# Patient Record
Sex: MALE | Race: WHITE | NOT HISPANIC OR LATINO
[De-identification: names, ages, dates, MRNs, and addresses within clinical notes are randomized per-mention and may not be internally consistent; named-entity substitution may affect disease eponyms.]

---

## 2017-01-18 PROBLEM — Z00.00 ENCOUNTER FOR PREVENTIVE HEALTH EXAMINATION: Status: ACTIVE | Noted: 2017-01-18

## 2017-01-19 ENCOUNTER — APPOINTMENT (OUTPATIENT)
Dept: CARDIOLOGY | Facility: CLINIC | Age: 52
End: 2017-01-19

## 2017-01-19 ENCOUNTER — NON-APPOINTMENT (OUTPATIENT)
Age: 52
End: 2017-01-19

## 2017-01-19 VITALS
TEMPERATURE: 98.3 F | BODY MASS INDEX: 35.29 KG/M2 | DIASTOLIC BLOOD PRESSURE: 93 MMHG | WEIGHT: 275 LBS | RESPIRATION RATE: 18 BRPM | OXYGEN SATURATION: 95 % | HEIGHT: 74 IN | SYSTOLIC BLOOD PRESSURE: 138 MMHG | HEART RATE: 82 BPM

## 2017-03-09 ENCOUNTER — APPOINTMENT (OUTPATIENT)
Dept: CARDIOLOGY | Facility: CLINIC | Age: 52
End: 2017-03-09

## 2017-03-09 ENCOUNTER — NON-APPOINTMENT (OUTPATIENT)
Age: 52
End: 2017-03-09

## 2017-03-09 VITALS
RESPIRATION RATE: 18 BRPM | HEIGHT: 74 IN | TEMPERATURE: 97.9 F | DIASTOLIC BLOOD PRESSURE: 90 MMHG | HEART RATE: 83 BPM | SYSTOLIC BLOOD PRESSURE: 133 MMHG | OXYGEN SATURATION: 95 % | WEIGHT: 272 LBS | BODY MASS INDEX: 34.91 KG/M2

## 2017-03-10 LAB
ALBUMIN SERPL ELPH-MCNC: 4.5 G/DL
ALP BLD-CCNC: 67 U/L
ALT SERPL-CCNC: 24 U/L
ANION GAP SERPL CALC-SCNC: 18 MMOL/L
AST SERPL-CCNC: 16 U/L
BILIRUB SERPL-MCNC: 0.7 MG/DL
BUN SERPL-MCNC: 14 MG/DL
CALCIUM SERPL-MCNC: 9.6 MG/DL
CHLORIDE SERPL-SCNC: 101 MMOL/L
CHOLEST SERPL-MCNC: 167 MG/DL
CHOLEST/HDLC SERPL: 3.2 RATIO
CO2 SERPL-SCNC: 22 MMOL/L
CREAT SERPL-MCNC: 1.07 MG/DL
GLUCOSE SERPL-MCNC: 96 MG/DL
HBA1C MFR BLD HPLC: 5.5 %
HDLC SERPL-MCNC: 53 MG/DL
LDLC SERPL CALC-MCNC: 78 MG/DL
POTASSIUM SERPL-SCNC: 4.4 MMOL/L
PROT SERPL-MCNC: 7.1 G/DL
SODIUM SERPL-SCNC: 141 MMOL/L
TRIGL SERPL-MCNC: 178 MG/DL

## 2017-03-22 ENCOUNTER — APPOINTMENT (OUTPATIENT)
Dept: CARDIOLOGY | Facility: CLINIC | Age: 52
End: 2017-03-22

## 2017-04-05 ENCOUNTER — OTHER (OUTPATIENT)
Age: 52
End: 2017-04-05

## 2017-04-06 ENCOUNTER — APPOINTMENT (OUTPATIENT)
Dept: CARDIOLOGY | Facility: CLINIC | Age: 52
End: 2017-04-06

## 2017-09-07 ENCOUNTER — OUTPATIENT (OUTPATIENT)
Dept: OUTPATIENT SERVICES | Facility: HOSPITAL | Age: 52
LOS: 1 days | End: 2017-09-07

## 2017-09-07 ENCOUNTER — APPOINTMENT (OUTPATIENT)
Dept: RADIOLOGY | Facility: HOSPITAL | Age: 52
End: 2017-09-07
Payer: COMMERCIAL

## 2017-09-07 DIAGNOSIS — R13.10 DYSPHAGIA, UNSPECIFIED: ICD-10-CM

## 2017-09-07 PROCEDURE — 74220 X-RAY XM ESOPHAGUS 1CNTRST: CPT | Mod: 26

## 2017-09-26 ENCOUNTER — APPOINTMENT (OUTPATIENT)
Dept: GASTROENTEROLOGY | Facility: CLINIC | Age: 52
End: 2017-09-26
Payer: COMMERCIAL

## 2017-09-26 VITALS
SYSTOLIC BLOOD PRESSURE: 130 MMHG | HEART RATE: 108 BPM | DIASTOLIC BLOOD PRESSURE: 80 MMHG | BODY MASS INDEX: 32.73 KG/M2 | WEIGHT: 255 LBS | TEMPERATURE: 98.7 F | OXYGEN SATURATION: 98 % | HEIGHT: 74 IN

## 2017-09-26 PROCEDURE — 99244 OFF/OP CNSLTJ NEW/EST MOD 40: CPT

## 2017-09-28 ENCOUNTER — NON-APPOINTMENT (OUTPATIENT)
Age: 52
End: 2017-09-28

## 2017-09-28 ENCOUNTER — APPOINTMENT (OUTPATIENT)
Dept: CARDIOLOGY | Facility: CLINIC | Age: 52
End: 2017-09-28
Payer: COMMERCIAL

## 2017-09-28 VITALS
DIASTOLIC BLOOD PRESSURE: 86 MMHG | HEART RATE: 100 BPM | HEIGHT: 74 IN | RESPIRATION RATE: 18 BRPM | OXYGEN SATURATION: 98 % | BODY MASS INDEX: 32.73 KG/M2 | WEIGHT: 255 LBS | SYSTOLIC BLOOD PRESSURE: 134 MMHG

## 2017-09-28 DIAGNOSIS — E78.5 HYPERLIPIDEMIA, UNSPECIFIED: ICD-10-CM

## 2017-09-28 PROCEDURE — 93000 ELECTROCARDIOGRAM COMPLETE: CPT

## 2017-09-28 PROCEDURE — 99215 OFFICE O/P EST HI 40 MIN: CPT

## 2017-09-29 PROBLEM — E78.5 HYPERLIPIDEMIA: Status: ACTIVE | Noted: 2017-01-19

## 2017-10-02 ENCOUNTER — APPOINTMENT (OUTPATIENT)
Dept: GASTROENTEROLOGY | Facility: AMBULATORY MEDICAL SERVICES | Age: 52
End: 2017-10-02

## 2017-10-05 ENCOUNTER — OUTPATIENT (OUTPATIENT)
Dept: OUTPATIENT SERVICES | Facility: HOSPITAL | Age: 52
LOS: 1 days | End: 2017-10-05
Payer: COMMERCIAL

## 2017-10-05 DIAGNOSIS — D62 ACUTE POSTHEMORRHAGIC ANEMIA: ICD-10-CM

## 2017-10-05 DIAGNOSIS — R13.11 DYSPHAGIA, ORAL PHASE: ICD-10-CM

## 2017-10-05 LAB
ALBUMIN SERPL ELPH-MCNC: 4 G/DL — SIGNIFICANT CHANGE UP (ref 3.3–5)
ALP SERPL-CCNC: 66 U/L — SIGNIFICANT CHANGE UP (ref 30–120)
ALT FLD-CCNC: 27 U/L DA — SIGNIFICANT CHANGE UP (ref 10–60)
ANION GAP SERPL CALC-SCNC: 7 MMOL/L — SIGNIFICANT CHANGE UP (ref 5–17)
AST SERPL-CCNC: 16 U/L — SIGNIFICANT CHANGE UP (ref 10–40)
BASOPHILS # BLD AUTO: 0.1 K/UL — SIGNIFICANT CHANGE UP (ref 0–0.2)
BASOPHILS NFR BLD AUTO: 1 % — SIGNIFICANT CHANGE UP (ref 0–2)
BILIRUB SERPL-MCNC: 0.6 MG/DL — SIGNIFICANT CHANGE UP (ref 0.2–1.2)
BUN SERPL-MCNC: 10 MG/DL — SIGNIFICANT CHANGE UP (ref 7–23)
CALCIUM SERPL-MCNC: 9.4 MG/DL — SIGNIFICANT CHANGE UP (ref 8.4–10.5)
CHLORIDE SERPL-SCNC: 104 MMOL/L — SIGNIFICANT CHANGE UP (ref 96–108)
CO2 SERPL-SCNC: 30 MMOL/L — SIGNIFICANT CHANGE UP (ref 22–31)
CREAT SERPL-MCNC: 1.32 MG/DL — HIGH (ref 0.5–1.3)
EOSINOPHIL # BLD AUTO: 0.1 K/UL — SIGNIFICANT CHANGE UP (ref 0–0.5)
EOSINOPHIL NFR BLD AUTO: 1 % — SIGNIFICANT CHANGE UP (ref 0–6)
GLUCOSE SERPL-MCNC: 90 MG/DL — SIGNIFICANT CHANGE UP (ref 70–99)
HCT VFR BLD CALC: 48.6 % — SIGNIFICANT CHANGE UP (ref 39–50)
HGB BLD-MCNC: 15.2 G/DL — SIGNIFICANT CHANGE UP (ref 13–17)
LYMPHOCYTES # BLD AUTO: 2.3 K/UL — SIGNIFICANT CHANGE UP (ref 1–3.3)
LYMPHOCYTES # BLD AUTO: 31.4 % — SIGNIFICANT CHANGE UP (ref 13–44)
MCHC RBC-ENTMCNC: 28.3 PG — SIGNIFICANT CHANGE UP (ref 27–34)
MCHC RBC-ENTMCNC: 31.2 GM/DL — LOW (ref 32–36)
MCV RBC AUTO: 90.6 FL — SIGNIFICANT CHANGE UP (ref 80–100)
MONOCYTES # BLD AUTO: 0.8 K/UL — SIGNIFICANT CHANGE UP (ref 0–0.9)
MONOCYTES NFR BLD AUTO: 11.4 % — SIGNIFICANT CHANGE UP (ref 2–14)
NEUTROPHILS # BLD AUTO: 4 K/UL — SIGNIFICANT CHANGE UP (ref 1.8–7.4)
NEUTROPHILS NFR BLD AUTO: 55.3 % — SIGNIFICANT CHANGE UP (ref 43–77)
PLATELET # BLD AUTO: 247 K/UL — SIGNIFICANT CHANGE UP (ref 150–400)
POTASSIUM SERPL-MCNC: 4.3 MMOL/L — SIGNIFICANT CHANGE UP (ref 3.5–5.3)
POTASSIUM SERPL-SCNC: 4.3 MMOL/L — SIGNIFICANT CHANGE UP (ref 3.5–5.3)
PROT SERPL-MCNC: 7.7 G/DL — SIGNIFICANT CHANGE UP (ref 6–8.3)
RBC # BLD: 5.37 M/UL — SIGNIFICANT CHANGE UP (ref 4.2–5.8)
RBC # FLD: 12.6 % — SIGNIFICANT CHANGE UP (ref 10.3–14.5)
SODIUM SERPL-SCNC: 141 MMOL/L — SIGNIFICANT CHANGE UP (ref 135–145)
WBC # BLD: 7.3 K/UL — SIGNIFICANT CHANGE UP (ref 3.8–10.5)
WBC # FLD AUTO: 7.3 K/UL — SIGNIFICANT CHANGE UP (ref 3.8–10.5)

## 2017-10-05 PROCEDURE — 36415 COLL VENOUS BLD VENIPUNCTURE: CPT

## 2017-10-05 PROCEDURE — 86301 IMMUNOASSAY TUMOR CA 19-9: CPT

## 2017-10-05 PROCEDURE — 80053 COMPREHEN METABOLIC PANEL: CPT

## 2017-10-05 PROCEDURE — 85027 COMPLETE CBC AUTOMATED: CPT

## 2017-10-05 PROCEDURE — 82378 CARCINOEMBRYONIC ANTIGEN: CPT

## 2017-10-06 LAB
CANCER AG19-9 SERPL-ACNC: 20.2 U/ML — SIGNIFICANT CHANGE UP
CEA SERPL-MCNC: 2.4 NG/ML — SIGNIFICANT CHANGE UP (ref 0–3.8)

## 2017-10-10 ENCOUNTER — TRANSCRIPTION ENCOUNTER (OUTPATIENT)
Age: 52
End: 2017-10-10

## 2017-10-16 ENCOUNTER — RESULT REVIEW (OUTPATIENT)
Age: 52
End: 2017-10-16

## 2017-10-16 ENCOUNTER — TRANSCRIPTION ENCOUNTER (OUTPATIENT)
Age: 52
End: 2017-10-16

## 2017-10-16 ENCOUNTER — OUTPATIENT (OUTPATIENT)
Dept: OUTPATIENT SERVICES | Facility: HOSPITAL | Age: 52
LOS: 1 days | Discharge: HOME | End: 2017-10-16
Payer: COMMERCIAL

## 2017-10-16 ENCOUNTER — RX RENEWAL (OUTPATIENT)
Age: 52
End: 2017-10-16

## 2017-10-16 DIAGNOSIS — R13.10 DYSPHAGIA, UNSPECIFIED: ICD-10-CM

## 2017-10-16 PROCEDURE — 88342 IMHCHEM/IMCYTCHM 1ST ANTB: CPT | Mod: 26

## 2017-10-16 PROCEDURE — 88341 IMHCHEM/IMCYTCHM EA ADD ANTB: CPT | Mod: 26

## 2017-10-16 PROCEDURE — 88312 SPECIAL STAINS GROUP 1: CPT | Mod: 26

## 2017-10-16 PROCEDURE — 88313 SPECIAL STAINS GROUP 2: CPT

## 2017-10-16 PROCEDURE — 88313 SPECIAL STAINS GROUP 2: CPT | Mod: 26

## 2017-10-16 PROCEDURE — 88305 TISSUE EXAM BY PATHOLOGIST: CPT | Mod: 26

## 2017-10-16 PROCEDURE — 88312 SPECIAL STAINS GROUP 1: CPT

## 2017-10-16 PROCEDURE — 88305 TISSUE EXAM BY PATHOLOGIST: CPT

## 2017-10-16 PROCEDURE — 88342 IMHCHEM/IMCYTCHM 1ST ANTB: CPT

## 2017-10-16 PROCEDURE — 88341 IMHCHEM/IMCYTCHM EA ADD ANTB: CPT

## 2017-10-16 PROCEDURE — 43239 EGD BIOPSY SINGLE/MULTIPLE: CPT

## 2017-10-17 ENCOUNTER — APPOINTMENT (OUTPATIENT)
Dept: THORACIC SURGERY | Facility: CLINIC | Age: 52
End: 2017-10-17
Payer: COMMERCIAL

## 2017-10-17 ENCOUNTER — OUTPATIENT (OUTPATIENT)
Dept: OUTPATIENT SERVICES | Facility: HOSPITAL | Age: 52
LOS: 1 days | End: 2017-10-17
Payer: COMMERCIAL

## 2017-10-17 ENCOUNTER — CHART COPY (OUTPATIENT)
Age: 52
End: 2017-10-17

## 2017-10-17 VITALS
OXYGEN SATURATION: 98 % | HEART RATE: 91 BPM | BODY MASS INDEX: 31.95 KG/M2 | SYSTOLIC BLOOD PRESSURE: 104 MMHG | WEIGHT: 249 LBS | DIASTOLIC BLOOD PRESSURE: 88 MMHG | HEIGHT: 74 IN

## 2017-10-17 DIAGNOSIS — R13.11 DYSPHAGIA, ORAL PHASE: ICD-10-CM

## 2017-10-17 DIAGNOSIS — Z87.891 PERSONAL HISTORY OF NICOTINE DEPENDENCE: ICD-10-CM

## 2017-10-17 LAB
ANION GAP SERPL CALC-SCNC: 9 MMOL/L — SIGNIFICANT CHANGE UP (ref 5–17)
BUN SERPL-MCNC: 11 MG/DL — SIGNIFICANT CHANGE UP (ref 7–23)
CALCIUM SERPL-MCNC: 9.4 MG/DL — SIGNIFICANT CHANGE UP (ref 8.4–10.5)
CHLORIDE SERPL-SCNC: 102 MMOL/L — SIGNIFICANT CHANGE UP (ref 96–108)
CO2 SERPL-SCNC: 28 MMOL/L — SIGNIFICANT CHANGE UP (ref 22–31)
CREAT SERPL-MCNC: 1.11 MG/DL — SIGNIFICANT CHANGE UP (ref 0.5–1.3)
GLUCOSE SERPL-MCNC: 98 MG/DL — SIGNIFICANT CHANGE UP (ref 70–99)
POTASSIUM SERPL-MCNC: 4.3 MMOL/L — SIGNIFICANT CHANGE UP (ref 3.5–5.3)
POTASSIUM SERPL-SCNC: 4.3 MMOL/L — SIGNIFICANT CHANGE UP (ref 3.5–5.3)
SODIUM SERPL-SCNC: 139 MMOL/L — SIGNIFICANT CHANGE UP (ref 135–145)

## 2017-10-17 PROCEDURE — 36415 COLL VENOUS BLD VENIPUNCTURE: CPT

## 2017-10-17 PROCEDURE — 99205 OFFICE O/P NEW HI 60 MIN: CPT

## 2017-10-17 PROCEDURE — 80048 BASIC METABOLIC PNL TOTAL CA: CPT

## 2017-10-17 RX ORDER — ATORVASTATIN CALCIUM 20 MG/1
20 TABLET, FILM COATED ORAL
Qty: 90 | Refills: 3 | Status: DISCONTINUED | COMMUNITY
Start: 2017-01-19 | End: 2017-10-17

## 2017-10-17 RX ORDER — TICAGRELOR 90 MG/1
90 TABLET ORAL TWICE DAILY
Qty: 180 | Refills: 3 | Status: DISCONTINUED | COMMUNITY
Start: 2017-01-19 | End: 2017-10-17

## 2017-10-19 DIAGNOSIS — Z79.82 LONG TERM (CURRENT) USE OF ASPIRIN: ICD-10-CM

## 2017-10-19 DIAGNOSIS — I25.2 OLD MYOCARDIAL INFARCTION: ICD-10-CM

## 2017-10-19 DIAGNOSIS — I25.10 ATHEROSCLEROTIC HEART DISEASE OF NATIVE CORONARY ARTERY WITHOUT ANGINA PECTORIS: ICD-10-CM

## 2017-10-19 DIAGNOSIS — K44.9 DIAPHRAGMATIC HERNIA WITHOUT OBSTRUCTION OR GANGRENE: ICD-10-CM

## 2017-10-19 DIAGNOSIS — K29.50 UNSPECIFIED CHRONIC GASTRITIS WITHOUT BLEEDING: ICD-10-CM

## 2017-10-20 ENCOUNTER — APPOINTMENT (OUTPATIENT)
Dept: GASTROENTEROLOGY | Facility: HOSPITAL | Age: 52
End: 2017-10-20

## 2017-10-20 ENCOUNTER — OUTPATIENT (OUTPATIENT)
Dept: OUTPATIENT SERVICES | Facility: HOSPITAL | Age: 52
LOS: 1 days | End: 2017-10-20
Payer: COMMERCIAL

## 2017-10-20 ENCOUNTER — APPOINTMENT (OUTPATIENT)
Dept: CT IMAGING | Facility: IMAGING CENTER | Age: 52
End: 2017-10-20
Payer: COMMERCIAL

## 2017-10-20 DIAGNOSIS — Z00.8 ENCOUNTER FOR OTHER GENERAL EXAMINATION: ICD-10-CM

## 2017-10-20 PROCEDURE — 74177 CT ABD & PELVIS W/CONTRAST: CPT | Mod: 26

## 2017-10-20 PROCEDURE — 71260 CT THORAX DX C+: CPT

## 2017-10-20 PROCEDURE — 74177 CT ABD & PELVIS W/CONTRAST: CPT

## 2017-10-20 PROCEDURE — 71260 CT THORAX DX C+: CPT | Mod: 26

## 2017-10-23 ENCOUNTER — APPOINTMENT (OUTPATIENT)
Dept: NUCLEAR MEDICINE | Facility: IMAGING CENTER | Age: 52
End: 2017-10-23
Payer: COMMERCIAL

## 2017-10-23 ENCOUNTER — OUTPATIENT (OUTPATIENT)
Dept: OUTPATIENT SERVICES | Facility: HOSPITAL | Age: 52
LOS: 1 days | End: 2017-10-23
Payer: COMMERCIAL

## 2017-10-23 DIAGNOSIS — K22.9 DISEASE OF ESOPHAGUS, UNSPECIFIED: ICD-10-CM

## 2017-10-23 PROCEDURE — 78815 PET IMAGE W/CT SKULL-THIGH: CPT | Mod: 26,PI

## 2017-10-23 PROCEDURE — A9552: CPT

## 2017-10-23 PROCEDURE — 78815 PET IMAGE W/CT SKULL-THIGH: CPT

## 2017-10-24 ENCOUNTER — APPOINTMENT (OUTPATIENT)
Dept: THORACIC SURGERY | Facility: CLINIC | Age: 52
End: 2017-10-24
Payer: COMMERCIAL

## 2017-10-24 DIAGNOSIS — C15.9 MALIGNANT NEOPLASM OF ESOPHAGUS, UNSPECIFIED: ICD-10-CM

## 2017-10-24 PROCEDURE — 99214 OFFICE O/P EST MOD 30 MIN: CPT

## 2017-10-24 RX ORDER — ERYTHROMYCIN 20 MG/ML
2 SWAB TOPICAL
Qty: 60 | Refills: 0 | Status: COMPLETED | COMMUNITY
Start: 2017-07-14

## 2017-10-25 ENCOUNTER — OUTPATIENT (OUTPATIENT)
Dept: OUTPATIENT SERVICES | Facility: HOSPITAL | Age: 52
LOS: 1 days | Discharge: HOME | End: 2017-10-25

## 2017-10-25 ENCOUNTER — APPOINTMENT (OUTPATIENT)
Dept: THORACIC SURGERY | Facility: CLINIC | Age: 52
End: 2017-10-25

## 2017-10-25 DIAGNOSIS — C15.3 MALIGNANT NEOPLASM OF UPPER THIRD OF ESOPHAGUS: ICD-10-CM

## 2017-10-26 ENCOUNTER — APPOINTMENT (OUTPATIENT)
Dept: GASTROENTEROLOGY | Facility: HOSPITAL | Age: 52
End: 2017-10-26

## 2017-10-26 ENCOUNTER — RESULT REVIEW (OUTPATIENT)
Age: 52
End: 2017-10-26

## 2017-10-26 ENCOUNTER — OUTPATIENT (OUTPATIENT)
Dept: OUTPATIENT SERVICES | Facility: HOSPITAL | Age: 52
LOS: 1 days | Discharge: HOME | End: 2017-10-26
Payer: COMMERCIAL

## 2017-10-26 DIAGNOSIS — K22.9 DISEASE OF ESOPHAGUS, UNSPECIFIED: ICD-10-CM

## 2017-10-26 PROCEDURE — 43259 EGD US EXAM DUODENUM/JEJUNUM: CPT | Mod: GC

## 2017-10-26 PROCEDURE — 43259 EGD US EXAM DUODENUM/JEJUNUM: CPT

## 2017-10-27 ENCOUNTER — OUTPATIENT (OUTPATIENT)
Dept: OUTPATIENT SERVICES | Facility: HOSPITAL | Age: 52
LOS: 1 days | Discharge: HOME | End: 2017-10-27

## 2017-10-30 ENCOUNTER — APPOINTMENT (OUTPATIENT)
Dept: HEMATOLOGY ONCOLOGY | Facility: CLINIC | Age: 52
End: 2017-10-30
Payer: COMMERCIAL

## 2017-10-30 VITALS
SYSTOLIC BLOOD PRESSURE: 131 MMHG | TEMPERATURE: 97.9 F | HEART RATE: 107 BPM | RESPIRATION RATE: 16 BRPM | OXYGEN SATURATION: 98 % | BODY MASS INDEX: 32 KG/M2 | HEIGHT: 73.31 IN | WEIGHT: 244.05 LBS | DIASTOLIC BLOOD PRESSURE: 80 MMHG

## 2017-10-30 PROCEDURE — 99245 OFF/OP CONSLTJ NEW/EST HI 55: CPT

## 2017-10-31 ENCOUNTER — APPOINTMENT (OUTPATIENT)
Dept: RADIATION ONCOLOGY | Facility: CLINIC | Age: 52
End: 2017-10-31
Payer: COMMERCIAL

## 2017-10-31 ENCOUNTER — APPOINTMENT (OUTPATIENT)
Dept: GASTROENTEROLOGY | Facility: CLINIC | Age: 52
End: 2017-10-31

## 2017-10-31 VITALS
DIASTOLIC BLOOD PRESSURE: 85 MMHG | BODY MASS INDEX: 31.13 KG/M2 | WEIGHT: 240 LBS | SYSTOLIC BLOOD PRESSURE: 127 MMHG | HEART RATE: 99 BPM | HEIGHT: 73.5 IN | OXYGEN SATURATION: 96 %

## 2017-10-31 PROCEDURE — 99244 OFF/OP CNSLTJ NEW/EST MOD 40: CPT | Mod: 25

## 2017-11-02 ENCOUNTER — CHART COPY (OUTPATIENT)
Age: 52
End: 2017-11-02

## 2017-11-06 PROCEDURE — 77470 SPECIAL RADIATION TREATMENT: CPT | Mod: 26

## 2017-11-13 ENCOUNTER — APPOINTMENT (OUTPATIENT)
Dept: HEMATOLOGY ONCOLOGY | Facility: CLINIC | Age: 52
End: 2017-11-13

## 2017-11-15 ENCOUNTER — APPOINTMENT (OUTPATIENT)
Dept: GASTROENTEROLOGY | Facility: HOSPITAL | Age: 52
End: 2017-11-15

## 2017-11-17 ENCOUNTER — APPOINTMENT (OUTPATIENT)
Dept: HEMATOLOGY ONCOLOGY | Facility: CLINIC | Age: 52
End: 2017-11-17

## 2017-11-17 ENCOUNTER — OUTPATIENT (OUTPATIENT)
Dept: OUTPATIENT SERVICES | Facility: HOSPITAL | Age: 52
LOS: 1 days | Discharge: HOME | End: 2017-11-17
Payer: COMMERCIAL

## 2017-11-17 DIAGNOSIS — C15.9 MALIGNANT NEOPLASM OF ESOPHAGUS, UNSPECIFIED: ICD-10-CM

## 2017-11-20 ENCOUNTER — APPOINTMENT (OUTPATIENT)
Dept: GASTROENTEROLOGY | Facility: HOSPITAL | Age: 52
End: 2017-11-20

## 2017-11-20 ENCOUNTER — OUTPATIENT (OUTPATIENT)
Dept: OUTPATIENT SERVICES | Facility: HOSPITAL | Age: 52
LOS: 1 days | Discharge: HOME | End: 2017-11-20
Payer: COMMERCIAL

## 2017-11-20 DIAGNOSIS — C15.9 MALIGNANT NEOPLASM OF ESOPHAGUS, UNSPECIFIED: ICD-10-CM

## 2017-11-20 PROCEDURE — 43242 EGD US FINE NEEDLE BX/ASPIR: CPT | Mod: GC

## 2017-11-21 ENCOUNTER — RESULT REVIEW (OUTPATIENT)
Age: 52
End: 2017-11-21

## 2017-11-21 PROCEDURE — 77263 THER RADIOLOGY TX PLNG CPLX: CPT

## 2017-11-21 PROCEDURE — 88305 TISSUE EXAM BY PATHOLOGIST: CPT | Mod: 26

## 2017-11-21 PROCEDURE — 88342 IMHCHEM/IMCYTCHM 1ST ANTB: CPT | Mod: 26,59

## 2017-11-21 PROCEDURE — 88360 TUMOR IMMUNOHISTOCHEM/MANUAL: CPT | Mod: 26

## 2017-11-21 PROCEDURE — 88341 IMHCHEM/IMCYTCHM EA ADD ANTB: CPT | Mod: 26,59

## 2017-11-27 ENCOUNTER — APPOINTMENT (OUTPATIENT)
Dept: HEMATOLOGY ONCOLOGY | Facility: CLINIC | Age: 52
End: 2017-11-27

## 2017-11-29 ENCOUNTER — CHART COPY (OUTPATIENT)
Age: 52
End: 2017-11-29

## 2017-12-11 PROCEDURE — 77338 DESIGN MLC DEVICE FOR IMRT: CPT | Mod: 26

## 2017-12-11 PROCEDURE — 77300 RADIATION THERAPY DOSE PLAN: CPT | Mod: 26

## 2017-12-11 PROCEDURE — 77301 RADIOTHERAPY DOSE PLAN IMRT: CPT | Mod: 26

## 2017-12-18 PROCEDURE — 77387B: CUSTOM | Mod: 26

## 2017-12-19 PROCEDURE — 77387B: CUSTOM | Mod: 26

## 2017-12-20 VITALS — BODY MASS INDEX: 30.79 KG/M2 | WEIGHT: 236.56 LBS

## 2017-12-20 PROCEDURE — 77387B: CUSTOM | Mod: 26

## 2017-12-21 PROCEDURE — 77387B: CUSTOM | Mod: 26

## 2017-12-22 PROCEDURE — 77427 RADIATION TX MANAGEMENT X5: CPT

## 2017-12-22 PROCEDURE — 77387B: CUSTOM | Mod: 26

## 2017-12-26 PROCEDURE — 77387B: CUSTOM | Mod: 26

## 2017-12-27 PROCEDURE — 77387B: CUSTOM | Mod: 26

## 2017-12-28 PROCEDURE — 77387B: CUSTOM | Mod: 26

## 2017-12-29 PROCEDURE — 77387B: CUSTOM | Mod: 26

## 2018-01-02 PROCEDURE — 77387B: CUSTOM | Mod: 26

## 2018-01-03 PROCEDURE — 77387B: CUSTOM | Mod: 26

## 2018-01-05 ENCOUNTER — MOBILE ON CALL (OUTPATIENT)
Age: 53
End: 2018-01-05

## 2018-01-05 VITALS
HEART RATE: 82 BPM | DIASTOLIC BLOOD PRESSURE: 85 MMHG | WEIGHT: 226.3 LBS | RESPIRATION RATE: 18 BRPM | OXYGEN SATURATION: 100 % | BODY MASS INDEX: 29.45 KG/M2 | SYSTOLIC BLOOD PRESSURE: 133 MMHG

## 2018-01-05 PROCEDURE — 77427 RADIATION TX MANAGEMENT X5: CPT

## 2018-01-05 PROCEDURE — 77387B: CUSTOM | Mod: 26

## 2018-01-08 PROCEDURE — 77387B: CUSTOM | Mod: 26

## 2018-01-09 PROCEDURE — 77387B: CUSTOM | Mod: 26

## 2018-01-10 PROCEDURE — 77387B: CUSTOM | Mod: 26

## 2018-01-11 ENCOUNTER — OTHER (OUTPATIENT)
Age: 53
End: 2018-01-11

## 2018-01-11 VITALS
RESPIRATION RATE: 18 BRPM | DIASTOLIC BLOOD PRESSURE: 77 MMHG | OXYGEN SATURATION: 96 % | SYSTOLIC BLOOD PRESSURE: 111 MMHG | HEART RATE: 100 BPM | WEIGHT: 220.99 LBS | BODY MASS INDEX: 28.76 KG/M2

## 2018-01-11 PROCEDURE — 77387B: CUSTOM | Mod: 26

## 2018-01-12 PROCEDURE — 77427 RADIATION TX MANAGEMENT X5: CPT

## 2018-01-12 PROCEDURE — 77387B: CUSTOM | Mod: 26

## 2018-01-16 PROCEDURE — 77387B: CUSTOM | Mod: 26

## 2018-01-17 ENCOUNTER — OTHER (OUTPATIENT)
Age: 53
End: 2018-01-17

## 2018-01-17 VITALS
DIASTOLIC BLOOD PRESSURE: 83 MMHG | BODY MASS INDEX: 28.34 KG/M2 | HEIGHT: 73 IN | OXYGEN SATURATION: 98 % | WEIGHT: 213.82 LBS | SYSTOLIC BLOOD PRESSURE: 120 MMHG | TEMPERATURE: 98.1 F | RESPIRATION RATE: 16 BRPM | HEART RATE: 90 BPM

## 2018-01-17 PROCEDURE — 77387B: CUSTOM | Mod: 26

## 2018-01-18 PROCEDURE — 77387B: CUSTOM | Mod: 26

## 2018-01-19 PROCEDURE — 77387B: CUSTOM | Mod: 26

## 2018-01-19 PROCEDURE — 77427 RADIATION TX MANAGEMENT X5: CPT

## 2018-01-22 PROCEDURE — 77387B: CUSTOM | Mod: 26

## 2018-01-23 VITALS
HEART RATE: 84 BPM | OXYGEN SATURATION: 97 % | TEMPERATURE: 98.1 F | HEIGHT: 73 IN | WEIGHT: 218 LBS | BODY MASS INDEX: 28.89 KG/M2 | SYSTOLIC BLOOD PRESSURE: 112 MMHG | RESPIRATION RATE: 16 BRPM | DIASTOLIC BLOOD PRESSURE: 79 MMHG

## 2018-01-23 PROCEDURE — 77387B: CUSTOM | Mod: 26

## 2018-01-24 PROCEDURE — G6002: CPT | Mod: 26

## 2018-01-25 PROCEDURE — G6002: CPT | Mod: 26

## 2018-01-26 PROCEDURE — 77427 RADIATION TX MANAGEMENT X5: CPT

## 2018-01-26 PROCEDURE — 77387B: CUSTOM | Mod: 26

## 2018-01-29 PROCEDURE — 77387B: CUSTOM | Mod: 26

## 2018-01-30 VITALS
BODY MASS INDEX: 28.4 KG/M2 | DIASTOLIC BLOOD PRESSURE: 83 MMHG | TEMPERATURE: 97.8 F | RESPIRATION RATE: 16 BRPM | WEIGHT: 214.31 LBS | HEIGHT: 73 IN | OXYGEN SATURATION: 98 % | HEART RATE: 88 BPM | SYSTOLIC BLOOD PRESSURE: 124 MMHG

## 2018-01-30 PROCEDURE — 77387B: CUSTOM | Mod: 26

## 2018-01-31 ENCOUNTER — APPOINTMENT (OUTPATIENT)
Dept: THORACIC SURGERY | Facility: CLINIC | Age: 53
End: 2018-01-31
Payer: COMMERCIAL

## 2018-01-31 ENCOUNTER — OTHER (OUTPATIENT)
Age: 53
End: 2018-01-31

## 2018-02-06 ENCOUNTER — OTHER (OUTPATIENT)
Age: 53
End: 2018-02-06

## 2018-02-07 ENCOUNTER — APPOINTMENT (OUTPATIENT)
Dept: THORACIC SURGERY | Facility: CLINIC | Age: 53
End: 2018-02-07
Payer: COMMERCIAL

## 2018-02-07 VITALS
DIASTOLIC BLOOD PRESSURE: 89 MMHG | HEIGHT: 73 IN | OXYGEN SATURATION: 100 % | WEIGHT: 211 LBS | RESPIRATION RATE: 16 BRPM | HEART RATE: 108 BPM | SYSTOLIC BLOOD PRESSURE: 111 MMHG | BODY MASS INDEX: 27.96 KG/M2

## 2018-02-07 PROCEDURE — 99215 OFFICE O/P EST HI 40 MIN: CPT

## 2018-02-07 RX ORDER — SUCRALFATE 1 G/10ML
1 SUSPENSION ORAL 4 TIMES DAILY
Qty: 600 | Refills: 5 | Status: COMPLETED | COMMUNITY
Start: 2018-01-25 | End: 2018-02-07

## 2018-02-07 RX ORDER — DRONABINOL 2.5 MG/1
2.5 CAPSULE ORAL 3 TIMES DAILY
Qty: 90 | Refills: 0 | Status: COMPLETED | COMMUNITY
Start: 2018-01-23 | End: 2018-02-07

## 2018-02-07 RX ORDER — DRONABINOL 5 MG/ML
5 SOLUTION ORAL 3 TIMES DAILY
Qty: 30 | Refills: 0 | Status: COMPLETED | COMMUNITY
Start: 2018-01-25 | End: 2018-02-07

## 2018-02-07 RX ORDER — ASPIRIN 81 MG/1
81 TABLET ORAL
Qty: 90 | Refills: 3 | Status: COMPLETED | COMMUNITY
Start: 2017-01-19 | End: 2018-02-07

## 2018-02-07 RX ORDER — METOCLOPRAMIDE 10 MG/1
10 TABLET ORAL
Qty: 60 | Refills: 2 | Status: COMPLETED | COMMUNITY
Start: 2017-12-22 | End: 2018-02-07

## 2018-02-07 RX ORDER — DIPHENHYDRAMINE HYDROCHLORIDE AND LIDOCAINE HYDROCHLORIDE AND ALUMINUM HYDROXIDE AND MAGNESIUM HYDRO
KIT
Qty: 600 | Refills: 5 | Status: COMPLETED | COMMUNITY
Start: 2018-01-25 | End: 2018-02-07

## 2018-02-07 RX ORDER — ONDANSETRON 4 MG/1
4 TABLET, ORALLY DISINTEGRATING ORAL
Qty: 48 | Refills: 0 | Status: COMPLETED | COMMUNITY
Start: 2018-01-08

## 2018-02-07 RX ORDER — HYDROCORTISONE 25 MG/G
2.5 CREAM TOPICAL
Qty: 28 | Refills: 0 | Status: COMPLETED | COMMUNITY
Start: 2018-02-01

## 2018-02-08 ENCOUNTER — CHART COPY (OUTPATIENT)
Age: 53
End: 2018-02-08

## 2018-02-13 ENCOUNTER — OTHER (OUTPATIENT)
Age: 53
End: 2018-02-13

## 2018-03-14 ENCOUNTER — APPOINTMENT (OUTPATIENT)
Dept: THORACIC SURGERY | Facility: CLINIC | Age: 53
End: 2018-03-14

## 2018-03-15 ENCOUNTER — APPOINTMENT (OUTPATIENT)
Dept: RADIATION ONCOLOGY | Facility: CLINIC | Age: 53
End: 2018-03-15
Payer: MEDICARE

## 2018-03-15 VITALS
DIASTOLIC BLOOD PRESSURE: 76 MMHG | SYSTOLIC BLOOD PRESSURE: 108 MMHG | RESPIRATION RATE: 18 BRPM | HEART RATE: 93 BPM | OXYGEN SATURATION: 98 % | WEIGHT: 216 LBS | BODY MASS INDEX: 28.5 KG/M2

## 2018-03-15 PROCEDURE — 99024 POSTOP FOLLOW-UP VISIT: CPT

## 2018-03-15 RX ORDER — ONDANSETRON 4 MG/1
4 TABLET ORAL
Qty: 30 | Refills: 0 | Status: DISCONTINUED | COMMUNITY
Start: 2017-11-24

## 2018-03-15 RX ORDER — PROCHLORPERAZINE MALEATE 10 MG/1
10 TABLET ORAL
Qty: 60 | Refills: 0 | Status: DISCONTINUED | COMMUNITY
Start: 2017-11-30

## 2018-03-19 ENCOUNTER — CHART COPY (OUTPATIENT)
Age: 53
End: 2018-03-19

## 2018-03-29 ENCOUNTER — FORM ENCOUNTER (OUTPATIENT)
Age: 53
End: 2018-03-29

## 2018-03-30 ENCOUNTER — OUTPATIENT (OUTPATIENT)
Dept: OUTPATIENT SERVICES | Facility: HOSPITAL | Age: 53
LOS: 1 days | End: 2018-03-30
Payer: COMMERCIAL

## 2018-03-30 ENCOUNTER — APPOINTMENT (OUTPATIENT)
Dept: CT IMAGING | Facility: IMAGING CENTER | Age: 53
End: 2018-03-30
Payer: COMMERCIAL

## 2018-03-30 ENCOUNTER — APPOINTMENT (OUTPATIENT)
Dept: NUCLEAR MEDICINE | Facility: IMAGING CENTER | Age: 53
End: 2018-03-30

## 2018-03-30 DIAGNOSIS — Z00.8 ENCOUNTER FOR OTHER GENERAL EXAMINATION: ICD-10-CM

## 2018-03-30 PROCEDURE — 71250 CT THORAX DX C-: CPT | Mod: 26

## 2018-03-30 PROCEDURE — 74176 CT ABD & PELVIS W/O CONTRAST: CPT | Mod: 26

## 2018-03-30 PROCEDURE — 71250 CT THORAX DX C-: CPT

## 2018-03-30 PROCEDURE — 74176 CT ABD & PELVIS W/O CONTRAST: CPT

## 2018-04-05 VITALS
HEART RATE: 98 BPM | DIASTOLIC BLOOD PRESSURE: 80 MMHG | RESPIRATION RATE: 16 BRPM | BODY MASS INDEX: 28.63 KG/M2 | WEIGHT: 216 LBS | SYSTOLIC BLOOD PRESSURE: 104 MMHG | OXYGEN SATURATION: 97 % | HEIGHT: 73 IN

## 2018-04-11 ENCOUNTER — APPOINTMENT (OUTPATIENT)
Dept: THORACIC SURGERY | Facility: CLINIC | Age: 53
End: 2018-04-11
Payer: COMMERCIAL

## 2018-04-11 VITALS
OXYGEN SATURATION: 95 % | BODY MASS INDEX: 27.96 KG/M2 | HEIGHT: 73 IN | DIASTOLIC BLOOD PRESSURE: 76 MMHG | SYSTOLIC BLOOD PRESSURE: 109 MMHG | HEART RATE: 100 BPM | RESPIRATION RATE: 16 BRPM | WEIGHT: 211 LBS

## 2018-04-11 PROCEDURE — 99215 OFFICE O/P EST HI 40 MIN: CPT

## 2018-04-12 ENCOUNTER — CHART COPY (OUTPATIENT)
Age: 53
End: 2018-04-12

## 2018-04-12 ENCOUNTER — FORM ENCOUNTER (OUTPATIENT)
Age: 53
End: 2018-04-12

## 2018-04-13 ENCOUNTER — APPOINTMENT (OUTPATIENT)
Dept: NUCLEAR MEDICINE | Facility: IMAGING CENTER | Age: 53
End: 2018-04-13
Payer: COMMERCIAL

## 2018-04-13 ENCOUNTER — OUTPATIENT (OUTPATIENT)
Dept: OUTPATIENT SERVICES | Facility: HOSPITAL | Age: 53
LOS: 1 days | End: 2018-04-13
Payer: COMMERCIAL

## 2018-04-13 DIAGNOSIS — C15.9 MALIGNANT NEOPLASM OF ESOPHAGUS, UNSPECIFIED: ICD-10-CM

## 2018-04-13 PROCEDURE — 78815 PET IMAGE W/CT SKULL-THIGH: CPT

## 2018-04-13 PROCEDURE — 78815 PET IMAGE W/CT SKULL-THIGH: CPT | Mod: 26,PS

## 2018-04-13 PROCEDURE — A9552: CPT

## 2018-04-18 ENCOUNTER — APPOINTMENT (OUTPATIENT)
Dept: THORACIC SURGERY | Facility: CLINIC | Age: 53
End: 2018-04-18
Payer: COMMERCIAL

## 2018-04-18 VITALS
WEIGHT: 211 LBS | HEART RATE: 98 BPM | SYSTOLIC BLOOD PRESSURE: 104 MMHG | BODY MASS INDEX: 27.96 KG/M2 | RESPIRATION RATE: 16 BRPM | DIASTOLIC BLOOD PRESSURE: 80 MMHG | HEIGHT: 73 IN | OXYGEN SATURATION: 97 %

## 2018-04-18 PROCEDURE — 99215 OFFICE O/P EST HI 40 MIN: CPT

## 2018-04-25 ENCOUNTER — APPOINTMENT (OUTPATIENT)
Dept: SURGICAL ONCOLOGY | Facility: CLINIC | Age: 53
End: 2018-04-25
Payer: COMMERCIAL

## 2018-04-25 VITALS
SYSTOLIC BLOOD PRESSURE: 121 MMHG | HEART RATE: 80 BPM | DIASTOLIC BLOOD PRESSURE: 75 MMHG | TEMPERATURE: 97.3 F | HEIGHT: 73 IN | BODY MASS INDEX: 27.96 KG/M2 | OXYGEN SATURATION: 95 % | WEIGHT: 211 LBS

## 2018-04-25 PROCEDURE — 99245 OFF/OP CONSLTJ NEW/EST HI 55: CPT

## 2018-04-25 RX ORDER — CLOTRIMAZOLE AND BETAMETHASONE DIPROPIONATE 10; .5 MG/G; MG/G
1-0.05 CREAM TOPICAL
Qty: 45 | Refills: 0 | Status: DISCONTINUED | COMMUNITY
Start: 2018-03-19 | End: 2018-04-25

## 2018-04-25 RX ORDER — DOCUSATE SODIUM 100 MG/1
100 CAPSULE, LIQUID FILLED ORAL
Qty: 10 | Refills: 0 | Status: DISCONTINUED | COMMUNITY
Start: 2018-02-01 | End: 2018-04-25

## 2018-04-25 RX ORDER — SUCRALFATE 1 G/10ML
1 SUSPENSION ORAL 3 TIMES DAILY
Qty: 1 | Refills: 0 | Status: DISCONTINUED | COMMUNITY
Start: 2018-01-11 | End: 2018-04-25

## 2018-05-16 ENCOUNTER — APPOINTMENT (OUTPATIENT)
Dept: THORACIC SURGERY | Facility: CLINIC | Age: 53
End: 2018-05-16
Payer: COMMERCIAL

## 2018-05-16 VITALS
HEART RATE: 95 BPM | OXYGEN SATURATION: 97 % | SYSTOLIC BLOOD PRESSURE: 132 MMHG | HEIGHT: 73 IN | TEMPERATURE: 98 F | WEIGHT: 211 LBS | BODY MASS INDEX: 27.96 KG/M2 | RESPIRATION RATE: 15 BRPM | DIASTOLIC BLOOD PRESSURE: 88 MMHG

## 2018-05-16 PROCEDURE — 99213 OFFICE O/P EST LOW 20 MIN: CPT

## 2018-05-17 ENCOUNTER — APPOINTMENT (OUTPATIENT)
Dept: CARDIOLOGY | Facility: CLINIC | Age: 53
End: 2018-05-17
Payer: COMMERCIAL

## 2018-05-17 ENCOUNTER — NON-APPOINTMENT (OUTPATIENT)
Age: 53
End: 2018-05-17

## 2018-05-17 VITALS
RESPIRATION RATE: 17 BRPM | SYSTOLIC BLOOD PRESSURE: 118 MMHG | HEIGHT: 73 IN | HEART RATE: 78 BPM | DIASTOLIC BLOOD PRESSURE: 76 MMHG | OXYGEN SATURATION: 96 % | WEIGHT: 213 LBS | TEMPERATURE: 97.9 F | BODY MASS INDEX: 28.23 KG/M2

## 2018-05-17 DIAGNOSIS — Z95.5 PRESENCE OF CORONARY ANGIOPLASTY IMPLANT AND GRAFT: ICD-10-CM

## 2018-05-17 LAB
ALBUMIN SERPL ELPH-MCNC: 4.4 G/DL
ALP BLD-CCNC: 79 U/L
ALT SERPL-CCNC: 46 U/L
ANION GAP SERPL CALC-SCNC: 13 MMOL/L
AST SERPL-CCNC: 30 U/L
BASOPHILS # BLD AUTO: 0 K/UL
BASOPHILS NFR BLD AUTO: 0 %
BILIRUB SERPL-MCNC: 0.5 MG/DL
BUN SERPL-MCNC: 17 MG/DL
CALCIUM SERPL-MCNC: 9.8 MG/DL
CHLORIDE SERPL-SCNC: 103 MMOL/L
CHOLEST SERPL-MCNC: 242 MG/DL
CHOLEST/HDLC SERPL: 3.2 RATIO
CO2 SERPL-SCNC: 24 MMOL/L
CREAT SERPL-MCNC: 1.04 MG/DL
EOSINOPHIL # BLD AUTO: 0.03 K/UL
EOSINOPHIL NFR BLD AUTO: 0.7 %
GLUCOSE SERPL-MCNC: 91 MG/DL
HBA1C MFR BLD HPLC: 5.3 %
HCT VFR BLD CALC: 43.5 %
HDLC SERPL-MCNC: 75 MG/DL
HGB BLD-MCNC: 13.9 G/DL
IMM GRANULOCYTES NFR BLD AUTO: 0 %
LDLC SERPL CALC-MCNC: 152 MG/DL
LYMPHOCYTES # BLD AUTO: 0.7 K/UL
LYMPHOCYTES NFR BLD AUTO: 16.4 %
MAN DIFF?: NORMAL
MCHC RBC-ENTMCNC: 27.9 PG
MCHC RBC-ENTMCNC: 32 GM/DL
MCV RBC AUTO: 87.3 FL
MONOCYTES # BLD AUTO: 0.39 K/UL
MONOCYTES NFR BLD AUTO: 9.2 %
NEUTROPHILS # BLD AUTO: 3.14 K/UL
NEUTROPHILS NFR BLD AUTO: 73.7 %
PLATELET # BLD AUTO: 199 K/UL
POTASSIUM SERPL-SCNC: 4.7 MMOL/L
PROT SERPL-MCNC: 7.5 G/DL
RBC # BLD: 4.98 M/UL
RBC # FLD: 14.9 %
SODIUM SERPL-SCNC: 140 MMOL/L
TRIGL SERPL-MCNC: 75 MG/DL
TSH SERPL-ACNC: 1.25 UIU/ML
WBC # FLD AUTO: 4.26 K/UL

## 2018-05-17 PROCEDURE — 99214 OFFICE O/P EST MOD 30 MIN: CPT

## 2018-05-17 PROCEDURE — 93000 ELECTROCARDIOGRAM COMPLETE: CPT

## 2018-05-29 ENCOUNTER — OUTPATIENT (OUTPATIENT)
Dept: OUTPATIENT SERVICES | Facility: HOSPITAL | Age: 53
LOS: 1 days | End: 2018-05-29
Payer: COMMERCIAL

## 2018-05-29 VITALS
HEIGHT: 73.5 IN | RESPIRATION RATE: 16 BRPM | DIASTOLIC BLOOD PRESSURE: 80 MMHG | WEIGHT: 212.08 LBS | TEMPERATURE: 97 F | OXYGEN SATURATION: 98 % | SYSTOLIC BLOOD PRESSURE: 120 MMHG | HEART RATE: 96 BPM

## 2018-05-29 DIAGNOSIS — Z95.5 PRESENCE OF CORONARY ANGIOPLASTY IMPLANT AND GRAFT: Chronic | ICD-10-CM

## 2018-05-29 DIAGNOSIS — C15.9 MALIGNANT NEOPLASM OF ESOPHAGUS, UNSPECIFIED: ICD-10-CM

## 2018-05-29 DIAGNOSIS — Z90.89 ACQUIRED ABSENCE OF OTHER ORGANS: Chronic | ICD-10-CM

## 2018-05-29 DIAGNOSIS — Z98.890 OTHER SPECIFIED POSTPROCEDURAL STATES: Chronic | ICD-10-CM

## 2018-05-29 DIAGNOSIS — I21.9 ACUTE MYOCARDIAL INFARCTION, UNSPECIFIED: ICD-10-CM

## 2018-05-29 LAB
BLD GP AB SCN SERPL QL: NEGATIVE — SIGNIFICANT CHANGE UP
RH IG SCN BLD-IMP: POSITIVE — SIGNIFICANT CHANGE UP

## 2018-05-29 RX ORDER — SODIUM CHLORIDE 9 MG/ML
1000 INJECTION, SOLUTION INTRAVENOUS
Qty: 0 | Refills: 0 | Status: DISCONTINUED | OUTPATIENT
Start: 2018-06-08 | End: 2018-06-10

## 2018-05-29 NOTE — H&P PST ADULT - FAMILY HISTORY
Father  Still living? Yes, Estimated age: Age Unknown  Family history of throat cancer, Age at diagnosis: Age Unknown     Mother  Still living? Yes, Estimated age: Age Unknown  Family history of cervical cancer, Age at diagnosis: Age Unknown

## 2018-05-29 NOTE — H&P PST ADULT - LYMPHATIC
anterior cervical R/posterior cervical R/supraclavicular L/posterior cervical L/anterior cervical L/supraclavicular R

## 2018-05-29 NOTE — H&P PST ADULT - NEGATIVE ENMT SYMPTOMS
no tinnitus/no sinus symptoms/no ear pain/no post-nasal discharge/no dysphagia/no hearing difficulty/no vertigo/no nasal congestion

## 2018-05-29 NOTE — H&P PST ADULT - PROBLEM SELECTOR PLAN 1
Pt is scheduled for upper endoscopy robotic jodie ji esophagogastrectomy feeding jejunostomy possible open, esophagogastroduodenoscopy robotic assisted laparoscopic esophagogastrectomy insertion of feeding tube on 6/8/18.   Pre op instructions including chlorhexidine wash given and pt able to verbalize understanding.

## 2018-05-29 NOTE — H&P PST ADULT - NEGATIVE MUSCULOSKELETAL SYMPTOMS
no stiffness/no arm pain L/no muscle weakness/no neck pain/no leg pain R/no leg pain L/no muscle cramps/no back pain/no arthralgia/no arthritis/no arm pain R/no joint swelling/no myalgia

## 2018-05-29 NOTE — H&P PST ADULT - NEGATIVE CARDIOVASCULAR SYMPTOMS
no claudication/no orthopnea/no paroxysmal nocturnal dyspnea/no peripheral edema/no chest pain/no dyspnea on exertion/no palpitations

## 2018-05-29 NOTE — H&P PST ADULT - PMH
Esophageal cancer    MI (myocardial infarction)  2015 with 1 cononary stent Esophageal cancer    MI (myocardial infarction)  2015 with 1 coronary stent

## 2018-05-29 NOTE — H&P PST ADULT - PROBLEM SELECTOR PLAN 2
Pt obtained cardiac eval, will obtain document.  Pt instructed to continue aspirin therapy preop due to h/o coronary stent.

## 2018-05-29 NOTE — H&P PST ADULT - NSANTHOSAYNRD_GEN_A_CORE
No. RERE screening performed.  STOP BANG Legend: 0-2 = LOW Risk; 3-4 = INTERMEDIATE Risk; 5-8 = HIGH Risk

## 2018-05-29 NOTE — H&P PST ADULT - HISTORY OF PRESENT ILLNESS
53 yo male with PMH MI and stent x1 placed 2015 presents to pre surgical testing.  Pt reports he was diagnosed with esophageal CA 10/2017.  Pt completed chemotherapy 11/25/17 -1/30/2018 and RT 1/2018.  Pt reports post treatment CT and PET done, revealed good response.  Pt is scheduled for upper endoscopy robotic jodie ji esophagogastrectomy feeding jejunostomy possible open, esophagogastroduodenoscopy robotic assisted laparoscopic esophagogastrectomy insertion of feeding tube on 6/8/18.

## 2018-05-29 NOTE — H&P PST ADULT - PSH
H/O hernia repair  1966  Status post tonsillectomy and adenoidectomy    Stented coronary artery  x1 2015

## 2018-06-08 ENCOUNTER — APPOINTMENT (OUTPATIENT)
Dept: SURGICAL ONCOLOGY | Facility: HOSPITAL | Age: 53
End: 2018-06-08

## 2018-06-08 ENCOUNTER — RESULT REVIEW (OUTPATIENT)
Age: 53
End: 2018-06-08

## 2018-06-08 ENCOUNTER — INPATIENT (INPATIENT)
Facility: HOSPITAL | Age: 53
LOS: 10 days | Discharge: HOME | End: 2018-06-19
Attending: THORACIC SURGERY (CARDIOTHORACIC VASCULAR SURGERY) | Admitting: THORACIC SURGERY (CARDIOTHORACIC VASCULAR SURGERY)
Payer: COMMERCIAL

## 2018-06-08 ENCOUNTER — APPOINTMENT (OUTPATIENT)
Dept: THORACIC SURGERY | Facility: HOSPITAL | Age: 53
End: 2018-06-08

## 2018-06-08 VITALS
HEART RATE: 80 BPM | DIASTOLIC BLOOD PRESSURE: 70 MMHG | WEIGHT: 212.08 LBS | OXYGEN SATURATION: 96 % | RESPIRATION RATE: 18 BRPM | TEMPERATURE: 98 F | HEIGHT: 73.5 IN | SYSTOLIC BLOOD PRESSURE: 108 MMHG

## 2018-06-08 DIAGNOSIS — Z98.890 OTHER SPECIFIED POSTPROCEDURAL STATES: Chronic | ICD-10-CM

## 2018-06-08 DIAGNOSIS — Z95.5 PRESENCE OF CORONARY ANGIOPLASTY IMPLANT AND GRAFT: Chronic | ICD-10-CM

## 2018-06-08 DIAGNOSIS — Z90.89 ACQUIRED ABSENCE OF OTHER ORGANS: Chronic | ICD-10-CM

## 2018-06-08 DIAGNOSIS — C15.9 MALIGNANT NEOPLASM OF ESOPHAGUS, UNSPECIFIED: ICD-10-CM

## 2018-06-08 LAB
BASOPHILS # BLD AUTO: 0.01 K/UL — SIGNIFICANT CHANGE UP (ref 0–0.2)
BASOPHILS NFR BLD AUTO: 0.1 % — SIGNIFICANT CHANGE UP (ref 0–2)
BUN SERPL-MCNC: 15 MG/DL — SIGNIFICANT CHANGE UP (ref 7–23)
CALCIUM SERPL-MCNC: 8.6 MG/DL — SIGNIFICANT CHANGE UP (ref 8.4–10.5)
CHLORIDE SERPL-SCNC: 99 MMOL/L — SIGNIFICANT CHANGE UP (ref 98–107)
CO2 SERPL-SCNC: 22 MMOL/L — SIGNIFICANT CHANGE UP (ref 22–31)
CREAT SERPL-MCNC: 0.98 MG/DL — SIGNIFICANT CHANGE UP (ref 0.5–1.3)
EOSINOPHIL # BLD AUTO: 0 K/UL — SIGNIFICANT CHANGE UP (ref 0–0.5)
EOSINOPHIL NFR BLD AUTO: 0 % — SIGNIFICANT CHANGE UP (ref 0–6)
GLUCOSE BLDC GLUCOMTR-MCNC: 137 MG/DL — HIGH (ref 70–99)
GLUCOSE BLDC GLUCOMTR-MCNC: 170 MG/DL — HIGH (ref 70–99)
GLUCOSE BLDC GLUCOMTR-MCNC: 96 MG/DL — SIGNIFICANT CHANGE UP (ref 70–99)
GLUCOSE SERPL-MCNC: 166 MG/DL — HIGH (ref 70–99)
HCT VFR BLD CALC: 40 % — SIGNIFICANT CHANGE UP (ref 39–50)
HGB BLD-MCNC: 13.1 G/DL — SIGNIFICANT CHANGE UP (ref 13–17)
IMM GRANULOCYTES # BLD AUTO: 0.04 # — SIGNIFICANT CHANGE UP
IMM GRANULOCYTES NFR BLD AUTO: 0.3 % — SIGNIFICANT CHANGE UP (ref 0–1.5)
LYMPHOCYTES # BLD AUTO: 0.32 K/UL — LOW (ref 1–3.3)
LYMPHOCYTES # BLD AUTO: 2.6 % — LOW (ref 13–44)
MCHC RBC-ENTMCNC: 26.8 PG — LOW (ref 27–34)
MCHC RBC-ENTMCNC: 32.8 % — SIGNIFICANT CHANGE UP (ref 32–36)
MCV RBC AUTO: 82 FL — SIGNIFICANT CHANGE UP (ref 80–100)
MONOCYTES # BLD AUTO: 1.02 K/UL — HIGH (ref 0–0.9)
MONOCYTES NFR BLD AUTO: 8.2 % — SIGNIFICANT CHANGE UP (ref 2–14)
NEUTROPHILS # BLD AUTO: 11.08 K/UL — HIGH (ref 1.8–7.4)
NEUTROPHILS NFR BLD AUTO: 88.8 % — HIGH (ref 43–77)
NRBC # FLD: 0 — SIGNIFICANT CHANGE UP
PLATELET # BLD AUTO: 144 K/UL — LOW (ref 150–400)
PMV BLD: 10 FL — SIGNIFICANT CHANGE UP (ref 7–13)
POTASSIUM SERPL-MCNC: 4.4 MMOL/L — SIGNIFICANT CHANGE UP (ref 3.5–5.3)
POTASSIUM SERPL-SCNC: 4.4 MMOL/L — SIGNIFICANT CHANGE UP (ref 3.5–5.3)
RBC # BLD: 4.88 M/UL — SIGNIFICANT CHANGE UP (ref 4.2–5.8)
RBC # FLD: 15 % — HIGH (ref 10.3–14.5)
RH IG SCN BLD-IMP: POSITIVE — SIGNIFICANT CHANGE UP
SODIUM SERPL-SCNC: 136 MMOL/L — SIGNIFICANT CHANGE UP (ref 135–145)
WBC # BLD: 12.47 K/UL — HIGH (ref 3.8–10.5)
WBC # FLD AUTO: 12.47 K/UL — HIGH (ref 3.8–10.5)

## 2018-06-08 PROCEDURE — 88331 PATH CONSLTJ SURG 1 BLK 1SPC: CPT | Mod: 26

## 2018-06-08 PROCEDURE — 43117 PARTIAL REMOVAL OF ESOPHAGUS: CPT

## 2018-06-08 PROCEDURE — 49203: CPT

## 2018-06-08 PROCEDURE — 88309 TISSUE EXAM BY PATHOLOGIST: CPT | Mod: 26

## 2018-06-08 PROCEDURE — 44120 REMOVAL OF SMALL INTESTINE: CPT

## 2018-06-08 PROCEDURE — 71045 X-RAY EXAM CHEST 1 VIEW: CPT | Mod: 26

## 2018-06-08 PROCEDURE — 88307 TISSUE EXAM BY PATHOLOGIST: CPT | Mod: 26

## 2018-06-08 PROCEDURE — 43236 UPPR GI SCOPE W/SUBMUC INJ: CPT

## 2018-06-08 PROCEDURE — 44015 INSERT NEEDLE CATH BOWEL: CPT

## 2018-06-08 PROCEDURE — S2900 ROBOTIC SURGICAL SYSTEM: CPT | Mod: NC

## 2018-06-08 PROCEDURE — 88305 TISSUE EXAM BY PATHOLOGIST: CPT | Mod: 26

## 2018-06-08 PROCEDURE — 99233 SBSQ HOSP IP/OBS HIGH 50: CPT

## 2018-06-08 PROCEDURE — 88304 TISSUE EXAM BY PATHOLOGIST: CPT | Mod: 26

## 2018-06-08 PROCEDURE — 88300 SURGICAL PATH GROSS: CPT | Mod: 26,59

## 2018-06-08 PROCEDURE — 43287 ESPHG DSTL 2/3 W/LAPS MOBLJ: CPT

## 2018-06-08 PROCEDURE — 43200 ESOPHAGOSCOPY FLEXIBLE BRUSH: CPT

## 2018-06-08 PROCEDURE — 51702 INSERT TEMP BLADDER CATH: CPT | Mod: 59

## 2018-06-08 RX ORDER — BUTORPHANOL TARTRATE 2 MG/ML
0.12 INJECTION, SOLUTION INTRAMUSCULAR; INTRAVENOUS ONCE
Qty: 0 | Refills: 0 | Status: DISCONTINUED | OUTPATIENT
Start: 2018-06-08 | End: 2018-06-13

## 2018-06-08 RX ORDER — ONDANSETRON 8 MG/1
4 TABLET, FILM COATED ORAL EVERY 6 HOURS
Qty: 0 | Refills: 0 | Status: DISCONTINUED | OUTPATIENT
Start: 2018-06-08 | End: 2018-06-13

## 2018-06-08 RX ORDER — NALOXONE HYDROCHLORIDE 4 MG/.1ML
0.1 SPRAY NASAL
Qty: 0 | Refills: 0 | Status: DISCONTINUED | OUTPATIENT
Start: 2018-06-08 | End: 2018-06-13

## 2018-06-08 RX ORDER — ACETAMINOPHEN 500 MG
1000 TABLET ORAL ONCE
Qty: 0 | Refills: 0 | Status: COMPLETED | OUTPATIENT
Start: 2018-06-08 | End: 2018-06-09

## 2018-06-08 RX ORDER — KETOROLAC TROMETHAMINE 30 MG/ML
30 SYRINGE (ML) INJECTION ONCE
Qty: 0 | Refills: 0 | Status: DISCONTINUED | OUTPATIENT
Start: 2018-06-08 | End: 2018-06-08

## 2018-06-08 RX ORDER — SODIUM CHLORIDE 9 MG/ML
250 INJECTION, SOLUTION INTRAVENOUS ONCE
Qty: 0 | Refills: 0 | Status: COMPLETED | OUTPATIENT
Start: 2018-06-08 | End: 2018-06-08

## 2018-06-08 RX ORDER — HEPARIN SODIUM 5000 [USP'U]/ML
5000 INJECTION INTRAVENOUS; SUBCUTANEOUS ONCE
Qty: 0 | Refills: 0 | Status: COMPLETED | OUTPATIENT
Start: 2018-06-08 | End: 2018-06-08

## 2018-06-08 RX ORDER — HYDROMORPHONE HYDROCHLORIDE 2 MG/ML
0.5 INJECTION INTRAMUSCULAR; INTRAVENOUS; SUBCUTANEOUS
Qty: 0 | Refills: 0 | Status: DISCONTINUED | OUTPATIENT
Start: 2018-06-08 | End: 2018-06-13

## 2018-06-08 RX ORDER — HEPARIN SODIUM 5000 [USP'U]/ML
5000 INJECTION INTRAVENOUS; SUBCUTANEOUS EVERY 12 HOURS
Qty: 0 | Refills: 0 | Status: DISCONTINUED | OUTPATIENT
Start: 2018-06-08 | End: 2018-06-19

## 2018-06-08 RX ORDER — CEFOTETAN DISODIUM 1 G
1 VIAL (EA) INJECTION EVERY 12 HOURS
Qty: 0 | Refills: 0 | Status: COMPLETED | OUTPATIENT
Start: 2018-06-08 | End: 2018-06-09

## 2018-06-08 RX ORDER — ASPIRIN/CALCIUM CARB/MAGNESIUM 324 MG
300 TABLET ORAL DAILY
Qty: 0 | Refills: 0 | Status: DISCONTINUED | OUTPATIENT
Start: 2018-06-09 | End: 2018-06-18

## 2018-06-08 RX ORDER — SODIUM CHLORIDE 9 MG/ML
1000 INJECTION, SOLUTION INTRAVENOUS
Qty: 0 | Refills: 0 | Status: DISCONTINUED | OUTPATIENT
Start: 2018-06-08 | End: 2018-06-10

## 2018-06-08 RX ADMIN — HYDROMORPHONE HYDROCHLORIDE 0.5 MILLIGRAM(S): 2 INJECTION INTRAMUSCULAR; INTRAVENOUS; SUBCUTANEOUS at 17:55

## 2018-06-08 RX ADMIN — HYDROMORPHONE HYDROCHLORIDE 0.5 MILLIGRAM(S): 2 INJECTION INTRAMUSCULAR; INTRAVENOUS; SUBCUTANEOUS at 17:40

## 2018-06-08 RX ADMIN — SODIUM CHLORIDE 1000 MILLILITER(S): 9 INJECTION, SOLUTION INTRAVENOUS at 18:30

## 2018-06-08 RX ADMIN — SODIUM CHLORIDE 30 MILLILITER(S): 9 INJECTION, SOLUTION INTRAVENOUS at 06:49

## 2018-06-08 RX ADMIN — HEPARIN SODIUM 5000 UNIT(S): 5000 INJECTION INTRAVENOUS; SUBCUTANEOUS at 18:12

## 2018-06-08 RX ADMIN — SODIUM CHLORIDE 100 MILLILITER(S): 9 INJECTION, SOLUTION INTRAVENOUS at 17:25

## 2018-06-08 RX ADMIN — SODIUM CHLORIDE 100 MILLILITER(S): 9 INJECTION, SOLUTION INTRAVENOUS at 19:40

## 2018-06-08 RX ADMIN — HEPARIN SODIUM 5000 UNIT(S): 5000 INJECTION INTRAVENOUS; SUBCUTANEOUS at 06:48

## 2018-06-08 RX ADMIN — Medication 30 MILLIGRAM(S): at 18:10

## 2018-06-08 RX ADMIN — ONDANSETRON 4 MILLIGRAM(S): 8 TABLET, FILM COATED ORAL at 17:49

## 2018-06-08 RX ADMIN — Medication 30 MILLIGRAM(S): at 18:25

## 2018-06-08 RX ADMIN — Medication 100 GRAM(S): at 18:10

## 2018-06-08 NOTE — BRIEF OPERATIVE NOTE - PROCEDURE
<<-----Click on this checkbox to enter Procedure Robotic assistance in thoracoscopic procedure  06/08/2018  [place] Esophagectomy  Active  TAMERA

## 2018-06-08 NOTE — CHART NOTE - NSCHARTNOTEFT_GEN_A_CORE
General Surgery Post-operative Note    S: Patient underwent robotic assisted [place] Andrew esophagectomy, tolerated procedure without issue and sent to CT ICU. Patient denies shortness of breath, nausea, vomiting, lightheadedness, or dizziness.  Pain was well controlled.      O:T(C): 36.1 (06-08-18 @ 20:00), Max: 37.2 (06-08-18 @ 17:10)  HR: 75 (06-08-18 @ 21:00) (75 - 93)  BP: 123/69 (06-08-18 @ 20:00) (106/61 - 130/78)  RR: 21 (06-08-18 @ 21:00) (12 - 23)  SpO2: 95% (06-08-18 @ 21:00) (93% - 97%)  Wt(kg): --                        13.1   12.47 )-----------( 144      ( 08 Jun 2018 18:20 )             40.0        06-08    136  |  99  |  15  ----------------------------<  166<H>  4.4   |  22  |  0.98    Ca    8.6      08 Jun 2018 18:20      Gen: Sitting upright in bed, in NAD  Resp: CTAB  Chest: Incisions c/d/i, no visible bleeding or drainage. CT in place  Abd: Soft, nontender, nondistended.  No palpable masses.         Assessment/Plan:  52y Male s/p Robotic assistance in thoracoscopic procedure, currently hemodynamically stable    - Care per CT ICU  - Continue NPO/IVF  - Monitor CT output  - Pain control  - F/U AM labs

## 2018-06-08 NOTE — BRIEF OPERATIVE NOTE - SPECIMENS
Continue Regimen: Ketoconazole shampoo PRN Detail Level: Zone gastro esophagectomy and anastamotic margins

## 2018-06-09 DIAGNOSIS — C15.9 MALIGNANT NEOPLASM OF ESOPHAGUS, UNSPECIFIED: ICD-10-CM

## 2018-06-09 LAB
APTT BLD: 26.6 SEC — LOW (ref 27.5–37.4)
BASOPHILS # BLD AUTO: 0.01 K/UL — SIGNIFICANT CHANGE UP (ref 0–0.2)
BASOPHILS NFR BLD AUTO: 0.1 % — SIGNIFICANT CHANGE UP (ref 0–2)
BUN SERPL-MCNC: 14 MG/DL — SIGNIFICANT CHANGE UP (ref 7–23)
CALCIUM SERPL-MCNC: 8.3 MG/DL — LOW (ref 8.4–10.5)
CHLORIDE SERPL-SCNC: 101 MMOL/L — SIGNIFICANT CHANGE UP (ref 98–107)
CO2 SERPL-SCNC: 24 MMOL/L — SIGNIFICANT CHANGE UP (ref 22–31)
CREAT SERPL-MCNC: 0.93 MG/DL — SIGNIFICANT CHANGE UP (ref 0.5–1.3)
EOSINOPHIL # BLD AUTO: 0 K/UL — SIGNIFICANT CHANGE UP (ref 0–0.5)
EOSINOPHIL NFR BLD AUTO: 0 % — SIGNIFICANT CHANGE UP (ref 0–6)
GLUCOSE BLDC GLUCOMTR-MCNC: 104 MG/DL — HIGH (ref 70–99)
GLUCOSE BLDC GLUCOMTR-MCNC: 107 MG/DL — HIGH (ref 70–99)
GLUCOSE BLDC GLUCOMTR-MCNC: 88 MG/DL — SIGNIFICANT CHANGE UP (ref 70–99)
GLUCOSE SERPL-MCNC: 124 MG/DL — HIGH (ref 70–99)
HCT VFR BLD CALC: 37.9 % — LOW (ref 39–50)
HGB BLD-MCNC: 12.1 G/DL — LOW (ref 13–17)
IMM GRANULOCYTES # BLD AUTO: 0.04 # — SIGNIFICANT CHANGE UP
IMM GRANULOCYTES NFR BLD AUTO: 0.3 % — SIGNIFICANT CHANGE UP (ref 0–1.5)
INR BLD: 1.23 — HIGH (ref 0.88–1.17)
LYMPHOCYTES # BLD AUTO: 0.42 K/UL — LOW (ref 1–3.3)
LYMPHOCYTES # BLD AUTO: 3.5 % — LOW (ref 13–44)
MCHC RBC-ENTMCNC: 26.9 PG — LOW (ref 27–34)
MCHC RBC-ENTMCNC: 31.9 % — LOW (ref 32–36)
MCV RBC AUTO: 84.4 FL — SIGNIFICANT CHANGE UP (ref 80–100)
MONOCYTES # BLD AUTO: 1.06 K/UL — HIGH (ref 0–0.9)
MONOCYTES NFR BLD AUTO: 8.9 % — SIGNIFICANT CHANGE UP (ref 2–14)
NEUTROPHILS # BLD AUTO: 10.38 K/UL — HIGH (ref 1.8–7.4)
NEUTROPHILS NFR BLD AUTO: 87.2 % — HIGH (ref 43–77)
NRBC # FLD: 0 — SIGNIFICANT CHANGE UP
PLATELET # BLD AUTO: 130 K/UL — LOW (ref 150–400)
PMV BLD: 11 FL — SIGNIFICANT CHANGE UP (ref 7–13)
POTASSIUM SERPL-MCNC: 4.2 MMOL/L — SIGNIFICANT CHANGE UP (ref 3.5–5.3)
POTASSIUM SERPL-SCNC: 4.2 MMOL/L — SIGNIFICANT CHANGE UP (ref 3.5–5.3)
PROTHROM AB SERPL-ACNC: 13.7 SEC — HIGH (ref 9.8–13.1)
RBC # BLD: 4.49 M/UL — SIGNIFICANT CHANGE UP (ref 4.2–5.8)
RBC # FLD: 15 % — HIGH (ref 10.3–14.5)
SODIUM SERPL-SCNC: 138 MMOL/L — SIGNIFICANT CHANGE UP (ref 135–145)
WBC # BLD: 11.91 K/UL — HIGH (ref 3.8–10.5)
WBC # FLD AUTO: 11.91 K/UL — HIGH (ref 3.8–10.5)

## 2018-06-09 PROCEDURE — 71045 X-RAY EXAM CHEST 1 VIEW: CPT | Mod: 26

## 2018-06-09 PROCEDURE — 99233 SBSQ HOSP IP/OBS HIGH 50: CPT

## 2018-06-09 RX ORDER — DIPHENHYDRAMINE HCL 50 MG
25 CAPSULE ORAL AT BEDTIME
Qty: 0 | Refills: 0 | Status: DISCONTINUED | OUTPATIENT
Start: 2018-06-09 | End: 2018-06-19

## 2018-06-09 RX ORDER — ALBUMIN HUMAN 25 %
250 VIAL (ML) INTRAVENOUS
Qty: 0 | Refills: 0 | Status: DISCONTINUED | OUTPATIENT
Start: 2018-06-09 | End: 2018-06-10

## 2018-06-09 RX ORDER — SODIUM CHLORIDE 9 MG/ML
250 INJECTION, SOLUTION INTRAVENOUS
Qty: 0 | Refills: 0 | Status: DISCONTINUED | OUTPATIENT
Start: 2018-06-09 | End: 2018-06-10

## 2018-06-09 RX ORDER — IPRATROPIUM/ALBUTEROL SULFATE 18-103MCG
3 AEROSOL WITH ADAPTER (GRAM) INHALATION EVERY 6 HOURS
Qty: 0 | Refills: 0 | Status: DISCONTINUED | OUTPATIENT
Start: 2018-06-09 | End: 2018-06-19

## 2018-06-09 RX ORDER — ACETAMINOPHEN 500 MG
1000 TABLET ORAL ONCE
Qty: 0 | Refills: 0 | Status: COMPLETED | OUTPATIENT
Start: 2018-06-09 | End: 2018-06-10

## 2018-06-09 RX ORDER — SODIUM CHLORIDE 0.65 %
1 AEROSOL, SPRAY (ML) NASAL EVERY 4 HOURS
Qty: 0 | Refills: 0 | Status: DISCONTINUED | OUTPATIENT
Start: 2018-06-09 | End: 2018-06-19

## 2018-06-09 RX ORDER — LIDOCAINE 4 G/100G
1 CREAM TOPICAL EVERY 4 HOURS
Qty: 0 | Refills: 0 | Status: DISCONTINUED | OUTPATIENT
Start: 2018-06-09 | End: 2018-06-17

## 2018-06-09 RX ORDER — MAGNESIUM SULFATE 500 MG/ML
1 VIAL (ML) INJECTION ONCE
Qty: 0 | Refills: 0 | Status: COMPLETED | OUTPATIENT
Start: 2018-06-09 | End: 2018-06-09

## 2018-06-09 RX ORDER — DORNASE ALFA 1 MG/ML
2.5 SOLUTION RESPIRATORY (INHALATION) DAILY
Qty: 0 | Refills: 0 | Status: DISCONTINUED | OUTPATIENT
Start: 2018-06-09 | End: 2018-06-12

## 2018-06-09 RX ADMIN — Medication 1000 MILLIGRAM(S): at 17:52

## 2018-06-09 RX ADMIN — Medication 400 MILLIGRAM(S): at 17:22

## 2018-06-09 RX ADMIN — Medication 3 MILLILITER(S): at 22:24

## 2018-06-09 RX ADMIN — Medication 100 GRAM(S): at 17:48

## 2018-06-09 RX ADMIN — SODIUM CHLORIDE 100 MILLILITER(S): 9 INJECTION, SOLUTION INTRAVENOUS at 07:28

## 2018-06-09 RX ADMIN — HEPARIN SODIUM 5000 UNIT(S): 5000 INJECTION INTRAVENOUS; SUBCUTANEOUS at 17:47

## 2018-06-09 RX ADMIN — HEPARIN SODIUM 5000 UNIT(S): 5000 INJECTION INTRAVENOUS; SUBCUTANEOUS at 07:13

## 2018-06-09 RX ADMIN — SODIUM CHLORIDE 100 MILLILITER(S): 9 INJECTION, SOLUTION INTRAVENOUS at 19:34

## 2018-06-09 RX ADMIN — Medication 100 GRAM(S): at 07:07

## 2018-06-09 RX ADMIN — Medication 1 SPRAY(S): at 23:46

## 2018-06-09 RX ADMIN — Medication 25 MILLIGRAM(S): at 23:47

## 2018-06-09 NOTE — CONSULT NOTE ADULT - SUBJECTIVE AND OBJECTIVE BOX
53 yo male with PMH MI and stent x1 placed 2015  Pt reported  he was diagnosed with esophageal CA 10/2017.    Pt completed chemotherapy 11/25/17 -1/30/2018 and RT 1/2018.    Pt reports post treatment CT and PET done, revealed good response.    Pt is now s/p  upper endoscopy / jodie ji esophagogastrectomy feeding jejunostomy  esophagogastroduodenoscopy robotic assisted laparoscopic esophagogastrectomy       INTERVAL HPI/OVERNIGHT EVENTS:    Medications:MEDICATIONS  (STANDING):  acetaminophen  IVPB. 1000 milliGRAM(s) IV Intermittent once  aspirin Suppository 300 milliGRAM(s) Rectal daily  heparin  Injectable 5000 Unit(s) SubCutaneous every 12 hours  HYDROmorphone (10 MICROgram(s)/mL) + BUpivacaine 0.0625% in 0.9% Sodium Chloride PCEA 250 milliLiter(s) Epidural PCA Continuous  lactated ringers. 1000 milliLiter(s) (30 mL/Hr) IV Continuous <Continuous>  lactated ringers. 1000 milliLiter(s) (100 mL/Hr) IV Continuous <Continuous>    MEDICATIONS  (PRN):  butorphanol Injectable 0.125 milliGRAM(s) IV Push once PRN Pruritus  HYDROmorphone  Injectable 0.5 milliGRAM(s) IV Push every 3 hours PRN Severe Pain  HYDROmorphone (10 MICROgram(s)/mL) + BUpivacaine 0.0625% in 0.9% Sodium Chloride PCEA Rescue Clinician  Bolus 5 milliLiter(s) Epidural every 15 minutes PRN for Pain Score greater than 6  naloxone Injectable 0.1 milliGRAM(s) IV Push every 3 minutes PRN For ANY of the following changes in patient status:  A. RR LESS THAN 10 breaths per minute, B. Oxygen saturation LESS THAN 90%, C. Sedation score of 6  ondansetron Injectable 4 milliGRAM(s) IV Push every 6 hours PRN Nausea      Allergies: Allergies    No Known Allergies    Intolerances          FAMILY HISTORY:  Family history of cervical cancer (Mother)  Family history of throat cancer (Father)        PAST MEDICAL & SURGICAL HISTORY:  Esophageal cancer  MI (myocardial infarction): 2015 with 1 coronary stent  H/O hernia repair: 1966  Status post tonsillectomy and adenoidectomy  Stented coronary artery: x1 2015      REVIEW OF SYSTEMS:  CONSTITUTIONAL: pain controlled  EYES: No eye pain, visual disturbances, or discharge  ENMT:  No difficulty hearing, tinnitus, vertigo; throat discomfort from ng tube  NECK: No pain or stiffness  RESPIRATORY: No cough, wheezing, chills or hemoptysis; No shortness of breath  CARDIOVASCULAR: No chest pain, palpitations, dizziness, or leg swelling  GASTROINTESTINAL: No abdominal or epigastric pain. No nausea, vomiting, or hematemesis; No diarrhea or constipation. No melena or hematochezia.  GENITOURINARY: No dysuria, frequency, hematuria, or incontinence  NEUROLOGICAL: No headaches, memory loss, loss of strength, numbness, or tremors  SKIN: No itching, burning, rashes, or lesions   LYMPH NODES: No enlarged glands  ENDOCRINE: No heat or cold intolerance; No hair loss  MUSCULOSKELETAL: No joint pain or swelling; No muscle, back, or extremity pain      T(C): 36.7 (06-09-18 @ 08:00), Max: 37.2 (06-08-18 @ 17:10)  HR: 89 (06-09-18 @ 10:00) (72 - 94)  BP: 109/64 (06-09-18 @ 10:00) (99/59 - 130/78)  RR: 17 (06-09-18 @ 10:00) (11 - 24)  SpO2: 94% (06-09-18 @ 10:00) (93% - 98%)  Wt(kg): --Vital Signs Last 24 Hrs  T(C): 36.7 (09 Jun 2018 08:00), Max: 37.2 (08 Jun 2018 17:10)  T(F): 98 (09 Jun 2018 08:00), Max: 99 (08 Jun 2018 17:10)  HR: 89 (09 Jun 2018 10:00) (72 - 94)  BP: 109/64 (09 Jun 2018 10:00) (99/59 - 130/78)  BP(mean): 74 (09 Jun 2018 10:00) (63 - 96)  RR: 17 (09 Jun 2018 10:00) (11 - 24)  SpO2: 94% (09 Jun 2018 10:00) (93% - 98%)  I&O's Summary    08 Jun 2018 07:01  -  09 Jun 2018 07:00  --------------------------------------------------------  IN: 1920 mL / OUT: 1747.5 mL / NET: 172.5 mL    09 Jun 2018 07:01  -  09 Jun 2018 10:28  --------------------------------------------------------  IN: 300 mL / OUT: 130 mL / NET: 170 mL        PHYSICAL EXAM:  GENERAL: NAD,   HEAD:  Atraumatic, Normocephalic  EYES: EOMI, PERRLA, conjunctiva and sclera clear/ ng in place  ENMT: No tonsillar erythema, exudates, or enlargement; Moist mucous membranes, Good dentition, No lesions  NECK: Supple, No JVD, Normal thyroid  NERVOUS SYSTEM:  Alert & Oriented X3, Good concentration; Motor Strength 5/5 B/L upper and lower extremities; DTRs 2+ intact and symmetric  CHEST/LUNG: dec bs b/l  HEART: Regular rate and rhythm; No murmurs, rubs, or gallops  ABDOMEN: Soft, Nontender, Nondistended; Bowel sounds present  EXTREMITIES:  2+ Peripheral Pulses, No clubbing, cyanosis, or edema  LYMPH: No lymphadenopathy noted      Consultant(s) Notes Reviewed:  [x ] YES  [ ] NO  Care Discussed with Consultants/Other Providerscpk [ x] YES  [ ] NO    LABS:                    CBC Full  -  ( 09 Jun 2018 03:00 )  WBC Count : 11.91 K/uL  Hemoglobin : 12.1 g/dL  Hematocrit : 37.9 %  Platelet Count - Automated : 130 K/uL  Mean Cell Volume : 84.4 fL  Mean Cell Hemoglobin : 26.9 pg  Mean Cell Hemoglobin Concentration : 31.9 %  Auto Neutrophil # : 10.38 K/uL  Auto Lymphocyte # : 0.42 K/uL  Auto Monocyte # : 1.06 K/uL  Auto Eosinophil # : 0.00 K/uL  Auto Basophil # : 0.01 K/uL  Auto Neutrophil % : 87.2 %  Auto Lymphocyte % : 3.5 %  Auto Monocyte % : 8.9 %  Auto Eosinophil % : 0.0 %  Auto Basophil % : 0.1 %      06-09    138  |  101  |  14  ----------------------------<  124<H>  4.2   |  24  |  0.93    Ca    8.3<L>      09 Jun 2018 03:00            PT/INR - ( 09 Jun 2018 03:00 )   PT: 13.7 SEC;   INR: 1.23          PTT - ( 09 Jun 2018 03:00 )  PTT:26.6 SEC  RADIOLOGY & ADDITIONAL TESTS:    Imaging Personally Reviewed:  [ ] YES  [ ] NO

## 2018-06-09 NOTE — PROGRESS NOTE ADULT - ASSESSMENT
52y Male s/p Robotic assistance in thoracoscopic procedure, currently hemodynamically stable    - Care per CT ICU  - Continue NPO/IVF  - Monitor CT output  - Pain control  - F/U AM labs

## 2018-06-09 NOTE — CHART NOTE - NSCHARTNOTEFT_GEN_A_CORE
spoke with department of anesthesia pain service, Dr Swain, regarding aspirin 300mg rectally daily in a pt w/ epidural catheter. They said that as long as platelet count was normal and there were no signs of bleeding that the pt could take asa 300mg, but to space out the aspirin and SQH dosing. SQH given 6am and 6PM and asa at After 12 hours if feeling well. Do NOT Drive if taking prescription pain medications noon. labs monitored daily. spoke with department of anesthesia pain service, Dr Swain, regarding aspirin 300mg rectally daily in a pt w/ epidural catheter. They said that as long as platelet count was normal and there were no signs of bleeding that the pt could take asa 300mg, but to space out the aspirin and SQH dosing. SQH given 6am and 6PM and asa at After 12 hours. labs monitored daily.

## 2018-06-09 NOTE — CONSULT NOTE ADULT - ASSESSMENT
s/p esophagectomy  pain control  dvt proph  hx cad  asa when able  pt  ppi  sx f/u  ng as per sx   will follow

## 2018-06-09 NOTE — CONSULT NOTE ADULT - SUBJECTIVE AND OBJECTIVE BOX
53 yo male with PMH MI and stent x1 placed 2015  Pt reported  he was diagnosed with esophageal CA 10/2017.    Pt completed chemotherapy 11/25/17 -1/30/2018 and RT 1/2018.    Pt reports post treatment CT and PET done, revealed good response.    Pt is now s/p  upper endoscopy / jodie ji esophagogastrectomy feeding jejunostomy  esophagogastroduodenoscopy robotic assisted laparoscopic esophagogastrectomy           Medications:MEDICATIONS  (STANDING):  acetaminophen  IVPB. 1000 milliGRAM(s) IV Intermittent once  aspirin Suppository 300 milliGRAM(s) Rectal daily  heparin  Injectable 5000 Unit(s) SubCutaneous every 12 hours  HYDROmorphone (10 MICROgram(s)/mL) + BUpivacaine 0.0625% in 0.9% Sodium Chloride PCEA 250 milliLiter(s) Epidural PCA Continuous  lactated ringers. 1000 milliLiter(s) (30 mL/Hr) IV Continuous <Continuous>  lactated ringers. 1000 milliLiter(s) (100 mL/Hr) IV Continuous <Continuous>    MEDICATIONS  (PRN):  butorphanol Injectable 0.125 milliGRAM(s) IV Push once PRN Pruritus  HYDROmorphone  Injectable 0.5 milliGRAM(s) IV Push every 3 hours PRN Severe Pain  HYDROmorphone (10 MICROgram(s)/mL) + BUpivacaine 0.0625% in 0.9% Sodium Chloride PCEA Rescue Clinician  Bolus 5 milliLiter(s) Epidural every 15 minutes PRN for Pain Score greater than 6  naloxone Injectable 0.1 milliGRAM(s) IV Push every 3 minutes PRN For ANY of the following changes in patient status:  A. RR LESS THAN 10 breaths per minute, B. Oxygen saturation LESS THAN 90%, C. Sedation score of 6  ondansetron Injectable 4 milliGRAM(s) IV Push every 6 hours PRN Nausea      Allergies: Allergies    No Known Allergies    Intolerances          FAMILY HISTORY:  Family history of cervical cancer (Mother)  Family history of throat cancer (Father)        PAST MEDICAL & SURGICAL HISTORY:  Esophageal cancer  MI (myocardial infarction): 2015 with 1 coronary stent  H/O hernia repair: 1966  Status post tonsillectomy and adenoidectomy  Stented coronary artery: x1 2015      REVIEW OF SYSTEMS:  CONSTITUTIONAL: pain controlled  EYES: No eye pain, visual disturbances, or discharge  ENMT:  No difficulty hearing, tinnitus, vertigo; throat discomfort from ng tube  NECK: No pain or stiffness  RESPIRATORY: No cough, wheezing, chills or hemoptysis; No shortness of breath  CARDIOVASCULAR: No chest pain, palpitations, dizziness, or leg swelling  GASTROINTESTINAL: No abdominal or epigastric pain. No nausea, vomiting, or hematemesis; No diarrhea or constipation. No melena or hematochezia.  GENITOURINARY: No dysuria, frequency, hematuria, or incontinence  NEUROLOGICAL: No headaches, memory loss, loss of strength, numbness, or tremors  SKIN: No itching, burning, rashes, or lesions   LYMPH NODES: No enlarged glands  ENDOCRINE: No heat or cold intolerance; No hair loss  MUSCULOSKELETAL: No joint pain or swelling; No muscle, back, or extremity pain      T(C): 36.7 (06-09-18 @ 08:00), Max: 37.2 (06-08-18 @ 17:10)  HR: 89 (06-09-18 @ 10:00) (72 - 94)  BP: 109/64 (06-09-18 @ 10:00) (99/59 - 130/78)  RR: 17 (06-09-18 @ 10:00) (11 - 24)  SpO2: 94% (06-09-18 @ 10:00) (93% - 98%)  Wt(kg): --Vital Signs Last 24 Hrs  T(C): 36.7 (09 Jun 2018 08:00), Max: 37.2 (08 Jun 2018 17:10)  T(F): 98 (09 Jun 2018 08:00), Max: 99 (08 Jun 2018 17:10)  HR: 89 (09 Jun 2018 10:00) (72 - 94)  BP: 109/64 (09 Jun 2018 10:00) (99/59 - 130/78)  BP(mean): 74 (09 Jun 2018 10:00) (63 - 96)  RR: 17 (09 Jun 2018 10:00) (11 - 24)  SpO2: 94% (09 Jun 2018 10:00) (93% - 98%)  I&O's Summary    08 Jun 2018 07:01  -  09 Jun 2018 07:00  --------------------------------------------------------  IN: 1920 mL / OUT: 1747.5 mL / NET: 172.5 mL    09 Jun 2018 07:01  -  09 Jun 2018 10:28  --------------------------------------------------------  IN: 300 mL / OUT: 130 mL / NET: 170 mL        PHYSICAL EXAM:  GENERAL: NAD,   HEAD:  Atraumatic, Normocephalic  EYES: EOMI, PERRLA, conjunctiva and sclera clear/ ng in place  ENMT: No tonsillar erythema, exudates, or enlargement; Moist mucous membranes, Good dentition, No lesions  NECK: Supple, No JVD, Normal thyroid  NERVOUS SYSTEM:  Alert & Oriented X3, Good concentration; Motor Strength 5/5 B/L upper and lower extremities; DTRs 2+ intact and symmetric  CHEST/LUNG: dec bs b/l  HEART: Regular rate and rhythm; No murmurs, rubs, or gallops  ABDOMEN: Soft, Nontender, Nondistended; Bowel sounds present  EXTREMITIES:  2+ Peripheral Pulses, No clubbing, cyanosis, or edema  LYMPH: No lymphadenopathy noted      Consultant(s) Notes Reviewed:  [x ] YES  [ ] NO  Care Discussed with Consultants/Other Providerscpk [ x] YES  [ ] NO    LABS:                    CBC Full  -  ( 09 Jun 2018 03:00 )  WBC Count : 11.91 K/uL  Hemoglobin : 12.1 g/dL  Hematocrit : 37.9 %  Platelet Count - Automated : 130 K/uL  Mean Cell Volume : 84.4 fL  Mean Cell Hemoglobin : 26.9 pg  Mean Cell Hemoglobin Concentration : 31.9 %  Auto Neutrophil # : 10.38 K/uL  Auto Lymphocyte # : 0.42 K/uL  Auto Monocyte # : 1.06 K/uL  Auto Eosinophil # : 0.00 K/uL  Auto Basophil # : 0.01 K/uL  Auto Neutrophil % : 87.2 %  Auto Lymphocyte % : 3.5 %  Auto Monocyte % : 8.9 %  Auto Eosinophil % : 0.0 %  Auto Basophil % : 0.1 %      06-09    138  |  101  |  14  ----------------------------<  124<H>  4.2   |  24  |  0.93    Ca    8.3<L>      09 Jun 2018 03:00            PT/INR - ( 09 Jun 2018 03:00 )   PT: 13.7 SEC;   INR: 1.23          PTT - ( 09 Jun 2018 03:00 )  PTT:26.6 SEC  RADIOLOGY & ADDITIONAL TESTS:    Imaging Personally Reviewed:  [ ] YES  [ ] NO

## 2018-06-10 LAB
APTT BLD: 30.5 SEC — SIGNIFICANT CHANGE UP (ref 27.5–37.4)
BASOPHILS # BLD AUTO: 0.01 K/UL — SIGNIFICANT CHANGE UP (ref 0–0.2)
BASOPHILS NFR BLD AUTO: 0.1 % — SIGNIFICANT CHANGE UP (ref 0–2)
BASOPHILS NFR SPEC: 0 % — SIGNIFICANT CHANGE UP (ref 0–2)
BUN SERPL-MCNC: 12 MG/DL — SIGNIFICANT CHANGE UP (ref 7–23)
CALCIUM SERPL-MCNC: 8.6 MG/DL — SIGNIFICANT CHANGE UP (ref 8.4–10.5)
CHLORIDE SERPL-SCNC: 98 MMOL/L — SIGNIFICANT CHANGE UP (ref 98–107)
CO2 SERPL-SCNC: 26 MMOL/L — SIGNIFICANT CHANGE UP (ref 22–31)
CREAT SERPL-MCNC: 0.84 MG/DL — SIGNIFICANT CHANGE UP (ref 0.5–1.3)
EOSINOPHIL # BLD AUTO: 0.02 K/UL — SIGNIFICANT CHANGE UP (ref 0–0.5)
EOSINOPHIL NFR BLD AUTO: 0.2 % — SIGNIFICANT CHANGE UP (ref 0–6)
EOSINOPHIL NFR FLD: 0 % — SIGNIFICANT CHANGE UP (ref 0–6)
GIANT PLATELETS BLD QL SMEAR: PRESENT — SIGNIFICANT CHANGE UP
GLUCOSE BLDC GLUCOMTR-MCNC: 87 MG/DL — SIGNIFICANT CHANGE UP (ref 70–99)
GLUCOSE BLDC GLUCOMTR-MCNC: 99 MG/DL — SIGNIFICANT CHANGE UP (ref 70–99)
GLUCOSE SERPL-MCNC: 83 MG/DL — SIGNIFICANT CHANGE UP (ref 70–99)
HCT VFR BLD CALC: 37.7 % — LOW (ref 39–50)
HGB BLD-MCNC: 12.2 G/DL — LOW (ref 13–17)
IMM GRANULOCYTES # BLD AUTO: 0.05 # — SIGNIFICANT CHANGE UP
IMM GRANULOCYTES NFR BLD AUTO: 0.5 % — SIGNIFICANT CHANGE UP (ref 0–1.5)
INR BLD: 1.11 — SIGNIFICANT CHANGE UP (ref 0.88–1.17)
LYMPHOCYTES # BLD AUTO: 0.54 K/UL — LOW (ref 1–3.3)
LYMPHOCYTES # BLD AUTO: 5.8 % — LOW (ref 13–44)
LYMPHOCYTES NFR SPEC AUTO: 3.6 % — LOW (ref 13–44)
MAGNESIUM SERPL-MCNC: 2.2 MG/DL — SIGNIFICANT CHANGE UP (ref 1.6–2.6)
MCHC RBC-ENTMCNC: 27.8 PG — SIGNIFICANT CHANGE UP (ref 27–34)
MCHC RBC-ENTMCNC: 32.4 % — SIGNIFICANT CHANGE UP (ref 32–36)
MCV RBC AUTO: 85.9 FL — SIGNIFICANT CHANGE UP (ref 80–100)
METAMYELOCYTES # FLD: 0.9 % — SIGNIFICANT CHANGE UP (ref 0–1)
MICROCYTES BLD QL: SLIGHT — SIGNIFICANT CHANGE UP
MONOCYTES # BLD AUTO: 0.98 K/UL — HIGH (ref 0–0.9)
MONOCYTES NFR BLD AUTO: 10.6 % — SIGNIFICANT CHANGE UP (ref 2–14)
MONOCYTES NFR BLD: 4.4 % — SIGNIFICANT CHANGE UP (ref 2–9)
NEUTROPHIL AB SER-ACNC: 86.7 % — HIGH (ref 43–77)
NEUTROPHILS # BLD AUTO: 7.66 K/UL — HIGH (ref 1.8–7.4)
NEUTROPHILS NFR BLD AUTO: 82.8 % — HIGH (ref 43–77)
NEUTS BAND # BLD: 4.4 % — SIGNIFICANT CHANGE UP (ref 0–6)
NRBC # FLD: 0 — SIGNIFICANT CHANGE UP
PHOSPHATE SERPL-MCNC: 2.2 MG/DL — LOW (ref 2.5–4.5)
PLATELET # BLD AUTO: 112 K/UL — LOW (ref 150–400)
PLATELET COUNT - ESTIMATE: SIGNIFICANT CHANGE UP
PMV BLD: 10.2 FL — SIGNIFICANT CHANGE UP (ref 7–13)
POIKILOCYTOSIS BLD QL AUTO: SLIGHT — SIGNIFICANT CHANGE UP
POTASSIUM SERPL-MCNC: 4 MMOL/L — SIGNIFICANT CHANGE UP (ref 3.5–5.3)
POTASSIUM SERPL-SCNC: 4 MMOL/L — SIGNIFICANT CHANGE UP (ref 3.5–5.3)
PROTHROM AB SERPL-ACNC: 12.8 SEC — SIGNIFICANT CHANGE UP (ref 9.8–13.1)
RBC # BLD: 4.39 M/UL — SIGNIFICANT CHANGE UP (ref 4.2–5.8)
RBC # FLD: 15.3 % — HIGH (ref 10.3–14.5)
SODIUM SERPL-SCNC: 136 MMOL/L — SIGNIFICANT CHANGE UP (ref 135–145)
WBC # BLD: 9.26 K/UL — SIGNIFICANT CHANGE UP (ref 3.8–10.5)
WBC # FLD AUTO: 9.26 K/UL — SIGNIFICANT CHANGE UP (ref 3.8–10.5)

## 2018-06-10 PROCEDURE — 71045 X-RAY EXAM CHEST 1 VIEW: CPT | Mod: 26

## 2018-06-10 PROCEDURE — 99233 SBSQ HOSP IP/OBS HIGH 50: CPT

## 2018-06-10 RX ORDER — ACETAMINOPHEN 500 MG
1000 TABLET ORAL ONCE
Qty: 0 | Refills: 0 | Status: COMPLETED | OUTPATIENT
Start: 2018-06-10 | End: 2018-06-10

## 2018-06-10 RX ORDER — SODIUM CHLORIDE 9 MG/ML
1000 INJECTION, SOLUTION INTRAVENOUS
Qty: 0 | Refills: 0 | Status: DISCONTINUED | OUTPATIENT
Start: 2018-06-10 | End: 2018-06-11

## 2018-06-10 RX ORDER — ALBUMIN HUMAN 25 %
250 VIAL (ML) INTRAVENOUS ONCE
Qty: 0 | Refills: 0 | Status: COMPLETED | OUTPATIENT
Start: 2018-06-10 | End: 2018-06-10

## 2018-06-10 RX ADMIN — Medication 400 MILLIGRAM(S): at 22:50

## 2018-06-10 RX ADMIN — Medication 300 MILLIGRAM(S): at 17:34

## 2018-06-10 RX ADMIN — Medication 400 MILLIGRAM(S): at 05:10

## 2018-06-10 RX ADMIN — Medication 63.75 MILLIMOLE(S): at 11:25

## 2018-06-10 RX ADMIN — HEPARIN SODIUM 5000 UNIT(S): 5000 INJECTION INTRAVENOUS; SUBCUTANEOUS at 07:22

## 2018-06-10 RX ADMIN — Medication 1000 MILLIGRAM(S): at 05:25

## 2018-06-10 RX ADMIN — DORNASE ALFA 2.5 MILLIGRAM(S): 1 SOLUTION RESPIRATORY (INHALATION) at 09:30

## 2018-06-10 RX ADMIN — SODIUM CHLORIDE 1000 MILLILITER(S): 9 INJECTION, SOLUTION INTRAVENOUS at 22:00

## 2018-06-10 RX ADMIN — Medication 25 MILLIGRAM(S): at 22:53

## 2018-06-10 RX ADMIN — SODIUM CHLORIDE 1500 MILLILITER(S): 9 INJECTION, SOLUTION INTRAVENOUS at 02:22

## 2018-06-10 RX ADMIN — HEPARIN SODIUM 5000 UNIT(S): 5000 INJECTION INTRAVENOUS; SUBCUTANEOUS at 18:21

## 2018-06-10 RX ADMIN — Medication 1000 MILLIGRAM(S): at 23:20

## 2018-06-10 RX ADMIN — Medication 500 MILLILITER(S): at 13:13

## 2018-06-10 RX ADMIN — SODIUM CHLORIDE 75 MILLILITER(S): 9 INJECTION, SOLUTION INTRAVENOUS at 09:00

## 2018-06-10 NOTE — PROGRESS NOTE ADULT - ASSESSMENT
52y Male s/p Robotic assistance in thoracoscopic procedure, currently hemodynamically stable POD2    - Care per CT ICU  - Continue NPO/IVF  - Monitor CT output  - Pain control, keep PCA  - keep pagan catheter

## 2018-06-10 NOTE — PROGRESS NOTE ADULT - ASSESSMENT
s/p esophagectomy  pain control  dvt proph  hx cad  asa when able  pt  ppi  sx f/u  ng as per sx   oob  o2   will follow

## 2018-06-11 LAB
APTT BLD: 30.3 SEC — SIGNIFICANT CHANGE UP (ref 27.5–37.4)
BUN SERPL-MCNC: 11 MG/DL — SIGNIFICANT CHANGE UP (ref 7–23)
CA-I BLD-SCNC: 1.14 MMOL/L — SIGNIFICANT CHANGE UP (ref 1.03–1.23)
CALCIUM SERPL-MCNC: 8.3 MG/DL — LOW (ref 8.4–10.5)
CHLORIDE SERPL-SCNC: 100 MMOL/L — SIGNIFICANT CHANGE UP (ref 98–107)
CO2 SERPL-SCNC: 25 MMOL/L — SIGNIFICANT CHANGE UP (ref 22–31)
CREAT SERPL-MCNC: 0.81 MG/DL — SIGNIFICANT CHANGE UP (ref 0.5–1.3)
GLUCOSE BLDC GLUCOMTR-MCNC: 86 MG/DL — SIGNIFICANT CHANGE UP (ref 70–99)
GLUCOSE BLDC GLUCOMTR-MCNC: 87 MG/DL — SIGNIFICANT CHANGE UP (ref 70–99)
GLUCOSE BLDC GLUCOMTR-MCNC: 94 MG/DL — SIGNIFICANT CHANGE UP (ref 70–99)
GLUCOSE SERPL-MCNC: 82 MG/DL — SIGNIFICANT CHANGE UP (ref 70–99)
HCT VFR BLD CALC: 33.5 % — LOW (ref 39–50)
HGB BLD-MCNC: 10.8 G/DL — LOW (ref 13–17)
INR BLD: 1.15 — SIGNIFICANT CHANGE UP (ref 0.88–1.17)
MAGNESIUM SERPL-MCNC: 2 MG/DL — SIGNIFICANT CHANGE UP (ref 1.6–2.6)
MCHC RBC-ENTMCNC: 26.8 PG — LOW (ref 27–34)
MCHC RBC-ENTMCNC: 32.2 % — SIGNIFICANT CHANGE UP (ref 32–36)
MCV RBC AUTO: 83.1 FL — SIGNIFICANT CHANGE UP (ref 80–100)
NRBC # FLD: 0 — SIGNIFICANT CHANGE UP
PHOSPHATE SERPL-MCNC: 2.6 MG/DL — SIGNIFICANT CHANGE UP (ref 2.5–4.5)
PLATELET # BLD AUTO: 117 K/UL — LOW (ref 150–400)
PMV BLD: 10.3 FL — SIGNIFICANT CHANGE UP (ref 7–13)
POTASSIUM SERPL-MCNC: 3.7 MMOL/L — SIGNIFICANT CHANGE UP (ref 3.5–5.3)
POTASSIUM SERPL-SCNC: 3.7 MMOL/L — SIGNIFICANT CHANGE UP (ref 3.5–5.3)
PROTHROM AB SERPL-ACNC: 12.8 SEC — SIGNIFICANT CHANGE UP (ref 9.8–13.1)
RBC # BLD: 4.03 M/UL — LOW (ref 4.2–5.8)
RBC # FLD: 15.1 % — HIGH (ref 10.3–14.5)
SODIUM SERPL-SCNC: 137 MMOL/L — SIGNIFICANT CHANGE UP (ref 135–145)
WBC # BLD: 6.64 K/UL — SIGNIFICANT CHANGE UP (ref 3.8–10.5)
WBC # FLD AUTO: 6.64 K/UL — SIGNIFICANT CHANGE UP (ref 3.8–10.5)

## 2018-06-11 PROCEDURE — 99233 SBSQ HOSP IP/OBS HIGH 50: CPT

## 2018-06-11 PROCEDURE — 71045 X-RAY EXAM CHEST 1 VIEW: CPT | Mod: 26

## 2018-06-11 RX ORDER — DEXTROSE MONOHYDRATE, SODIUM CHLORIDE, AND POTASSIUM CHLORIDE 50; .745; 4.5 G/1000ML; G/1000ML; G/1000ML
1000 INJECTION, SOLUTION INTRAVENOUS
Qty: 0 | Refills: 0 | Status: DISCONTINUED | OUTPATIENT
Start: 2018-06-11 | End: 2018-06-11

## 2018-06-11 RX ORDER — ACETAMINOPHEN 500 MG
1000 TABLET ORAL ONCE
Qty: 0 | Refills: 0 | Status: COMPLETED | OUTPATIENT
Start: 2018-06-11 | End: 2018-06-11

## 2018-06-11 RX ORDER — SODIUM CHLORIDE 9 MG/ML
250 INJECTION, SOLUTION INTRAVENOUS ONCE
Qty: 0 | Refills: 0 | Status: COMPLETED | OUTPATIENT
Start: 2018-06-11 | End: 2018-06-10

## 2018-06-11 RX ORDER — SODIUM CHLORIDE 9 MG/ML
1000 INJECTION, SOLUTION INTRAVENOUS
Qty: 0 | Refills: 0 | Status: DISCONTINUED | OUTPATIENT
Start: 2018-06-11 | End: 2018-06-11

## 2018-06-11 RX ORDER — DEXTROSE MONOHYDRATE, SODIUM CHLORIDE, AND POTASSIUM CHLORIDE 50; .745; 4.5 G/1000ML; G/1000ML; G/1000ML
1000 INJECTION, SOLUTION INTRAVENOUS
Qty: 0 | Refills: 0 | Status: DISCONTINUED | OUTPATIENT
Start: 2018-06-11 | End: 2018-06-12

## 2018-06-11 RX ADMIN — HEPARIN SODIUM 5000 UNIT(S): 5000 INJECTION INTRAVENOUS; SUBCUTANEOUS at 06:50

## 2018-06-11 RX ADMIN — Medication 25 MILLIGRAM(S): at 22:47

## 2018-06-11 RX ADMIN — Medication 400 MILLIGRAM(S): at 14:00

## 2018-06-11 RX ADMIN — DORNASE ALFA 2.5 MILLIGRAM(S): 1 SOLUTION RESPIRATORY (INHALATION) at 09:48

## 2018-06-11 RX ADMIN — Medication 300 MILLIGRAM(S): at 13:32

## 2018-06-11 RX ADMIN — Medication 400 MILLIGRAM(S): at 22:45

## 2018-06-11 RX ADMIN — DEXTROSE MONOHYDRATE, SODIUM CHLORIDE, AND POTASSIUM CHLORIDE 75 MILLILITER(S): 50; .745; 4.5 INJECTION, SOLUTION INTRAVENOUS at 16:53

## 2018-06-11 RX ADMIN — HEPARIN SODIUM 5000 UNIT(S): 5000 INJECTION INTRAVENOUS; SUBCUTANEOUS at 17:21

## 2018-06-11 RX ADMIN — Medication 1000 MILLIGRAM(S): at 14:15

## 2018-06-11 RX ADMIN — Medication 1000 MILLIGRAM(S): at 23:15

## 2018-06-11 NOTE — CHART NOTE - NSCHARTNOTEFT_GEN_A_CORE
NUTRITION SERVICES                                                                                  MALNUTRITION ALERT     Attention Health Care Provider: Upon nutritional assessment by the Registered Dietitian your patient was determined to meet criteria / has evidence of the following diagnosis/diagnoses:    [ ] Mild Protein Calorie Malnutrition   [ ] Moderate Protein Calorie Malnutrition   [ ] Severe Protein Calorie Malnutrition   [ ] Unspecified Protein Calorie Malnutrition   [ ] Underweight / BMI <19  [ ] Morbid Obesity / BMI >40          By signing this assessment you are acknowledging the diagnosis/diagnoses.       PLAN OF CARE: Refer to Initial Dietitian Evaluation or Nutrition Follow-Up Documentation for Nutritional Recommendations. NUTRITION SERVICES                                                                                  MALNUTRITION ALERT     Attention Health Care Provider: Upon nutritional assessment by the Registered Dietitian your patient was determined to meet criteria / has evidence of the following diagnosis/diagnoses:    [ ] Mild Protein Calorie Malnutrition   [x ] Moderate Protein Calorie Malnutrition   [ ] Severe Protein Calorie Malnutrition   [ ] Unspecified Protein Calorie Malnutrition   [ ] Underweight / BMI <19  [ ] Morbid Obesity / BMI >40          By signing this assessment you are acknowledging the diagnosis/diagnoses.       PLAN OF CARE: Refer to Initial Dietitian Evaluation or Nutrition Follow-Up Documentation for Nutritional Recommendations.

## 2018-06-11 NOTE — PROGRESS NOTE ADULT - ASSESSMENT
s/p esophagectomy  pain control  dvt proph  hx cad  asa when able  pt  ppi  sx f/u  ng as per sx   oob  o2  incentive spirometry   will follow

## 2018-06-11 NOTE — PROGRESS NOTE ADULT - ASSESSMENT
52y Male s/p Robotic assistance in thoracoscopic procedure, currently hemodynamically stable    - Care per CT ICU  - Continue NPO/IVF  - Monitor CT output  - Pain control, keep PCA  - Monitor UOP

## 2018-06-11 NOTE — DIETITIAN INITIAL EVALUATION ADULT. - OTHER INFO
Nutrition assessment initiated for critical care LOS. Pt. alert, oriented, maintained NPO. Pt. geraldineorts, no recent wt. changes, was not taking PO since last Wednesday - 6/6/18 - in preparation for the surgery as directed . Pt. continues NPO now , plan for swallow study on Wednesday 6/13/18.

## 2018-06-12 LAB
APTT BLD: 32.2 SEC — SIGNIFICANT CHANGE UP (ref 27.5–37.4)
BUN SERPL-MCNC: 10 MG/DL — SIGNIFICANT CHANGE UP (ref 7–23)
CA-I BLD-SCNC: 1.06 MMOL/L — SIGNIFICANT CHANGE UP (ref 1.03–1.23)
CALCIUM SERPL-MCNC: 8.7 MG/DL — SIGNIFICANT CHANGE UP (ref 8.4–10.5)
CHLORIDE SERPL-SCNC: 101 MMOL/L — SIGNIFICANT CHANGE UP (ref 98–107)
CO2 SERPL-SCNC: 25 MMOL/L — SIGNIFICANT CHANGE UP (ref 22–31)
CREAT SERPL-MCNC: 0.79 MG/DL — SIGNIFICANT CHANGE UP (ref 0.5–1.3)
GLUCOSE BLDC GLUCOMTR-MCNC: 101 MG/DL — HIGH (ref 70–99)
GLUCOSE BLDC GLUCOMTR-MCNC: 98 MG/DL — SIGNIFICANT CHANGE UP (ref 70–99)
GLUCOSE SERPL-MCNC: 89 MG/DL — SIGNIFICANT CHANGE UP (ref 70–99)
HCT VFR BLD CALC: 36.4 % — LOW (ref 39–50)
HGB BLD-MCNC: 12 G/DL — LOW (ref 13–17)
INR BLD: 1.15 — SIGNIFICANT CHANGE UP (ref 0.88–1.17)
MAGNESIUM SERPL-MCNC: 2 MG/DL — SIGNIFICANT CHANGE UP (ref 1.6–2.6)
MCHC RBC-ENTMCNC: 27 PG — SIGNIFICANT CHANGE UP (ref 27–34)
MCHC RBC-ENTMCNC: 33 % — SIGNIFICANT CHANGE UP (ref 32–36)
MCV RBC AUTO: 82 FL — SIGNIFICANT CHANGE UP (ref 80–100)
NRBC # FLD: 0 — SIGNIFICANT CHANGE UP
PHOSPHATE SERPL-MCNC: 2.6 MG/DL — SIGNIFICANT CHANGE UP (ref 2.5–4.5)
PLATELET # BLD AUTO: 131 K/UL — LOW (ref 150–400)
PMV BLD: 10.4 FL — SIGNIFICANT CHANGE UP (ref 7–13)
POTASSIUM SERPL-MCNC: 3.5 MMOL/L — SIGNIFICANT CHANGE UP (ref 3.5–5.3)
POTASSIUM SERPL-SCNC: 3.5 MMOL/L — SIGNIFICANT CHANGE UP (ref 3.5–5.3)
PROTHROM AB SERPL-ACNC: 13.3 SEC — HIGH (ref 9.8–13.1)
RBC # BLD: 4.44 M/UL — SIGNIFICANT CHANGE UP (ref 4.2–5.8)
RBC # FLD: 14.7 % — HIGH (ref 10.3–14.5)
SODIUM SERPL-SCNC: 138 MMOL/L — SIGNIFICANT CHANGE UP (ref 135–145)
WBC # BLD: 5.84 K/UL — SIGNIFICANT CHANGE UP (ref 3.8–10.5)
WBC # FLD AUTO: 5.84 K/UL — SIGNIFICANT CHANGE UP (ref 3.8–10.5)

## 2018-06-12 PROCEDURE — 99233 SBSQ HOSP IP/OBS HIGH 50: CPT

## 2018-06-12 PROCEDURE — 71045 X-RAY EXAM CHEST 1 VIEW: CPT | Mod: 26

## 2018-06-12 RX ORDER — DEXTROSE MONOHYDRATE, SODIUM CHLORIDE, AND POTASSIUM CHLORIDE 50; .745; 4.5 G/1000ML; G/1000ML; G/1000ML
1000 INJECTION, SOLUTION INTRAVENOUS
Qty: 0 | Refills: 0 | Status: DISCONTINUED | OUTPATIENT
Start: 2018-06-12 | End: 2018-06-13

## 2018-06-12 RX ORDER — ACETAMINOPHEN 500 MG
1000 TABLET ORAL ONCE
Qty: 0 | Refills: 0 | Status: COMPLETED | OUTPATIENT
Start: 2018-06-13 | End: 2018-06-13

## 2018-06-12 RX ORDER — CHLORHEXIDINE GLUCONATE 213 G/1000ML
1 SOLUTION TOPICAL
Qty: 0 | Refills: 0 | Status: DISCONTINUED | OUTPATIENT
Start: 2018-06-12 | End: 2018-06-15

## 2018-06-12 RX ORDER — CHLORHEXIDINE GLUCONATE 213 G/1000ML
1 SOLUTION TOPICAL DAILY
Qty: 0 | Refills: 0 | Status: DISCONTINUED | OUTPATIENT
Start: 2018-06-12 | End: 2018-06-12

## 2018-06-12 RX ORDER — ACETAMINOPHEN 500 MG
1000 TABLET ORAL ONCE
Qty: 0 | Refills: 0 | Status: COMPLETED | OUTPATIENT
Start: 2018-06-12 | End: 2018-06-12

## 2018-06-12 RX ADMIN — Medication 25 MILLIGRAM(S): at 22:31

## 2018-06-12 RX ADMIN — CHLORHEXIDINE GLUCONATE 1 APPLICATION(S): 213 SOLUTION TOPICAL at 07:23

## 2018-06-12 RX ADMIN — DEXTROSE MONOHYDRATE, SODIUM CHLORIDE, AND POTASSIUM CHLORIDE 80 MILLILITER(S): 50; .745; 4.5 INJECTION, SOLUTION INTRAVENOUS at 11:31

## 2018-06-12 RX ADMIN — DEXTROSE MONOHYDRATE, SODIUM CHLORIDE, AND POTASSIUM CHLORIDE 100 MILLILITER(S): 50; .745; 4.5 INJECTION, SOLUTION INTRAVENOUS at 07:41

## 2018-06-12 RX ADMIN — Medication 1000 MILLIGRAM(S): at 17:00

## 2018-06-12 RX ADMIN — DEXTROSE MONOHYDRATE, SODIUM CHLORIDE, AND POTASSIUM CHLORIDE 80 MILLILITER(S): 50; .745; 4.5 INJECTION, SOLUTION INTRAVENOUS at 19:18

## 2018-06-12 RX ADMIN — HEPARIN SODIUM 5000 UNIT(S): 5000 INJECTION INTRAVENOUS; SUBCUTANEOUS at 05:55

## 2018-06-12 RX ADMIN — Medication 300 MILLIGRAM(S): at 13:23

## 2018-06-12 RX ADMIN — HEPARIN SODIUM 5000 UNIT(S): 5000 INJECTION INTRAVENOUS; SUBCUTANEOUS at 17:00

## 2018-06-12 RX ADMIN — Medication 400 MILLIGRAM(S): at 16:45

## 2018-06-12 NOTE — PROGRESS NOTE ADULT - ASSESSMENT
s/p esophagectomy  pain control  dvt proph  hx cad  asa when able  pt  ppi  sx f/u  ng as per sx   oob  o2  incentive spirometry   care per CTU

## 2018-06-12 NOTE — PROGRESS NOTE ADULT - ASSESSMENT
52y Male s/p Robotic assistance in thoracoscopic procedure, currently hemodynamically stable    - Care per CT ICU  - Continue NPO/IVF  - Monitor Chest tube output  - Pain control  - Monitor UOP

## 2018-06-13 ENCOUNTER — TRANSCRIPTION ENCOUNTER (OUTPATIENT)
Age: 53
End: 2018-06-13

## 2018-06-13 LAB
APTT BLD: 33.8 SEC — SIGNIFICANT CHANGE UP (ref 27.5–37.4)
BUN SERPL-MCNC: 7 MG/DL — SIGNIFICANT CHANGE UP (ref 7–23)
CA-I BLD-SCNC: 1.14 MMOL/L — SIGNIFICANT CHANGE UP (ref 1.03–1.23)
CALCIUM SERPL-MCNC: 8.9 MG/DL — SIGNIFICANT CHANGE UP (ref 8.4–10.5)
CHLORIDE SERPL-SCNC: 98 MMOL/L — SIGNIFICANT CHANGE UP (ref 98–107)
CO2 SERPL-SCNC: 26 MMOL/L — SIGNIFICANT CHANGE UP (ref 22–31)
CREAT SERPL-MCNC: 0.86 MG/DL — SIGNIFICANT CHANGE UP (ref 0.5–1.3)
GLUCOSE SERPL-MCNC: 98 MG/DL — SIGNIFICANT CHANGE UP (ref 70–99)
HCT VFR BLD CALC: 35.4 % — LOW (ref 39–50)
HGB BLD-MCNC: 11.7 G/DL — LOW (ref 13–17)
INR BLD: 1.19 — HIGH (ref 0.88–1.17)
MAGNESIUM SERPL-MCNC: 1.9 MG/DL — SIGNIFICANT CHANGE UP (ref 1.6–2.6)
MCHC RBC-ENTMCNC: 26.8 PG — LOW (ref 27–34)
MCHC RBC-ENTMCNC: 33.1 % — SIGNIFICANT CHANGE UP (ref 32–36)
MCV RBC AUTO: 81 FL — SIGNIFICANT CHANGE UP (ref 80–100)
NRBC # FLD: 0 — SIGNIFICANT CHANGE UP
PHOSPHATE SERPL-MCNC: 3.7 MG/DL — SIGNIFICANT CHANGE UP (ref 2.5–4.5)
PLATELET # BLD AUTO: 149 K/UL — LOW (ref 150–400)
PMV BLD: 10.2 FL — SIGNIFICANT CHANGE UP (ref 7–13)
POTASSIUM SERPL-MCNC: 4.5 MMOL/L — SIGNIFICANT CHANGE UP (ref 3.5–5.3)
POTASSIUM SERPL-SCNC: 4.5 MMOL/L — SIGNIFICANT CHANGE UP (ref 3.5–5.3)
PROTHROM AB SERPL-ACNC: 13.2 SEC — HIGH (ref 9.8–13.1)
RBC # BLD: 4.37 M/UL — SIGNIFICANT CHANGE UP (ref 4.2–5.8)
RBC # FLD: 14.6 % — HIGH (ref 10.3–14.5)
SODIUM SERPL-SCNC: 135 MMOL/L — SIGNIFICANT CHANGE UP (ref 135–145)
WBC # BLD: 6.54 K/UL — SIGNIFICANT CHANGE UP (ref 3.8–10.5)
WBC # FLD AUTO: 6.54 K/UL — SIGNIFICANT CHANGE UP (ref 3.8–10.5)

## 2018-06-13 PROCEDURE — 71045 X-RAY EXAM CHEST 1 VIEW: CPT | Mod: 26

## 2018-06-13 PROCEDURE — 99233 SBSQ HOSP IP/OBS HIGH 50: CPT

## 2018-06-13 PROCEDURE — 74220 X-RAY XM ESOPHAGUS 1CNTRST: CPT | Mod: 26

## 2018-06-13 RX ORDER — HYDROMORPHONE HYDROCHLORIDE 2 MG/ML
1 INJECTION INTRAMUSCULAR; INTRAVENOUS; SUBCUTANEOUS
Qty: 0 | Refills: 0 | Status: DISCONTINUED | OUTPATIENT
Start: 2018-06-13 | End: 2018-06-18

## 2018-06-13 RX ORDER — HYDROMORPHONE HYDROCHLORIDE 2 MG/ML
0.5 INJECTION INTRAMUSCULAR; INTRAVENOUS; SUBCUTANEOUS
Qty: 0 | Refills: 0 | Status: DISCONTINUED | OUTPATIENT
Start: 2018-06-13 | End: 2018-06-18

## 2018-06-13 RX ORDER — HYDROMORPHONE HYDROCHLORIDE 2 MG/ML
0.5 INJECTION INTRAMUSCULAR; INTRAVENOUS; SUBCUTANEOUS ONCE
Qty: 0 | Refills: 0 | Status: DISCONTINUED | OUTPATIENT
Start: 2018-06-13 | End: 2018-06-13

## 2018-06-13 RX ORDER — DEXTROSE MONOHYDRATE, SODIUM CHLORIDE, AND POTASSIUM CHLORIDE 50; .745; 4.5 G/1000ML; G/1000ML; G/1000ML
1000 INJECTION, SOLUTION INTRAVENOUS
Qty: 0 | Refills: 0 | Status: DISCONTINUED | OUTPATIENT
Start: 2018-06-13 | End: 2018-06-17

## 2018-06-13 RX ADMIN — HYDROMORPHONE HYDROCHLORIDE 0.5 MILLIGRAM(S): 2 INJECTION INTRAMUSCULAR; INTRAVENOUS; SUBCUTANEOUS at 11:40

## 2018-06-13 RX ADMIN — HYDROMORPHONE HYDROCHLORIDE 0.5 MILLIGRAM(S): 2 INJECTION INTRAMUSCULAR; INTRAVENOUS; SUBCUTANEOUS at 18:09

## 2018-06-13 RX ADMIN — Medication 25 MILLIGRAM(S): at 23:38

## 2018-06-13 RX ADMIN — HYDROMORPHONE HYDROCHLORIDE 0.5 MILLIGRAM(S): 2 INJECTION INTRAMUSCULAR; INTRAVENOUS; SUBCUTANEOUS at 11:25

## 2018-06-13 RX ADMIN — HYDROMORPHONE HYDROCHLORIDE 1 MILLIGRAM(S): 2 INJECTION INTRAMUSCULAR; INTRAVENOUS; SUBCUTANEOUS at 23:38

## 2018-06-13 RX ADMIN — Medication 1000 MILLIGRAM(S): at 01:24

## 2018-06-13 RX ADMIN — HYDROMORPHONE HYDROCHLORIDE 0.5 MILLIGRAM(S): 2 INJECTION INTRAMUSCULAR; INTRAVENOUS; SUBCUTANEOUS at 20:42

## 2018-06-13 RX ADMIN — HEPARIN SODIUM 5000 UNIT(S): 5000 INJECTION INTRAVENOUS; SUBCUTANEOUS at 06:46

## 2018-06-13 RX ADMIN — HEPARIN SODIUM 5000 UNIT(S): 5000 INJECTION INTRAVENOUS; SUBCUTANEOUS at 18:09

## 2018-06-13 RX ADMIN — CHLORHEXIDINE GLUCONATE 1 APPLICATION(S): 213 SOLUTION TOPICAL at 06:00

## 2018-06-13 RX ADMIN — Medication 400 MILLIGRAM(S): at 01:09

## 2018-06-13 RX ADMIN — DEXTROSE MONOHYDRATE, SODIUM CHLORIDE, AND POTASSIUM CHLORIDE 75 MILLILITER(S): 50; .745; 4.5 INJECTION, SOLUTION INTRAVENOUS at 19:59

## 2018-06-13 RX ADMIN — Medication 300 MILLIGRAM(S): at 16:38

## 2018-06-13 RX ADMIN — DEXTROSE MONOHYDRATE, SODIUM CHLORIDE, AND POTASSIUM CHLORIDE 75 MILLILITER(S): 50; .745; 4.5 INJECTION, SOLUTION INTRAVENOUS at 07:21

## 2018-06-13 RX ADMIN — DEXTROSE MONOHYDRATE, SODIUM CHLORIDE, AND POTASSIUM CHLORIDE 75 MILLILITER(S): 50; .745; 4.5 INJECTION, SOLUTION INTRAVENOUS at 18:12

## 2018-06-13 RX ADMIN — HYDROMORPHONE HYDROCHLORIDE 0.5 MILLIGRAM(S): 2 INJECTION INTRAMUSCULAR; INTRAVENOUS; SUBCUTANEOUS at 19:58

## 2018-06-13 RX ADMIN — HYDROMORPHONE HYDROCHLORIDE 0.5 MILLIGRAM(S): 2 INJECTION INTRAMUSCULAR; INTRAVENOUS; SUBCUTANEOUS at 18:30

## 2018-06-13 RX ADMIN — DEXTROSE MONOHYDRATE, SODIUM CHLORIDE, AND POTASSIUM CHLORIDE 75 MILLILITER(S): 50; .745; 4.5 INJECTION, SOLUTION INTRAVENOUS at 06:46

## 2018-06-13 NOTE — PROGRESS NOTE ADULT - ASSESSMENT
s/p esophagectomy  pain control  dvt proph  hx cad  asa when able  pt  ppi  ng as per sx   swallow study today  oob  o2  incentive spirometry  tachycardia and uptrending Cr - possibly slightly dehydrated, would increase IVF  care per CTU s/p esophagectomy  pain control  dvt proph  hx cad  asa when able  pt  ppi  ng as per sx   swallow study today  oob  o2  incentive spirometry  tachycardia likely 2/2 pain  care per CTU

## 2018-06-13 NOTE — PROGRESS NOTE ADULT - ASSESSMENT
52y Male s/p Robotic assistance in thoracoscopic procedure, currently hemodynamically stable POD5    - omnipaque swallow study today  - discontinue PCEA today   - Care per CT ICU  - Continue NPO/IVF  - Monitor Chest tube output  - Pain control  - Monitor UOP

## 2018-06-14 ENCOUNTER — APPOINTMENT (OUTPATIENT)
Dept: THORACIC SURGERY | Facility: HOSPITAL | Age: 53
End: 2018-06-14

## 2018-06-14 LAB
BUN SERPL-MCNC: 9 MG/DL — SIGNIFICANT CHANGE UP (ref 7–23)
CALCIUM SERPL-MCNC: 9 MG/DL — SIGNIFICANT CHANGE UP (ref 8.4–10.5)
CHLORIDE SERPL-SCNC: 99 MMOL/L — SIGNIFICANT CHANGE UP (ref 98–107)
CO2 SERPL-SCNC: 29 MMOL/L — SIGNIFICANT CHANGE UP (ref 22–31)
CREAT SERPL-MCNC: 0.91 MG/DL — SIGNIFICANT CHANGE UP (ref 0.5–1.3)
GLUCOSE BLDC GLUCOMTR-MCNC: 76 MG/DL — SIGNIFICANT CHANGE UP (ref 70–99)
GLUCOSE SERPL-MCNC: 107 MG/DL — HIGH (ref 70–99)
HCT VFR BLD CALC: 37.7 % — LOW (ref 39–50)
HGB BLD-MCNC: 12.3 G/DL — LOW (ref 13–17)
MCHC RBC-ENTMCNC: 27.3 PG — SIGNIFICANT CHANGE UP (ref 27–34)
MCHC RBC-ENTMCNC: 32.6 % — SIGNIFICANT CHANGE UP (ref 32–36)
MCV RBC AUTO: 83.6 FL — SIGNIFICANT CHANGE UP (ref 80–100)
NRBC # FLD: 0 — SIGNIFICANT CHANGE UP
PLATELET # BLD AUTO: 174 K/UL — SIGNIFICANT CHANGE UP (ref 150–400)
PMV BLD: 10.2 FL — SIGNIFICANT CHANGE UP (ref 7–13)
POTASSIUM SERPL-MCNC: 4 MMOL/L — SIGNIFICANT CHANGE UP (ref 3.5–5.3)
POTASSIUM SERPL-SCNC: 4 MMOL/L — SIGNIFICANT CHANGE UP (ref 3.5–5.3)
RBC # BLD: 4.51 M/UL — SIGNIFICANT CHANGE UP (ref 4.2–5.8)
RBC # FLD: 14.6 % — HIGH (ref 10.3–14.5)
SODIUM SERPL-SCNC: 138 MMOL/L — SIGNIFICANT CHANGE UP (ref 135–145)
WBC # BLD: 6.88 K/UL — SIGNIFICANT CHANGE UP (ref 3.8–10.5)
WBC # FLD AUTO: 6.88 K/UL — SIGNIFICANT CHANGE UP (ref 3.8–10.5)

## 2018-06-14 PROCEDURE — 71045 X-RAY EXAM CHEST 1 VIEW: CPT | Mod: 26

## 2018-06-14 PROCEDURE — 43236 UPPR GI SCOPE W/SUBMUC INJ: CPT | Mod: 78

## 2018-06-14 PROCEDURE — 43245 EGD DILATE STRICTURE: CPT | Mod: 78

## 2018-06-14 RX ORDER — ACETAMINOPHEN 500 MG
1000 TABLET ORAL ONCE
Qty: 0 | Refills: 0 | Status: COMPLETED | OUTPATIENT
Start: 2018-06-14 | End: 2018-06-14

## 2018-06-14 RX ORDER — SODIUM CHLORIDE 9 MG/ML
1000 INJECTION, SOLUTION INTRAVENOUS ONCE
Qty: 0 | Refills: 0 | Status: COMPLETED | OUTPATIENT
Start: 2018-06-14 | End: 2018-06-14

## 2018-06-14 RX ORDER — SODIUM CHLORIDE 9 MG/ML
1000 INJECTION, SOLUTION INTRAVENOUS
Qty: 0 | Refills: 0 | Status: DISCONTINUED | OUTPATIENT
Start: 2018-06-14 | End: 2018-06-14

## 2018-06-14 RX ADMIN — HYDROMORPHONE HYDROCHLORIDE 1 MILLIGRAM(S): 2 INJECTION INTRAMUSCULAR; INTRAVENOUS; SUBCUTANEOUS at 09:12

## 2018-06-14 RX ADMIN — Medication 400 MILLIGRAM(S): at 18:25

## 2018-06-14 RX ADMIN — Medication 400 MILLIGRAM(S): at 11:03

## 2018-06-14 RX ADMIN — CHLORHEXIDINE GLUCONATE 1 APPLICATION(S): 213 SOLUTION TOPICAL at 05:10

## 2018-06-14 RX ADMIN — HYDROMORPHONE HYDROCHLORIDE 0.5 MILLIGRAM(S): 2 INJECTION INTRAMUSCULAR; INTRAVENOUS; SUBCUTANEOUS at 17:00

## 2018-06-14 RX ADMIN — HYDROMORPHONE HYDROCHLORIDE 1 MILLIGRAM(S): 2 INJECTION INTRAMUSCULAR; INTRAVENOUS; SUBCUTANEOUS at 05:21

## 2018-06-14 RX ADMIN — HEPARIN SODIUM 5000 UNIT(S): 5000 INJECTION INTRAVENOUS; SUBCUTANEOUS at 05:14

## 2018-06-14 RX ADMIN — HYDROMORPHONE HYDROCHLORIDE 1 MILLIGRAM(S): 2 INJECTION INTRAMUSCULAR; INTRAVENOUS; SUBCUTANEOUS at 23:00

## 2018-06-14 RX ADMIN — HYDROMORPHONE HYDROCHLORIDE 1 MILLIGRAM(S): 2 INJECTION INTRAMUSCULAR; INTRAVENOUS; SUBCUTANEOUS at 10:24

## 2018-06-14 RX ADMIN — SODIUM CHLORIDE 2000 MILLILITER(S): 9 INJECTION, SOLUTION INTRAVENOUS at 16:15

## 2018-06-14 RX ADMIN — Medication 1000 MILLIGRAM(S): at 12:04

## 2018-06-14 RX ADMIN — HEPARIN SODIUM 5000 UNIT(S): 5000 INJECTION INTRAVENOUS; SUBCUTANEOUS at 18:25

## 2018-06-14 RX ADMIN — HYDROMORPHONE HYDROCHLORIDE 0.5 MILLIGRAM(S): 2 INJECTION INTRAMUSCULAR; INTRAVENOUS; SUBCUTANEOUS at 16:45

## 2018-06-14 RX ADMIN — HYDROMORPHONE HYDROCHLORIDE 1 MILLIGRAM(S): 2 INJECTION INTRAMUSCULAR; INTRAVENOUS; SUBCUTANEOUS at 03:57

## 2018-06-14 RX ADMIN — HYDROMORPHONE HYDROCHLORIDE 1 MILLIGRAM(S): 2 INJECTION INTRAMUSCULAR; INTRAVENOUS; SUBCUTANEOUS at 22:00

## 2018-06-14 RX ADMIN — HYDROMORPHONE HYDROCHLORIDE 1 MILLIGRAM(S): 2 INJECTION INTRAMUSCULAR; INTRAVENOUS; SUBCUTANEOUS at 00:10

## 2018-06-14 RX ADMIN — Medication 1000 MILLIGRAM(S): at 19:25

## 2018-06-14 RX ADMIN — Medication 3 MILLILITER(S): at 20:50

## 2018-06-14 NOTE — BRIEF OPERATIVE NOTE - PROCEDURE
<<-----Click on this checkbox to enter Procedure Botox injection  06/14/2018  Esophageal stricture Botox injection.  Active  CSUMMERS  EGD, with balloon dilation  06/14/2018  Dialated up to 15  Active  CSUMMERS Botox injection  06/14/2018  Esophageal stricture Botox injection.  Francisco Javier Gaspar  EGD, with balloon dilation  06/14/2018  Dialated up to 50mm  Francisco Javier Gaspar

## 2018-06-14 NOTE — PROGRESS NOTE ADULT - ASSESSMENT
s/p esophagectomy  pain control  dvt proph  hx cad  asa when able  pt  ppi  ng as per sx   swallow study w/o anastamotic leak  oob  o2  incentive spirometry  tachycardia likely 2/2 pain, monitor

## 2018-06-14 NOTE — BRIEF OPERATIVE NOTE - PRE-OP DX
Esophageal cancer  06/08/2018  s/p Esophagogastrectomy, esophagal stricture  Active  Mcneill, Francisco Javier ALCARAZ

## 2018-06-14 NOTE — PROGRESS NOTE ADULT - ASSESSMENT
52y Male s/p Robotic assistance in thoracoscopic procedure, currently hemodynamically stable POD6. ARBF    - f/u CT surgery  - Pain control  - Monitor UOP  - continue CLD

## 2018-06-14 NOTE — CHART NOTE - NSCHARTNOTEFT_GEN_A_CORE
STATUS POST:  EGD with dilation of pyloric sphincter and botox injection    POST OPERATIVE DAY #: 0    SUBJECTIVE: Pt seen  SOB:  [ ] YES [ X] NO  Chest Discomfort: [ ] YES [ X] NO    Nausea: [ ] YES [ X] NO           Vomiting: [ ] YES [X ] NO     Pain Control Adequate: [X ] YES [ ] NO      Vital Signs Last 24 Hrs  T(C): 37.1 (14 Jun 2018 18:21), Max: 37.1 (14 Jun 2018 00:16)  T(F): 98.7 (14 Jun 2018 18:21), Max: 98.7 (14 Jun 2018 00:16)  HR: 64 (14 Jun 2018 18:21) (64 - 103)  BP: 122/83 (14 Jun 2018 18:21) (109/71 - 137/89)  BP(mean): --  RR: 18 (14 Jun 2018 18:21) (14 - 20)  SpO2: 97% (14 Jun 2018 18:21) (90% - 100%)  I&O's Summary    13 Jun 2018 07:01  -  14 Jun 2018 07:00  --------------------------------------------------------  IN: 1650 mL / OUT: 3720 mL / NET: -2070 mL    14 Jun 2018 07:01  -  14 Jun 2018 18:46  --------------------------------------------------------  IN: 690 mL / OUT: 830 mL / NET: -140 mL      I&O's Detail    13 Jun 2018 07:01  -  14 Jun 2018 07:00  --------------------------------------------------------  IN:    dextrose 5% + lactated ringers with potassium chloride 20 mEq/L: 1650 mL  Total IN: 1650 mL    OUT:    Bulb: 35 mL    Chest Tube: 255 mL    Nasoenteral Tube: 150 mL    Voided: 3280 mL  Total OUT: 3720 mL    Total NET: -2070 mL      14 Jun 2018 07:01  -  14 Jun 2018 18:46  --------------------------------------------------------  IN:    dextrose 5% + lactated ringers with potassium chloride 20 mEq/L: 450 mL    IV PiggyBack: 100 mL    Oral Fluid: 140 mL  Total IN: 690 mL    OUT:    Bulb: 50 mL    Chest Tube: 30 mL    Voided: 750 mL  Total OUT: 830 mL    Total NET: -140 mL          MEDICATIONS  (STANDING):  aspirin Suppository 300 milliGRAM(s) Rectal daily  botulinum toxin Type A Injectable 100 Unit(s) IntraMuscular once  chlorhexidine 4% Liquid 1 Application(s) Topical <User Schedule>  dextrose 5% + lactated ringers with potassium chloride 20 mEq/L 1000 milliLiter(s) (75 mL/Hr) IV Continuous <Continuous>  heparin  Injectable 5000 Unit(s) SubCutaneous every 12 hours    MEDICATIONS  (PRN):  ALBUTerol/ipratropium for Nebulization 3 milliLiter(s) Nebulizer every 6 hours PRN SOB  diphenhydrAMINE   Injectable 25 milliGRAM(s) IV Push at bedtime PRN sleep  HYDROmorphone  Injectable 0.5 milliGRAM(s) IV Push every 3 hours PRN Moderate Pain (4 - 6)  HYDROmorphone  Injectable 1 milliGRAM(s) IV Push every 3 hours PRN Severe Pain (7 - 10)  lidocaine 2% Gel 1 Application(s) Topical every 4 hours PRN for pain  sodium chloride 0.65% Nasal 1 Spray(s) Left Nostril every 4 hours PRN Congestion      LABS:                        12.3   6.88  )-----------( 174      ( 14 Jun 2018 06:59 )             37.7     06-14    138  |  99  |  9   ----------------------------<  107<H>  4.0   |  29  |  0.91    Ca    9.0      14 Jun 2018 06:59  Phos  3.7     06-13  Mg     1.9     06-13      PT/INR - ( 13 Jun 2018 04:30 )   PT: 13.2 SEC;   INR: 1.19          PTT - ( 13 Jun 2018 04:30 )  PTT:33.8 SEC      RADIOLOGY & ADDITIONAL STUDIES:    PHYSICAL EXAM:    Gen: NAD  Abdomen: soft nondistended chris ttp, incisions c/d/i        A/P: 52y Male Malignant neoplasm of esophagus  Family history of cervical cancer (Mother)  Family history of throat cancer (Father)  Handoff  MEWS Score  Esophageal cancer  MI (myocardial infarction)  Esophageal cancer  Esophageal cancer  Malignant neoplasm of esophagus, unspecified location  MI (myocardial infarction)  Esophageal cancer  EGD, with balloon dilation  Botox injection  Robotic assistance in thoracoscopic procedure  H/O hernia repair  Status post tonsillectomy and adenoidectomy  Stented coronary artery   s/p EGD with dilation of pyloric sphincter and botox injection  - Diet: cld  - Activity: ambulate with assistance  - Labs: AM labs  - Pain medication  - DVT ppx

## 2018-06-15 LAB
BUN SERPL-MCNC: 9 MG/DL — SIGNIFICANT CHANGE UP (ref 7–23)
CALCIUM SERPL-MCNC: 9.1 MG/DL — SIGNIFICANT CHANGE UP (ref 8.4–10.5)
CHLORIDE SERPL-SCNC: 99 MMOL/L — SIGNIFICANT CHANGE UP (ref 98–107)
CO2 SERPL-SCNC: 27 MMOL/L — SIGNIFICANT CHANGE UP (ref 22–31)
CREAT SERPL-MCNC: 0.81 MG/DL — SIGNIFICANT CHANGE UP (ref 0.5–1.3)
GLUCOSE SERPL-MCNC: 91 MG/DL — SIGNIFICANT CHANGE UP (ref 70–99)
HCT VFR BLD CALC: 36.9 % — LOW (ref 39–50)
HGB BLD-MCNC: 12.4 G/DL — LOW (ref 13–17)
MCHC RBC-ENTMCNC: 27.1 PG — SIGNIFICANT CHANGE UP (ref 27–34)
MCHC RBC-ENTMCNC: 33.6 % — SIGNIFICANT CHANGE UP (ref 32–36)
MCV RBC AUTO: 80.7 FL — SIGNIFICANT CHANGE UP (ref 80–100)
NRBC # FLD: 0 — SIGNIFICANT CHANGE UP
PLATELET # BLD AUTO: 186 K/UL — SIGNIFICANT CHANGE UP (ref 150–400)
PMV BLD: 10.5 FL — SIGNIFICANT CHANGE UP (ref 7–13)
POTASSIUM SERPL-MCNC: 3.8 MMOL/L — SIGNIFICANT CHANGE UP (ref 3.5–5.3)
POTASSIUM SERPL-SCNC: 3.8 MMOL/L — SIGNIFICANT CHANGE UP (ref 3.5–5.3)
RBC # BLD: 4.57 M/UL — SIGNIFICANT CHANGE UP (ref 4.2–5.8)
RBC # FLD: 14.9 % — HIGH (ref 10.3–14.5)
SODIUM SERPL-SCNC: 137 MMOL/L — SIGNIFICANT CHANGE UP (ref 135–145)
WBC # BLD: 6.1 K/UL — SIGNIFICANT CHANGE UP (ref 3.8–10.5)
WBC # FLD AUTO: 6.1 K/UL — SIGNIFICANT CHANGE UP (ref 3.8–10.5)

## 2018-06-15 PROCEDURE — 71045 X-RAY EXAM CHEST 1 VIEW: CPT | Mod: 26,77

## 2018-06-15 PROCEDURE — 71045 X-RAY EXAM CHEST 1 VIEW: CPT | Mod: 26

## 2018-06-15 PROCEDURE — 74220 X-RAY XM ESOPHAGUS 1CNTRST: CPT | Mod: 26

## 2018-06-15 PROCEDURE — 99232 SBSQ HOSP IP/OBS MODERATE 35: CPT

## 2018-06-15 RX ORDER — ACETAMINOPHEN 500 MG
1000 TABLET ORAL ONCE
Qty: 0 | Refills: 0 | Status: COMPLETED | OUTPATIENT
Start: 2018-06-15 | End: 2018-06-15

## 2018-06-15 RX ORDER — PANTOPRAZOLE SODIUM 20 MG/1
40 TABLET, DELAYED RELEASE ORAL DAILY
Qty: 0 | Refills: 0 | Status: DISCONTINUED | OUTPATIENT
Start: 2018-06-15 | End: 2018-06-18

## 2018-06-15 RX ADMIN — Medication 1000 MILLIGRAM(S): at 23:50

## 2018-06-15 RX ADMIN — Medication 400 MILLIGRAM(S): at 22:47

## 2018-06-15 RX ADMIN — PANTOPRAZOLE SODIUM 40 MILLIGRAM(S): 20 TABLET, DELAYED RELEASE ORAL at 12:36

## 2018-06-15 RX ADMIN — HYDROMORPHONE HYDROCHLORIDE 1 MILLIGRAM(S): 2 INJECTION INTRAMUSCULAR; INTRAVENOUS; SUBCUTANEOUS at 05:51

## 2018-06-15 RX ADMIN — HYDROMORPHONE HYDROCHLORIDE 1 MILLIGRAM(S): 2 INJECTION INTRAMUSCULAR; INTRAVENOUS; SUBCUTANEOUS at 02:10

## 2018-06-15 RX ADMIN — Medication 25 MILLIGRAM(S): at 01:15

## 2018-06-15 RX ADMIN — HYDROMORPHONE HYDROCHLORIDE 1 MILLIGRAM(S): 2 INJECTION INTRAMUSCULAR; INTRAVENOUS; SUBCUTANEOUS at 10:30

## 2018-06-15 RX ADMIN — Medication 400 MILLIGRAM(S): at 17:02

## 2018-06-15 RX ADMIN — Medication 25 MILLIGRAM(S): at 22:47

## 2018-06-15 RX ADMIN — HEPARIN SODIUM 5000 UNIT(S): 5000 INJECTION INTRAVENOUS; SUBCUTANEOUS at 05:51

## 2018-06-15 RX ADMIN — HYDROMORPHONE HYDROCHLORIDE 1 MILLIGRAM(S): 2 INJECTION INTRAMUSCULAR; INTRAVENOUS; SUBCUTANEOUS at 06:51

## 2018-06-15 RX ADMIN — HYDROMORPHONE HYDROCHLORIDE 1 MILLIGRAM(S): 2 INJECTION INTRAMUSCULAR; INTRAVENOUS; SUBCUTANEOUS at 01:11

## 2018-06-15 RX ADMIN — HYDROMORPHONE HYDROCHLORIDE 1 MILLIGRAM(S): 2 INJECTION INTRAMUSCULAR; INTRAVENOUS; SUBCUTANEOUS at 14:05

## 2018-06-15 RX ADMIN — HEPARIN SODIUM 5000 UNIT(S): 5000 INJECTION INTRAVENOUS; SUBCUTANEOUS at 17:03

## 2018-06-15 RX ADMIN — Medication 1000 MILLIGRAM(S): at 17:35

## 2018-06-15 RX ADMIN — HYDROMORPHONE HYDROCHLORIDE 1 MILLIGRAM(S): 2 INJECTION INTRAMUSCULAR; INTRAVENOUS; SUBCUTANEOUS at 13:44

## 2018-06-15 RX ADMIN — HYDROMORPHONE HYDROCHLORIDE 1 MILLIGRAM(S): 2 INJECTION INTRAMUSCULAR; INTRAVENOUS; SUBCUTANEOUS at 10:15

## 2018-06-15 NOTE — PROGRESS NOTE ADULT - ASSESSMENT
s/p esophagectomy  pain control  dvt proph  hx cad  asa when able  pt  ppi  ng as per sx   swallow study w/o anastamotic leak  oob  o2  incentive spirometry

## 2018-06-15 NOTE — CHART NOTE - NSCHARTNOTEFT_GEN_A_CORE
Pt seen and examined. Pt is POD6 from Rell Morales, POD0 from ESVIN, do duvalitation, tolerated procedure well. Patient agitated, wants to eat. Pt in no acute disress.    Vital Signs Last 24 Hrs  T(C): 36.4 (15 Boogie 2018 00:10), Max: 37.1 (14 Jun 2018 18:21)  T(F): 97.6 (15 Boogie 2018 00:10), Max: 98.7 (14 Jun 2018 18:21)  HR: 76 (15 Boogie 2018 00:10) (64 - 103)  BP: 126/75 (15 Boogie 2018 00:10) (109/71 - 137/89)  BP(mean): --  RR: 18 (15 Boogie 2018 00:10) (14 - 20)  SpO2: 94% (15 Boogie 2018 00:10) (90% - 100%)                          12.3   6.88  )-----------( 174      ( 14 Jun 2018 06:59 )             37.7     06-14    138  |  99  |  9   ----------------------------<  107<H>  4.0   |  29  |  0.91    Ca    9.0      14 Jun 2018 06:59  Phos  3.7     06-13  Mg     1.9     06-13    Plan  - Keep NPO, BS in AM  - IVF until BS  - Plan management

## 2018-06-16 PROCEDURE — 71045 X-RAY EXAM CHEST 1 VIEW: CPT | Mod: 26

## 2018-06-16 PROCEDURE — 99232 SBSQ HOSP IP/OBS MODERATE 35: CPT

## 2018-06-16 RX ORDER — ACETAMINOPHEN 500 MG
1000 TABLET ORAL ONCE
Qty: 0 | Refills: 0 | Status: COMPLETED | OUTPATIENT
Start: 2018-06-16 | End: 2018-06-16

## 2018-06-16 RX ADMIN — HEPARIN SODIUM 5000 UNIT(S): 5000 INJECTION INTRAVENOUS; SUBCUTANEOUS at 05:27

## 2018-06-16 RX ADMIN — HEPARIN SODIUM 5000 UNIT(S): 5000 INJECTION INTRAVENOUS; SUBCUTANEOUS at 18:17

## 2018-06-16 RX ADMIN — Medication 1000 MILLIGRAM(S): at 08:02

## 2018-06-16 RX ADMIN — Medication 300 MILLIGRAM(S): at 13:41

## 2018-06-16 RX ADMIN — Medication 1000 MILLIGRAM(S): at 17:30

## 2018-06-16 RX ADMIN — Medication 400 MILLIGRAM(S): at 07:00

## 2018-06-16 RX ADMIN — Medication 400 MILLIGRAM(S): at 16:08

## 2018-06-16 RX ADMIN — HYDROMORPHONE HYDROCHLORIDE 0.5 MILLIGRAM(S): 2 INJECTION INTRAMUSCULAR; INTRAVENOUS; SUBCUTANEOUS at 02:00

## 2018-06-16 RX ADMIN — HYDROMORPHONE HYDROCHLORIDE 1 MILLIGRAM(S): 2 INJECTION INTRAMUSCULAR; INTRAVENOUS; SUBCUTANEOUS at 21:34

## 2018-06-16 RX ADMIN — HYDROMORPHONE HYDROCHLORIDE 0.5 MILLIGRAM(S): 2 INJECTION INTRAMUSCULAR; INTRAVENOUS; SUBCUTANEOUS at 03:00

## 2018-06-16 NOTE — PROGRESS NOTE ADULT - ASSESSMENT
s/p esophagectomy  pain control  dvt proph  hx cad  asa when able  pt  ppi  now s/p EGD w/baloon dilation  diet per Surg  oob  o2  incentive spirometry

## 2018-06-16 NOTE — PROGRESS NOTE ADULT - ASSESSMENT
52y Male s/p Robotic assistance in thoracoscopic procedure, currently hemodynamically stable POD8. Repeated barium swallow yesterday, ARBF    - care per CT surg  - Pain control  - Monitor UOP  - Monitor GI function  - NPO

## 2018-06-17 ENCOUNTER — TRANSCRIPTION ENCOUNTER (OUTPATIENT)
Age: 53
End: 2018-06-17

## 2018-06-17 LAB
HCT VFR BLD CALC: 37.7 % — LOW (ref 39–50)
HGB BLD-MCNC: 12.2 G/DL — LOW (ref 13–17)
MCHC RBC-ENTMCNC: 27.5 PG — SIGNIFICANT CHANGE UP (ref 27–34)
MCHC RBC-ENTMCNC: 32.4 % — SIGNIFICANT CHANGE UP (ref 32–36)
MCV RBC AUTO: 84.9 FL — SIGNIFICANT CHANGE UP (ref 80–100)
NRBC # FLD: 0 — SIGNIFICANT CHANGE UP
PLATELET # BLD AUTO: 228 K/UL — SIGNIFICANT CHANGE UP (ref 150–400)
PMV BLD: 10.1 FL — SIGNIFICANT CHANGE UP (ref 7–13)
RBC # BLD: 4.44 M/UL — SIGNIFICANT CHANGE UP (ref 4.2–5.8)
RBC # FLD: 14.6 % — HIGH (ref 10.3–14.5)
WBC # BLD: 5.71 K/UL — SIGNIFICANT CHANGE UP (ref 3.8–10.5)
WBC # FLD AUTO: 5.71 K/UL — SIGNIFICANT CHANGE UP (ref 3.8–10.5)

## 2018-06-17 PROCEDURE — 71045 X-RAY EXAM CHEST 1 VIEW: CPT | Mod: 26

## 2018-06-17 PROCEDURE — 99232 SBSQ HOSP IP/OBS MODERATE 35: CPT

## 2018-06-17 RX ORDER — BENZOCAINE AND MENTHOL 5; 1 G/100ML; G/100ML
1 LIQUID ORAL ONCE
Qty: 0 | Refills: 0 | Status: COMPLETED | OUTPATIENT
Start: 2018-06-17 | End: 2018-06-17

## 2018-06-17 RX ORDER — BENZOCAINE AND MENTHOL 5; 1 G/100ML; G/100ML
1 LIQUID ORAL
Qty: 0 | Refills: 0 | Status: DISCONTINUED | OUTPATIENT
Start: 2018-06-17 | End: 2018-06-19

## 2018-06-17 RX ADMIN — BENZOCAINE AND MENTHOL 1 LOZENGE: 5; 1 LIQUID ORAL at 23:14

## 2018-06-17 RX ADMIN — HYDROMORPHONE HYDROCHLORIDE 1 MILLIGRAM(S): 2 INJECTION INTRAMUSCULAR; INTRAVENOUS; SUBCUTANEOUS at 00:00

## 2018-06-17 RX ADMIN — HYDROMORPHONE HYDROCHLORIDE 1 MILLIGRAM(S): 2 INJECTION INTRAMUSCULAR; INTRAVENOUS; SUBCUTANEOUS at 11:17

## 2018-06-17 RX ADMIN — HEPARIN SODIUM 5000 UNIT(S): 5000 INJECTION INTRAVENOUS; SUBCUTANEOUS at 05:33

## 2018-06-17 RX ADMIN — HYDROMORPHONE HYDROCHLORIDE 1 MILLIGRAM(S): 2 INJECTION INTRAMUSCULAR; INTRAVENOUS; SUBCUTANEOUS at 23:00

## 2018-06-17 RX ADMIN — HEPARIN SODIUM 5000 UNIT(S): 5000 INJECTION INTRAVENOUS; SUBCUTANEOUS at 18:12

## 2018-06-17 RX ADMIN — HYDROMORPHONE HYDROCHLORIDE 1 MILLIGRAM(S): 2 INJECTION INTRAMUSCULAR; INTRAVENOUS; SUBCUTANEOUS at 22:36

## 2018-06-17 RX ADMIN — HYDROMORPHONE HYDROCHLORIDE 1 MILLIGRAM(S): 2 INJECTION INTRAMUSCULAR; INTRAVENOUS; SUBCUTANEOUS at 16:57

## 2018-06-17 RX ADMIN — BENZOCAINE AND MENTHOL 1 LOZENGE: 5; 1 LIQUID ORAL at 05:32

## 2018-06-17 RX ADMIN — Medication 25 MILLIGRAM(S): at 22:37

## 2018-06-17 RX ADMIN — HYDROMORPHONE HYDROCHLORIDE 1 MILLIGRAM(S): 2 INJECTION INTRAMUSCULAR; INTRAVENOUS; SUBCUTANEOUS at 01:26

## 2018-06-17 RX ADMIN — HYDROMORPHONE HYDROCHLORIDE 1 MILLIGRAM(S): 2 INJECTION INTRAMUSCULAR; INTRAVENOUS; SUBCUTANEOUS at 12:20

## 2018-06-17 RX ADMIN — HYDROMORPHONE HYDROCHLORIDE 1 MILLIGRAM(S): 2 INJECTION INTRAMUSCULAR; INTRAVENOUS; SUBCUTANEOUS at 17:01

## 2018-06-17 RX ADMIN — HYDROMORPHONE HYDROCHLORIDE 1 MILLIGRAM(S): 2 INJECTION INTRAMUSCULAR; INTRAVENOUS; SUBCUTANEOUS at 05:32

## 2018-06-17 RX ADMIN — DEXTROSE MONOHYDRATE, SODIUM CHLORIDE, AND POTASSIUM CHLORIDE 75 MILLILITER(S): 50; .745; 4.5 INJECTION, SOLUTION INTRAVENOUS at 19:44

## 2018-06-17 RX ADMIN — Medication 25 MILLIGRAM(S): at 02:29

## 2018-06-17 NOTE — DISCHARGE NOTE ADULT - PLAN OF CARE
Wound healing; Development of a treatment plan based on final pathology results Stable for outpatient follow up Walk 4-5 x per day. Increase as tolerated. You may climb stairs. No heavy lifting. Continue to use incentive spirometer. Follow above diet instructions.   See Dr. Brown in 7-10 days. Call for an apt. [phone]  See Dr. Yousif in 2 weeks as well. Call for an apt.   You may keep all wounds uncovered and shower daily.

## 2018-06-17 NOTE — DISCHARGE NOTE ADULT - MEDICATION SUMMARY - MEDICATIONS TO TAKE
I will START or STAY ON the medications listed below when I get home from the hospital:    acetaminophen 325 mg oral tablet  -- 2 tab(s) by mouth every 6 hours, As needed, Mild Pain (1 - 3)  -- Indication: For pain    HYDROmorphone 2 mg oral tablet  -- 2 tab(s) by mouth every 4 hours, As Needed for severe pain. Can take 1 tab for moderate. MDD:12  -- Indication: For pain    Advil 200 mg oral tablet  -- 2 tab(s) by mouth every 6 hours, As Needed  -- Indication: For pain, can alternate with Tylenol and Dilaudid    aspirin 81 mg oral tablet  -- 1 tab(s) by mouth once a day  -- Indication: For Anti platelet    gabapentin 100 mg oral capsule  -- 2 cap(s) by mouth 2 times a day  -- Indication: For pain    Colace 100 mg oral capsule  -- 1 cap(s) by mouth 2 times a day  -- Indication: For constipation    senna oral tablet  -- 2 tab(s) by mouth once a day  -- Indication: For constipation    benzocaine-menthol 15 mg-3.6 mg mucous membrane lozenge  -- 1  mucous membrane every 6 hours, As Needed  -- Indication: For Sore throat over the counter

## 2018-06-17 NOTE — DISCHARGE NOTE ADULT - HOSPITAL COURSE
51 yo male was diagnosed with esophageal CA in 10/2017.  Pt completed chemotherapy 11/25/17 -1/30/2018 and RT 1/2018.  Pt reports post treatment CT and PET scans revealed good responses.  Pt underwent an upper endoscopy, robotic [place]-Andrew Esophagogastrectomy on 6/8/18.  As his barium swallow showed delayed emptying, he went for an EGD, balloon dilation, Botox injection on 6/14/18.  The Vitor drain was removed on 6/15.  He tolerated sips of clears and was advanced to clears on 6/17. 51 yo male was diagnosed with esophageal CA in 10/2017.  Pt completed chemotherapy 11/25/17 -1/30/2018 and RT 1/2018.  Pt reports post treatment CT and PET scans revealed good responses.  Pt underwent an upper endoscopy, robotic [place]-Andrew Esophagogastrectomy on 6/8/18.  As his barium swallow showed delayed emptying, he went for an EGD, balloon dilation, Botox injection on 6/14/18.  The Vitor drain was removed on 6/15.  He tolerated sips of clears and was advanced to clears on 6/17. On 6/18 pt had a pureed diet but felt fullness with some abd discomfort. Put back onto Fulls liquids po and tolerated that well. Extensive teaching done. All pain meds changed to oral. Staples and sutures removed today. Pt. ambulating frequently. No c.o SOB and CP. Pt. c.o incisional pain. All wounds c/d/i. All staples and sutures removed. Vital signs, CXR and labs WNL. Cleared for d/c to home from Dr. Brown

## 2018-06-17 NOTE — DISCHARGE NOTE ADULT - ADDITIONAL INSTRUCTIONS
See Dr Brown in 2 weeks; call for an appointment.  Keep the wounds clean with soap and water and then pat dry.  If you develop fevers or increasing redness or pus at the wounds, call Dr Brown.

## 2018-06-17 NOTE — PROGRESS NOTE ADULT - ASSESSMENT
Subjective: "Im feeling better today. I am able to drink liquids well"  Overnight pt was refusing to were the telemetry monitor. Had discussion with him this morning and he agrees to wear the monitor.     Vital Signs:  Vital Signs Last 24 Hrs  T(C): 36.9 (06-17-18 @ 05:39), Max: 36.9 (06-16-18 @ 16:22)  T(F): 98.4 (06-17-18 @ 05:39), Max: 98.4 (06-16-18 @ 16:22)  HR: 78 (06-17-18 @ 05:39) (76 - 94)  BP: 128/78 (06-17-18 @ 05:39) (124/77 - 138/84)  RR: 18 (06-17-18 @ 05:39) (16 - 18)  SpO2: 94% (06-17-18 @ 05:39) (94% - 95%) on (O2)    Telemetry/Alarms: No tele overnight pt was refusing.   General: WN/WD NAD  Neurology: Awake, nonfocal, CASH x 4  Eyes: Scleras clear, PERRLA/ EOMI, Gross vision intact  ENT: Gross hearing intact, grossly patent pharynx, no stridor  Neck: Neck supple, trachea midline, No JVD,   Respiratory: CTA B/L, No wheezing, rales, rhonchi  CV: RRR, S1S2, no murmurs, rubs or gallops  Abdominal: Soft, NT, ND +BS, +BM last night.  Extremities: No edema, + peripheral pulses  Skin: No Rashes, Hematoma, Ecchymosis  Lymphatic: No Neck, axilla, groin LAD  Psych: Oriented x 3, normal affect  Incisions: no signs of infection, Staples in place at surgical sights.     Relevant labs, radiology and Medications reviewed                        12.2   5.71  )-----------( 228      ( 17 Jun 2018 05:51 )             37.7             MEDICATIONS  (STANDING):  aspirin Suppository 300 milliGRAM(s) Rectal daily  dextrose 5% + lactated ringers with potassium chloride 20 mEq/L 1000 milliLiter(s) (75 mL/Hr) IV Continuous <Continuous>  heparin  Injectable 5000 Unit(s) SubCutaneous every 12 hours  pantoprazole  Injectable 40 milliGRAM(s) IV Push daily    MEDICATIONS  (PRN):  ALBUTerol/ipratropium for Nebulization 3 milliLiter(s) Nebulizer every 6 hours PRN SOB  diphenhydrAMINE   Injectable 25 milliGRAM(s) IV Push at bedtime PRN sleep  HYDROmorphone  Injectable 0.5 milliGRAM(s) IV Push every 3 hours PRN Moderate Pain (4 - 6)  HYDROmorphone  Injectable 1 milliGRAM(s) IV Push every 3 hours PRN Severe Pain (7 - 10)  lidocaine 2% Gel 1 Application(s) Topical every 4 hours PRN for pain  sodium chloride 0.65% Nasal 1 Spray(s) Left Nostril every 4 hours PRN Congestion    Pertinent Physical Exam  I&O's Summary    16 Jun 2018 07:01  -  17 Jun 2018 07:00  --------------------------------------------------------  IN: 1375 mL / OUT: 850 mL / NET: 525 mL    17 Jun 2018 07:01  -  17 Jun 2018 08:44  --------------------------------------------------------  IN: 75 mL / OUT: 300 mL / NET: -225 mL        Assessment  52y Male  w/ PAST MEDICAL & SURGICAL HISTORY:  Esophageal cancer  MI (myocardial infarction): 2015 with 1 coronary stent  H/O hernia repair: 1966  Status post tonsillectomy and adenoidectomy  Stented coronary artery: x1 2015  admitted with complaints of Patient is a 52y old  Male who presents with a chief complaint of "stomach surgery and feeding tube" (29 May 2018 16:22)    on 6/8/18, he underwent a Robotic Arley Andrew Esophagogastrectomy.   . Postoperative course/issues: Pt had delayed emptying on swallow study which required him to under go EGD, Dilation and Botox injection on 6/14/18    PLAN  Head of bed 45 degrees.  Neuro: Pain management  Pulm: Encourage coughing, deep breathing and use of incentive spirometry. Wean off supplemental oxygen as able. Daily CXR.   Cardio: Monitor telemetry/alarms. Pt agrees to wear the monitors.  GI: Tolerating diet. Continue stool softeners. No carbonated of acidic beverages.   Renal: monitor urine output, supplement electrolytes as needed  Vasc: Heparin SC/SCDs for DVT prophylaxis  Heme: Stable H/H. .   ID: Off antibiotics. Stable.  Therapy: OOB/ambulate  Disposition: Aim to D/C to home when he has better gastric emptying.   Discussed with Cardiothoracic Team at AM rounds.

## 2018-06-17 NOTE — DISCHARGE NOTE ADULT - INSTRUCTIONS
***** Continue Full Liquid and Pureed diet as tolerated. No Carbonated beverages. Eat only small amounts at one time, 2-4oz. Eating frequently throughout the day. 6-8 small meals. Avoid spicy or gassy foods. You must be completely upright for all meals. Sleep or lay down with your head of bed at least 45degrees. You can never lay flat.

## 2018-06-17 NOTE — DISCHARGE NOTE ADULT - CONDITIONS AT DISCHARGE
Patient remained hemodynamically stable. Patient denies chest pain, shortness of breath and palpitations. Patient shows no signs of distress. Patient instructed on diet. Discharged to home.

## 2018-06-17 NOTE — DISCHARGE NOTE ADULT - CARE PROVIDERS DIRECT ADDRESSES
[email or web address],DirectAddress_Unknown,DirectAddress_Unknown

## 2018-06-17 NOTE — DISCHARGE NOTE ADULT - CARE PLAN
Principal Discharge DX:	Esophageal cancer  Goal:	Wound healing; Development of a treatment plan based on final pathology results  Assessment and plan of treatment:	Stable for outpatient follow up Principal Discharge DX:	Esophageal cancer  Goal:	Wound healing; Development of a treatment plan based on final pathology results  Assessment and plan of treatment:	Walk 4-5 x per day. Increase as tolerated. You may climb stairs. No heavy lifting. Continue to use incentive spirometer. Follow above diet instructions.   See Dr. Brown in 7-10 days. Call for an apt. [phone]  See Dr. Yousif in 2 weeks as well. Call for an apt.   You may keep all wounds uncovered and shower daily.

## 2018-06-17 NOTE — DISCHARGE NOTE ADULT - NS AS ACTIVITY OBS
Walking-Outdoors allowed/Stairs allowed/Do not drive or operate machinery/Sex allowed/No Heavy lifting/straining/Showering allowed/Walking-Indoors allowed Showering allowed/Stairs allowed/Walking-Outdoors allowed/Do not make important decisions/Sex allowed/No Heavy lifting/straining/Walking-Indoors allowed/Do not drive or operate machinery

## 2018-06-17 NOTE — DISCHARGE NOTE ADULT - PATIENT PORTAL LINK FT
You can access the [facility] Patient Portal, offered by [facility], by registering with the following website: [email or web address]

## 2018-06-17 NOTE — DISCHARGE NOTE ADULT - PROVIDER TOKENS
TOKEN:'[id number]',FREE:[LAST:[Alfredo],FIRST:[Laurie],PHONE:[phone]:[phone]    -],ADDRESS:[[address] [place] [address]],FREE:[LAST:[Kevin],FIRST:[Abdifatah],PHONE:[phone]:[phone]],ADDRESS:[[facility]  Oncology [facility]  Level C]]

## 2018-06-18 LAB
BUN SERPL-MCNC: 8 MG/DL — SIGNIFICANT CHANGE UP (ref 7–23)
CALCIUM SERPL-MCNC: 8.8 MG/DL — SIGNIFICANT CHANGE UP (ref 8.4–10.5)
CHLORIDE SERPL-SCNC: 100 MMOL/L — SIGNIFICANT CHANGE UP (ref 98–107)
CO2 SERPL-SCNC: 26 MMOL/L — SIGNIFICANT CHANGE UP (ref 22–31)
CREAT SERPL-MCNC: 0.94 MG/DL — SIGNIFICANT CHANGE UP (ref 0.5–1.3)
GLUCOSE SERPL-MCNC: 94 MG/DL — SIGNIFICANT CHANGE UP (ref 70–99)
POTASSIUM SERPL-MCNC: 3.7 MMOL/L — SIGNIFICANT CHANGE UP (ref 3.5–5.3)
POTASSIUM SERPL-SCNC: 3.7 MMOL/L — SIGNIFICANT CHANGE UP (ref 3.5–5.3)
SODIUM SERPL-SCNC: 138 MMOL/L — SIGNIFICANT CHANGE UP (ref 135–145)
SURGICAL PATHOLOGY STUDY: SIGNIFICANT CHANGE UP

## 2018-06-18 RX ORDER — HYDROMORPHONE HYDROCHLORIDE 2 MG/ML
2 INJECTION INTRAMUSCULAR; INTRAVENOUS; SUBCUTANEOUS EVERY 4 HOURS
Qty: 0 | Refills: 0 | Status: DISCONTINUED | OUTPATIENT
Start: 2018-06-18 | End: 2018-06-19

## 2018-06-18 RX ORDER — ACETAMINOPHEN 500 MG
1000 TABLET ORAL ONCE
Qty: 0 | Refills: 0 | Status: COMPLETED | OUTPATIENT
Start: 2018-06-18 | End: 2018-06-18

## 2018-06-18 RX ORDER — HYDROMORPHONE HYDROCHLORIDE 2 MG/ML
4 INJECTION INTRAMUSCULAR; INTRAVENOUS; SUBCUTANEOUS EVERY 4 HOURS
Qty: 0 | Refills: 0 | Status: DISCONTINUED | OUTPATIENT
Start: 2018-06-18 | End: 2018-06-19

## 2018-06-18 RX ORDER — ACETAMINOPHEN 500 MG
650 TABLET ORAL EVERY 6 HOURS
Qty: 0 | Refills: 0 | Status: DISCONTINUED | OUTPATIENT
Start: 2018-06-18 | End: 2018-06-19

## 2018-06-18 RX ORDER — OXYCODONE HYDROCHLORIDE 5 MG/1
10 TABLET ORAL
Qty: 0 | Refills: 0 | Status: DISCONTINUED | OUTPATIENT
Start: 2018-06-18 | End: 2018-06-18

## 2018-06-18 RX ORDER — PANTOPRAZOLE SODIUM 20 MG/1
40 TABLET, DELAYED RELEASE ORAL
Qty: 0 | Refills: 0 | Status: DISCONTINUED | OUTPATIENT
Start: 2018-06-18 | End: 2018-06-19

## 2018-06-18 RX ORDER — OXYCODONE HYDROCHLORIDE 5 MG/1
5 TABLET ORAL
Qty: 0 | Refills: 0 | Status: DISCONTINUED | OUTPATIENT
Start: 2018-06-18 | End: 2018-06-18

## 2018-06-18 RX ORDER — ASPIRIN/CALCIUM CARB/MAGNESIUM 324 MG
81 TABLET ORAL DAILY
Qty: 0 | Refills: 0 | Status: DISCONTINUED | OUTPATIENT
Start: 2018-06-18 | End: 2018-06-19

## 2018-06-18 RX ADMIN — HEPARIN SODIUM 5000 UNIT(S): 5000 INJECTION INTRAVENOUS; SUBCUTANEOUS at 05:03

## 2018-06-18 RX ADMIN — Medication 400 MILLIGRAM(S): at 12:11

## 2018-06-18 RX ADMIN — OXYCODONE HYDROCHLORIDE 10 MILLIGRAM(S): 5 TABLET ORAL at 11:05

## 2018-06-18 RX ADMIN — BENZOCAINE AND MENTHOL 1 LOZENGE: 5; 1 LIQUID ORAL at 08:08

## 2018-06-18 RX ADMIN — Medication 81 MILLIGRAM(S): at 11:05

## 2018-06-18 RX ADMIN — HYDROMORPHONE HYDROCHLORIDE 1 MILLIGRAM(S): 2 INJECTION INTRAMUSCULAR; INTRAVENOUS; SUBCUTANEOUS at 05:18

## 2018-06-18 RX ADMIN — Medication 25 MILLIGRAM(S): at 22:34

## 2018-06-18 RX ADMIN — HYDROMORPHONE HYDROCHLORIDE 4 MILLIGRAM(S): 2 INJECTION INTRAMUSCULAR; INTRAVENOUS; SUBCUTANEOUS at 22:50

## 2018-06-18 RX ADMIN — HYDROMORPHONE HYDROCHLORIDE 4 MILLIGRAM(S): 2 INJECTION INTRAMUSCULAR; INTRAVENOUS; SUBCUTANEOUS at 22:35

## 2018-06-18 RX ADMIN — HYDROMORPHONE HYDROCHLORIDE 1 MILLIGRAM(S): 2 INJECTION INTRAMUSCULAR; INTRAVENOUS; SUBCUTANEOUS at 04:48

## 2018-06-18 RX ADMIN — Medication 650 MILLIGRAM(S): at 18:59

## 2018-06-18 RX ADMIN — HYDROMORPHONE HYDROCHLORIDE 1 MILLIGRAM(S): 2 INJECTION INTRAMUSCULAR; INTRAVENOUS; SUBCUTANEOUS at 02:20

## 2018-06-18 RX ADMIN — HYDROMORPHONE HYDROCHLORIDE 1 MILLIGRAM(S): 2 INJECTION INTRAMUSCULAR; INTRAVENOUS; SUBCUTANEOUS at 01:45

## 2018-06-18 RX ADMIN — PANTOPRAZOLE SODIUM 40 MILLIGRAM(S): 20 TABLET, DELAYED RELEASE ORAL at 18:13

## 2018-06-18 RX ADMIN — OXYCODONE HYDROCHLORIDE 10 MILLIGRAM(S): 5 TABLET ORAL at 12:30

## 2018-06-18 RX ADMIN — Medication 1000 MILLIGRAM(S): at 12:35

## 2018-06-18 RX ADMIN — HEPARIN SODIUM 5000 UNIT(S): 5000 INJECTION INTRAVENOUS; SUBCUTANEOUS at 18:13

## 2018-06-18 RX ADMIN — Medication 650 MILLIGRAM(S): at 18:12

## 2018-06-18 RX ADMIN — HYDROMORPHONE HYDROCHLORIDE 1 MILLIGRAM(S): 2 INJECTION INTRAMUSCULAR; INTRAVENOUS; SUBCUTANEOUS at 09:00

## 2018-06-18 RX ADMIN — HYDROMORPHONE HYDROCHLORIDE 1 MILLIGRAM(S): 2 INJECTION INTRAMUSCULAR; INTRAVENOUS; SUBCUTANEOUS at 08:09

## 2018-06-18 NOTE — PROGRESS NOTE ADULT - ASSESSMENT
s/p esophagectomy  pain control  dvt proph  hx cad  asa when able  pt  ppi  now s/p EGD w/baloon dilation  diet per Surg  oob  o2  tachycardia episodes likely 2/2 pain  incentive spirometry  d/c planning per Surg

## 2018-06-18 NOTE — PROGRESS NOTE ADULT - ASSESSMENT
A/P: 52M s/p Arley Andrew esophagectomy, s/p EDG and dilation of pyloric sphincter. Tolerating full liquids, no anastamotic leaks.    - care per CT surgery  - encouraged patient to minimize narcotics  - seen and examined with Dr. Yousif

## 2018-06-19 VITALS
OXYGEN SATURATION: 95 % | RESPIRATION RATE: 16 BRPM | DIASTOLIC BLOOD PRESSURE: 72 MMHG | SYSTOLIC BLOOD PRESSURE: 112 MMHG | HEART RATE: 80 BPM | TEMPERATURE: 98 F

## 2018-06-19 PROCEDURE — 71045 X-RAY EXAM CHEST 1 VIEW: CPT | Mod: 26

## 2018-06-19 PROCEDURE — 99238 HOSP IP/OBS DSCHRG MGMT 30/<: CPT

## 2018-06-19 RX ORDER — HYDROMORPHONE HYDROCHLORIDE 2 MG/ML
2 INJECTION INTRAMUSCULAR; INTRAVENOUS; SUBCUTANEOUS
Qty: 72 | Refills: 0 | OUTPATIENT
Start: 2018-06-19 | End: 2018-06-24

## 2018-06-19 RX ORDER — BENZOCAINE AND MENTHOL 5; 1 G/100ML; G/100ML
1 LIQUID ORAL
Qty: 0 | Refills: 0 | COMMUNITY
Start: 2018-06-19

## 2018-06-19 RX ORDER — GABAPENTIN 400 MG/1
2 CAPSULE ORAL
Qty: 120 | Refills: 0 | OUTPATIENT
Start: 2018-06-19 | End: 2018-07-18

## 2018-06-19 RX ORDER — ACETAMINOPHEN 500 MG
975 TABLET ORAL ONCE
Qty: 0 | Refills: 0 | Status: COMPLETED | OUTPATIENT
Start: 2018-06-19 | End: 2018-06-19

## 2018-06-19 RX ORDER — GABAPENTIN 400 MG/1
200 CAPSULE ORAL
Qty: 0 | Refills: 0 | Status: DISCONTINUED | OUTPATIENT
Start: 2018-06-19 | End: 2018-06-19

## 2018-06-19 RX ORDER — ACETAMINOPHEN 500 MG
2 TABLET ORAL
Qty: 0 | Refills: 0 | COMMUNITY
Start: 2018-06-19

## 2018-06-19 RX ADMIN — HYDROMORPHONE HYDROCHLORIDE 4 MILLIGRAM(S): 2 INJECTION INTRAMUSCULAR; INTRAVENOUS; SUBCUTANEOUS at 11:52

## 2018-06-19 RX ADMIN — Medication 81 MILLIGRAM(S): at 11:52

## 2018-06-19 RX ADMIN — Medication 975 MILLIGRAM(S): at 15:04

## 2018-06-19 RX ADMIN — GABAPENTIN 200 MILLIGRAM(S): 400 CAPSULE ORAL at 08:47

## 2018-06-19 RX ADMIN — PANTOPRAZOLE SODIUM 40 MILLIGRAM(S): 20 TABLET, DELAYED RELEASE ORAL at 06:05

## 2018-06-19 RX ADMIN — HYDROMORPHONE HYDROCHLORIDE 4 MILLIGRAM(S): 2 INJECTION INTRAMUSCULAR; INTRAVENOUS; SUBCUTANEOUS at 06:47

## 2018-06-19 RX ADMIN — Medication 25 MILLIGRAM(S): at 02:59

## 2018-06-19 RX ADMIN — HYDROMORPHONE HYDROCHLORIDE 4 MILLIGRAM(S): 2 INJECTION INTRAMUSCULAR; INTRAVENOUS; SUBCUTANEOUS at 08:44

## 2018-06-19 RX ADMIN — HYDROMORPHONE HYDROCHLORIDE 4 MILLIGRAM(S): 2 INJECTION INTRAMUSCULAR; INTRAVENOUS; SUBCUTANEOUS at 03:08

## 2018-06-19 RX ADMIN — HYDROMORPHONE HYDROCHLORIDE 4 MILLIGRAM(S): 2 INJECTION INTRAMUSCULAR; INTRAVENOUS; SUBCUTANEOUS at 02:47

## 2018-06-19 RX ADMIN — HEPARIN SODIUM 5000 UNIT(S): 5000 INJECTION INTRAVENOUS; SUBCUTANEOUS at 06:04

## 2018-06-19 NOTE — PROGRESS NOTE ADULT - PROBLEM SELECTOR PROBLEM 1
Malignant neoplasm of esophagus, unspecified location

## 2018-06-19 NOTE — PROGRESS NOTE ADULT - SUBJECTIVE AND OBJECTIVE BOX
CHIEF COMPLAINT:Patient is a 52y old  Male who presents with a chief complaint of "stomach surgery and feeding tube" (29 May 2018 16:22)  no acute events    PAST MEDICAL & SURGICAL HISTORY:  Esophageal cancer  MI (myocardial infarction): 2015 with 1 coronary stent  H/O hernia repair: 1966  Status post tonsillectomy and adenoidectomy  Stented coronary artery: x1 2015          REVIEW OF SYSTEMS:  CONSTITUTIONAL: weak  EYES: No eye pain, visual disturbances, or discharge  NECK: No pain or stiffness  RESPIRATORY: No cough, wheezing, chills or hemoptysis; No Shortness of Breath  CARDIOVASCULAR: No chest pain, palpitations, passing out, dizziness, or leg swelling  GASTROINTESTINAL: pain  controlled  GENITOURINARY: No dysuria, frequency, hematuria, or incontinence  NEUROLOGICAL: No headaches, memory loss, loss of strength, numbness, or tremorsain      Medications:  MEDICATIONS  (STANDING):  aspirin Suppository 300 milliGRAM(s) Rectal daily  dornase shelby Solution 2.5 milliGRAM(s) Inhalation daily  heparin  Injectable 5000 Unit(s) SubCutaneous every 12 hours  HYDROmorphone (10 MICROgram(s)/mL) + BUpivacaine 0.0625% in 0.9% Sodium Chloride PCEA 250 milliLiter(s) Epidural PCA Continuous  lactated ringers 1000 milliLiter(s) (75 mL/Hr) IV Continuous <Continuous>    MEDICATIONS  (PRN):  ALBUTerol/ipratropium for Nebulization 3 milliLiter(s) Nebulizer every 6 hours PRN SOB  butorphanol Injectable 0.125 milliGRAM(s) IV Push once PRN Pruritus  diphenhydrAMINE   Injectable 25 milliGRAM(s) IV Push at bedtime PRN sleep  HYDROmorphone  Injectable 0.5 milliGRAM(s) IV Push every 3 hours PRN Severe Pain  HYDROmorphone (10 MICROgram(s)/mL) + BUpivacaine 0.0625% in 0.9% Sodium Chloride PCEA Rescue Clinician  Bolus 5 milliLiter(s) Epidural every 15 minutes PRN for Pain Score greater than 6  lidocaine 2% Gel 1 Application(s) Topical every 4 hours PRN for pain  naloxone Injectable 0.1 milliGRAM(s) IV Push every 3 minutes PRN For ANY of the following changes in patient status:  A. RR LESS THAN 10 breaths per minute, B. Oxygen saturation LESS THAN 90%, C. Sedation score of 6  ondansetron Injectable 4 milliGRAM(s) IV Push every 6 hours PRN Nausea  sodium chloride 0.65% Nasal 1 Spray(s) Left Nostril every 4 hours PRN Congestion    	    PHYSICAL EXAM:  T(C): 36.9 (06-11-18 @ 08:00), Max: 37.2 (06-10-18 @ 20:00)  HR: 88 (06-11-18 @ 11:00) (72 - 104)  BP: 115/77 (06-11-18 @ 11:00) (95/54 - 138/86)  RR: 18 (06-11-18 @ 11:00) (11 - 27)  SpO2: 94% (06-11-18 @ 11:00) (91% - 97%)  Wt(kg): --  I&O's Summary    10 Boogie 2018 07:01  -  11 Jun 2018 07:00  --------------------------------------------------------  IN: 2655 mL / OUT: 2192 mL / NET: 463 mL    11 Jun 2018 07:01  -  11 Jun 2018 11:58  --------------------------------------------------------  IN: 435 mL / OUT: 70 mL / NET: 365 mL      Appearance: Normal	  HEENT:   Normal oral mucosa, PERRL, EOMI	  Lymphatic: No lymphadenopathy  Cardiovascular: Normal S1 S2, No JVD, No murmurs, No edema  Respiratory: dec bs   Psychiatry: A & O x 3, Mood & affect appropriate  Gastrointestinal:  Soft, Non-tender, + BS	  Skin: No rashes, No ecchymoses, No cyanosis	  Neurologic: Non-focal  Extremities: Normal range of motion, No clubbing, cyanosis or edema  Vascular: Peripheral pulses palpable 2+ bilaterally    LABS:	 	    CARDIAC MARKERS:                                10.8   6.64  )-----------( 117      ( 11 Jun 2018 05:00 )             33.5     06-11    137  |  100  |  11  ----------------------------<  82  3.7   |  25  |  0.81    Ca    8.3<L>      11 Jun 2018 05:00  Phos  2.6     06-11  Mg     2.0     06-11      proBNP:   Lipid Profile:   HgA1c:   TSH:
GENERAL SURGERY DAILY PROGRESS NOTE      Subjective:  Patient underwent robotic [place] Andrew Esophagectomy yesterday, tolerated procedure well. This AM pt feels well overall. Pain well controlled.         Objective:    PE:  Gen: Sitting upright in bed, in NAD  Resp: CTAB  Chest: Incisions c/d/i, no visible bleeding or drainage. CT in place  Abd: Soft, nontender, nondistended.  No palpable masses.     Vital Signs Last 24 Hrs  T(C): 36.4 (09 Jun 2018 00:00), Max: 37.2 (08 Jun 2018 17:10)  T(F): 97.5 (09 Jun 2018 00:00), Max: 99 (08 Jun 2018 17:10)  HR: 72 (09 Jun 2018 00:00) (72 - 93)  BP: 123/69 (08 Jun 2018 20:00) (106/61 - 130/78)  BP(mean): 80 (08 Jun 2018 20:00) (71 - 96)  RR: 11 (09 Jun 2018 00:00) (11 - 23)  SpO2: 94% (09 Jun 2018 00:00) (93% - 97%)    I&O's Detail    08 Jun 2018 07:01  -  09 Jun 2018 00:25  --------------------------------------------------------  IN:    Enteral Tube Flush: 60 mL    IV PiggyBack: 300 mL    lactated ringers.: 900 mL  Total IN: 1260 mL    OUT:    Bulb: 22.5 mL    Chest Tube: 110 mL    Indwelling Catheter - Urethral: 465 mL    Nasoenteral Tube: 25 mL  Total OUT: 622.5 mL    Total NET: 637.5 mL          Daily Height in cm: 186.69 (08 Jun 2018 06:22)    Daily     MEDICATIONS  (STANDING):  acetaminophen  IVPB. 1000 milliGRAM(s) IV Intermittent once  aspirin Suppository 300 milliGRAM(s) Rectal daily  cefoTEtan  IVPB 1 Gram(s) IV Intermittent every 12 hours  heparin  Injectable 5000 Unit(s) SubCutaneous every 12 hours  HYDROmorphone (10 MICROgram(s)/mL) + BUpivacaine 0.0625% in 0.9% Sodium Chloride PCEA 250 milliLiter(s) Epidural PCA Continuous  lactated ringers. 1000 milliLiter(s) (30 mL/Hr) IV Continuous <Continuous>  lactated ringers. 1000 milliLiter(s) (100 mL/Hr) IV Continuous <Continuous>    MEDICATIONS  (PRN):  butorphanol Injectable 0.125 milliGRAM(s) IV Push once PRN Pruritus  HYDROmorphone  Injectable 0.5 milliGRAM(s) IV Push every 3 hours PRN Severe Pain  HYDROmorphone (10 MICROgram(s)/mL) + BUpivacaine 0.0625% in 0.9% Sodium Chloride PCEA Rescue Clinician  Bolus 5 milliLiter(s) Epidural every 15 minutes PRN for Pain Score greater than 6  naloxone Injectable 0.1 milliGRAM(s) IV Push every 3 minutes PRN For ANY of the following changes in patient status:  A. RR LESS THAN 10 breaths per minute, B. Oxygen saturation LESS THAN 90%, C. Sedation score of 6  ondansetron Injectable 4 milliGRAM(s) IV Push every 6 hours PRN Nausea      LABS:                        13.1   12.47 )-----------( 144      ( 08 Jun 2018 18:20 )             40.0     06-08    136  |  99  |  15  ----------------------------<  166<H>  4.4   |  22  |  0.98    Ca    8.6      08 Jun 2018 18:20            RADIOLOGY & ADDITIONAL STUDIES:
GENERAL SURGERY DAILY PROGRESS NOTE      Subjective:  Patient underwent robotic [place] Andrew Esophagectomy POD2, pain controlled. Working on breathing with incentive spirometry         Objective:    PE:  Gen: sitting in chair NAD  Resp: breathing comfortably  Chest: Incisions c/d/i, no visible bleeding or drainage. CT in place  Abd: Soft, nontender, nondistended.  No palpable masses.     T(C): 35.7 (06-10-18 @ 08:00), Max: 36.8 (06-09-18 @ 12:00)  HR: 70 (06-10-18 @ 09:31) (68 - 93)  BP: 104/65 (06-10-18 @ 09:00) (94/58 - 126/67)  RR: 16 (06-10-18 @ 09:00) (11 - 24)  SpO2: 95% (06-10-18 @ 09:31) (91% - 99%)  Wt(kg): --  06-09 @ 07:01  -  06-10 @ 07:00  --------------------------------------------------------  IN:    Enteral Tube Flush: 180 mL    IV PiggyBack: 200 mL    Lactated Ringers IV Bolus: 250 mL    lactated ringers.: 2400 mL  Total IN: 3030 mL    OUT:    Bulb: 10 mL    Chest Tube: 305 mL    Indwelling Catheter - Urethral: 1640 mL    Nasoenteral Tube: 250 mL  Total OUT: 2205 mL    Total NET: 825 mL      06-10 @ 07:01  -  06-10 @ 09:56  --------------------------------------------------------  IN:    Enteral Tube Flush: 30 mL    lactated ringers.: 75 mL  Total IN: 105 mL    OUT:    Bulb: 20 mL    Chest Tube: 20 mL    Indwelling Catheter - Urethral: 80 mL    Nasoenteral Tube: 30 mL  Total OUT: 150 mL    Total NET: -45 mL            MEDICATIONS  (STANDING):  aspirin Suppository 300 milliGRAM(s) Rectal daily  dornase shelby Solution 2.5 milliGRAM(s) Inhalation daily  heparin  Injectable 5000 Unit(s) SubCutaneous every 12 hours  HYDROmorphone (10 MICROgram(s)/mL) + BUpivacaine 0.0625% in 0.9% Sodium Chloride PCEA 250 milliLiter(s) Epidural PCA Continuous  lactated ringers. 1000 milliLiter(s) (75 mL/Hr) IV Continuous <Continuous>  sodium phosphate IVPB 15 milliMole(s) IV Intermittent once    MEDICATIONS  (PRN):  ALBUTerol/ipratropium for Nebulization 3 milliLiter(s) Nebulizer every 6 hours PRN SOB  butorphanol Injectable 0.125 milliGRAM(s) IV Push once PRN Pruritus  diphenhydrAMINE   Injectable 25 milliGRAM(s) IV Push at bedtime PRN sleep  HYDROmorphone  Injectable 0.5 milliGRAM(s) IV Push every 3 hours PRN Severe Pain  HYDROmorphone (10 MICROgram(s)/mL) + BUpivacaine 0.0625% in 0.9% Sodium Chloride PCEA Rescue Clinician  Bolus 5 milliLiter(s) Epidural every 15 minutes PRN for Pain Score greater than 6  lidocaine 2% Gel 1 Application(s) Topical every 4 hours PRN for pain  naloxone Injectable 0.1 milliGRAM(s) IV Push every 3 minutes PRN For ANY of the following changes in patient status:  A. RR LESS THAN 10 breaths per minute, B. Oxygen saturation LESS THAN 90%, C. Sedation score of 6  ondansetron Injectable 4 milliGRAM(s) IV Push every 6 hours PRN Nausea  sodium chloride 0.65% Nasal 1 Spray(s) Left Nostril every 4 hours PRN Congestion        LABS:                         12.2   9.26  )-----------( 112      ( 10 Boogie 2018 06:00 )             37.7     06-10    136  |  98  |  12  ----------------------------<  83  4.0   |  26  |  0.84    Ca    8.6      10 Boogie 2018 06:00  Phos  2.2     06-10  Mg     2.2     06-10      PT/INR - ( 10 Boogie 2018 06:00 )   PT: 12.8 SEC;   INR: 1.11          PTT - ( 10 Boogie 2018 06:00 )  PTT:30.5 SEC          RADIOLOGY, EKG & ADDITIONAL TESTS: Reviewed.
SYLVIA VICTOR                     [id number]    HPI:  51 yo male with PMH MI and stent x1 placed 2015 presents to pre surgical testing.  Pt reports he was diagnosed with esophageal CA 10/2017.  Pt completed chemotherapy 11/25/17 -1/30/2018 and RT 1/2018.  Pt reports post treatment CT and PET done, revealed good response.  Pt is scheduled for upper endoscopy robotic jodie andrew esophagogastrectomy feeding jejunostomy possible open, esophagogastroduodenoscopy robotic assisted laparoscopic esophagogastrectomy insertion of feeding tube on 6/8/18. (29 May 2018 16:22)      Procedure: Robotic assistance in thoracoscopic procedure  06/08/2018  [place] Andrew Esophagectomy       Issues:  Esophageal cancer  Post op pain  Anxiety  CAD         PAST MEDICAL & SURGICAL HISTORY:  Esophageal cancer  MI (myocardial infarction): 2015 with 1 coronary stent  H/O hernia repair: 1966  Status post tonsillectomy and adenoidectomy  Stented coronary artery: x1 2015            VITAL SIGNS:  Vital Signs Last 24 Hrs  T(C): 37.2 (08 Jun 2018 17:10), Max: 37.2 (08 Jun 2018 17:10)  T(F): 99 (08 Jun 2018 17:10), Max: 99 (08 Jun 2018 17:10)  HR: 89 (08 Jun 2018 17:45) (80 - 93)  BP: 106/61 (08 Jun 2018 17:45) (106/61 - 130/78)  BP(mean): 71 (08 Jun 2018 17:45) (71 - 96)  RR: 20 (08 Jun 2018 17:45) (12 - 20)  SpO2: 94% (08 Jun 2018 17:45) (93% - 97%)    I/Os:   I&O's Detail    08 Jun 2018 07:01  -  08 Jun 2018 17:48  --------------------------------------------------------  IN:    lactated ringers.: 200 mL  Total IN: 200 mL    OUT:    Chest Tube: 40 mL    Indwelling Catheter - Urethral: 110 mL  Total OUT: 150 mL    Total NET: 50 mL          CAPILLARY BLOOD GLUCOSE      POCT Blood Glucose.: 96 mg/dL (08 Jun 2018 06:37)      =======================MEDICATIONS===================  MEDICATIONS  (STANDING):  cefoTEtan  IVPB 1 Gram(s) IV Intermittent every 12 hours  heparin  Injectable 5000 Unit(s) SubCutaneous every 12 hours  HYDROmorphone (10 MICROgram(s)/mL) + BUpivacaine 0.0625% in 0.9% Sodium Chloride PCEA 250 milliLiter(s) Epidural PCA Continuous  ketorolac   Injectable 30 milliGRAM(s) IV Push once  lactated ringers. 1000 milliLiter(s) (30 mL/Hr) IV Continuous <Continuous>  lactated ringers. 1000 milliLiter(s) (100 mL/Hr) IV Continuous <Continuous>    MEDICATIONS  (PRN):  butorphanol Injectable 0.125 milliGRAM(s) IV Push once PRN Pruritus  HYDROmorphone  Injectable 0.5 milliGRAM(s) IV Push every 3 hours PRN Severe Pain  HYDROmorphone (10 MICROgram(s)/mL) + BUpivacaine 0.0625% in 0.9% Sodium Chloride PCEA Rescue Clinician  Bolus 5 milliLiter(s) Epidural every 15 minutes PRN for Pain Score greater than 6  naloxone Injectable 0.1 milliGRAM(s) IV Push every 3 minutes PRN For ANY of the following changes in patient status:  A. RR LESS THAN 10 breaths per minute, B. Oxygen saturation LESS THAN 90%, C. Sedation score of 6  ondansetron Injectable 4 milliGRAM(s) IV Push every 6 hours PRN Nausea      PHYSICAL EXAM============================  General:                         Awake, alert, not in any distress  Neuro:                            Moving all extremities to commands.   Respiratory:	Air entry fair and  bilateral conducted sounds                                     Chest tube to suction  CV:		Regular rate and rhythm. Normal S1/S2                                          Distal pulses present.  Abdomen:	                     Soft, non-distended. Bowel sounds present   Skin:		No rash.  Extremities:	Warm, no cyanosis or edema.  Palpable pulses    =============================NEUROLOGY============================  Pain control with PCA /  Tylenol IV / Toradol     ==============================RESPIRATORY========================  Pt is on  2 L nasal canula   Comfortable, not in any distress.  Using incentive spirometry   Monitor chest tube output  Chest tube to suction 	  Continue bronchodilators, pulmonary toilet    ============================CARDIOVASCULAR======================  Continue hemodynamic monitoring.  Not on any pressors    =====================RENAL===================  Continue LR 100CC/hr    Monitor I/Os and electrolytes    ====================GASTROINTESTINAL===================  NPO. N/G to LWS  Continue GI prophylaxis with  Protonix  Continue Zofran / Reglan for nausea - PRN	    ========================HEMATOLOGIC/ONCOLOGIC====================  Monitor chest tube output. No signs of active bleeding.   Follow CBC in AM    ============================INFECTIOUS DISEASE========================  Monitor for fever / leukocytosis.  All surgical incision / chest tube  sites look clean      Pt is on GI & DVT prophylaxis  OOB & ambulate       Pertinent clinical, laboratory, radiographic, hemodynamic, echocardiographic, respiratory data, microbiologic data and chart were reviewed and analyzed frequently throughout the course of the day and night  Patient seen, examined and plan discussed with CT Surgery / CTICU team during rounds.    Pt's status discussed with family at bedside, updated status        Jonathan Quiles DO FACEP
52 Mh PMH MI and stent x1 w esophageal CA.  The pt was diagnosed with esophageal CA 10/2017.  Pt completed chemotherapy 11/25/17 -1/30/2018 and RT 1/2018.  Pt reports post treatment CT and PET done, revealed good response.   [id number]: Robotic [place] Andrew Esophagectomy     Issues:  Esophageal cancer  Post op pain  Anxiety  CAD     Past Medical History:  Esophageal cancer    MI (myocardial infarction)  2015 with 1 coronary stent.     Past Surgical History:  H/O hernia repair  1966  Status post tonsillectomy and adenoidectomy    Stented coronary artery  x1 2015.  MEDICATIONS  (STANDING):  albumin human  5% IVPB 250 milliLiter(s) IV Intermittent every 30 minutes  aspirin Suppository 300 milliGRAM(s) Rectal daily  dornase shelby Solution 2.5 milliGRAM(s) Inhalation daily  heparin  Injectable 5000 Unit(s) SubCutaneous every 12 hours  HYDROmorphone (10 MICROgram(s)/mL) + BUpivacaine 0.0625% in 0.9% Sodium Chloride PCEA 250 milliLiter(s) Epidural PCA Continuous  lactated ringers Bolus 250 milliLiter(s) IV Bolus every 15 minutes  lactated ringers. 1000 milliLiter(s) (30 mL/Hr) IV Continuous <Continuous>  lactated ringers. 1000 milliLiter(s) (100 mL/Hr) IV Continuous <Continuous>    MEDICATIONS  (PRN):  ALBUTerol/ipratropium for Nebulization 3 milliLiter(s) Nebulizer every 6 hours PRN SOB  butorphanol Injectable 0.125 milliGRAM(s) IV Push once PRN Pruritus  diphenhydrAMINE   Injectable 25 milliGRAM(s) IV Push at bedtime PRN sleep  HYDROmorphone  Injectable 0.5 milliGRAM(s) IV Push every 3 hours PRN Severe Pain  HYDROmorphone (10 MICROgram(s)/mL) + BUpivacaine 0.0625% in 0.9% Sodium Chloride PCEA Rescue Clinician  Bolus 5 milliLiter(s) Epidural every 15 minutes PRN for Pain Score greater than 6  lidocaine 2% Gel 1 Application(s) Topical every 4 hours PRN for pain  naloxone Injectable 0.1 milliGRAM(s) IV Push every 3 minutes PRN For ANY of the following changes in patient status:  A. RR LESS THAN 10 breaths per minute, B. Oxygen saturation LESS THAN 90%, C. Sedation score of 6  ondansetron Injectable 4 milliGRAM(s) IV Push every 6 hours PRN Nausea  sodium chloride 0.65% Nasal 1 Spray(s) Left Nostril every 4 hours PRN Congestion      ICU Vital Signs Last 24 Hrs  T(C): 36.2 (10 Boogie 2018 04:00), Max: 36.8 (09 Jun 2018 12:00)  T(F): 97.2 (10 Boogie 2018 04:00), Max: 98.3 (09 Jun 2018 16:00)  HR: 80 (10 Boogie 2018 06:00) (71 - 94)  BP: 96/62 (10 Boogie 2018 06:00) (94/58 - 126/67)  BP(mean): 69 (10 Boogie 2018 06:00) (65 - 85)  RR: 14 (10 Boogie 2018 06:00) (11 - 24)  SpO2: 96% (10 Boogie 2018 06:00) (91% - 99%)      Physical exam:                           General:            WD WN in NAD  Neuro:              No focal deficits	  Respiratory:	Lungs clear to auscultation bilaterally. Good aeration.   CV:		Regular rate and rhythm. Normal S1/S2. No murmurs Distal pulses present.  Abdomen:	 + bowel sounds, sofft, non-distended.  Skin:		No rash.  Extremities:	Warm, no cyanosis or edema.  Palpable pulses      I&O's Summary    09 Jun 2018 07:01  -  10 Boogie 2018 07:00  --------------------------------------------------------  IN: 3030 mL / OUT: 2205 mL / NET: 825 mL    Labs:                                                                           12.2   9.26  )-----------( 112      ( 10 Boogie 2018 06:00 )             37.7                            06-10    136  |  98  |  12  ----------------------------<  83  4.0   |  26  |  0.84    Ca    8.6      10 Boogie 2018 06:00  Phos  2.2     06-10  Mg     2.2     06-10    POCT Blood Glucose.: 99 mg/dL (10 Boogie 2018 00:13)                      PT/INR - ( 10 Boogie 2018 06:00 )   PT: 12.8 SEC;   INR: 1.11     PTT - ( 10 Boogie 2018 06:00 )  PTT:30.5 SEC           CXR  [id number]:  FINDINGS:   Cardiac silhouette normal in size. Endotracheal tube tip below diaphragm.   2 right-sided chest tubes and right-sided postsurgical changes.   Pneumoperitoneum and subcutaneous emphysema. Clear lungs. No pneumothorax.  IMPRESSION:   No change from prior study.    Plan:  52 Mh PMH MI and stent x1 w esophageal CA.  The pt was diagnosed with esophageal CA 10/2017.  Pt completed chemotherapy 11/25/17 -1/30/2018 and RT 1/2018.  Pt reports post treatment CT and PET done, revealed good response.   [id number]: Robotic [place] Andrew Esophagectomy   Issues:  Esophageal cancer  Post op pain  Anxiety  CAD                                Neuro:                                         Pain control with PCEA /Tylenol IV                             Cardiovascular:                                          Continue hemodynamic monitoring.                                         Not on any pressors                                         Continue cardiovascular / antihypertensive medications                            Respiratory:                                         Pt is on  nasal canula                                          Comfortable, not in any distress.                                         Use incentive spirometry ASAP                                         Monitor chest tube output                                         Chest tube to suction	                                         Continue bronchodilators, pulmonary toilet                            GI                                         Continue GI prophylaxis with Protonix                                         Continue Zofran / Reglan for nausea - PRN	                                         NPO                                  Renal:                                         Continue IVF                                         Monitor I/Os and electrolytes                                                 Hematologic / Oncology:                                         SQH & SCDs for VTE prophylaxis                                         Monitor chest tube output. No signs of active bleeding.                                          Follow CBC in AM                           Infectious disease:                                          No signs of infection. Monitor for fever / leukocytosis.                                          All surgical incision / chest tube  sites look clean                            Endocrine:                                           Continue Finger stick glucose checks with coverage    All available pertinent clinical, laboratory, radiographic, hemodynamic, echocardiographic, respiratory data, microbiologic data and chart were reviewed and analyzed frequently. GI and DVT prophylaxis, glycemic control, head of bed elevation and skin care issues were addressed.  Patient seen, examined and plan discussed with CT Surgery / CTICU team during rounds.    Fredis Cates MD
52 Mh PMH MI and stent x1 w esophageal CA.  The pt was diagnosed with esophageal CA 10/2017.  Pt completed chemotherapy 11/25/17 -1/30/2018 and RT 1/2018.  Pt reports post treatment CT and PET done, revealed good response.   [id number]: Robotic [place] Andrew Esophagectomy     Issues:  Esophageal cancer  Post op pain  Anxiety  CAD       MEDICATIONS  (STANDING):  aspirin Suppository 300 milliGRAM(s) Rectal daily  dextrose 5% + sodium chloride 0.45% with potassium chloride 40 mEq/L 1000 milliLiter(s) (100 mL/Hr) IV Continuous <Continuous>  dornase shelby Solution 2.5 milliGRAM(s) Inhalation daily  heparin  Injectable 5000 Unit(s) SubCutaneous every 12 hours  HYDROmorphone (10 MICROgram(s)/mL) + BUpivacaine 0.0625% in 0.9% Sodium Chloride PCEA 250 milliLiter(s) Epidural PCA Continuous    MEDICATIONS  (PRN):  ALBUTerol/ipratropium for Nebulization 3 milliLiter(s) Nebulizer every 6 hours PRN SOB  butorphanol Injectable 0.125 milliGRAM(s) IV Push once PRN Pruritus  diphenhydrAMINE   Injectable 25 milliGRAM(s) IV Push at bedtime PRN sleep  HYDROmorphone  Injectable 0.5 milliGRAM(s) IV Push every 3 hours PRN Severe Pain  HYDROmorphone (10 MICROgram(s)/mL) + BUpivacaine 0.0625% in 0.9% Sodium Chloride PCEA Rescue Clinician  Bolus 5 milliLiter(s) Epidural every 15 minutes PRN for Pain Score greater than 6  lidocaine 2% Gel 1 Application(s) Topical every 4 hours PRN for pain  naloxone Injectable 0.1 milliGRAM(s) IV Push every 3 minutes PRN For ANY of the following changes in patient status:  A. RR LESS THAN 10 breaths per minute, B. Oxygen saturation LESS THAN 90%, C. Sedation score of 6  ondansetron Injectable 4 milliGRAM(s) IV Push every 6 hours PRN Nausea  sodium chloride 0.65% Nasal 1 Spray(s) Left Nostril every 4 hours PRN Congestion      ICU Vital Signs Last 24 Hrs  T(C): 36.6 (12 Jun 2018 04:00), Max: 36.9 (11 Jun 2018 08:00)  T(F): 97.9 (12 Jun 2018 04:00), Max: 98.5 (11 Jun 2018 08:00)  HR: 67 (12 Jun 2018 04:00) (67 - 97)  BP: 118/72 (12 Jun 2018 04:00) (103/60 - 133/75)  BP(mean): 81 (12 Jun 2018 04:00) (70 - 97)  RR: 12 (12 Jun 2018 04:00) (11 - 23)  SpO2: 95% (12 Jun 2018 04:00) (92% - 99%)      Physical exam:                           General:            WD WN in NAD  Neuro:              No focal deficits	  Respiratory:	Lungs clear to auscultation bilaterally. Good aeration.   CV:		Regular rate and rhythm. Normal S1/S2. No murmurs Distal pulses present.  Abdomen:	 + bowel sounds, sofft, non-distended.  Skin:		No rash.  Extremities:	Warm, no cyanosis or edema.  Palpable pulses    I&O's Summary    10 Boogie 2018 07:01  -  11 Jun 2018 07:00  --------------------------------------------------------  IN: 2655 mL / OUT: 2192 mL / NET: 463 mL    11 Jun 2018 07:01  -  12 Jun 2018 05:30  --------------------------------------------------------  IN: 1930 mL / OUT: 1895 mL / NET: 35 mL        Labs:                                                                           12.0   5.84  )-----------( 131      ( 12 Jun 2018 03:30 )             36.4                            06-12    138  |  101  |  10  ----------------------------<  89  3.5   |  25  |  0.79    Ca    8.7      12 Jun 2018 03:30  Phos  2.6     06-12  Mg     2.0     06-12      CAPILLARY BLOOD GLUCOSE      POCT Blood Glucose.: 86 mg/dL (11 Jun 2018 22:53)                       PT/INR - ( 12 Jun 2018 03:30 )   PT: 13.3 SEC;   INR: 1.15     PTT - ( 12 Jun 2018 03:30 )  PTT:32.2 SEC               Plan:  52 Mh PMH MI and stent x1 w esophageal CA.  The pt was diagnosed with esophageal CA 10/2017.  Pt completed chemotherapy 11/25/17 -1/30/2018 and RT 1/2018.  Pt reports post treatment CT and PET done, revealed good response.   [id number]: Robotic Arley Andrew Esophagectomy   Issues:  Esophageal cancer  Post op pain  Anxiety  CAD                                Neuro:                                         Pain control with PCEA /Tylenol IV                             Cardiovascular:                                          Continue hemodynamic monitoring.                                         Not on any pressors                                         Continue cardiovascular / antihypertensive medications                            Respiratory:                                         Pt is on  nasal canula                                          Comfortable, not in any distress.                                         Use incentive spirometry ASAP                                         Monitor chest tube output                                         Chest tube to suction	                                         Continue bronchodilators, pulmonary toilet                            GI                                         Continue GI prophylaxis with Protonix                                         Continue Zofran / Reglan for nausea - PRN	                                         NPO     BS tmw am                                  Renal:                                         Continue IVF - Add K                                         Monitor I/Os and electrolytes                                                 Hematologic / Oncology:                                         SQH & SCDs for VTE prophylaxis                                         Monitor chest tube output. No signs of active bleeding.                                          Follow CBC in AM                           Infectious disease:                                          No signs of infection. Monitor for fever / leukocytosis.                                          All surgical incision / chest tube sites look clean                            Endocrine:                                           Continue Finger stick glucose checks with coverage    All available pertinent clinical, laboratory, radiographic, hemodynamic, echocardiographic, respiratory data, microbiologic data and chart were reviewed and analyzed frequently. GI and DVT prophylaxis, glycemic control, head of bed elevation and skin care issues were addressed.  Patient seen, examined and plan discussed with CT Surgery / CTICU team during rounds.    MD Fredis Perdomo MD
Anesthesia Pain Management Service    SUBJECTIVE: Pt doing well with PCEA without problems reported.    Therapy:	  [ ] IV PCA	   [ X] Epidural           [ ] s/p Spinal Opoid              [ ] Postpartum infusion	  [ ] Patient controlled regional anesthesia (PCRA)    [ ] prn Analgesics    Allergies    No Known Allergies    Intolerances      MEDICATIONS  (STANDING):  aspirin Suppository 300 milliGRAM(s) Rectal daily  dextrose 5% + sodium chloride 0.9% with potassium chloride 20 mEq/L 1000 milliLiter(s) (75 mL/Hr) IV Continuous <Continuous>  dornase shelby Solution 2.5 milliGRAM(s) Inhalation daily  heparin  Injectable 5000 Unit(s) SubCutaneous every 12 hours  HYDROmorphone (10 MICROgram(s)/mL) + BUpivacaine 0.0625% in 0.9% Sodium Chloride PCEA 250 milliLiter(s) Epidural PCA Continuous    MEDICATIONS  (PRN):  ALBUTerol/ipratropium for Nebulization 3 milliLiter(s) Nebulizer every 6 hours PRN SOB  butorphanol Injectable 0.125 milliGRAM(s) IV Push once PRN Pruritus  diphenhydrAMINE   Injectable 25 milliGRAM(s) IV Push at bedtime PRN sleep  HYDROmorphone  Injectable 0.5 milliGRAM(s) IV Push every 3 hours PRN Severe Pain  HYDROmorphone (10 MICROgram(s)/mL) + BUpivacaine 0.0625% in 0.9% Sodium Chloride PCEA Rescue Clinician  Bolus 5 milliLiter(s) Epidural every 15 minutes PRN for Pain Score greater than 6  lidocaine 2% Gel 1 Application(s) Topical every 4 hours PRN for pain  naloxone Injectable 0.1 milliGRAM(s) IV Push every 3 minutes PRN For ANY of the following changes in patient status:  A. RR LESS THAN 10 breaths per minute, B. Oxygen saturation LESS THAN 90%, C. Sedation score of 6  ondansetron Injectable 4 milliGRAM(s) IV Push every 6 hours PRN Nausea  sodium chloride 0.65% Nasal 1 Spray(s) Left Nostril every 4 hours PRN Congestion      OBJECTIVE:   [X] No new signs     [ ] Other:    Side Effects:  [X ] None			[ ] Other:    Assessment of Catheter Site:		[ X] Intact		[ ] Other:    ASSESSMENT/PLAN  [ X] Continue current therapy    [ ] Therapy changed to:    [ ] IV PCA       [ ] Epidural     [ ] prn Analgesics     Comments: Continue current PCEA settings.
Anesthesia Pain Management Service- Attending Addendum    SUBJECTIVE: Pt doing well with PCEA without problems reported.    Therapy:	  [ ] IV PCA	   [ X] Epidural           [ ] s/p Spinal Opoid              [ ] Postpartum infusion	  [ ] Patient controlled regional anesthesia (PCRA)    [ ] prn Analgesics    Allergies    No Known Allergies    Intolerances      MEDICATIONS  (STANDING):  aspirin Suppository 300 milliGRAM(s) Rectal daily  chlorhexidine 4% Liquid 1 Application(s) Topical <User Schedule>  dextrose 5% + lactated ringers with potassium chloride 20 mEq/L 1000 milliLiter(s) (75 mL/Hr) IV Continuous <Continuous>  heparin  Injectable 5000 Unit(s) SubCutaneous every 12 hours    MEDICATIONS  (PRN):  ALBUTerol/ipratropium for Nebulization 3 milliLiter(s) Nebulizer every 6 hours PRN SOB  diphenhydrAMINE   Injectable 25 milliGRAM(s) IV Push at bedtime PRN sleep  HYDROmorphone  Injectable 0.5 milliGRAM(s) IV Push every 3 hours PRN Moderate Pain (4 - 6)  HYDROmorphone  Injectable 1 milliGRAM(s) IV Push every 3 hours PRN Severe Pain (7 - 10)  lidocaine 2% Gel 1 Application(s) Topical every 4 hours PRN for pain  sodium chloride 0.65% Nasal 1 Spray(s) Left Nostril every 4 hours PRN Congestion      OBJECTIVE:   [X] No new signs     [ ] Other:    Side Effects:  [X ] None			[ ] Other:    Assessment of Catheter Site:		[ ] Intact		[ X] Other: Discontinued    ASSESSMENT/PLAN  [ ] Continue current therapy    [ x] Therapy changed to:    [ ] IV PCA       [ ] Epidural     [ x] prn Analgesics     Comments: Epidural discontinued, PRN analgesics
Anesthesia Pain Management Service- Attending Addendum    SUBJECTIVE: Pt doing well with PCEA without problems reported.    Therapy:	  [ ] IV PCA	   [ X] Epidural           [ ] s/p Spinal Opoid              [ ] Postpartum infusion	  [ ] Patient controlled regional anesthesia (PCRA)    [ ] prn Analgesics    Allergies    No Known Allergies    Intolerances      MEDICATIONS  (STANDING):  aspirin Suppository 300 milliGRAM(s) Rectal daily  chlorhexidine 4% Liquid 1 Application(s) Topical <User Schedule>  dextrose 5% + sodium chloride 0.45% with potassium chloride 40 mEq/L 1000 milliLiter(s) (80 mL/Hr) IV Continuous <Continuous>  heparin  Injectable 5000 Unit(s) SubCutaneous every 12 hours  HYDROmorphone (10 MICROgram(s)/mL) + BUpivacaine 0.0625% in 0.9% Sodium Chloride PCEA 250 milliLiter(s) Epidural PCA Continuous    MEDICATIONS  (PRN):  ALBUTerol/ipratropium for Nebulization 3 milliLiter(s) Nebulizer every 6 hours PRN SOB  butorphanol Injectable 0.125 milliGRAM(s) IV Push once PRN Pruritus  diphenhydrAMINE   Injectable 25 milliGRAM(s) IV Push at bedtime PRN sleep  HYDROmorphone  Injectable 0.5 milliGRAM(s) IV Push every 3 hours PRN Severe Pain  HYDROmorphone (10 MICROgram(s)/mL) + BUpivacaine 0.0625% in 0.9% Sodium Chloride PCEA Rescue Clinician  Bolus 5 milliLiter(s) Epidural every 15 minutes PRN for Pain Score greater than 6  lidocaine 2% Gel 1 Application(s) Topical every 4 hours PRN for pain  naloxone Injectable 0.1 milliGRAM(s) IV Push every 3 minutes PRN For ANY of the following changes in patient status:  A. RR LESS THAN 10 breaths per minute, B. Oxygen saturation LESS THAN 90%, C. Sedation score of 6  ondansetron Injectable 4 milliGRAM(s) IV Push every 6 hours PRN Nausea  sodium chloride 0.65% Nasal 1 Spray(s) Left Nostril every 4 hours PRN Congestion      OBJECTIVE:   [X] No new signs     [ ] Other:    Side Effects:  [X ] None			[ ] Other:    Assessment of Catheter Site:		[ X] Intact		[ ] Other:    ASSESSMENT/PLAN  [ X] Continue current therapy    [ ] Therapy changed to:    [ ] IV PCA       [ ] Epidural     [ ] prn Analgesics     Comments: Continue current PCEA settings.
Anesthesia Pain Management Service: Day _2_ of Epidural    SUBJECTIVE: Patient doing well with PCEA and no problems.  Pain Scale Score:   Refer to charted pain scores    THERAPY:  [x ] Epidural Bupivacaine 0.0625% and Hydromorphone  		[ ] 10 micrograms/mL	[ ] 5 micrograms/mL  [ ] Epidural Bupivacaine 0.0625% and Fentanyl - 2 micrograms/mL  [ ] Epidural Ropivacaine 0.1% plain – 1 mg/mL  [ ] Patient Controlled Regional Anesthesia (PCRA) Ropivacaine  		[ ] 0.2%			[ ] 0.1%    Demand dose __3_ lockout __15_ (minutes) Continuous Rate 8___ Total: __276_ Daily      MEDICATIONS  (STANDING):  aspirin Suppository 300 milliGRAM(s) Rectal daily  dornase shelby Solution 2.5 milliGRAM(s) Inhalation daily  heparin  Injectable 5000 Unit(s) SubCutaneous every 12 hours  HYDROmorphone (10 MICROgram(s)/mL) + BUpivacaine 0.0625% in 0.9% Sodium Chloride PCEA 250 milliLiter(s) Epidural PCA Continuous  lactated ringers. 1000 milliLiter(s) (75 mL/Hr) IV Continuous <Continuous>  sodium phosphate IVPB 15 milliMole(s) IV Intermittent once    MEDICATIONS  (PRN):  ALBUTerol/ipratropium for Nebulization 3 milliLiter(s) Nebulizer every 6 hours PRN SOB  butorphanol Injectable 0.125 milliGRAM(s) IV Push once PRN Pruritus  diphenhydrAMINE   Injectable 25 milliGRAM(s) IV Push at bedtime PRN sleep  HYDROmorphone  Injectable 0.5 milliGRAM(s) IV Push every 3 hours PRN Severe Pain  HYDROmorphone (10 MICROgram(s)/mL) + BUpivacaine 0.0625% in 0.9% Sodium Chloride PCEA Rescue Clinician  Bolus 5 milliLiter(s) Epidural every 15 minutes PRN for Pain Score greater than 6  lidocaine 2% Gel 1 Application(s) Topical every 4 hours PRN for pain  naloxone Injectable 0.1 milliGRAM(s) IV Push every 3 minutes PRN For ANY of the following changes in patient status:  A. RR LESS THAN 10 breaths per minute, B. Oxygen saturation LESS THAN 90%, C. Sedation score of 6  ondansetron Injectable 4 milliGRAM(s) IV Push every 6 hours PRN Nausea  sodium chloride 0.65% Nasal 1 Spray(s) Left Nostril every 4 hours PRN Congestion      OBJECTIVE:    Assessment of Catheter Site:	[ ] Left	[ ] Right  [x ] Epidural 	[ ] Femoral	      [ ] Saphenous   [ ] Supraclavicular   [ ] Other:    [x ] Dressing intact	[x ] Site non-tender	[ x] Site without erythema, discharge, edema  [x ] Epidural tubing and connection checked	[x] Gross neurological exam within normal limits  [ ] Catheter removed – tip intact		[x ] Afebrile	[ ] Febrile: ___    PT/INR - ( 10 Boogie 2018 06:00 )   PT: 12.8 SEC;   INR: 1.11          PTT - ( 10 Boogie 2018 06:00 )  PTT:30.5 SEC                      12.2   9.26  )-----------( 112      ( 10 Boogie 2018 06:00 )             37.7     Vital Signs Last 24 Hrs  T(C): 35.7 (06-10-18 @ 08:00), Max: 36.8 (06-09-18 @ 12:00)  T(F): 96.3 (06-10-18 @ 08:00), Max: 98.3 (06-09-18 @ 16:00)  HR: 69 (06-10-18 @ 09:00) (68 - 93)  BP: 104/65 (06-10-18 @ 09:00) (94/58 - 126/67)  BP(mean): 74 (06-10-18 @ 09:00) (65 - 85)  RR: 16 (06-10-18 @ 09:00) (11 - 24)  SpO2: 95% (06-10-18 @ 09:00) (91% - 99%)      Sedation Score:	[x ] Alert	[ ] Drowsy	[ ] Arousable	[ ] Asleep	[ ] Unresponsive    Side Effects:	[x ] None	[ ] Nausea	[ ] Vomiting	[ ] Pruritus  		[ ] Weakness		[ ] Numbness	[ ] Other:    ASSESSMENT/ PLAN:    Therapy to  be:	[x ] Continue   [ ] Discontinued   [ ] Change to prn Analgesics    Documentation and Verification of current medications:  [ X ] Done	[ ] Not done, not eligible, reason:    Comments: Doing OK with epidural and may continue.
Anesthesia Pain Management Service: Day _6_ of Epidural    SUBJECTIVE: Patient doing well with PCEA and no problems.  Pain Scale Score:   Refer to charted pain scores    THERAPY:  [x ] Epidural Bupivacaine 0.0625% and Hydromorphone  		[X ] 10 micrograms/mL	[ ] 5 micrograms/mL  [ ] Epidural Bupivacaine 0.0625% and Fentanyl - 2 micrograms/mL  [ ] Epidural Ropivacaine 0.1% plain – 1 mg/mL  [ ] Patient Controlled Regional Anesthesia (PCRA) Ropivacaine  		[ ] 0.2%			[ ] 0.1%    Demand dose __3_ lockout __15_ (minutes) Continuous Rate __8_ Total: _260ml__ Daily      MEDICATIONS  (STANDING):  aspirin Suppository 300 milliGRAM(s) Rectal daily  chlorhexidine 4% Liquid 1 Application(s) Topical <User Schedule>  dextrose 5% + lactated ringers with potassium chloride 20 mEq/L 1000 milliLiter(s) (75 mL/Hr) IV Continuous <Continuous>  heparin  Injectable 5000 Unit(s) SubCutaneous every 12 hours    MEDICATIONS  (PRN):  ALBUTerol/ipratropium for Nebulization 3 milliLiter(s) Nebulizer every 6 hours PRN SOB  diphenhydrAMINE   Injectable 25 milliGRAM(s) IV Push at bedtime PRN sleep  HYDROmorphone  Injectable 0.5 milliGRAM(s) IV Push every 3 hours PRN Moderate Pain (4 - 6)  HYDROmorphone  Injectable 1 milliGRAM(s) IV Push every 3 hours PRN Severe Pain (7 - 10)  lidocaine 2% Gel 1 Application(s) Topical every 4 hours PRN for pain  sodium chloride 0.65% Nasal 1 Spray(s) Left Nostril every 4 hours PRN Congestion      OBJECTIVE: sitting in chair     Assessment of Catheter Site:	[ ] Left	[ ] Right  [x ] Epidural 	[ ] Femoral	      [ ] Saphenous   [ ] Supraclavicular   [ ] Other:    [x ] Dressing intact	[x ] Site non-tender	[ x] Site without erythema, discharge, edema  [x ] Epidural tubing and connection checked	[x] Gross neurological exam within normal limits  [X ] Catheter removed – tip intact		[ ] Afebrile	  [ ] Febrile: ___       [ X] see Temp under VS below)    PT/INR - ( 13 Jun 2018 04:30 )   PT: 13.2 SEC;   INR: 1.19          PTT - ( 13 Jun 2018 04:30 )  PTT:33.8 SEC                      11.7   6.54  )-----------( 149      ( 13 Jun 2018 04:30 )             35.4     Vital Signs Last 24 Hrs  T(C): 37.2 (06-13-18 @ 08:00), Max: 37.2 (06-12-18 @ 16:00)  T(F): 98.9 (06-13-18 @ 08:00), Max: 99 (06-12-18 @ 16:00)  HR: 98 (06-13-18 @ 09:00) (70 - 98)  BP: 139/64 (06-13-18 @ 09:00) (97/64 - 140/76)  BP(mean): 84 (06-13-18 @ 09:00) (71 - 103)  RR: 20 (06-13-18 @ 09:00) (10 - 23)  SpO2: 94% (06-13-18 @ 09:00) (92% - 96%)      Sedation Score:	[x ] Alert	[ ] Drowsy	[ ] Arousable	[ ] Asleep	[ ] Unresponsive    Side Effects:	[x ] None	[ ] Nausea	[ ] Vomiting	[ ] Pruritus  		[ ] Weakness		[ ] Numbness	[ ] Other:    ASSESSMENT/ PLAN:    Therapy to  be:	[ ] Continue   [ X] Discontinued   [ X] Change to prn Analgesics    Documentation and Verification of current medications:  [ X ] Done	[ ] Not done, not eligible, reason:    Comments: Changed to IV or oral opioid medication at this point.
Anesthesia Pain Management Service: Day __ of Epidural    SUBJECTIVE: Patient doing well with PCEA and no problems.  Pain Scale Score:   Refer to charted pain scores    THERAPY:  [x ] Epidural Bupivacaine 0.0625% and Hydromorphone  		[ ] 10 micrograms/mL	[ ] 5 micrograms/mL  [ ] Epidural Bupivacaine 0.0625% and Fentanyl - 2 micrograms/mL  [ ] Epidural Ropivacaine 0.1% plain – 1 mg/mL  [ ] Patient Controlled Regional Anesthesia (PCRA) Ropivacaine  		[ ] 0.2%			[ ] 0.1%    Demand dose __3_ lockout __15_ (minutes) Continuous Rate ___ Total: 202.90___ Daily      MEDICATIONS  (STANDING):  acetaminophen  IVPB. 1000 milliGRAM(s) IV Intermittent once  aspirin Suppository 300 milliGRAM(s) Rectal daily  heparin  Injectable 5000 Unit(s) SubCutaneous every 12 hours  HYDROmorphone (10 MICROgram(s)/mL) + BUpivacaine 0.0625% in 0.9% Sodium Chloride PCEA 250 milliLiter(s) Epidural PCA Continuous  lactated ringers. 1000 milliLiter(s) (30 mL/Hr) IV Continuous <Continuous>  lactated ringers. 1000 milliLiter(s) (100 mL/Hr) IV Continuous <Continuous>    MEDICATIONS  (PRN):  butorphanol Injectable 0.125 milliGRAM(s) IV Push once PRN Pruritus  HYDROmorphone  Injectable 0.5 milliGRAM(s) IV Push every 3 hours PRN Severe Pain  HYDROmorphone (10 MICROgram(s)/mL) + BUpivacaine 0.0625% in 0.9% Sodium Chloride PCEA Rescue Clinician  Bolus 5 milliLiter(s) Epidural every 15 minutes PRN for Pain Score greater than 6  naloxone Injectable 0.1 milliGRAM(s) IV Push every 3 minutes PRN For ANY of the following changes in patient status:  A. RR LESS THAN 10 breaths per minute, B. Oxygen saturation LESS THAN 90%, C. Sedation score of 6  ondansetron Injectable 4 milliGRAM(s) IV Push every 6 hours PRN Nausea      OBJECTIVE:    Assessment of Catheter Site:	[ ] Left	[ ] Right  [x ] Epidural 	[ ] Femoral	      [ ] Saphenous   [ ] Supraclavicular   [ ] Other:    [x ] Dressing intact	[x ] Site non-tender	[ x] Site without erythema, discharge, edema  [x ] Epidural tubing and connection checked	[x] Gross neurological exam within normal limits  [ ] Catheter removed – tip intact		[x ] Afebrile	[ ] Febrile: ___    PT/INR - ( 09 Jun 2018 03:00 )   PT: 13.7 SEC;   INR: 1.23          PTT - ( 09 Jun 2018 03:00 )  PTT:26.6 SEC                      12.1   11.91 )-----------( 130      ( 09 Jun 2018 03:00 )             37.9     Vital Signs Last 24 Hrs  T(C): 36.7 (06-09-18 @ 08:00), Max: 37.2 (06-08-18 @ 17:10)  T(F): 98 (06-09-18 @ 08:00), Max: 99 (06-08-18 @ 17:10)  HR: 89 (06-09-18 @ 10:00) (72 - 94)  BP: 109/64 (06-09-18 @ 10:00) (99/59 - 130/78)  BP(mean): 74 (06-09-18 @ 10:00) (63 - 96)  RR: 17 (06-09-18 @ 10:00) (11 - 24)  SpO2: 94% (06-09-18 @ 10:00) (93% - 98%)      Sedation Score:	[x ] Alert	[ ] Drowsy	[ ] Arousable	[ ] Asleep	[ ] Unresponsive    Side Effects:	[x ] None	[ ] Nausea	[ ] Vomiting	[ ] Pruritus  		[ ] Weakness		[ ] Numbness	[ ] Other:    ASSESSMENT/ PLAN:    Therapy to  be:	[x ] Continue   [ ] Discontinued   [ ] Change to prn Analgesics    Documentation and Verification of current medications:  [ X ] Done	[ ] Not done, not eligible, reason:    Comments: Doing OK with epidural and may continue.
CHIEF COMPLAINT:Patient is a 52y old  Male who presents with a chief complaint of "stomach surgery and feeding tube" (29 May 2018 16:22)    	        PAST MEDICAL & SURGICAL HISTORY:  Esophageal cancer  MI (myocardial infarction): 2015 with 1 coronary stent  H/O hernia repair: 1966  Status post tonsillectomy and adenoidectomy  Stented coronary artery: x1 2015          REVIEW OF SYSTEMS:  CONSTITUTIONAL: weak  EYES: No eye pain, visual disturbances, or discharge  NECK: No pain or stiffness  RESPIRATORY: No cough, wheezing, chills or hemoptysis; No Shortness of Breath  CARDIOVASCULAR: No chest pain, palpitations, passing out, dizziness, or leg swelling  GASTROINTESTINAL: pain  controlled  GENITOURINARY: No dysuria, frequency, hematuria, or incontinence  NEUROLOGICAL: No headaches, memory loss, loss of strength, numbness, or tremorsain    Medications:  MEDICATIONS  (STANDING):  aspirin Suppository 300 milliGRAM(s) Rectal daily  dornase shelby Solution 2.5 milliGRAM(s) Inhalation daily  heparin  Injectable 5000 Unit(s) SubCutaneous every 12 hours  HYDROmorphone (10 MICROgram(s)/mL) + BUpivacaine 0.0625% in 0.9% Sodium Chloride PCEA 250 milliLiter(s) Epidural PCA Continuous  lactated ringers. 1000 milliLiter(s) (75 mL/Hr) IV Continuous <Continuous>    MEDICATIONS  (PRN):  ALBUTerol/ipratropium for Nebulization 3 milliLiter(s) Nebulizer every 6 hours PRN SOB  butorphanol Injectable 0.125 milliGRAM(s) IV Push once PRN Pruritus  diphenhydrAMINE   Injectable 25 milliGRAM(s) IV Push at bedtime PRN sleep  HYDROmorphone  Injectable 0.5 milliGRAM(s) IV Push every 3 hours PRN Severe Pain  HYDROmorphone (10 MICROgram(s)/mL) + BUpivacaine 0.0625% in 0.9% Sodium Chloride PCEA Rescue Clinician  Bolus 5 milliLiter(s) Epidural every 15 minutes PRN for Pain Score greater than 6  lidocaine 2% Gel 1 Application(s) Topical every 4 hours PRN for pain  naloxone Injectable 0.1 milliGRAM(s) IV Push every 3 minutes PRN For ANY of the following changes in patient status:  A. RR LESS THAN 10 breaths per minute, B. Oxygen saturation LESS THAN 90%, C. Sedation score of 6  ondansetron Injectable 4 milliGRAM(s) IV Push every 6 hours PRN Nausea  sodium chloride 0.65% Nasal 1 Spray(s) Left Nostril every 4 hours PRN Congestion    	    PHYSICAL EXAM:  T(C): 35.7 (06-10-18 @ 08:00), Max: 36.8 (06-09-18 @ 12:00)  HR: 87 (06-10-18 @ 11:00) (68 - 93)  BP: 100/56 (06-10-18 @ 11:00) (94/58 - 126/67)  RR: 23 (06-10-18 @ 11:00) (11 - 24)  SpO2: 94% (06-10-18 @ 11:00) (91% - 99%)  Wt(kg): --  I&O's Summary    09 Jun 2018 07:01  -  10 Boogie 2018 07:00  --------------------------------------------------------  IN: 3030 mL / OUT: 2205 mL / NET: 825 mL    10 Boogie 2018 07:01  -  10 Boogie 2018 11:58  --------------------------------------------------------  IN: 255 mL / OUT: 320 mL / NET: -65 mL        Appearance: Normal	  HEENT:   Normal oral mucosa, PERRL, EOMI	  Lymphatic: No lymphadenopathy  Cardiovascular: Normal S1 S2, No JVD, No murmurs, No edema  Respiratory: dec bs   Psychiatry: A & O x 3, Mood & affect appropriate  Gastrointestinal:  Soft, Non-tender, + BS	  Skin: No rashes, No ecchymoses, No cyanosis	  Neurologic: Non-focal  Extremities: Normal range of motion, No clubbing, cyanosis or edema  Vascular: Peripheral pulses palpable 2+ bilaterally    TELEMETRY: 	    ECG:  	  RADIOLOGY:  OTHER: 	  	  LABS:	 	    CARDIAC MARKERS:                                12.2   9.26  )-----------( 112      ( 10 Boogie 2018 06:00 )             37.7     06-10    136  |  98  |  12  ----------------------------<  83  4.0   |  26  |  0.84    Ca    8.6      10 Boogie 2018 06:00  Phos  2.2     06-10  Mg     2.2     06-10      proBNP:   Lipid Profile:   HgA1c:   TSH:
CHIEF COMPLAINT:Patient is a 52y old  Male who presents with a chief complaint of "stomach surgery and feeding tube" (29 May 2018 16:22)  no acute events    PAST MEDICAL & SURGICAL HISTORY:  Esophageal cancer  MI (myocardial infarction): 2015 with 1 coronary stent  H/O hernia repair: 1966  Status post tonsillectomy and adenoidectomy  Stented coronary artery: x1 2015          REVIEW OF SYSTEMS:  CONSTITUTIONAL: no fever  EYES: No eye pain, visual disturbances, or discharge  NECK: No pain or stiffness  RESPIRATORY: No cough, wheezing, chills or hemoptysis; No Shortness of Breath  CARDIOVASCULAR: No chest pain, palpitations, passing out, dizziness, or leg swelling  GASTROINTESTINAL: pain  controlled  GENITOURINARY: No dysuria, frequency, hematuria, or incontinence  NEUROLOGICAL: No headaches, memory loss, loss of strength, numbness, or tremorsain      Medications:  MEDICATIONS  (STANDING):  aspirin  chewable 81 milliGRAM(s) Oral daily  gabapentin 200 milliGRAM(s) Oral two times a day  heparin  Injectable 5000 Unit(s) SubCutaneous every 12 hours  pantoprazole    Tablet 40 milliGRAM(s) Oral before breakfast    MEDICATIONS  (PRN):  acetaminophen   Tablet. 650 milliGRAM(s) Oral every 6 hours PRN Mild Pain (1 - 3)  ALBUTerol/ipratropium for Nebulization 3 milliLiter(s) Nebulizer every 6 hours PRN SOB  benzocaine 15 mG/menthol 3.6 mG Lozenge 1 Lozenge Oral every 2 hours PRN Sore Throat  diphenhydrAMINE   Injectable 25 milliGRAM(s) IV Push at bedtime PRN sleep  HYDROmorphone   Tablet 4 milliGRAM(s) Oral every 4 hours PRN Severe Pain (7 - 10)  HYDROmorphone   Tablet 2 milliGRAM(s) Oral every 4 hours PRN Moderate Pain (4 - 6)  sodium chloride 0.65% Nasal 1 Spray(s) Left Nostril every 4 hours PRN Congestion    	    PHYSICAL EXAM:  T(C): 36.8 (06-19-18 @ 11:25), Max: 37.5 (06-18-18 @ 21:28)  HR: 80 (06-19-18 @ 11:25) (79 - 102)  BP: 112/72 (06-19-18 @ 11:25) (109/66 - 121/81)  RR: 16 (06-19-18 @ 11:25) (16 - 19)  SpO2: 95% (06-19-18 @ 11:25) (94% - 99%)  Wt(kg): --  I&O's Summary    18 Jun 2018 07:01  -  19 Jun 2018 07:00  --------------------------------------------------------  IN: 0 mL / OUT: 500 mL / NET: -500 mL      Appearance: Normal	  HEENT:   Normal oral mucosa, PERRL, EOMI	  Lymphatic: No lymphadenopathy  Cardiovascular: Normal S1 S2, No JVD, No murmurs, No edema  Respiratory: dec bs   Psychiatry: A & O x 3, Mood & affect appropriate  Gastrointestinal:  Soft, Non-tender, + BS	  Skin: No rashes, No ecchymoses, No cyanosis	  Neurologic: Non-focal  Extremities: Normal range of motion, No clubbing, cyanosis or edema  Vascular: Peripheral pulses palpable 2+ bilaterally    LABS:	 	    CARDIAC MARKERS:            06-18    138  |  100  |  8   ----------------------------<  94  3.7   |  26  |  0.94    Ca    8.8      18 Jun 2018 06:10      proBNP:   Lipid Profile:   HgA1c:   TSH:
CHIEF COMPLAINT:Patient is a 52y old  Male who presents with a chief complaint of "stomach surgery and feeding tube" (29 May 2018 16:22)  no acute events    PAST MEDICAL & SURGICAL HISTORY:  Esophageal cancer  MI (myocardial infarction): 2015 with 1 coronary stent  H/O hernia repair: 1966  Status post tonsillectomy and adenoidectomy  Stented coronary artery: x1 2015          REVIEW OF SYSTEMS:  CONSTITUTIONAL: weak  EYES: No eye pain, visual disturbances, or discharge  NECK: No pain or stiffness  RESPIRATORY: No cough, wheezing, chills or hemoptysis; No Shortness of Breath  CARDIOVASCULAR: No chest pain, palpitations, passing out, dizziness, or leg swelling  GASTROINTESTINAL: pain  controlled  GENITOURINARY: No dysuria, frequency, hematuria, or incontinence  NEUROLOGICAL: No headaches, memory loss, loss of strength, numbness, or tremorsain      Medications:  MEDICATIONS  (STANDING):  aspirin  chewable 81 milliGRAM(s) Oral daily  heparin  Injectable 5000 Unit(s) SubCutaneous every 12 hours  pantoprazole    Tablet 40 milliGRAM(s) Oral before breakfast    MEDICATIONS  (PRN):  ALBUTerol/ipratropium for Nebulization 3 milliLiter(s) Nebulizer every 6 hours PRN SOB  benzocaine 15 mG/menthol 3.6 mG Lozenge 1 Lozenge Oral every 2 hours PRN Sore Throat  diphenhydrAMINE   Injectable 25 milliGRAM(s) IV Push at bedtime PRN sleep  HYDROmorphone   Tablet 4 milliGRAM(s) Oral every 4 hours PRN Severe Pain (7 - 10)  HYDROmorphone   Tablet 2 milliGRAM(s) Oral every 4 hours PRN Moderate Pain (4 - 6)  sodium chloride 0.65% Nasal 1 Spray(s) Left Nostril every 4 hours PRN Congestion    	    PHYSICAL EXAM:  T(C): 36.5 (06-18-18 @ 12:00), Max: 37.3 (06-17-18 @ 16:26)  HR: 104 (06-18-18 @ 12:00) (73 - 104)  BP: 100/73 (06-18-18 @ 12:00) (100/73 - 125/72)  RR: 18 (06-18-18 @ 12:00) (16 - 18)  SpO2: 95% (06-18-18 @ 12:00) (94% - 96%)  Wt(kg): --  I&O's Summary    17 Jun 2018 07:01  -  18 Jun 2018 07:00  --------------------------------------------------------  IN: 1400 mL / OUT: 2150 mL / NET: -750 mL      Appearance: Normal	  HEENT:   Normal oral mucosa, PERRL, EOMI	  Lymphatic: No lymphadenopathy  Cardiovascular: Normal S1 S2, No JVD, No murmurs, No edema  Respiratory: dec bs   Psychiatry: A & O x 3, Mood & affect appropriate  Gastrointestinal:  Soft, Non-tender, + BS	  Skin: No rashes, No ecchymoses, No cyanosis	  Neurologic: Non-focal  Extremities: Normal range of motion, No clubbing, cyanosis or edema  Vascular: Peripheral pulses palpable 2+ bilaterally    LABS:	 	    CARDIAC MARKERS:                                12.2   5.71  )-----------( 228      ( 17 Jun 2018 05:51 )             37.7     06-18    138  |  100  |  8   ----------------------------<  94  3.7   |  26  |  0.94    Ca    8.8      18 Jun 2018 06:10      proBNP:   Lipid Profile:   HgA1c:   TSH:
CHIEF COMPLAINT:Patient is a 52y old  Male who presents with a chief complaint of "stomach surgery and feeding tube" (29 May 2018 16:22)  no acute events    PAST MEDICAL & SURGICAL HISTORY:  Esophageal cancer  MI (myocardial infarction): 2015 with 1 coronary stent  H/O hernia repair: 1966  Status post tonsillectomy and adenoidectomy  Stented coronary artery: x1 2015          REVIEW OF SYSTEMS:  CONSTITUTIONAL: weak  EYES: No eye pain, visual disturbances, or discharge  NECK: No pain or stiffness  RESPIRATORY: No cough, wheezing, chills or hemoptysis; No Shortness of Breath  CARDIOVASCULAR: No chest pain, palpitations, passing out, dizziness, or leg swelling  GASTROINTESTINAL: pain  controlled  GENITOURINARY: No dysuria, frequency, hematuria, or incontinence  NEUROLOGICAL: No headaches, memory loss, loss of strength, numbness, or tremorsain      Medications:  MEDICATIONS  (STANDING):  aspirin Suppository 300 milliGRAM(s) Rectal daily  botulinum toxin Type A Injectable 100 Unit(s) IntraMuscular once  dextrose 5% + lactated ringers with potassium chloride 20 mEq/L 1000 milliLiter(s) (75 mL/Hr) IV Continuous <Continuous>  heparin  Injectable 5000 Unit(s) SubCutaneous every 12 hours  pantoprazole  Injectable 40 milliGRAM(s) IV Push daily    MEDICATIONS  (PRN):  ALBUTerol/ipratropium for Nebulization 3 milliLiter(s) Nebulizer every 6 hours PRN SOB  diphenhydrAMINE   Injectable 25 milliGRAM(s) IV Push at bedtime PRN sleep  HYDROmorphone  Injectable 0.5 milliGRAM(s) IV Push every 3 hours PRN Moderate Pain (4 - 6)  HYDROmorphone  Injectable 1 milliGRAM(s) IV Push every 3 hours PRN Severe Pain (7 - 10)  lidocaine 2% Gel 1 Application(s) Topical every 4 hours PRN for pain  sodium chloride 0.65% Nasal 1 Spray(s) Left Nostril every 4 hours PRN Congestion    	    PHYSICAL EXAM:  T(C): 36.8 (06-15-18 @ 19:49), Max: 37.1 (06-15-18 @ 05:50)  HR: 76 (06-15-18 @ 19:49) (67 - 103)  BP: 116/70 (06-15-18 @ 19:49) (113/78 - 128/91)  RR: 18 (06-15-18 @ 19:49) (16 - 20)  SpO2: 95% (06-15-18 @ 19:49) (93% - 98%)  Wt(kg): --  I&O's Summary    14 Jun 2018 07:01  -  15 Boogie 2018 07:00  --------------------------------------------------------  IN: 690 mL / OUT: 1485 mL / NET: -795 mL    15 Boogie 2018 07:01  -  15 Boogie 2018 20:19  --------------------------------------------------------  IN: 150 mL / OUT: 90 mL / NET: 60 mL      Appearance: Normal	  HEENT:   Normal oral mucosa, PERRL, EOMI	  Lymphatic: No lymphadenopathy  Cardiovascular: Normal S1 S2, No JVD, No murmurs, No edema  Respiratory: dec bs   Psychiatry: A & O x 3, Mood & affect appropriate  Gastrointestinal:  Soft, Non-tender, + BS	  Skin: No rashes, No ecchymoses, No cyanosis	  Neurologic: Non-focal  Extremities: Normal range of motion, No clubbing, cyanosis or edema  Vascular: Peripheral pulses palpable 2+ bilaterally    LABS:	 	    CARDIAC MARKERS:                                12.4   6.10  )-----------( 186      ( 15 Boogie 2018 06:20 )             36.9     06-15    137  |  99  |  9   ----------------------------<  91  3.8   |  27  |  0.81    Ca    9.1      15 Boogie 2018 06:20      proBNP:   Lipid Profile:   HgA1c:   TSH:
CHIEF COMPLAINT:Patient is a 52y old  Male who presents with a chief complaint of "stomach surgery and feeding tube" (29 May 2018 16:22)  no acute events    PAST MEDICAL & SURGICAL HISTORY:  Esophageal cancer  MI (myocardial infarction): 2015 with 1 coronary stent  H/O hernia repair: 1966  Status post tonsillectomy and adenoidectomy  Stented coronary artery: x1 2015          REVIEW OF SYSTEMS:  CONSTITUTIONAL: weak  EYES: No eye pain, visual disturbances, or discharge  NECK: No pain or stiffness  RESPIRATORY: No cough, wheezing, chills or hemoptysis; No Shortness of Breath  CARDIOVASCULAR: No chest pain, palpitations, passing out, dizziness, or leg swelling  GASTROINTESTINAL: pain  controlled  GENITOURINARY: No dysuria, frequency, hematuria, or incontinence  NEUROLOGICAL: No headaches, memory loss, loss of strength, numbness, or tremorsain      Medications:  MEDICATIONS  (STANDING):  aspirin Suppository 300 milliGRAM(s) Rectal daily  botulinum toxin Type A Injectable 100 Unit(s) IntraMuscular once  dextrose 5% + lactated ringers with potassium chloride 20 mEq/L 1000 milliLiter(s) (75 mL/Hr) IV Continuous <Continuous>  heparin  Injectable 5000 Unit(s) SubCutaneous every 12 hours  pantoprazole  Injectable 40 milliGRAM(s) IV Push daily    MEDICATIONS  (PRN):  ALBUTerol/ipratropium for Nebulization 3 milliLiter(s) Nebulizer every 6 hours PRN SOB  diphenhydrAMINE   Injectable 25 milliGRAM(s) IV Push at bedtime PRN sleep  HYDROmorphone  Injectable 0.5 milliGRAM(s) IV Push every 3 hours PRN Moderate Pain (4 - 6)  HYDROmorphone  Injectable 1 milliGRAM(s) IV Push every 3 hours PRN Severe Pain (7 - 10)  lidocaine 2% Gel 1 Application(s) Topical every 4 hours PRN for pain  sodium chloride 0.65% Nasal 1 Spray(s) Left Nostril every 4 hours PRN Congestion    	    PHYSICAL EXAM:  T(C): 36.8 (06-15-18 @ 19:49), Max: 37.1 (06-15-18 @ 05:50)  HR: 76 (06-15-18 @ 19:49) (67 - 103)  BP: 116/70 (06-15-18 @ 19:49) (113/78 - 128/91)  RR: 18 (06-15-18 @ 19:49) (16 - 20)  SpO2: 95% (06-15-18 @ 19:49) (93% - 98%)  Wt(kg): --  I&O's Summary    14 Jun 2018 07:01  -  15 Boogie 2018 07:00  --------------------------------------------------------  IN: 690 mL / OUT: 1485 mL / NET: -795 mL    15 Boogie 2018 07:01  -  15 Boogie 2018 20:19  --------------------------------------------------------  IN: 150 mL / OUT: 90 mL / NET: 60 mL      Appearance: Normal	  HEENT:   Normal oral mucosa, PERRL, EOMI	  Lymphatic: No lymphadenopathy  Cardiovascular: Normal S1 S2, No JVD, No murmurs, No edema  Respiratory: dec bs   Psychiatry: A & O x 3, Mood & affect appropriate  Gastrointestinal:  Soft, Non-tender, + BS	  Skin: No rashes, No ecchymoses, No cyanosis	  Neurologic: Non-focal  Extremities: Normal range of motion, No clubbing, cyanosis or edema  Vascular: Peripheral pulses palpable 2+ bilaterally    LABS:	 	    CARDIAC MARKERS:                                12.4   6.10  )-----------( 186      ( 15 Boogie 2018 06:20 )             36.9     06-15    137  |  99  |  9   ----------------------------<  91  3.8   |  27  |  0.81    Ca    9.1      15 Boogie 2018 06:20      proBNP:   Lipid Profile:   HgA1c:   TSH:
CHIEF COMPLAINT:Patient is a 52y old  Male who presents with a chief complaint of "stomach surgery and feeding tube" (29 May 2018 16:22)  no acute events    PAST MEDICAL & SURGICAL HISTORY:  Esophageal cancer  MI (myocardial infarction): 2015 with 1 coronary stent  H/O hernia repair: 1966  Status post tonsillectomy and adenoidectomy  Stented coronary artery: x1 2015          REVIEW OF SYSTEMS:  CONSTITUTIONAL: weak  EYES: No eye pain, visual disturbances, or discharge  NECK: No pain or stiffness  RESPIRATORY: No cough, wheezing, chills or hemoptysis; No Shortness of Breath  CARDIOVASCULAR: No chest pain, palpitations, passing out, dizziness, or leg swelling  GASTROINTESTINAL: pain  controlled  GENITOURINARY: No dysuria, frequency, hematuria, or incontinence  NEUROLOGICAL: No headaches, memory loss, loss of strength, numbness, or tremorsain      Medications:  MEDICATIONS  (STANDING):  aspirin Suppository 300 milliGRAM(s) Rectal daily  chlorhexidine 4% Liquid 1 Application(s) Topical <User Schedule>  dextrose 5% + lactated ringers with potassium chloride 20 mEq/L 1000 milliLiter(s) (75 mL/Hr) IV Continuous <Continuous>  heparin  Injectable 5000 Unit(s) SubCutaneous every 12 hours    MEDICATIONS  (PRN):  ALBUTerol/ipratropium for Nebulization 3 milliLiter(s) Nebulizer every 6 hours PRN SOB  diphenhydrAMINE   Injectable 25 milliGRAM(s) IV Push at bedtime PRN sleep  HYDROmorphone  Injectable 0.5 milliGRAM(s) IV Push every 3 hours PRN Moderate Pain (4 - 6)  HYDROmorphone  Injectable 1 milliGRAM(s) IV Push every 3 hours PRN Severe Pain (7 - 10)  lidocaine 2% Gel 1 Application(s) Topical every 4 hours PRN for pain  sodium chloride 0.65% Nasal 1 Spray(s) Left Nostril every 4 hours PRN Congestion    	    PHYSICAL EXAM:  T(C): 37.2 (06-13-18 @ 08:00), Max: 37.2 (06-12-18 @ 16:00)  HR: 98 (06-13-18 @ 09:00) (70 - 98)  BP: 139/64 (06-13-18 @ 09:00) (97/64 - 140/76)  RR: 20 (06-13-18 @ 09:00) (10 - 23)  SpO2: 94% (06-13-18 @ 09:00) (92% - 96%)  Wt(kg): --  I&O's Summary    12 Jun 2018 07:01  -  13 Jun 2018 07:00  --------------------------------------------------------  IN: 2370 mL / OUT: 3830 mL / NET: -1460 mL    13 Jun 2018 07:01  -  13 Jun 2018 11:13  --------------------------------------------------------  IN: 225 mL / OUT: 1025 mL / NET: -800 mL      Appearance: Normal	  HEENT:   Normal oral mucosa, PERRL, EOMI	  Lymphatic: No lymphadenopathy  Cardiovascular: Normal S1 S2, No JVD, No murmurs, No edema  Respiratory: dec bs   Psychiatry: A & O x 3, Mood & affect appropriate  Gastrointestinal:  Soft, Non-tender, + BS	  Skin: No rashes, No ecchymoses, No cyanosis	  Neurologic: Non-focal  Extremities: Normal range of motion, No clubbing, cyanosis or edema  Vascular: Peripheral pulses palpable 2+ bilaterally    LABS:	 	    CARDIAC MARKERS:                                11.7   6.54  )-----------( 149      ( 13 Jun 2018 04:30 )             35.4     06-13    135  |  98  |  7   ----------------------------<  98  4.5   |  26  |  0.86    Ca    8.9      13 Jun 2018 04:30  Phos  3.7     06-13  Mg     1.9     06-13      proBNP:   Lipid Profile:   HgA1c:   TSH:
CHIEF COMPLAINT:Patient is a 52y old  Male who presents with a chief complaint of "stomach surgery and feeding tube" (29 May 2018 16:22)  no acute events    PAST MEDICAL & SURGICAL HISTORY:  Esophageal cancer  MI (myocardial infarction): 2015 with 1 coronary stent  H/O hernia repair: 1966  Status post tonsillectomy and adenoidectomy  Stented coronary artery: x1 2015          REVIEW OF SYSTEMS:  CONSTITUTIONAL: weak  EYES: No eye pain, visual disturbances, or discharge  NECK: No pain or stiffness  RESPIRATORY: No cough, wheezing, chills or hemoptysis; No Shortness of Breath  CARDIOVASCULAR: No chest pain, palpitations, passing out, dizziness, or leg swelling  GASTROINTESTINAL: pain  controlled  GENITOURINARY: No dysuria, frequency, hematuria, or incontinence  NEUROLOGICAL: No headaches, memory loss, loss of strength, numbness, or tremorsain      Medications:  MEDICATIONS  (STANDING):  aspirin Suppository 300 milliGRAM(s) Rectal daily  chlorhexidine 4% Liquid 1 Application(s) Topical <User Schedule>  dextrose 5% + sodium chloride 0.45% with potassium chloride 40 mEq/L 1000 milliLiter(s) (100 mL/Hr) IV Continuous <Continuous>  heparin  Injectable 5000 Unit(s) SubCutaneous every 12 hours  HYDROmorphone (10 MICROgram(s)/mL) + BUpivacaine 0.0625% in 0.9% Sodium Chloride PCEA 250 milliLiter(s) Epidural PCA Continuous    MEDICATIONS  (PRN):  ALBUTerol/ipratropium for Nebulization 3 milliLiter(s) Nebulizer every 6 hours PRN SOB  butorphanol Injectable 0.125 milliGRAM(s) IV Push once PRN Pruritus  diphenhydrAMINE   Injectable 25 milliGRAM(s) IV Push at bedtime PRN sleep  HYDROmorphone  Injectable 0.5 milliGRAM(s) IV Push every 3 hours PRN Severe Pain  HYDROmorphone (10 MICROgram(s)/mL) + BUpivacaine 0.0625% in 0.9% Sodium Chloride PCEA Rescue Clinician  Bolus 5 milliLiter(s) Epidural every 15 minutes PRN for Pain Score greater than 6  lidocaine 2% Gel 1 Application(s) Topical every 4 hours PRN for pain  naloxone Injectable 0.1 milliGRAM(s) IV Push every 3 minutes PRN For ANY of the following changes in patient status:  A. RR LESS THAN 10 breaths per minute, B. Oxygen saturation LESS THAN 90%, C. Sedation score of 6  ondansetron Injectable 4 milliGRAM(s) IV Push every 6 hours PRN Nausea  sodium chloride 0.65% Nasal 1 Spray(s) Left Nostril every 4 hours PRN Congestion    	    PHYSICAL EXAM:  T(C): 37.3 (06-12-18 @ 08:00), Max: 37.3 (06-12-18 @ 08:00)  HR: 87 (06-12-18 @ 09:00) (67 - 88)  BP: 126/75 (06-12-18 @ 09:00) (103/60 - 134/79)  RR: 23 (06-12-18 @ 09:00) (11 - 24)  SpO2: 93% (06-12-18 @ 09:00) (93% - 99%)  Wt(kg): --  I&O's Summary    11 Jun 2018 07:01  -  12 Jun 2018 07:00  --------------------------------------------------------  IN: 2005 mL / OUT: 2605 mL / NET: -600 mL    12 Jun 2018 07:01  -  12 Jun 2018 10:05  --------------------------------------------------------  IN: 105 mL / OUT: 350 mL / NET: -245 mL      Appearance: Normal	  HEENT:   Normal oral mucosa, PERRL, EOMI	  Lymphatic: No lymphadenopathy  Cardiovascular: Normal S1 S2, No JVD, No murmurs, No edema  Respiratory: dec bs   Psychiatry: A & O x 3, Mood & affect appropriate  Gastrointestinal:  Soft, Non-tender, + BS	  Skin: No rashes, No ecchymoses, No cyanosis	  Neurologic: Non-focal  Extremities: Normal range of motion, No clubbing, cyanosis or edema  Vascular: Peripheral pulses palpable 2+ bilaterally    LABS:	 	    CARDIAC MARKERS:                                12.0   5.84  )-----------( 131      ( 12 Jun 2018 03:30 )             36.4     06-12    138  |  101  |  10  ----------------------------<  89  3.5   |  25  |  0.79    Ca    8.7      12 Jun 2018 03:30  Phos  2.6     06-12  Mg     2.0     06-12      proBNP:   Lipid Profile:   HgA1c:   TSH:
CHIEF COMPLAINT:Patient is a 52y old  Male who presents with a chief complaint of "stomach surgery and feeding tube" (29 May 2018 16:22)  no acute events    PAST MEDICAL & SURGICAL HISTORY:  Esophageal cancer  MI (myocardial infarction): 2015 with 1 coronary stent  H/O hernia repair: 1966  Status post tonsillectomy and adenoidectomy  Stented coronary artery: x1 2015          REVIEW OF SYSTEMS:  CONSTITUTIONAL: weak  EYES: No eye pain, visual disturbances, or discharge  NECK: No pain or stiffness  RESPIRATORY: No cough, wheezing, chills or hemoptysis; No Shortness of Breath  CARDIOVASCULAR: No chest pain, palpitations, passing out, dizziness, or leg swelling  GASTROINTESTINAL: pain  controlled  GENITOURINARY: No dysuria, frequency, hematuria, or incontinence  NEUROLOGICAL: No headaches, memory loss, loss of strength, numbness, or tremorsain      Medications:  MEDICATIONS  (STANDING):  aspirin Suppository 300 milliGRAM(s) Rectal daily  dextrose 5% + lactated ringers with potassium chloride 20 mEq/L 1000 milliLiter(s) (75 mL/Hr) IV Continuous <Continuous>  heparin  Injectable 5000 Unit(s) SubCutaneous every 12 hours  pantoprazole  Injectable 40 milliGRAM(s) IV Push daily    MEDICATIONS  (PRN):  ALBUTerol/ipratropium for Nebulization 3 milliLiter(s) Nebulizer every 6 hours PRN SOB  diphenhydrAMINE   Injectable 25 milliGRAM(s) IV Push at bedtime PRN sleep  HYDROmorphone  Injectable 0.5 milliGRAM(s) IV Push every 3 hours PRN Moderate Pain (4 - 6)  HYDROmorphone  Injectable 1 milliGRAM(s) IV Push every 3 hours PRN Severe Pain (7 - 10)  lidocaine 2% Gel 1 Application(s) Topical every 4 hours PRN for pain  sodium chloride 0.65% Nasal 1 Spray(s) Left Nostril every 4 hours PRN Congestion    	    PHYSICAL EXAM:  T(C): 36.7 (06-17-18 @ 12:43), Max: 36.9 (06-16-18 @ 16:22)  HR: 71 (06-17-18 @ 12:43) (71 - 84)  BP: 121/80 (06-17-18 @ 12:43) (121/80 - 138/84)  RR: 17 (06-17-18 @ 12:43) (17 - 18)  SpO2: 95% (06-17-18 @ 12:43) (94% - 95%)  Wt(kg): --  I&O's Summary    16 Jun 2018 07:01  -  17 Jun 2018 07:00  --------------------------------------------------------  IN: 1375 mL / OUT: 850 mL / NET: 525 mL    17 Jun 2018 07:01  -  17 Jun 2018 13:49  --------------------------------------------------------  IN: 750 mL / OUT: 700 mL / NET: 50 mL      Appearance: Normal	  HEENT:   Normal oral mucosa, PERRL, EOMI	  Lymphatic: No lymphadenopathy  Cardiovascular: Normal S1 S2, No JVD, No murmurs, No edema  Respiratory: dec bs   Psychiatry: A & O x 3, Mood & affect appropriate  Gastrointestinal:  Soft, Non-tender, + BS	  Skin: No rashes, No ecchymoses, No cyanosis	  Neurologic: Non-focal  Extremities: Normal range of motion, No clubbing, cyanosis or edema  Vascular: Peripheral pulses palpable 2+ bilaterally    LABS:	 	    CARDIAC MARKERS:                                12.2   5.71  )-----------( 228      ( 17 Jun 2018 05:51 )             37.7           proBNP:   Lipid Profile:   HgA1c:   TSH:
CHIEF COMPLAINT:Patient is a 52y old  Male who presents with a chief complaint of "stomach surgery and feeding tube" (29 May 2018 16:22)  transferred to floor    PAST MEDICAL & SURGICAL HISTORY:  Esophageal cancer  MI (myocardial infarction): 2015 with 1 coronary stent  H/O hernia repair: 1966  Status post tonsillectomy and adenoidectomy  Stented coronary artery: x1 2015          REVIEW OF SYSTEMS:  CONSTITUTIONAL: weak  EYES: No eye pain, visual disturbances, or discharge  NECK: No pain or stiffness  RESPIRATORY: No cough, wheezing, chills or hemoptysis; No Shortness of Breath  CARDIOVASCULAR: No chest pain, palpitations, passing out, dizziness, or leg swelling  GASTROINTESTINAL: pain  controlled  GENITOURINARY: No dysuria, frequency, hematuria, or incontinence  NEUROLOGICAL: No headaches, memory loss, loss of strength, numbness, or tremorsain      Medications:  MEDICATIONS  (STANDING):  aspirin Suppository 300 milliGRAM(s) Rectal daily  chlorhexidine 4% Liquid 1 Application(s) Topical <User Schedule>  dextrose 5% + lactated ringers with potassium chloride 20 mEq/L 1000 milliLiter(s) (75 mL/Hr) IV Continuous <Continuous>  heparin  Injectable 5000 Unit(s) SubCutaneous every 12 hours    MEDICATIONS  (PRN):  ALBUTerol/ipratropium for Nebulization 3 milliLiter(s) Nebulizer every 6 hours PRN SOB  diphenhydrAMINE   Injectable 25 milliGRAM(s) IV Push at bedtime PRN sleep  HYDROmorphone  Injectable 0.5 milliGRAM(s) IV Push every 3 hours PRN Moderate Pain (4 - 6)  HYDROmorphone  Injectable 1 milliGRAM(s) IV Push every 3 hours PRN Severe Pain (7 - 10)  lidocaine 2% Gel 1 Application(s) Topical every 4 hours PRN for pain  sodium chloride 0.65% Nasal 1 Spray(s) Left Nostril every 4 hours PRN Congestion    	    PHYSICAL EXAM:  T(C): 37 (06-14-18 @ 08:35), Max: 37.1 (06-14-18 @ 00:16)  HR: 85 (06-14-18 @ 08:35) (72 - 100)  BP: 132/84 (06-14-18 @ 08:35) (111/68 - 134/78)  RR: 16 (06-14-18 @ 08:35) (13 - 22)  SpO2: 95% (06-14-18 @ 08:35) (91% - 100%)  Wt(kg): --  I&O's Summary    13 Jun 2018 07:01  -  14 Jun 2018 07:00  --------------------------------------------------------  IN: 1650 mL / OUT: 3720 mL / NET: -2070 mL    14 Jun 2018 07:01  -  14 Jun 2018 11:28  --------------------------------------------------------  IN: 440 mL / OUT: 370 mL / NET: 70 mL      Appearance: Normal	  HEENT:   Normal oral mucosa, PERRL, EOMI	  Lymphatic: No lymphadenopathy  Cardiovascular: Normal S1 S2, No JVD, No murmurs, No edema  Respiratory: dec bs   Psychiatry: A & O x 3, Mood & affect appropriate  Gastrointestinal:  Soft, Non-tender, + BS	  Skin: No rashes, No ecchymoses, No cyanosis	  Neurologic: Non-focal  Extremities: Normal range of motion, No clubbing, cyanosis or edema  Vascular: Peripheral pulses palpable 2+ bilaterally    LABS:	 	    CARDIAC MARKERS:                                12.3   6.88  )-----------( 174      ( 14 Jun 2018 06:59 )             37.7     06-14    138  |  99  |  9   ----------------------------<  107<H>  4.0   |  29  |  0.91    Ca    9.0      14 Jun 2018 06:59  Phos  3.7     06-13  Mg     1.9     06-13      proBNP:   Lipid Profile:   HgA1c:   TSH:
Day _4__ of Anesthesia Pain Management Service    Allergies    No Known Allergies    Intolerances        SUBJECTIVE: "I'm doing ok"  Pain Scale Score	At rest: _3__ 	With Activity: ___ 	[  ] Refer to charted pain scores    THERAPY:  [X] Epidural Bupivacaine 0.0625% and Hydromorphone  		[X] 10 micrograms/mL	[ ] 5 micrograms/mL  [ ] Epidural Bupivacaine 0.0625% and Fentanyl - 2 micrograms/mL  [ ] Epidural Ropivacaine 0.1% plain – 1 mg/mL  [ ] Patient Controlled Regional Anesthesia (PCRA) Ropivacaine  		[ ] 0.2%			[ ] 0.1%    Demand dose _3mL_ lockout _15min_ (minutes) Continuous Rate _8mL_ Total: __292ml_ Daily      MEDICATIONS  (STANDING):  aspirin Suppository 300 milliGRAM(s) Rectal daily  dornase shelby Solution 2.5 milliGRAM(s) Inhalation daily  heparin  Injectable 5000 Unit(s) SubCutaneous every 12 hours  HYDROmorphone (10 MICROgram(s)/mL) + BUpivacaine 0.0625% in 0.9% Sodium Chloride PCEA 250 milliLiter(s) Epidural PCA Continuous  lactated ringers 1000 milliLiter(s) (75 mL/Hr) IV Continuous <Continuous>    MEDICATIONS  (PRN):  ALBUTerol/ipratropium for Nebulization 3 milliLiter(s) Nebulizer every 6 hours PRN SOB  butorphanol Injectable 0.125 milliGRAM(s) IV Push once PRN Pruritus  diphenhydrAMINE   Injectable 25 milliGRAM(s) IV Push at bedtime PRN sleep  HYDROmorphone  Injectable 0.5 milliGRAM(s) IV Push every 3 hours PRN Severe Pain  HYDROmorphone (10 MICROgram(s)/mL) + BUpivacaine 0.0625% in 0.9% Sodium Chloride PCEA Rescue Clinician  Bolus 5 milliLiter(s) Epidural every 15 minutes PRN for Pain Score greater than 6  lidocaine 2% Gel 1 Application(s) Topical every 4 hours PRN for pain  naloxone Injectable 0.1 milliGRAM(s) IV Push every 3 minutes PRN For ANY of the following changes in patient status:  A. RR LESS THAN 10 breaths per minute, B. Oxygen saturation LESS THAN 90%, C. Sedation score of 6  ondansetron Injectable 4 milliGRAM(s) IV Push every 6 hours PRN Nausea  sodium chloride 0.65% Nasal 1 Spray(s) Left Nostril every 4 hours PRN Congestion      OBJECTIVE: Patient A&Ox3, NAD, sitting up in chair    Assessment of Catheter Site:	[ ] Left	[ ] Right  [X] Epidural 	[ ] Femoral	      [ ] Saphenous   [ ] Supraclavicular   [ ] Other:    [X] Dressing intact	[X] Site non-tender	[X] Site without erythema, discharge, edema  [ ] Epidural tubing and connection checked	[X] Gross neurological exam within normal limits  [ ] Catheter removed – tip intact		    [X ] Temperatue:  ___ [TMax: ]    Sedation Score:	[X] Alert	[ ] Drowsy	[ ] Arousable	[ ] Asleep	[ ] Unresponsive    Side Effects:	[X] None	[ ] Nausea	[ ] Vomiting	[ ] Pruritus  		[ ] Weakness		[ ] Numbness	[ ] Other:    PT/INR - ( 11 Jun 2018 05:00 )   PT: 12.8 SEC;   INR: 1.15          PTT - ( 11 Jun 2018 05:00 )  PTT:30.3 SEC                          10.8   6.64  )-----------( 117      ( 11 Jun 2018 05:00 )             33.5       06-11    137  |  100  |  11  ----------------------------<  82  3.7   |  25  |  0.81    Ca    8.3<L>      11 Jun 2018 05:00  Phos  2.6     06-11  Mg     2.0     06-11        ASSESSMENT/ PLAN:    Therapy to  be:	[X] Continue   [ ] Discontinued   [ ] Change to prn Analgesics    Documentation and Verification of current medications:  [X] Done	[ ] Not done, not eligible  [ ] Not done, reason not given    [ ]  [place]  Reviewed and Copied to Chart    COMMENTS: NPO with NGT, continue current therapy   Dressing reinforced.  systemic anticoagulant sign placed above bed.
Day _5__ of Anesthesia Pain Management Service    Allergies    No Known Allergies    Intolerances        SUBJECTIVE: "I'm doing ok   Pain Scale Score	At rest: _3__ 	With Activity: ___ 	[  ] Refer to charted pain scores    THERAPY:  [X] Epidural Bupivacaine 0.0625% and Hydromorphone  		[X] 10 micrograms/mL	[ ] 5 micrograms/mL  [ ] Epidural Bupivacaine 0.0625% and Fentanyl - 2 micrograms/mL  [ ] Epidural Ropivacaine 0.1% plain – 1 mg/mL  [ ] Patient Controlled Regional Anesthesia (PCRA) Ropivacaine  		[ ] 0.2%			[ ] 0.1%    Demand dose _3mL_ lockout _15min_ (minutes) Continuous Rate _8mL_ Total: _321.7__ Daily      MEDICATIONS  (STANDING):  aspirin Suppository 300 milliGRAM(s) Rectal daily  chlorhexidine 4% Liquid 1 Application(s) Topical <User Schedule>  dextrose 5% + sodium chloride 0.45% with potassium chloride 40 mEq/L 1000 milliLiter(s) (100 mL/Hr) IV Continuous <Continuous>  heparin  Injectable 5000 Unit(s) SubCutaneous every 12 hours  HYDROmorphone (10 MICROgram(s)/mL) + BUpivacaine 0.0625% in 0.9% Sodium Chloride PCEA 250 milliLiter(s) Epidural PCA Continuous    MEDICATIONS  (PRN):  ALBUTerol/ipratropium for Nebulization 3 milliLiter(s) Nebulizer every 6 hours PRN SOB  butorphanol Injectable 0.125 milliGRAM(s) IV Push once PRN Pruritus  diphenhydrAMINE   Injectable 25 milliGRAM(s) IV Push at bedtime PRN sleep  HYDROmorphone  Injectable 0.5 milliGRAM(s) IV Push every 3 hours PRN Severe Pain  HYDROmorphone (10 MICROgram(s)/mL) + BUpivacaine 0.0625% in 0.9% Sodium Chloride PCEA Rescue Clinician  Bolus 5 milliLiter(s) Epidural every 15 minutes PRN for Pain Score greater than 6  lidocaine 2% Gel 1 Application(s) Topical every 4 hours PRN for pain  naloxone Injectable 0.1 milliGRAM(s) IV Push every 3 minutes PRN For ANY of the following changes in patient status:  A. RR LESS THAN 10 breaths per minute, B. Oxygen saturation LESS THAN 90%, C. Sedation score of 6  ondansetron Injectable 4 milliGRAM(s) IV Push every 6 hours PRN Nausea  sodium chloride 0.65% Nasal 1 Spray(s) Left Nostril every 4 hours PRN Congestion      OBJECTIVE: Patient A&Ox3, NAD, sitting up in chair    Assessment of Catheter Site:	[ ] Left	[ ] Right  [X] Epidural 	[ ] Femoral	      [ ] Saphenous   [ ] Supraclavicular   [ ] Other:    [X] Dressing intact	[X] Site non-tender	[X] Site without erythema, discharge, edema  [ ] Epidural tubing and connection checked	[X] Gross neurological exam within normal limits  [ ] Catheter removed – tip intact		    [X ] Temperatue:  ___ [TMax: ]    Sedation Score:	[X] Alert	[ ] Drowsy	[ ] Arousable	[ ] Asleep	[ ] Unresponsive    Side Effects:	[X] None	[ ] Nausea	[ ] Vomiting	[ ] Pruritus  		[ ] Weakness		[ ] Numbness	[ ] Other:    PT/INR - ( 12 Jun 2018 03:30 )   PT: 13.3 SEC;   INR: 1.15          PTT - ( 12 Jun 2018 03:30 )  PTT:32.2 SEC                          12.0   5.84  )-----------( 131      ( 12 Jun 2018 03:30 )             36.4       06-12    138  |  101  |  10  ----------------------------<  89  3.5   |  25  |  0.79    Ca    8.7      12 Jun 2018 03:30  Phos  2.6     06-12  Mg     2.0     06-12        ASSESSMENT/ PLAN:    Therapy to  be:	[X] Continue   [ ] Discontinued   [ ] Change to prn Analgesics    Documentation and Verification of current medications:  [X] Done	[ ] Not done, not eligible  [ ] Not done, reason not given    [ ]  [place]ed and Copied to Chart    COMMENTS: NPO with NGT, continue current therapy   Dressing reinforced.   systemic anticoagulant sign placed above bed.
GASTROENTEROLOGY    Patient seen and examined at bedside   No complaints offered.   No abdominal pain reported  Denies nausea and vomiting. Tolerating clear liquid diet well  + flatus, no BM today       MEDICATIONS  (STANDING):  aspirin Suppository 300 milliGRAM(s) Rectal daily  dextrose 5% + lactated ringers with potassium chloride 20 mEq/L 1000 milliLiter(s) (75 mL/Hr) IV Continuous <Continuous>  heparin  Injectable 5000 Unit(s) SubCutaneous every 12 hours  pantoprazole  Injectable 40 milliGRAM(s) IV Push daily        T(F): 98.5 (06-17-18 @ 08:57), Max: 98.5 (06-17-18 @ 08:57)  HR: 74 (06-17-18 @ 08:57) (74 - 84)  BP: 122/73 (06-17-18 @ 08:57) (122/73 - 138/84)  RR: 18 (06-17-18 @ 08:57) (17 - 18)  SpO2: 95% (06-17-18 @ 08:57) (94% - 95%)  Wt(kg): --    PHYSICAL EXAM  GENERAL:   NAD  HEENT:  NC/AT,  no JVD, sclera-anicteric  CHEST:  clear to ascultation bilaterally, respirations nonlabored  HEART:  +S1+S2 regular rate and rhythm   ABDOMEN:  Soft, non-tender, non-distended  EXTREMITIES:  no cyanosis,clubbing or edema  NEURO:  Alert, oriented, no asterixis, no tremor, no encephalopathy  SKIN:  No rash/warm/dry      LABS:                        12.2<L>  5.71  )-----------( 228      ( 17 Jun 2018 05:51 )             37.7<L>  17 Jun 2018 05:51          I&O's Detail    16 Jun 2018 07:01  -  17 Jun 2018 07:00  --------------------------------------------------------  IN:    dextrose 5% + lactated ringers with potassium chloride 20 mEq/L: 975 mL    Oral Fluid: 400 mL  Total IN: 1375 mL    OUT:    Voided: 850 mL  Total OUT: 850 mL    Total NET: 525 mL      17 Jun 2018 07:01  -  17 Jun 2018 10:58  --------------------------------------------------------  IN:    dextrose 5% + lactated ringers with potassium chloride 20 mEq/L: 300 mL    Oral Fluid: 100 mL  Total IN: 400 mL    OUT:    Voided: 450 mL  Total OUT: 450 mL    Total NET: -50 mL
GASTROENTEROLOGY    Patient seen and examined at bedside  No complaints offered. Denies abdominal pain  Denies nausea and vomiting. Tolerating  clear liquid diet.  + BM this morning      MEDICATIONS  (STANDING):  aspirin Suppository 300 milliGRAM(s) Rectal daily  botulinum toxin Type A Injectable 100 Unit(s) IntraMuscular once  dextrose 5% + lactated ringers with potassium chloride 20 mEq/L 1000 milliLiter(s) (75 mL/Hr) IV Continuous <Continuous>  heparin  Injectable 5000 Unit(s) SubCutaneous every 12 hours  pantoprazole  Injectable 40 milliGRAM(s) IV Push daily        T(F): 98.2 (06-16-18 @ 09:15), Max: 98.2 (06-15-18 @ 19:49)  HR: 94 (06-16-18 @ 09:15) (65 - 94)  BP: 132/82 (06-16-18 @ 09:15) (116/70 - 138/89)  RR: 16 (06-16-18 @ 09:15) (16 - 20)  SpO2: 95% (06-16-18 @ 09:15) (95% - 98%)  Wt(kg): --    PHYSICAL EXAM  GENERAL:   NAD  HEENT:  NC/AT,  no JVD, sclera-anicteric  CHEST:  clear to ascultation bilaterally, respirations nonlabored  HEART:  +S1+S2 regular rate and rhythm   ABDOMEN:  Soft, non-tender, non-distended, normoactive bowel sounds  EXTREMITIES:  no cyanosis,clubbing or edema  NEURO:  Alert, oriented, no asterixis, no tremor, no encephalopathy  SKIN:  No rash/warm/dry      LABS:    06-15    137  |  99  |  9   ----------------------------<  91  3.8   |  27  |  0.81    Ca    9.1      15 Boogie 2018 06:20          I&O's Detail    15 Boogie 2018 07:01  -  16 Jun 2018 07:00  --------------------------------------------------------  IN:    dextrose 5% + lactated ringers with potassium chloride 20 mEq/L: 225 mL  Total IN: 225 mL    OUT:    Bulb: 90 mL    Voided: 500 mL  Total OUT: 590 mL    Total NET: -365 mL      16 Jun 2018 07:01  -  16 Jun 2018 11:55  --------------------------------------------------------  IN:    dextrose 5% + lactated ringers with potassium chloride 20 mEq/L: 375 mL    Oral Fluid: 100 mL  Total IN: 475 mL    OUT:    Voided: 300 mL  Total OUT: 300 mL    Total NET: 175 mL      < from: Xray Chest 1 View- PORTABLE-Routine (06.16.18 @ 08:46) >    EXAM:  XR CHEST PORTABLE ROUTINE 1V        PROCEDURE DATE:  Jun 16 2018         INTERPRETATION:  TIME OF EXAM: June 16, 2018 at 8:43 AM    CLINICAL INFORMATION: Post esophagectomy with gastric pull-through and   following barium passage.    TECHNIQUE:   Portable chest    INTERPRETATION:     Most of the contrast has now passed out of the gastric pull-through with   small residual remaining. Lungs are unchanged with small postop   effusions. Upper lung fields are clear.    Contrast seen within the large bowel.      COMPARISON:  [facility] 15      IMPRESSION:  Follow-up study showing progression of contrast out of   gastric pull-through and now in the large bowel.      < end of copied text >
GENERAL SURGERY DAILY PROGRESS NOTE:       Subjective:  No acute events overnight. This AM pt feeling well overall. Has been voiding spontaneously. Pain well controlled.         Objective:    PE:  Gen: sitting in chair NAD  Resp: breathing comfortably  Chest: Incisions c/d/i, no visible bleeding or drainage. CT in place, on suction  Abd: Soft, nontender, nondistended.  No palpable masses.      Vital Signs Last 24 Hrs  T(C): 36.6 (2018 04:00), Max: 36.9 (2018 08:00)  T(F): 97.9 (2018 04:00), Max: 98.5 (2018 08:00)  HR: 67 (2018 05:00) (67 - 88)  BP: 118/65 (2018 05:00) (103/60 - 133/75)  BP(mean): 76 (2018 05:00) (70 - 97)  RR: 11 (2018 05:00) (11 - 23)  SpO2: 95% (2018 05:00) (92% - 99%)    I&O's Detail    2018 07:01  -  2018 07:00  --------------------------------------------------------  IN:    dextrose 5% + sodium chloride 0.45% with potassium chloride 40 mEq/L: 150 mL    dextrose 5% + sodium chloride 0.9% with potassium chloride 20 mEq/L: 1050 mL    dextrose 5% + sodium chloride 0.9% with potassium chloride 20 mEq/L: 75 mL    Enteral Tube Flush: 180 mL    IV PiggyBack: 100 mL    lactated ringers: 450 mL  Total IN: 2005 mL    OUT:    Bulb: 40 mL    Chest Tube: 270 mL    Nasoenteral Tube: 350 mL    Voided: 1275 mL  Total OUT: 1935 mL    Total NET: 70 mL          Daily     Daily Weight in k.9 (2018 22:00)    MEDICATIONS  (STANDING):  aspirin Suppository 300 milliGRAM(s) Rectal daily  chlorhexidine 4% Liquid 1 Application(s) Topical <User Schedule>  dextrose 5% + sodium chloride 0.45% with potassium chloride 40 mEq/L 1000 milliLiter(s) (100 mL/Hr) IV Continuous <Continuous>  heparin  Injectable 5000 Unit(s) SubCutaneous every 12 hours  HYDROmorphone (10 MICROgram(s)/mL) + BUpivacaine 0.0625% in 0.9% Sodium Chloride PCEA 250 milliLiter(s) Epidural PCA Continuous    MEDICATIONS  (PRN):  ALBUTerol/ipratropium for Nebulization 3 milliLiter(s) Nebulizer every 6 hours PRN SOB  butorphanol Injectable 0.125 milliGRAM(s) IV Push once PRN Pruritus  diphenhydrAMINE   Injectable 25 milliGRAM(s) IV Push at bedtime PRN sleep  HYDROmorphone  Injectable 0.5 milliGRAM(s) IV Push every 3 hours PRN Severe Pain  HYDROmorphone (10 MICROgram(s)/mL) + BUpivacaine 0.0625% in 0.9% Sodium Chloride PCEA Rescue Clinician  Bolus 5 milliLiter(s) Epidural every 15 minutes PRN for Pain Score greater than 6  lidocaine 2% Gel 1 Application(s) Topical every 4 hours PRN for pain  naloxone Injectable 0.1 milliGRAM(s) IV Push every 3 minutes PRN For ANY of the following changes in patient status:  A. RR LESS THAN 10 breaths per minute, B. Oxygen saturation LESS THAN 90%, C. Sedation score of 6  ondansetron Injectable 4 milliGRAM(s) IV Push every 6 hours PRN Nausea  sodium chloride 0.65% Nasal 1 Spray(s) Left Nostril every 4 hours PRN Congestion      LABS:                        12.0   5.84  )-----------( 131      ( 2018 03:30 )             36.4     06-12    138  |  101  |  10  ----------------------------<  89  3.5   |  25  |  0.79    Ca    8.7      2018 03:30  Phos  2.6     06-12  Mg     2.0     06-12      PT/INR - ( 2018 03:30 )   PT: 13.3 SEC;   INR: 1.15          PTT - ( 2018 03:30 )  PTT:32.2 SEC      RADIOLOGY & ADDITIONAL STUDIES:
GENERAL SURGERY DAILY PROGRESS NOTE:       Subjective:  No acute events overnight. This AM pt feeling well overall. Pain well controlled.         Objective:    PE:  Gen: sitting in chair NAD  Resp: breathing comfortably  Chest: Incisions c/d/i, no visible bleeding or drainage. CT in place  Abd: Soft, nontender, nondistended.  No palpable masses.       Vital Signs Last 24 Hrs  T(C): 36.7 (2018 16:00), Max: 37.2 (10 Boogie 2018 20:00)  T(F): 98 (2018 16:00), Max: 98.9 (10 Boogie 2018 20:00)  HR: 71 (2018 16:00) (71 - 104)  BP: 118/66 (:00) (95/54 - 138/86)  BP(mean): 78 (2018 16:00) (64 - 95)  RR: 15 (2018 16:00) (11 - 27)  SpO2: 95% (2018 16:00) (91% - 97%)    I&O's Detail    10 Boogie 2018 07:  -  2018 07:00  --------------------------------------------------------  IN:    Albumin 5%  - 250 mL: 250 mL    Enteral Tube Flush: 180 mL    IV PiggyBack: 250 mL    Lactated Ringers IV Bolus: 250 mL    lactated ringers.: 1725 mL  Total IN: 2655 mL    OUT:    Bulb: 60 mL    Chest Tube: 210 mL    Indwelling Catheter - Urethral: 1222 mL    Nasoenteral Tube: 700 mL  Total OUT: 2192 mL    Total NET: 463 mL      2018 07:01  -  2018 16:51  --------------------------------------------------------  IN:    dextrose 5% + sodium chloride 0.9% with potassium chloride 20 mEq/L: 75 mL    dextrose 5% + sodium chloride 0.9% with potassium chloride 20 mEq/L: 225 mL    Enteral Tube Flush: 90 mL    IV PiggyBack: 100 mL    lactated ringers: 450 mL  Total IN: 940 mL    OUT:    Bulb: 10 mL    Chest Tube: 100 mL    Voided: 100 mL  Total OUT: 210 mL    Total NET: 730 mL          Daily     Daily Weight in k.8 (2018 11:41)    MEDICATIONS  (STANDING):  aspirin Suppository 300 milliGRAM(s) Rectal daily  dextrose 5% + sodium chloride 0.9% with potassium chloride 20 mEq/L 1000 milliLiter(s) (75 mL/Hr) IV Continuous <Continuous>  dornase shel[place] Solution 2.5 milliGRAM(s) Inhalation daily  heparin  Injectable 5000 Unit(s) SubCutaneous every 12 hours  HYDROmorphone (10 MICROgram(s)/mL) + BUpivacaine 0.0625% in 0.9% Sodium Chloride PCEA 250 milliLiter(s) Epidural PCA Continuous    MEDICATIONS  (PRN):  acetaminophen  IVPB. 1000 milliGRAM(s) IV Intermittent once PRN Moderate Pain (4 - 6)  ALBUTerol/ipratropium for Nebulization 3 milliLiter(s) Nebulizer every 6 hours PRN SOB  butorphanol Injectable 0.125 milliGRAM(s) IV Push once PRN Pruritus  diphenhydrAMINE   Injectable 25 milliGRAM(s) IV Push at bedtime PRN sleep  HYDROmorphone  Injectable 0.5 milliGRAM(s) IV Push every 3 hours PRN Severe Pain  HYDROmorphone (10 MICROgram(s)/mL) + BUpivacaine 0.0625% in 0.9% Sodium Chloride PCEA Rescue Clinician  Bolus 5 milliLiter(s) Epidural every 15 minutes PRN for Pain Score greater than 6  lidocaine 2% Gel 1 Application(s) Topical every 4 hours PRN for pain  naloxone Injectable 0.1 milliGRAM(s) IV Push every 3 minutes PRN For ANY of the following changes in patient status:  A. RR LESS THAN 10 breaths per minute, B. Oxygen saturation LESS THAN 90%, C. Sedation score of 6  ondansetron Injectable 4 milliGRAM(s) IV Push every 6 hours PRN Nausea  sodium chloride 0.65% Nasal 1 Spray(s) Left Nostril every 4 hours PRN Congestion      LABS:                        10.8   6.64  )-----------( 117      ( 2018 05:00 )             33.5     06-11    137  |  100  |  11  ----------------------------<  82  3.7   |  25  |  0.81    Ca    8.3<L>      2018 05:00  Phos  2.6       Mg     2.0           PT/INR - ( 2018 05:00 )   PT: 12.8 SEC;   INR: 1.15          PTT - ( 2018 05:00 )  PTT:30.3 SEC      RADIOLOGY & ADDITIONAL STUDIES:
GENERAL SURGERY DAILY PROGRESS NOTE:   Subjective:  Pt refusing to be evaluated by surgical residents this morning.    Objective:    PE: pt declining examination    VITALS  T(C): 36.4 (06-13-18 @ 04:00), Max: 37.2 (06-12-18 @ 16:00)  HR: 86 (06-13-18 @ 07:00) (70 - 92)  BP: 129/95 (06-13-18 @ 07:00) (97/64 - 140/76)  RR: 20 (06-13-18 @ 07:00) (10 - 23)  SpO2: 94% (06-13-18 @ 07:00) (92% - 96%)  Wt(kg): --  06-12 @ 07:01  -  06-13 @ 07:00  --------------------------------------------------------  IN:    dextrose 5% + lactated ringers with potassium chloride 20 mEq/L: 150 mL    dextrose 5% + sodium chloride 0.45% with potassium chloride 40 mEq/L: 1840 mL    Enteral Tube Flush: 180 mL    IV PiggyBack: 200 mL  Total IN: 2370 mL    OUT:    Bulb: 35 mL    Chest Tube: 320 mL    Nasoenteral Tube: 450 mL    Voided: 3025 mL  Total OUT: 3830 mL    Total NET: -1460 mL      MEDICATIONS  (STANDING):  aspirin Suppository 300 milliGRAM(s) Rectal daily  chlorhexidine 4% Liquid 1 Application(s) Topical <User Schedule>  dextrose 5% + lactated ringers with potassium chloride 20 mEq/L 1000 milliLiter(s) (75 mL/Hr) IV Continuous <Continuous>  heparin  Injectable 5000 Unit(s) SubCutaneous every 12 hours  HYDROmorphone (10 MICROgram(s)/mL) + BUpivacaine 0.0625% in 0.9% Sodium Chloride PCEA 250 milliLiter(s) Epidural PCA Continuous    MEDICATIONS  (PRN):  ALBUTerol/ipratropium for Nebulization 3 milliLiter(s) Nebulizer every 6 hours PRN SOB  butorphanol Injectable 0.125 milliGRAM(s) IV Push once PRN Pruritus  diphenhydrAMINE   Injectable 25 milliGRAM(s) IV Push at bedtime PRN sleep  HYDROmorphone  Injectable 0.5 milliGRAM(s) IV Push every 3 hours PRN Severe Pain  HYDROmorphone (10 MICROgram(s)/mL) + BUpivacaine 0.0625% in 0.9% Sodium Chloride PCEA Rescue Clinician  Bolus 5 milliLiter(s) Epidural every 15 minutes PRN for Pain Score greater than 6  lidocaine 2% Gel 1 Application(s) Topical every 4 hours PRN for pain  naloxone Injectable 0.1 milliGRAM(s) IV Push every 3 minutes PRN For ANY of the following changes in patient status:  A. RR LESS THAN 10 breaths per minute, B. Oxygen saturation LESS THAN 90%, C. Sedation score of 6  ondansetron Injectable 4 milliGRAM(s) IV Push every 6 hours PRN Nausea  sodium chloride 0.65% Nasal 1 Spray(s) Left Nostril every 4 hours PRN Congestion    .  LABS:                         11.7   6.54  )-----------( 149      ( 13 Jun 2018 04:30 )             35.4     06-13    135  |  98  |  7   ----------------------------<  98  4.5   |  26  |  0.86    Ca    8.9      13 Jun 2018 04:30  Phos  3.7     06-13  Mg     1.9     06-13      PT/INR - ( 13 Jun 2018 04:30 )   PT: 13.2 SEC;   INR: 1.19          PTT - ( 13 Jun 2018 04:30 )  PTT:33.8 SEC          RADIOLOGY, EKG & ADDITIONAL TESTS: Reviewed.
GENERAL SURGERY POST-OP NOTE:   Patient seen and examined. Patient stable.     MEDICATIONS  (STANDING):  aspirin Suppository 300 milliGRAM(s) Rectal daily  botulinum toxin Type A Injectable 100 Unit(s) IntraMuscular once  dextrose 5% + lactated ringers with potassium chloride 20 mEq/L 1000 milliLiter(s) (75 mL/Hr) IV Continuous <Continuous>  heparin  Injectable 5000 Unit(s) SubCutaneous every 12 hours  pantoprazole  Injectable 40 milliGRAM(s) IV Push daily    MEDICATIONS  (PRN):  ALBUTerol/ipratropium for Nebulization 3 milliLiter(s) Nebulizer every 6 hours PRN SOB  diphenhydrAMINE   Injectable 25 milliGRAM(s) IV Push at bedtime PRN sleep  HYDROmorphone  Injectable 0.5 milliGRAM(s) IV Push every 3 hours PRN Moderate Pain (4 - 6)  HYDROmorphone  Injectable 1 milliGRAM(s) IV Push every 3 hours PRN Severe Pain (7 - 10)  lidocaine 2% Gel 1 Application(s) Topical every 4 hours PRN for pain  sodium chloride 0.65% Nasal 1 Spray(s) Left Nostril every 4 hours PRN Congestion      Vital Signs Last 24 Hrs  T(C): 36.9 (16 Jun 2018 16:22), Max: 36.9 (16 Jun 2018 16:22)  T(F): 98.4 (16 Jun 2018 16:22), Max: 98.4 (16 Jun 2018 16:22)  HR: 84 (16 Jun 2018 16:22) (65 - 94)  BP: 138/84 (16 Jun 2018 16:22) (132/75 - 138/89)  BP(mean): --  RR: 17 (16 Jun 2018 16:22) (16 - 18)  SpO2: 95% (16 Jun 2018 16:22) (95% - 95%)    I&O's Detail    15 Boogie 2018 07:01  -  16 Jun 2018 07:00  --------------------------------------------------------  IN:    dextrose 5% + lactated ringers with potassium chloride 20 mEq/L: 225 mL  Total IN: 225 mL    OUT:    Bulb: 90 mL    Voided: 500 mL  Total OUT: 590 mL    Total NET: -365 mL      16 Jun 2018 07:01  -  16 Jun 2018 20:12  --------------------------------------------------------  IN:    dextrose 5% + lactated ringers with potassium chloride 20 mEq/L: 900 mL    Oral Fluid: 400 mL  Total IN: 1300 mL    OUT:    Voided: 850 mL  Total OUT: 850 mL    Total NET: 450 mL          Daily     Daily     LABS:                        12.4   6.10  )-----------( 186      ( 15 Boogie 2018 06:20 )             36.9     06-15    137  |  99  |  9   ----------------------------<  91  3.8   |  27  |  0.81    Ca    9.1      15 Boogie 2018 06:20            PHYSICAL EXAM:  Pt comfortable, reports flatus  Abd- soft, NT ND  port sites CDI          52yMale s/p robot assist Arley Morales, doing well  Plan:   -Diet per thoracic surgery team
INTERVAL HPI/OVERNIGHT EVENTS:    'i want to eat normal food'  had small bm     MEDICATIONS  (STANDING):  aspirin  chewable 81 milliGRAM(s) Oral daily  gabapentin 200 milliGRAM(s) Oral two times a day  heparin  Injectable 5000 Unit(s) SubCutaneous every 12 hours  pantoprazole    Tablet 40 milliGRAM(s) Oral before breakfast    MEDICATIONS  (PRN):  acetaminophen   Tablet. 650 milliGRAM(s) Oral every 6 hours PRN Mild Pain (1 - 3)  ALBUTerol/ipratropium for Nebulization 3 milliLiter(s) Nebulizer every 6 hours PRN SOB  benzocaine 15 mG/menthol 3.6 mG Lozenge 1 Lozenge Oral every 2 hours PRN Sore Throat  diphenhydrAMINE   Injectable 25 milliGRAM(s) IV Push at bedtime PRN sleep  HYDROmorphone   Tablet 4 milliGRAM(s) Oral every 4 hours PRN Severe Pain (7 - 10)  HYDROmorphone   Tablet 2 milliGRAM(s) Oral every 4 hours PRN Moderate Pain (4 - 6)  sodium chloride 0.65% Nasal 1 Spray(s) Left Nostril every 4 hours PRN Congestion      Allergies    No Known Allergies    Intolerances        Review of Systems:    General:  No wt loss, fevers, chills, night sweats, fatigue   Eyes:  Good vision, no reported pain  ENT:  No sore throat, pain, runny nose, dysphagia  CV:  No pain, palpitations, hypo/hypertension  Resp:  No dyspnea, cough, tachypnea, wheezing  GI:  No pain, No nausea, No vomiting, No diarrhea, No constipation, No weight loss, No fever, No pruritis, No rectal bleeding, No melena, No dysphagia  :  No pain, bleeding, incontinence, nocturia  Muscle:  No pain, weakness  Neuro:  No weakness, tingling, memory problems  Psych:  No fatigue, insomnia, mood problems, depression  Endocrine:  No polyuria, polydypsia, cold/heat intolerance  Heme:  No petechiae, ecchymosis, easy bruisability  Skin:  No rash, tattoos, scars, edema      Vital Signs Last 24 Hrs  T(C): 36.8 (19 Jun 2018 11:25), Max: 37.5 (18 Jun 2018 21:28)  T(F): 98.2 (19 Jun 2018 11:25), Max: 99.5 (18 Jun 2018 21:28)  HR: 80 (19 Jun 2018 11:25) (79 - 104)  BP: 112/72 (19 Jun 2018 11:25) (100/73 - 121/81)  BP(mean): --  RR: 16 (19 Jun 2018 11:25) (16 - 19)  SpO2: 95% (19 Jun 2018 11:25) (94% - 99%)    PHYSICAL EXAM:    Constitutional: NAD  HEENT: EOMI, throat clear  Neck: No LAD, supple  Respiratory: CTA and P  Cardiovascular: S1 and S2, RRR, no M  Gastrointestinal: BS+, soft, NT/ND, neg HSM,  Extremities: No peripheral edema, neg clubbing, cyanosis  Vascular: 2+ peripheral pulses  Neurological: A/O x 3, no focal deficits  Psychiatric: Normal mood, normal affect  Skin: No rashes      LABS:    06-18    138  |  100  |  8   ----------------------------<  94  3.7   |  26  |  0.94    Ca    8.8      18 Jun 2018 06:10            RADIOLOGY & ADDITIONAL TESTS:
INTERVAL HPI/OVERNIGHT EVENTS:    no new gi complaints  passing flatus  no n/v    MEDICATIONS  (STANDING):  aspirin Suppository 300 milliGRAM(s) Rectal daily  chlorhexidine 4% Liquid 1 Application(s) Topical <User Schedule>  dextrose 5% + sodium chloride 0.45% with potassium chloride 40 mEq/L 1000 milliLiter(s) (80 mL/Hr) IV Continuous <Continuous>  heparin  Injectable 5000 Unit(s) SubCutaneous every 12 hours  HYDROmorphone (10 MICROgram(s)/mL) + BUpivacaine 0.0625% in 0.9% Sodium Chloride PCEA 250 milliLiter(s) Epidural PCA Continuous    MEDICATIONS  (PRN):  ALBUTerol/ipratropium for Nebulization 3 milliLiter(s) Nebulizer every 6 hours PRN SOB  butorphanol Injectable 0.125 milliGRAM(s) IV Push once PRN Pruritus  diphenhydrAMINE   Injectable 25 milliGRAM(s) IV Push at bedtime PRN sleep  HYDROmorphone  Injectable 0.5 milliGRAM(s) IV Push every 3 hours PRN Severe Pain  HYDROmorphone (10 MICROgram(s)/mL) + BUpivacaine 0.0625% in 0.9% Sodium Chloride PCEA Rescue Clinician  Bolus 5 milliLiter(s) Epidural every 15 minutes PRN for Pain Score greater than 6  lidocaine 2% Gel 1 Application(s) Topical every 4 hours PRN for pain  naloxone Injectable 0.1 milliGRAM(s) IV Push every 3 minutes PRN For ANY of the following changes in patient status:  A. RR LESS THAN 10 breaths per minute, B. Oxygen saturation LESS THAN 90%, C. Sedation score of 6  ondansetron Injectable 4 milliGRAM(s) IV Push every 6 hours PRN Nausea  sodium chloride 0.65% Nasal 1 Spray(s) Left Nostril every 4 hours PRN Congestion      Allergies    No Known Allergies    Intolerances        Review of Systems:    General:  No wt loss, fevers, chills, night sweats, fatigue   Eyes:  Good vision, no reported pain  ENT:  No sore throat, pain, runny nose, dysphagia  CV:  No pain, palpitations, hypo/hypertension  Resp:  No dyspnea, cough, tachypnea, wheezing  GI:  No pain, No nausea, No vomiting, No diarrhea, No constipation, No weight loss, No fever, No pruritis, No rectal bleeding, No melena, No dysphagia  :  No pain, bleeding, incontinence, nocturia  Muscle:  No pain, weakness  Neuro:  No weakness, tingling, memory problems  Psych:  No fatigue, insomnia, mood problems, depression  Endocrine:  No polyuria, polydypsia, cold/heat intolerance  Heme:  No petechiae, ecchymosis, easy bruisability  Skin:  No rash, tattoos, scars, edema      Vital Signs Last 24 Hrs  T(C): 37.3 (12 Jun 2018 08:00), Max: 37.3 (12 Jun 2018 08:00)  T(F): 99.1 (12 Jun 2018 08:00), Max: 99.1 (12 Jun 2018 08:00)  HR: 73 (12 Jun 2018 12:00) (67 - 88)  BP: 121/71 (12 Jun 2018 12:00) (103/60 - 134/79)  BP(mean): 81 (12 Jun 2018 12:00) (70 - 97)  RR: 12 (12 Jun 2018 12:00) (11 - 24)  SpO2: 94% (12 Jun 2018 12:00) (93% - 99%)    PHYSICAL EXAM:    Constitutional: NAD  HEENT: EOMI, throat clear  Neck: No LAD, supple  Respiratory: CTA and P  Cardiovascular: S1 and S2, RRR, no M  Gastrointestinal: BS+, soft, NT/ND, neg HSM,  Extremities: No peripheral edema, neg clubbing, cyanosis  Vascular: 2+ peripheral pulses  Neurological: A/O x 3, no focal deficits  Psychiatric: Normal mood, normal affect  Skin: No rashes      LABS:                        12.0   5.84  )-----------( 131      ( 12 Jun 2018 03:30 )             36.4     06-12    138  |  101  |  10  ----------------------------<  89  3.5   |  25  |  0.79    Ca    8.7      12 Jun 2018 03:30  Phos  2.6     06-12  Mg     2.0     06-12      PT/INR - ( 12 Jun 2018 03:30 )   PT: 13.3 SEC;   INR: 1.15          PTT - ( 12 Jun 2018 03:30 )  PTT:32.2 SEC      RADIOLOGY & ADDITIONAL TESTS:
INTERVAL HPI/OVERNIGHT EVENTS:    pt reports no abdominal pain   feeling "very hungry, i just want to eat"    MEDICATIONS  (STANDING):  aspirin Suppository 300 milliGRAM(s) Rectal daily  botulinum toxin Type A Injectable 100 Unit(s) IntraMuscular once  dextrose 5% + lactated ringers with potassium chloride 20 mEq/L 1000 milliLiter(s) (75 mL/Hr) IV Continuous <Continuous>  heparin  Injectable 5000 Unit(s) SubCutaneous every 12 hours  pantoprazole  Injectable 40 milliGRAM(s) IV Push daily    MEDICATIONS  (PRN):  ALBUTerol/ipratropium for Nebulization 3 milliLiter(s) Nebulizer every 6 hours PRN SOB  diphenhydrAMINE   Injectable 25 milliGRAM(s) IV Push at bedtime PRN sleep  HYDROmorphone  Injectable 0.5 milliGRAM(s) IV Push every 3 hours PRN Moderate Pain (4 - 6)  HYDROmorphone  Injectable 1 milliGRAM(s) IV Push every 3 hours PRN Severe Pain (7 - 10)  lidocaine 2% Gel 1 Application(s) Topical every 4 hours PRN for pain  sodium chloride 0.65% Nasal 1 Spray(s) Left Nostril every 4 hours PRN Congestion      Allergies    No Known Allergies    Intolerances        Review of Systems:    General:  No wt loss, fevers, chills, night sweats, fatigue   Eyes:  Good vision, no reported pain  ENT:  No sore throat, pain, runny nose, dysphagia  CV:  No pain, palpitations, hypo/hypertension  Resp:  No dyspnea, cough, tachypnea, wheezing  GI:  No pain, No nausea, No vomiting, No diarrhea, No constipation, No weight loss, No fever, No pruritis, No rectal bleeding, No melena, No dysphagia  :  No pain, bleeding, incontinence, nocturia  Muscle:  No pain, weakness  Neuro:  No weakness, tingling, memory problems  Psych:  No fatigue, insomnia, mood problems, depression  Endocrine:  No polyuria, polydypsia, cold/heat intolerance  Heme:  No petechiae, ecchymosis, easy bruisability  Skin:  No rash, tattoos, scars, edema      Vital Signs Last 24 Hrs  T(C): 37.1 (15 Boogie 2018 10:00), Max: 37.1 (14 Jun 2018 18:21)  T(F): 98.8 (15 Boogie 2018 10:00), Max: 98.8 (15 Boogie 2018 05:50)  HR: 103 (15 Boogie 2018 10:00) (64 - 103)  BP: 127/84 (15 Boogie 2018 10:00) (109/71 - 137/89)  BP(mean): --  RR: 18 (15 Boogie 2018 10:00) (14 - 20)  SpO2: 95% (15 Boogie 2018 10:00) (90% - 98%)    PHYSICAL EXAM:    Constitutional: NAD  HEENT: EOMI, throat clear  Neck: No LAD, supple  Respiratory: CTA and P  Cardiovascular: S1 and S2, RRR, no M  Gastrointestinal: BS+, soft, NT/ND, neg HSM,  Extremities: No peripheral edema, neg clubbing, cyanosis  Vascular: 2+ peripheral pulses  Neurological: A/O x 3, no focal deficits  Psychiatric: Normal mood, normal affect  Skin: No rashes      LABS:                        12.4   6.10  )-----------( 186      ( 15 Boogie 2018 06:20 )             36.9     06-15    137  |  99  |  9   ----------------------------<  91  3.8   |  27  |  0.81    Ca    9.1      15 Boogie 2018 06:20            RADIOLOGY & ADDITIONAL TESTS:
INTERVAL HPI/OVERNIGHT EVENTS:    reports pain being controlled  passing flatus    MEDICATIONS  (STANDING):  aspirin Suppository 300 milliGRAM(s) Rectal daily  botulinum toxin Type A Injectable 100 Unit(s) IntraMuscular once  chlorhexidine 4% Liquid 1 Application(s) Topical <User Schedule>  dextrose 5% + lactated ringers with potassium chloride 20 mEq/L 1000 milliLiter(s) (75 mL/Hr) IV Continuous <Continuous>  heparin  Injectable 5000 Unit(s) SubCutaneous every 12 hours    MEDICATIONS  (PRN):  ALBUTerol/ipratropium for Nebulization 3 milliLiter(s) Nebulizer every 6 hours PRN SOB  diphenhydrAMINE   Injectable 25 milliGRAM(s) IV Push at bedtime PRN sleep  HYDROmorphone  Injectable 0.5 milliGRAM(s) IV Push every 3 hours PRN Moderate Pain (4 - 6)  HYDROmorphone  Injectable 1 milliGRAM(s) IV Push every 3 hours PRN Severe Pain (7 - 10)  lidocaine 2% Gel 1 Application(s) Topical every 4 hours PRN for pain  sodium chloride 0.65% Nasal 1 Spray(s) Left Nostril every 4 hours PRN Congestion      Allergies    No Known Allergies    Intolerances        Review of Systems:    General:  No wt loss, fevers, chills, night sweats, fatigue   Eyes:  Good vision, no reported pain  ENT:  No sore throat, pain, runny nose, dysphagia  CV:  No pain, palpitations, hypo/hypertension  Resp:  No dyspnea, cough, tachypnea, wheezing  GI:  No pain, No nausea, No vomiting, No diarrhea, No constipation, No weight loss, No fever, No pruritis, No rectal bleeding, No melena, No dysphagia  :  No pain, bleeding, incontinence, nocturia  Muscle:  No pain, weakness  Neuro:  No weakness, tingling, memory problems  Psych:  No fatigue, insomnia, mood problems, depression  Endocrine:  No polyuria, polydypsia, cold/heat intolerance  Heme:  No petechiae, ecchymosis, easy bruisability  Skin:  No rash, tattoos, scars, edema      Vital Signs Last 24 Hrs  T(C): 36.8 (14 Jun 2018 12:50), Max: 37.1 (14 Jun 2018 00:16)  T(F): 98.3 (14 Jun 2018 12:50), Max: 98.7 (14 Jun 2018 00:16)  HR: 103 (14 Jun 2018 12:50) (71 - 103)  BP: 120/77 (14 Jun 2018 12:50) (109/71 - 134/78)  BP(mean): 92 (13 Jun 2018 17:00) (71 - 92)  RR: 20 (14 Jun 2018 12:50) (13 - 20)  SpO2: 90% (14 Jun 2018 12:50) (90% - 100%)    PHYSICAL EXAM:    Constitutional: NAD  HEENT: EOMI, throat clear  Neck: No LAD, supple  Respiratory: CTA and P  Cardiovascular: S1 and S2, RRR, no M  Gastrointestinal: BS+, soft, NT/ND, neg HSM,  Extremities: No peripheral edema, neg clubbing, cyanosis  Vascular: 2+ peripheral pulses  Neurological: A/O x 3, no focal deficits  Psychiatric: Normal mood, normal affect  Skin: No rashes      LABS:                        12.3   6.88  )-----------( 174      ( 14 Jun 2018 06:59 )             37.7     06-14    138  |  99  |  9   ----------------------------<  107<H>  4.0   |  29  |  0.91    Ca    9.0      14 Jun 2018 06:59  Phos  3.7     06-13  Mg     1.9     06-13      PT/INR - ( 13 Jun 2018 04:30 )   PT: 13.2 SEC;   INR: 1.19          PTT - ( 13 Jun 2018 04:30 )  PTT:33.8 SEC      RADIOLOGY & ADDITIONAL TESTS:
S: no acute events overnight, patient seen and examined. no nausea no vomiting. no GI function    O:  T(C): 36.8 (06-16-18 @ 05:21), Max: 37.1 (06-15-18 @ 10:00)  HR: 75 (06-16-18 @ 05:21) (65 - 103)  BP: 138/89 (06-16-18 @ 05:21) (116/70 - 138/89)  RR: 17 (06-16-18 @ 05:21) (17 - 20)  SpO2: 95% (06-16-18 @ 05:21) (95% - 98%)  Wt(kg): --  06-15 @ 07:01  -  06-16 @ 07:00  --------------------------------------------------------  IN:    dextrose 5% + lactated ringers with potassium chloride 20 mEq/L: 150 mL  Total IN: 150 mL    OUT:    Bulb: 90 mL    Voided: 500 mL  Total OUT: 590 mL    Total NET: -440 mL    Gen: NAD  Chest: breathing comfortably on room air  Abd: soft nt nd, incisions intact with staples    .  LABS:                         12.4   6.10  )-----------( 186      ( 15 Boogie 2018 06:20 )             36.9     06-15    137  |  99  |  9   ----------------------------<  91  3.8   |  27  |  0.81    Ca    9.1      15 Boogie 2018 06:20                RADIOLOGY, EKG & ADDITIONAL TESTS: Reviewed.
S: no acute events overnight. tolerating clear liquid diet without complaints. seen and examined this morning on rounds.    O:  T(C): 36.8 (06-14-18 @ 12:50), Max: 37.1 (06-14-18 @ 00:16)  HR: 93 (06-14-18 @ 13:00) (71 - 103)  BP: 120/77 (06-14-18 @ 12:50) (109/71 - 134/78)  RR: 20 (06-14-18 @ 13:00) (13 - 20)  SpO2: 94% (06-14-18 @ 13:00) (90% - 100%)  Wt(kg): --  06-13 @ 07:01  -  06-14 @ 07:00  --------------------------------------------------------  IN:    dextrose 5% + lactated ringers with potassium chloride 20 mEq/L: 1650 mL  Total IN: 1650 mL    OUT:    Bulb: 35 mL    Chest Tube: 255 mL    Nasoenteral Tube: 150 mL    Voided: 3280 mL  Total OUT: 3720 mL    Total NET: -2070 mL      06-14 @ 07:01  -  06-14 @ 14:01  --------------------------------------------------------  IN:    dextrose 5% + lactated ringers with potassium chloride 20 mEq/L: 300 mL    Oral Fluid: 140 mL  Total IN: 440 mL    OUT:    Bulb: 40 mL    Chest Tube: 30 mL    Voided: 300 mL  Total OUT: 370 mL    Total NET: 70 mL      Gen: NAD  Chest: chest tube in place  Abdomen: incisions c/d/i, soft, nt, nd    .  LABS:                         12.3   6.88  )-----------( 174      ( 14 Jun 2018 06:59 )             37.7     06-14    138  |  99  |  9   ----------------------------<  107<H>  4.0   |  29  |  0.91    Ca    9.0      14 Jun 2018 06:59  Phos  3.7     06-13  Mg     1.9     06-13      PT/INR - ( 13 Jun 2018 04:30 )   PT: 13.2 SEC;   INR: 1.19          PTT - ( 13 Jun 2018 04:30 )  PTT:33.8 SEC          RADIOLOGY, EKG & ADDITIONAL TESTS: Reviewed.
S: patient reports tolerating full liquid diet, tried pureed diet but had nausea. encouraged to try non-narcotic pain medications.    O:  T(C): 36.4 (06-18-18 @ 16:11), Max: 37.3 (06-17-18 @ 16:26)  HR: 102 (06-18-18 @ 16:11) (73 - 104)  BP: 111/66 (06-18-18 @ 16:11) (100/73 - 125/72)  RR: 16 (06-18-18 @ 16:11) (16 - 18)  SpO2: 99% (06-18-18 @ 16:11) (94% - 99%)  Wt(kg): --  06-17 @ 07:01  -  06-18 @ 07:00  --------------------------------------------------------  IN:    dextrose 5% + lactated ringers with potassium chloride 20 mEq/L: 900 mL    Oral Fluid: 500 mL  Total IN: 1400 mL    OUT:    Voided: 2150 mL  Total OUT: 2150 mL    Total NET: -750 mL      Gen: NAD resting in bed  Chest: breathing comfortably on room air  Abd: soft nt nd, incisions c/d/i    .  LABS:                         12.2   5.71  )-----------( 228      ( 17 Jun 2018 05:51 )             37.7     06-18    138  |  100  |  8   ----------------------------<  94  3.7   |  26  |  0.94    Ca    8.8      18 Jun 2018 06:10                RADIOLOGY, EKG & ADDITIONAL TESTS: Reviewed.
SURGICAL ONCOLGY  Patient seen and examined.     MEDICATIONS  (STANDING):  aspirin Suppository 300 milliGRAM(s) Rectal daily  dextrose 5% + lactated ringers with potassium chloride 20 mEq/L 1000 milliLiter(s) (75 mL/Hr) IV Continuous <Continuous>  heparin  Injectable 5000 Unit(s) SubCutaneous every 12 hours  pantoprazole  Injectable 40 milliGRAM(s) IV Push daily    MEDICATIONS  (PRN):  ALBUTerol/ipratropium for Nebulization 3 milliLiter(s) Nebulizer every 6 hours PRN SOB  diphenhydrAMINE   Injectable 25 milliGRAM(s) IV Push at bedtime PRN sleep  HYDROmorphone  Injectable 0.5 milliGRAM(s) IV Push every 3 hours PRN Moderate Pain (4 - 6)  HYDROmorphone  Injectable 1 milliGRAM(s) IV Push every 3 hours PRN Severe Pain (7 - 10)  lidocaine 2% Gel 1 Application(s) Topical every 4 hours PRN for pain  sodium chloride 0.65% Nasal 1 Spray(s) Left Nostril every 4 hours PRN Congestion      Vital Signs Last 24 Hrs  T(C): 36.7 (17 Jun 2018 12:43), Max: 36.9 (16 Jun 2018 16:22)  T(F): 98 (17 Jun 2018 12:43), Max: 98.5 (17 Jun 2018 08:57)  HR: 71 (17 Jun 2018 12:43) (71 - 84)  BP: 121/80 (17 Jun 2018 12:43) (121/80 - 138/84)  BP(mean): --  RR: 17 (17 Jun 2018 12:43) (17 - 18)  SpO2: 95% (17 Jun 2018 12:43) (94% - 95%)    I&O's Detail    16 Jun 2018 07:01  -  17 Jun 2018 07:00  --------------------------------------------------------  IN:    dextrose 5% + lactated ringers with potassium chloride 20 mEq/L: 975 mL    Oral Fluid: 400 mL  Total IN: 1375 mL    OUT:    Voided: 850 mL  Total OUT: 850 mL    Total NET: 525 mL      17 Jun 2018 07:01  -  17 Jun 2018 15:08  --------------------------------------------------------  IN:    dextrose 5% + lactated ringers with potassium chloride 20 mEq/L: 450 mL    Oral Fluid: 300 mL  Total IN: 750 mL    OUT:    Voided: 700 mL  Total OUT: 700 mL    Total NET: 50 mL          Daily     Daily     LABS:                        12.2   5.71  )-----------( 228      ( 17 Jun 2018 05:51 )             37.7                 PHYSICAL EXAM:  Gen: feeling well,   Abd: soft, NT ND port sites CDI            52yMale s/p   Plan:   - Diet plan per CT surgery  Follow GI- s/p EGD and dilatation
SYLVIA VICTOR            [id number]         No Known Allergies                 HPI:  53 yo male with PMH MI and stent x1 placed 2015 presents to pre surgical testing.  Pt reports he was diagnosed with esophageal CA 10/2017.  Pt completed chemotherapy 11/25/17 -1/30/2018 and RT 1/2018.  Pt reports post treatment CT and PET done, revealed good response.  Pt is scheduled for upper endoscopy robotic jodie andrew esophagogastrectomy feeding jejunostomy possible open, esophagogastroduodenoscopy robotic assisted laparoscopic esophagogastrectomy insertion of feeding tube on 6/8/18. (29 May 2018 16:22)      Procedure: [place] Andrew Esophagogastrectomy  06/08/2018      Issues:  Esophageal cancer  Postop pain  CAD / Hx of MI / Coronary stents                 PAST MEDICAL & SURGICAL HISTORY:  Esophageal cancer  MI (myocardial infarction): 2015 with 1 coronary stent  H/O hernia repair: 1966  Status post tonsillectomy and adenoidectomy  Stented coronary artery: x1 2015        ICU Vital Signs Last 24 Hrs  T(C): 36.4 (13 Jun 2018 04:00), Max: 37.3 (12 Jun 2018 08:00)  T(F): 97.6 (13 Jun 2018 04:00), Max: 99.1 (12 Jun 2018 08:00)  HR: 76 (13 Jun 2018 05:00) (71 - 92)  BP: 117/67 (13 Jun 2018 05:00) (97/64 - 140/76)  BP(mean): 79 (13 Jun 2018 05:00) (71 - 93)  ABP: --  ABP(mean): --  RR: 16 (13 Jun 2018 05:00) (10 - 24)  SpO2: 93% (13 Jun 2018 05:00) (92% - 96%)    I&O's Detail    11 Jun 2018 07:01  -  12 Jun 2018 07:00  --------------------------------------------------------  IN:    dextrose 5% + sodium chloride 0.45% with potassium chloride 40 mEq/L: 150 mL    dextrose 5% + sodium chloride 0.9% with potassium chloride 20 mEq/L: 1050 mL    dextrose 5% + sodium chloride 0.9% with potassium chloride 20 mEq/L: 75 mL    Enteral Tube Flush: 180 mL    IV PiggyBack: 100 mL    lactated ringers: 450 mL  Total IN: 2005 mL    OUT:    Bulb: 40 mL    Chest Tube: 290 mL    Nasoenteral Tube: 500 mL    Voided: 1775 mL  Total OUT: 2605 mL    Total NET: -600 mL      12 Jun 2018 07:01  -  13 Jun 2018 05:38  --------------------------------------------------------  IN:    dextrose 5% + sodium chloride 0.45% with potassium chloride 40 mEq/L: 1840 mL    Enteral Tube Flush: 180 mL    IV PiggyBack: 200 mL  Total IN: 2220 mL    OUT:    Bulb: 15 mL    Chest Tube: 270 mL    Nasoenteral Tube: 300 mL    Voided: 3025 mL  Total OUT: 3610 mL    Total NET: -1390 mL        CAPILLARY BLOOD GLUCOSE      POCT Blood Glucose.: 98 mg/dL (12 Jun 2018 17:38)      Home Medications:  aspirin 81 mg oral tablet: 1 tab(s) orally once a day (08 Jun 2018 06:35)      MEDICATIONS  (STANDING):  aspirin Suppository 300 milliGRAM(s) Rectal daily  chlorhexidine 4% Liquid 1 Application(s) Topical <User Schedule>  dextrose 5% + sodium chloride 0.45% with potassium chloride 40 mEq/L 1000 milliLiter(s) (80 mL/Hr) IV Continuous <Continuous>  heparin  Injectable 5000 Unit(s) SubCutaneous every 12 hours  HYDROmorphone (10 MICROgram(s)/mL) + BUpivacaine 0.0625% in 0.9% Sodium Chloride PCEA 250 milliLiter(s) Epidural PCA Continuous    MEDICATIONS  (PRN):  ALBUTerol/ipratropium for Nebulization 3 milliLiter(s) Nebulizer every 6 hours PRN SOB  butorphanol Injectable 0.125 milliGRAM(s) IV Push once PRN Pruritus  diphenhydrAMINE   Injectable 25 milliGRAM(s) IV Push at bedtime PRN sleep  HYDROmorphone  Injectable 0.5 milliGRAM(s) IV Push every 3 hours PRN Severe Pain  HYDROmorphone (10 MICROgram(s)/mL) + BUpivacaine 0.0625% in 0.9% Sodium Chloride PCEA Rescue Clinician  Bolus 5 milliLiter(s) Epidural every 15 minutes PRN for Pain Score greater than 6  lidocaine 2% Gel 1 Application(s) Topical every 4 hours PRN for pain  naloxone Injectable 0.1 milliGRAM(s) IV Push every 3 minutes PRN For ANY of the following changes in patient status:  A. RR LESS THAN 10 breaths per minute, B. Oxygen saturation LESS THAN 90%, C. Sedation score of 6  ondansetron Injectable 4 milliGRAM(s) IV Push every 6 hours PRN Nausea  sodium chloride 0.65% Nasal 1 Spray(s) Left Nostril every 4 hours PRN Congestion          Physical exam:                             General:               Pt is awake, alert, not in any distress                                                  Neuro:                  Nonfocal                             Cardiovascular:   S1 & S2, regular                           Respiratory:         Air entry is fair and equal on both sides, has bilateral conducted sounds                           GI:                          Soft, nondistended and nontender, Bowel sounds hypoactive                            Ext:                        No cyanosis or edema     Labs:                                                                           11.7   6.54  )-----------( 149      ( 13 Jun 2018 04:30 )             35.4             06-13    135  |  98  |  7   ----------------------------<  98  4.5   |  26  |  0.86    Ca    8.9      13 Jun 2018 04:30  Phos  3.7     06-13  Mg     1.9     06-13                    PT/INR - ( 13 Jun 2018 04:30 )   PT: 13.2 SEC;   INR: 1.19          PTT - ( 13 Jun 2018 04:30 )  PTT:33.8 SEC        CXR:    < from: Xray Chest 1 View- PORTABLE-Routine (06.12.18 @ 07:48) >  An enteric tube is seen coursing down the thorax with its tip overlying   the right upper quadrant.    2 right-sided chest tubes are present unchanged. Hazy left lung base   likely small postop effusion with atelectasis. Subcutaneous emphysema in  the right side of the neck. Right apical pneumothorax seen post   thoracotomy.        Plan:    General: 52yMale s/p Jodie Andrew Esophagogastrectomy 06/08/2018,   progressing well, OOB in chair, experiencing  pain with deep breathing.                             Neuro:                                         Pain control with PCA / PCEA / IV Tylenol    D/C PCEA today                            Cardiovascular:                                          Continue hemodynamic monitoring.                                        Continue IVF LR 75cc/hr    CAD: Restart ASA when PO is allowed                                        Avoid vasopressor agents                            Respiratory:                                         Pt is on RA,                                           Appears to be in pain but not in distress.                                         Using incentive spirometry  2000cc                                         Monitor chest tube output                                         Chest tube to WS                                                              Continue bronchodilators, pulmonary toilet                            GI                                         NPO                                         Continue GI prophylaxis with Protonix                                         Continue Zofran / Reglan for nausea - PRN                                         NGT to low continuous wall suction                                          Flush both NGT with 20cc of sterile water Q 4hrs.  	                                                                 Renal:                                         Continue LR 75cc/hr                                         Monitor I/Os and electrolytes                                         Rodriguez                                                  Hem/ Onc:                                                                                  Monitor chest tube output &  signs of bleeding.                                          Follow CBC in AM                           Infectious disease:                                            No signs of infection. Monitor for fever / leukocytosis.                                          All surgical incision / chest tube  sites look clean                            Endocrine                                             Continue Accu-Checks with coverage    Pt is on SQ Heparin and Venodyne boots for DVT prophylaxis.     Pertinent clinical, laboratory, radiographic, hemodynamic, echocardiographic, respiratory data, microbiologic data and chart were reviewed and analyzed frequently throughout the course of the day and night  Patient seen, examined and plan discussed with CT Surgeon Dr. Brown / CTICU team during rounds.    Pt's status discussed with family at bedside, updated status            Les Jiménez MD
SYLVIA VICTOR  MRN-[id number]    HPI:  53 yo male with PMH MI and stent x1 placed 2015 presents to pre surgical testing.  Pt reports he was diagnosed with esophageal CA 10/2017.  Pt completed chemotherapy 11/25/17 -1/30/2018 and RT 1/2018.  Pt reports post treatment CT and PET done, revealed good response.  Pt is scheduled for upper endoscopy robotic jodie andrew esophagogastrectomy feeding jejunostomy possible open, esophagogastroduodenoscopy robotic assisted laparoscopic esophagogastrectomy insertion of feeding tube on 6/8/18. (29 May 2018 16:22)      [id number]: Robotic Jodie Andrew Esophagectomy     Issues:  Esophageal cancer  Post op pain  Anxiety  CAD     Past Medical History:  Esophageal cancer    MI (myocardial infarction)  2015 with 1 coronary stent.     Past Surgical History:  H/O hernia repair  1966  Status post tonsillectomy and adenoidectomy    Stented coronary artery  x1 2015.  MEDICATIONS  (STANDING):  albumin human  5% IVPB 250 milliLiter(s) IV Intermittent every 30 minutes  aspirin Suppository 300 milliGRAM(s) Rectal daily  dornase shelby Solution 2.5 milliGRAM(s) Inhalation daily  heparin  Injectable 5000 Unit(s) SubCutaneous every 12 hours  HYDROmorphone (10 MICROgram(s)/mL) + BUpivacaine 0.0625% in 0.9% Sodium Chloride PCEA 250 milliLiter(s) Epidural PCA Continuous  lactated ringers Bolus 250 milliLiter(s) IV Bolus every 15 minutes  lactated ringers. 1000 milliLiter(s) (30 mL/Hr) IV Continuous <Continuous>  lactated ringers. 1000 milliLiter(s) (100 mL/Hr) IV Continuous <Continuous>    PAST MEDICAL & SURGICAL HISTORY:  Esophageal cancer  MI (myocardial infarction): 2015 with 1 coronary stent  H/O hernia repair: 1966  Status post tonsillectomy and adenoidectomy  Stented coronary artery: x1 2015      ***VITAL SIGNS:  Vital Signs Last 24 Hrs  T(C): 36.4 (11 Jun 2018 04:00), Max: 37.2 (10 Boogie 2018 20:00)  T(F): 97.6 (11 Jun 2018 04:00), Max: 98.9 (10 Boogie 2018 20:00)  HR: 72 (11 Jun 2018 05:00) (68 - 104)  BP: 115/67 (11 Jun 2018 05:00) (95/54 - 138/86)  BP(mean): 78 (11 Jun 2018 05:00) (64 - 95)  RR: 13 (11 Jun 2018 05:00) (11 - 27)  SpO2: 93% (11 Jun 2018 05:00) (91% - 97%)  I/Os:   I&O's Detail    09 Jun 2018 07:01  -  10 Boogie 2018 07:00  --------------------------------------------------------  IN:    Enteral Tube Flush: 180 mL    IV PiggyBack: 200 mL    Lactated Ringers IV Bolus: 250 mL    lactated ringers.: 2400 mL  Total IN: 3030 mL    OUT:    Bulb: 10 mL    Chest Tube: 305 mL    Indwelling Catheter - Urethral: 1640 mL    Nasoenteral Tube: 250 mL  Total OUT: 2205 mL    Total NET: 825 mL      10 Boogie 2018 07:01  -  11 Jun 2018 06:08  --------------------------------------------------------  IN:    Albumin 5%  - 250 mL: 250 mL    Enteral Tube Flush: 180 mL    IV PiggyBack: 250 mL    Lactated Ringers IV Bolus: 250 mL    lactated ringers.: 1575 mL  Total IN: 2505 mL    OUT:    Bulb: 45 mL    Chest Tube: 190 mL    Indwelling Catheter - Urethral: 1047 mL    Nasoenteral Tube: 500 mL  Total OUT: 1782 mL    Total NET: 723 mL        CAPILLARY BLOOD GLUCOSE      POCT Blood Glucose.: 87 mg/dL (10 Boogie 2018 19:34)        ======================= PHYSICAL EXAM============================  General:                         Awake, alert, not in any distress  Neuro:                            Moving all extremities to commands. No acute change from baseline exam.	  Respiratory:	Lungs clear to auscultation bilaterally. Good aeration.                                       CV:		Regular rate and rhythm. Normal S1/S2. No murmurs                                          Distal pulses present.  Abdomen:	                     Soft, non-distended. Bowel sounds present. N/G to LWS   Skin:		No rash.  Extremities:	Warm, no cyanosis or edema.  Palpable pulses    ============================ LABS =========================                        10.8   6.64  )-----------( 117      ( 11 Jun 2018 05:00 )             33.5     06-11    137  |  100  |  11  ----------------------------<  82  3.7   |  25  |  0.81    Ca    8.3<L>      11 Jun 2018 05:00  Phos  2.6     06-11  Mg     2.0     06-11        PT/INR - ( 11 Jun 2018 05:00 )   PT: 12.8 SEC;   INR: 1.15          PTT - ( 11 Jun 2018 05:00 )  PTT:30.3 SEC    ===================== IMAGING STUDIES =========================  < from: Xray Chest 1 View- PORTABLE-Urgent (06.10.18 @ 06:35) >  IMPRESSION: The patient appears to be status post gastric pull-through.   Enteric tube has its tip overlying the right hemidiaphragm as on the   prior study. Right-sided chest tubes are again noted. There is no   evidence of a pneumothorax. There are likely bibasilar areas of   subsegmental atelectasis associated with a trace left pleural effusion.   There is pneumoperitoneum with interval decrease in size. There is left   chest wall and right lower neck subcutaneous emphysema.      =======================  MEDICATIONS  =================  MEDICATIONS  (STANDING):  aspirin Suppository 300 milliGRAM(s) Rectal daily  dornase shelby Solution 2.5 milliGRAM(s) Inhalation daily  heparin  Injectable 5000 Unit(s) SubCutaneous every 12 hours  HYDROmorphone (10 MICROgram(s)/mL) + BUpivacaine 0.0625% in 0.9% Sodium Chloride PCEA 250 milliLiter(s) Epidural PCA Continuous  lactated ringers. 1000 milliLiter(s) (75 mL/Hr) IV Continuous <Continuous>    MEDICATIONS  (PRN):  ALBUTerol/ipratropium for Nebulization 3 milliLiter(s) Nebulizer every 6 hours PRN SOB  butorphanol Injectable 0.125 milliGRAM(s) IV Push once PRN Pruritus  diphenhydrAMINE   Injectable 25 milliGRAM(s) IV Push at bedtime PRN sleep  HYDROmorphone  Injectable 0.5 milliGRAM(s) IV Push every 3 hours PRN Severe Pain  HYDROmorphone (10 MICROgram(s)/mL) + BUpivacaine 0.0625% in 0.9% Sodium Chloride PCEA Rescue Clinician  Bolus 5 milliLiter(s) Epidural every 15 minutes PRN for Pain Score greater than 6  lidocaine 2% Gel 1 Application(s) Topical every 4 hours PRN for pain  naloxone Injectable 0.1 milliGRAM(s) IV Push every 3 minutes PRN For ANY of the following changes in patient status:  A. RR LESS THAN 10 breaths per minute, B. Oxygen saturation LESS THAN 90%, C. Sedation score of 6  ondansetron Injectable 4 milliGRAM(s) IV Push every 6 hours PRN Nausea  sodium chloride 0.65% Nasal 1 Spray(s) Left Nostril every 4 hours PRN Congestion      A/P:    52 Mh PMH MI and stent x1 w esophageal CA.  The pt was diagnosed with esophageal CA 10/2017.  Pt completed chemotherapy 11/25/17 -1/30/2018 and RT 1/2018.  Pt reports post treatment CT and PET done, revealed good response.     [id number]: Robotic Jodie Andrew Esophagectomy   Issues:  Esophageal cancer  Post op pain  Anxiety  CAD                                Neuro:                                         Pain control with PCEA /Tylenol IV                             Cardiovascular:                                          Continue hemodynamic monitoring.                                         Not on any pressors                              Respiratory:                                         Pt is on  nasal canula , HOB 45 degree                                         Comfortable, not in any distress.                                         Use incentive spirometry ASAP                                         Monitor chest tube output                                         Chest tube to suction	                                         Continue bronchodilators, pulmonary toilet, Ambulate                            GI                                         Continue GI prophylaxis with Protonix                                         Continue Zofran / Reglan for nausea - PRN	                                         NPO, N/G to LWS                                  Renal:                                         Continue IVF                                         Monitor I/Os and electrolytes                                                 Hematologic / Oncology:                                         SQH & SCDs for VTE prophylaxis                                         Monitor chest tube output. No signs of active bleeding.                                          Follow CBC in AM                           Infectious disease:                                          No signs of infection. Monitor for fever / leukocytosis.                                          All surgical incision / chest tube  sites look clean                            Endocrine:                                           Continue Finger stick glucose checks with coverage    All available pertinent clinical, laboratory, radiographic, hemodynamic, echocardiographic, respiratory data, microbiologic data and chart were reviewed and analyzed frequently. GI and DVT prophylaxis, glycemic control, head of bed elevation and skin care issues were addressed.  Patient seen, examined and plan discussed with CT Surgery / CTICU team during rounds.      Jonathan Quiles DO, ERROL
SYLVIA VICTOR  MRN-[id number]    HPI:  51 yo male with PMH MI and stent x1 placed 2015 presents to pre surgical testing.  Pt reports he was diagnosed with esophageal CA 10/2017.  Pt completed chemotherapy 11/25/17 -1/30/2018 and RT 1/2018.  Pt reports post treatment CT and PET done, revealed good response.  Pt is scheduled for upper endoscopy robotic jodie andrew esophagogastrectomy feeding jejunostomy possible open, esophagogastroduodenoscopy robotic assisted laparoscopic esophagogastrectomy insertion of feeding tube on 6/8/18. (29 May 2018 16:22)    Procedure: Robotic assistance in thoracoscopic procedure  06/08/2018  [place] Andrew Esophagectomy       Issues:  Esophageal cancer  Post op pain  Anxiety  CAD             PAST MEDICAL & SURGICAL HISTORY:  Esophageal cancer  MI (myocardial infarction): 2015 with 1 coronary stent  H/O hernia repair: 1966  Status post tonsillectomy and adenoidectomy  Stented coronary artery: x1 2015      ***VITAL SIGNS:  Vital Signs Last 24 Hrs  T(C): 36.9 (09 Jun 2018 04:00), Max: 37.2 (08 Jun 2018 17:10)  T(F): 98.4 (09 Jun 2018 04:00), Max: 99 (08 Jun 2018 17:10)  HR: 80 (09 Jun 2018 05:00) (72 - 93)  BP: 108/57 (09 Jun 2018 04:00) (101/54 - 130/78)  BP(mean): 67 (09 Jun 2018 04:00) (63 - 96)  RR: 24 (09 Jun 2018 05:00) (11 - 24)  SpO2: 98% (09 Jun 2018 05:00) (93% - 98%)  I/Os:   I&O's Detail    08 Jun 2018 07:01  -  09 Jun 2018 07:00  --------------------------------------------------------  IN:    Enteral Tube Flush: 90 mL    IV PiggyBack: 300 mL    lactated ringers.: 1300 mL  Total IN: 1690 mL    OUT:    Bulb: 22.5 mL    Chest Tube: 150 mL    Indwelling Catheter - Urethral: 975 mL    Nasoenteral Tube: 200 mL  Total OUT: 1347.5 mL    Total NET: 342.5 mL        CAPILLARY BLOOD GLUCOSE      POCT Blood Glucose.: 107 mg/dL (09 Jun 2018 06:47)  POCT Blood Glucose.: 137 mg/dL (08 Jun 2018 23:47)  POCT Blood Glucose.: 170 mg/dL (08 Jun 2018 18:21)        ======================= PHYSICAL EXAM============================  General:                         Awake, alert, not in any distress  Neuro:                            Moving all extremities to commands. Nonfocal	  Respiratory:	Lungs clear to auscultation bilaterally. Good aeration.                                        chest tube to suction  CV:		Regular rate and rhythm. Normal S1/S2. No murmurs                                          Distal pulses present.  Abdomen:	                     Soft, non-distended. Bowel sounds present   Skin:		No rash.  Extremities:	Warm, no cyanosis or edema.  Palpable pulses    ============================ LABS =========================                        12.1   11.91 )-----------( 130      ( 09 Jun 2018 03:00 )             37.9     06-09    138  |  101  |  14  ----------------------------<  124<H>  4.2   |  24  |  0.93    Ca    8.3<L>      09 Jun 2018 03:00        PT/INR - ( 09 Jun 2018 03:00 )   PT: 13.7 SEC;   INR: 1.23          PTT - ( 09 Jun 2018 03:00 )  PTT:26.6 SEC        =======================  MEDICATIONS  =================  MEDICATIONS  (STANDING):  acetaminophen  IVPB. 1000 milliGRAM(s) IV Intermittent once  aspirin Suppository 300 milliGRAM(s) Rectal daily  heparin  Injectable 5000 Unit(s) SubCutaneous every 12 hours  HYDROmorphone (10 MICROgram(s)/mL) + BUpivacaine 0.0625% in 0.9% Sodium Chloride PCEA 250 milliLiter(s) Epidural PCA Continuous  lactated ringers. 1000 milliLiter(s) (30 mL/Hr) IV Continuous <Continuous>  lactated ringers. 1000 milliLiter(s) (100 mL/Hr) IV Continuous <Continuous>    MEDICATIONS  (PRN):  butorphanol Injectable 0.125 milliGRAM(s) IV Push once PRN Pruritus  HYDROmorphone  Injectable 0.5 milliGRAM(s) IV Push every 3 hours PRN Severe Pain  HYDROmorphone (10 MICROgram(s)/mL) + BUpivacaine 0.0625% in 0.9% Sodium Chloride PCEA Rescue Clinician  Bolus 5 milliLiter(s) Epidural every 15 minutes PRN for Pain Score greater than 6  naloxone Injectable 0.1 milliGRAM(s) IV Push every 3 minutes PRN For ANY of the following changes in patient status:  A. RR LESS THAN 10 breaths per minute, B. Oxygen saturation LESS THAN 90%, C. Sedation score of 6  ondansetron Injectable 4 milliGRAM(s) IV Push every 6 hours PRN Nausea      ====================== NEUROLOGY=====================  Pain control with  PCEA / Tylenol IV / Toradol     ==================== RESPIRATORY======================  Pt is on 2 L nasal canula   Comfortable, not in any distress.  Using incentive spirometry   Monitor chest tube output  Chest tube to suction   Continue bronchodilators, pulmonary toilet    ====================CARDIOVASCULAR==================  Continue hemodynamic monitoring.  Not on any pressors    ===================== RENAL =========================  Continue  IVF  Monitor I/Os and electrolytes  Keep Rodriguez    ==================== GASTROINTESTINAL===================  Continue GI prophylaxis with  Protonix  Continue Zofran / Reglan for nausea - PRN	  NPO, N/G to LWS    =======================    ENDOCRIN  =====================  Glycemic monitoring  F/S with coverage  ===================HEMATOLOGIC/ONC ===================  Monitor chest tube output. No signs of active bleeding.   Follow CBC in AM  DVT prophylaxis with SCD, sc Heparin    ========================INFECTIOUS DISEASE================  No signs of infection. Monitor for fever / leukocytosis.  All surgical incision / chest tube  sites look clean        Pertinent clinical, laboratory, radiographic, hemodynamic, echocardiographic, respiratory data, microbiologic data and chart were reviewed and analyzed frequently throughout the course of the day and night. GI and DVT prophylaxis, glycemic control, head of bed elevation and skin care issues were addressed.  Patient seen, examined and plan discussed with CT Surgery / CTICU team during rounds.    I have spent               minutes of critical care time with this pt between            am/pm    and               am/ pm      Jonathan Quiles DO, FACEP
Subjective: "I really want to drink something. I hope my test goes well" Pt. denies CP or SOB. Denies abd pain, n/v/d. + flatus. NO BM. Still NPO. Using IS to 1500    Vital Signs:  Vital Signs Last 24 Hrs  T(C): 36.7 (06-15-18 @ 12:53), Max: 37.1 (06-14-18 @ 18:21)  T(F): 98.1 (06-15-18 @ 12:53), Max: 98.8 (06-15-18 @ 05:50)  HR: 79 (06-15-18 @ 12:53) (64 - 103)  BP: 120/74 (06-15-18 @ 12:53) (110/90 - 137/89)  RR: 18 (06-15-18 @ 12:53) (14 - 18)  SpO2: 98% (06-15-18 @ 12:53) (93% - 98%) on (O2)    Telemetry/Alarms:  General: WN/WD NAD  Neurology: Awake, nonfocal, CASH x 4  Eyes: Scleras clear, PERRLA/ EOMI, Gross vision intact  ENT:Gross hearing intact, grossly patent pharynx, no stridor  Neck: Neck supple, trachea midline, No JVD,   Respiratory: CTA B/L, decreased at Rt. Base.  No wheezing, rales, rhonchi  CV: RRR, S1S2, no murmurs, rubs or gallops  Abdominal: Soft, NT, ND +BS, +flatus. No BM. Still NPO w IVF.   Extremities: No edema, + peripheral pulses  Skin: No Rashes, Hematoma, Ecchymosis  Lymphatic: No Neck, axilla, groin LAD  Psych: Oriented x 3, normal affect  Incisions: Abd and chest incisions c/d/i.   Tubes: Rt. Vitor removed at bedside this morning. Tolerated procedure well. CXR stable.   Relevant labs, radiology and Medications reviewed                        12.4   6.10  )-----------( 186      ( 15 Boogie 2018 06:20 )             36.9     06-15    137  |  99  |  9   ----------------------------<  91  3.8   |  27  |  0.81    Ca    9.1      15 Boogie 2018 06:20        MEDICATIONS  (STANDING):  aspirin Suppository 300 milliGRAM(s) Rectal daily  botulinum toxin Type A Injectable 100 Unit(s) IntraMuscular once  dextrose 5% + lactated ringers with potassium chloride 20 mEq/L 1000 milliLiter(s) (75 mL/Hr) IV Continuous <Continuous>  heparin  Injectable 5000 Unit(s) SubCutaneous every 12 hours  pantoprazole  Injectable 40 milliGRAM(s) IV Push daily    MEDICATIONS  (PRN):  ALBUTerol/ipratropium for Nebulization 3 milliLiter(s) Nebulizer every 6 hours PRN SOB  diphenhydrAMINE   Injectable 25 milliGRAM(s) IV Push at bedtime PRN sleep  HYDROmorphone  Injectable 0.5 milliGRAM(s) IV Push every 3 hours PRN Moderate Pain (4 - 6)  HYDROmorphone  Injectable 1 milliGRAM(s) IV Push every 3 hours PRN Severe Pain (7 - 10)  lidocaine 2% Gel 1 Application(s) Topical every 4 hours PRN for pain  sodium chloride 0.65% Nasal 1 Spray(s) Left Nostril every 4 hours PRN Congestion    Pertinent Physical Exam  I&O's Summary    14 Jun 2018 07:01  -  15 Boogie 2018 07:00  --------------------------------------------------------  IN: 690 mL / OUT: 1485 mL / NET: -795 mL    15 Boogie 2018 07:01  -  15 Jun 2018 15:58  --------------------------------------------------------  IN: 150 mL / OUT: 90 mL / NET: 60 mL        Assessment  52y Male  w/ PAST MEDICAL & SURGICAL HISTORY:  Esophageal cancer  MI (myocardial infarction): 2015 with 1 coronary stent  H/O hernia repair: 1966  Status post tonsillectomy and adenoidectomy  Stented coronary artery: x1 2015  admitted with complaints of Patient is a 52y old  Male who presents with a chief complaint of "stomach surgery and feeding tube" (29 May 2018 16:22)  On (6/8/18), patient underwent Robotic assisted Arley Andrew Esophagectomy  . Postoperative course/issues: barium swallow negative for leak but delayed emptying. On 6/14-pt. went to OR with Dr. Brown for EGD/botox and dilation. Pt now with out complaints. Rpt Barium swallow today read as normal however images and CXR 2 hrs after esophagram show stasis and delay in stomach. Images reviewed by Dr. Brown.   PLAN  Neuro: Pain management  Pulm: Encourage coughing, deep breathing and use of incentive spirometry. Wean off supplemental oxygen as able. Daily CXR.   Cardio: Monitor telemetry/alarms  GI: Cont. IVF hydration and meds IV. As per Dr. Brown request Surg team consider J tube for feedings. Cont NPO for next 24-48hrs at least. Will rpt CXR tonight and tomorrow.   Renal: monitor urine output, supplement electrolytes as needed  Vasc: Heparin SC/SCDs for DVT prophylaxis  Heme: Stable H/H. .   ID: Off antibiotics. Stable.  Therapy: OOB/ambulate    Disposition: Aim to D/C to home once able to tolerate po diet or receive enteral nutrition.   Discussed with Cardiothoracic Team at AM rounds.
Subjective: "Im drinking a lot of liquids and they're going down fine. I'd like to have more to eat" Pt given extensive teaching abt keep intake to very small amounts frequently. Discussed risk of aspiration. Pt. otherwise feels well. No CP or SOB. No n/v. Had BM 3 days ago. C/o pain at incision sites. Ambulating frequently in hallway.     Vital Signs:  Vital Signs Last 24 Hrs  T(C): 36.5 (06-18-18 @ 12:00), Max: 37.3 (06-17-18 @ 16:26)  T(F): 97.7 (06-18-18 @ 12:00), Max: 99.2 (06-17-18 @ 20:33)  HR: 104 (06-18-18 @ 12:00) (73 - 104)  BP: 100/73 (06-18-18 @ 12:00) (100/73 - 125/72)  RR: 18 (06-18-18 @ 12:00) (16 - 18)  SpO2: 95% (06-18-18 @ 12:00) (94% - 96%) on (O2)    Telemetry/Alarms:  General: WN/WD NAD  Neurology: Awake, nonfocal, CASH x 4  Eyes: Scleras clear, PERRLA/ EOMI, Gross vision intact  ENT:Gross hearing intact, grossly patent pharynx, no stridor  Neck: Neck supple, trachea midline, No JVD,   Respiratory: CTA B/L, No wheezing, rales, rhonchi. Decreased Rt. Mid to base  CV: RRR, S1S2, no murmurs, rubs or gallops  Abdominal: Soft, NT, ND +BS, + flatus, no BM. ON Clears po this am. Given pureed but c/o "fullness", will change to Full liquids for now.   Extremities: No edema, + peripheral pulses  Skin: No Rashes, Hematoma, Ecchymosis  Lymphatic: No Neck, axilla, groin LAD  Psych: Oriented x 3, normal affect  Incisions: Rt. Chest and abd incisions w staples c/d/i.   Tubes: none  Relevant labs, radiology and Medications reviewed                        12.2   5.71  )-----------( 228      ( 17 Jun 2018 05:51 )             37.7     06-18    138  |  100  |  8   ----------------------------<  94  3.7   |  26  |  0.94    Ca    8.8      18 Jun 2018 06:10        MEDICATIONS  (STANDING):  aspirin  chewable 81 milliGRAM(s) Oral daily  heparin  Injectable 5000 Unit(s) SubCutaneous every 12 hours  pantoprazole    Tablet 40 milliGRAM(s) Oral before breakfast    MEDICATIONS  (PRN):  ALBUTerol/ipratropium for Nebulization 3 milliLiter(s) Nebulizer every 6 hours PRN SOB  benzocaine 15 mG/menthol 3.6 mG Lozenge 1 Lozenge Oral every 2 hours PRN Sore Throat  diphenhydrAMINE   Injectable 25 milliGRAM(s) IV Push at bedtime PRN sleep  HYDROmorphone   Tablet 4 milliGRAM(s) Oral every 4 hours PRN Severe Pain (7 - 10)  HYDROmorphone   Tablet 2 milliGRAM(s) Oral every 4 hours PRN Moderate Pain (4 - 6)  sodium chloride 0.65% Nasal 1 Spray(s) Left Nostril every 4 hours PRN Congestion    Pertinent Physical Exam  I&O's Summary    17 Jun 2018 07:01  -  18 Jun 2018 07:00  --------------------------------------------------------  IN: 1400 mL / OUT: 2150 mL / NET: -750 mL        Assessment  52y Male  w/ PAST MEDICAL & SURGICAL HISTORY:  Esophageal cancer  MI (myocardial infarction): 2015 with 1 coronary stent  H/O hernia repair: 1966  Status post tonsillectomy and adenoidectomy  Stented coronary artery: x1 2015  admitted with complaints of Patient is a 52y old  Male who presents with a chief complaint of Esophageal cancer (17 Jun 2018 22:01)  On (6/8/18), patient underwent Robotic assisted Arley Andrew Esophagectomy  . Postoperative course/issues: barium swallow negative for leak but delayed emptying. On 6/14-pt. went to OR with Dr. Brown for EGD/botox and dilation. Pt now with out complaints. Rpt Barium swallow today read as normal however images and CXR 2 hrs after esophagram show stasis and delay in stomach. Images reviewed by Dr. Brown. Pt then kept NPO for another day. On Saturday began sips of clears then advanced to full tray yesterday.       PLAN  Neuro: Pain management. Will change regiment to oral pain meds to prepare for home.   Pulm: Encourage coughing, deep breathing and use of incentive spirometry. . Daily CXR.   Cardio: Monitor telemetry/alarms  GI: Pt c/o fullness, GI distress after Pureed diet this am. Will change to Full liquids. MOre teaching done abt sml intake. Keep HOB up. Aspiration precaut.   Renal: monitor urine output, supplement electrolytes as needed  Vasc: Heparin SC/SCDs for DVT prophylaxis  Heme: Stable H/H. .   ID: Off antibiotics. Stable.  Therapy: OOB/ambulate  Disposition: Aim to D/C to home once tolerating diet.   Discussed with Cardiothoracic Team at AM rounds.
Subjective: pt angry this morning, frustrated that he is still in the hospital; denies N,V; +flatus, no BM, ambulating in the room, no pain complaints    Vital Signs:  Vital Signs Last 24 Hrs  T(C): 36.8 (06-16-18 @ 05:21), Max: 37.1 (06-15-18 @ 10:00)  T(F): 98.2 (06-16-18 @ 05:21), Max: 98.8 (06-15-18 @ 10:00)  HR: 75 (06-16-18 @ 05:21) (65 - 103)  BP: 138/89 (06-16-18 @ 05:21) (116/70 - 138/89)  RR: 17 (06-16-18 @ 05:21) (17 - 20)  SpO2: 95% (06-16-18 @ 05:21) (95% - 98%) on (O2)    Telemetry/Alarms:  General: WN/WD NAD  Neurology: Awake, nonfocal, CASH x 4  Eyes: Scleras clear, PERRLA/ EOMI, Gross vision intact  ENT:Gross hearing intact, grossly patent pharynx, no stridor  Neck: Neck supple, trachea midline, No JVD,   Respiratory: CTA B/L, No wheezing, rales, rhonchi  CV: RRR, S1S2, no murmurs, rubs or gallops  Abdominal: Soft, NT, ND +BS,   Extremities: No edema, + peripheral pulses  Skin: No Rashes, Hematoma, Ecchymosis  Lymphatic: No Neck, axilla, groin LAD  Psych: Oriented x 3, normal affect  Incisions: c,d,i    Relevant labs, radiology and Medications reviewed                        12.4   6.10  )-----------( 186      ( 15 Boogie 2018 06:20 )             36.9     06-15    137  |  99  |  9   ----------------------------<  91  3.8   |  27  |  0.81    Ca    9.1      15 Boogie 2018 06:20        MEDICATIONS  (STANDING):  aspirin Suppository 300 milliGRAM(s) Rectal daily  botulinum toxin Type A Injectable 100 Unit(s) IntraMuscular once  dextrose 5% + lactated ringers with potassium chloride 20 mEq/L 1000 milliLiter(s) (75 mL/Hr) IV Continuous <Continuous>  heparin  Injectable 5000 Unit(s) SubCutaneous every 12 hours  pantoprazole  Injectable 40 milliGRAM(s) IV Push daily    MEDICATIONS  (PRN):  ALBUTerol/ipratropium for Nebulization 3 milliLiter(s) Nebulizer every 6 hours PRN SOB  diphenhydrAMINE   Injectable 25 milliGRAM(s) IV Push at bedtime PRN sleep  HYDROmorphone  Injectable 0.5 milliGRAM(s) IV Push every 3 hours PRN Moderate Pain (4 - 6)  HYDROmorphone  Injectable 1 milliGRAM(s) IV Push every 3 hours PRN Severe Pain (7 - 10)  lidocaine 2% Gel 1 Application(s) Topical every 4 hours PRN for pain  sodium chloride 0.65% Nasal 1 Spray(s) Left Nostril every 4 hours PRN Congestion    Pertinent Physical Exam  I&O's Summary    15 Boogie 2018 07:01  -  16 Jun 2018 07:00  --------------------------------------------------------  IN: 225 mL / OUT: 590 mL / NET: -365 mL    16 Jun 2018 07:01  -  16 Jun 2018 08:37  --------------------------------------------------------  IN: 150 mL / OUT: 0 mL / NET: 150 mL        Assessment  52y Male  w/ PAST MEDICAL & SURGICAL HISTORY:  Esophageal cancer  MI (myocardial infarction): 2015 with 1 coronary stent  H/O hernia repair: 1966  Status post tonsillectomy and adenoidectomy  Stented coronary artery: x1 2015  admitted with complaints of Patient is a 52y old  Male who presents with a chief complaint of "stomach surgery and feeding tube" (29 May 2018 16:22)  .  On (6/8/18), patient underwent Robotic assisted Arley Andrew Esophagogastrectomy, Botox injection  On (6/14/18), patient underwent EGD, with balloon dilation,   . Postoperative course/issues: Barium swallow postop revealed delayed emptying of stomach    PLAN  Neuro: Pain management  Pulm: Encourage coughing, deep breathing and use of incentive spirometry. Wean off supplemental oxygen as able. Daily CXR.   Cardio: Monitor telemetry/alarms  GI: advanced to sips of clears this AM, monitor GI function  Renal: monitor urine output, supplement electrolytes as needed  Vasc: Heparin SC/SCDs for DVT prophylaxis  Heme: Stable H/H. .   ID: Off antibiotics. Stable.  Therapy: OOB/ambulate    Disposition: advance diet as tolerated monitoring GI function  Discussed with Cardiothoracic Team at AM rounds.
Subjective: pt has pain complaints - IV tylenol and dilaudid ordered    Vital Signs:  Vital Signs Last 24 Hrs  T(C): 37 (06-14-18 @ 08:35), Max: 37.1 (06-14-18 @ 00:16)  T(F): 98.6 (06-14-18 @ 08:35), Max: 98.7 (06-14-18 @ 00:16)  HR: 85 (06-14-18 @ 08:35) (72 - 100)  BP: 132/84 (06-14-18 @ 08:35) (115/64 - 134/78)  RR: 16 (06-14-18 @ 08:35) (13 - 19)  SpO2: 95% (06-14-18 @ 08:35) (91% - 100%) on (O2)    Telemetry/Alarms:  General: WN/WD NAD  Neurology: Awake, nonfocal, CASH x 4  Eyes: Scleras clear, PERRLA/ EOMI, Gross vision intact  ENT:Gross hearing intact, grossly patent pharynx, no stridor  Neck: Neck supple, trachea midline, No JVD,   Respiratory: CTA B/L, No wheezing, rales, rhonchi  CV: RRR, S1S2, no murmurs, rubs or gallops  Abdominal: Soft, NT, ND +BS,   Extremities: No edema, + peripheral pulses  Skin: No Rashes, Hematoma, Ecchymosis  Lymphatic: No Neck, axilla, groin LAD  Psych: Oriented x 3, normal affect  Incisions: c,d,i  Tubes: chest tube drained 255 and CLARITZA drained 35  Relevant labs, radiology and Medications reviewed                        12.3   6.88  )-----------( 174      ( 14 Jun 2018 06:59 )             37.7     06-14    138  |  99  |  9   ----------------------------<  107<H>  4.0   |  29  |  0.91    Ca    9.0      14 Jun 2018 06:59  Phos  3.7     06-13  Mg     1.9     06-13      PT/INR - ( 13 Jun 2018 04:30 )   PT: 13.2 SEC;   INR: 1.19          PTT - ( 13 Jun 2018 04:30 )  PTT:33.8 SEC  MEDICATIONS  (STANDING):  aspirin Suppository 300 milliGRAM(s) Rectal daily  chlorhexidine 4% Liquid 1 Application(s) Topical <User Schedule>  dextrose 5% + lactated ringers with potassium chloride 20 mEq/L 1000 milliLiter(s) (75 mL/Hr) IV Continuous <Continuous>  heparin  Injectable 5000 Unit(s) SubCutaneous every 12 hours    MEDICATIONS  (PRN):  ALBUTerol/ipratropium for Nebulization 3 milliLiter(s) Nebulizer every 6 hours PRN SOB  diphenhydrAMINE   Injectable 25 milliGRAM(s) IV Push at bedtime PRN sleep  HYDROmorphone  Injectable 0.5 milliGRAM(s) IV Push every 3 hours PRN Moderate Pain (4 - 6)  HYDROmorphone  Injectable 1 milliGRAM(s) IV Push every 3 hours PRN Severe Pain (7 - 10)  lidocaine 2% Gel 1 Application(s) Topical every 4 hours PRN for pain  sodium chloride 0.65% Nasal 1 Spray(s) Left Nostril every 4 hours PRN Congestion    Pertinent Physical Exam  I&O's Summary    13 Jun 2018 07:01  -  14 Jun 2018 07:00  --------------------------------------------------------  IN: 1650 mL / OUT: 3720 mL / NET: -2070 mL    14 Jun 2018 07:01  -  14 Jun 2018 12:03  --------------------------------------------------------  IN: 440 mL / OUT: 370 mL / NET: 70 mL        Assessment  52y Male  w/ PAST MEDICAL & SURGICAL HISTORY:  Esophageal cancer  MI (myocardial infarction): 2015 with 1 coronary stent  H/O hernia repair: 1966  Status post tonsillectomy and adenoidectomy  Stented coronary artery: x1 2015  admitted with complaints of Patient is a 52y old  Male who presents with a chief complaint of "stomach surgery and feeding tube" (29 May 2018 16:22)  .  On (6/8/18), patient underwent Robotic assisted Arley Andrew Esophagectomy  . Postoperative course/issues: barium swallow negative for leak but delayed emptying    PLAN  Neuro: Pain management  Pulm: Encourage coughing, deep breathing and use of incentive spirometry. Wean off supplemental oxygen as able. Daily CXR.   Cardio: Monitor telemetry/alarms  GI: NPO for OR today  Renal: monitor urine output, supplement electrolytes as needed  Vasc: Heparin SC/SCDs for DVT prophylaxis  Heme: Stable H/H. .   ID: Off antibiotics. Stable.  Therapy: OOB/ambulate  Tubes: chest tube removed, f/u urgent CXR; continue CLARITZA  Disposition: Plan for EGD, dilation later today  Discussed with Cardiothoracic Team at AM rounds.
INTERVAL HPI/OVERNIGHT EVENTS:    "i dont want to be bothered anymore, i just want to go home"    MEDICATIONS  (STANDING):  aspirin  chewable 81 milliGRAM(s) Oral daily  heparin  Injectable 5000 Unit(s) SubCutaneous every 12 hours    MEDICATIONS  (PRN):  ALBUTerol/ipratropium for Nebulization 3 milliLiter(s) Nebulizer every 6 hours PRN SOB  benzocaine 15 mG/menthol 3.6 mG Lozenge 1 Lozenge Oral every 2 hours PRN Sore Throat  diphenhydrAMINE   Injectable 25 milliGRAM(s) IV Push at bedtime PRN sleep  oxyCODONE    IR 5 milliGRAM(s) Oral every 3 hours PRN Moderate Pain (4 - 6)  oxyCODONE    IR 10 milliGRAM(s) Oral every 3 hours PRN Severe Pain (7 - 10)  sodium chloride 0.65% Nasal 1 Spray(s) Left Nostril every 4 hours PRN Congestion      Allergies    No Known Allergies    Intolerances        Review of Systems:    General:  No wt loss, fevers, chills, night sweats, fatigue   Eyes:  Good vision, no reported pain  ENT:  No sore throat, pain, runny nose, dysphagia  CV:  No pain, palpitations, hypo/hypertension  Resp:  No dyspnea, cough, tachypnea, wheezing  GI:  No pain, No nausea, No vomiting, No diarrhea, No constipation, No weight loss, No fever, No pruritis, No rectal bleeding, No melena, No dysphagia  :  No pain, bleeding, incontinence, nocturia  Muscle:  No pain, weakness  Neuro:  No weakness, tingling, memory problems  Psych:  No fatigue, insomnia, mood problems, depression  Endocrine:  No polyuria, polydypsia, cold/heat intolerance  Heme:  No petechiae, ecchymosis, easy bruisability  Skin:  No rash, tattoos, scars, edema      Vital Signs Last 24 Hrs  T(C): 36.8 (18 Jun 2018 07:56), Max: 37.3 (17 Jun 2018 16:26)  T(F): 98.2 (18 Jun 2018 07:56), Max: 99.2 (17 Jun 2018 20:33)  HR: 77 (18 Jun 2018 07:56) (71 - 83)  BP: 118/77 (18 Jun 2018 07:56) (111/72 - 125/72)  BP(mean): --  RR: 18 (18 Jun 2018 07:56) (16 - 18)  SpO2: 94% (18 Jun 2018 07:56) (94% - 96%)    PHYSICAL EXAM:    Constitutional: NAD  HEENT: EOMI, throat clear  Neck: No LAD, supple  Respiratory: CTA and P  Cardiovascular: S1 and S2, RRR, no M  Gastrointestinal: BS+, soft, NT/ND, neg HSM,  Extremities: No peripheral edema, neg clubbing, cyanosis  Vascular: 2+ peripheral pulses  Neurological: A/O x 3, no focal deficits  Psychiatric: Normal mood, normal affect  Skin: No rashes      LABS:                        12.2   5.71  )-----------( 228      ( 17 Jun 2018 05:51 )             37.7     06-18    138  |  100  |  8   ----------------------------<  94  3.7   |  26  |  0.94    Ca    8.8      18 Jun 2018 06:10            RADIOLOGY & ADDITIONAL TESTS:

## 2018-06-19 NOTE — PROGRESS NOTE ADULT - PROBLEM SELECTOR PLAN 1
- s/p Esophagectomy  - gi ppx with protonix qd  - pain control prn/zofran prn  - CTSx care appreciated  - barium swallow showed delayed emptying and he is now s/p egd with balloon dilation by CTSx on 6/14  - monitor GI function  - diet per thoracic team  - dc planning per primary team
- s/p Esophagectomy  - gi ppx with protonix qd  - pain control prn/zofran prn  - CTSx care appreciated  - barium swallow showed delayed emptying and he is now s/p egd with balloon dilation by CTSx on 6/14  - monitor GI function  - diet per thoracic team
- s/p Esophagectomy  - gi ppx with protonix qd  - pain control prn/zofran prn  - CTSx care appreciated  - barium swallow showed delayed emptying and he is now s/p egd with balloon dilation by CTSx on 6/14  - monitor GI function  - diet per thoracic team
- s/p Esophagectomy  - gi ppx with protonix qd  - pain control prn/zofran prn  - CTSx care appreciated  - barium swallow showed delayed emptying and he is now s/p egd with balloon dilation by CTSx on 6/14  - monitor GI function  - diet per thoracic team  - dc planning per primary team
- s/p esophagectomy  - IVF  - gi ppx with protonix qd  - pain control prn/zofran prn  - CTI care appreciated  - barium swallow showing delayed emptying; plan for egd/dilation today per CTSx
- s/p esophagectomy  - IVF  - gi ppx with protonix qd  - pain control prn/zofran prn  - CTI care appreciated  - feeds as per CTI
- s/p esophagectomy  - gi ppx with protonix qd  - pain control prn/zofran prn  - CTSx care appreciated  - barium swallow showing delayed emptying and he is now s/p egd with balloon dilation by CTSx on 6/14  - repeat barium swallow tomorrow, 6/16  - NPO/IVF; diet per thoracic team post repeat swallow study

## 2018-06-27 ENCOUNTER — APPOINTMENT (OUTPATIENT)
Dept: SURGICAL ONCOLOGY | Facility: CLINIC | Age: 53
End: 2018-06-27
Payer: COMMERCIAL

## 2018-06-27 VITALS
HEIGHT: 73 IN | HEART RATE: 70 BPM | RESPIRATION RATE: 16 BRPM | OXYGEN SATURATION: 98 % | WEIGHT: 213 LBS | SYSTOLIC BLOOD PRESSURE: 82 MMHG | DIASTOLIC BLOOD PRESSURE: 68 MMHG | BODY MASS INDEX: 28.23 KG/M2

## 2018-06-27 PROCEDURE — 99024 POSTOP FOLLOW-UP VISIT: CPT

## 2018-06-29 ENCOUNTER — INPATIENT (INPATIENT)
Facility: HOSPITAL | Age: 53
LOS: 5 days | Discharge: HOME | End: 2018-07-05
Attending: THORACIC SURGERY (CARDIOTHORACIC VASCULAR SURGERY) | Admitting: THORACIC SURGERY (CARDIOTHORACIC VASCULAR SURGERY)
Payer: COMMERCIAL

## 2018-06-29 VITALS
OXYGEN SATURATION: 95 % | RESPIRATION RATE: 16 BRPM | HEART RATE: 96 BPM | DIASTOLIC BLOOD PRESSURE: 56 MMHG | TEMPERATURE: 98 F | SYSTOLIC BLOOD PRESSURE: 97 MMHG

## 2018-06-29 DIAGNOSIS — Z98.890 OTHER SPECIFIED POSTPROCEDURAL STATES: Chronic | ICD-10-CM

## 2018-06-29 DIAGNOSIS — Z90.89 ACQUIRED ABSENCE OF OTHER ORGANS: Chronic | ICD-10-CM

## 2018-06-29 DIAGNOSIS — R14.0 ABDOMINAL DISTENSION (GASEOUS): ICD-10-CM

## 2018-06-29 DIAGNOSIS — R07.9 CHEST PAIN, UNSPECIFIED: ICD-10-CM

## 2018-06-29 DIAGNOSIS — Z95.5 PRESENCE OF CORONARY ANGIOPLASTY IMPLANT AND GRAFT: Chronic | ICD-10-CM

## 2018-06-29 LAB
ALBUMIN SERPL ELPH-MCNC: 3.6 G/DL — SIGNIFICANT CHANGE UP (ref 3.3–5)
ALP SERPL-CCNC: 82 U/L — SIGNIFICANT CHANGE UP (ref 40–120)
ALT FLD-CCNC: 11 U/L — SIGNIFICANT CHANGE UP (ref 4–41)
APTT BLD: 34 SEC — SIGNIFICANT CHANGE UP (ref 27.5–37.4)
AST SERPL-CCNC: 15 U/L — SIGNIFICANT CHANGE UP (ref 4–40)
BASE EXCESS BLDV CALC-SCNC: 3.3 MMOL/L — SIGNIFICANT CHANGE UP
BASOPHILS # BLD AUTO: 0.01 K/UL — SIGNIFICANT CHANGE UP (ref 0–0.2)
BASOPHILS NFR BLD AUTO: 0.1 % — SIGNIFICANT CHANGE UP (ref 0–2)
BILIRUB SERPL-MCNC: 0.3 MG/DL — SIGNIFICANT CHANGE UP (ref 0.2–1.2)
BLOOD GAS VENOUS - CREATININE: 0.82 MG/DL — SIGNIFICANT CHANGE UP (ref 0.5–1.3)
BUN SERPL-MCNC: 17 MG/DL — SIGNIFICANT CHANGE UP (ref 7–23)
CALCIUM SERPL-MCNC: 9 MG/DL — SIGNIFICANT CHANGE UP (ref 8.4–10.5)
CHLORIDE BLDV-SCNC: 101 MMOL/L — SIGNIFICANT CHANGE UP (ref 96–108)
CHLORIDE SERPL-SCNC: 96 MMOL/L — LOW (ref 98–107)
CO2 SERPL-SCNC: 27 MMOL/L — SIGNIFICANT CHANGE UP (ref 22–31)
CREAT SERPL-MCNC: 0.97 MG/DL — SIGNIFICANT CHANGE UP (ref 0.5–1.3)
EOSINOPHIL # BLD AUTO: 0.01 K/UL — SIGNIFICANT CHANGE UP (ref 0–0.5)
EOSINOPHIL NFR BLD AUTO: 0.1 % — SIGNIFICANT CHANGE UP (ref 0–6)
GAS PNL BLDV: 136 MMOL/L — SIGNIFICANT CHANGE UP (ref 136–146)
GLUCOSE BLDV-MCNC: 133 — HIGH (ref 70–99)
GLUCOSE SERPL-MCNC: 138 MG/DL — HIGH (ref 70–99)
HCO3 BLDV-SCNC: 25 MMOL/L — SIGNIFICANT CHANGE UP (ref 20–27)
HCT VFR BLD CALC: 39.4 % — SIGNIFICANT CHANGE UP (ref 39–50)
HCT VFR BLDV CALC: 40.3 % — SIGNIFICANT CHANGE UP (ref 39–51)
HGB BLD-MCNC: 12.7 G/DL — LOW (ref 13–17)
HGB BLDV-MCNC: 13.1 G/DL — SIGNIFICANT CHANGE UP (ref 13–17)
IMM GRANULOCYTES # BLD AUTO: 0.06 # — SIGNIFICANT CHANGE UP
IMM GRANULOCYTES NFR BLD AUTO: 0.4 % — SIGNIFICANT CHANGE UP (ref 0–1.5)
INR BLD: 1.4 — HIGH (ref 0.88–1.17)
LACTATE BLDV-MCNC: 1.9 MMOL/L — SIGNIFICANT CHANGE UP (ref 0.5–2)
LYMPHOCYTES # BLD AUTO: 0.39 K/UL — LOW (ref 1–3.3)
LYMPHOCYTES # BLD AUTO: 2.8 % — LOW (ref 13–44)
MCHC RBC-ENTMCNC: 27.3 PG — SIGNIFICANT CHANGE UP (ref 27–34)
MCHC RBC-ENTMCNC: 32.2 % — SIGNIFICANT CHANGE UP (ref 32–36)
MCV RBC AUTO: 84.5 FL — SIGNIFICANT CHANGE UP (ref 80–100)
MONOCYTES # BLD AUTO: 0.97 K/UL — HIGH (ref 0–0.9)
MONOCYTES NFR BLD AUTO: 7 % — SIGNIFICANT CHANGE UP (ref 2–14)
NEUTROPHILS # BLD AUTO: 12.5 K/UL — HIGH (ref 1.8–7.4)
NEUTROPHILS NFR BLD AUTO: 89.6 % — HIGH (ref 43–77)
NRBC # FLD: 0 — SIGNIFICANT CHANGE UP
PCO2 BLDV: 49 MMHG — SIGNIFICANT CHANGE UP (ref 41–51)
PH BLDV: 7.37 PH — SIGNIFICANT CHANGE UP (ref 7.32–7.43)
PLATELET # BLD AUTO: 328 K/UL — SIGNIFICANT CHANGE UP (ref 150–400)
PMV BLD: 9.3 FL — SIGNIFICANT CHANGE UP (ref 7–13)
PO2 BLDV: < 24 MMHG — LOW (ref 35–40)
POTASSIUM BLDV-SCNC: 4 MMOL/L — SIGNIFICANT CHANGE UP (ref 3.4–4.5)
POTASSIUM SERPL-MCNC: 5.1 MMOL/L — SIGNIFICANT CHANGE UP (ref 3.5–5.3)
POTASSIUM SERPL-SCNC: 5.1 MMOL/L — SIGNIFICANT CHANGE UP (ref 3.5–5.3)
PROT SERPL-MCNC: 7.5 G/DL — SIGNIFICANT CHANGE UP (ref 6–8.3)
PROTHROM AB SERPL-ACNC: 15.6 SEC — HIGH (ref 9.8–13.1)
RBC # BLD: 4.66 M/UL — SIGNIFICANT CHANGE UP (ref 4.2–5.8)
RBC # FLD: 14.2 % — SIGNIFICANT CHANGE UP (ref 10.3–14.5)
SAO2 % BLDV: 22.2 % — LOW (ref 60–85)
SODIUM SERPL-SCNC: 135 MMOL/L — SIGNIFICANT CHANGE UP (ref 135–145)
TROPONIN T, HIGH SENSITIVITY: 10 NG/L — SIGNIFICANT CHANGE UP (ref ?–14)
TROPONIN T, HIGH SENSITIVITY: 9 NG/L — SIGNIFICANT CHANGE UP (ref ?–14)
WBC # BLD: 13.94 K/UL — HIGH (ref 3.8–10.5)
WBC # FLD AUTO: 13.94 K/UL — HIGH (ref 3.8–10.5)

## 2018-06-29 PROCEDURE — 71045 X-RAY EXAM CHEST 1 VIEW: CPT | Mod: 26

## 2018-06-29 PROCEDURE — 99221 1ST HOSP IP/OBS SF/LOW 40: CPT

## 2018-06-29 PROCEDURE — 71275 CT ANGIOGRAPHY CHEST: CPT | Mod: 26

## 2018-06-29 PROCEDURE — 74174 CTA ABD&PLVS W/CONTRAST: CPT | Mod: 26

## 2018-06-29 RX ORDER — VANCOMYCIN HCL 1 G
1000 VIAL (EA) INTRAVENOUS ONCE
Qty: 0 | Refills: 0 | Status: COMPLETED | OUTPATIENT
Start: 2018-06-29 | End: 2018-06-29

## 2018-06-29 RX ORDER — SODIUM CHLORIDE 9 MG/ML
1000 INJECTION INTRAMUSCULAR; INTRAVENOUS; SUBCUTANEOUS ONCE
Qty: 0 | Refills: 0 | Status: COMPLETED | OUTPATIENT
Start: 2018-06-29 | End: 2018-06-29

## 2018-06-29 RX ORDER — FENTANYL CITRATE 50 UG/ML
25 INJECTION INTRAVENOUS ONCE
Qty: 0 | Refills: 0 | Status: DISCONTINUED | OUTPATIENT
Start: 2018-06-29 | End: 2018-06-29

## 2018-06-29 RX ORDER — HYDROMORPHONE HYDROCHLORIDE 2 MG/ML
1 INJECTION INTRAMUSCULAR; INTRAVENOUS; SUBCUTANEOUS EVERY 4 HOURS
Qty: 0 | Refills: 0 | Status: DISCONTINUED | OUTPATIENT
Start: 2018-06-29 | End: 2018-07-04

## 2018-06-29 RX ORDER — PIPERACILLIN AND TAZOBACTAM 4; .5 G/20ML; G/20ML
3.38 INJECTION, POWDER, LYOPHILIZED, FOR SOLUTION INTRAVENOUS EVERY 8 HOURS
Qty: 0 | Refills: 0 | Status: DISCONTINUED | OUTPATIENT
Start: 2018-06-29 | End: 2018-07-03

## 2018-06-29 RX ORDER — VANCOMYCIN HCL 1 G
1000 VIAL (EA) INTRAVENOUS EVERY 24 HOURS
Qty: 0 | Refills: 0 | Status: DISCONTINUED | OUTPATIENT
Start: 2018-06-29 | End: 2018-07-03

## 2018-06-29 RX ORDER — PIPERACILLIN AND TAZOBACTAM 4; .5 G/20ML; G/20ML
3.38 INJECTION, POWDER, LYOPHILIZED, FOR SOLUTION INTRAVENOUS ONCE
Qty: 0 | Refills: 0 | Status: COMPLETED | OUTPATIENT
Start: 2018-06-29 | End: 2018-06-29

## 2018-06-29 RX ORDER — HEPARIN SODIUM 5000 [USP'U]/ML
5000 INJECTION INTRAVENOUS; SUBCUTANEOUS EVERY 8 HOURS
Qty: 0 | Refills: 0 | Status: DISCONTINUED | OUTPATIENT
Start: 2018-06-29 | End: 2018-07-05

## 2018-06-29 RX ORDER — PANTOPRAZOLE SODIUM 20 MG/1
40 TABLET, DELAYED RELEASE ORAL DAILY
Qty: 0 | Refills: 0 | Status: DISCONTINUED | OUTPATIENT
Start: 2018-06-29 | End: 2018-07-04

## 2018-06-29 RX ORDER — DIPHENHYDRAMINE HCL 50 MG
25 CAPSULE ORAL ONCE
Qty: 0 | Refills: 0 | Status: COMPLETED | OUTPATIENT
Start: 2018-06-29 | End: 2018-06-29

## 2018-06-29 RX ORDER — HYDROMORPHONE HYDROCHLORIDE 2 MG/ML
0.5 INJECTION INTRAMUSCULAR; INTRAVENOUS; SUBCUTANEOUS EVERY 4 HOURS
Qty: 0 | Refills: 0 | Status: DISCONTINUED | OUTPATIENT
Start: 2018-06-29 | End: 2018-07-04

## 2018-06-29 RX ORDER — ACETAMINOPHEN 500 MG
650 TABLET ORAL ONCE
Qty: 0 | Refills: 0 | Status: COMPLETED | OUTPATIENT
Start: 2018-06-29 | End: 2018-06-29

## 2018-06-29 RX ORDER — SODIUM CHLORIDE 9 MG/ML
1000 INJECTION, SOLUTION INTRAVENOUS
Qty: 0 | Refills: 0 | Status: DISCONTINUED | OUTPATIENT
Start: 2018-06-29 | End: 2018-06-30

## 2018-06-29 RX ADMIN — FENTANYL CITRATE 25 MICROGRAM(S): 50 INJECTION INTRAVENOUS at 15:05

## 2018-06-29 RX ADMIN — PIPERACILLIN AND TAZOBACTAM 200 GRAM(S): 4; .5 INJECTION, POWDER, LYOPHILIZED, FOR SOLUTION INTRAVENOUS at 13:36

## 2018-06-29 RX ADMIN — FENTANYL CITRATE 25 MICROGRAM(S): 50 INJECTION INTRAVENOUS at 15:50

## 2018-06-29 RX ADMIN — SODIUM CHLORIDE 75 MILLILITER(S): 9 INJECTION, SOLUTION INTRAVENOUS at 21:08

## 2018-06-29 RX ADMIN — HYDROMORPHONE HYDROCHLORIDE 1 MILLIGRAM(S): 2 INJECTION INTRAMUSCULAR; INTRAVENOUS; SUBCUTANEOUS at 20:45

## 2018-06-29 RX ADMIN — FENTANYL CITRATE 25 MICROGRAM(S): 50 INJECTION INTRAVENOUS at 11:30

## 2018-06-29 RX ADMIN — SODIUM CHLORIDE 75 MILLILITER(S): 9 INJECTION, SOLUTION INTRAVENOUS at 20:18

## 2018-06-29 RX ADMIN — Medication 250 MILLIGRAM(S): at 14:06

## 2018-06-29 RX ADMIN — HEPARIN SODIUM 5000 UNIT(S): 5000 INJECTION INTRAVENOUS; SUBCUTANEOUS at 21:07

## 2018-06-29 RX ADMIN — Medication 250 MILLIGRAM(S): at 21:07

## 2018-06-29 RX ADMIN — Medication 650 MILLIGRAM(S): at 15:05

## 2018-06-29 RX ADMIN — HYDROMORPHONE HYDROCHLORIDE 1 MILLIGRAM(S): 2 INJECTION INTRAMUSCULAR; INTRAVENOUS; SUBCUTANEOUS at 20:18

## 2018-06-29 RX ADMIN — Medication 650 MILLIGRAM(S): at 15:50

## 2018-06-29 RX ADMIN — SODIUM CHLORIDE 1000 MILLILITER(S): 9 INJECTION INTRAMUSCULAR; INTRAVENOUS; SUBCUTANEOUS at 10:29

## 2018-06-29 RX ADMIN — Medication 25 MILLIGRAM(S): at 22:52

## 2018-06-29 RX ADMIN — PIPERACILLIN AND TAZOBACTAM 25 GRAM(S): 4; .5 INJECTION, POWDER, LYOPHILIZED, FOR SOLUTION INTRAVENOUS at 22:51

## 2018-06-29 RX ADMIN — FENTANYL CITRATE 25 MICROGRAM(S): 50 INJECTION INTRAVENOUS at 11:12

## 2018-06-29 NOTE — ED ADULT NURSE NOTE - OBJECTIVE STATEMENT
Patient reports increasing chest pain since esophageal ectomy, pain has been persistent since surgery, but has increased to an unbearable limit this morning that "it woke me from sleeping." Patent reports "it feels like I got hit by a truck. Patient denies any SOB, syncope or dizziness. Patient describes pain as aching, sharp. Patient reports that pain increases with movement. Patient was prescribed hydromorphone for pain, patient reports that pain is not relieved with rx medications. Patient's labs drawn, placed on continuous cardiac monitor showing NSR.

## 2018-06-29 NOTE — H&P ADULT - NSHPPHYSICALEXAM_GEN_ALL_CORE
Vital Signs Last 24 Hrs  T(C): 37.1 (29 Jun 2018 20:48), Max: 38.1 (29 Jun 2018 12:05)  T(F): 98.7 (29 Jun 2018 20:48), Max: 100.5 (29 Jun 2018 12:05)  HR: 102 (29 Jun 2018 20:48) (96 - 106)  BP: 106/71 (29 Jun 2018 20:48) (97/56 - 122/75)  BP(mean): --  RR: 18 (29 Jun 2018 20:48) (16 - 22)  SpO2: 93% (29 Jun 2018 20:48) (93% - 98%)    General: WN/WD NAD  Neurology: A&Ox3, nonfocal, CASH x 4  Eyes: PERRLA/ EOMI, Gross vision intact  ENT/Neck: Neck supple, trachea midline, No JVD, Gross hearing intact  Respiratory: CTA B/L, No wheezing, rales, rhonchi  CV: RRR, S1S2, no murmurs, rubs or gallops  Abdominal: Soft, NT, ND +BS,   Extremities: No edema, + peripheral pulses  Skin: No Rashes, Hematoma, Ecchymosis Vital Signs Last 24 Hrs  T(C): 37.1 (29 Jun 2018 20:48), Max: 38.1 (29 Jun 2018 12:05)  T(F): 98.7 (29 Jun 2018 20:48), Max: 100.5 (29 Jun 2018 12:05)  HR: 102 (29 Jun 2018 20:48) (96 - 106)  BP: 106/71 (29 Jun 2018 20:48) (97/56 - 122/75)  BP(mean): --  RR: 18 (29 Jun 2018 20:48) (16 - 22)  SpO2: 93% (29 Jun 2018 20:48) (93% - 98%)    General: WN/WD NAD  Neurology: A&Ox3, nonfocal, CASH x 4  Eyes: PERRLA/ EOMI, Gross vision intact  ENT/Neck: Neck supple, trachea midline, No JVD, Gross hearing intact  Respiratory: CTA B/L, No wheezing, rales, rhonchi  CV: RRR, S1S2, no murmurs, rubs or gallops  Abdominal: Soft, NT, ND +BS,   Extremities: No edema, + peripheral pulses  Skin: No Rashes, Hematoma, Ecchymosis  Incision: clean and dry, no drainage or redness noted

## 2018-06-29 NOTE — PROGRESS NOTE ADULT - SUBJECTIVE AND OBJECTIVE BOX
HPI: Pt is a 52 year old male with a PMHX of MI with 1 stent, esophageal carcinoma (last chemo/rad 1/18), s/p robotic [place]-Andrew Esophagogastrectomy on 6/8/18 with Dr. Brown/Dr. Yousif pw 10/10 midsternal CP radiating to back and shoulders started when he was asleep at 6AM, worsening on movement. Pt had chest pain since surgery but never this severe. Patient stated he had no difficulty tolerating his diet and had salmon and rice yesterday night, which he ate with no difficulty. Pt denies any dysphagia or recent trouble eating food. Pt febrile in ED to 100.8, WBC elected to 15, started on abx. EKG done in ED showed new 1mm EDUARDO in AvL, I, not present in present in pt's pre op EKGs. CTA done in ER   < from: CT Angio Abdomen and Pelvis w/ IV Cont (06.29.18 @ 11:43) >  IMPRESSION:     Chest:  1.  No aneurysm or dissection.    Abdomen and pelvis:  1.  No aneurysm or dissection.  2.  The thickening of the descending colon and sigmoid colon could be   secondary to incomplete distention alternatively a colitis.  3.  Small amount of free fluid in the pelvis.  4.  No bowel obstruction.  5.  No CT findings to suggest appendicitis.    < end of copied text >   CTS consulted for possible esophageal perforation.         PAST MEDICAL & SURGICAL HISTORY:  Esophageal cancer  MI (myocardial infarction): 2015 with 1 coronary stent  History of esophageal surgery  H/O hernia repair: 1966  Status post tonsillectomy and adenoidectomy  Stented coronary artery: x1 2015      REVIEW OF SYSTEMS      General:No Weight change/ Fatigue/ HA/Dizzy	  Skin/Breast: No Rashes/ Lesions/ Masses	  Ophthalmologic: No Blurry vision/ Glaucoma/ Blindness	  ENMT: No Hearing loss/ Drainage/ Lesions	  Respiratory and Thorax: No Cough/ Wheezing/ SOB/ Hemoptysis/ Sputum production	  Cardiovascular: Chest pain/ No Palpitations/ Diaphoresis	  Gastrointestinal: No dysphagia Nausea/ Vomiting/ Constipation/ Appetite Change	  Genitourinary: No Heamturia/ Dysuria/ Frequency change/ Impotence	  Musculoskeletal: No Pain/ Weakness/ Claudication	  Neurological: No Seizures/ TIA/CVA/ Parastesias	  Psychiatric: No Dementia/ Depression/ SI/HI	  Hematology/Lymphatics: No hx of bleeding/ Edema	  Endocrine:	No Hyperglycemia/ Hypoglycemia  Allergic/Immunologic:	 No Anaphylaxis/ Intolerance/ Recent illnesses    MEDICATIONS  (STANDING):    MEDICATIONS  (PRN):      Allergies    No Known Allergies    Intolerances        SOCIAL HISTORY:  Occupation:  Smoking Hx: denies  Etoh Hx: Ex drinker 2-4 drinks 3-5 days a week for 15 years  IVDA Hx: denies    FAMILY HISTORY:  Family history of cervical cancer (Mother)  Family history of throat cancer (Father)    unless noted, no significant family hx with Mother, Father, Siblings    Vital Signs Last 24 Hrs  T(C): 36.9 (29 Jun 2018 16:04), Max: 38.1 (29 Jun 2018 12:05)  T(F): 98.5 (29 Jun 2018 16:04), Max: 100.5 (29 Jun 2018 12:05)  HR: 98 (29 Jun 2018 16:04) (96 - 106)  BP: 105/60 (29 Jun 2018 16:04) (97/56 - 122/75)  BP(mean): --  RR: 22 (29 Jun 2018 16:04) (16 - 22)  SpO2: 98% (29 Jun 2018 16:04) (95% - 98%)    General: WN/WD NAD  Neurology: Awake, nonfocal, CASH x 4  Eyes: Scleras clear, PERRLA/ EOMI, Gross vision intact  ENT:Gross hearing intact, grossly patent pharynx, no stridor  Neck: Neck supple, trachea midline, No JVD,   Respiratory: CTA B/L, No wheezing, rales, rhonchi  CV: RRR, S1S2, no murmurs, rubs or gallops  Abdominal: Soft, NT, ND +BS,   Extremities: No edema, + peripheral pulses  Skin: No Rashes, Hematoma, Ecchymosis  Lymphatic: No Neck, axilla, groin LAD  Psych: Oriented x 3, normal affect  Incisions:   Tubes:    LABS:                        12.7   13.94 )-----------( 328      ( 29 Jun 2018 10:15 )             39.4     06-29    135  |  96<L>  |  17  ----------------------------<  138<H>  5.1   |  27  |  0.97    Ca    9.0      29 Jun 2018 10:15    TPro  7.5  /  Alb  3.6  /  TBili  0.3  /  DBili  x   /  AST  15  /  ALT  11  /  AlkPhos  82  06-29    PT/INR - ( 29 Jun 2018 10:16 )   PT: 15.6 SEC;   INR: 1.40          PTT - ( 29 Jun 2018 10:16 )  PTT:34.0 SEC      RADIOLOGY & ADDITIONAL STUDIES:  < from: CT Angio Abdomen and Pelvis w/ IV Cont (06.29.18 @ 11:43) >  FINDINGS:     Aorta: Left-sided aortic arch and left-sided descending thoracic aorta.   No acute intramural hematoma. No aneurysm. No penetrating aortic ulcer.   No dissection. The great vessels are patent along their imaged segments.   Atheromatous disease of the aorta.    The celiac artery, SMA, renal arteries, and STACI are patent.    Tubes/Lines: None.    Lungs And Airways: The 2 mm nodules in the right upper lobeare   unchanged. The remainder of the lungs are clear. The airways are   unremarkable.      Pleura: No pneumothorax.  No pleural effusion.    Mediastinum: There are no enlarged chest lymph nodes. The visualized   portion of the thyroid gland is unremarkable. The patient is undergone   esophagectomy and gastric pull-up.     Heart and Vasculature: The heart is normal in size.  Small amount of   pericardial fluid. Coronary artery calcifications of the left anterior   descending, left circumflex, andright coronary arteries. The main   pulmonary artery is normal in caliber.     Bones And Soft Tissues: The bones are unremarkable.  The soft tissues are   unremarkable.      Abdomen and pelvis:    Liver: Unremarkable.    Spleen: Unremarkable.    Pancreas: Unremarkable.    Gallbladder: Unremarkable.    Adrenal glands: There are bilateral adrenal nodules. A 1.1 x 0.7 cm left   adrenal nodule and a 1.4 x 1.7 cm right adrenal nodule are unchanged.    Kidneys: A subcentimeter hypodense right renal lesion likely represents a   cyst. Remainder of the right kidney is unremarkable. The left kidney is   unremarkable.    Lymph nodes: No enlarged lymph nodes.    Bowel: The patient has undergone esophagectomy and gastric pull-up. The   small bowel is normal in caliber. No small bowel obstruction. The   appendix is normal. There is thickening of the sigmoid colon and   descending colon. No free air. No pneumatosis. Small amount of free fluid   in the pelvis.    Bladder: The bladder is unremarkable.    Prostate gland: The prostate gland measures 4.7 x 3.7 cm.    Skeletal: Unremarkable.      IMPRESSION:     Chest:  1.  No aneurysm or dissection.    Abdomen and pelvis:  1.  No aneurysm or dissection.  2.  The thickening of the descending colon and sigmoid colon could be   secondary to incomplete distention alternatively a colitis.  3.  Small amount of free fluid in the pelvis.  4.  No bowel obstruction.  5.  No CT findings to suggest appendicitis.    < end of copied text >      ASSESSMENT:   52yMalePAST MEDICAL & SURGICAL HISTORY:  Esophageal cancer  MI (myocardial infarction): 2015 with 1 coronary stent  History of esophageal surgery  H/O hernia repair: 1966  Status post tonsillectomy and adenoidectomy  Stented coronary artery: x1 2015  HEALTH ISSUES - PROBLEM Dx:      HEALTH ISSUES - R/O PROBLEM Dx:      PLAN:

## 2018-06-29 NOTE — H&P ADULT - NSHPLABSRESULTS_GEN_ALL_CORE
Lab Results:  CBC  CBC Full  -  ( 29 Jun 2018 10:15 )  WBC Count : 13.94 K/uL  Hemoglobin : 12.7 g/dL  Hematocrit : 39.4 %  Platelet Count - Automated : 328 K/uL  Mean Cell Volume : 84.5 fL  Mean Cell Hemoglobin : 27.3 pg  Mean Cell Hemoglobin Concentration : 32.2 %  Auto Neutrophil # : 12.50 K/uL  Auto Lymphocyte # : 0.39 K/uL  Auto Monocyte # : 0.97 K/uL  Auto Eosinophil # : 0.01 K/uL  Auto Basophil # : 0.01 K/uL  Auto Neutrophil % : 89.6 %  Auto Lymphocyte % : 2.8 %  Auto Monocyte % : 7.0 %  Auto Eosinophil % : 0.1 %  Auto Basophil % : 0.1 %    .		Differential:	[] Automated		[] Manual  Chemistry                        12.7   13.94 )-----------( 328      ( 29 Jun 2018 10:15 )             39.4     06-29    135  |  96<L>  |  17  ----------------------------<  138<H>  5.1   |  27  |  0.97    Ca    9.0      29 Jun 2018 10:15    TPro  7.5  /  Alb  3.6  /  TBili  0.3  /  DBili  x   /  AST  15  /  ALT  11  /  AlkPhos  82  06-29    LIVER FUNCTIONS - ( 29 Jun 2018 10:15 )  Alb: 3.6 g/dL / Pro: 7.5 g/dL / ALK PHOS: 82 u/L / ALT: 11 u/L / AST: 15 u/L / GGT: x           PT/INR - ( 29 Jun 2018 10:16 )   PT: 15.6 SEC;   INR: 1.40          PTT - ( 29 Jun 2018 10:16 )  PTT:34.0 SEC          MICROBIOLOGY/CULTURES:      RADIOLOGY RESULTS:  Chest:  1.  No aneurysm or dissection.    Abdomen and pelvis:  1.  No aneurysm or dissection.  2.  The thickening of the descending colon and sigmoid colon could be   secondary to incomplete distention alternatively a colitis.  3.  Small amount of free fluid in the pelvis.  4.  No bowel obstruction.  5.  No CT findings to suggest appendicitis.

## 2018-06-29 NOTE — H&P ADULT - FAMILY HISTORY
Father  Still living? Unknown  Family history of throat cancer, Age at diagnosis: Age Unknown     Mother  Still living? Unknown  Family history of cervical cancer, Age at diagnosis: Age Unknown

## 2018-06-29 NOTE — ED PROVIDER NOTE - ATTENDING CONTRIBUTION TO CARE
ED Attending Dr. Hillman: 53 yo male with PMH CAD s/p MI with stent x 1, esophageal carcinoma s/p esophagectomy 3 weeks ago, in ED with sternal chest pain radiating into back, constant and awoke him from sleep at 04:00 today.  Pt notes that he had chest pain post-surgical but today it was much worse.  Pain is worse when pt moves, not relieved by 2 doses of Dilaudid that pt took at home.  No abdominal pain, N/V/D, urinary complaints, SOB or fever, although pt found to have fever in ED.  On exam pt stable but uncomfortable appearing, heart tachycardic, lungs CTAB, abd NTND, extremities without swelling, strength 5/5 in all extremities and skin without rash.  Initial EKG tachycardic with anterior ST abnormality but repeat EKG no long tachycardic and without acute ischemic changes.

## 2018-06-29 NOTE — H&P ADULT - HISTORY OF PRESENT ILLNESS
Pt is a 52 year old male with a PMHX of MI with 1 stent, esophageal carcinoma (last chemo/rad 1/18), s/p robotic [place]-Andrew Esophagogastrectomy on 6/8/18 with Dr. Brown/Dr. Yousif pw 10/10 midsternal CP radiating to back and shoulders started when he was asleep at 6AM, worsening on movement. Pt had chest pain since surgery but never this severe. Patient stated he had no difficulty tolerating his diet and had salmon and rice yesterday night, which he ate with no difficulty. Pt denies any dysphagia or recent trouble eating food. Pt febrile in ED to 100.8, WBC elected to 15, started on abx. EKG done in ED showed new 1mm EDUARDO in AvL, I, not present in present in pt's pre op EKGs. CTA done in ER   < from: CT Angio Abdomen and Pelvis w/ IV Cont (06.29.18 @ 11:43) >  IMPRESSION:     Chest:  1.  No aneurysm or dissection.    Abdomen and pelvis:  1.  No aneurysm or dissection.  2.  The thickening of the descending colon and sigmoid colon could be   secondary to incomplete distention alternatively a colitis.  3.  Small amount of free fluid in the pelvis.  4.  No bowel obstruction.  5.  No CT findings to suggest appendicitis. Pt is a 52 year old male with a PMHX of MI with 1 stent, esophageal carcinoma (last chemo/rad 1/18), s/p robotic [place]-Andrew Esophagogastrectomy on 6/8/18 with Dr. Brown/Dr. Yousif pw 10/10 midsternal CP radiating to back and shoulders started when he was asleep at 4AM, worsening on movement. Pt had chest pain since surgery but never this severe. Patient stated he had no difficulty tolerating his diet and had salmon and rice yesterday night, which he ate with no difficulty. Pt denies any dysphagia or recent trouble eating food. Pt febrile in ED to 100.8, WBC elected to 15, started on abx. EKG done in ED showed new 1mm EDUARDO in AvL, I, not present in present in pt's pre op EKGs. CTA done in ER   < from: CT Angio Abdomen and Pelvis w/ IV Cont (06.29.18 @ 11:43) >  IMPRESSION:     Chest:  1.  No aneurysm or dissection.    Abdomen and pelvis:  1.  No aneurysm or dissection.  2.  The thickening of the descending colon and sigmoid colon could be   secondary to incomplete distention alternatively a colitis.  3.  Small amount of free fluid in the pelvis.  4.  No bowel obstruction.  5.  No CT findings to suggest appendicitis.    Patient seen with Dr. Corbin, will admit, NPO, NGT placement- patient refusing.  Plan on OR monday for EGD, dilation.

## 2018-06-29 NOTE — ED ADULT TRIAGE NOTE - CHIEF COMPLAINT QUOTE
s/p esophageal ectomy on 06/08/17 for CA, c/o chest pain x 3 weeks, with pain so severe this A.M., it woke him up from sleep, denies any SOB or fever.  Skin color is pale.  PMH: esophageal CA, MI, cardiac stent,

## 2018-06-29 NOTE — H&P ADULT - ASSESSMENT
52 male s/p Robotic Arley Andrew esophagogastrectomy,EGD, balloon dilation, botox injection with chest pain with CT findings of distention- thickening of the descending colon and sigmoid colon.

## 2018-06-29 NOTE — ED PROVIDER NOTE - MEDICAL DECISION MAKING DETAILS
52 year old male with a PMHX of MI with 1 stent, esophageal carcinoma (last chemo/rad 1/18), s/p robotic [place]-Andrew Esophagogastrectomy on 6/8/18 pw 3 weeks of chest pain (after procedure) - today worse - woke the patient up from sleeping at 04:00.  Plan: r/o dissection

## 2018-06-29 NOTE — ED ADULT NURSE REASSESSMENT NOTE - NS ED NURSE REASSESS COMMENT FT1
received from [facility], c/o severe chest pain. After taking medication pt feels better by now. VSS on his baseline. Cardiac monitor in place/tachycardia. will continue to monitor.

## 2018-06-29 NOTE — CONSULT NOTE ADULT - SUBJECTIVE AND OBJECTIVE BOX
CC: 52y old Male admitted with a chief complaint of chest pain (29 Jun 2018 19:39), now with plan for possible endoscopy 7/2/18    HPI: 52 year old male POD#21 s/p robotic esophagogastrectomy (Arley Andrew), presenting to the ED with severe sudden chest pain, located in the middle of the chest/midsternum, and radiating to the back and R shoulder. Patient states he has had some discomfort since surgery, but this was different and woke him from sleep. Deep breaths and moving worsened his discomfort, IV analgesia in the hospital improved his pain. Previously he was doing well, tolerating a regular diet, with no change in his bowel habits. No dysphagia. Fever in the ED, denies fevers at home, denies difficulty breathing, nausea, emesis, abdominal pain.     PMHx/PSHx: Esophageal cancer s/p chemo, [place] Andrew (6/8/18), MI s/p stent x 1 (2015), hernia repair, tonsillectomy, adenoidectomy    Medications (inpatient): dextrose 5% + sodium chloride 0.45%. 1000 milliLiter(s) IV Continuous <Continuous>  heparin  Injectable 5000 Unit(s) SubCutaneous every 8 hours  pantoprazole  Injectable 40 milliGRAM(s) IV Push daily  piperacillin/tazobactam IVPB. 3.375 Gram(s) IV Intermittent every 8 hours  vancomycin  IVPB 1000 milliGRAM(s) IV Intermittent every 24 hours    Medications (PRN):HYDROmorphone  Injectable 1 milliGRAM(s) IV Push every 4 hours PRN  HYDROmorphone  Injectable 0.5 milliGRAM(s) IV Push every 4 hours PRN    Allergies: No Known Allergies  (Intolerances: None)  Social Hx:   Family Hx: Family history of cervical cancer (Mother)  Family history of throat cancer (Father)      Physical Exam  T(C): 37.1  HR: 102 (96 - 106)  BP: 106/71 (97/56 - 122/75)  RR: 18 (16 - 22)  SpO2: 93% (93% - 98%)  Tmax: T(C): , Max: 38.1 (06-29-18 @ 12:05)    General: well developed, well nourished, NAD  Neuro: alert and oriented, no focal deficits, moves all extremities spontaneously  HEENT: NCAT, EOMI, anicteric, mucosa moist  Respiratory: airway patent, respirations unlabored  CVS: regular rate and rhythm  Abdomen: soft, nontender, nondistended  Extremities: no edema, sensation and movement grossly intact  Skin: warm, dry, appropriate color    Labs:                        12.7   13.94 )-----------( 328      ( 29 Jun 2018 10:15 )             39.4     PT/INR - ( 29 Jun 2018 10:16 )   PT: 15.6 SEC;   INR: 1.40          PTT - ( 29 Jun 2018 10:16 )  PTT:34.0 SEC  06-29    135  |  96<L>  |  17  ----------------------------<  138<H>  5.1   |  27  |  0.97    Ca    9.0      29 Jun 2018 10:15    TPro  7.5  /  Alb  3.6  /  TBili  0.3  /  DBili  x   /  AST  15  /  ALT  11  /  AlkPhos  82  06-29            Imaging and other studies: CC: 52y old Male admitted with a chief complaint of chest pain (29 Jun 2018 19:39), now with plan for possible endoscopy 7/2/18    HPI: 52 year old male POD#21 s/p robotic esophagogastrectomy (Arley Andrew), presenting to the ED with severe sudden chest pain, located in the middle of the chest/midsternum, and radiating to the back and R shoulder. Patient states he has had some discomfort since surgery, but this was different and woke him from sleep. Deep breaths and moving worsened his discomfort, IV analgesia in the hospital improved his pain. Previously he was doing well, tolerating a regular diet, with no change in his bowel habits. No dysphagia. Fever in the ED, denies fevers at home, denies difficulty breathing, nausea, emesis, abdominal pain.     PMHx/PSHx: Esophageal cancer s/p chemo, [place] Andrew (6/8/18), MI s/p stent x 1 (2015), hernia repair, tonsillectomy, adenoidectomy    Medications (inpatient): dextrose 5% + sodium chloride 0.45%. 1000 milliLiter(s) IV Continuous <Continuous>  heparin  Injectable 5000 Unit(s) SubCutaneous every 8 hours  pantoprazole  Injectable 40 milliGRAM(s) IV Push daily  piperacillin/tazobactam IVPB. 3.375 Gram(s) IV Intermittent every 8 hours  vancomycin  IVPB 1000 milliGRAM(s) IV Intermittent every 24 hours    Medications (PRN):HYDROmorphone  Injectable 1 milliGRAM(s) IV Push every 4 hours PRN  HYDROmorphone  Injectable 0.5 milliGRAM(s) IV Push every 4 hours PRN    Allergies: No Known Allergies  (Intolerances: None)  Social Hx: denies EtOH, tobacco use  Family Hx: Family history of cervical cancer (Mother)  Family history of throat cancer (Father)    Physical Exam  T(C): 37.1  HR: 102 (96 - 106)  BP: 106/71 (97/56 - 122/75)  RR: 18 (16 - 22)  SpO2: 93% (93% - 98%)  Tmax: T(C): , Max: 38.1 (06-29-18 @ 12:05)    General: well developed, thin, NAD  Neuro: alert and oriented, no focal deficits, moves all extremities spontaneously  HEENT: NCAT, anicteric, mucosa moist  Respiratory: airway patent, respirations unlabored  CVS: regular, mildly tachycardic  Abdomen: soft, nontender, nondistended  Incisions: C/D/I, healing well  Extremities: no edema, sensation and movement grossly intact  Skin: warm, dry, appropriate color    Labs:                        12.7   13.94 )-----------( 328      ( 29 Jun 2018 10:15 )             39.4     PT/INR - ( 29 Jun 2018 10:16 )   PT: 15.6 SEC;   INR: 1.40          PTT - ( 29 Jun 2018 10:16 )  PTT:34.0 SEC  06-29    135  |  96<L>  |  17  ----------------------------<  138<H>  5.1   |  27  |  0.97    Ca    9.0      29 Jun 2018 10:15    TPro  7.5  /  Alb  3.6  /  TBili  0.3  /  DBili  x   /  AST  15  /  ALT  11  /  AlkPhos  82  06-29      Imaging and other studies:  < from: CT Angio Abdomen and Pelvis w/ IV Cont (06.29.18 @ 11:43) >  Chest:  1.  No aneurysm or dissection.    Abdomen and pelvis:  1.  No aneurysm or dissection.  2.  The thickening of the descending colon and sigmoid colon could be secondary to incomplete distention alternatively a colitis.  3.  Small amount of free fluid in the pelvis.  4.  No bowel obstruction.  5.  No CT findings to suggest appendicitis.    < end of copied text >

## 2018-06-29 NOTE — CONSULT NOTE ADULT - ASSESSMENT
52 year old male POD#21 s/p Arley Morales with Lourdes Brown and Benja, returning with chest pain, now afebrile and normotensive.    - admitted to thoracic surgery service, with plan for possible endoscopy on Monday  - on broad spectrum antibiotics as empiric coverage for fever and leukocytosis  - colonic thickening seen on CT scan less likely colitis without clinical changes in bowel habits or abdominal pain, recommend serial abdominal exams    Discussed with Dr. Chen, PGY-5  --KAVITHA Mckinnon, [id number]

## 2018-06-29 NOTE — H&P ADULT - NSHPSOCIALHISTORY_GEN_ALL_CORE
Social History:  · Marital Status	  1 child, unaware of his health problems  · Occupation	musician  · Lives With	spouse     Substance Use History:  · Substance Use	never used     Alcohol Use History:  · Have you ever consumed alcohol	never  · Alcohol Use Comment	quit 1 year ago, previously 2-4 drinks 3-5 days a week for 15 years     Tobacco Usage:  · Tobacco Usage: Former smoker  · Tobacco Type: quit at age 15, smoked 1 PPD for 3 years

## 2018-06-29 NOTE — ED ADULT NURSE NOTE - CHPI ED SYMPTOMS NEG
no nausea/no dizziness/no shortness of breath/no vomiting/no cough/no diaphoresis/no syncope/no fever

## 2018-06-29 NOTE — H&P ADULT - PROBLEM SELECTOR PLAN 1
NPO  NGT placement- patient refusing   IV hydration  Antibiotics   Pre-op for Monday for EGD, dilation

## 2018-06-29 NOTE — ED PROVIDER NOTE - PSH
H/O hernia repair  1966  History of esophageal surgery    Status post tonsillectomy and adenoidectomy    Stented coronary artery  x1 2015

## 2018-06-29 NOTE — ED PROVIDER NOTE - OBJECTIVE STATEMENT
52 year old male with a PMHX of 52 year old male with a PMHX of MI with 1 stent, esophageal carcinoma (last chemo/rad 1/18) pw 3 weeks of chest pain - today worse. Pain is midsternal radiates to the engle, constant, 10/10 in severity, worse with movement, no alleviating factors 52 year old male with a PMHX of MI with 1 stent, esophageal carcinoma (last chemo/rad 1/18), s/p robotic [place]-Andrew Esophagogastrectomy on 6/8/18 pw 3 weeks of chest pain (after procedure) - today worse - woke the patient up from sleeping at 04:00. Pain is midsternal radiates to the back, constant, 10/10 in severity, worse with movement, no alleviating factors - took 2 doses of hydromorphone without relief. Denies lightheadedness, n/v/f/c, abdominal pain, dysuria, hematuria, melena, hematochezia, diarrhea, constipation.

## 2018-06-29 NOTE — H&P ADULT - NSHPREVIEWOFSYSTEMS_GEN_ALL_CORE
REVIEW OF SYSTEMS      General: +fever, chills 	    Skin/Breast: No Rashes/ Lesions/ Masses  	  Ophthalmologic: No Blurry vision/ Glaucoma/ Blindness  	  ENMT: No Hearing loss/ Drainage/ Lesions	    Respiratory and Thorax: No Cough/ Wheezing/ SOB/ Hemoptysis/ Sputum production  	  Cardiovascular: + Chest pain radiating to back     Gastrointestinal: No Nausea/ Vomiting/ Constipation/ Appetite Change	    Genitourinary: No Heamturia/ Dysuria/ Frequency change/ Impotence	    Musculoskeletal: No Pain/ Weakness/ Claudication	    Neurological: No Seizures/ TIA/CVA/ Parastesias	    Psychiatric: No Dementia/ Depression/ SI/HI	    Hematology/Lymphatics: No hx of bleeding/ Edema	    Endocrine:	No Hyperglycemia/ Hypoglycemia    Allergic/Immunologic:	 No Anaphylaxis/ Intolerance/ Recent illnesses

## 2018-06-30 LAB
APTT BLD: 34.6 SEC — SIGNIFICANT CHANGE UP (ref 27.5–37.4)
BLD GP AB SCN SERPL QL: NEGATIVE — SIGNIFICANT CHANGE UP
BUN SERPL-MCNC: 12 MG/DL — SIGNIFICANT CHANGE UP (ref 7–23)
CALCIUM SERPL-MCNC: 9.1 MG/DL — SIGNIFICANT CHANGE UP (ref 8.4–10.5)
CHLORIDE SERPL-SCNC: 97 MMOL/L — LOW (ref 98–107)
CO2 SERPL-SCNC: 29 MMOL/L — SIGNIFICANT CHANGE UP (ref 22–31)
CREAT SERPL-MCNC: 1.03 MG/DL — SIGNIFICANT CHANGE UP (ref 0.5–1.3)
GLUCOSE SERPL-MCNC: 96 MG/DL — SIGNIFICANT CHANGE UP (ref 70–99)
HCT VFR BLD CALC: 35.3 % — LOW (ref 39–50)
HGB BLD-MCNC: 11.3 G/DL — LOW (ref 13–17)
INR BLD: 1.71 — HIGH (ref 0.88–1.17)
MCHC RBC-ENTMCNC: 26.8 PG — LOW (ref 27–34)
MCHC RBC-ENTMCNC: 32 % — SIGNIFICANT CHANGE UP (ref 32–36)
MCV RBC AUTO: 83.6 FL — SIGNIFICANT CHANGE UP (ref 80–100)
NRBC # FLD: 0 — SIGNIFICANT CHANGE UP
PLATELET # BLD AUTO: 267 K/UL — SIGNIFICANT CHANGE UP (ref 150–400)
PMV BLD: 9.9 FL — SIGNIFICANT CHANGE UP (ref 7–13)
POTASSIUM SERPL-MCNC: 3.9 MMOL/L — SIGNIFICANT CHANGE UP (ref 3.5–5.3)
POTASSIUM SERPL-SCNC: 3.9 MMOL/L — SIGNIFICANT CHANGE UP (ref 3.5–5.3)
PROTHROM AB SERPL-ACNC: 19.2 SEC — HIGH (ref 9.8–13.1)
RBC # BLD: 4.22 M/UL — SIGNIFICANT CHANGE UP (ref 4.2–5.8)
RBC # FLD: 14.3 % — SIGNIFICANT CHANGE UP (ref 10.3–14.5)
RH IG SCN BLD-IMP: POSITIVE — SIGNIFICANT CHANGE UP
SODIUM SERPL-SCNC: 137 MMOL/L — SIGNIFICANT CHANGE UP (ref 135–145)
SPECIMEN SOURCE: SIGNIFICANT CHANGE UP
SPECIMEN SOURCE: SIGNIFICANT CHANGE UP
TROPONIN T, HIGH SENSITIVITY: 15 NG/L — SIGNIFICANT CHANGE UP (ref ?–14)
WBC # BLD: 9.66 K/UL — SIGNIFICANT CHANGE UP (ref 3.8–10.5)
WBC # FLD AUTO: 9.66 K/UL — SIGNIFICANT CHANGE UP (ref 3.8–10.5)

## 2018-06-30 PROCEDURE — 71045 X-RAY EXAM CHEST 1 VIEW: CPT | Mod: 26

## 2018-06-30 RX ORDER — SODIUM CHLORIDE 9 MG/ML
1000 INJECTION, SOLUTION INTRAVENOUS
Qty: 0 | Refills: 0 | Status: DISCONTINUED | OUTPATIENT
Start: 2018-06-30 | End: 2018-07-04

## 2018-06-30 RX ADMIN — HYDROMORPHONE HYDROCHLORIDE 1 MILLIGRAM(S): 2 INJECTION INTRAMUSCULAR; INTRAVENOUS; SUBCUTANEOUS at 22:30

## 2018-06-30 RX ADMIN — HEPARIN SODIUM 5000 UNIT(S): 5000 INJECTION INTRAVENOUS; SUBCUTANEOUS at 21:25

## 2018-06-30 RX ADMIN — HEPARIN SODIUM 5000 UNIT(S): 5000 INJECTION INTRAVENOUS; SUBCUTANEOUS at 13:06

## 2018-06-30 RX ADMIN — PIPERACILLIN AND TAZOBACTAM 25 GRAM(S): 4; .5 INJECTION, POWDER, LYOPHILIZED, FOR SOLUTION INTRAVENOUS at 22:30

## 2018-06-30 RX ADMIN — HYDROMORPHONE HYDROCHLORIDE 1 MILLIGRAM(S): 2 INJECTION INTRAMUSCULAR; INTRAVENOUS; SUBCUTANEOUS at 13:28

## 2018-06-30 RX ADMIN — PIPERACILLIN AND TAZOBACTAM 25 GRAM(S): 4; .5 INJECTION, POWDER, LYOPHILIZED, FOR SOLUTION INTRAVENOUS at 05:16

## 2018-06-30 RX ADMIN — HYDROMORPHONE HYDROCHLORIDE 1 MILLIGRAM(S): 2 INJECTION INTRAMUSCULAR; INTRAVENOUS; SUBCUTANEOUS at 21:50

## 2018-06-30 RX ADMIN — SODIUM CHLORIDE 75 MILLILITER(S): 9 INJECTION, SOLUTION INTRAVENOUS at 13:04

## 2018-06-30 RX ADMIN — HYDROMORPHONE HYDROCHLORIDE 1 MILLIGRAM(S): 2 INJECTION INTRAMUSCULAR; INTRAVENOUS; SUBCUTANEOUS at 17:19

## 2018-06-30 RX ADMIN — HYDROMORPHONE HYDROCHLORIDE 1 MILLIGRAM(S): 2 INJECTION INTRAMUSCULAR; INTRAVENOUS; SUBCUTANEOUS at 00:19

## 2018-06-30 RX ADMIN — HYDROMORPHONE HYDROCHLORIDE 1 MILLIGRAM(S): 2 INJECTION INTRAMUSCULAR; INTRAVENOUS; SUBCUTANEOUS at 00:35

## 2018-06-30 RX ADMIN — HYDROMORPHONE HYDROCHLORIDE 1 MILLIGRAM(S): 2 INJECTION INTRAMUSCULAR; INTRAVENOUS; SUBCUTANEOUS at 08:28

## 2018-06-30 RX ADMIN — PIPERACILLIN AND TAZOBACTAM 25 GRAM(S): 4; .5 INJECTION, POWDER, LYOPHILIZED, FOR SOLUTION INTRAVENOUS at 13:28

## 2018-06-30 RX ADMIN — HYDROMORPHONE HYDROCHLORIDE 1 MILLIGRAM(S): 2 INJECTION INTRAMUSCULAR; INTRAVENOUS; SUBCUTANEOUS at 04:40

## 2018-06-30 RX ADMIN — SODIUM CHLORIDE 100 MILLILITER(S): 9 INJECTION, SOLUTION INTRAVENOUS at 21:26

## 2018-06-30 RX ADMIN — PANTOPRAZOLE SODIUM 40 MILLIGRAM(S): 20 TABLET, DELAYED RELEASE ORAL at 13:05

## 2018-06-30 RX ADMIN — HYDROMORPHONE HYDROCHLORIDE 1 MILLIGRAM(S): 2 INJECTION INTRAMUSCULAR; INTRAVENOUS; SUBCUTANEOUS at 04:24

## 2018-06-30 RX ADMIN — Medication 250 MILLIGRAM(S): at 21:25

## 2018-06-30 RX ADMIN — SODIUM CHLORIDE 75 MILLILITER(S): 9 INJECTION, SOLUTION INTRAVENOUS at 11:40

## 2018-06-30 RX ADMIN — HYDROMORPHONE HYDROCHLORIDE 1 MILLIGRAM(S): 2 INJECTION INTRAMUSCULAR; INTRAVENOUS; SUBCUTANEOUS at 13:04

## 2018-06-30 RX ADMIN — HYDROMORPHONE HYDROCHLORIDE 1 MILLIGRAM(S): 2 INJECTION INTRAMUSCULAR; INTRAVENOUS; SUBCUTANEOUS at 18:00

## 2018-06-30 RX ADMIN — HEPARIN SODIUM 5000 UNIT(S): 5000 INJECTION INTRAVENOUS; SUBCUTANEOUS at 05:16

## 2018-06-30 NOTE — PROGRESS NOTE ADULT - SUBJECTIVE AND OBJECTIVE BOX
Subjective: no acute complaints. Pt refusing NGT.    Vital Signs:  Vital Signs Last 24 Hrs  T(C): 36.9 (06-30-18 @ 08:32), Max: 37.8 (06-29-18 @ 14:40)  T(F): 98.5 (06-30-18 @ 08:32), Max: 100.1 (06-29-18 @ 14:40)  HR: 90 (06-30-18 @ 08:32) (89 - 106)  BP: 113/74 (06-30-18 @ 08:32) (105/60 - 115/74)  RR: 18 (06-30-18 @ 08:32) (16 - 22)  SpO2: 95% (06-30-18 @ 08:32) (93% - 98%) on (O2)    Telemetry/Alarms:  General: WN/WD NAD  Neurology: Awake, nonfocal, CASH x 4  Eyes: Scleras clear, PERRLA/ EOMI, Gross vision intact  ENT:Gross hearing intact, grossly patent pharynx, no stridor  Neck: Neck supple, trachea midline, No JVD,   Respiratory: CTA B/L, No wheezing, rales, rhonchi  CV: RRR, S1S2, no murmurs, rubs or gallops  Abdominal: Soft, NT, ND +BS,   Extremities: No edema, + peripheral pulses  Skin: No Rashes, Hematoma, Ecchymosis  Lymphatic: No Neck, axilla, groin LAD  Psych: Oriented x 3, normal affect  Incisions: c,d,i    Relevant labs, radiology and Medications reviewed                        11.3   9.66  )-----------( 267      ( 30 Jun 2018 06:34 )             35.3     06-30    137  |  97<L>  |  12  ----------------------------<  96  3.9   |  29  |  1.03    Ca    9.1      30 Jun 2018 06:34    TPro  7.5  /  Alb  3.6  /  TBili  0.3  /  DBili  x   /  AST  15  /  ALT  11  /  AlkPhos  82  06-29    PT/INR - ( 30 Jun 2018 06:34 )   PT: 19.2 SEC;   INR: 1.71          PTT - ( 30 Jun 2018 06:34 )  PTT:34.6 SEC  MEDICATIONS  (STANDING):  dextrose 5% + sodium chloride 0.45% 1000 milliLiter(s) (75 mL/Hr) IV Continuous <Continuous>  heparin  Injectable 5000 Unit(s) SubCutaneous every 8 hours  pantoprazole  Injectable 40 milliGRAM(s) IV Push daily  piperacillin/tazobactam IVPB. 3.375 Gram(s) IV Intermittent every 8 hours  vancomycin  IVPB 1000 milliGRAM(s) IV Intermittent every 24 hours    MEDICATIONS  (PRN):  HYDROmorphone  Injectable 0.5 milliGRAM(s) IV Push every 4 hours PRN Moderate Pain (4 - 6)  HYDROmorphone  Injectable 1 milliGRAM(s) IV Push every 4 hours PRN Severe Pain (7 - 10)    Pertinent Physical Exam  I&O's Summary    29 Jun 2018 07:01  -  30 Jun 2018 07:00  --------------------------------------------------------  IN: 450 mL / OUT: 950 mL / NET: -500 mL    30 Jun 2018 07:01  -  30 Jun 2018 12:28  --------------------------------------------------------  IN: 0 mL / OUT: 180 mL / NET: -180 mL        Assessment  52y Male  w/ PAST MEDICAL & SURGICAL HISTORY:  Esophageal cancer  MI (myocardial infarction): 2015 with 1 coronary stent  History of esophageal surgery  H/O hernia repair: 1966  Status post tonsillectomy and adenoidectomy  Stented coronary artery: x1 2015  admitted with complaints of Patient is a 52y old  Male who presents with a chief complaint of chest pain (29 Jun 2018 19:39)  .  On (6/8/18), patient underwent Arley Andrew Esophagogastrectomy followed by 6/14/18 EGD balloon dilation, botox injection . Pt was discharge home and readmitted with chest pain, found to have distension on CT scan.    PLAN  Neuro: Pain management  Pulm: Encourage coughing, deep breathing and use of incentive spirometry. Wean off supplemental oxygen as able. Daily CXR.   Cardio: Monitor telemetry/alarms  GI: NPO with IV fluids   Renal: monitor urine output, supplement electrolytes as needed  Vasc: Heparin SC/SCDs for DVT prophylaxis  Heme: Stable H/H. .   ID: vanco [email or web address]  Therapy: OOB/ambulate    Disposition: Plan for EGD on Monday  Discussed with Cardiothoracic Team at AM rounds.

## 2018-07-01 PROCEDURE — 71045 X-RAY EXAM CHEST 1 VIEW: CPT | Mod: 26

## 2018-07-01 RX ORDER — DIPHENHYDRAMINE HCL 50 MG
25 CAPSULE ORAL ONCE
Qty: 0 | Refills: 0 | Status: COMPLETED | OUTPATIENT
Start: 2018-07-01 | End: 2018-07-01

## 2018-07-01 RX ORDER — DIPHENHYDRAMINE HCL 50 MG
25 CAPSULE ORAL ONCE
Qty: 0 | Refills: 0 | Status: DISCONTINUED | OUTPATIENT
Start: 2018-07-01 | End: 2018-07-01

## 2018-07-01 RX ADMIN — HYDROMORPHONE HYDROCHLORIDE 1 MILLIGRAM(S): 2 INJECTION INTRAMUSCULAR; INTRAVENOUS; SUBCUTANEOUS at 23:30

## 2018-07-01 RX ADMIN — PIPERACILLIN AND TAZOBACTAM 25 GRAM(S): 4; .5 INJECTION, POWDER, LYOPHILIZED, FOR SOLUTION INTRAVENOUS at 22:30

## 2018-07-01 RX ADMIN — PIPERACILLIN AND TAZOBACTAM 25 GRAM(S): 4; .5 INJECTION, POWDER, LYOPHILIZED, FOR SOLUTION INTRAVENOUS at 13:48

## 2018-07-01 RX ADMIN — HYDROMORPHONE HYDROCHLORIDE 1 MILLIGRAM(S): 2 INJECTION INTRAMUSCULAR; INTRAVENOUS; SUBCUTANEOUS at 13:50

## 2018-07-01 RX ADMIN — HYDROMORPHONE HYDROCHLORIDE 1 MILLIGRAM(S): 2 INJECTION INTRAMUSCULAR; INTRAVENOUS; SUBCUTANEOUS at 03:45

## 2018-07-01 RX ADMIN — HYDROMORPHONE HYDROCHLORIDE 0.5 MILLIGRAM(S): 2 INJECTION INTRAMUSCULAR; INTRAVENOUS; SUBCUTANEOUS at 10:30

## 2018-07-01 RX ADMIN — SODIUM CHLORIDE 100 MILLILITER(S): 9 INJECTION, SOLUTION INTRAVENOUS at 13:49

## 2018-07-01 RX ADMIN — PANTOPRAZOLE SODIUM 40 MILLIGRAM(S): 20 TABLET, DELAYED RELEASE ORAL at 13:49

## 2018-07-01 RX ADMIN — Medication 25 MILLIGRAM(S): at 00:24

## 2018-07-01 RX ADMIN — HEPARIN SODIUM 5000 UNIT(S): 5000 INJECTION INTRAVENOUS; SUBCUTANEOUS at 22:29

## 2018-07-01 RX ADMIN — HYDROMORPHONE HYDROCHLORIDE 1 MILLIGRAM(S): 2 INJECTION INTRAMUSCULAR; INTRAVENOUS; SUBCUTANEOUS at 14:30

## 2018-07-01 RX ADMIN — HYDROMORPHONE HYDROCHLORIDE 1 MILLIGRAM(S): 2 INJECTION INTRAMUSCULAR; INTRAVENOUS; SUBCUTANEOUS at 23:11

## 2018-07-01 RX ADMIN — HEPARIN SODIUM 5000 UNIT(S): 5000 INJECTION INTRAVENOUS; SUBCUTANEOUS at 05:05

## 2018-07-01 RX ADMIN — HYDROMORPHONE HYDROCHLORIDE 0.5 MILLIGRAM(S): 2 INJECTION INTRAMUSCULAR; INTRAVENOUS; SUBCUTANEOUS at 05:53

## 2018-07-01 RX ADMIN — HYDROMORPHONE HYDROCHLORIDE 0.5 MILLIGRAM(S): 2 INJECTION INTRAMUSCULAR; INTRAVENOUS; SUBCUTANEOUS at 06:36

## 2018-07-01 RX ADMIN — HYDROMORPHONE HYDROCHLORIDE 1 MILLIGRAM(S): 2 INJECTION INTRAMUSCULAR; INTRAVENOUS; SUBCUTANEOUS at 19:05

## 2018-07-01 RX ADMIN — HYDROMORPHONE HYDROCHLORIDE 0.5 MILLIGRAM(S): 2 INJECTION INTRAMUSCULAR; INTRAVENOUS; SUBCUTANEOUS at 09:48

## 2018-07-01 RX ADMIN — PIPERACILLIN AND TAZOBACTAM 25 GRAM(S): 4; .5 INJECTION, POWDER, LYOPHILIZED, FOR SOLUTION INTRAVENOUS at 05:05

## 2018-07-01 RX ADMIN — HYDROMORPHONE HYDROCHLORIDE 1 MILLIGRAM(S): 2 INJECTION INTRAMUSCULAR; INTRAVENOUS; SUBCUTANEOUS at 19:20

## 2018-07-01 RX ADMIN — SODIUM CHLORIDE 100 MILLILITER(S): 9 INJECTION, SOLUTION INTRAVENOUS at 23:12

## 2018-07-01 RX ADMIN — Medication 250 MILLIGRAM(S): at 22:30

## 2018-07-01 RX ADMIN — HYDROMORPHONE HYDROCHLORIDE 1 MILLIGRAM(S): 2 INJECTION INTRAMUSCULAR; INTRAVENOUS; SUBCUTANEOUS at 03:06

## 2018-07-01 RX ADMIN — HEPARIN SODIUM 5000 UNIT(S): 5000 INJECTION INTRAVENOUS; SUBCUTANEOUS at 13:49

## 2018-07-01 NOTE — PROGRESS NOTE ADULT - SUBJECTIVE AND OBJECTIVE BOX
Subjective:    Vital Signs:  Vital Signs Last 24 Hrs  T(C): 36.9 (07-01-18 @ 07:54), Max: 37.4 (06-30-18 @ 21:46)  T(F): 98.5 (07-01-18 @ 07:54), Max: 99.4 (06-30-18 @ 21:46)  HR: 92 (07-01-18 @ 07:54) (56 - 113)  BP: 112/82 (07-01-18 @ 07:54) (100/57 - 115/60)  RR: 18 (07-01-18 @ 07:54) (16 - 20)  SpO2: 95% (07-01-18 @ 07:54) (94% - 97%) on (O2)    Telemetry/Alarms:  General: WN/WD NAD  Neurology: Awake, nonfocal, CASH x 4  Eyes: Scleras clear, PERRLA/ EOMI, Gross vision intact  ENT:Gross hearing intact, grossly patent pharynx, no stridor  Neck: Neck supple, trachea midline, No JVD,   Respiratory: CTA B/L, No wheezing, rales, rhonchi  CV: RRR, S1S2, no murmurs, rubs or gallops  Abdominal: Soft, NT, ND +BS,   Extremities: No edema, + peripheral pulses  Skin: No Rashes, Hematoma, Ecchymosis  Lymphatic: No Neck, axilla, groin LAD  Psych: Oriented x 3, normal affect  Incisions:   Tubes:  Relevant labs, radiology and Medications reviewed                        11.3   9.66  )-----------( 267      ( 30 Jun 2018 06:34 )             35.3     06-30    137  |  97<L>  |  12  ----------------------------<  96  3.9   |  29  |  1.03    Ca    9.1      30 Jun 2018 06:34      PT/INR - ( 30 Jun 2018 06:34 )   PT: 19.2 SEC;   INR: 1.71          PTT - ( 30 Jun 2018 06:34 )  PTT:34.6 SEC  MEDICATIONS  (STANDING):  dextrose 5% + sodium chloride 0.45% 1000 milliLiter(s) (100 mL/Hr) IV Continuous <Continuous>  heparin  Injectable 5000 Unit(s) SubCutaneous every 8 hours  pantoprazole  Injectable 40 milliGRAM(s) IV Push daily  piperacillin/tazobactam IVPB. 3.375 Gram(s) IV Intermittent every 8 hours  vancomycin  IVPB 1000 milliGRAM(s) IV Intermittent every 24 hours    MEDICATIONS  (PRN):  HYDROmorphone  Injectable 0.5 milliGRAM(s) IV Push every 4 hours PRN Moderate Pain (4 - 6)  HYDROmorphone  Injectable 1 milliGRAM(s) IV Push every 4 hours PRN Severe Pain (7 - 10)    Pertinent Physical Exam  I&O's Summary    30 Jun 2018 07:01  -  01 Jul 2018 07:00  --------------------------------------------------------  IN: 0 mL / OUT: 1780 mL / NET: -1780 mL        Assessment  52y Male  w/ PAST MEDICAL & SURGICAL HISTORY:  Esophageal cancer  MI (myocardial infarction): 2015 with 1 coronary stent  History of esophageal surgery  H/O hernia repair: 1966  Status post tonsillectomy and adenoidectomy  Stented coronary artery: x1 2015  admitted with complaints of Patient is a 52y old  Male who presents with a chief complaint of chest pain (29 Jun 2018 19:39)  .  pt s/p esophagectomy 6/8/18 with egd, dilation 6/14/18.  Pt kept NPO over weekend with IV hydration with plans for EGD, dilation in AM.     PLAN  Neuro: Pain management  Pulm: Encourage coughing, deep breathing and use of incentive spirometry. Wean off supplemental oxygen as able. Daily CXR.   Cardio: Monitor telemetry/alarms  GI: Tolerating diet. Continue stool softeners.  Renal: monitor urine output, supplement electrolytes as needed  Vasc: Heparin SC/SCDs for DVT prophylaxis  Heme: Stable H/H. .   ID: Off antibiotics. Stable.  Therapy: OOB/ambulate    Disposition: EGD, dilation in AM  Discussed with Cardiothoracic Team at AM rounds.

## 2018-07-02 LAB
APTT BLD: 30.2 SEC — SIGNIFICANT CHANGE UP (ref 27.5–37.4)
BUN SERPL-MCNC: 9 MG/DL — SIGNIFICANT CHANGE UP (ref 7–23)
CALCIUM SERPL-MCNC: 9.1 MG/DL — SIGNIFICANT CHANGE UP (ref 8.4–10.5)
CHLORIDE SERPL-SCNC: 99 MMOL/L — SIGNIFICANT CHANGE UP (ref 98–107)
CO2 SERPL-SCNC: 24 MMOL/L — SIGNIFICANT CHANGE UP (ref 22–31)
CREAT SERPL-MCNC: 0.94 MG/DL — SIGNIFICANT CHANGE UP (ref 0.5–1.3)
GLUCOSE SERPL-MCNC: 79 MG/DL — SIGNIFICANT CHANGE UP (ref 70–99)
HCT VFR BLD CALC: 38.1 % — LOW (ref 39–50)
HGB BLD-MCNC: 12 G/DL — LOW (ref 13–17)
INR BLD: 1.42 — HIGH (ref 0.88–1.17)
MCHC RBC-ENTMCNC: 26.4 PG — LOW (ref 27–34)
MCHC RBC-ENTMCNC: 31.5 % — LOW (ref 32–36)
MCV RBC AUTO: 83.7 FL — SIGNIFICANT CHANGE UP (ref 80–100)
NRBC # FLD: 0 — SIGNIFICANT CHANGE UP
PLATELET # BLD AUTO: 304 K/UL — SIGNIFICANT CHANGE UP (ref 150–400)
PMV BLD: 10.2 FL — SIGNIFICANT CHANGE UP (ref 7–13)
POTASSIUM SERPL-MCNC: 4 MMOL/L — SIGNIFICANT CHANGE UP (ref 3.5–5.3)
POTASSIUM SERPL-SCNC: 4 MMOL/L — SIGNIFICANT CHANGE UP (ref 3.5–5.3)
PROTHROM AB SERPL-ACNC: 15.9 SEC — HIGH (ref 9.8–13.1)
RBC # BLD: 4.55 M/UL — SIGNIFICANT CHANGE UP (ref 4.2–5.8)
RBC # FLD: 14.3 % — SIGNIFICANT CHANGE UP (ref 10.3–14.5)
SODIUM SERPL-SCNC: 137 MMOL/L — SIGNIFICANT CHANGE UP (ref 135–145)
VANCOMYCIN TROUGH SERPL-MCNC: 1.7 UG/ML — LOW (ref 10–20)
WBC # BLD: 5.38 K/UL — SIGNIFICANT CHANGE UP (ref 3.8–10.5)
WBC # FLD AUTO: 5.38 K/UL — SIGNIFICANT CHANGE UP (ref 3.8–10.5)

## 2018-07-02 RX ORDER — DIPHENHYDRAMINE HCL 50 MG
25 CAPSULE ORAL ONCE
Qty: 0 | Refills: 0 | Status: COMPLETED | OUTPATIENT
Start: 2018-07-02 | End: 2018-07-02

## 2018-07-02 RX ORDER — ONDANSETRON 8 MG/1
4 TABLET, FILM COATED ORAL ONCE
Qty: 0 | Refills: 0 | Status: DISCONTINUED | OUTPATIENT
Start: 2018-07-02 | End: 2018-07-04

## 2018-07-02 RX ORDER — HYDROMORPHONE HYDROCHLORIDE 2 MG/ML
0.5 INJECTION INTRAMUSCULAR; INTRAVENOUS; SUBCUTANEOUS ONCE
Qty: 0 | Refills: 0 | Status: DISCONTINUED | OUTPATIENT
Start: 2018-07-02 | End: 2018-07-02

## 2018-07-02 RX ADMIN — PIPERACILLIN AND TAZOBACTAM 25 GRAM(S): 4; .5 INJECTION, POWDER, LYOPHILIZED, FOR SOLUTION INTRAVENOUS at 05:44

## 2018-07-02 RX ADMIN — HYDROMORPHONE HYDROCHLORIDE 1 MILLIGRAM(S): 2 INJECTION INTRAMUSCULAR; INTRAVENOUS; SUBCUTANEOUS at 20:05

## 2018-07-02 RX ADMIN — HYDROMORPHONE HYDROCHLORIDE 1 MILLIGRAM(S): 2 INJECTION INTRAMUSCULAR; INTRAVENOUS; SUBCUTANEOUS at 19:30

## 2018-07-02 RX ADMIN — HYDROMORPHONE HYDROCHLORIDE 1 MILLIGRAM(S): 2 INJECTION INTRAMUSCULAR; INTRAVENOUS; SUBCUTANEOUS at 11:13

## 2018-07-02 RX ADMIN — HYDROMORPHONE HYDROCHLORIDE 1 MILLIGRAM(S): 2 INJECTION INTRAMUSCULAR; INTRAVENOUS; SUBCUTANEOUS at 06:01

## 2018-07-02 RX ADMIN — HYDROMORPHONE HYDROCHLORIDE 0.5 MILLIGRAM(S): 2 INJECTION INTRAMUSCULAR; INTRAVENOUS; SUBCUTANEOUS at 22:00

## 2018-07-02 RX ADMIN — HYDROMORPHONE HYDROCHLORIDE 0.5 MILLIGRAM(S): 2 INJECTION INTRAMUSCULAR; INTRAVENOUS; SUBCUTANEOUS at 21:39

## 2018-07-02 RX ADMIN — PIPERACILLIN AND TAZOBACTAM 25 GRAM(S): 4; .5 INJECTION, POWDER, LYOPHILIZED, FOR SOLUTION INTRAVENOUS at 13:47

## 2018-07-02 RX ADMIN — HYDROMORPHONE HYDROCHLORIDE 1 MILLIGRAM(S): 2 INJECTION INTRAMUSCULAR; INTRAVENOUS; SUBCUTANEOUS at 06:05

## 2018-07-02 RX ADMIN — PANTOPRAZOLE SODIUM 40 MILLIGRAM(S): 20 TABLET, DELAYED RELEASE ORAL at 12:59

## 2018-07-02 RX ADMIN — PIPERACILLIN AND TAZOBACTAM 25 GRAM(S): 4; .5 INJECTION, POWDER, LYOPHILIZED, FOR SOLUTION INTRAVENOUS at 21:49

## 2018-07-02 RX ADMIN — Medication 25 MILLIGRAM(S): at 02:32

## 2018-07-02 RX ADMIN — HEPARIN SODIUM 5000 UNIT(S): 5000 INJECTION INTRAVENOUS; SUBCUTANEOUS at 05:44

## 2018-07-02 RX ADMIN — HEPARIN SODIUM 5000 UNIT(S): 5000 INJECTION INTRAVENOUS; SUBCUTANEOUS at 21:39

## 2018-07-02 RX ADMIN — HYDROMORPHONE HYDROCHLORIDE 1 MILLIGRAM(S): 2 INJECTION INTRAMUSCULAR; INTRAVENOUS; SUBCUTANEOUS at 16:55

## 2018-07-02 RX ADMIN — Medication 25 MILLIGRAM(S): at 23:57

## 2018-07-02 RX ADMIN — SODIUM CHLORIDE 100 MILLILITER(S): 9 INJECTION, SOLUTION INTRAVENOUS at 21:49

## 2018-07-02 RX ADMIN — HEPARIN SODIUM 5000 UNIT(S): 5000 INJECTION INTRAVENOUS; SUBCUTANEOUS at 13:47

## 2018-07-02 RX ADMIN — HYDROMORPHONE HYDROCHLORIDE 1 MILLIGRAM(S): 2 INJECTION INTRAMUSCULAR; INTRAVENOUS; SUBCUTANEOUS at 15:41

## 2018-07-02 NOTE — PROGRESS NOTE ADULT - SUBJECTIVE AND OBJECTIVE BOX
Subjective: "I really don't want any procedures or any more tests done, I just want to try eating again" Pt. denies CP, fever chills, abd pain, n/v/d. Pt. ambulating frequently in hallway. Planned EGD/Dilation explained at length to pt. this am w risks/benefits. Pt. still refusing surgical intervention. Dr. Corbin at bedside this am, wants to get Barium Swallow but pt. refusing that as well.     Vital Signs:  Vital Signs Last 24 Hrs  T(C): 36.9 (07-02-18 @ 08:19), Max: 37.2 (07-01-18 @ 14:01)  T(F): 98.5 (07-02-18 @ 08:19), Max: 98.9 (07-01-18 @ 14:01)  HR: 75 (07-02-18 @ 08:19) (65 - 103)  BP: 113/76 (07-02-18 @ 08:19) (92/62 - 113/76)  RR: 16 (07-02-18 @ 08:19) (16 - 18)  SpO2: 95% (07-02-18 @ 08:19) (95% - 100%) on (O2)    Telemetry/Alarms:  General: WN/WD NAD  Neurology: Awake, nonfocal, CASH x 4  Eyes: Scleras clear, PERRLA/ EOMI, Gross vision intact  ENT:Gross hearing intact, grossly patent pharynx, no stridor  Neck: Neck supple, trachea midline, No JVD,   Respiratory: CTA B/L, No wheezing, rales, rhonchi  CV: RRR, S1S2, no murmurs, rubs or gallops  Abdominal: Soft, NT, ND +BS, NO BM. No N/v/. Still NPO on IVF  Extremities: No edema, + peripheral pulses  Skin: No Rashes, Hematoma, Ecchymosis  Lymphatic: No Neck, axilla, groin LAD  Psych: Oriented x 3, normal affect  Incisions: Healing well, C/d/i.   Tubes: none  Relevant labs, radiology and Medications reviewed                        12.0   5.38  )-----------( 304      ( 02 Jul 2018 07:19 )             38.1     07-02    137  |  99  |  9   ----------------------------<  79  4.0   |  24  |  0.94    Ca    9.1      02 Jul 2018 07:19      PT/INR - ( 02 Jul 2018 07:19 )   PT: 15.9 SEC;   INR: 1.42          PTT - ( 02 Jul 2018 07:19 )  PTT:30.2 SEC  MEDICATIONS  (STANDING):  dextrose 5% + sodium chloride 0.45% 1000 milliLiter(s) (100 mL/Hr) IV Continuous <Continuous>  heparin  Injectable 5000 Unit(s) SubCutaneous every 8 hours  pantoprazole  Injectable 40 milliGRAM(s) IV Push daily  piperacillin/tazobactam IVPB. 3.375 Gram(s) IV Intermittent every 8 hours  vancomycin  IVPB 1000 milliGRAM(s) IV Intermittent every 24 hours    MEDICATIONS  (PRN):  HYDROmorphone  Injectable 0.5 milliGRAM(s) IV Push every 4 hours PRN Moderate Pain (4 - 6)  HYDROmorphone  Injectable 1 milliGRAM(s) IV Push every 4 hours PRN Severe Pain (7 - 10)    Pertinent Physical Exam  I&O's Summary    01 Jul 2018 07:01  -  02 Jul 2018 07:00  --------------------------------------------------------  IN: 2450 mL / OUT: 2565 mL / NET: -115 mL    02 Jul 2018 07:01  -  02 Jul 2018 12:05  --------------------------------------------------------  IN: 400 mL / OUT: 250 mL / NET: 150 mL        Assessment  52y Male  w/ PAST MEDICAL & SURGICAL HISTORY:  Esophageal cancer  MI (myocardial infarction): 2015 with 1 coronary stent  History of esophageal surgery  H/O hernia repair: 1966  Status post tonsillectomy and adenoidectomy  Stented coronary artery: x1 2015  admitted with complaints of Patient is a 52y old  Male who presents with a chief complaint of chest pain (29 Jun 2018 19:39)  This is a 52 year old male with a PMHX of MI with 1 stent, esophageal carcinoma (last chemo/rad 1/18), s/p robotic [place]-Andrew Esophagogastrectomy on 6/8/18 with Dr. Brown/Dr. Yousif pw 10/10 midsternal CP radiating to back and shoulders started when he was asleep at 4AM, worsening on movement. Pt had chest pain since surgery but never this severe. Patient stated he had no difficulty tolerating his diet and had salmon and rice yesterday night, which he ate with no difficulty. Pt denies any dysphagia or recent trouble eating food. Pt febrile in ED to 100.8, WBC elected to 15, started on abx. EKG done in ED showed new 1mm EDUARDO in AvL, I, not present in present in pt's pre op EKGs. CTA done in ER. On CT scan pt. w distended stomach, signs of poor emptying. Placed NPO on IVF with Vanco and Zosyn,.  Refusing NGT. Kept NPO over weekend with plans for EGD on 7/2/18. Pt. has been afebrile >24hrs. No GI complaints. Today 7/2 pt. refusing OR or Barium Swallow.       PLAN  Neuro: Pain management  Pulm: Encourage coughing, deep breathing and use of incentive spirometry.   Cardio: Monitor telemetry/alarms  GI: cont NPO. Cont IVF hydration. Pt. refusing intervention.   Renal: monitor urine output, supplement electrolytes as needed  Vasc: Heparin SC/SCDs for DVT prophylaxis  Heme: Stable H/H. .   ID: Cont Vanco/zosyn per Dr. Corbin. Vanco trough tonight.   Therapy: OOB/ambulate    Disposition: Awaiting further plan/instructions by Dr. Corbin.   Discussed with Cardiothoracic Team at AM rounds.

## 2018-07-03 RX ADMIN — HEPARIN SODIUM 5000 UNIT(S): 5000 INJECTION INTRAVENOUS; SUBCUTANEOUS at 06:02

## 2018-07-03 RX ADMIN — HYDROMORPHONE HYDROCHLORIDE 1 MILLIGRAM(S): 2 INJECTION INTRAMUSCULAR; INTRAVENOUS; SUBCUTANEOUS at 04:13

## 2018-07-03 RX ADMIN — HYDROMORPHONE HYDROCHLORIDE 1 MILLIGRAM(S): 2 INJECTION INTRAMUSCULAR; INTRAVENOUS; SUBCUTANEOUS at 11:05

## 2018-07-03 RX ADMIN — HYDROMORPHONE HYDROCHLORIDE 1 MILLIGRAM(S): 2 INJECTION INTRAMUSCULAR; INTRAVENOUS; SUBCUTANEOUS at 20:53

## 2018-07-03 RX ADMIN — HEPARIN SODIUM 5000 UNIT(S): 5000 INJECTION INTRAVENOUS; SUBCUTANEOUS at 21:25

## 2018-07-03 RX ADMIN — Medication 250 MILLIGRAM(S): at 00:18

## 2018-07-03 RX ADMIN — HEPARIN SODIUM 5000 UNIT(S): 5000 INJECTION INTRAVENOUS; SUBCUTANEOUS at 14:55

## 2018-07-03 RX ADMIN — HYDROMORPHONE HYDROCHLORIDE 1 MILLIGRAM(S): 2 INJECTION INTRAMUSCULAR; INTRAVENOUS; SUBCUTANEOUS at 03:45

## 2018-07-03 RX ADMIN — HYDROMORPHONE HYDROCHLORIDE 1 MILLIGRAM(S): 2 INJECTION INTRAMUSCULAR; INTRAVENOUS; SUBCUTANEOUS at 15:15

## 2018-07-03 RX ADMIN — SODIUM CHLORIDE 100 MILLILITER(S): 9 INJECTION, SOLUTION INTRAVENOUS at 21:25

## 2018-07-03 RX ADMIN — HYDROMORPHONE HYDROCHLORIDE 1 MILLIGRAM(S): 2 INJECTION INTRAMUSCULAR; INTRAVENOUS; SUBCUTANEOUS at 14:55

## 2018-07-03 RX ADMIN — PIPERACILLIN AND TAZOBACTAM 25 GRAM(S): 4; .5 INJECTION, POWDER, LYOPHILIZED, FOR SOLUTION INTRAVENOUS at 06:03

## 2018-07-03 RX ADMIN — HYDROMORPHONE HYDROCHLORIDE 1 MILLIGRAM(S): 2 INJECTION INTRAMUSCULAR; INTRAVENOUS; SUBCUTANEOUS at 21:05

## 2018-07-03 RX ADMIN — HYDROMORPHONE HYDROCHLORIDE 1 MILLIGRAM(S): 2 INJECTION INTRAMUSCULAR; INTRAVENOUS; SUBCUTANEOUS at 10:44

## 2018-07-03 RX ADMIN — PANTOPRAZOLE SODIUM 40 MILLIGRAM(S): 20 TABLET, DELAYED RELEASE ORAL at 12:09

## 2018-07-03 RX ADMIN — PIPERACILLIN AND TAZOBACTAM 25 GRAM(S): 4; .5 INJECTION, POWDER, LYOPHILIZED, FOR SOLUTION INTRAVENOUS at 14:18

## 2018-07-04 LAB
BACTERIA BLD CULT: SIGNIFICANT CHANGE UP
BACTERIA BLD CULT: SIGNIFICANT CHANGE UP

## 2018-07-04 RX ORDER — GABAPENTIN 400 MG/1
200 CAPSULE ORAL EVERY 8 HOURS
Qty: 0 | Refills: 0 | Status: DISCONTINUED | OUTPATIENT
Start: 2018-07-04 | End: 2018-07-05

## 2018-07-04 RX ORDER — FAMOTIDINE 10 MG/ML
20 INJECTION INTRAVENOUS DAILY
Qty: 0 | Refills: 0 | Status: DISCONTINUED | OUTPATIENT
Start: 2018-07-04 | End: 2018-07-05

## 2018-07-04 RX ORDER — HYDROMORPHONE HYDROCHLORIDE 2 MG/ML
1 INJECTION INTRAMUSCULAR; INTRAVENOUS; SUBCUTANEOUS ONCE
Qty: 0 | Refills: 0 | Status: DISCONTINUED | OUTPATIENT
Start: 2018-07-04 | End: 2018-07-04

## 2018-07-04 RX ORDER — POLYETHYLENE GLYCOL 3350 17 G/17G
17 POWDER, FOR SOLUTION ORAL
Qty: 0 | Refills: 0 | Status: DISCONTINUED | OUTPATIENT
Start: 2018-07-04 | End: 2018-07-05

## 2018-07-04 RX ORDER — DIPHENHYDRAMINE HCL 50 MG
25 CAPSULE ORAL ONCE
Qty: 0 | Refills: 0 | Status: COMPLETED | OUTPATIENT
Start: 2018-07-04 | End: 2018-07-04

## 2018-07-04 RX ORDER — HYDROMORPHONE HYDROCHLORIDE 2 MG/ML
4 INJECTION INTRAMUSCULAR; INTRAVENOUS; SUBCUTANEOUS EVERY 6 HOURS
Qty: 0 | Refills: 0 | Status: DISCONTINUED | OUTPATIENT
Start: 2018-07-04 | End: 2018-07-05

## 2018-07-04 RX ORDER — HYDROMORPHONE HYDROCHLORIDE 2 MG/ML
2 INJECTION INTRAMUSCULAR; INTRAVENOUS; SUBCUTANEOUS EVERY 4 HOURS
Qty: 0 | Refills: 0 | Status: DISCONTINUED | OUTPATIENT
Start: 2018-07-04 | End: 2018-07-05

## 2018-07-04 RX ORDER — ACETAMINOPHEN 500 MG
650 TABLET ORAL EVERY 6 HOURS
Qty: 0 | Refills: 0 | Status: DISCONTINUED | OUTPATIENT
Start: 2018-07-04 | End: 2018-07-05

## 2018-07-04 RX ADMIN — FAMOTIDINE 20 MILLIGRAM(S): 10 INJECTION INTRAVENOUS at 13:23

## 2018-07-04 RX ADMIN — HYDROMORPHONE HYDROCHLORIDE 0.5 MILLIGRAM(S): 2 INJECTION INTRAMUSCULAR; INTRAVENOUS; SUBCUTANEOUS at 05:35

## 2018-07-04 RX ADMIN — Medication 650 MILLIGRAM(S): at 12:38

## 2018-07-04 RX ADMIN — GABAPENTIN 200 MILLIGRAM(S): 400 CAPSULE ORAL at 23:03

## 2018-07-04 RX ADMIN — GABAPENTIN 200 MILLIGRAM(S): 400 CAPSULE ORAL at 13:24

## 2018-07-04 RX ADMIN — HEPARIN SODIUM 5000 UNIT(S): 5000 INJECTION INTRAVENOUS; SUBCUTANEOUS at 23:03

## 2018-07-04 RX ADMIN — Medication 650 MILLIGRAM(S): at 18:08

## 2018-07-04 RX ADMIN — POLYETHYLENE GLYCOL 3350 17 GRAM(S): 17 POWDER, FOR SOLUTION ORAL at 09:44

## 2018-07-04 RX ADMIN — HYDROMORPHONE HYDROCHLORIDE 0.5 MILLIGRAM(S): 2 INJECTION INTRAMUSCULAR; INTRAVENOUS; SUBCUTANEOUS at 05:17

## 2018-07-04 RX ADMIN — HEPARIN SODIUM 5000 UNIT(S): 5000 INJECTION INTRAVENOUS; SUBCUTANEOUS at 13:24

## 2018-07-04 RX ADMIN — POLYETHYLENE GLYCOL 3350 17 GRAM(S): 17 POWDER, FOR SOLUTION ORAL at 18:08

## 2018-07-04 RX ADMIN — HYDROMORPHONE HYDROCHLORIDE 1 MILLIGRAM(S): 2 INJECTION INTRAMUSCULAR; INTRAVENOUS; SUBCUTANEOUS at 09:43

## 2018-07-04 RX ADMIN — Medication 25 MILLIGRAM(S): at 00:17

## 2018-07-04 RX ADMIN — Medication 650 MILLIGRAM(S): at 23:03

## 2018-07-04 RX ADMIN — Medication 650 MILLIGRAM(S): at 18:29

## 2018-07-04 RX ADMIN — HYDROMORPHONE HYDROCHLORIDE 1 MILLIGRAM(S): 2 INJECTION INTRAMUSCULAR; INTRAVENOUS; SUBCUTANEOUS at 10:21

## 2018-07-04 RX ADMIN — Medication 650 MILLIGRAM(S): at 12:00

## 2018-07-04 RX ADMIN — HEPARIN SODIUM 5000 UNIT(S): 5000 INJECTION INTRAVENOUS; SUBCUTANEOUS at 05:11

## 2018-07-04 NOTE — PROGRESS NOTE ADULT - SUBJECTIVE AND OBJECTIVE BOX
Subjective: "Im eating a little but I get full easily and feel nauseated" No n/v or abd pain. Pt. still complains of diffuse "severe" pain throughout chest and abd. Feels that he isn't "healed", needs to continue IV pain medication only to control his pain.  Extensive conversation had between pt. and Dr. Moody this am explaining reason for his feelings of fullness/nausea risks by not having additional tests and EGD done. Pt. also given teaching about diet, ambulation, converting medications to oral and need for caloric intake to sustain life. Pt. still resistant to any further interventions including possible feeding tube placement for adequate nutrition. Pt. also explained at length risk for aspiration if he overeats and sequelae of malnutrition       Vital Signs:  Vital Signs Last 24 Hrs  T(C): 36.8 (07-04-18 @ 09:36), Max: 36.9 (07-04-18 @ 00:01)  T(F): 98.2 (07-04-18 @ 09:36), Max: 98.4 (07-04-18 @ 00:01)  HR: 82 (07-04-18 @ 09:36) (79 - 85)  BP: 121/83 (07-04-18 @ 09:36) (105/68 - 124/80)  RR: 18 (07-04-18 @ 09:36) (18 - 18)  SpO2: 96% (07-04-18 @ 09:36) (96% - 98%) on (O2)    Telemetry/Alarms:  General: WN/WD NAD. No distress. No s/s of pain/discomfort  Neurology: Awake, nonfocal, CASH x 4  Eyes: Scleras clear, PERRLA/ EOMI, Gross vision intact  ENT:Gross hearing intact, grossly patent pharynx, no stridor  Neck: Neck supple, trachea midline, No JVD,   Respiratory: CTA B/L, No wheezing, rales, rhonchi  CV: RRR, S1S2, no murmurs, rubs or gallops  Abdominal: Soft, NT, ND +BS, No BM x 7 days. + Flatus.   Extremities: No edema, + peripheral pulses  Skin: No Rashes, Hematoma, Ecchymosis  Lymphatic: No Neck, axilla, groin LAD  Psych: Oriented x 3, normal affect  Incisions: Healing well, no swelling, redness, drainage.     Relevant labs, radiology and Medications reviewed            MEDICATIONS  (STANDING):  acetaminophen    Suspension. 650 milliGRAM(s) Oral every 6 hours  famotidine    Tablet 20 milliGRAM(s) Oral daily  gabapentin 200 milliGRAM(s) Oral every 8 hours  heparin  Injectable 5000 Unit(s) SubCutaneous every 8 hours  polyethylene glycol 3350 17 Gram(s) Oral two times a day    MEDICATIONS  (PRN):  bisacodyl Suppository 10 milliGRAM(s) Rectal daily PRN Constipation  HYDROmorphone   Tablet 2 milliGRAM(s) Oral every 4 hours PRN Moderate Pain (4 - 6)  HYDROmorphone   Tablet 4 milliGRAM(s) Oral every 6 hours PRN Severe Pain (7 - 10)    Pertinent Physical Exam  I&O's Summary    03 Jul 2018 07:01  -  04 Jul 2018 07:00  --------------------------------------------------------  IN: 1200 mL / OUT: 1160 mL / NET: 40 mL    04 Jul 2018 07:01  -  04 Jul 2018 13:14  --------------------------------------------------------  IN: 300 mL / OUT: 800 mL / NET: -500 mL        Assessment  52y Male  w/ PAST MEDICAL & SURGICAL HISTORY:  Esophageal cancer  MI (myocardial infarction): 2015 with 1 coronary stent  History of esophageal surgery  H/O hernia repair: 1966  Status post tonsillectomy and adenoidectomy  Stented coronary artery: x1 2015  admitted with complaints of Patient is a 52y old  Male who presents with a chief complaint of chest pain (29 Jun 2018 19:39)  This is a 52 year old male with a PMHX of MI with 1 stent, esophageal carcinoma (last chemo/rad 1/18), s/p robotic [place]-Andrew Esophagogastrectomy on 6/8/18 with Dr. Brown/Dr. Yousif pw 10/10 midsternal CP radiating to back and shoulders started when he was asleep at 4AM, worsening on movement. Pt had chest pain since surgery but never this severe. Patient stated he had no difficulty tolerating his diet and had salmon and rice yesterday night, which he ate with no difficulty. Pt denies any dysphagia or recent trouble eating food. Pt febrile in ED to 100.8, WBC elected to 15, started on abx. EKG done in ED showed new 1mm EDUARDO in AvL, I, not present in present in pt's pre op EKGs. CTA done in ER. On CT scan pt. w distended stomach, signs of poor emptying. Placed NPO on IVF with Vanco and Zosyn,.  Refusing NGT. Kept NPO over weekend with plans for EGD on 7/2/18. Pt. has been afebrile >24hrs. No GI complaints. Today 7/2 pt. refusing OR or Barium Swallow.   7/3-Antibiotics stopped. Pt. started on Clear liquid diet per Dr. Corbin. Continuing to refuse EGD or Ba Swa.   7/4-Today advanced to Fulls po, only tolerating sml amounts before becoming "full".   PLAN  Neuro: Pain management. Will change medication to oral. Unusual for pt. to have "severe" pain at POD #26.   Pulm: Encourage coughing, deep breathing and use of incentive spirometry.   Cardio: Monitor telemetry/alarms  GI: Will cont Full liquids as tolerated. Will add Ensure. Continued to re-teach pt. abt eating sml meals, keep HOB up. Will order Calorie Count and Bowel Regiment for constipation. Will stop IVF  Renal: monitor urine output, supplement electrolytes as needed  Vasc: Heparin SC/SCDs for DVT prophylaxis  Heme: Stable H/H. .   ID: Off antibiotics. Stable.  Therapy: OOB/ambulate    Disposition: Aim to D/C to home once confirmed that pt. can sustain oral intake  Discussed with Cardiothoracic Team at AM rounds.

## 2018-07-05 ENCOUNTER — TRANSCRIPTION ENCOUNTER (OUTPATIENT)
Age: 53
End: 2018-07-05

## 2018-07-05 VITALS
TEMPERATURE: 98 F | RESPIRATION RATE: 18 BRPM | SYSTOLIC BLOOD PRESSURE: 116 MMHG | HEART RATE: 90 BPM | DIASTOLIC BLOOD PRESSURE: 77 MMHG | OXYGEN SATURATION: 96 %

## 2018-07-05 LAB
BUN SERPL-MCNC: 8 MG/DL — SIGNIFICANT CHANGE UP (ref 7–23)
CALCIUM SERPL-MCNC: 9.7 MG/DL — SIGNIFICANT CHANGE UP (ref 8.4–10.5)
CHLORIDE SERPL-SCNC: 103 MMOL/L — SIGNIFICANT CHANGE UP (ref 98–107)
CO2 SERPL-SCNC: 24 MMOL/L — SIGNIFICANT CHANGE UP (ref 22–31)
CREAT SERPL-MCNC: 0.95 MG/DL — SIGNIFICANT CHANGE UP (ref 0.5–1.3)
GLUCOSE SERPL-MCNC: 85 MG/DL — SIGNIFICANT CHANGE UP (ref 70–99)
HCT VFR BLD CALC: 41 % — SIGNIFICANT CHANGE UP (ref 39–50)
HGB BLD-MCNC: 13 G/DL — SIGNIFICANT CHANGE UP (ref 13–17)
MCHC RBC-ENTMCNC: 26.2 PG — LOW (ref 27–34)
MCHC RBC-ENTMCNC: 31.7 % — LOW (ref 32–36)
MCV RBC AUTO: 82.7 FL — SIGNIFICANT CHANGE UP (ref 80–100)
NRBC # FLD: 0 — SIGNIFICANT CHANGE UP
PLATELET # BLD AUTO: 305 K/UL — SIGNIFICANT CHANGE UP (ref 150–400)
PMV BLD: 9.7 FL — SIGNIFICANT CHANGE UP (ref 7–13)
POTASSIUM SERPL-MCNC: 4.3 MMOL/L — SIGNIFICANT CHANGE UP (ref 3.5–5.3)
POTASSIUM SERPL-SCNC: 4.3 MMOL/L — SIGNIFICANT CHANGE UP (ref 3.5–5.3)
RBC # BLD: 4.96 M/UL — SIGNIFICANT CHANGE UP (ref 4.2–5.8)
RBC # FLD: 14 % — SIGNIFICANT CHANGE UP (ref 10.3–14.5)
SODIUM SERPL-SCNC: 138 MMOL/L — SIGNIFICANT CHANGE UP (ref 135–145)
WBC # BLD: 6.15 K/UL — SIGNIFICANT CHANGE UP (ref 3.8–10.5)
WBC # FLD AUTO: 6.15 K/UL — SIGNIFICANT CHANGE UP (ref 3.8–10.5)

## 2018-07-05 PROCEDURE — 71045 X-RAY EXAM CHEST 1 VIEW: CPT | Mod: 26

## 2018-07-05 PROCEDURE — 99238 HOSP IP/OBS DSCHRG MGMT 30/<: CPT

## 2018-07-05 RX ORDER — HYDROMORPHONE HYDROCHLORIDE 2 MG/ML
1 INJECTION INTRAMUSCULAR; INTRAVENOUS; SUBCUTANEOUS
Qty: 24 | Refills: 0 | OUTPATIENT
Start: 2018-07-05 | End: 2018-07-08

## 2018-07-05 RX ORDER — GABAPENTIN 400 MG/1
2 CAPSULE ORAL
Qty: 84 | Refills: 0 | OUTPATIENT
Start: 2018-07-05 | End: 2018-07-18

## 2018-07-05 RX ORDER — DIPHENHYDRAMINE HCL 50 MG
25 CAPSULE ORAL ONCE
Qty: 0 | Refills: 0 | Status: COMPLETED | OUTPATIENT
Start: 2018-07-05 | End: 2018-07-05

## 2018-07-05 RX ORDER — HYDROMORPHONE HYDROCHLORIDE 2 MG/ML
1 INJECTION INTRAMUSCULAR; INTRAVENOUS; SUBCUTANEOUS
Qty: 0 | Refills: 0 | COMMUNITY
Start: 2018-07-05

## 2018-07-05 RX ORDER — POLYETHYLENE GLYCOL 3350 17 G/17G
17 POWDER, FOR SOLUTION ORAL
Qty: 0 | Refills: 0 | COMMUNITY
Start: 2018-07-05

## 2018-07-05 RX ORDER — GABAPENTIN 400 MG/1
2 CAPSULE ORAL
Qty: 0 | Refills: 0 | COMMUNITY
Start: 2018-07-05

## 2018-07-05 RX ORDER — ACETAMINOPHEN 500 MG
20 TABLET ORAL
Qty: 0 | Refills: 0 | COMMUNITY
Start: 2018-07-05

## 2018-07-05 RX ADMIN — GABAPENTIN 200 MILLIGRAM(S): 400 CAPSULE ORAL at 05:31

## 2018-07-05 RX ADMIN — Medication 25 MILLIGRAM(S): at 00:44

## 2018-07-05 RX ADMIN — GABAPENTIN 200 MILLIGRAM(S): 400 CAPSULE ORAL at 13:21

## 2018-07-05 RX ADMIN — FAMOTIDINE 20 MILLIGRAM(S): 10 INJECTION INTRAVENOUS at 11:45

## 2018-07-05 RX ADMIN — HEPARIN SODIUM 5000 UNIT(S): 5000 INJECTION INTRAVENOUS; SUBCUTANEOUS at 05:31

## 2018-07-05 RX ADMIN — Medication 650 MILLIGRAM(S): at 00:01

## 2018-07-05 RX ADMIN — POLYETHYLENE GLYCOL 3350 17 GRAM(S): 17 POWDER, FOR SOLUTION ORAL at 05:31

## 2018-07-05 RX ADMIN — Medication 650 MILLIGRAM(S): at 05:31

## 2018-07-05 RX ADMIN — Medication 650 MILLIGRAM(S): at 11:44

## 2018-07-05 NOTE — DISCHARGE NOTE ADULT - CARE PROVIDERS DIRECT ADDRESSES
[email or web address]

## 2018-07-05 NOTE — DISCHARGE NOTE ADULT - PATIENT PORTAL LINK FT
You can access the [facility] Patient Portal, offered by [facility], by registering with the following website: [email or web address]

## 2018-07-05 NOTE — DISCHARGE NOTE ADULT - CARE PROVIDER_API CALL
Abdifatah Brown), Thoracic Surgery  [address] [place] [id number]  Phone: [phone]  Fax: [phone]    Serafin Yousif), ColonRectal Surgery; Surgery  [address] [place] [id number]  Phone: [phone]  Fax: [phone]

## 2018-07-05 NOTE — DISCHARGE NOTE ADULT - MEDICATION SUMMARY - MEDICATIONS TO STOP TAKING
I will STOP taking the medications listed below when I get home from the hospital:    benzocaine-menthol 15 mg-3.6 mg mucous membrane lozenge  -- 1  mucous membrane every 6 hours, As Needed

## 2018-07-05 NOTE — DISCHARGE NOTE ADULT - PLAN OF CARE
Goal is improved emptying of stomach and you being able to sustain your Nutrition orally. Walk frequently. Climb stairs. Keep moving your bowels regularly. Follow diet instructions carefully. You are at risk of Aspiration if your stomach becomes too full with food or liquids. Shower daily. Leave wounds uncovered.   Follow up with Dr. Brown and Dr. Yousif next week. Call for your apts.  Return to office or ER sooner if symptoms worsen or you are unable to eat.

## 2018-07-05 NOTE — DISCHARGE NOTE ADULT - CARE PLAN
Principal Discharge DX:	Dysphagia, unspecified type  Goal:	Goal is improved emptying of stomach and you being able to sustain your Nutrition orally.  Assessment and plan of treatment:	Walk frequently. Climb stairs. Keep moving your bowels regularly. Follow diet instructions carefully. You are at risk of Aspiration if your stomach becomes too full with food or liquids. Shower daily. Leave wounds uncovered.   Follow up with Dr. Brown and Dr. Yousif next week. Call for your apts.  Return to office or ER sooner if symptoms worsen or you are unable to eat.

## 2018-07-05 NOTE — DISCHARGE NOTE ADULT - HOSPITAL COURSE
This is a 52 year old male with a PMHX of MI with 1 stent, esophageal carcinoma (last chemo/rad 1/18), s/p robotic Arley-Andrew Esophagogastrectomy on 6/8/18 with Dr. Brown/Dr. Yousif pw 10/10 midsternal CP radiating to back and shoulders started when he was asleep at 4AM, worsening on movement. Pt had chest pain since surgery but never this severe. Patient stated he had no difficulty tolerating his diet and had salmon and rice yesterday night, which he ate with no difficulty. Pt denies any dysphagia or recent trouble eating food. Pt febrile in ED to 100.8, WBC elected to 15, started on abx. EKG done in ED showed new 1mm EDUARDO in AvL, I, not present in present in pt's pre op EKGs. CTA done in ER. On CT scan pt. w distended stomach, signs of poor emptying. Placed NPO on IVF with Vanco and Zosyn,.  Refusing NGT. Kept NPO over weekend with plans for EGD on 7/2/18. Pt. has been afebrile >24hrs. No GI complaints. Today 7/2 pt. refusing OR or Barium Swallow.   7/3-Antibiotics stopped. Pt. started on Clear liquid diet per Dr. Corbin. Continuing to refuse EGD or Ba Swa.   7/4-Today advanced to Fulls po, only tolerating sml amounts before becoming "full". On 7/4/18-Extensive conversation had between pt. and Dr. Moody this am explaining reason for his feelings of fullness/nausea risks by not having additional tests and EGD done. Pt. also given teaching about diet, ambulation, converting medications to oral and need for caloric intake to sustain life. Pt. still resistant to any further interventions including possible feeding tube placement for adequate nutrition. Pt. also explained at length risk for aspiration if he overeats and sequelae of malnutrition   Today 7/5/18 pt tolerating 75% to 100% of his liquid diet. Denies SOB, CP, abd pain, n/v/d or feelings of fullness. S/p BM today. All wounds continue to heal, c/d/i. PT. ambulating frequently. Again, pt. spoken to by Dr. Corbin today to reiterate recommendations for EGD/dilation or consideration of feeding tube. Risks of aspiration and malnutrition discussed. Pt. still refusing any procedure or intervention. Tolerating liquid diet and requesting to be discharged to home. FU instructions and diet instructions given at length with understanding. Cleared for d/c to home by Dr. Corbin.

## 2018-07-05 NOTE — DISCHARGE NOTE ADULT - MEDICATION SUMMARY - MEDICATIONS TO TAKE
I will START or STAY ON the medications listed below when I get home from the hospital:    aspirin 81 mg oral tablet  -- 1 tab(s) by mouth once a day  -- Indication: For anti platelet    acetaminophen 160 mg/5 mL oral suspension  -- 20 milliliter(s) by mouth every 6 hours, As Needed  -- Indication: For pain    Advil 200 mg oral tablet  -- 2 tab(s) by mouth every 6 hours, As Needed  -- Indication: For pain    HYDROmorphone 2 mg oral tablet  -- 1 tab(s) by mouth every 4 hours, As needed, Moderate Pain (4 - 6) MDD:6  -- Indication: For pain    gabapentin 100 mg oral capsule  -- 2 cap(s) by mouth every 8 hours  -- Indication: For pain    Colace 100 mg oral capsule  -- 1 cap(s) by mouth 2 times a day  -- Indication: For Constipation    senna oral tablet  -- 2 tab(s) by mouth once a day  -- Indication: For Constipation    bisacodyl 10 mg rectal suppository  -- 1 suppository(ies) rectally once a day, As needed, Constipation  -- Indication: For Constipation    polyethylene glycol 3350 oral powder for reconstitution  -- 17 gram(s) by mouth once a day  -- Indication: For Constipation

## 2018-07-05 NOTE — DISCHARGE NOTE ADULT - NS AS ACTIVITY OBS
No Heavy lifting/straining/Showering allowed/Walking-Indoors allowed/Do not make important decisions/Do not drive or operate machinery/Walking-Outdoors allowed/Stairs allowed/Sex allowed

## 2018-07-05 NOTE — DISCHARGE NOTE ADULT - INSTRUCTIONS
Full liquid diet only. No carbonated beverages. No large amounts at one time. Eat small amounts frequently throughout the day. Stay hydrated. Can add supplement shakes like Ensure or Glucerna 3 x per day. Be completely upright at all times to lessen risk of aspiration. Please follow full liquid diet as instructed. If you develop any difficulties swallowing please contact Dr. Corbin office. IF you develop fever above 100.4 please go to nearest Emergency room.

## 2018-07-10 ENCOUNTER — INPATIENT (INPATIENT)
Facility: HOSPITAL | Age: 53
LOS: 5 days | Discharge: HOME | End: 2018-07-16
Attending: THORACIC SURGERY (CARDIOTHORACIC VASCULAR SURGERY) | Admitting: THORACIC SURGERY (CARDIOTHORACIC VASCULAR SURGERY)
Payer: COMMERCIAL

## 2018-07-10 ENCOUNTER — TRANSCRIPTION ENCOUNTER (OUTPATIENT)
Age: 53
End: 2018-07-10

## 2018-07-10 VITALS
OXYGEN SATURATION: 97 % | RESPIRATION RATE: 20 BRPM | HEART RATE: 129 BPM | DIASTOLIC BLOOD PRESSURE: 71 MMHG | TEMPERATURE: 98 F | SYSTOLIC BLOOD PRESSURE: 101 MMHG

## 2018-07-10 DIAGNOSIS — Z98.890 OTHER SPECIFIED POSTPROCEDURAL STATES: Chronic | ICD-10-CM

## 2018-07-10 DIAGNOSIS — Z90.89 ACQUIRED ABSENCE OF OTHER ORGANS: Chronic | ICD-10-CM

## 2018-07-10 DIAGNOSIS — Z95.5 PRESENCE OF CORONARY ANGIOPLASTY IMPLANT AND GRAFT: Chronic | ICD-10-CM

## 2018-07-10 LAB
ALBUMIN SERPL ELPH-MCNC: 3.5 G/DL — SIGNIFICANT CHANGE UP (ref 3.3–5)
ALP SERPL-CCNC: 76 U/L — SIGNIFICANT CHANGE UP (ref 40–120)
ALT FLD-CCNC: 12 U/L — SIGNIFICANT CHANGE UP (ref 4–41)
AST SERPL-CCNC: 17 U/L — SIGNIFICANT CHANGE UP (ref 4–40)
BASE EXCESS BLDV CALC-SCNC: 2.1 MMOL/L — SIGNIFICANT CHANGE UP
BASOPHILS # BLD AUTO: 0.02 K/UL — SIGNIFICANT CHANGE UP (ref 0–0.2)
BASOPHILS NFR BLD AUTO: 0.1 % — SIGNIFICANT CHANGE UP (ref 0–2)
BILIRUB SERPL-MCNC: 0.5 MG/DL — SIGNIFICANT CHANGE UP (ref 0.2–1.2)
BLOOD GAS VENOUS - CREATININE: 0.99 MG/DL — SIGNIFICANT CHANGE UP (ref 0.5–1.3)
BUN SERPL-MCNC: 15 MG/DL — SIGNIFICANT CHANGE UP (ref 7–23)
CALCIUM SERPL-MCNC: 9.2 MG/DL — SIGNIFICANT CHANGE UP (ref 8.4–10.5)
CHLORIDE BLDV-SCNC: 102 MMOL/L — SIGNIFICANT CHANGE UP (ref 96–108)
CHLORIDE SERPL-SCNC: 98 MMOL/L — SIGNIFICANT CHANGE UP (ref 98–107)
CO2 SERPL-SCNC: 24 MMOL/L — SIGNIFICANT CHANGE UP (ref 22–31)
CREAT SERPL-MCNC: 1.03 MG/DL — SIGNIFICANT CHANGE UP (ref 0.5–1.3)
D DIMER BLD IA.RAPID-MCNC: 711 NG/ML — SIGNIFICANT CHANGE UP
EOSINOPHIL # BLD AUTO: 0.03 K/UL — SIGNIFICANT CHANGE UP (ref 0–0.5)
EOSINOPHIL NFR BLD AUTO: 0.2 % — SIGNIFICANT CHANGE UP (ref 0–6)
GAS PNL BLDV: 131 MMOL/L — LOW (ref 136–146)
GLUCOSE BLDV-MCNC: 107 — HIGH (ref 70–99)
GLUCOSE SERPL-MCNC: 107 MG/DL — HIGH (ref 70–99)
HCO3 BLDV-SCNC: 26 MMOL/L — SIGNIFICANT CHANGE UP (ref 20–27)
HCT VFR BLD CALC: 39.8 % — SIGNIFICANT CHANGE UP (ref 39–50)
HCT VFR BLDV CALC: 40.1 % — SIGNIFICANT CHANGE UP (ref 39–51)
HGB BLD-MCNC: 12.9 G/DL — LOW (ref 13–17)
HGB BLDV-MCNC: 13.1 G/DL — SIGNIFICANT CHANGE UP (ref 13–17)
IMM GRANULOCYTES # BLD AUTO: 0.06 # — SIGNIFICANT CHANGE UP
IMM GRANULOCYTES NFR BLD AUTO: 0.4 % — SIGNIFICANT CHANGE UP (ref 0–1.5)
LACTATE BLDV-MCNC: 1.7 MMOL/L — SIGNIFICANT CHANGE UP (ref 0.5–2)
LYMPHOCYTES # BLD AUTO: 0.76 K/UL — LOW (ref 1–3.3)
LYMPHOCYTES # BLD AUTO: 5.7 % — LOW (ref 13–44)
MCHC RBC-ENTMCNC: 27.1 PG — SIGNIFICANT CHANGE UP (ref 27–34)
MCHC RBC-ENTMCNC: 32.4 % — SIGNIFICANT CHANGE UP (ref 32–36)
MCV RBC AUTO: 83.6 FL — SIGNIFICANT CHANGE UP (ref 80–100)
MONOCYTES # BLD AUTO: 1.31 K/UL — HIGH (ref 0–0.9)
MONOCYTES NFR BLD AUTO: 9.8 % — SIGNIFICANT CHANGE UP (ref 2–14)
NEUTROPHILS # BLD AUTO: 11.16 K/UL — HIGH (ref 1.8–7.4)
NEUTROPHILS NFR BLD AUTO: 83.8 % — HIGH (ref 43–77)
NRBC # FLD: 0 — SIGNIFICANT CHANGE UP
PCO2 BLDV: 41 MMHG — SIGNIFICANT CHANGE UP (ref 41–51)
PH BLDV: 7.42 PH — SIGNIFICANT CHANGE UP (ref 7.32–7.43)
PLATELET # BLD AUTO: 378 K/UL — SIGNIFICANT CHANGE UP (ref 150–400)
PMV BLD: 10 FL — SIGNIFICANT CHANGE UP (ref 7–13)
PO2 BLDV: 41 MMHG — HIGH (ref 35–40)
POTASSIUM BLDV-SCNC: 5.8 MMOL/L — HIGH (ref 3.4–4.5)
POTASSIUM SERPL-MCNC: 5.1 MMOL/L — SIGNIFICANT CHANGE UP (ref 3.5–5.3)
POTASSIUM SERPL-SCNC: 5.1 MMOL/L — SIGNIFICANT CHANGE UP (ref 3.5–5.3)
PROT SERPL-MCNC: 7.5 G/DL — SIGNIFICANT CHANGE UP (ref 6–8.3)
RBC # BLD: 4.76 M/UL — SIGNIFICANT CHANGE UP (ref 4.2–5.8)
RBC # FLD: 15.1 % — HIGH (ref 10.3–14.5)
SAO2 % BLDV: 73 % — SIGNIFICANT CHANGE UP (ref 60–85)
SODIUM SERPL-SCNC: 134 MMOL/L — LOW (ref 135–145)
TROPONIN T, HIGH SENSITIVITY: 16 NG/L — SIGNIFICANT CHANGE UP (ref ?–14)
WBC # BLD: 13.34 K/UL — HIGH (ref 3.8–10.5)
WBC # FLD AUTO: 13.34 K/UL — HIGH (ref 3.8–10.5)

## 2018-07-10 PROCEDURE — 71275 CT ANGIOGRAPHY CHEST: CPT | Mod: 26

## 2018-07-10 PROCEDURE — 71046 X-RAY EXAM CHEST 2 VIEWS: CPT | Mod: 26

## 2018-07-10 RX ORDER — SODIUM CHLORIDE 9 MG/ML
1000 INJECTION INTRAMUSCULAR; INTRAVENOUS; SUBCUTANEOUS ONCE
Qty: 0 | Refills: 0 | Status: COMPLETED | OUTPATIENT
Start: 2018-07-10 | End: 2018-07-10

## 2018-07-10 RX ORDER — HYDROMORPHONE HYDROCHLORIDE 2 MG/ML
1 INJECTION INTRAMUSCULAR; INTRAVENOUS; SUBCUTANEOUS ONCE
Qty: 0 | Refills: 0 | Status: DISCONTINUED | OUTPATIENT
Start: 2018-07-10 | End: 2018-07-10

## 2018-07-10 RX ADMIN — SODIUM CHLORIDE 500 MILLILITER(S): 9 INJECTION INTRAMUSCULAR; INTRAVENOUS; SUBCUTANEOUS at 23:35

## 2018-07-10 RX ADMIN — HYDROMORPHONE HYDROCHLORIDE 1 MILLIGRAM(S): 2 INJECTION INTRAMUSCULAR; INTRAVENOUS; SUBCUTANEOUS at 23:50

## 2018-07-10 RX ADMIN — HYDROMORPHONE HYDROCHLORIDE 1 MILLIGRAM(S): 2 INJECTION INTRAMUSCULAR; INTRAVENOUS; SUBCUTANEOUS at 23:35

## 2018-07-10 RX ADMIN — SODIUM CHLORIDE 1000 MILLILITER(S): 9 INJECTION INTRAMUSCULAR; INTRAVENOUS; SUBCUTANEOUS at 21:34

## 2018-07-10 NOTE — ED ADULT NURSE REASSESSMENT NOTE - NS ED NURSE REASSESS COMMENT FT1
facilitator RN- pt VS as noted, in NAD, medicated per orders for pain and temp. pt status reported to primary RN in area.

## 2018-07-10 NOTE — ED ADULT NURSE NOTE - ED STAT RN HANDOFF DETAILS
endorsed to imelda sullivan. pt a&o x3, remains in sinus tach, md aware. no c/o pain. nad noted. safety maintained

## 2018-07-10 NOTE — ED PROVIDER NOTE - OBJECTIVE STATEMENT
This is a 52 year old male with a PMHX of MI with 1 stent, esophageal carcinoma (last chemo/rad 1/18), s/p robotic Arley-Andrew Esophagogastrectomy on 6/8/18 with Dr. Brown/Dr. Yousif pw 10/10 midsternal CP radiating to back and shoulders started when he was asleep at 4AM, worsening on movement. Pt had chest pain since surgery but never this severe. Patient stated he had no difficulty tolerating his diet and had salmon and rice yesterday night, which he ate with no difficulty. Pt denies any dysphagia or recent trouble eating food. Pt febrile in ED to 100.8, WBC elected to 15, started on abx. EKG done in ED showed new 1mm EDUARDO in AvL, I, not present in present in pt's pre op EKGs. CTA done in ER. On CT scan pt. w distended stomach, signs of poor emptying. Placed NPO on IVF with Vanco and Zosyn,.  Refusing NGT. Kept NPO over weekend with plans for EGD on 7/2/18. Pt. has been afebrile >24hrs. No GI complaints. Today 7/2 pt. refusing OR or Barium Swallow.   7/3-Antibiotics stopped. Pt. started on Clear liquid diet per Dr. Corbin. Continuing to refuse EGD or Ba Swa.   7/4-Today advanced to Fulls po, only tolerating sml amounts before becoming "full". On 7/4/18-Extensive conversation had between pt. and Dr. Moody this am explaining reason for his feelings of fullness/nausea risks by not having additional tests and EGD done. Pt. also given teaching about diet, ambulation, converting medications to oral and need for caloric intake to sustain life. Pt. still resistant to any further interventions including possible feeding tube placement for adequate nutrition. Pt. also explained at length risk for aspiration if he overeats and sequelae of malnutrition   Today 7/5/18 pt tolerating 75% to 100% of his liquid diet. Denies SOB, CP, abd pain, n/v/d or feelings of fullness. S/p BM today. All wounds continue to heal, c/d/i. PT. ambulating frequently. Again, pt. spoken to by Dr. Corbin today to reiterate recommendations for EGD/dilation or consideration of feeding tube. Risks of aspiration and malnutrition discussed. Pt. still refusing any procedure or intervention. Tolerating liquid diet and requesting to be discharged to home. FU instructions and diet instructions given at length with understanding. Cleared for d/c to home by Dr. Corbin. 52 year old male with PMH of MI with 1 stent placed about 3 years ago, esophageal carcinoma (last chemo/rad 1/18), s/p robotic Arley-Andrew Esophagogastrectomy on 6/8/18 with Dr. Brown/Dr. Benja lee left sided chest pain. Pt states he has had chest, back, and abdominal pain with movement since his surgery but today's pain feels different. Denies SOB, METCALF but noted to be tachycardic. Patient notes decreased PO intake since his surgery, instructed to be on a liquid diet and notes he has lost about 20-30 pounds since his surgery and states  he feels he is dehydrated because he is not drinking enough water. Denies any dysphagia or recent trouble eating food. Denies paresthesias, weakness, paralysis. 52 year old male with PMH of MI with 1 stent placed about 3 years ago, esophageal carcinoma (last chemo/rad 1/18), s/p robotic Arley-Andrew Esophagogastrectomy on 6/8/18 with Dr. Brown/Dr. Benja lee left sided chest pain. Pt states he has had chest, back, and abdominal pain with movement since his surgery but today's pain feels different. Denies SOB, METCALF but noted to be tachycardic. Patient notes decreased PO intake since his surgery, instructed to be on a liquid diet and notes he has lost about 20-30 pounds since his surgery and states  he feels he is dehydrated because he is not drinking enough water. Denies any dysphagia or recent trouble eating food. Denies paresthesias, weakness, .paralysis.

## 2018-07-10 NOTE — ED PROVIDER NOTE - PROGRESS NOTE DETAILS
[place]: CT showing new pericardial effusion. Tachy ~115, other vitals wnl. Currently (at rest) pt has no CP or SOB. Feels very minimally lightheaded but much improved. Well appearing. CT surg consulted. Updated pt. Antonette: Accepted to [facility].

## 2018-07-10 NOTE — ED CLERICAL - NS ED CLERK NOTE PRE-ARRIVAL INFORMATION; ADDITIONAL PRE-ARRIVAL INFORMATION
sent from urgent care had esophogeal tumour removed one week ago  pt c/o cp sob tac acardia  r/o cardiac or pe

## 2018-07-10 NOTE — ED PROVIDER NOTE - ATTENDING CONTRIBUTION TO CARE
Attending note:   After face to face evaluation of this patient, I concur with above noted hx, pe, and care plan for this patient. 51 y/o M with h/o gastric tumor with resection and pull-through, +CAD with stent c/o weakness from poor po intake and chest discomfort today, now resolved.  No dyspnea today.   No pleuritic pain today.   +Tachycardia.    evaluation in progress

## 2018-07-10 NOTE — ED ADULT NURSE NOTE - OBJECTIVE STATEMENT
Alert and oriented x 4. Pt received to spot 2 due to chest pain. Pt states : " I had chest pain on and off today and I feel weak. "  Pt states he also has poor intake recently due to GI hx and has been losing weight. Pt denies pain at this. Denies shortness of breath, nausea, vomiting , dizziness, painful urination or diarrhea.  Pt has cardiac hx of stents and MI. IV 20g to left hand. Labs drawn.  Sinus tachycardiac on monitor. VSS. MD Sow at bedside. Pt walks. Wife at bedside.  Will continue to monitor.

## 2018-07-10 NOTE — ED ADULT TRIAGE NOTE - CHIEF COMPLAINT QUOTE
pt. c/o "aching" L sided CP since this AM, denies SOB, respirations even and unlabored in triage.  Pt. s/p esophageal sx on 06/08/18 d/t esophageal CA.  Pt. tachycardic in triage.  PMHx MI w/ 1 cardiac stent.

## 2018-07-11 ENCOUNTER — APPOINTMENT (OUTPATIENT)
Dept: THORACIC SURGERY | Facility: CLINIC | Age: 53
End: 2018-07-11

## 2018-07-11 DIAGNOSIS — I31.3 PERICARDIAL EFFUSION (NONINFLAMMATORY): ICD-10-CM

## 2018-07-11 LAB — TROPONIN T, HIGH SENSITIVITY: 15 NG/L — SIGNIFICANT CHANGE UP (ref ?–14)

## 2018-07-11 PROCEDURE — 99291 CRITICAL CARE FIRST HOUR: CPT

## 2018-07-11 PROCEDURE — 88305 TISSUE EXAM BY PATHOLOGIST: CPT | Mod: 26

## 2018-07-11 PROCEDURE — 75989 ABSCESS DRAINAGE UNDER X-RAY: CPT | Mod: 26,GC

## 2018-07-11 PROCEDURE — 99223 1ST HOSP IP/OBS HIGH 75: CPT

## 2018-07-11 PROCEDURE — 99254 IP/OBS CNSLTJ NEW/EST MOD 60: CPT | Mod: GC

## 2018-07-11 PROCEDURE — 33015: CPT

## 2018-07-11 PROCEDURE — 93306 TTE W/DOPPLER COMPLETE: CPT | Mod: 26

## 2018-07-11 PROCEDURE — 88112 CYTOPATH CELL ENHANCE TECH: CPT | Mod: 26

## 2018-07-11 RX ORDER — SENNA PLUS 8.6 MG/1
2 TABLET ORAL AT BEDTIME
Qty: 0 | Refills: 0 | Status: DISCONTINUED | OUTPATIENT
Start: 2018-07-11 | End: 2018-07-16

## 2018-07-11 RX ORDER — CHLORHEXIDINE GLUCONATE 213 G/1000ML
5 SOLUTION TOPICAL
Qty: 0 | Refills: 0 | Status: DISCONTINUED | OUTPATIENT
Start: 2018-07-11 | End: 2018-07-11

## 2018-07-11 RX ORDER — ASPIRIN/CALCIUM CARB/MAGNESIUM 324 MG
81 TABLET ORAL DAILY
Qty: 0 | Refills: 0 | Status: DISCONTINUED | OUTPATIENT
Start: 2018-07-11 | End: 2018-07-11

## 2018-07-11 RX ORDER — HYDROMORPHONE HYDROCHLORIDE 2 MG/ML
2 INJECTION INTRAMUSCULAR; INTRAVENOUS; SUBCUTANEOUS EVERY 6 HOURS
Qty: 0 | Refills: 0 | Status: DISCONTINUED | OUTPATIENT
Start: 2018-07-11 | End: 2018-07-13

## 2018-07-11 RX ORDER — ACETAMINOPHEN 500 MG
650 TABLET ORAL ONCE
Qty: 0 | Refills: 0 | Status: COMPLETED | OUTPATIENT
Start: 2018-07-11 | End: 2018-07-11

## 2018-07-11 RX ORDER — MORPHINE SULFATE 50 MG/1
2 CAPSULE, EXTENDED RELEASE ORAL EVERY 4 HOURS
Qty: 0 | Refills: 0 | Status: DISCONTINUED | OUTPATIENT
Start: 2018-07-11 | End: 2018-07-13

## 2018-07-11 RX ORDER — HEPARIN SODIUM 5000 [USP'U]/ML
5000 INJECTION INTRAVENOUS; SUBCUTANEOUS EVERY 8 HOURS
Qty: 0 | Refills: 0 | Status: DISCONTINUED | OUTPATIENT
Start: 2018-07-11 | End: 2018-07-16

## 2018-07-11 RX ORDER — GABAPENTIN 400 MG/1
200 CAPSULE ORAL EVERY 8 HOURS
Qty: 0 | Refills: 0 | Status: DISCONTINUED | OUTPATIENT
Start: 2018-07-11 | End: 2018-07-16

## 2018-07-11 RX ORDER — ASPIRIN/CALCIUM CARB/MAGNESIUM 324 MG
81 TABLET ORAL DAILY
Qty: 0 | Refills: 0 | Status: DISCONTINUED | OUTPATIENT
Start: 2018-07-11 | End: 2018-07-16

## 2018-07-11 RX ORDER — POLYETHYLENE GLYCOL 3350 17 G/17G
17 POWDER, FOR SOLUTION ORAL DAILY
Qty: 0 | Refills: 0 | Status: DISCONTINUED | OUTPATIENT
Start: 2018-07-11 | End: 2018-07-16

## 2018-07-11 RX ORDER — CHLORHEXIDINE GLUCONATE 213 G/1000ML
1 SOLUTION TOPICAL
Qty: 0 | Refills: 0 | Status: DISCONTINUED | OUTPATIENT
Start: 2018-07-11 | End: 2018-07-12

## 2018-07-11 RX ORDER — ACETAMINOPHEN 500 MG
650 TABLET ORAL EVERY 6 HOURS
Qty: 0 | Refills: 0 | Status: DISCONTINUED | OUTPATIENT
Start: 2018-07-11 | End: 2018-07-16

## 2018-07-11 RX ORDER — DOCUSATE SODIUM 100 MG
100 CAPSULE ORAL
Qty: 0 | Refills: 0 | Status: DISCONTINUED | OUTPATIENT
Start: 2018-07-11 | End: 2018-07-16

## 2018-07-11 RX ORDER — MAGNESIUM SULFATE 500 MG/ML
1 VIAL (ML) INJECTION ONCE
Qty: 0 | Refills: 0 | Status: COMPLETED | OUTPATIENT
Start: 2018-07-11 | End: 2018-07-11

## 2018-07-11 RX ORDER — HYDROMORPHONE HYDROCHLORIDE 2 MG/ML
1 INJECTION INTRAMUSCULAR; INTRAVENOUS; SUBCUTANEOUS EVERY 4 HOURS
Qty: 0 | Refills: 0 | Status: DISCONTINUED | OUTPATIENT
Start: 2018-07-11 | End: 2018-07-11

## 2018-07-11 RX ORDER — CHLORHEXIDINE GLUCONATE 213 G/1000ML
5 SOLUTION TOPICAL EVERY 12 HOURS
Qty: 0 | Refills: 0 | Status: DISCONTINUED | OUTPATIENT
Start: 2018-07-11 | End: 2018-07-16

## 2018-07-11 RX ORDER — SODIUM CHLORIDE 9 MG/ML
1000 INJECTION, SOLUTION INTRAVENOUS
Qty: 0 | Refills: 0 | Status: DISCONTINUED | OUTPATIENT
Start: 2018-07-11 | End: 2018-07-13

## 2018-07-11 RX ADMIN — MORPHINE SULFATE 2 MILLIGRAM(S): 50 CAPSULE, EXTENDED RELEASE ORAL at 22:18

## 2018-07-11 RX ADMIN — SODIUM CHLORIDE 125 MILLILITER(S): 9 INJECTION, SOLUTION INTRAVENOUS at 23:10

## 2018-07-11 RX ADMIN — HYDROMORPHONE HYDROCHLORIDE 1 MILLIGRAM(S): 2 INJECTION INTRAMUSCULAR; INTRAVENOUS; SUBCUTANEOUS at 04:45

## 2018-07-11 RX ADMIN — HYDROMORPHONE HYDROCHLORIDE 1 MILLIGRAM(S): 2 INJECTION INTRAMUSCULAR; INTRAVENOUS; SUBCUTANEOUS at 12:15

## 2018-07-11 RX ADMIN — MORPHINE SULFATE 2 MILLIGRAM(S): 50 CAPSULE, EXTENDED RELEASE ORAL at 17:18

## 2018-07-11 RX ADMIN — HYDROMORPHONE HYDROCHLORIDE 1 MILLIGRAM(S): 2 INJECTION INTRAMUSCULAR; INTRAVENOUS; SUBCUTANEOUS at 12:00

## 2018-07-11 RX ADMIN — Medication 81 MILLIGRAM(S): at 17:16

## 2018-07-11 RX ADMIN — HEPARIN SODIUM 5000 UNIT(S): 5000 INJECTION INTRAVENOUS; SUBCUTANEOUS at 16:09

## 2018-07-11 RX ADMIN — HYDROMORPHONE HYDROCHLORIDE 2 MILLIGRAM(S): 2 INJECTION INTRAMUSCULAR; INTRAVENOUS; SUBCUTANEOUS at 16:09

## 2018-07-11 RX ADMIN — MORPHINE SULFATE 2 MILLIGRAM(S): 50 CAPSULE, EXTENDED RELEASE ORAL at 22:03

## 2018-07-11 RX ADMIN — HYDROMORPHONE HYDROCHLORIDE 1 MILLIGRAM(S): 2 INJECTION INTRAMUSCULAR; INTRAVENOUS; SUBCUTANEOUS at 07:00

## 2018-07-11 RX ADMIN — HYDROMORPHONE HYDROCHLORIDE 1 MILLIGRAM(S): 2 INJECTION INTRAMUSCULAR; INTRAVENOUS; SUBCUTANEOUS at 07:15

## 2018-07-11 RX ADMIN — Medication 650 MILLIGRAM(S): at 00:05

## 2018-07-11 RX ADMIN — HYDROMORPHONE HYDROCHLORIDE 2 MILLIGRAM(S): 2 INJECTION INTRAMUSCULAR; INTRAVENOUS; SUBCUTANEOUS at 16:25

## 2018-07-11 RX ADMIN — HYDROMORPHONE HYDROCHLORIDE 1 MILLIGRAM(S): 2 INJECTION INTRAMUSCULAR; INTRAVENOUS; SUBCUTANEOUS at 04:30

## 2018-07-11 RX ADMIN — Medication 100 GRAM(S): at 23:10

## 2018-07-11 RX ADMIN — SENNA PLUS 2 TABLET(S): 8.6 TABLET ORAL at 22:02

## 2018-07-11 RX ADMIN — GABAPENTIN 200 MILLIGRAM(S): 400 CAPSULE ORAL at 22:03

## 2018-07-11 RX ADMIN — HEPARIN SODIUM 5000 UNIT(S): 5000 INJECTION INTRAVENOUS; SUBCUTANEOUS at 22:02

## 2018-07-11 RX ADMIN — CHLORHEXIDINE GLUCONATE 5 MILLILITER(S): 213 SOLUTION TOPICAL at 05:16

## 2018-07-11 RX ADMIN — GABAPENTIN 200 MILLIGRAM(S): 400 CAPSULE ORAL at 16:09

## 2018-07-11 RX ADMIN — Medication 100 MILLIGRAM(S): at 05:16

## 2018-07-11 RX ADMIN — MORPHINE SULFATE 2 MILLIGRAM(S): 50 CAPSULE, EXTENDED RELEASE ORAL at 17:38

## 2018-07-11 RX ADMIN — CHLORHEXIDINE GLUCONATE 5 MILLILITER(S): 213 SOLUTION TOPICAL at 17:16

## 2018-07-11 RX ADMIN — HEPARIN SODIUM 5000 UNIT(S): 5000 INJECTION INTRAVENOUS; SUBCUTANEOUS at 05:15

## 2018-07-11 NOTE — H&P ADULT - ASSESSMENT
52 male s/p Robotic Arley Andrew esophagogastrectomy 6/8/18 and recent admission in which he refused EGD, dilation.  This admission pt presents with different chest pain and was found to have moderate pericardial effusion on CT chest.

## 2018-07-11 NOTE — H&P ADULT - NSHPPHYSICALEXAM_GEN_ALL_CORE
General: WN/WD NAD  Neurology: A&Ox3, nonfocal, CASH x 4  Eyes: PERRLA/ EOMI, Gross vision intact  ENT/Neck: Neck supple, trachea midline, No JVD, Gross hearing intact  Respiratory: CTA B/L, No wheezing, rales, rhonchi  CV: RRR, S1S2, no murmurs, rubs or gallops  Abdominal: Soft, NT, ND +BS,   Extremities: No edema, + peripheral pulses  Skin: No Rashes, Hematoma, Ecchymosis  Incision: clean and dry, no drainage or redness noted

## 2018-07-11 NOTE — H&P ADULT - HISTORY OF PRESENT ILLNESS
This is a 52 year old male with a PMHX of MI with 1 stent, esophageal carcinoma (last chemo/rad 1/18), s/p robotic Arley-Andrew Esophagogastrectomy on 6/8/18 with Dr. Brown/Dr. Yousif and a recent admission 6/29/18-7/5/18 in which pt refused EGD, dilation of pylorus.  This admission, pt presents to ER with complaints of chest pain that felt different from previous complaints and tachycardia, -130s. He had a CTA which revealed moderate pericardial effusion.  Of note, pt has only been tolerating a clear liquid diet at home, mostly broth and watermelon juice, he says he can barely consume one ensure can daily and has lost 20-30 lbs since initial surgery.

## 2018-07-11 NOTE — H&P ADULT - NSHPREVIEWOFSYSTEMS_GEN_ALL_CORE
REVIEW OF SYSTEMS      General: weak, "feel dehydrated", weight loss	    Skin/Breast: No Rashes/ Lesions/ Masses  	  Ophthalmologic: No Blurry vision/ Glaucoma/ Blindness  	  ENMT: No Hearing loss/ Drainage/ Lesions	    Respiratory and Thorax: No Cough/ Wheezing/ SOB/ Hemoptysis/ Sputum production  	  Cardiovascular: + substernal Chest pain and back pain    Gastrointestinal: No Nausea/ Vomiting/ Constipation/ Appetite Change, +abdominal pain	    Genitourinary: No Heamturia/ Dysuria/ Frequency change/ Impotence	    Musculoskeletal: No Pain/ Weakness/ Claudication	    Neurological: No Seizures/ TIA/CVA/ Parastesias	    Psychiatric: No Dementia/ Depression/ SI/HI	    Hematology/Lymphatics: No hx of bleeding/ Edema	    Endocrine:	No Hyperglycemia/ Hypoglycemia    Allergic/Immunologic:	 No Anaphylaxis/ Intolerance/ Recent illnesses

## 2018-07-11 NOTE — CONSULT NOTE ADULT - ATTENDING COMMENTS
Pt with gastroparesis. Discussed dilation and botox of the pylorus.    Pt refuses treatment at this time.

## 2018-07-11 NOTE — CONSULT NOTE ADULT - SUBJECTIVE AND OBJECTIVE BOX
[facility]      Chief Complaint:  Patient is a 52y old  Male who presents with a chief complaint of chest pain (11 Jul 2018 02:16)      Interval Events: 52 year old male with a PMHX of MI with 1 stent, esophageal carcinoma (last chemo/rad 1/18), s/p robotic [place]-Andrew Esophagogastrectomy on 6/8/18 with Dr. Brown/Dr. Yousif and a recent admission 6/29/18-7/5/18 in which pt refused EGD, dilation of pylorus.  This admission, pt presents to ER with complaints of chest pain that felt different from previous complaints and tachycardia, -130s. He had a CTA which revealed moderate pericardial effusion.  Of note, pt has only been tolerating a clear liquid diet at home, mostly broth and watermelon juice, he says he can barely consume one ensure can daily and has lost 20-30 lbs since initial surgery.     He had pericardial drain this afternoon.    Allergies:  No Known Allergies      Hospital Medications:  acetaminophen    Suspension. 650 milliGRAM(s) Oral every 6 hours PRN  aspirin enteric coated 81 milliGRAM(s) Oral daily  bisacodyl Suppository 10 milliGRAM(s) Rectal daily PRN  chlorhexidine 0.12% Liquid 5 milliLiter(s) Swish and Spit every 12 hours  chlorhexidine 4% Liquid 1 Application(s) Topical <User Schedule>  dextrose 5% + sodium chloride 0.9%. 1000 milliLiter(s) IV Continuous <Continuous>  docusate sodium 100 milliGRAM(s) Oral two times a day  gabapentin 200 milliGRAM(s) Oral every 8 hours  heparin  Injectable 5000 Unit(s) SubCutaneous every 8 hours  HYDROmorphone   Tablet 2 milliGRAM(s) Oral every 6 hours PRN  HYDROmorphone  Injectable 1 milliGRAM(s) IV Push every 4 hours PRN  polyethylene glycol 3350 17 Gram(s) Oral daily PRN  senna 2 Tablet(s) Oral at bedtime      PMHX/PSHX:  Esophageal cancer  MI (myocardial infarction)  History of esophageal surgery  H/O hernia repair  Status post tonsillectomy and adenoidectomy  Stented coronary artery      Family history:  Family history of cervical cancer  Family history of throat cancer      ROS:     General:  No fevers, chills  Eyes:  Good vision  ENT:  No odynophagia, dysphagia  CV:  No pain, palpitations  Resp:  No dyspnea, cough, tachypnea, wheezing  GI:  See HPI  Muscle:  No pain, weakness  Skin:  No rash, edema      PHYSICAL EXAM:     GENERAL:  No distress  HEENT: conjunctivae clear  CHEST:  Full & symmetric excursion, no increased effort  HEART:  Regular rhythm  ABDOMEN:  Soft, non-tender, non-distended  EXTREMITIES:  no edema  SKIN:  Dry/warm  NEURO:  Alert    Vital Signs:  Vital Signs Last 24 Hrs  T(C): 36.9 (11 Jul 2018 11:00), Max: 37.2 (10 Jul 2018 23:32)  T(F): 98.5 (11 Jul 2018 11:00), Max: 99 (10 Jul 2018 23:32)  HR: 109 (11 Jul 2018 15:00) (98 - 129)  BP: 108/77 (11 Jul 2018 15:00) (97/73 - 123/76)  BP(mean): 80 (11 Jul 2018 15:00) (72 - 95)  RR: 17 (11 Jul 2018 15:00) (12 - 25)  SpO2: 96% (11 Jul 2018 15:00) (92% - 100%)  Daily Height in cm: 187.96 (11 Jul 2018 03:05)    Daily     LABS:                        12.9   13.34 )-----------( 378      ( 10 Jul 2018 21:30 )             39.8     07-10    134<L>  |  98  |  15  ----------------------------<  107<H>  5.1   |  24  |  1.03    Ca    9.2      10 Jul 2018 21:30    TPro  7.5  /  Alb  3.5  /  TBili  0.5  /  DBili  x   /  AST  17  /  ALT  12  /  AlkPhos  76  07-10    LIVER FUNCTIONS - ( 10 Jul 2018 21:30 )  Alb: 3.5 g/dL / Pro: 7.5 g/dL / ALK PHOS: 76 u/L / ALT: 12 u/L / AST: 17 u/L / GGT: x                   Imaging:

## 2018-07-11 NOTE — CONSULT NOTE ADULT - ASSESSMENT
Israel from [facility] called with acceptance of pt by Dr Foster to unit A.  Report to be called to [phone].  Asked to wait until 4557 to call report.  Notified pt primary nurse Liat HOWARD RN and Dr Earl.    Impression:  1. Decreased appetite due to pylorus stenosis s/p dilation 6/2018  2. esophageal cancer s/p jodie ji Esophagogastrectomy    Plan:  1. Pt amenable to egd with dilation after risks explained but he is hesitant to get stent across pyloris.   2. Will plan for EGD with dilation Friday if no further tachycardia and optimized from cardiac standpoint  3. Diet as tolerating. Impression:  1. Decreased appetite due to pylorus stenosis s/p dilation 6/2018  2. esophageal cancer s/p jodie ji Esophagogastrectomy    Plan:  1. Pt amenable to egd with dilation after risks explained but he is hesitant to get stent across pyloris.   2. Will plan for EGD with dilation Friday if no further tachycardia and optimized from cardiac standpoint  3. Diet as tolerating.  4. NM gastric emptying prior to EGD Friday.

## 2018-07-11 NOTE — PROGRESS NOTE ADULT - SUBJECTIVE AND OBJECTIVE BOX
52 M PMH MI (stent X 1), esophageal carcinoma (chemo/RT 1/18), s/p [place]-Andrew on 6/8/18 w cc of CP.  The patient underwent a robotic [place]-Andrew Esophagogastrectomy on 6/8/18 with Dr. Brown/Dr. Yousif. He was recently admitted from 6/29/18-7/5/18 with cc of CP and was advised and refused  EGD/ dilation of pylorus.  The pt presented to ER with complaints of chest pain that felt different from previous admission and also c/o tachycardia, -130s. A CTA revealed moderate pericardial effusion.  He has been tolerating ONLY a clear liquid diet at home, mostly broth and watermelon juice, can barely consume one ensure can daily and has lost 20-30 lbs since initial surgery.     Past Medical History:  Esophageal cancer    MI (myocardial infarction)  2015 with 1 coronary stent.     Past Surgical History:  H/O hernia repair  1966  History of esophageal surgery    Status post tonsillectomy and adenoidectomy    Stented coronary artery  x1 2015.     Family History:  Father  Still living? Unknown  Family history of throat cancer, Age at diagnosis: Age Unknown     Mother  Still living? Unknown  Family history of cervical cancer, Age at diagnosis: Age Unknown.     Social History:  Social History (marital status, living situation, occupation, tobacco use, alcohol and drug use, and sexual history): Social History:  · Marital Status   1 child, unaware of his health problems  · Occupation musician  · Lives With spouse    Substance Use History:  · Substance Use never used    Alcohol Use History:  · Have you ever consumed alcohol never  · Alcohol Use Comment quit 1 year ago, previously 2-4 drinks 3-5 days a week for 15 years    Tobacco Usage:  · Tobacco Usage: Former smoker · Tobacco Type: quit at age 15, smoked 1 PPD for 3 years	     Tobacco Screening:  · Core Measure Site	No	      Allergies:  	No Known Allergies:     Home Medications:   * Patient Currently Takes Medications as of 05-Jul-2018 14:49 documented in Structured Notes  · 	gabapentin 100 mg oral capsule: 2 cap(s) orally every 8 hours  · 	HYDROmorphone 2 mg oral tablet: 1 tab(s) orally every 4 hours, As needed, Moderate Pain (4 - 6) MDD:6  · 	polyethylene glycol 3350 oral powder for reconstitution: 17 gram(s) orally once a day  · 	bisacodyl 10 mg rectal suppository: 1 suppository(ies) rectal once a day, As needed, Constipation  · 	acetaminophen 160 mg/5 mL oral suspension: 20 milliliter(s) orally every 6 hours, As Needed  · 	Advil 200 mg oral tablet: 2 tab(s) orally every 6 hours, As Needed  · 	Colace 100 mg oral capsule: 1 cap(s) orally 2 times a day  · 	senna oral tablet: 2 tab(s) orally once a day  · 	aspirin 81 mg oral tablet: 1 tab(s) orally once a day    MEDICATIONS  (STANDING):  aspirin enteric coated 81 milliGRAM(s) Oral daily  chlorhexidine 0.12% Liquid 5 milliLiter(s) Swish and Spit <User Schedule>  chlorhexidine 4% Liquid 1 Application(s) Topical <User Schedule>  dextrose 5% + sodium chloride 0.9%. 1000 milliLiter(s) (125 mL/Hr) IV Continuous <Continuous>  docusate sodium 100 milliGRAM(s) Oral two times a day  gabapentin 200 milliGRAM(s) Oral every 8 hours  heparin  Injectable 5000 Unit(s) SubCutaneous every 8 hours  senna 2 Tablet(s) Oral at bedtime    MEDICATIONS  (PRN):  acetaminophen    Suspension. 650 milliGRAM(s) Oral every 6 hours PRN Mild Pain (1 - 3)  bisacodyl Suppository 10 milliGRAM(s) Rectal daily PRN Constipation  HYDROmorphone   Tablet 2 milliGRAM(s) Oral every 6 hours PRN Moderate Pain (4 - 6)  polyethylene glycol 3350 17 Gram(s) Oral daily PRN Constipation      ICU Vital Signs Last 24 Hrs  T(C): 37.2 (10 Jul 2018 23:32), Max: 37.2 (10 Jul 2018 23:32)  T(F): 99 (10 Jul 2018 23:32), Max: 99 (10 Jul 2018 23:32)  HR: 116 (11 Jul 2018 01:38) (114 - 129)  BP: 105/79 (11 Jul 2018 01:38) (101/71 - 111/80)  RR: 12 (11 Jul 2018 01:38) (12 - 20)  SpO2: 96% (11 Jul 2018 01:38) (96% - 100%)      Physical exam:                             General:             WN/WD NAD  	Neurology:          A&Ox3, nonfocal, CASH x 4  	Eyes:                 PERRLA/ EOMI, Gross vision intact  	Neck                  supple, trachea midline, No JVD,     ENT/Neck:          Gross hearing intact  	Respiratory:        CTA B/L, No wheezing, rales, rhonchi  	CV:                    RRR, S1S2, no murmurs, rubs or gallops  	Abdominal:         +BS, Soft, NT, ND  	Extremities:       No edema, + peripheral pulses  	Skin:                 No Rashes, Hematoma, Ecchymosis  Incision:            clean and dry, no drainage or redness noted    Labs:                                                                           12.9   13.34 )-----------( 378      ( 10 Jul 2018 21:30 )             39.8                            07-10    134<L>  |  98  |  15  ----------------------------<  107<H>  5.1         |  24  |  1.03    Ca    9.2      10 Jul 2018 21:30    TPro  7.5  /  Alb  3.5  /  TBili  0.5  /  DBili  x   /  AST  17  /  ALT  12  /  AlkPhos  76  07-10    LIVER FUNCTIONS - ( 10 Jul 2018 21:30 )  Alb: 3.5 g/dL / Pro: 7.5 g/dL / ALK PHOS: 76 u/L / ALT: 12 u/L / AST: 17 u/L / GGT: x                                CTA chest  [id number]  IMPRESSION:   Suboptimal contrast opacification of the subsegmental pulmonary arteries.   No obvious embolus to the level of the segmental pulmonary arteries,   given the extent of artifact.   New moderate pericardial effusion. Suggestion of mild   flattening/indentation of the anterior border of the right ventricle.   Cardiac tamponade cannot be excluded. Recommend clinical correlation and   follow-up echocardiogram.  Age-indeterminate splenic infarct.  Stable determinate nodular thickening of bilateral adrenal glands, which   can be further characterized on a nonemergent MRI.               Plan:  52 M PMH MI (stent X 1), esophageal carcinoma (chemo/RT 1/18), s/p [place]-Andrew on 6/8/18 w cc of CP.  The patient underwent a robotic [place]-Andrew Esophagogastrectomy on 6/8/18 with Dr. Brown/Dr. Yousif. He was recently admitted from 6/29/18-7/5/18 with cc of CP and was advised and refused  EGD/ dilation of pylorus.  The pt presented to ER with complaints of chest pain that felt different from previous admission and also c/o tachycardia, -130s. A CTA revealed moderate pericardial effusion.  He has been tolerating ONLY a clear liquid diet at home, mostly broth and watermelon juice, can barely consume one ensure can daily and has lost 20-30 lbs since initial surgery.                             Neuro:                                         Pain control                               Cardiovascular:                                          Continue hemodynamic monitoring.                                         Not on any pressors                                         Continue cardiovascular / antihypertensive medications     STAT Echo                            Respiratory:                                         Pt is on nasal canula                                          Comfortable, not in any distress.                                         Use incentive spirometry                                          Continue bronchodilators, pulmonary toilet                            GI                                         GI prophylaxis                                          Zofran / Reglan for nausea - PRN	                                         NPO                                  Renal:                                         Continue IVF                                         Monitor I/Os and electrolytes                                                   Hematologic / Oncology:                                         SQH & SCDs for VTE prophylaxis                                         Monitor chest tube output. No signs of active bleeding.                                          Follow CBC in AM                           Infectious disease:                                          No signs of infection. Monitor for fever / leukocytosis.                                          All surgical incision  sites look clean                              Endocrine:                                           Continue Finger stick glucose checks with coverage      All available pertinent clinical, laboratory, radiographic, hemodynamic, echocardiographic, respiratory data, microbiologic data and chart were reviewed and analyzed frequently. GI and DVT prophylaxis, glycemic control, head of bed elevation and skin care issues were addressed.  Patient seen, examined and plan discussed with CT Surgery / CTICU team during rounds.    I have spent 80minutes of critical care time with this patient between 2 am and  7 am.    Fredis Cates MD

## 2018-07-12 LAB
ALBUMIN SERPL ELPH-MCNC: 3.2 G/DL — LOW (ref 3.3–5)
ALP SERPL-CCNC: 72 U/L — SIGNIFICANT CHANGE UP (ref 40–120)
ALT FLD-CCNC: 8 U/L — SIGNIFICANT CHANGE UP (ref 4–41)
APTT BLD: 24 SEC — LOW (ref 27.5–37.4)
AST SERPL-CCNC: 11 U/L — SIGNIFICANT CHANGE UP (ref 4–40)
BASOPHILS # BLD AUTO: 0.01 K/UL — SIGNIFICANT CHANGE UP (ref 0–0.2)
BASOPHILS NFR BLD AUTO: 0.1 % — SIGNIFICANT CHANGE UP (ref 0–2)
BILIRUB SERPL-MCNC: 0.4 MG/DL — SIGNIFICANT CHANGE UP (ref 0.2–1.2)
BUN SERPL-MCNC: 7 MG/DL — SIGNIFICANT CHANGE UP (ref 7–23)
CALCIUM SERPL-MCNC: 8.8 MG/DL — SIGNIFICANT CHANGE UP (ref 8.4–10.5)
CHLORIDE SERPL-SCNC: 100 MMOL/L — SIGNIFICANT CHANGE UP (ref 98–107)
CO2 SERPL-SCNC: 27 MMOL/L — SIGNIFICANT CHANGE UP (ref 22–31)
CREAT SERPL-MCNC: 0.86 MG/DL — SIGNIFICANT CHANGE UP (ref 0.5–1.3)
EOSINOPHIL # BLD AUTO: 0.01 K/UL — SIGNIFICANT CHANGE UP (ref 0–0.5)
EOSINOPHIL NFR BLD AUTO: 0.1 % — SIGNIFICANT CHANGE UP (ref 0–6)
GLUCOSE SERPL-MCNC: 105 MG/DL — HIGH (ref 70–99)
HCT VFR BLD CALC: 37.3 % — LOW (ref 39–50)
HGB BLD-MCNC: 11.7 G/DL — LOW (ref 13–17)
IMM GRANULOCYTES # BLD AUTO: 0.03 # — SIGNIFICANT CHANGE UP
IMM GRANULOCYTES NFR BLD AUTO: 0.4 % — SIGNIFICANT CHANGE UP (ref 0–1.5)
INR BLD: 1.41 — HIGH (ref 0.88–1.17)
LYMPHOCYTES # BLD AUTO: 0.69 K/UL — LOW (ref 1–3.3)
LYMPHOCYTES # BLD AUTO: 9.1 % — LOW (ref 13–44)
MAGNESIUM SERPL-MCNC: 2.3 MG/DL — SIGNIFICANT CHANGE UP (ref 1.6–2.6)
MCHC RBC-ENTMCNC: 26.6 PG — LOW (ref 27–34)
MCHC RBC-ENTMCNC: 31.4 % — LOW (ref 32–36)
MCV RBC AUTO: 84.8 FL — SIGNIFICANT CHANGE UP (ref 80–100)
MONOCYTES # BLD AUTO: 0.9 K/UL — SIGNIFICANT CHANGE UP (ref 0–0.9)
MONOCYTES NFR BLD AUTO: 11.9 % — SIGNIFICANT CHANGE UP (ref 2–14)
NEUTROPHILS # BLD AUTO: 5.93 K/UL — SIGNIFICANT CHANGE UP (ref 1.8–7.4)
NEUTROPHILS NFR BLD AUTO: 78.4 % — HIGH (ref 43–77)
NRBC # FLD: 0 — SIGNIFICANT CHANGE UP
NT-PROBNP SERPL-SCNC: 326 PG/ML — SIGNIFICANT CHANGE UP
PLATELET # BLD AUTO: 321 K/UL — SIGNIFICANT CHANGE UP (ref 150–400)
PMV BLD: 9.6 FL — SIGNIFICANT CHANGE UP (ref 7–13)
POTASSIUM SERPL-MCNC: 4.3 MMOL/L — SIGNIFICANT CHANGE UP (ref 3.5–5.3)
POTASSIUM SERPL-SCNC: 4.3 MMOL/L — SIGNIFICANT CHANGE UP (ref 3.5–5.3)
PROT SERPL-MCNC: 6.6 G/DL — SIGNIFICANT CHANGE UP (ref 6–8.3)
PROTHROM AB SERPL-ACNC: 15.8 SEC — HIGH (ref 9.8–13.1)
RBC # BLD: 4.4 M/UL — SIGNIFICANT CHANGE UP (ref 4.2–5.8)
RBC # FLD: 15 % — HIGH (ref 10.3–14.5)
SODIUM SERPL-SCNC: 136 MMOL/L — SIGNIFICANT CHANGE UP (ref 135–145)
WBC # BLD: 7.57 K/UL — SIGNIFICANT CHANGE UP (ref 3.8–10.5)
WBC # FLD AUTO: 7.57 K/UL — SIGNIFICANT CHANGE UP (ref 3.8–10.5)

## 2018-07-12 PROCEDURE — 99233 SBSQ HOSP IP/OBS HIGH 50: CPT

## 2018-07-12 PROCEDURE — 71045 X-RAY EXAM CHEST 1 VIEW: CPT | Mod: 26

## 2018-07-12 RX ORDER — LANOLIN ALCOHOL/MO/W.PET/CERES
3 CREAM (GRAM) TOPICAL AT BEDTIME
Qty: 0 | Refills: 0 | Status: DISCONTINUED | OUTPATIENT
Start: 2018-07-12 | End: 2018-07-16

## 2018-07-12 RX ORDER — ACETAMINOPHEN 500 MG
1000 TABLET ORAL ONCE
Qty: 0 | Refills: 0 | Status: COMPLETED | OUTPATIENT
Start: 2018-07-12 | End: 2018-07-12

## 2018-07-12 RX ADMIN — MORPHINE SULFATE 2 MILLIGRAM(S): 50 CAPSULE, EXTENDED RELEASE ORAL at 15:37

## 2018-07-12 RX ADMIN — MORPHINE SULFATE 2 MILLIGRAM(S): 50 CAPSULE, EXTENDED RELEASE ORAL at 20:31

## 2018-07-12 RX ADMIN — SODIUM CHLORIDE 125 MILLILITER(S): 9 INJECTION, SOLUTION INTRAVENOUS at 21:52

## 2018-07-12 RX ADMIN — CHLORHEXIDINE GLUCONATE 1 APPLICATION(S): 213 SOLUTION TOPICAL at 05:43

## 2018-07-12 RX ADMIN — Medication 100 MILLIGRAM(S): at 05:39

## 2018-07-12 RX ADMIN — MORPHINE SULFATE 2 MILLIGRAM(S): 50 CAPSULE, EXTENDED RELEASE ORAL at 14:56

## 2018-07-12 RX ADMIN — MORPHINE SULFATE 2 MILLIGRAM(S): 50 CAPSULE, EXTENDED RELEASE ORAL at 10:53

## 2018-07-12 RX ADMIN — Medication 1000 MILLIGRAM(S): at 23:30

## 2018-07-12 RX ADMIN — MORPHINE SULFATE 2 MILLIGRAM(S): 50 CAPSULE, EXTENDED RELEASE ORAL at 11:56

## 2018-07-12 RX ADMIN — MORPHINE SULFATE 2 MILLIGRAM(S): 50 CAPSULE, EXTENDED RELEASE ORAL at 20:51

## 2018-07-12 RX ADMIN — SODIUM CHLORIDE 125 MILLILITER(S): 9 INJECTION, SOLUTION INTRAVENOUS at 10:53

## 2018-07-12 RX ADMIN — MORPHINE SULFATE 2 MILLIGRAM(S): 50 CAPSULE, EXTENDED RELEASE ORAL at 03:01

## 2018-07-12 RX ADMIN — Medication 81 MILLIGRAM(S): at 12:11

## 2018-07-12 RX ADMIN — Medication 400 MILLIGRAM(S): at 23:04

## 2018-07-12 RX ADMIN — GABAPENTIN 200 MILLIGRAM(S): 400 CAPSULE ORAL at 05:39

## 2018-07-12 RX ADMIN — HEPARIN SODIUM 5000 UNIT(S): 5000 INJECTION INTRAVENOUS; SUBCUTANEOUS at 05:39

## 2018-07-12 RX ADMIN — Medication 400 MILLIGRAM(S): at 16:33

## 2018-07-12 RX ADMIN — HEPARIN SODIUM 5000 UNIT(S): 5000 INJECTION INTRAVENOUS; SUBCUTANEOUS at 21:51

## 2018-07-12 RX ADMIN — HEPARIN SODIUM 5000 UNIT(S): 5000 INJECTION INTRAVENOUS; SUBCUTANEOUS at 14:53

## 2018-07-12 RX ADMIN — GABAPENTIN 200 MILLIGRAM(S): 400 CAPSULE ORAL at 14:53

## 2018-07-12 RX ADMIN — MORPHINE SULFATE 2 MILLIGRAM(S): 50 CAPSULE, EXTENDED RELEASE ORAL at 02:46

## 2018-07-12 RX ADMIN — Medication 1000 MILLIGRAM(S): at 18:17

## 2018-07-12 RX ADMIN — GABAPENTIN 200 MILLIGRAM(S): 400 CAPSULE ORAL at 21:51

## 2018-07-12 NOTE — PROGRESS NOTE ADULT - SUBJECTIVE AND OBJECTIVE BOX
52 M PMH MI (stent X 1), esophageal carcinoma (chemo/RT 1/18), s/p [place]-Andrew on 6/8/18 w cc of CP.  The patient underwent a robotic [place]-Andrew Esophagogastrectomy on 6/8/18 with Dr. Brown/Dr. Yousif. He was recently admitted from 6/29/18-7/5/18 with cc of CP and was advised and refused  EGD/ dilation of pylorus.  The pt presented to ER with complaints of chest pain that felt different from previous admission and also c/o tachycardia, -130s. A CTA revealed moderate pericardial effusion.  He has been tolerating ONLY a clear liquid diet at home, mostly broth and watermelon juice, can barely consume one ensure can daily and has lost 20-30 lbs since initial surgery.     [id number]: Pericardiocentesis (550 ml serosagrenous fluid)      Allergies:  	No Known Allergies:     Home Medications:   * Patient Currently Takes Medications as of 05-Jul-2018 14:49 documented in Structured Notes  · 	gabapentin 100 mg oral capsule: 2 cap(s) orally every 8 hours  · 	HYDROmorphone 2 mg oral tablet: 1 tab(s) orally every 4 hours, As needed, Moderate Pain (4 - 6) MDD:6  · 	polyethylene glycol 3350 oral powder for reconstitution: 17 gram(s) orally once a day  · 	bisacodyl 10 mg rectal suppository: 1 suppository(ies) rectal once a day, As needed, Constipation  · 	acetaminophen 160 mg/5 mL oral suspension: 20 milliliter(s) orally every 6 hours, As Needed  · 	Advil 200 mg oral tablet: 2 tab(s) orally every 6 hours, As Needed  · 	Colace 100 mg oral capsule: 1 cap(s) orally 2 times a day  · 	senna oral tablet: 2 tab(s) orally once a day  · 	aspirin 81 mg oral tablet: 1 tab(s) orally once a day    MEDICATIONS  (STANDING):  aspirin enteric coated 81 milliGRAM(s) Oral daily  chlorhexidine 0.12% Liquid 5 milliLiter(s) Swish and Spit every 12 hours  chlorhexidine 4% Liquid 1 Application(s) Topical <User Schedule>  dextrose 5% + sodium chloride 0.9%. 1000 milliLiter(s) (125 mL/Hr) IV Continuous <Continuous>  docusate sodium 100 milliGRAM(s) Oral two times a day  gabapentin 200 milliGRAM(s) Oral every 8 hours  heparin  Injectable 5000 Unit(s) SubCutaneous every 8 hours  senna 2 Tablet(s) Oral at bedtime    MEDICATIONS  (PRN):  acetaminophen    Suspension. 650 milliGRAM(s) Oral every 6 hours PRN Mild Pain (1 - 3)  bisacodyl Suppository 10 milliGRAM(s) Rectal daily PRN Constipation  HYDROmorphone   Tablet 2 milliGRAM(s) Oral every 6 hours PRN Moderate Pain (4 - 6)  morphine  - Injectable 2 milliGRAM(s) IV Push every 4 hours PRN Moderate Pain (4 - 6)  polyethylene glycol 3350 17 Gram(s) Oral daily PRN Constipation      ICU Vital Signs Last 24 Hrs  T(C): 37 (12 Jul 2018 11:57), Max: 37 (12 Jul 2018 08:00)  T(F): 98.6 (12 Jul 2018 11:57), Max: 98.6 (12 Jul 2018 08:00)  HR: 110 (12 Jul 2018 11:57) (98 - 123)  BP: 135/87 (12 Jul 2018 11:57) (105/72 - 135/87)  BP(mean): 83 (12 Jul 2018 11:00) (71 - 98)  RR: 20 (12 Jul 2018 11:57) (16 - 40)  SpO2: 95% (12 Jul 2018 11:57) (92% - 98%)      Physical exam:                             General:             WN/WD NAD  	Neurology:          A&Ox3, nonfocal, CASH x 4  	Eyes:                 PERRLA/ EOMI, Gross vision intact  	Neck                  supple, trachea midline, No JVD,     ENT/Neck:          Gross hearing intact  	Respiratory:        CTA B/L, No wheezing, rales, rhonchi  	CV:                    RRR, S1S2, no murmurs, rubs or gallops  	Abdominal:         +BS, Soft, NT, ND  	Extremities:       No edema, + peripheral pulses  	Skin:                 No Rashes, Hematoma, Ecchymosis  Incision:            clean and dry, no drainage or redness noted    I&O's Summary    11 Jul 2018 07:01  -  12 Jul 2018 07:00  --------------------------------------------------------  IN: 2225 mL / OUT: 2900 mL / NET: -675 mL    12 Jul 2018 07:01  -  12 Jul 2018 12:39  --------------------------------------------------------  IN: 740 mL / OUT: 1100 mL / NET: -360 mL        Labs:                                                                           11.7   7.57  )-----------( 321      ( 12 Jul 2018 03:00 )             37.3                            07-12    136  |  100  |  7   ----------------------------<  105<H>  4.3   |  27  |  0.86    Ca    8.8      12 Jul 2018 03:00  Mg     2.3     07-12    TPro  6.6  /  Alb  3.2<L>  /  TBili  0.4  /  DBili  x   /  AST  11  /  ALT  8   /  AlkPhos  72  07-12    CAPILLARY BLOOD GLUCOSE        LIVER FUNCTIONS - ( 12 Jul 2018 03:00 )  Alb: 3.2 g/dL / Pro: 6.6 g/dL / ALK PHOS: 72 u/L / ALT: 8 u/L / AST: 11 u/L / GGT: x                                PT/INR - ( 12 Jul 2018 03:00 )   PT: 15.8 SEC;   INR: 1.41     PTT - ( 12 Jul 2018 03:00 )  PTT:24.0 SEC           CXR  [id number]  IMPRESSION:  Pericardial catheter overlies the base of the heart.  Small bilateral pleural effusions with likely associated passive   atelectasis. Atelectasis of other cause and/or pneumonia at the left base   is not excluded.    Plan:  52 M PMH MI (stent X 1), esophageal carcinoma (chemo/RT 1/18), s/p [place]-Andrew on 6/8/18 w cc of CP.  The patient underwent a robotic [place]-Andrew Esophagogastrectomy on 6/8/18 with Dr. Brown/Dr. Yousif. He was recently admitted from 6/29/18-7/5/18 with cc of CP and was advised and refused  EGD/ dilation of pylorus.  The pt presented to ER with complaints of chest pain that felt different from previous admission and also c/o tachycardia, -130s. A CTA revealed moderate pericardial effusion.  He has been tolerating ONLY a clear liquid diet at home, mostly broth and watermelon juice, can barely consume one ensure can daily and has lost 20-30 lbs since initial surgery.   [id number]: Pericardiocentesis (550 ml serosagrenous fluid)                              Neuro:                                         Pain control                             Cardiovascular:                                          Continue hemodynamic monitoring.                                         Not on any pressors                                         Continue cardiovascular / antihypertensive medications     STAT Echo                            Respiratory:                                         Pt is on nasal canula                                          Comfortable, not in any distress.                                         Use incentive spirometry                                          Continue bronchodilators, pulmonary toilet                            GI                                         For dilatation in am     GI prophylaxis                                          Zofran / Reglan for nausea - PRN	                                         NPO                                  Renal:                                         Continue IVF                                         Monitor I/Os and electrolytes                                                   Hematologic / Oncology:                                         SQH & SCDs for VTE prophylaxis                                         Monitor chest tube output. No signs of active bleeding.                                          Follow CBC in AM                           Infectious disease:                                          No signs of infection. Monitor for fever / leukocytosis.                                          All surgical incision  sites look clean                              Endocrine:                                           Continue Finger stick glucose checks with coverage      All available pertinent clinical, laboratory, radiographic, hemodynamic, echocardiographic, respiratory data, microbiologic data and chart were reviewed and analyzed frequently. GI and DVT prophylaxis, glycemic control, head of bed elevation and skin care issues were addressed.  Patient seen, examined and plan discussed with CT Surgery / CTICU team during rounds.    I have spent 80minutes of critical care time with this patient     Fredis Cates MD

## 2018-07-13 ENCOUNTER — TRANSCRIPTION ENCOUNTER (OUTPATIENT)
Age: 53
End: 2018-07-13

## 2018-07-13 LAB — NON-GYNECOLOGICAL CYTOLOGY STUDY: SIGNIFICANT CHANGE UP

## 2018-07-13 PROCEDURE — 99232 SBSQ HOSP IP/OBS MODERATE 35: CPT | Mod: GC

## 2018-07-13 PROCEDURE — 71045 X-RAY EXAM CHEST 1 VIEW: CPT | Mod: 26

## 2018-07-13 RX ORDER — MORPHINE SULFATE 50 MG/1
4 CAPSULE, EXTENDED RELEASE ORAL EVERY 4 HOURS
Qty: 0 | Refills: 0 | Status: DISCONTINUED | OUTPATIENT
Start: 2018-07-13 | End: 2018-07-13

## 2018-07-13 RX ORDER — ACETAMINOPHEN 500 MG
1000 TABLET ORAL ONCE
Qty: 0 | Refills: 0 | Status: COMPLETED | OUTPATIENT
Start: 2018-07-13 | End: 2018-07-13

## 2018-07-13 RX ORDER — LANOLIN ALCOHOL/MO/W.PET/CERES
3 CREAM (GRAM) TOPICAL AT BEDTIME
Qty: 0 | Refills: 0 | Status: DISCONTINUED | OUTPATIENT
Start: 2018-07-13 | End: 2018-07-16

## 2018-07-13 RX ORDER — HYDROMORPHONE HYDROCHLORIDE 2 MG/ML
1 INJECTION INTRAMUSCULAR; INTRAVENOUS; SUBCUTANEOUS
Qty: 30 | Refills: 0 | OUTPATIENT
Start: 2018-07-13 | End: 2018-07-17

## 2018-07-13 RX ORDER — LANOLIN ALCOHOL/MO/W.PET/CERES
3 CREAM (GRAM) TOPICAL ONCE
Qty: 0 | Refills: 0 | Status: COMPLETED | OUTPATIENT
Start: 2018-07-13 | End: 2018-07-13

## 2018-07-13 RX ORDER — PANTOPRAZOLE SODIUM 20 MG/1
40 TABLET, DELAYED RELEASE ORAL DAILY
Qty: 0 | Refills: 0 | Status: DISCONTINUED | OUTPATIENT
Start: 2018-07-13 | End: 2018-07-16

## 2018-07-13 RX ADMIN — CHLORHEXIDINE GLUCONATE 5 MILLILITER(S): 213 SOLUTION TOPICAL at 05:00

## 2018-07-13 RX ADMIN — Medication 3 MILLIGRAM(S): at 00:59

## 2018-07-13 RX ADMIN — Medication 1000 MILLIGRAM(S): at 16:05

## 2018-07-13 RX ADMIN — SODIUM CHLORIDE 125 MILLILITER(S): 9 INJECTION, SOLUTION INTRAVENOUS at 13:53

## 2018-07-13 RX ADMIN — Medication 100 MILLIGRAM(S): at 04:55

## 2018-07-13 RX ADMIN — GABAPENTIN 200 MILLIGRAM(S): 400 CAPSULE ORAL at 22:34

## 2018-07-13 RX ADMIN — PANTOPRAZOLE SODIUM 40 MILLIGRAM(S): 20 TABLET, DELAYED RELEASE ORAL at 18:02

## 2018-07-13 RX ADMIN — SENNA PLUS 2 TABLET(S): 8.6 TABLET ORAL at 22:34

## 2018-07-13 RX ADMIN — MORPHINE SULFATE 2 MILLIGRAM(S): 50 CAPSULE, EXTENDED RELEASE ORAL at 04:48

## 2018-07-13 RX ADMIN — MORPHINE SULFATE 2 MILLIGRAM(S): 50 CAPSULE, EXTENDED RELEASE ORAL at 13:44

## 2018-07-13 RX ADMIN — MORPHINE SULFATE 2 MILLIGRAM(S): 50 CAPSULE, EXTENDED RELEASE ORAL at 05:28

## 2018-07-13 RX ADMIN — Medication 100 MILLIGRAM(S): at 18:02

## 2018-07-13 RX ADMIN — HEPARIN SODIUM 5000 UNIT(S): 5000 INJECTION INTRAVENOUS; SUBCUTANEOUS at 13:53

## 2018-07-13 RX ADMIN — GABAPENTIN 200 MILLIGRAM(S): 400 CAPSULE ORAL at 13:53

## 2018-07-13 RX ADMIN — MORPHINE SULFATE 4 MILLIGRAM(S): 50 CAPSULE, EXTENDED RELEASE ORAL at 09:30

## 2018-07-13 RX ADMIN — HEPARIN SODIUM 5000 UNIT(S): 5000 INJECTION INTRAVENOUS; SUBCUTANEOUS at 05:28

## 2018-07-13 RX ADMIN — Medication 400 MILLIGRAM(S): at 15:53

## 2018-07-13 RX ADMIN — GABAPENTIN 200 MILLIGRAM(S): 400 CAPSULE ORAL at 05:28

## 2018-07-13 RX ADMIN — MORPHINE SULFATE 4 MILLIGRAM(S): 50 CAPSULE, EXTENDED RELEASE ORAL at 09:45

## 2018-07-13 RX ADMIN — Medication 81 MILLIGRAM(S): at 13:53

## 2018-07-13 RX ADMIN — MORPHINE SULFATE 2 MILLIGRAM(S): 50 CAPSULE, EXTENDED RELEASE ORAL at 14:00

## 2018-07-13 NOTE — DISCHARGE NOTE ADULT - CARE PROVIDERS DIRECT ADDRESSES
[email or web address]

## 2018-07-13 NOTE — DISCHARGE NOTE ADULT - HOSPITAL COURSE
This is a 52 year old male with a PMHX of MI with 1 stent, esophageal carcinoma (last chemo/rad 1/18), s/p robotic Arley-Andrew Esophagogastrectomy on 6/8/18 with Dr. Brown/Dr. Yousif and a recent admission 6/29/18-7/5/18 in which pt refused EGD, dilation of pylorus.  This admission, pt presents to ER with complaints of chest pain that felt different from previous complaints and tachycardia, -130s. He had a CTA which revealed moderate pericardial effusion.  Of note, pt has only been tolerating a clear liquid diet at home, mostly broth and watermelon juice, he says he can barely consume one ensure can daily and has lost 20-30 lbs since initial surgery.   Pt underwent Pericardiocentesis (550 ml serosanguinous fluid) on 7/11.  Pt c/o dysphagia but refused esophageal dilation on 7/13. This is a 52 year old male with a PMHX of MI with 1 stent, esophageal carcinoma (last chemo/rad 1/18), s/p robotic Arley-Andrew Esophagogastrectomy on 6/8/18 with Dr. Brown/Dr. Yousif and a recent admission 6/29/18-7/5/18 in which pt refused EGD, dilation of pylorus.  This admission, pt presents to ER with complaints of chest pain that felt different from previous complaints and tachycardia, -130s. He had a CTA which revealed moderate pericardial effusion.  Of note, pt has only been tolerating a clear liquid diet at home, mostly broth and watermelon juice, he says he can barely consume one ensure can daily and has lost 20-30 lbs since initial surgery.   Pt underwent Pericardiocentesis (550 ml serosanguinous fluid) on 7/11. PT tolerated procedure well. Pericardial drain eventually removed. .  Pt c/o dysphagia. Seen by GI Dr. Bean and recommended for an EGD but refused esophageal dilation on 7/13. Pt diet slowly advanced through weekend. Yesterday and today pt. tolerating pureed diet. Had bowel movement. Ordered for Barium Swallow but refusing, Dr. Brown aware and OK to cancel test. PT. ambulating frequently. All wounds c/d/i. No fevers or leukocytosis. Cleared for d/c to home by Dr. Brown.

## 2018-07-13 NOTE — DISCHARGE NOTE ADULT - MEDICATION SUMMARY - MEDICATIONS TO TAKE
I will START or STAY ON the medications listed below when I get home from the hospital:    aspirin 81 mg oral tablet  -- 1 tab(s) by mouth once a day  -- Indication: For Pericardial effusion    acetaminophen 160 mg/5 mL oral suspension  -- 20 milliliter(s) by mouth every 6 hours, As Needed  -- Indication: For Pericardial effusion    Advil 200 mg oral tablet  -- 2 tab(s) by mouth every 6 hours, As Needed  -- Indication: For Pericardial effusion    HYDROmorphone 2 mg oral tablet  -- 1 tab(s) by mouth every 4 hours, As Needed -Moderate Pain (4 - 6) - for severe pain MDD:6   -- Indication: For Pericardial effusion    gabapentin 100 mg oral capsule  -- 2 cap(s) by mouth every 8 hours  -- Indication: For Pericardial effusion    Colace 100 mg oral capsule  -- 1 cap(s) by mouth 2 times a day  -- Indication: For Pericardial effusion    senna oral tablet  -- 2 tab(s) by mouth once a day  -- Indication: For Pericardial effusion    bisacodyl 10 mg rectal suppository  -- 1 suppository(ies) rectally once a day, As needed, Constipation  -- Indication: For Pericardial effusion    polyethylene glycol 3350 oral powder for reconstitution  -- 17 gram(s) by mouth once a day  -- Indication: For Pericardial effusion I will START or STAY ON the medications listed below when I get home from the hospital:    Advil 200 mg oral tablet  -- 2 tab(s) by mouth every 6 hours, As Needed  -- Indication: For Pain    aspirin 81 mg oral tablet  -- 1 tab(s) by mouth once a day  -- Indication: For anti platelet    Tylenol 325 mg oral tablet  -- 2 tab(s) by mouth every 6 hours, As Needed  -- Indication: For Pain    gabapentin 100 mg oral capsule  -- 2 cap(s) by mouth every 8 hours  -- Indication: For Pain    bisacodyl 10 mg rectal suppository  -- 1 suppository(ies) rectally once a day, As needed, Constipation  -- Indication: For constipation    polyethylene glycol 3350 oral powder for reconstitution  -- 17 gram(s) by mouth once a day  -- Indication: For constipation    Colace 100 mg oral capsule  -- 1 cap(s) by mouth 2 times a day  -- Indication: For constipation    senna oral tablet  -- 2 tab(s) by mouth once a day  -- Indication: For constipation    melatonin 3 mg oral tablet  -- 1 tab(s) by mouth once a day (at bedtime), As Needed   -- Indication: For sleep

## 2018-07-13 NOTE — DISCHARGE NOTE ADULT - CONDITIONS AT DISCHARGE
PAtient alert and oriented x4. Able to make needs known. CASH x4. VSS. Denies c/o pain or discomfort. Tolerating PO diet. Denies c/o nausea or vomitting. Ambulating without difficulty. Patient support and education given. Saline locks D/cd.

## 2018-07-13 NOTE — CHART NOTE - NSCHARTNOTEFT_GEN_A_CORE
Patient came down to endoscopy unit for planned EGD with botox and possible dilatation. After procedure was explained to patient by Dr. Vikas correa. Patient refused because he did not want to be committed to repeat endoscopies with botox and dilation. He is also worried about the risk of the procedure and does not want to proceed.

## 2018-07-13 NOTE — DISCHARGE NOTE ADULT - PLAN OF CARE
treatment of disease s/p Pericardial drain  wound healing  followup with your PMD and cardiologist s/p robotic [place]-Andrew Esophagogastrectomy on 6/8/18  Pt refused esophageal dilation by GI Dr. Bean  Continue current diet s/p robotic [place]-Andrew Esophagogastrectomy on 6/8/18  Walk 4-5 x per day. Shower daily. Follow above diet instructions.   See Dr. Brown in 10 day for follow up. Call office for an apt. [phone]

## 2018-07-13 NOTE — DISCHARGE NOTE ADULT - INSTRUCTIONS
Continue Clears/ one ensure can daily Continue Liquid and pureed food diet as tolerated. Drink Ensure supplements as possible. No large quantities at one time. No carbonated beverages. Stay completely upright at all times.

## 2018-07-13 NOTE — DISCHARGE NOTE ADULT - NS AS ACTIVITY OBS
Walking-Outdoors allowed/Driving allowed/Walking-Indoors allowed/Do not drive or operate machinery/Do not make important decisions/Showering allowed/No Heavy lifting/straining

## 2018-07-13 NOTE — DISCHARGE NOTE ADULT - MEDICATION SUMMARY - MEDICATIONS TO STOP TAKING
I will STOP taking the medications listed below when I get home from the hospital:  None I will STOP taking the medications listed below when I get home from the hospital:    HYDROmorphone 2 mg oral tablet  -- 1 tab(s) by mouth every 4 hours, As needed, Moderate Pain (4 - 6) MDD:6

## 2018-07-13 NOTE — DISCHARGE NOTE ADULT - PATIENT PORTAL LINK FT
You can access the [facility] Patient Portal, offered by [facility], by registering with the following website: [email or web address]

## 2018-07-13 NOTE — DISCHARGE NOTE ADULT - CARE PROVIDER_API CALL
Abdifatah Brown), Thoracic Surgery  [address] [place] [address]  Phone: [phone]  Fax: [phone]    Scooter Bean), Gastroenterology; Internal Medicine  [address] [place] [address]  Phone: [phone]  Fax: [phone]

## 2018-07-13 NOTE — DISCHARGE NOTE ADULT - MEDICATION SUMMARY - MEDICATIONS TO CHANGE
I will SWITCH the dose or number of times a day I take the medications listed below when I get home from the hospital:  None I will SWITCH the dose or number of times a day I take the medications listed below when I get home from the hospital:    acetaminophen 160 mg/5 mL oral suspension  -- 20 milliliter(s) by mouth every 6 hours, As Needed

## 2018-07-13 NOTE — DISCHARGE NOTE ADULT - CARE PLAN
Principal Discharge DX:	Pericardial effusion  Goal:	treatment of disease  Assessment and plan of treatment:	s/p Pericardial drain  wound healing  followup with your PMD and cardiologist  Secondary Diagnosis:	Malignant neoplasm of esophagus, unspecified location  Goal:	treatment of disease  Assessment and plan of treatment:	s/p robotic Arley-Andrew Esophagogastrectomy on 6/8/18  Pt refused esophageal dilation by GI Dr. Bean  Continue current diet Principal Discharge DX:	Pericardial effusion  Goal:	treatment of disease  Assessment and plan of treatment:	s/p Pericardial drain  wound healing  followup with your PMD and cardiologist  Secondary Diagnosis:	Malignant neoplasm of esophagus, unspecified location  Goal:	treatment of disease  Assessment and plan of treatment:	s/p robotic Arley-Andrew Esophagogastrectomy on 6/8/18  Walk 4-5 x per day. Shower daily. Follow above diet instructions.   See Dr. Brown in 10 day for follow up. Call office for an apt. [phone]

## 2018-07-13 NOTE — PROGRESS NOTE ADULT - SUBJECTIVE AND OBJECTIVE BOX
Subjective: pt refused blood work this AM, he went down to endoscopy for EGD, dilation and refused the procedure, he is currently opposed to procedures in the future, he is complaining of epigastric and chest pain and taking IV morphine neucp2cm, he is requesting something to eat and drink    Vital Signs:  Vital Signs Last 24 Hrs  T(C): 36.8 (07-13-18 @ 09:31), Max: 37 (07-12-18 @ 21:07)  T(F): 98.2 (07-13-18 @ 09:31), Max: 98.6 (07-12-18 @ 21:07)  HR: 104 (07-13-18 @ 09:31) (94 - 108)  BP: 119/79 (07-13-18 @ 09:31) (112/78 - 123/78)  RR: 20 (07-13-18 @ 09:31) (19 - 22)  SpO2: 96% (07-13-18 @ 09:31) (93% - 96%) on (O2)    Telemetry/Alarms:  General: WN/WD NAD  Neurology: Awake, nonfocal, CASH x 4  Eyes: Scleras clear, PERRLA/ EOMI, Gross vision intact  ENT:Gross hearing intact, grossly patent pharynx, no stridor  Neck: Neck supple, trachea midline, No JVD,   Respiratory: CTA B/L, No wheezing, rales, rhonchi  CV: RRR, S1S2, no murmurs, rubs or gallops  Abdominal: Soft, NT, ND +BS,   Extremities: No edema, + peripheral pulses  Skin: No Rashes, Hematoma, Ecchymosis  Lymphatic: No Neck, axilla, groin LAD  Psych: Oriented x 3, normal affect  Incisions: c,d,i  Tubes: pericardial blanca drained 25cc/24h - removed per protocol, pt tolerated procedure well  Relevant labs, radiology and Medications reviewed                        11.7   7.57  )-----------( 321      ( 12 Jul 2018 03:00 )             37.3     07-12    136  |  100  |  7   ----------------------------<  105<H>  4.3   |  27  |  0.86    Ca    8.8      12 Jul 2018 03:00  Mg     2.3     07-12    TPro  6.6  /  Alb  3.2<L>  /  TBili  0.4  /  DBili  x   /  AST  11  /  ALT  8   /  AlkPhos  72  07-12    PT/INR - ( 12 Jul 2018 03:00 )   PT: 15.8 SEC;   INR: 1.41          PTT - ( 12 Jul 2018 03:00 )  PTT:24.0 SEC  MEDICATIONS  (STANDING):  aspirin enteric coated 81 milliGRAM(s) Oral daily  botulinum toxin Type A Injectable 200 Unit(s) IntraMuscular once  chlorhexidine 0.12% Liquid 5 milliLiter(s) Swish and Spit every 12 hours  dextrose 5% + sodium chloride 0.9%. 1000 milliLiter(s) (125 mL/Hr) IV Continuous <Continuous>  docusate sodium 100 milliGRAM(s) Oral two times a day  gabapentin 200 milliGRAM(s) Oral every 8 hours  heparin  Injectable 5000 Unit(s) SubCutaneous every 8 hours  senna 2 Tablet(s) Oral at bedtime    MEDICATIONS  (PRN):  acetaminophen    Suspension. 650 milliGRAM(s) Oral every 6 hours PRN Mild Pain (1 - 3)  bisacodyl Suppository 10 milliGRAM(s) Rectal daily PRN Constipation  melatonin 3 milliGRAM(s) Oral at bedtime PRN Insomnia  morphine  - Injectable 4 milliGRAM(s) IV Push every 4 hours PRN Severe Pain (7 - 10)  morphine  - Injectable 2 milliGRAM(s) IV Push every 4 hours PRN Moderate Pain (4 - 6)  polyethylene glycol 3350 17 Gram(s) Oral daily PRN Constipation    Pertinent Physical Exam  I&O's Summary    12 Jul 2018 07:01  -  13 Jul 2018 07:00  --------------------------------------------------------  IN: 1990 mL / OUT: 4075 mL / NET: -2085 mL    13 Jul 2018 07:01  -  13 Jul 2018 14:05  --------------------------------------------------------  IN: 500 mL / OUT: 700 mL / NET: -200 mL        Assessment  52y Male  w/ PAST MEDICAL & SURGICAL HISTORY:  Esophageal cancer  MI (myocardial infarction): 2015 with 1 coronary stent  History of esophageal surgery  H/O hernia repair: 1966  Status post tonsillectomy and adenoidectomy  Stented coronary artery: x1 2015  admitted with complaints of Patient is a 52y old  Male who presents with a chief complaint of chest pain (13 Jul 2018 13:06)  .  Pt s/p jodie ji esophagectomy 6/8/18 and EGD, dilation and botox injection 6/14/18. His first readmission he refused EGD, dilation.  This is his second readmission and he was found to have a pericardial effusion which was drained by IR. He continues to have trouble tolerating a diet and he went for EGD, dilation today with GI but refused the procedure    PLAN  Neuro: Pain management, pt educated that he cannot continue to take narcotic pain medication OTC, this will not help GI motility, pain is likely due esophageal stricture and pt is refusing intervention  Pulm: Encourage coughing, deep breathing and use of incentive spirometry. Wean off supplemental oxygen as able. Daily CXR.   Cardio: Monitor telemetry/alarms  GI: will reintroduce clear and full liquid diet and monitor if pt can tolerate  Renal: monitor urine output, supplement electrolytes as needed  Vasc: Heparin SC/SCDs for DVT prophylaxis  Heme: Stable H/H. .   ID: Off antibiotics. Stable.  Therapy: OOB/ambulate    Disposition: pt is refusing recommended medical intervention, discharge planning  Discussed with Cardiothoracic Team at AM rounds.

## 2018-07-14 DIAGNOSIS — C15.9 MALIGNANT NEOPLASM OF ESOPHAGUS, UNSPECIFIED: ICD-10-CM

## 2018-07-14 RX ORDER — MULTIVIT WITH MIN/MFOLATE/K2 340-15/3 G
295.7 POWDER (GRAM) ORAL ONCE
Qty: 0 | Refills: 0 | Status: COMPLETED | OUTPATIENT
Start: 2018-07-14 | End: 2018-07-14

## 2018-07-14 RX ADMIN — Medication 3 MILLIGRAM(S): at 00:57

## 2018-07-14 RX ADMIN — PANTOPRAZOLE SODIUM 40 MILLIGRAM(S): 20 TABLET, DELAYED RELEASE ORAL at 11:45

## 2018-07-14 RX ADMIN — Medication 3 MILLIGRAM(S): at 21:23

## 2018-07-14 RX ADMIN — Medication 650 MILLIGRAM(S): at 20:41

## 2018-07-14 RX ADMIN — Medication 81 MILLIGRAM(S): at 11:45

## 2018-07-14 RX ADMIN — SENNA PLUS 2 TABLET(S): 8.6 TABLET ORAL at 21:23

## 2018-07-14 RX ADMIN — CHLORHEXIDINE GLUCONATE 5 MILLILITER(S): 213 SOLUTION TOPICAL at 06:11

## 2018-07-14 RX ADMIN — Medication 3 MILLIGRAM(S): at 23:45

## 2018-07-14 RX ADMIN — Medication 295.7 MILLILITER(S): at 21:23

## 2018-07-14 RX ADMIN — GABAPENTIN 200 MILLIGRAM(S): 400 CAPSULE ORAL at 21:23

## 2018-07-14 RX ADMIN — HEPARIN SODIUM 5000 UNIT(S): 5000 INJECTION INTRAVENOUS; SUBCUTANEOUS at 21:23

## 2018-07-14 RX ADMIN — Medication 100 MILLIGRAM(S): at 17:53

## 2018-07-14 RX ADMIN — Medication 650 MILLIGRAM(S): at 05:30

## 2018-07-14 RX ADMIN — Medication 650 MILLIGRAM(S): at 04:30

## 2018-07-14 RX ADMIN — Medication 650 MILLIGRAM(S): at 20:40

## 2018-07-14 RX ADMIN — POLYETHYLENE GLYCOL 3350 17 GRAM(S): 17 POWDER, FOR SOLUTION ORAL at 11:50

## 2018-07-14 NOTE — PROGRESS NOTE ADULT - SUBJECTIVE AND OBJECTIVE BOX
51 yo male s/p esophagectomy readmitted 2/2 moderate pericardial effusion and dysphagia    7/14 No acute event overnight, admits to mild central chest pain when eating.     PMHx/PSHx:PAST MEDICAL & SURGICAL HISTORY:  Esophageal cancer  MI (myocardial infarction): 2015 with 1 coronary stent  History of esophageal surgery  H/O hernia repair: 1966  Status post tonsillectomy and adenoidectomy  Stented coronary artery: x1 2015      Vital Signs:  Vital Signs Last 24 Hrs  T(C): 36.7 (07-14-18 @ 11:58), Max: 37.2 (07-13-18 @ 18:13)  T(F): 98.1 (07-14-18 @ 11:58), Max: 98.9 (07-13-18 @ 18:13)  HR: 106 (07-14-18 @ 11:58) (95 - 115)  BP: 116/89 (07-14-18 @ 11:58) (106/68 - 124/81)  RR: 18 (07-14-18 @ 11:58) (18 - 19)  SpO2: 97% (07-14-18 @ 11:58) (97% - 98%) on (O2)      Relevant labs, radiology and Medications reviewed            Pertinent Physical Exam  I&O's Summary    13 Jul 2018 07:01  -  14 Jul 2018 07:00  --------------------------------------------------------  IN: 500 mL / OUT: 1400 mL / NET: -900 mL    14 Jul 2018 07:01  -  14 Jul 2018 14:27  --------------------------------------------------------  IN: 600 mL / OUT: 0 mL / NET: 600 mL        Assessment  52y Male  w/ PAST MEDICAL & SURGICAL HISTORY:  Esophageal cancer  MI (myocardial infarction): 2015 with 1 coronary stent  History of esophageal surgery  H/O hernia repair: 1966  Status post tonsillectomy and adenoidectomy  Stented coronary artery: x1 2015   Patient is a 52y old  Male who presents with a chief complaint of chest pain (13 Jul 2018 13:06)  .  On (Date), patient underwent .     Postoperative course/issues:                                                                                                        PLAN    1. esophagectomy  - Plan to have barium swallow to evaluate emptying and pylorus   - If barium w/ delayed emptying would recommend EGD w/ dilation, but pt is thinking about it  - Will cont to follow   - Full liquid diet as tolerated  - OOB to chair ambulation    Discussed with Cardiothoracic Team at AM rounds.                                                                                                      Contact spectra: [id number]

## 2018-07-15 RX ORDER — IBUPROFEN 200 MG
400 TABLET ORAL ONCE
Qty: 0 | Refills: 0 | Status: COMPLETED | OUTPATIENT
Start: 2018-07-15 | End: 2018-07-15

## 2018-07-15 RX ADMIN — GABAPENTIN 200 MILLIGRAM(S): 400 CAPSULE ORAL at 05:30

## 2018-07-15 RX ADMIN — Medication 10 MILLIGRAM(S): at 13:00

## 2018-07-15 RX ADMIN — Medication 400 MILLIGRAM(S): at 23:49

## 2018-07-15 RX ADMIN — Medication 3 MILLIGRAM(S): at 21:25

## 2018-07-15 RX ADMIN — PANTOPRAZOLE SODIUM 40 MILLIGRAM(S): 20 TABLET, DELAYED RELEASE ORAL at 13:16

## 2018-07-15 RX ADMIN — Medication 650 MILLIGRAM(S): at 04:00

## 2018-07-15 RX ADMIN — Medication 100 MILLIGRAM(S): at 05:30

## 2018-07-15 RX ADMIN — Medication 400 MILLIGRAM(S): at 18:31

## 2018-07-15 RX ADMIN — Medication 100 MILLIGRAM(S): at 17:15

## 2018-07-15 RX ADMIN — Medication 81 MILLIGRAM(S): at 13:00

## 2018-07-15 RX ADMIN — Medication 400 MILLIGRAM(S): at 19:18

## 2018-07-15 RX ADMIN — Medication 650 MILLIGRAM(S): at 03:19

## 2018-07-15 RX ADMIN — GABAPENTIN 200 MILLIGRAM(S): 400 CAPSULE ORAL at 14:34

## 2018-07-15 RX ADMIN — Medication 650 MILLIGRAM(S): at 17:15

## 2018-07-15 NOTE — PROGRESS NOTE ADULT - SUBJECTIVE AND OBJECTIVE BOX
Subjective: mild chest discomfort with eating     Vital Signs:  Vital Signs Last 24 Hrs  T(C): 36.9 (07-15-18 @ 10:01), Max: 37.1 (07-14-18 @ 16:12)  T(F): 98.4 (07-15-18 @ 10:01), Max: 98.8 (07-14-18 @ 16:12)  HR: 115 (07-15-18 @ 10:01) (106 - 120)  BP: 114/83 (07-15-18 @ 10:01) (103/79 - 116/89)  RR: 18 (07-15-18 @ 10:01) (18 - 18)  SpO2: 97% (07-15-18 @ 10:01) (95% - 97%) on (O2)    Telemetry/Alarms: nsr  General: adult in NAD, oob to chair   HEENT/neck: normocephalic, trachea is midline   CV: normal S1/S2 RRR  Lungs: clear to auscultation, no wheezes, no rales  Abdomen: soft non-tender non-distended, normal BS   Ext: no edema  Skin: no rashes   Neuro: alert and oriented X 3   Relevant labs, radiology and Medications reviewed      MEDICATIONS  (STANDING):  aspirin enteric coated 81 milliGRAM(s) Oral daily  bisacodyl Suppository 10 milliGRAM(s) Rectal once  chlorhexidine 0.12% Liquid 5 milliLiter(s) Swish and Spit every 12 hours  docusate sodium 100 milliGRAM(s) Oral two times a day  gabapentin 200 milliGRAM(s) Oral every 8 hours  heparin  Injectable 5000 Unit(s) SubCutaneous every 8 hours  melatonin 3 milliGRAM(s) Oral at bedtime  pantoprazole   Suspension 40 milliGRAM(s) Oral daily  senna 2 Tablet(s) Oral at bedtime    MEDICATIONS  (PRN):  acetaminophen    Suspension. 650 milliGRAM(s) Oral every 6 hours PRN Mild Pain (1 - 3)  bisacodyl Suppository 10 milliGRAM(s) Rectal daily PRN Constipation  melatonin 3 milliGRAM(s) Oral at bedtime PRN Insomnia  polyethylene glycol 3350 17 Gram(s) Oral daily PRN Constipation    Pertinent Physical Exam  I&O's Summary    14 Jul 2018 07:01  -  15 Jul 2018 07:00  --------------------------------------------------------  IN: 960 mL / OUT: 0 mL / NET: 960 mL    15 Jul 2018 07:01  -  15 Jul 2018 11:27  --------------------------------------------------------  IN: 250 mL / OUT: 250 mL / NET: 0 mL        Assessment: 53 yo male with PMH esophageal cancer, CAD s/p MI and stent in 2005. On 6/8/18, patient underwent robotic jodie ji esophagogastrectomy and on 6/14/18, patient underwent EGD, balloon dilation, botox injection. Hospital course complicated by moderate pericardial effusion s/p pericardial drain placed by IR and since removed (7/13). Recommend EGD dilation (with GI Dr. Bean) however patient refused. Pending Barium Swallow.     PLAN  Neuro: Pain management  Pulm: Encourage coughing, deep breathing and use of incentive spirometry.   Cardio: Monitor telemetry/alarms  GI: Puree diet. HOB at 30   Renal: monitor urine output, supplement electrolytes as needed  Vasc: Heparin SC/SCDs for DVT prophylaxis  Heme: Stable H/H.   ID: Off antibiotics. Stable.  Therapy: OOB/ambulate  Disposition: Aim to D/C to home once stable   Discussed with Cardiothoracic Team at AM rounds.

## 2018-07-16 VITALS
TEMPERATURE: 97 F | DIASTOLIC BLOOD PRESSURE: 77 MMHG | RESPIRATION RATE: 18 BRPM | OXYGEN SATURATION: 96 % | HEART RATE: 103 BPM | SYSTOLIC BLOOD PRESSURE: 109 MMHG

## 2018-07-16 LAB
BUN SERPL-MCNC: 10 MG/DL — SIGNIFICANT CHANGE UP (ref 7–23)
CALCIUM SERPL-MCNC: 9.2 MG/DL — SIGNIFICANT CHANGE UP (ref 8.4–10.5)
CHLORIDE SERPL-SCNC: 100 MMOL/L — SIGNIFICANT CHANGE UP (ref 98–107)
CO2 SERPL-SCNC: 26 MMOL/L — SIGNIFICANT CHANGE UP (ref 22–31)
CREAT SERPL-MCNC: 0.9 MG/DL — SIGNIFICANT CHANGE UP (ref 0.5–1.3)
GLUCOSE SERPL-MCNC: 92 MG/DL — SIGNIFICANT CHANGE UP (ref 70–99)
HCT VFR BLD CALC: 37 % — LOW (ref 39–50)
HGB BLD-MCNC: 12 G/DL — LOW (ref 13–17)
MCHC RBC-ENTMCNC: 26.3 PG — LOW (ref 27–34)
MCHC RBC-ENTMCNC: 32.4 % — SIGNIFICANT CHANGE UP (ref 32–36)
MCV RBC AUTO: 81.1 FL — SIGNIFICANT CHANGE UP (ref 80–100)
NRBC # FLD: 0 — SIGNIFICANT CHANGE UP
PLATELET # BLD AUTO: 337 K/UL — SIGNIFICANT CHANGE UP (ref 150–400)
PMV BLD: 9.5 FL — SIGNIFICANT CHANGE UP (ref 7–13)
POTASSIUM SERPL-MCNC: 4.2 MMOL/L — SIGNIFICANT CHANGE UP (ref 3.5–5.3)
POTASSIUM SERPL-SCNC: 4.2 MMOL/L — SIGNIFICANT CHANGE UP (ref 3.5–5.3)
RBC # BLD: 4.56 M/UL — SIGNIFICANT CHANGE UP (ref 4.2–5.8)
RBC # FLD: 14.9 % — HIGH (ref 10.3–14.5)
SODIUM SERPL-SCNC: 138 MMOL/L — SIGNIFICANT CHANGE UP (ref 135–145)
WBC # BLD: 7.17 K/UL — SIGNIFICANT CHANGE UP (ref 3.8–10.5)
WBC # FLD AUTO: 7.17 K/UL — SIGNIFICANT CHANGE UP (ref 3.8–10.5)

## 2018-07-16 PROCEDURE — 99238 HOSP IP/OBS DSCHRG MGMT 30/<: CPT

## 2018-07-16 RX ORDER — LANOLIN ALCOHOL/MO/W.PET/CERES
1 CREAM (GRAM) TOPICAL
Qty: 14 | Refills: 0 | OUTPATIENT
Start: 2018-07-16 | End: 2018-07-29

## 2018-07-16 RX ADMIN — Medication 400 MILLIGRAM(S): at 00:30

## 2018-07-17 PROBLEM — C15.9 MALIGNANT NEOPLASM OF ESOPHAGUS, UNSPECIFIED: Chronic | Status: ACTIVE | Noted: 2018-05-29

## 2018-07-21 ENCOUNTER — RESULT REVIEW (OUTPATIENT)
Age: 53
End: 2018-07-21

## 2018-07-25 ENCOUNTER — APPOINTMENT (OUTPATIENT)
Dept: THORACIC SURGERY | Facility: CLINIC | Age: 53
End: 2018-07-25
Payer: COMMERCIAL

## 2018-07-25 VITALS
WEIGHT: 190 LBS | OXYGEN SATURATION: 97 % | DIASTOLIC BLOOD PRESSURE: 79 MMHG | HEIGHT: 76 IN | BODY MASS INDEX: 23.14 KG/M2 | RESPIRATION RATE: 18 BRPM | TEMPERATURE: 98.1 F | HEART RATE: 80 BPM | SYSTOLIC BLOOD PRESSURE: 113 MMHG

## 2018-07-25 PROCEDURE — 99024 POSTOP FOLLOW-UP VISIT: CPT

## 2018-10-01 ENCOUNTER — APPOINTMENT (OUTPATIENT)
Dept: SURGICAL ONCOLOGY | Facility: CLINIC | Age: 53
End: 2018-10-01
Payer: COMMERCIAL

## 2018-10-01 VITALS
WEIGHT: 191 LBS | RESPIRATION RATE: 16 BRPM | SYSTOLIC BLOOD PRESSURE: 118 MMHG | DIASTOLIC BLOOD PRESSURE: 78 MMHG | OXYGEN SATURATION: 98 % | BODY MASS INDEX: 23.26 KG/M2 | HEART RATE: 91 BPM | HEIGHT: 76 IN

## 2018-10-01 PROCEDURE — 99215 OFFICE O/P EST HI 40 MIN: CPT

## 2018-10-03 ENCOUNTER — APPOINTMENT (OUTPATIENT)
Dept: PAIN MANAGEMENT | Facility: CLINIC | Age: 53
End: 2018-10-03

## 2018-11-07 ENCOUNTER — FORM ENCOUNTER (OUTPATIENT)
Age: 53
End: 2018-11-07

## 2018-11-08 ENCOUNTER — APPOINTMENT (OUTPATIENT)
Dept: NUCLEAR MEDICINE | Facility: IMAGING CENTER | Age: 53
End: 2018-11-08
Payer: COMMERCIAL

## 2018-11-08 ENCOUNTER — OUTPATIENT (OUTPATIENT)
Dept: OUTPATIENT SERVICES | Facility: HOSPITAL | Age: 53
LOS: 1 days | End: 2018-11-08
Payer: COMMERCIAL

## 2018-11-08 DIAGNOSIS — Z95.5 PRESENCE OF CORONARY ANGIOPLASTY IMPLANT AND GRAFT: Chronic | ICD-10-CM

## 2018-11-08 DIAGNOSIS — Z98.890 OTHER SPECIFIED POSTPROCEDURAL STATES: Chronic | ICD-10-CM

## 2018-11-08 DIAGNOSIS — Z90.89 ACQUIRED ABSENCE OF OTHER ORGANS: Chronic | ICD-10-CM

## 2018-11-08 DIAGNOSIS — C15.9 MALIGNANT NEOPLASM OF ESOPHAGUS, UNSPECIFIED: ICD-10-CM

## 2018-11-08 PROCEDURE — 78815 PET IMAGE W/CT SKULL-THIGH: CPT

## 2018-11-08 PROCEDURE — A9552: CPT

## 2018-11-08 PROCEDURE — 78815 PET IMAGE W/CT SKULL-THIGH: CPT | Mod: 26,PS

## 2018-11-15 ENCOUNTER — APPOINTMENT (OUTPATIENT)
Dept: GASTROENTEROLOGY | Facility: CLINIC | Age: 53
End: 2018-11-15
Payer: COMMERCIAL

## 2018-11-15 VITALS
DIASTOLIC BLOOD PRESSURE: 80 MMHG | BODY MASS INDEX: 24.64 KG/M2 | OXYGEN SATURATION: 98 % | HEIGHT: 73.5 IN | HEART RATE: 84 BPM | RESPIRATION RATE: 16 BRPM | WEIGHT: 190 LBS | SYSTOLIC BLOOD PRESSURE: 110 MMHG | TEMPERATURE: 98 F

## 2018-11-15 PROCEDURE — 99213 OFFICE O/P EST LOW 20 MIN: CPT

## 2018-11-26 ENCOUNTER — APPOINTMENT (OUTPATIENT)
Dept: CT IMAGING | Facility: IMAGING CENTER | Age: 53
End: 2018-11-26

## 2018-12-04 ENCOUNTER — APPOINTMENT (OUTPATIENT)
Dept: CT IMAGING | Facility: HOSPITAL | Age: 53
End: 2018-12-04

## 2018-12-05 ENCOUNTER — APPOINTMENT (OUTPATIENT)
Dept: THORACIC SURGERY | Facility: CLINIC | Age: 53
End: 2018-12-05

## 2019-01-16 LAB
ALBUMIN SERPL ELPH-MCNC: 4.3 G/DL
ALP BLD-CCNC: 73 U/L
ALT SERPL-CCNC: 21 U/L
ANION GAP SERPL CALC-SCNC: 10 MMOL/L
AST SERPL-CCNC: 18 U/L
BASOPHILS # BLD AUTO: 0 K/UL
BASOPHILS NFR BLD AUTO: 0 %
BILIRUB SERPL-MCNC: 0.4 MG/DL
BUN SERPL-MCNC: 16 MG/DL
CALCIUM SERPL-MCNC: 9.6 MG/DL
CHLORIDE SERPL-SCNC: 103 MMOL/L
CO2 SERPL-SCNC: 26 MMOL/L
CREAT SERPL-MCNC: 1.01 MG/DL
EOSINOPHIL # BLD AUTO: 0.04 K/UL
EOSINOPHIL NFR BLD AUTO: 1.2 %
GLUCOSE SERPL-MCNC: 90 MG/DL
HCT VFR BLD CALC: 43 %
HGB BLD-MCNC: 13.8 G/DL
IMM GRANULOCYTES NFR BLD AUTO: 0.3 %
INR PPP: 1.15 RATIO
LYMPHOCYTES # BLD AUTO: 0.76 K/UL
LYMPHOCYTES NFR BLD AUTO: 22.4 %
MAN DIFF?: NORMAL
MCHC RBC-ENTMCNC: 27.5 PG
MCHC RBC-ENTMCNC: 32.1 GM/DL
MCV RBC AUTO: 85.7 FL
MONOCYTES # BLD AUTO: 0.48 K/UL
MONOCYTES NFR BLD AUTO: 14.1 %
NEUTROPHILS # BLD AUTO: 2.11 K/UL
NEUTROPHILS NFR BLD AUTO: 62 %
PLATELET # BLD AUTO: 209 K/UL
POTASSIUM SERPL-SCNC: 4.5 MMOL/L
PROT SERPL-MCNC: 7.2 G/DL
PT BLD: 13.2 SEC
RBC # BLD: 5.02 M/UL
RBC # FLD: 15.3 %
SODIUM SERPL-SCNC: 139 MMOL/L
WBC # FLD AUTO: 3.4 K/UL

## 2019-01-24 ENCOUNTER — FORM ENCOUNTER (OUTPATIENT)
Age: 54
End: 2019-01-24

## 2019-01-25 ENCOUNTER — OUTPATIENT (OUTPATIENT)
Dept: OUTPATIENT SERVICES | Facility: HOSPITAL | Age: 54
LOS: 1 days | End: 2019-01-25
Payer: COMMERCIAL

## 2019-01-25 ENCOUNTER — RESULT REVIEW (OUTPATIENT)
Age: 54
End: 2019-01-25

## 2019-01-25 ENCOUNTER — APPOINTMENT (OUTPATIENT)
Dept: CT IMAGING | Facility: HOSPITAL | Age: 54
End: 2019-01-25

## 2019-01-25 DIAGNOSIS — Z90.89 ACQUIRED ABSENCE OF OTHER ORGANS: Chronic | ICD-10-CM

## 2019-01-25 DIAGNOSIS — Z98.890 OTHER SPECIFIED POSTPROCEDURAL STATES: Chronic | ICD-10-CM

## 2019-01-25 DIAGNOSIS — M53.3 SACROCOCCYGEAL DISORDERS, NOT ELSEWHERE CLASSIFIED: ICD-10-CM

## 2019-01-25 DIAGNOSIS — Z95.5 PRESENCE OF CORONARY ANGIOPLASTY IMPLANT AND GRAFT: Chronic | ICD-10-CM

## 2019-01-25 DIAGNOSIS — Z00.00 ENCOUNTER FOR GENERAL ADULT MEDICAL EXAMINATION WITHOUT ABNORMAL FINDINGS: ICD-10-CM

## 2019-01-25 PROCEDURE — 88305 TISSUE EXAM BY PATHOLOGIST: CPT | Mod: 26

## 2019-01-25 PROCEDURE — 38505 NEEDLE BIOPSY LYMPH NODES: CPT

## 2019-01-25 PROCEDURE — 77012 CT SCAN FOR NEEDLE BIOPSY: CPT | Mod: 26

## 2019-01-25 PROCEDURE — 88172 CYTP DX EVAL FNA 1ST EA SITE: CPT

## 2019-01-25 PROCEDURE — 88173 CYTOPATH EVAL FNA REPORT: CPT

## 2019-01-25 PROCEDURE — 77012 CT SCAN FOR NEEDLE BIOPSY: CPT

## 2019-01-25 PROCEDURE — 88305 TISSUE EXAM BY PATHOLOGIST: CPT

## 2019-01-25 PROCEDURE — 88173 CYTOPATH EVAL FNA REPORT: CPT | Mod: 26

## 2019-01-25 NOTE — PRE-ANESTHESIA EVALUATION ADULT - NSPREOPDXFT_GEN_ALL_CORE
EMS transport took patient at this time. Patient sent with DC instructions (to be given to spouse by EMS), cough assist, glasses, 4 pillows, and stuffed [employer] dog. All other patient belongings were taken earlier today by patient's sitter. Patient in stable condition. Wife called and notified of EMS leaving facility at this time in route to patient's home.    sacral mass

## 2019-01-31 ENCOUNTER — APPOINTMENT (OUTPATIENT)
Dept: RADIATION ONCOLOGY | Facility: CLINIC | Age: 54
End: 2019-01-31
Payer: COMMERCIAL

## 2019-01-31 LAB — NON-GYNECOLOGICAL CYTOLOGY STUDY: SIGNIFICANT CHANGE UP

## 2019-02-01 ENCOUNTER — APPOINTMENT (OUTPATIENT)
Dept: THORACIC SURGERY | Facility: CLINIC | Age: 54
End: 2019-02-01
Payer: COMMERCIAL

## 2019-02-01 VITALS
OXYGEN SATURATION: 97 % | RESPIRATION RATE: 18 BRPM | SYSTOLIC BLOOD PRESSURE: 131 MMHG | BODY MASS INDEX: 24.9 KG/M2 | WEIGHT: 192 LBS | DIASTOLIC BLOOD PRESSURE: 80 MMHG | HEIGHT: 73.5 IN | HEART RATE: 85 BPM | TEMPERATURE: 97.7 F

## 2019-02-01 PROCEDURE — 99214 OFFICE O/P EST MOD 30 MIN: CPT

## 2019-02-05 ENCOUNTER — APPOINTMENT (OUTPATIENT)
Dept: RADIATION ONCOLOGY | Facility: CLINIC | Age: 54
End: 2019-02-05
Payer: COMMERCIAL

## 2019-02-05 VITALS
RESPIRATION RATE: 16 BRPM | WEIGHT: 190 LBS | DIASTOLIC BLOOD PRESSURE: 75 MMHG | HEART RATE: 67 BPM | OXYGEN SATURATION: 98 % | HEIGHT: 73.5 IN | BODY MASS INDEX: 24.64 KG/M2 | SYSTOLIC BLOOD PRESSURE: 112 MMHG

## 2019-02-05 PROCEDURE — 99215 OFFICE O/P EST HI 40 MIN: CPT | Mod: 25

## 2019-02-05 NOTE — VITALS
[Maximal Pain Intensity: 1/10] : 1/10 [Least Pain Intensity: 0/10] : 0/10 [Pain Description/Quality: ___] : Pain description/quality: [unfilled] [Pain Duration: ___] : Pain duration: [unfilled] [Pain Location: ___] : Pain Location: [unfilled] [Pain Interferes with ADLs] : Pain interferes with activities of daily living. [NoTreatment Scheduled] : no treatment scheduled [80: Normal activity with effort; some signs or symptoms of disease.] : 80: Normal activity with effort; some signs or symptoms of disease.  [ECOG Performance Status: 0 - Fully active, able to carry on all pre-disease performance without restriction] : Performance Status: 0 - Fully active, able to carry on all pre-disease performance without restriction

## 2019-02-06 NOTE — CONSULT LETTER
[Dear  ___] : Dear  [unfilled], [Courtesy Letter:] : I had the pleasure of seeing your patient, [unfilled], in my office today. [Please see my note below.] : Please see my note below. [Sincerely,] : Sincerely, [DrVernon  ___] : Dr. MAC [DrVernon ___] : Dr. MAC [FreeTextEntry2] : Dr. Mario Pérez RT \par Dr. Jann Pozo GI \par Dr. Declan Mendez \par Dr. Homero Ryan oncol \par  [FreeTextEntry3] : Abdifatah Brown MD, FACS \par , Division of Thoracic Surgery \par [facility] \par Chief, Thoracic Surgery \par [facility] \par Department of Cardiovascular & Thoracic Surgery \par  \par [facility] [facility] [employer] [facility]

## 2019-02-06 NOTE — PHYSICAL EXAM
[Sclera] : the sclera and conjunctiva were normal [PERRL With Normal Accommodation] : pupils were equal in size, round, and reactive to light [Neck Appearance] : the appearance of the neck was normal [Respiration, Rhythm And Depth] : normal respiratory rhythm and effort [Auscultation Breath Sounds / Voice Sounds] : lungs were clear to auscultation bilaterally [Heart Rate And Rhythm] : heart rate was normal and rhythm regular [Heart Sounds] : normal S1 and S2 [Examination Of The Chest] : the chest was normal in appearance [2+] : left 2+ [No Abnormalities] : the abdominal aorta was not enlarged and no bruit was heard [No Pulse Delay] : no pulse delay [No Pulse Discrepancy] : no pulse discrepancy detected [Full Pulse] : peripheral pulses were full [Bowel Sounds] : normal bowel sounds [Abdomen Soft] : soft [Abdomen Tenderness] : non-tender [Cervical Lymph Nodes Enlarged Posterior Bilaterally] : posterior cervical [Cervical Lymph Nodes Enlarged Anterior Bilaterally] : anterior cervical [Supraclavicular Lymph Nodes Enlarged Bilaterally] : supraclavicular [No CVA Tenderness] : no ~M costovertebral angle tenderness [Abnormal Walk] : normal gait [Involuntary Movements] : no involuntary movements were seen [Skin Color & Pigmentation] : normal skin color and pigmentation [Skin Turgor] : normal skin turgor [] : no rash [No Focal Deficits] : no focal deficits [Oriented To Time, Place, And Person] : oriented to person, place, and time [Affect] : the affect was normal [Mood] : the mood was normal [Fingers] :  capillary refill of the fingers was normal [Varicose Veins Of The Right Leg] : the patient has no varicose veins of the right leg [Varicose Veins Of The Left Leg] : the patient has no varicose veins of the left leg [FreeTextEntry1] : deferred

## 2019-02-06 NOTE — ASSESSMENT
[FreeTextEntry1] : 54 y/o male, with Pmhx of esophageal adnoCa in 10/2017. He completed induction chemo from 11/25/2017--2/29/2018 with Dr. Crowe and RT with Dr. Pérez on 1/30/2018. His post treatment CT revealed gastrohepatic and mediastinal nodes decreased in size, and a persistent thickening of the distal esophagus and gastric cardia. \par \par He is s/p EGD, robotic assisted [place] Andrew esophagogastrectomy on on 6/8/2018. Path: ypT1a, N1, invasive moderately differentiate adenoCa, with negative margins, 0/7 LNs were negative. \par \par He presented to ED on 7/11/18 with C/O chest pain and tachycardia ( -130s). CTA revealed moderate pericardial effusion, pericardiocentesis was performed and 550ml serosanguineous fluid was drained at the same day, which he tolerated well. \par \par PET/CT on 11/8/18: \par -hypermetabolic lytic lesion in Rt hemisacrum, new since 4/13/2018, is compatible with osseous mets. \par -S/P esophagectomy with gastric pull-through. Mild hypermetabolism at the gastroesophageal anastomosis is nonspecific. \par -tiny hypermetabolic focus in left thyroid is unchanged. \par \par CT guided bone biopsy of Rt sacrum lytic lesion on 1/25/19. Path: metastatic poorly differentiated Ca, favor adenoCa. \par \par Clinical stage IV disease, I had length discussion with patient in the office today, recommended him for systemic therapy with oncologist Dr. Crowe. He verbalized understanding and agreed with the plan. \par \par I have reviewed the patient's medical records and diagnostic images at the time of this office consultation and have made the following recommendation.\par Plan:\par 1. 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 \par 2. Refer to Dr. Crowe for systemic chemo. \par 3.  RTC prn. \par \par \par \par Written by Anel Goode NP, acting as a scribe for Dr. Abdifatah Brown. \par “The documentation recorded by the scribe accurately reflects the service I personally performed and the decisions made by me.” Abdifatah Brown MD.\par \par

## 2019-02-06 NOTE — HISTORY OF PRESENT ILLNESS
[FreeTextEntry1] : 52 y/o male, with Pmhx of esophageal adnoCa in 10/2017. He completed induction chemo from 11/25/2017--2/29/2018 with Dr. Crowe and RT with Dr. Pérez on 1/30/2018. His post treatment CT revealed gastrohepatic and mediastinal nodes decreased in size, and a persistent thickening of the distal esophagus and gastric cardia. \par \par He is s/p EGD, robotic assisted [place] Andrew esophagogastrectomy on on 6/8/2018. Path: ypT1a, N1, invasive moderately differentiate adenoCa, with negative margins, 0/7 LNs were negative. \par \par He presented to ED on 7/11/18 with C/O chest pain and tachycardia ( -130s). CTA revealed moderate pericardial effusion, pericardiocentesis was performed and 550ml serosanguineous fluid was drained at the same day, which he tolerated well. \par \par PET/CT on 11/8/18: \par -hypermetabolic lytic lesion in Rt hemisacrum, new since 4/13/2018, is compatible with osseous mets. \par -S/P esophagectomy with gastric pull-through. Mild hypermetabolism at the gastroesophageal anastomosis is nonspecific. \par -tiny hypermetabolic focus in left thyroid is unchanged. \par \par CT guided bone biopsy of Rt sacrum lytic lesion on 1/25/19. Path: metastatic poorly differentiated Ca, favor adenoCa. \par \par He presents today for a followup visit. The patient denies SOB, cough, chest pain, hemoptysis, palpitation, fever, recent illness, hospitalization and significant weight loss.\par

## 2019-02-06 NOTE — DATA REVIEWED
[FreeTextEntry1] : PET/CT on 11/8/18: \par -hypermetabolic lytic lesion in Rt hemisacrum, new since 4/13/2018, is compatible with osseous mets. \par -S/P esophagectomy with gastric pull-through. Mild hypermetabolism at the gastroesophageal anastomosis is nonspecific. \par -tiny hypermetabolic focus in left thyroid is unchanged. \par \par CT guided bone biopsy of Rt sacrum lytic lesion on 1/25/19. Path: metastatic poorly differentiated Ca, favor adenoCa

## 2019-02-07 ENCOUNTER — OUTPATIENT (OUTPATIENT)
Dept: OUTPATIENT SERVICES | Facility: HOSPITAL | Age: 54
LOS: 1 days | Discharge: HOME | End: 2019-02-07
Payer: COMMERCIAL

## 2019-02-07 DIAGNOSIS — Z98.890 OTHER SPECIFIED POSTPROCEDURAL STATES: Chronic | ICD-10-CM

## 2019-02-07 DIAGNOSIS — Z90.89 ACQUIRED ABSENCE OF OTHER ORGANS: Chronic | ICD-10-CM

## 2019-02-07 DIAGNOSIS — Z95.5 PRESENCE OF CORONARY ANGIOPLASTY IMPLANT AND GRAFT: Chronic | ICD-10-CM

## 2019-02-07 PROCEDURE — 77263 THER RADIOLOGY TX PLNG CPLX: CPT

## 2019-02-19 PROCEDURE — 77334 RADIATION TREATMENT AID(S): CPT | Mod: 26

## 2019-02-19 PROCEDURE — 77290 THER RAD SIMULAJ FIELD CPLX: CPT | Mod: 26

## 2019-02-19 PROCEDURE — 77333 RADIATION TREATMENT AID(S): CPT | Mod: 26,59

## 2019-02-28 PROCEDURE — 77300 RADIATION THERAPY DOSE PLAN: CPT | Mod: 26

## 2019-02-28 PROCEDURE — 77295 3-D RADIOTHERAPY PLAN: CPT | Mod: 26

## 2019-02-28 PROCEDURE — 77334 RADIATION TREATMENT AID(S): CPT | Mod: 26

## 2019-03-05 ENCOUNTER — TRANSCRIPTION ENCOUNTER (OUTPATIENT)
Age: 54
End: 2019-03-05

## 2019-03-07 VITALS — SYSTOLIC BLOOD PRESSURE: 112 MMHG | OXYGEN SATURATION: 97 % | DIASTOLIC BLOOD PRESSURE: 72 MMHG | HEART RATE: 114 BPM

## 2019-03-07 NOTE — REVIEW OF SYSTEMS
[Constipation: Grade 0] : Constipation: Grade 0 [Diarrhea: Grade 0] : Diarrhea: Grade 0 [Nausea: Grade 0] : Nausea: Grade 0 [Vomiting: Grade 0] : Vomiting: Grade 0 [Fatigue: Grade 1 - Fatigue relieved by rest] : Fatigue: Grade 1 - Fatigue relieved by rest [Cough: Grade 0] : Cough: Grade 0 [Dyspnea: Grade 0] : Dyspnea: Grade 0

## 2019-03-08 NOTE — HISTORY OF PRESENT ILLNESS
[FreeTextEntry1] : 3/7/19\par -tolerating tx\par -no complaints of pain\par -pt had a immun therapy booster today and feels chilled,tired and uncomfortable-no fever noted

## 2019-03-15 VITALS
SYSTOLIC BLOOD PRESSURE: 99 MMHG | BODY MASS INDEX: 24.34 KG/M2 | DIASTOLIC BLOOD PRESSURE: 65 MMHG | WEIGHT: 187 LBS | RESPIRATION RATE: 15 BRPM | OXYGEN SATURATION: 97 % | HEART RATE: 75 BPM | TEMPERATURE: 94.28 F

## 2019-03-15 PROCEDURE — 77435 SBRT MANAGEMENT: CPT

## 2019-03-15 NOTE — REVIEW OF SYSTEMS
[Constipation: Grade 0] : Constipation: Grade 0 [Diarrhea: Grade 0] : Diarrhea: Grade 0 [Nausea: Grade 0] : Nausea: Grade 0 [Vomiting: Grade 0] : Vomiting: Grade 0 [Fatigue: Grade 1 - Fatigue relieved by rest] : Fatigue: Grade 1 - Fatigue relieved by rest [Cough: Grade 0] : Cough: Grade 0 [Dyspnea: Grade 0] : Dyspnea: Grade 0 [Negative] : Heme/Lymph

## 2019-04-01 ENCOUNTER — APPOINTMENT (OUTPATIENT)
Dept: SURGICAL ONCOLOGY | Facility: CLINIC | Age: 54
End: 2019-04-01
Payer: COMMERCIAL

## 2019-04-01 VITALS
OXYGEN SATURATION: 96 % | SYSTOLIC BLOOD PRESSURE: 115 MMHG | WEIGHT: 188 LBS | BODY MASS INDEX: 24.39 KG/M2 | DIASTOLIC BLOOD PRESSURE: 72 MMHG | RESPIRATION RATE: 15 BRPM | HEART RATE: 70 BPM | HEIGHT: 73.5 IN

## 2019-04-01 PROCEDURE — 99214 OFFICE O/P EST MOD 30 MIN: CPT

## 2019-04-01 NOTE — HISTORY OF PRESENT ILLNESS
[de-identified] : 53 year-old male presents for follow up.  He is status post robotic [place] Andrew Esophagogastrectomy on 6/14/18.  Final pathology showed residual adenocarcinoma with 7 negative regional lymph nodes (although pathology report indicates stage T1aN1).  He experienced delayed gastric emptying and underwent dilation of the pyloric channel with botox injection on 6/14/18.\par \par He met with Dr. Crowe but did not require any adjuvant therapy.\par \par A PET/CT in November 2018 demonstrated a new hypermetabolic lytic lesion in the right marley sacrum, consistent with osseus metastasis.  This was confirmed on a CT guided biopsy in January 2019.  He completed RT to the sacrum on 3/15/19.  He states his oncologist will refer him for repeat imaging in approximately May 2019.\par \par Today he reports episodes of constipation that occur at least once per week.  There is associated abdominal discomfort during those times which improves after he eventually moves his bowels.  He uses laxative teas and psyllium fiber as needed but he is not on any consistent bowel regimen.  His weight and appetite are preserved and he denies any dysphagia, nausea or vomiting.  He remains afebrile. \par \par Previous pertinent history is as follows:\par \par He began to experience dysphagia in August 2017.  He was referred for an endoscopy in October 2017 which showed a mass in the distal esophagus which was biopsy proven adenocarcinoma.  EUS was performed by Dr. Muñoz on 10/26/17 showed a partially obstructing mass in the distal esophagus from 35-39 cm.  The GEJ and cardia were uninvolved.  This was initially staged T3N3 by EUS criteria.  \par \par Initial PET/CT in October 2017 showed hypermetabolic wall thickening of the distal esophagus and gastric cardia as well as metastatic perigastric lymphadenopathy.  A subcentimeter FDG avid thyroid nodule was seen for which a follow up thyroid ultrasound recommended.  There was also a PET+ paraesophageal mass, and he underwent a second EUS in November 2017, which identified the mass as a paraesophageal lymph node which was biopsy proven metastatic adenocarcinoma. \par \par He completed chemotherapy 11/25/17 - 2/29/2018 with Dr. Joaquin Crowe and RT with Dr. Pérez on 1/30/2018.\par \par Restaging PET/CT on 4/13/18 demonstrated interval decrease in hypermetabolism associated with circumferential wall thickening of distal esophagus and gastric cardia as compared to prior study dated 10/23/2017, compatible with response to interval therapy. Small focus of residual disease is not excluded. There is resolution of hypermetabolism associated with perigastric and right upper paraesophageal lymph nodes which are decreased in size.  Small FDG-avid subcutaneous soft tissue nodule posterior to the left shoulder is nonspecific. This is new as compared to prior PET/CT and unchanged as compared to CT dated 3/30/2018. Correlate clinically for focal inflammatory lesion.  A subcentimeter FDG-avid thyroid nodule was unchanged\par \par His past medical history is notable for CAD, s/p MI with PCI and stent placement in February 2015.  He was initially prescribed antiplatelet therapy but decided to discontinue the medication on his own.  He has no prior surgeries other than an inguinal hernia repair at the age of 1.  He is a former smoker and quit in 1981.  He has refrained from alcohol use since his diagnosis.  \par \par PCP: Dr. Declan Mendez\par Referring MD/CTSX: Dr. Abdifatah Bronw\par Rad Onc: Dr. Mario Pérez\par Med Onc: Dr. Joaquin Crowe\par GI: Dr. Jose L Muñoz

## 2019-04-01 NOTE — ASSESSMENT
[FreeTextEntry1] : Esophageal cancer now with biopsy proven bone metastases, s/p RT to the sacral lesion.

## 2019-04-01 NOTE — CONSULT LETTER
[Dear  ___] : Dear  [unfilled], [Courtesy Letter:] : I had the pleasure of seeing your patient, [unfilled], in my office today. [Consult Closing:] : Thank you very much for allowing me to participate in the care of this patient.  If you have any questions, please do not hesitate to contact me. [Sincerely,] : Sincerely, [DrVernon  ___] : Dr. MAC [___] : [unfilled] [DrVernon ___] : Dr. MAC [FreeTextEntry2] : Abdifatah Brown MD [FreeTextEntry1] : 53 year-old male presents for follow up.  He is status post robotic [place] Andrew Esophagogastrectomy on 6/14/18.  Final pathology showed residual adenocarcinoma with 7 negative regional lymph nodes (although pathology report indicates stage T1aN1).  He experienced delayed gastric emptying and underwent dilation of the pyloric channel with botox injection on 6/14/18.\par \par He met with Dr. Crowe but did not require any adjuvant therapy.\par \par A PET/CT in November 2018 demonstrated a new hypermetabolic lytic lesion in the right marley sacrum, consistent with osseus metastasis.  This was confirmed on a CT guided biopsy in January 2019.  He completed RT to the sacrum on 3/15/19.  He states his oncologist will refer him for repeat imaging in approximately May 2019.\par \par Today he reports episodes of constipation that occur at least once per week.  There is associated abdominal discomfort during those times which improves after he eventually moves his bowels.  He uses laxative teas and psyllium fiber as needed but he is not on any consistent bowel regimen.  His weight and appetite are preserved and he denies any dysphagia, nausea or vomiting.  He remains afebrile. \par \par Previous pertinent history is as follows:\par \par He began to experience dysphagia in August 2017.  He was referred for an endoscopy in October 2017 which showed a mass in the distal esophagus which was biopsy proven adenocarcinoma.  EUS was performed by Dr. Muñoz on 10/26/17 showed a partially obstructing mass in the distal esophagus from 35-39 cm.  The GEJ and cardia were uninvolved.  This was initially staged T3N3 by EUS criteria.  \par \par Initial PET/CT in October 2017 showed hypermetabolic wall thickening of the distal esophagus and gastric cardia as well as metastatic perigastric lymphadenopathy.  A subcentimeter FDG avid thyroid nodule was seen for which a follow up thyroid ultrasound recommended.  There was also a PET+ paraesophageal mass, and he underwent a second EUS in November 2017, which identified the mass as a paraesophageal lymph node which was biopsy proven metastatic adenocarcinoma. \par \par He completed chemotherapy 11/25/17 - 2/29/2018 with Dr. Joaquin Crowe and RT with Dr. Pérez on 1/30/2018.\par \par Restaging PET/CT on 4/13/18 demonstrated interval decrease in hypermetabolism associated with circumferential wall thickening of distal esophagus and gastric cardia as compared to prior study dated 10/23/2017, compatible with response to interval therapy. Small focus of residual disease is not excluded. There is resolution of hypermetabolism associated with perigastric and right upper paraesophageal lymph nodes which are decreased in size.  Small FDG-avid subcutaneous soft tissue nodule posterior to the left shoulder is nonspecific. This is new as compared to prior PET/CT and unchanged as compared to CT dated 3/30/2018. Correlate clinically for focal inflammatory lesion.  A subcentimeter FDG-avid thyroid nodule was unchanged\par \par His past medical history is notable for CAD, s/p MI with PCI and stent placement in February 2015.  He was initially prescribed antiplatelet therapy but decided to discontinue the medication on his own.  He has no prior surgeries other than an inguinal hernia repair at the age of 1.  He is a former smoker and quit in 1981, and he drinks alcohol on a daily basis.  \par \par Today we recommended he return to [facility] for EGD\par \par \par PCP: Dr. Declan Mendez\par Referring MD/CTSX: Dr. Abdifatah Brown\par Rad Onc: Dr. Mario Pérez\par Med Onc: Dr. Joaquin Crowe\par GI: Dr. Jose L Muñoz [FreeTextEntry3] : Serafin Yousif MD, FACS, FASCRS\par , Department of Surgery\par Director of the [facility]\par , Minimally Invasive/Robotic Cancer Surgery, Central & [facility] Divisions\par Division of Surgical Oncology \par \par enclose pathology

## 2019-04-01 NOTE — PHYSICAL EXAM
[Normal] : supple, no neck mass and thyroid not enlarged [Normal Neck Lymph Nodes] : normal neck lymph nodes  [Normal Supraclavicular Lymph Nodes] : normal supraclavicular lymph nodes [Normal] : oriented to person, place and time, with appropriate affect [de-identified] : Trochar sites well-healed with no mass or hernia.

## 2019-04-02 ENCOUNTER — NON-APPOINTMENT (OUTPATIENT)
Age: 54
End: 2019-04-02

## 2019-04-02 ENCOUNTER — APPOINTMENT (OUTPATIENT)
Dept: CARDIOLOGY | Facility: CLINIC | Age: 54
End: 2019-04-02
Payer: COMMERCIAL

## 2019-04-02 VITALS
SYSTOLIC BLOOD PRESSURE: 109 MMHG | OXYGEN SATURATION: 95 % | WEIGHT: 192 LBS | BODY MASS INDEX: 24.9 KG/M2 | HEIGHT: 73.5 IN | HEART RATE: 61 BPM | DIASTOLIC BLOOD PRESSURE: 69 MMHG

## 2019-04-02 DIAGNOSIS — I25.10 ATHEROSCLEROTIC HEART DISEASE OF NATIVE CORONARY ARTERY W/OUT ANGINA PECTORIS: ICD-10-CM

## 2019-04-02 PROCEDURE — 93000 ELECTROCARDIOGRAM COMPLETE: CPT

## 2019-04-02 PROCEDURE — 99214 OFFICE O/P EST MOD 30 MIN: CPT

## 2019-04-04 PROBLEM — I25.10 CORONARY ARTERY DISEASE: Status: ACTIVE | Noted: 2017-01-19

## 2019-04-10 LAB
ALBUMIN SERPL ELPH-MCNC: 4.4 G/DL
ALP BLD-CCNC: 54 U/L
ALT SERPL-CCNC: 17 U/L
ANION GAP SERPL CALC-SCNC: 14 MMOL/L
AST SERPL-CCNC: 23 U/L
BASOPHILS # BLD AUTO: 0.01 K/UL
BASOPHILS NFR BLD AUTO: 0.3 %
BILIRUB SERPL-MCNC: 0.3 MG/DL
BUN SERPL-MCNC: 13 MG/DL
CALCIUM SERPL-MCNC: 9.5 MG/DL
CHLORIDE SERPL-SCNC: 102 MMOL/L
CHOLEST SERPL-MCNC: 180 MG/DL
CHOLEST/HDLC SERPL: 2.9 RATIO
CO2 SERPL-SCNC: 25 MMOL/L
CREAT SERPL-MCNC: 1.06 MG/DL
EOSINOPHIL # BLD AUTO: 0.09 K/UL
EOSINOPHIL NFR BLD AUTO: 2.3 %
ERYTHROCYTE [SEDIMENTATION RATE] IN BLOOD BY WESTERGREN METHOD: 28 MM/HR
ESTIMATED AVERAGE GLUCOSE: 108 MG/DL
GLUCOSE SERPL-MCNC: 90 MG/DL
HBA1C MFR BLD HPLC: 5.4 %
HCT VFR BLD CALC: 43.2 %
HDLC SERPL-MCNC: 62 MG/DL
HGB BLD-MCNC: 13.8 G/DL
IMM GRANULOCYTES NFR BLD AUTO: 0.3 %
LDLC SERPL CALC-MCNC: 105 MG/DL
LYMPHOCYTES # BLD AUTO: 0.65 K/UL
LYMPHOCYTES NFR BLD AUTO: 17 %
MAN DIFF?: NORMAL
MCHC RBC-ENTMCNC: 28.3 PG
MCHC RBC-ENTMCNC: 31.9 GM/DL
MCV RBC AUTO: 88.5 FL
MONOCYTES # BLD AUTO: 0.42 K/UL
MONOCYTES NFR BLD AUTO: 11 %
NEUTROPHILS # BLD AUTO: 2.65 K/UL
NEUTROPHILS NFR BLD AUTO: 69.1 %
PLATELET # BLD AUTO: 204 K/UL
POTASSIUM SERPL-SCNC: 4.7 MMOL/L
PROT SERPL-MCNC: 7.3 G/DL
PSA FREE FLD-MCNC: 49 %
PSA FREE SERPL-MCNC: 0.35 NG/ML
PSA SERPL-MCNC: 0.72 NG/ML
RBC # BLD: 4.88 M/UL
RBC # FLD: 15.9 %
SODIUM SERPL-SCNC: 141 MMOL/L
TRIGL SERPL-MCNC: 65 MG/DL
TSH SERPL-ACNC: 1.6 UIU/ML
WBC # FLD AUTO: 3.83 K/UL

## 2019-04-11 LAB
TESTOST BND SERPL-MCNC: 7.2 PG/ML
TESTOST SERPL-MCNC: 929.4 NG/DL

## 2019-04-13 ENCOUNTER — INPATIENT (INPATIENT)
Facility: HOSPITAL | Age: 54
LOS: 1 days | Discharge: HOME | End: 2019-04-15
Attending: STUDENT IN AN ORGANIZED HEALTH CARE EDUCATION/TRAINING PROGRAM | Admitting: STUDENT IN AN ORGANIZED HEALTH CARE EDUCATION/TRAINING PROGRAM
Payer: COMMERCIAL

## 2019-04-13 VITALS
DIASTOLIC BLOOD PRESSURE: 97 MMHG | HEART RATE: 68 BPM | RESPIRATION RATE: 18 BRPM | TEMPERATURE: 98 F | SYSTOLIC BLOOD PRESSURE: 140 MMHG | OXYGEN SATURATION: 99 %

## 2019-04-13 DIAGNOSIS — Z98.890 OTHER SPECIFIED POSTPROCEDURAL STATES: Chronic | ICD-10-CM

## 2019-04-13 DIAGNOSIS — C15.9 MALIGNANT NEOPLASM OF ESOPHAGUS, UNSPECIFIED: ICD-10-CM

## 2019-04-13 DIAGNOSIS — Z29.9 ENCOUNTER FOR PROPHYLACTIC MEASURES, UNSPECIFIED: ICD-10-CM

## 2019-04-13 DIAGNOSIS — G93.9 DISORDER OF BRAIN, UNSPECIFIED: ICD-10-CM

## 2019-04-13 DIAGNOSIS — Z90.89 ACQUIRED ABSENCE OF OTHER ORGANS: Chronic | ICD-10-CM

## 2019-04-13 DIAGNOSIS — R42 DIZZINESS AND GIDDINESS: ICD-10-CM

## 2019-04-13 DIAGNOSIS — Z95.5 PRESENCE OF CORONARY ANGIOPLASTY IMPLANT AND GRAFT: Chronic | ICD-10-CM

## 2019-04-13 DIAGNOSIS — I25.10 ATHEROSCLEROTIC HEART DISEASE OF NATIVE CORONARY ARTERY WITHOUT ANGINA PECTORIS: ICD-10-CM

## 2019-04-13 LAB
ALBUMIN SERPL ELPH-MCNC: 4.5 G/DL — SIGNIFICANT CHANGE UP (ref 3.3–5)
ALP SERPL-CCNC: 51 U/L — SIGNIFICANT CHANGE UP (ref 40–120)
ALT FLD-CCNC: 14 U/L — SIGNIFICANT CHANGE UP (ref 4–41)
ANION GAP SERPL CALC-SCNC: 12 MMO/L — SIGNIFICANT CHANGE UP (ref 7–14)
AST SERPL-CCNC: 17 U/L — SIGNIFICANT CHANGE UP (ref 4–40)
BASOPHILS # BLD AUTO: 0 K/UL — SIGNIFICANT CHANGE UP (ref 0–0.2)
BASOPHILS NFR BLD AUTO: 0 % — SIGNIFICANT CHANGE UP (ref 0–2)
BILIRUB SERPL-MCNC: 0.5 MG/DL — SIGNIFICANT CHANGE UP (ref 0.2–1.2)
BUN SERPL-MCNC: 14 MG/DL — SIGNIFICANT CHANGE UP (ref 7–23)
CALCIUM SERPL-MCNC: 10 MG/DL — SIGNIFICANT CHANGE UP (ref 8.4–10.5)
CHLORIDE SERPL-SCNC: 103 MMOL/L — SIGNIFICANT CHANGE UP (ref 98–107)
CO2 SERPL-SCNC: 24 MMOL/L — SIGNIFICANT CHANGE UP (ref 22–31)
CREAT SERPL-MCNC: 0.91 MG/DL — SIGNIFICANT CHANGE UP (ref 0.5–1.3)
EOSINOPHIL # BLD AUTO: 0 K/UL — SIGNIFICANT CHANGE UP (ref 0–0.5)
EOSINOPHIL NFR BLD AUTO: 0 % — SIGNIFICANT CHANGE UP (ref 0–6)
GLUCOSE SERPL-MCNC: 121 MG/DL — HIGH (ref 70–99)
HCT VFR BLD CALC: 43.2 % — SIGNIFICANT CHANGE UP (ref 39–50)
HGB BLD-MCNC: 14.1 G/DL — SIGNIFICANT CHANGE UP (ref 13–17)
IMM GRANULOCYTES NFR BLD AUTO: 0.2 % — SIGNIFICANT CHANGE UP (ref 0–1.5)
LYMPHOCYTES # BLD AUTO: 0.51 K/UL — LOW (ref 1–3.3)
LYMPHOCYTES # BLD AUTO: 9.7 % — LOW (ref 13–44)
MCHC RBC-ENTMCNC: 28.4 PG — SIGNIFICANT CHANGE UP (ref 27–34)
MCHC RBC-ENTMCNC: 32.6 % — SIGNIFICANT CHANGE UP (ref 32–36)
MCV RBC AUTO: 87.1 FL — SIGNIFICANT CHANGE UP (ref 80–100)
MONOCYTES # BLD AUTO: 0.44 K/UL — SIGNIFICANT CHANGE UP (ref 0–0.9)
MONOCYTES NFR BLD AUTO: 8.4 % — SIGNIFICANT CHANGE UP (ref 2–14)
NEUTROPHILS # BLD AUTO: 4.3 K/UL — SIGNIFICANT CHANGE UP (ref 1.8–7.4)
NEUTROPHILS NFR BLD AUTO: 81.7 % — HIGH (ref 43–77)
NRBC # FLD: 0 K/UL — SIGNIFICANT CHANGE UP (ref 0–0)
PLATELET # BLD AUTO: 175 K/UL — SIGNIFICANT CHANGE UP (ref 150–400)
PMV BLD: 9.8 FL — SIGNIFICANT CHANGE UP (ref 7–13)
POTASSIUM SERPL-MCNC: 5 MMOL/L — SIGNIFICANT CHANGE UP (ref 3.5–5.3)
POTASSIUM SERPL-SCNC: 5 MMOL/L — SIGNIFICANT CHANGE UP (ref 3.5–5.3)
PROT SERPL-MCNC: 7.4 G/DL — SIGNIFICANT CHANGE UP (ref 6–8.3)
RBC # BLD: 4.96 M/UL — SIGNIFICANT CHANGE UP (ref 4.2–5.8)
RBC # FLD: 16 % — HIGH (ref 10.3–14.5)
SODIUM SERPL-SCNC: 139 MMOL/L — SIGNIFICANT CHANGE UP (ref 135–145)
WBC # BLD: 5.26 K/UL — SIGNIFICANT CHANGE UP (ref 3.8–10.5)
WBC # FLD AUTO: 5.26 K/UL — SIGNIFICANT CHANGE UP (ref 3.8–10.5)

## 2019-04-13 PROCEDURE — 99253 IP/OBS CNSLTJ NEW/EST LOW 45: CPT

## 2019-04-13 PROCEDURE — 71260 CT THORAX DX C+: CPT | Mod: 26

## 2019-04-13 PROCEDURE — 99223 1ST HOSP IP/OBS HIGH 75: CPT

## 2019-04-13 PROCEDURE — 70553 MRI BRAIN STEM W/O & W/DYE: CPT | Mod: 26

## 2019-04-13 PROCEDURE — 74177 CT ABD & PELVIS W/CONTRAST: CPT | Mod: 26

## 2019-04-13 PROCEDURE — 70450 CT HEAD/BRAIN W/O DYE: CPT | Mod: 26

## 2019-04-13 RX ORDER — DEXTROSE 50 % IN WATER 50 %
15 SYRINGE (ML) INTRAVENOUS ONCE
Qty: 0 | Refills: 0 | Status: DISCONTINUED | OUTPATIENT
Start: 2019-04-13 | End: 2019-04-15

## 2019-04-13 RX ORDER — DEXAMETHASONE 0.5 MG/5ML
4 ELIXIR ORAL EVERY 6 HOURS
Qty: 0 | Refills: 0 | Status: DISCONTINUED | OUTPATIENT
Start: 2019-04-13 | End: 2019-04-15

## 2019-04-13 RX ORDER — SODIUM CHLORIDE 9 MG/ML
1000 INJECTION INTRAMUSCULAR; INTRAVENOUS; SUBCUTANEOUS ONCE
Qty: 0 | Refills: 0 | Status: COMPLETED | OUTPATIENT
Start: 2019-04-13 | End: 2019-04-13

## 2019-04-13 RX ORDER — DEXAMETHASONE 0.5 MG/5ML
10 ELIXIR ORAL ONCE
Qty: 0 | Refills: 0 | Status: DISCONTINUED | OUTPATIENT
Start: 2019-04-13 | End: 2019-04-13

## 2019-04-13 RX ORDER — DEXTROSE 50 % IN WATER 50 %
12.5 SYRINGE (ML) INTRAVENOUS ONCE
Qty: 0 | Refills: 0 | Status: DISCONTINUED | OUTPATIENT
Start: 2019-04-13 | End: 2019-04-15

## 2019-04-13 RX ORDER — LANOLIN ALCOHOL/MO/W.PET/CERES
3 CREAM (GRAM) TOPICAL AT BEDTIME
Qty: 0 | Refills: 0 | Status: DISCONTINUED | OUTPATIENT
Start: 2019-04-13 | End: 2019-04-15

## 2019-04-13 RX ORDER — SODIUM CHLORIDE 9 MG/ML
1000 INJECTION, SOLUTION INTRAVENOUS
Qty: 0 | Refills: 0 | Status: DISCONTINUED | OUTPATIENT
Start: 2019-04-13 | End: 2019-04-15

## 2019-04-13 RX ORDER — DEXTROSE 50 % IN WATER 50 %
25 SYRINGE (ML) INTRAVENOUS ONCE
Qty: 0 | Refills: 0 | Status: DISCONTINUED | OUTPATIENT
Start: 2019-04-13 | End: 2019-04-15

## 2019-04-13 RX ORDER — GLUCAGON INJECTION, SOLUTION 0.5 MG/.1ML
1 INJECTION, SOLUTION SUBCUTANEOUS ONCE
Qty: 0 | Refills: 0 | Status: DISCONTINUED | OUTPATIENT
Start: 2019-04-13 | End: 2019-04-15

## 2019-04-13 RX ORDER — ACETAMINOPHEN 500 MG
325 TABLET ORAL EVERY 4 HOURS
Qty: 0 | Refills: 0 | Status: DISCONTINUED | OUTPATIENT
Start: 2019-04-13 | End: 2019-04-15

## 2019-04-13 RX ORDER — ONDANSETRON 8 MG/1
4 TABLET, FILM COATED ORAL ONCE
Qty: 0 | Refills: 0 | Status: COMPLETED | OUTPATIENT
Start: 2019-04-13 | End: 2019-04-13

## 2019-04-13 RX ORDER — MECLIZINE HCL 12.5 MG
25 TABLET ORAL ONCE
Qty: 0 | Refills: 0 | Status: COMPLETED | OUTPATIENT
Start: 2019-04-13 | End: 2019-04-13

## 2019-04-13 RX ORDER — ASPIRIN/CALCIUM CARB/MAGNESIUM 324 MG
81 TABLET ORAL DAILY
Qty: 0 | Refills: 0 | Status: DISCONTINUED | OUTPATIENT
Start: 2019-04-13 | End: 2019-04-14

## 2019-04-13 RX ORDER — INSULIN LISPRO 100/ML
VIAL (ML) SUBCUTANEOUS
Qty: 0 | Refills: 0 | Status: DISCONTINUED | OUTPATIENT
Start: 2019-04-13 | End: 2019-04-15

## 2019-04-13 RX ORDER — DEXAMETHASONE 0.5 MG/5ML
10 ELIXIR ORAL ONCE
Qty: 0 | Refills: 0 | Status: COMPLETED | OUTPATIENT
Start: 2019-04-13 | End: 2019-04-13

## 2019-04-13 RX ORDER — PANTOPRAZOLE SODIUM 20 MG/1
40 TABLET, DELAYED RELEASE ORAL
Qty: 0 | Refills: 0 | Status: DISCONTINUED | OUTPATIENT
Start: 2019-04-13 | End: 2019-04-15

## 2019-04-13 RX ORDER — DOCUSATE SODIUM 100 MG
100 CAPSULE ORAL
Qty: 0 | Refills: 0 | Status: DISCONTINUED | OUTPATIENT
Start: 2019-04-13 | End: 2019-04-15

## 2019-04-13 RX ADMIN — Medication 25 MILLIGRAM(S): at 12:22

## 2019-04-13 RX ADMIN — Medication 102 MILLIGRAM(S): at 21:32

## 2019-04-13 RX ADMIN — ONDANSETRON 4 MILLIGRAM(S): 8 TABLET, FILM COATED ORAL at 12:22

## 2019-04-13 RX ADMIN — SODIUM CHLORIDE 1000 MILLILITER(S): 9 INJECTION INTRAMUSCULAR; INTRAVENOUS; SUBCUTANEOUS at 12:22

## 2019-04-13 NOTE — H&P ADULT - ASSESSMENT
54 yo M with PMH of CAD w/ x1 stent, metastatic esophageal adenocarcinoma cancer s/p robotic [place]-Andrew Esophagogastrectomy on 6/8/18 with osseous mets to the Rt hemisacrum s/p neoadjuvant chemotherapy and RT with Dr. Pérez currently receiving immunotherapy (q3 weeks s/p 3rd dose due on 4/18) who presents to ER c/o headache a/w nausea and vertigo x4 days found to have a left cerebellar lesion concerning for neoplasm. 54 yo M with PMH of CAD w/ x1 stent, metastatic esophageal adenocarcinoma cancer with osseous mets to the Rt hemisacrum s/p neoadjuvant chemotherapy and RT with Dr. Pérez currently receiving immunotherapy who presents to ER c/o headache a/w nausea and vertigo x4 days found to have a left cerebellar lesion concerning for metastatic disease.

## 2019-04-13 NOTE — ED CDU PROVIDER INITIAL DAY NOTE - ATTENDING CONTRIBUTION TO CARE
I performed a face to face bedside interview with patient regarding history of present illness, review of symptoms and past medical history. I completed an independent physical exam.  I have discussed patient's plan of care with PA.   I agree with note as stated above, having amended the EMR as needed to reflect my findings. I have discussed the assessment and plan of care.  This includes during the time I functioned as the attending physician for this patient.    Attending Exam - Dr. Salazar: GEN: well appearing, NAD  HEENT: +PERRL, EOMI  RESP: CTAB, no signs of respiratory distress CV: s1s2 RRR ABD: soft/non tender/non distended  MSK: no deformities / swelling, normal range of motion, spine grossly normal NEURO: alert, non focal exam SKIN: normal color / temperature / condition.

## 2019-04-13 NOTE — ED CDU PROVIDER INITIAL DAY NOTE - OBJECTIVE STATEMENT
54 yo M with PMH of CAD w/ x1 stent, metastatic esophageal cancer s/p robotic [place]-Andrew Esophagogastrectomy on 6/8/18 (mets to sacral) currently receiving immunotherapy presents to ER c/o headache a/w nausea and vertigo x4 days. In ED, CT head shows Left superior cerebellar 2.3 x 3.5 x 1.5 cm mass with moderate surrounding parenchymal vasogenic edema, near complete effacement of the fourth ventricle and findings worrisome for developing hydrocephalus. Pt seen by neurosurgery, sent to CDU for MRI.

## 2019-04-13 NOTE — ED ADULT NURSE NOTE - OBJECTIVE STATEMENT
53 y male A+OX3 presents to the ER with the complaint of dizziness. Pt states for the past 3-4 days, he has been experiencing headaches in the morning along with dizziness and some nausea. Today patient states the dizziness is still present but the headache not as much. States dizziness is triggered on movement and that sound and light make it worse. Pt has hx of esophogeal CA with mets to the sacrum and is currently on immunotherapy medication with last radiation in march. Pt placed on cardiac monitor due to previous MI and stent history. Will continue to monitor.

## 2019-04-13 NOTE — ED ADULT NURSE NOTE - NSIMPLEMENTINTERV_GEN_ALL_ED
Implemented All Universal Safety Interventions:  [place] to call system. Call bell, personal items and telephone within reach. Instruct patient to call for assistance. Room bathroom lighting operational. Non-slip footwear when patient is off stretcher. Physically safe environment: no spills, clutter or unnecessary equipment. Stretcher in lowest position, wheels locked, appropriate side rails in place.

## 2019-04-13 NOTE — CONSULT NOTE ADULT - SUBJECTIVE AND OBJECTIVE BOX
KAYLEIGH SYLVIA 53y ,Male  HPI:  53 M PMH esophageal ca s/p resection with sacral mets currently receiving immunotherapy p/w morning headache and nausea x 4 days with positional vertigo. reduced PO intake, no emesis. No head trauma.  CT head shows Left superior cerebellar 2.3 x 3.5 x 1.5 cm mass with moderate  surrounding parenchymal vasogenic edema, near complete effacement of the fourth ventricle and findings worrisome for developing hydrocephalus. Please correlate clinically.	    PAST MEDICAL & SURGICAL HISTORY:  Esophageal cancer  MI (myocardial infarction): 2015 with 1 coronary stent  History of esophageal surgery  H/O hernia repair: 1966  Status post tonsillectomy and adenoidectomy  Stented coronary artery: x1 2015    Allergies  No Known Allergies    Vital Signs Last 24 Hrs  T(C): 36.7 (13 Apr 2019 14:42), Max: 36.7 (13 Apr 2019 14:42)  T(F): 98.1 (13 Apr 2019 14:42), Max: 98.1 (13 Apr 2019 14:42)  HR: 82 (13 Apr 2019 14:42) (66 - 82)  BP: 135/80 (13 Apr 2019 14:42) (135/80 - 148/90)  BP(mean): --  RR: 16 (13 Apr 2019 14:42) (16 - 18)  SpO2: 100% (13 Apr 2019 14:42) (99% - 100%)    PE:  AA&0 x 3, CN 2-12 grossly intact, speach clear, follows commands, PERRL  Motor: strength : BUE/BLE 5/5  Sensory: intact to light touch  no drift, no ataxia, no dysmetria    LABS:                        14.1   5.26  )-----------( 175      ( 13 Apr 2019 12:20 )             43.2     04-13    139  |  103  |  14  ----------------------------<  121<H>  5.0   |  24  |  0.91    Ca    10.0      13 Apr 2019 12:20    TPro  7.4  /  Alb  4.5  /  TBili  0.5  /  DBili  x   /  AST  17  /  ALT  14  /  AlkPhos  51  04-13

## 2019-04-13 NOTE — H&P ADULT - PROBLEM SELECTOR PLAN 3
CAD s/p inferior wall MI in [facility] in February of 2015 s/p TATE to the mid RCA followed by Dr. Swan  EKG NSR  - c/w ASA, as per OP records patient does not wish to be on a statin drug

## 2019-04-13 NOTE — ED CDU PROVIDER DISPOSITION NOTE - CLINICAL COURSE
Patient brought to CDU for MRI, showing possible new primary neoplasm in the cerebellum, neurosurgery requesting medicine admission given hx of CA in past and needing metastatic w/u

## 2019-04-13 NOTE — ED CDU PROVIDER INITIAL DAY NOTE - MEDICAL DECISION MAKING DETAILS
52 yo M with PMH of CAD w/ x1 stent, metastatic esophageal cancer s/p robotic [place]-Andrew Esophagogastrectomy on 6/8/18 (mets to sacral) currently receiving immunotherapy presents to ER c/o headache a/w nausea and vertigo x4 days. In ED, CT head shows Left superior cerebellar 2.3 x 3.5 x 1.5 cm mass. Pt seen by neurosurgery, sent to CDU for MRI and disposition after MRI results, if primary brain mass will need inpatient admission for further evaluation, if mets, likely d/c for outpatient followup.

## 2019-04-13 NOTE — ED ADULT TRIAGE NOTE - CHIEF COMPLAINT QUOTE
hx of esophageal CA with mets to sacrum currently on immunothearpy. reports dizziness x 3 days worse with position. pt pale appearing in triage. denies CP SOB ABBEY or NVD. + HA and dizziness better when laying down

## 2019-04-13 NOTE — H&P ADULT - HISTORY OF PRESENT ILLNESS
HPI:  52 yo M with PMH of CAD w/ x1 stent, metastatic esophageal adenocarcinoma cancer s/p robotic [place]-Andrew Esophagogastrectomy on 6/8/18 (mets to sacral) s/p neoadjuvant chemotherapy and RT with Dr. Pérez currently receiving immunotherapy (q3 weeks s/p 3rd dose due on 4/18) who presents to ER c/o headache a/w nausea and vertigo x4 days. Patient admits to taking herbal teas, Tylenol that provided some relief, admits that headaches were worse in the morning, had difficulty ambulating today with 9-10/10 headache and associated dizziness which prompted him to seek medical care. Denies any infectious symptoms or recent illness.   In ED, he was hemodynamically stable, CT head was significant for Left superior cerebellar 2.3 x 3.5 x 1.5 cm mass with moderate surrounding parenchymal vasogenic edema, near complete effacement of the fourth ventricle and findings worrisome for developing hydrocephalus. Pt seen by neurosurgery who recommended IV steroids additional imaging with MRI, admitted for further management.     PAST MEDICAL & SURGICAL HISTORY:  Esophageal cancer  MI (myocardial infarction): 2015 with 1 coronary stent  History of esophageal surgery  H/O hernia repair: 1966  Status post tonsillectomy and adenoidectomy  Stented coronary artery: x1 2015      Review of Systems:   CONSTITUTIONAL: No fever, weight loss, or fatigue  EYES: No eye pain, visual disturbances, or discharge  ENMT:  No difficulty hearing, tinnitus, + vertigo; No sinus or throat pain  NECK: No pain or stiffness  BREASTS: No pain, masses, or nipple discharge  RESPIRATORY: No cough, wheezing, chills or hemoptysis; No shortness of breath  CARDIOVASCULAR: No chest pain, palpitations, dizziness, or leg swelling  GASTROINTESTINAL: No abdominal or epigastric pain. + nausea, vomiting, or hematemesis; No diarrhea or constipation. No melena or hematochezia.  GENITOURINARY: No dysuria, frequency, hematuria, or incontinence  NEUROLOGICAL: No headaches, memory loss, loss of strength, numbness, or tremors  SKIN: No itching, burning, rashes, or lesions   LYMPH NODES: No enlarged glands  ENDOCRINE: No heat or cold intolerance; No hair loss  MUSCULOSKELETAL: No joint pain or swelling; No muscle, back, or extremity pain  PSYCHIATRIC: No depression, anxiety, mood swings, or difficulty sleeping  HEME/LYMPH: No easy bruising, or bleeding gums  ALLERGY AND IMMUNOLOGIC: No hives or eczema    Allergies    No Known Allergies    Intolerances        Social History:   Lives in [place] with his wife, no children, 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    FAMILY HISTORY:  Family history of cervical cancer, Mother  Family history of throat cancer, Father      MEDICATIONS  (STANDING):  dexamethasone  Injectable 4 milliGRAM(s) IV Push every 6 hours  dexamethasone  IVPB 10 milliGRAM(s) IV Intermittent once    MEDICATIONS  (PRN):      T(C): 36.9 (04-13-19 @ 18:28), Max: 36.9 (04-13-19 @ 18:28)  HR: 90 (04-13-19 @ 18:28) (66 - 90)  BP: 138/80 (04-13-19 @ 18:28) (135/80 - 148/90)  RR: 17 (04-13-19 @ 18:28) (16 - 18)  SpO2: 97% (04-13-19 @ 18:28) (97% - 100%)  Height (cm): 185.42 (04-13-19 @ 14:42)  Weight (kg): 83.41343888 (04-13-19 @ 14:42)  BMI (kg/m2): 24.4 (04-13-19 @ 14:42)  BSA (m2): 2.08 (04-13-19 @ 14:42)  CAPILLARY BLOOD GLUCOSE        I&O's Summary      PHYSICAL EXAM:  GENERAL: NAD, well-developed, cooperative with exam  HEAD:  Atraumatic, Normocephalic  EYES: EOMI, PERRLA, conjunctiva and sclera clear  NECK: Supple, No elevated JVD  CHEST/LUNG: Clear to auscultation bilaterally; No wheeze  HEART: Regular rate and rhythm; No murmurs, rubs, or gallops  ABDOMEN: Soft, Nontender, Nondistended; Bowel sounds present  EXTREMITIES:  2+ Peripheral Pulses, No clubbing, cyanosis, or edema  PSYCH: AAOx3  NEUROLOGY: CN II-XII grossly intact, moving all extremities, ataxic gait  SKIN: No rashes or lesions    LABS:                        14.1   5.26  )-----------( 175      ( 13 Apr 2019 12:20 )             43.2     04-13    139  |  103  |  14  ----------------------------<  121<H>  5.0   |  24  |  0.91    Ca    10.0      13 Apr 2019 12:20    TPro  7.4  /  Alb  4.5  /  TBili  0.5  /  DBili  x   /  AST  17  /  ALT  14  /  AlkPhos  51  04-13                RADIOLOGY & ADDITIONAL TESTS:    ECG Personally Reviewed - NSR, left atrial enlargement    Imaging Personally Reviewed:    Consultant(s) Notes Reviewed:      Care Discussed with Consultants/Other Providers:

## 2019-04-13 NOTE — H&P ADULT - PROBLEM SELECTOR PLAN 1
Left superior cerebellar mass with enhancement of contacting tentorial leaflet with mild to moderate ventriculomegaly worrisome for obstructive hydrocephalus  Concerning for new mets, primary oncologist Dr. Joaquin Vazquez notified (office [phone])  - continue with IV Decadron 10mg IV, then 4mg Q6H  - FS QID, ISS, pantoprazole while on steroids  - PT consult to evaluate for gait stability Likely due to new left superior cerebellar mass concerning for new mets, primary oncologist Dr. Joaquin Vazquez notified (office [phone])  - continue with IV Decadron 10mg IV, then 4mg Q6H  - FS QID, ISS, pantoprazole while on steroids  - IV zofran prn nausea  - Neurosurgery following  - PT consult to evaluate for gait stability Likely due to new left superior cerebellar mass concerning for new mets, primary oncologist Dr. Joaquin Fuentes notified (office [phone])  - continue with IV Decadron 10mg IV, then 4mg Q6H  - FS QID, ISS, pantoprazole while on steroids  - IV zofran prn nausea  - Neurosurgery following  - PT consult to evaluate for gait stability

## 2019-04-13 NOTE — ED CDU PROVIDER INITIAL DAY NOTE - PROGRESS NOTE DETAILS
MR brain: worrisome for primary CNS lesion with obstructive hydrocephalus. Attending spoke with neurosurgery, plan to admit to medicine. Pt admitted for new primary brain mass. Mar text paged.

## 2019-04-13 NOTE — ED PROVIDER NOTE - OBJECTIVE STATEMENT
Tyler YO MD PGY1: 53 M PMH esophageal ca s/p resection with sacral mets currently receiving immunotherapy p/w morning headache and nausea x 4 days with positional vertigo. reduced PO intake, no emesis. No head trauma.

## 2019-04-13 NOTE — H&P ADULT - PROBLEM SELECTOR PLAN 2
s/p robotic [place]-Andrew Esophagogastrectomy on 6/8/18 with osseous mets to the Rt hemisacrum s/p neoadjuvant chemotherapy and RT with Dr. Pérez currently receiving immunotherapy (q3 weeks s/p 3rd dose due on 4/18)  - Discuss care with primary oncologist regarding likely new mets  - Neurosx following as above s/p robotic [place]-Andrew Esophagogastrectomy on 6/8/18 with osseous mets to the Rt hemisacrum s/p neoadjuvant chemotherapy and RT with Dr. Pérez currently receiving immunotherapy (q3 weeks s/p 3rd dose due on 4/18)  - Discuss care with primary oncologist regarding likely new mets  - F/U CT chest/abdo-pelvis  - Neurosx following as above

## 2019-04-13 NOTE — ED CDU PROVIDER INITIAL DAY NOTE - NS ED ATTENDING STATEMENT MOD
07-Dec-2018 11:11
I have personally performed a face to face diagnostic evaluation on this patient. I have reviewed the ACP note and agree with the history, exam and plan of care, except as noted.

## 2019-04-13 NOTE — ED PROVIDER NOTE - FAMILY HISTORY
Father  Still living? Unknown  Family history of throat cancer, Age at diagnosis: Age Unknown  Family history of cervical cancer, Age at diagnosis: Age Unknown

## 2019-04-13 NOTE — ED PROVIDER NOTE - ATTENDING CONTRIBUTION TO CARE
53 year old male with esophageal ca presents with dizziness in the past 4 days. worse in AM.  concern for cva vs met. will check labs, ct head reassess. no other deficits.

## 2019-04-13 NOTE — ED PROVIDER NOTE - PHYSICAL EXAMINATION
Tyler YO MD PGY1:   PHYSICAL EXAM:    GENERAL: NAD, pale, yellow?  HEENT:  Atraumatic, Normocephalic  CHEST/LUNG: Chest rise equal bilaterally. CTAB.   HEART: Regular rate and rhythm  ABDOMEN: Soft, Nontender, Nondistended  EXTREMITIES:  2+ Peripheral Pulses.  PSYCH: A&Ox3  SKIN: No obvious rashes or lesions  NEUROLOGY: strength 5/5 assessed by hang , at elbow and shoulder. No gross changes in sensation at fingertips. CN 2 - 12 intact. Able to walk without losing balance.

## 2019-04-13 NOTE — ED CDU PROVIDER INITIAL DAY NOTE - CONSTITUTIONAL, MLM
Therapeutic normal... Well appearing, well nourished, awake, alert, oriented to person, place, time/situation and in no apparent distress.

## 2019-04-13 NOTE — H&P ADULT - PROBLEM SELECTOR PLAN 4
DVT ppx: Lovenox  Diet: DASH/TLC  Dispo: Home vs. rehab, PT consult in place DVT ppx: ICD in case need for inpatient procedure  Diet: DASH/TLC  Dispo: Home vs. rehab, PT consult in place

## 2019-04-13 NOTE — ED PROVIDER NOTE - CLINICAL SUMMARY MEDICAL DECISION MAKING FREE TEXT BOX
Tyler YO MD PGY1: 53 M PMH metastatic esophageal ca with resolved primary s/p surgery p/w morning headache and nausea and positional vertigo c/f possible metastasis vs BPPV. Will obtain CTH for eval of intracranial process, t/w meclizine and zofran and will Po challenge. Will obtain LFTs to eval bili for ? juandice.

## 2019-04-13 NOTE — CONSULT NOTE ADULT - PROBLEM SELECTOR RECOMMENDATION 9
1. MRI brain w/ filemon  2. Decadron 10mg IV, then 4mg Q6H  3. oncology consult  4. Case and images d/w Dr. Rollins

## 2019-04-14 LAB
ALBUMIN SERPL ELPH-MCNC: 4.2 G/DL — SIGNIFICANT CHANGE UP (ref 3.3–5)
ALP SERPL-CCNC: 49 U/L — SIGNIFICANT CHANGE UP (ref 40–120)
ALT FLD-CCNC: 13 U/L — SIGNIFICANT CHANGE UP (ref 4–41)
ANION GAP SERPL CALC-SCNC: 10 MMO/L — SIGNIFICANT CHANGE UP (ref 7–14)
AST SERPL-CCNC: 12 U/L — SIGNIFICANT CHANGE UP (ref 4–40)
BILIRUB SERPL-MCNC: 0.5 MG/DL — SIGNIFICANT CHANGE UP (ref 0.2–1.2)
BUN SERPL-MCNC: 14 MG/DL — SIGNIFICANT CHANGE UP (ref 7–23)
CALCIUM SERPL-MCNC: 9.9 MG/DL — SIGNIFICANT CHANGE UP (ref 8.4–10.5)
CHLORIDE SERPL-SCNC: 103 MMOL/L — SIGNIFICANT CHANGE UP (ref 98–107)
CO2 SERPL-SCNC: 26 MMOL/L — SIGNIFICANT CHANGE UP (ref 22–31)
CREAT SERPL-MCNC: 0.85 MG/DL — SIGNIFICANT CHANGE UP (ref 0.5–1.3)
GLUCOSE SERPL-MCNC: 111 MG/DL — HIGH (ref 70–99)
HBA1C BLD-MCNC: 5.1 % — SIGNIFICANT CHANGE UP (ref 4–5.6)
HCT VFR BLD CALC: 43.6 % — SIGNIFICANT CHANGE UP (ref 39–50)
HCV AB S/CO SERPL IA: 0.13 S/CO — SIGNIFICANT CHANGE UP (ref 0–0.99)
HCV AB SERPL-IMP: SIGNIFICANT CHANGE UP
HGB BLD-MCNC: 14.1 G/DL — SIGNIFICANT CHANGE UP (ref 13–17)
MCHC RBC-ENTMCNC: 28.4 PG — SIGNIFICANT CHANGE UP (ref 27–34)
MCHC RBC-ENTMCNC: 32.3 % — SIGNIFICANT CHANGE UP (ref 32–36)
MCV RBC AUTO: 87.9 FL — SIGNIFICANT CHANGE UP (ref 80–100)
NRBC # FLD: 0 K/UL — SIGNIFICANT CHANGE UP (ref 0–0)
PLATELET # BLD AUTO: 193 K/UL — SIGNIFICANT CHANGE UP (ref 150–400)
PMV BLD: 10.4 FL — SIGNIFICANT CHANGE UP (ref 7–13)
POTASSIUM SERPL-MCNC: 4.4 MMOL/L — SIGNIFICANT CHANGE UP (ref 3.5–5.3)
POTASSIUM SERPL-SCNC: 4.4 MMOL/L — SIGNIFICANT CHANGE UP (ref 3.5–5.3)
PROT SERPL-MCNC: 6.8 G/DL — SIGNIFICANT CHANGE UP (ref 6–8.3)
RBC # BLD: 4.96 M/UL — SIGNIFICANT CHANGE UP (ref 4.2–5.8)
RBC # FLD: 16.1 % — HIGH (ref 10.3–14.5)
SODIUM SERPL-SCNC: 139 MMOL/L — SIGNIFICANT CHANGE UP (ref 135–145)
WBC # BLD: 4.89 K/UL — SIGNIFICANT CHANGE UP (ref 3.8–10.5)
WBC # FLD AUTO: 4.89 K/UL — SIGNIFICANT CHANGE UP (ref 3.8–10.5)

## 2019-04-14 PROCEDURE — 99233 SBSQ HOSP IP/OBS HIGH 50: CPT

## 2019-04-14 RX ORDER — INFLUENZA VIRUS VACCINE 15; 15; 15; 15 UG/.5ML; UG/.5ML; UG/.5ML; UG/.5ML
0.5 SUSPENSION INTRAMUSCULAR ONCE
Qty: 0 | Refills: 0 | Status: COMPLETED | OUTPATIENT
Start: 2019-04-14 | End: 2019-04-14

## 2019-04-14 RX ORDER — SENNA PLUS 8.6 MG/1
2 TABLET ORAL
Qty: 0 | Refills: 0 | COMMUNITY

## 2019-04-14 RX ORDER — DOCUSATE SODIUM 100 MG
1 CAPSULE ORAL
Qty: 0 | Refills: 0 | COMMUNITY

## 2019-04-14 RX ORDER — ACETAMINOPHEN 500 MG
2 TABLET ORAL
Qty: 0 | Refills: 0 | COMMUNITY

## 2019-04-14 RX ORDER — IBUPROFEN 200 MG
2 TABLET ORAL
Qty: 0 | Refills: 0 | COMMUNITY

## 2019-04-14 RX ADMIN — Medication 4 MILLIGRAM(S): at 00:39

## 2019-04-14 RX ADMIN — Medication 100 MILLIGRAM(S): at 06:33

## 2019-04-14 RX ADMIN — Medication 4 MILLIGRAM(S): at 06:33

## 2019-04-14 RX ADMIN — Medication 81 MILLIGRAM(S): at 13:55

## 2019-04-14 RX ADMIN — Medication 4 MILLIGRAM(S): at 23:32

## 2019-04-14 RX ADMIN — Medication 4 MILLIGRAM(S): at 18:26

## 2019-04-14 RX ADMIN — Medication 4 MILLIGRAM(S): at 12:51

## 2019-04-14 RX ADMIN — Medication 100 MILLIGRAM(S): at 18:26

## 2019-04-14 NOTE — PROGRESS NOTE ADULT - PROBLEM SELECTOR PLAN 1
Likely due to new left superior cerebellar mass concerning for new mets, primary oncologist Dr. Joaquin Fuentes notified (office [phone])  - continue with IV Decadron 10mg IV, then 4mg Q6H  - FS QID, ISS, pantoprazole while on steroids  - IV zofran prn nausea  - Neurosurgery following, might need surgery.   - PT consult to evaluate for gait stability

## 2019-04-14 NOTE — HISTORY OF PRESENT ILLNESS
[FreeTextEntry1] : 3/15/19\par -tolerating tx well-completed today\par -feeling well\par -moving bowels well\par -some discofort rt hip\par -some fatigue noted\par -started on metformin today

## 2019-04-14 NOTE — PATIENT PROFILE ADULT - NSPROEDAREADYLEARN_GEN_A_NUR
Problem: Behavioral Disturbance  Goal: Behavioral Disturbance  Interventions to focus on helping patient to regulate impulse control, learn methods  of dealing with stressors and feelings,  learn to control negative impulses and acting out behaviors, and increase ability to express/manage  anger in appropriate and non-violent ways. Assist patient with exploring satisfying alternatives to aggressive behaviors such as physical outlets for redirection of angry feelings, hobbies, or other individual pursuits.   Therapeutic Goals:  1. Damien will develop and identify coping strategies. Stressors include: sexual trauma, intrauterine exposure  2. Damien will not engage in sexualized behavior in the milieu.   3. Damien will complete coping plan prior to d/c.  4. No signs or symptoms of med AEs will be observed or reported.  5. Damien will express willingness to participate in f/u care.  6. Damien will report a decrease in sexual thoughts.   7. Damien will maintain appropriate verbal, physical, emotional and informational boundaries c staff and peers.     Outcome: Therapy, progress toward functional goals as expected    Attended full hour of music therapy group.  Interventions focused on positive self-expression and improving mood.  Pt participated by playing the electric guitar and later playing a game with a peer.  Bright affect.  Pleasant and cooperative.  Positive attitude.  No negative or aggressive behaviors were observed.  Pt was appropriate and social with peers.        none

## 2019-04-14 NOTE — PATIENT PROFILE ADULT - HOW PATIENT ADDRESSED, PROFILE
Jam
Initiate Treatment: Betamethasone dipropionate: Apply to scalp twice daily for two weeks then discontinue
Detail Level: Simple
Initiate Treatment: Ketaconazole shampoo: apply to scalp let sit for 1-2 minutes three times weekly for 2 weeks when flared, then once weekly for maintenance

## 2019-04-15 ENCOUNTER — TRANSCRIPTION ENCOUNTER (OUTPATIENT)
Age: 54
End: 2019-04-15

## 2019-04-15 VITALS
SYSTOLIC BLOOD PRESSURE: 105 MMHG | RESPIRATION RATE: 19 BRPM | OXYGEN SATURATION: 99 % | DIASTOLIC BLOOD PRESSURE: 71 MMHG | TEMPERATURE: 97 F | HEART RATE: 69 BPM

## 2019-04-15 LAB
ANION GAP SERPL CALC-SCNC: 12 MMO/L — SIGNIFICANT CHANGE UP (ref 7–14)
BUN SERPL-MCNC: 22 MG/DL — SIGNIFICANT CHANGE UP (ref 7–23)
CALCIUM SERPL-MCNC: 9.4 MG/DL — SIGNIFICANT CHANGE UP (ref 8.4–10.5)
CHLORIDE SERPL-SCNC: 104 MMOL/L — SIGNIFICANT CHANGE UP (ref 98–107)
CO2 SERPL-SCNC: 24 MMOL/L — SIGNIFICANT CHANGE UP (ref 22–31)
CREAT SERPL-MCNC: 0.78 MG/DL — SIGNIFICANT CHANGE UP (ref 0.5–1.3)
GLUCOSE SERPL-MCNC: 106 MG/DL — HIGH (ref 70–99)
HCT VFR BLD CALC: 42.1 % — SIGNIFICANT CHANGE UP (ref 39–50)
HGB BLD-MCNC: 13.9 G/DL — SIGNIFICANT CHANGE UP (ref 13–17)
MCHC RBC-ENTMCNC: 28.6 PG — SIGNIFICANT CHANGE UP (ref 27–34)
MCHC RBC-ENTMCNC: 33 % — SIGNIFICANT CHANGE UP (ref 32–36)
MCV RBC AUTO: 86.6 FL — SIGNIFICANT CHANGE UP (ref 80–100)
NRBC # FLD: 0 K/UL — SIGNIFICANT CHANGE UP (ref 0–0)
NT-PROBNP SERPL-SCNC: 90.58 PG/ML — SIGNIFICANT CHANGE UP
PLATELET # BLD AUTO: 181 K/UL — SIGNIFICANT CHANGE UP (ref 150–400)
PMV BLD: 9.6 FL — SIGNIFICANT CHANGE UP (ref 7–13)
POTASSIUM SERPL-MCNC: 4.3 MMOL/L — SIGNIFICANT CHANGE UP (ref 3.5–5.3)
POTASSIUM SERPL-SCNC: 4.3 MMOL/L — SIGNIFICANT CHANGE UP (ref 3.5–5.3)
RBC # BLD: 4.86 M/UL — SIGNIFICANT CHANGE UP (ref 4.2–5.8)
RBC # FLD: 16.1 % — HIGH (ref 10.3–14.5)
SODIUM SERPL-SCNC: 140 MMOL/L — SIGNIFICANT CHANGE UP (ref 135–145)
WBC # BLD: 8.87 K/UL — SIGNIFICANT CHANGE UP (ref 3.8–10.5)
WBC # FLD AUTO: 8.87 K/UL — SIGNIFICANT CHANGE UP (ref 3.8–10.5)

## 2019-04-15 PROCEDURE — 99222 1ST HOSP IP/OBS MODERATE 55: CPT | Mod: GC

## 2019-04-15 PROCEDURE — 99239 HOSP IP/OBS DSCHRG MGMT >30: CPT

## 2019-04-15 RX ORDER — DOCUSATE SODIUM 100 MG
1 CAPSULE ORAL
Qty: 0 | Refills: 0 | COMMUNITY
Start: 2019-04-15

## 2019-04-15 RX ORDER — PANTOPRAZOLE SODIUM 20 MG/1
1 TABLET, DELAYED RELEASE ORAL
Qty: 30 | Refills: 0 | OUTPATIENT
Start: 2019-04-15 | End: 2019-05-14

## 2019-04-15 RX ORDER — DEXAMETHASONE 0.5 MG/5ML
1 ELIXIR ORAL
Qty: 28 | Refills: 0 | OUTPATIENT
Start: 2019-04-15 | End: 2019-04-21

## 2019-04-15 RX ORDER — LANOLIN ALCOHOL/MO/W.PET/CERES
1 CREAM (GRAM) TOPICAL
Qty: 0 | Refills: 0 | COMMUNITY
Start: 2019-04-15

## 2019-04-15 RX ORDER — ASPIRIN/CALCIUM CARB/MAGNESIUM 324 MG
1 TABLET ORAL
Qty: 0 | Refills: 0 | COMMUNITY

## 2019-04-15 RX ORDER — ACETAMINOPHEN 500 MG
1 TABLET ORAL
Qty: 0 | Refills: 0 | COMMUNITY
Start: 2019-04-15

## 2019-04-15 RX ADMIN — Medication 4 MILLIGRAM(S): at 12:29

## 2019-04-15 RX ADMIN — Medication 4 MILLIGRAM(S): at 07:09

## 2019-04-15 RX ADMIN — PANTOPRAZOLE SODIUM 40 MILLIGRAM(S): 20 TABLET, DELAYED RELEASE ORAL at 07:09

## 2019-04-15 RX ADMIN — Medication 100 MILLIGRAM(S): at 07:09

## 2019-04-15 NOTE — DISCHARGE NOTE PROVIDER - CARE PROVIDERS DIRECT ADDRESSES
[email or web address]_Unknown

## 2019-04-15 NOTE — CHART NOTE - NSCHARTNOTEFT_GEN_A_CORE
provider spoke with neurosurgery team [id number]- ok to be discharged from neurosurgical standpoint on PO steroids (4mg q6h). Medical clearance documented by attg for OR- plan for surgical resection next Monday with Dr. Rollins.

## 2019-04-15 NOTE — DISCHARGE NOTE PROVIDER - HOSPITAL COURSE
54 yo M with PMH of CAD w/ x1 stent, metastatic esophageal adenocarcinoma cancer with osseous mets to the Rt hemisacrum s/p neoadjuvant chemotherapy and RT with Dr. Pérez currently receiving immunotherapy who presents to ER c/o headache a/w nausea and vertigo x4 days found to have a left cerebellar lesion concerning for metastatic disease.         Dizziness, cerebellar mass     - Likely due to new left superior cerebellar mass concerning for new mets, primary oncologist Dr. Joaquin Feuntes notified (office [phone])    - Neurosurgery consulted- aspirin held for surgery, plan for surgical resection with Dr. Rollins 4/22    - Continue with IV Decadron 4mg Q6H, switch to PO decadron 4mg q6H upon d/c per neurosurgery     - FS QID, ISS, pantoprazole while on steroids    - IV zofran prn nausea    - PT consult ->rec home PT         Malignant neoplasm of esophagus, unspecified location.  -    -s/p robotic [place]-Andrew Esophagogastrectomy on 6/8/18 with osseous mets to the Rt hemisacrum s/p neoadjuvant chemotherapy and RT with Dr. Pérez currently receiving immunotherapy (q3 weeks s/p 3rd dose due on 4/18)    - CT C/A/P-->Gastric cancer status post esophagectomy and gastric pull-through. Interval sclerosis of known sacral metastatic lesion compared to 1/25/2019. Mesenteric nodularity suspicious for peritoneal carcinomatosis. Mild rectal wall thickening, clinical correlation with colonoscopy recommended. Small pelvic ascites.    -onc consulted- pt would like to be established at [facility], pt will follow up with Dr. Joaquin Fuentes for Keytruda April 18th.         CAD S/P percutaneous coronary angioplasty.      - CAD s/p inferior wall MI in [facility] in February of 2015 s/p TATE to the mid RCA followed by Dr. Beny REYNAGA NSR    - Aspirin held for tentative surgery Monday 4/22, as per OP records patient does not wish to be on a statin drug.             DVT ppx: ICD in case need for inpatient procedure    Diet: DASH/TLC    Dispo: Home PT         Dispo: Pt medically stable for d/c per Dr. Cristobal

## 2019-04-15 NOTE — PROGRESS NOTE ADULT - PROBLEM SELECTOR PLAN 2
s/p robotic [place]-Andrew Esophagogastrectomy on 6/8/18 with osseous mets to the Rt hemisacrum s/p neoadjuvant chemotherapy and RT with Dr. Pérez currently receiving immunotherapy (q3 weeks s/p 3rd dose due on 4/18)  - Discuss care with primary oncologist regarding likely new mets  - CT chest/abdo-pelvis showing carcinomatosis in abdomen.   - Neurosx following as above  - RadOnc and Onc consult on Monday.
s/p robotic [place]-Andrew Esophagogastrectomy on 6/8/18 with osseous mets to the Rt hemisacrum s/p neoadjuvant chemotherapy and RT with Dr. Pérez currently receiving immunotherapy (q3 weeks s/p 3rd dose due on 4/18)  - Discuss care with primary oncologist regarding likely new mets  - CT chest/abdo-pelvis showing carcinomatosis in abdomen.   - Neurosx following as above  - RadOnc and Onc consult on Monday.

## 2019-04-15 NOTE — PROGRESS NOTE ADULT - PROBLEM SELECTOR PLAN 1
Likely due to new left superior cerebellar mass concerning for new mets, primary oncologist Dr. Joaquin Fuentes notified (office [phone])  - continue with IV Decadron 10mg IV, then 4mg Q6H  - Patient may be discharged for outpatient follow up and resection/biopsy of brain lesion.     Patient history of CAD s/p TATE to the mid RCA  On aspirin  EKG NSR  >4 METS  Revised cardiac risk index for preoperative risk 2, would obtain proBNP, if normal may proceed with surgical resection. Likely due to new left superior cerebellar mass concerning for new mets, primary oncologist Dr. Joaquin Fuentes notified (office [phone])  - continue with IV Decadron 10mg IV, then 4mg Q6H  - Patient may be discharged for outpatient follow up and resection/biopsy of brain lesion.     Patient history of CAD s/p TATE to the mid RCA  On aspirin  EKG NSR  >4 METS  Revised cardiac risk index for preoperative risk 2, would obtain proBNP, if normal there are no further medical contraindications to proceed with surgery at this time.

## 2019-04-15 NOTE — DISCHARGE NOTE PROVIDER - NSDCCPCAREPLAN_GEN_ALL_CORE_FT
PRINCIPAL DISCHARGE DIAGNOSIS  Diagnosis: Brain mass  Assessment and Plan of Treatment: Continue steroids as directed. Outpatient follow up with neurosurgery, plan for surgical resection next Monday, 4/22, tentatively. Continue to hold aspirin.      SECONDARY DISCHARGE DIAGNOSES  Diagnosis: Malignant neoplasm of esophagus, unspecified location  Assessment and Plan of Treatment: Outpatient follow up with your oncologist, scheduled for keyuda April 18th.    Diagnosis: Dizziness  Assessment and Plan of Treatment: Maintain a safe environment. Do not change positions quickly. Particpate in program recommended by physical therapy    Diagnosis: CAD S/P percutaneous coronary angioplasty  Assessment and Plan of Treatment: Hold aspirin for planned procedure. PRINCIPAL DISCHARGE DIAGNOSIS  Diagnosis: Brain mass  Assessment and Plan of Treatment: Continue steroids as directed. Outpatient follow up with neurosurgery, plan for surgical resection next Monday, 4/22, tentatively. Please call Dr. Rollins's office to confirm scheduling.  Continue to hold aspirin.      SECONDARY DISCHARGE DIAGNOSES  Diagnosis: Malignant neoplasm of esophagus, unspecified location  Assessment and Plan of Treatment: Outpatient follow up with your oncologist, scheduled for keytruda April 18th.    Diagnosis: Dizziness  Assessment and Plan of Treatment: Maintain a safe environment. Do not change positions quickly. Particpate in program recommended by physical therapy    Diagnosis: CAD S/P percutaneous coronary angioplasty  Assessment and Plan of Treatment: Hold aspirin for planned procedure.

## 2019-04-15 NOTE — PROGRESS NOTE ADULT - SUBJECTIVE AND OBJECTIVE BOX
Patient is a 53y old  Male who presents with a chief complaint of Dizziness, nausea, headache (15 Apr 2019 12:53)      SUBJECTIVE / OVERNIGHT EVENTS:  Patient seen and examined this morning. No overnight events. Dizziness improved, patient able to ambulate and care for himself. Looks forward to going home. Denies any chest pain, shortness of breath, fevers, chills, nausea or vomiting.    MEDICATIONS  (STANDING):  dexamethasone  Injectable 4 milliGRAM(s) IV Push every 6 hours  dextrose 5%. 1000 milliLiter(s) (50 mL/Hr) IV Continuous <Continuous>  dextrose 50% Injectable 12.5 Gram(s) IV Push once  dextrose 50% Injectable 25 Gram(s) IV Push once  dextrose 50% Injectable 25 Gram(s) IV Push once  docusate sodium 100 milliGRAM(s) Oral two times a day  insulin lispro (HumaLOG) corrective regimen sliding scale   SubCutaneous three times a day before meals  pantoprazole    Tablet 40 milliGRAM(s) Oral before breakfast    MEDICATIONS  (PRN):  acetaminophen   Tablet .. 325 milliGRAM(s) Oral every 4 hours PRN Temp greater or equal to 38C (100.4F), Mild Pain (1 - 3), Moderate Pain (4 - 6), Severe Pain (7 - 10)  bisacodyl Suppository 10 milliGRAM(s) Rectal daily PRN Constipation  dextrose 40% Gel 15 Gram(s) Oral once PRN Blood Glucose LESS THAN 70 milliGRAM(s)/deciliter  glucagon  Injectable 1 milliGRAM(s) IntraMuscular once PRN Glucose LESS THAN 70 milligrams/deciliter  melatonin 3 milliGRAM(s) Oral at bedtime PRN Insomnia      PHYSICAL EXAM:  T(C): 36.7 (04-15-19 @ 05:12), Max: 36.7 (04-14-19 @ 15:46)  HR: 92 (04-15-19 @ 09:22) (67 - 92)  BP: 119/81 (04-15-19 @ 09:22) (100/66 - 119/81)  RR: 18 (04-15-19 @ 09:22) (16 - 19)  SpO2: 99% (04-15-19 @ 09:22) (96% - 99%)  I&O's Summary    Gen: NAD; resting in bed;, pleasant  Pulm: no respiratory distress; CTA b/l; no wheezing  Card: RRR; S1/S2 wnl; no obvious murmurs  Abd: soft; +bs; NT/ND  Ext: no joint swelling; no edema      LABS:  CAPILLARY BLOOD GLUCOSE      POCT Blood Glucose.: 120 mg/dL (15 Apr 2019 12:39)  POCT Blood Glucose.: 97 mg/dL (15 Apr 2019 08:40)  POCT Blood Glucose.: 112 mg/dL (14 Apr 2019 22:18)  POCT Blood Glucose.: 122 mg/dL (14 Apr 2019 17:49)                          13.9   8.87  )-----------( 181      ( 15 Apr 2019 07:05 )             42.1     04-15    140  |  104  |  22  ----------------------------<  106<H>  4.3   |  24  |  0.78    Ca    9.4      15 Apr 2019 07:05    TPro  6.8  /  Alb  4.2  /  TBili  0.5  /  DBili  x   /  AST  12  /  ALT  13  /  AlkPhos  49  04-14              RADIOLOGY & ADDITIONAL TESTS:    Imaging Personally Reviewed:    Consultant(s) Notes Reviewed:      Care Discussed with Consultants/Other Providers: Patient care discussed with oncology during interdisciplinary rounds, case management, nursing management  and social work were also present.
Patient is a 53y old  Male who presents with a chief complaint of Dizziness, nausea, headache (13 Apr 2019 21:14)      SUBJECTIVE / OVERNIGHT EVENTS:  patient admitted overnight with severe headache and weakness. found to have brain mass on CTH and MRI.  started on decadron with improvement of his neurologic symptoms. CT C/A/P showing carcinomatosis in abdomen. Discussed case with patient Oncologist (Joaquin Esparza), who was surprise of the finding. Will need biopsy for further evaluation.   Currently patient reports resolution of headaches. ambulated with PT.   No fever, chill, SOB, CP, N/V, diarrhea, abdominal pain or dysuria.       MEDICATIONS  (STANDING):  aspirin  chewable 81 milliGRAM(s) Oral daily  dexamethasone  Injectable 4 milliGRAM(s) IV Push every 6 hours  dextrose 5%. 1000 milliLiter(s) (50 mL/Hr) IV Continuous <Continuous>  dextrose 50% Injectable 12.5 Gram(s) IV Push once  dextrose 50% Injectable 25 Gram(s) IV Push once  dextrose 50% Injectable 25 Gram(s) IV Push once  docusate sodium 100 milliGRAM(s) Oral two times a day  insulin lispro (HumaLOG) corrective regimen sliding scale   SubCutaneous three times a day before meals  pantoprazole    Tablet 40 milliGRAM(s) Oral before breakfast    MEDICATIONS  (PRN):  acetaminophen   Tablet .. 325 milliGRAM(s) Oral every 4 hours PRN Temp greater or equal to 38C (100.4F), Mild Pain (1 - 3), Moderate Pain (4 - 6), Severe Pain (7 - 10)  bisacodyl Suppository 10 milliGRAM(s) Rectal daily PRN Constipation  dextrose 40% Gel 15 Gram(s) Oral once PRN Blood Glucose LESS THAN 70 milliGRAM(s)/deciliter  glucagon  Injectable 1 milliGRAM(s) IntraMuscular once PRN Glucose LESS THAN 70 milligrams/deciliter  melatonin 3 milliGRAM(s) Oral at bedtime PRN Insomnia      PHYSICAL EXAM:  T(C): 36.3 (04-14-19 @ 06:31), Max: 36.9 (04-13-19 @ 18:28)  HR: 72 (04-14-19 @ 06:31) (70 - 90)  BP: 118/83 (04-14-19 @ 06:31) (114/73 - 138/80)  RR: 18 (04-14-19 @ 06:31) (16 - 18)  SpO2: 97% (04-14-19 @ 06:31) (97% - 100%)  I&O's Summary    Gen: NAD; resting in bed;  Pulm: no respiratory distress; CTA b/l; no wheezing  Card: RRR; S1/S2 wnl; no obvious murmurs  Abd: soft; +bs; NT/ND  Ext: no joint swelling; no edema      LABS:  CAPILLARY BLOOD GLUCOSE      POCT Blood Glucose.: 115 mg/dL (14 Apr 2019 12:29)  POCT Blood Glucose.: 108 mg/dL (14 Apr 2019 08:34)  POCT Blood Glucose.: 114 mg/dL (13 Apr 2019 23:15)                          14.1   4.89  )-----------( 193      ( 14 Apr 2019 07:05 )             43.6     04-14    139  |  103  |  14  ----------------------------<  111<H>  4.4   |  26  |  0.85    Ca    9.9      14 Apr 2019 07:05    TPro  6.8  /  Alb  4.2  /  TBili  0.5  /  DBili  x   /  AST  12  /  ALT  13  /  AlkPhos  49  04-14              RADIOLOGY & ADDITIONAL TESTS:    Imaging Personally Reviewed:    Consultant(s) Notes Reviewed:      Care Discussed with Consultants/Other Providers:

## 2019-04-15 NOTE — DISCHARGE NOTE PROVIDER - PROVIDER TOKENS
PROVIDER:[TOKEN:[[id number]]],FREE:[LAST:[Dr. Joaquin Fuentes],PHONE:[(   )    -],FAX:[(   )    -],ADDRESS:[(office [phone])]]

## 2019-04-15 NOTE — PROGRESS NOTE ADULT - PROBLEM SELECTOR PLAN 4
DVT ppx: ICD in case need for inpatient procedure  Diet: DASH/TLC  Dispo: Home vs. rehab, PT consult in place
ICD in case need for inpatient procedure  Diet: DASH/TLC  Dispo: Patient desires to go home, likely be discharged today

## 2019-04-15 NOTE — CONSULT NOTE ADULT - ATTENDING COMMENTS
Patient seen and examined. Imaging reviewed. Patient has large left cerebellar lesion c/w new metastatic lesion. I have discussed surgery with both the patient and his primary oncologist, Dr. Joaquin Hernandez. At this time, I would recommend surgical resection of the lesion after seven days off of aspirin. Will plan for OR on Monday, 4/22.
Pt seen, examined with medical oncology fellow Dr Barnard. I agree with the interval history, ROS, med review, allergy review, exam, lab review, imaging review, and plan as outlined. Patient will either continue immunotherapy or other therapy with his integrative physician, or be seen at [facility] by a [facility] physician.

## 2019-04-15 NOTE — PROGRESS NOTE ADULT - ASSESSMENT
54 yo M with PMH of CAD w/ x1 stent, metastatic esophageal adenocarcinoma cancer with osseous mets to the Rt hemisacrum s/p neoadjuvant chemotherapy and RT with Dr. Pérez currently receiving immunotherapy who presents to ER c/o headache a/w nausea and vertigo x4 days found to have a left cerebellar lesion concerning for metastatic disease.
54 yo M with PMH of CAD w/ x1 stent, metastatic esophageal adenocarcinoma cancer with osseous mets to the Rt hemisacrum s/p neoadjuvant chemotherapy and RT with Dr. Pérez currently receiving immunotherapy who presents to ER c/o headache a/w nausea and vertigo x4 days found to have a left cerebellar lesion concerning for metastatic disease.

## 2019-04-15 NOTE — PROGRESS NOTE ADULT - PROBLEM SELECTOR PLAN 3
CAD s/p inferior wall MI in [facility] in February of 2015 s/p TATE to the mid RCA followed by Dr. Swan  EKG NSR  - c/w ASA, as per OP records patient does not wish to be on a statin drug
CAD s/p inferior wall MI in [facility] in February of 2015 s/p TATE to the mid RCA followed by Dr. Swan  EKG NSR  - c/w ASA, as per OP records patient does not wish to be on a statin drug

## 2019-04-15 NOTE — CONSULT NOTE ADULT - SUBJECTIVE AND OBJECTIVE BOX
HPI:  53M with PMH of CAD, MI (s/p 1 stent ~4 yrs ago, now on aspirin), metastatic esophageal adenocarcinoma cancer s/p robotic Whitehall-Andrew Esophagogastrectomy on 6/8/18 (mets to sacrum, Bx proven on 1/25/19) s/p neoadjuvant chemotherapy and RT currently receiving immunotherapy (Keytruda) q3 weeks with his 3rd dose due on 4/18 with his outside oncologist Dr. Fuentes (Cell [phone]) who initially presented to ED on 4/13 with c/o headache, nausea and severe vertigo x4 days. CT head was significant for Left superior cerebellar 2.3 x 3.5 x 1.5 cm mass, he was started on Decadron. He is planned for a possible surgical intervention next Monday (after being off ASA for 7 days), and will likely be discharged and come back for the surgery next week.     Patient seen and examined at bedside today. He reports significant improvement in his symptoms since starting decadron, especially the dizziness that is now resolved. He has been able to walk around his room without difficulty. He reports he once saw Dr Homero Ryan at [facility] 2 yrs prior however he chose to establish care with an outside oncologist with an integrative medicine approach. He is currently desiring a change in care location since he lives closer to [facility].    PAST MEDICAL & SURGICAL HISTORY:  Esophageal cancer  MI (myocardial infarction): 2015 with 1 coronary stent  History of esophageal surgery  H/O hernia repair: 1966  Status post tonsillectomy and adenoidectomy  Stented coronary artery: x1 2015      Review of Systems:  Constitutional: [ ] Fevers [ ] Weight changes [ ] Changes in appetite  HEENT: [ ] Visual Disturbances [ ] Nasal congestion  CV: [ ] Chest pain [ ] Palpitations  Resp:  [ ] Cough [ ] Shortness of breath  GI:  [x] Nausea [ ] Vomiting [ ] Diarrhea [ ] Constipation [ ] Abd pain  : [ ] Dysuria [ ] Hematuria  Musculoskeletal: [ ] Myalgias [ ] Weakness [ ] Edema  Skin: [ ] Rash [ ] Itch  Neurological:  [ ] Headache [x] Dizziness [ ] Numbness  Psychiatric: [ ] Anxiety [ ] Depression  Hematologic/Lymphatic: [ ] Bleeding [ ] Enlarged Lymph Nodes  Allergic/Immunologic: [ ] Nasal discharge [ ] Hives  * Note all systems were reviewed as above; those not marked with an "x" are negative    MEDICATIONS  (STANDING):  dexamethasone  Injectable 4 milliGRAM(s) IV Push every 6 hours  dextrose 5%. 1000 milliLiter(s) (50 mL/Hr) IV Continuous <Continuous>  dextrose 50% Injectable 12.5 Gram(s) IV Push once  dextrose 50% Injectable 25 Gram(s) IV Push once  dextrose 50% Injectable 25 Gram(s) IV Push once  docusate sodium 100 milliGRAM(s) Oral two times a day  insulin lispro (HumaLOG) corrective regimen sliding scale   SubCutaneous three times a day before meals  pantoprazole    Tablet 40 milliGRAM(s) Oral before breakfast    MEDICATIONS  (PRN):  acetaminophen   Tablet .. 325 milliGRAM(s) Oral every 4 hours PRN Temp greater or equal to 38C (100.4F), Mild Pain (1 - 3), Moderate Pain (4 - 6), Severe Pain (7 - 10)  bisacodyl Suppository 10 milliGRAM(s) Rectal daily PRN Constipation  dextrose 40% Gel 15 Gram(s) Oral once PRN Blood Glucose LESS THAN 70 milliGRAM(s)/deciliter  glucagon  Injectable 1 milliGRAM(s) IntraMuscular once PRN Glucose LESS THAN 70 milligrams/deciliter  melatonin 3 milliGRAM(s) Oral at bedtime PRN Insomnia      Allergies    No Known Allergies      SOCIAL HISTORY:  FAMILY HISTORY:  Family history of cervical cancer  Family history of throat cancer      Vital Signs Last 24 Hrs  T(C): 36.7 (15 Apr 2019 05:12), Max: 36.7 (14 Apr 2019 15:46)  T(F): 98 (15 Apr 2019 05:12), Max: 98.1 (14 Apr 2019 15:46)  HR: 92 (15 Apr 2019 09:22) (67 - 92)  BP: 119/81 (15 Apr 2019 09:22) (100/66 - 119/81)  BP(mean): --  RR: 18 (15 Apr 2019 09:22) (16 - 19)  SpO2: 99% (15 Apr 2019 09:22) (96% - 99%)    PHYSICAL EXAM:  Constitutional: well developed, in no acute distress  Eyes: Anicteric  ENT: Oropharynx is unremarkable, no petechiae or masses  Neck: No anterior neck masses appreciated.   Pulmonary: Clear to auscultation bilaterally  Cardiac: RRR, no murmurs  Abdomen: laparoscopic scars, Normoactive bowel sounds, soft and nontender, no hepatosplenomegaly or masses appreciated.  Lymphatic: No cervical, supraclavicular or axillary lymphadenopathy appreciated  MSK: No lower extremity edema appreciated  Skin: normal appearance, no significant bruising or petechiae  Neurology: Grossly intact, AAOx3    LABS:                        13.9   8.87  )-----------( 181      ( 15 Apr 2019 07:05 )             42.1     04-15    140  |  104  |  22  ----------------------------<  106<H>  4.3   |  24  |  0.78    Ca    9.4      15 Apr 2019 07:05    TPro  6.8  /  Alb  4.2  /  TBili  0.5  /  DBili  x   /  AST  12  /  ALT  13  /  AlkPhos  49  04-14            RADIOLOGIC Studies:  Reviewed - Relevant findings addressed in assessment. HPI:   53M with PMH of CAD, MI (s/p 1 stent ~4 yrs ago, now on aspirin), metastatic esophageal adenocarcinoma cancer s/p robotic Arley-Andrew Esophagogastrectomy on 6/8/18 (mets to sacrum, Bx proven on 1/25/19) s/p neoadjuvant chemotherapy and RT currently receiving immunotherapy (Keytruda) q3 weeks with his 3rd dose due on 4/18 with his outside oncologist Dr. Fuentes (Cell [phone]) who initially presented to ED on 4/13 with c/o headache, nausea and severe vertigo x4 days. CT head was significant for Left superior cerebellar 2.3 x 3.5 x 1.5 cm mass, he was started on Decadron. He is planned for a possible surgical intervention next Monday (after being off ASA for 7 days), and will likely be discharged and come back for the surgery next week.     Patient seen and examined at bedside today. He reports significant improvement in his symptoms since starting decadron, especially the dizziness that is now resolved. He has been able to walk around his room without difficulty. He reports he once saw Dr Homero Ryan at [facility] 2 yrs prior however he chose to establish care with an outside oncologist with an integrative medicine approach. He is currently desiring a change in care location since he lives closer to [facility].    PAST MEDICAL & SURGICAL HISTORY:  Esophageal cancer  MI (myocardial infarction): 2015 with 1 coronary stent  History of esophageal surgery  H/O hernia repair: 1966  Status post tonsillectomy and adenoidectomy  Stented coronary artery: x1 2015    Review of Systems:  Constitutional: [ ] Fevers [ ] Weight changes [ ] Changes in appetite  HEENT: [ ] Visual Disturbances [ ] Nasal congestion  CV: [ ] Chest pain [ ] Palpitations  Resp:  [ ] Cough [ ] Shortness of breath  GI:  [x] Nausea [ ] Vomiting [ ] Diarrhea [ ] Constipation [ ] Abd pain  : [ ] Dysuria [ ] Hematuria  Musculoskeletal: [ ] Myalgias [ ] Weakness [ ] Edema  Skin: [ ] Rash [ ] Itch  Neurological:  [ ] Headache [x] Dizziness [ ] Numbness  Psychiatric: [ ] Anxiety [ ] Depression  Hematologic/Lymphatic: [ ] Bleeding [ ] Enlarged Lymph Nodes  Allergic/Immunologic: [ ] Nasal discharge [ ] Hives  * Note all systems were reviewed as above; those not marked with an "x" are negative    MEDICATIONS  (STANDING):  dexamethasone  Injectable 4 milliGRAM(s) IV Push every 6 hours  dextrose 5%. 1000 milliLiter(s) (50 mL/Hr) IV Continuous <Continuous>  dextrose 50% Injectable 12.5 Gram(s) IV Push once  dextrose 50% Injectable 25 Gram(s) IV Push once  dextrose 50% Injectable 25 Gram(s) IV Push once  docusate sodium 100 milliGRAM(s) Oral two times a day  insulin lispro (HumaLOG) corrective regimen sliding scale   SubCutaneous three times a day before meals  pantoprazole    Tablet 40 milliGRAM(s) Oral before breakfast    MEDICATIONS  (PRN):  acetaminophen   Tablet .. 325 milliGRAM(s) Oral every 4 hours PRN Temp greater or equal to 38C (100.4F), Mild Pain (1 - 3), Moderate Pain (4 - 6), Severe Pain (7 - 10)  bisacodyl Suppository 10 milliGRAM(s) Rectal daily PRN Constipation  dextrose 40% Gel 15 Gram(s) Oral once PRN Blood Glucose LESS THAN 70 milliGRAM(s)/deciliter  glucagon  Injectable 1 milliGRAM(s) IntraMuscular once PRN Glucose LESS THAN 70 milligrams/deciliter  melatonin 3 milliGRAM(s) Oral at bedtime PRN Insomnia    No Known Allergies    FH/SH  Family history of cervical cancer  Family history of throat cancer    Vital Signs Last 24 Hrs  T(C): 36.7 (15 Apr 2019 05:12), Max: 36.7 (14 Apr 2019 15:46)  T(F): 98 (15 Apr 2019 05:12), Max: 98.1 (14 Apr 2019 15:46)  HR: 92 (15 Apr 2019 09:22) (67 - 92)  BP: 119/81 (15 Apr 2019 09:22) (100/66 - 119/81)  BP(mean): --  RR: 18 (15 Apr 2019 09:22) (16 - 19)  SpO2: 99% (15 Apr 2019 09:22) (96% - 99%)    PHYSICAL EXAM:  Constitutional: well developed, in no acute distress  Eyes: Anicteric  ENT: Oropharynx is unremarkable, no petechiae or masses  Neck: No anterior neck masses appreciated.   Pulmonary: Clear to auscultation bilaterally  Cardiac: RRR, no murmurs  Abdomen: laparoscopic scars, Normoactive bowel sounds, soft and nontender, no hepatosplenomegaly or masses appreciated.  Lymphatic: No cervical, supraclavicular or axillary lymphadenopathy appreciated  MSK: No lower extremity edema appreciated  Skin: normal appearance, no significant bruising or petechiae  Neurology: Grossly intact, AAOx3    LABS:                        13.9   8.87  )-----------( 181      ( 15 Apr 2019 07:05 )             42.1     04-15    140  |  104  |  22  ----------------------------<  106<H>  4.3   |  24  |  0.78    Ca    9.4      15 Apr 2019 07:05    TPro  6.8  /  Alb  4.2  /  TBili  0.5  /  DBili  x   /  AST  12  /  ALT  13  /  AlkPhos  49  04-14    RADIOLOGIC Studies:   Reviewed - Relevant findings addressed in assessment.

## 2019-04-15 NOTE — DISCHARGE NOTE PROVIDER - NSFOLLOWUPCLINICS_GEN_ALL_ED_FT
[facility]  Hematology/Oncology  [address] [id number]  Phone: [phone]  Fax:   Follow Up Time:

## 2019-04-15 NOTE — DISCHARGE NOTE PROVIDER - CARE PROVIDER_API CALL
Rosaura Rollins)  [facility] Neurosurgery  General  [address], [address]  [place] [address]  Phone: [phone]  Fax: [phone]  Follow Up Time:     Dr. Joaquin Fuentes,   (office [phone])  Phone: (   )    -  Fax: (   )    -  Follow Up Time:

## 2019-04-15 NOTE — DISCHARGE NOTE NURSING/CASE MANAGEMENT/SOCIAL WORK - NSDCDPATPORTLINK_GEN_ALL_CORE
You can access the [facility] Patient Portal, offered by [facility], by registering with the following website: [email or web address]

## 2019-04-15 NOTE — CONSULT NOTE ADULT - ASSESSMENT
53M with metastatic esophageal adenocarcinoma cancer s/p robotic Arley-Andrew Esophagogastrectomy on 6/8/18 (mets to sacrum, Bx proven on 1/25/19) s/p neoadjuvant chemotherapy and RT currently receiving immunotherapy (Keytruda) q3 weeks with his 3rd dose due on 4/18 who presented with dizziness and a new cerebellar mass on imaging concerning for metastasis. Patient would like to re-establish care at [facility] for further treatment. His outside Oncologist is Dr. Fuentes (Cell [phone]).    Problem 1: Metastatic Esophageal Cancer  - Now suspicion for brain mets to cerebellum  - CT C/A/P showing small ascites, mesenteric nodularity suspicious for metastatic disease.  - Planned for surgery next Monday after holding ASA for 7 days total.   - Neuro surgery planning to re-admit at the time of planned surgery.  - No inpatient medical oncology at this time.  - Patient advised to follow-up with his primary oncologist after discharge and decisions on further immunotherapy vs additional treatment options  - Can get a second opinion at [facility], referral sent, they will reach out to patient, however if he does not hear back within 1 week he should call the new patient scheduling unit number [phone]    Cristiano Barrett/Onc Fellow PGY4  [phone] 53M with metastatic esophageal adenocarcinoma cancer s/p robotic Arley-Andrew esophagogastrectomy on 6/8/18 (mets to sacrum, Bx proven on 1/25/19) s/p neoadjuvant chemotherapy and RT currently receiving immunotherapy (pembrolizumab, for this not FDA approved indication) q3 weeks with his 3rd dose due on 4/18 who presented with dizziness and a new cerebellar mass on imaging concerning for metastasis. Patient would like to re-establish care at [facility] for further treatment. His outside Oncologist is Dr. Fuentes (Cell [phone]).    Problem 1: Metastatic Esophageal Cancer  - Now suspicion for brain mets to cerebellum  - CT C/A/P showing small ascites, mesenteric nodularity suspicious for metastatic disease.  - Planned for surgery next Monday after holding ASA for 7 days total.   - Neuro surgery planning to re-admit at the time of planned surgery.  - No inpatient medical oncology at this time.  - Patient advised to follow-up with his primary oncologist after discharge and decisions on further immunotherapy vs additional treatment options  - Can get a second opinion at [facility], referral sent, they will reach out to patient, however if he does not hear back within 1 week he should call the new patient scheduling unit number [phone]    Cristiano Barrett/Onc Fellow PGY4  [phone]

## 2019-04-15 NOTE — HISTORY OF PRESENT ILLNESS
[FreeTextEntry1] : Jam Morales is a 54yo male with [id number] adenocarcinoma of the distal esophagus, 35-39cm with indeterminant right upper para-esophageal lymph node and subcentimeter lung nodules without PET uptake. He completed neoadjuvant chemoradiation to 50.4Gy in Jan 2018 and now returns for f/u.\par  \par 6/14/18 pt underwent a post robotic Esophagogastrectomy. Path showed residual  adenocarcinoma with 7 negative regional lymph nodes. He was experiencing delayed gastric emptying and also underwent dilation of the pyloric channel with botox injection.\par \par 11/8/18 pt had followup PET Scan. It showed a new hypermetabolic lytic lesion in right inferior  sacrum.  He had a biopsy of sacrum on 1/25/19 which showed metastatic poorly differentiated carcinoma favors adenocarcinoma of esophageal primary.  He states he is having very little pain, maybe a 1/10 and states its rt buttocks region. He does not take any medications for it at this time.  Eating well and moving bowels ok.\par

## 2019-04-15 NOTE — DISCHARGE NOTE PROVIDER - NSDCFUSCHEDAPPT_GEN_ALL_CORE_FT
SYLVIA VICTOR ; 04/22/2019 ; PRE Amb Surg Endoscopy SYLVIA VICTOR ; 04/22/2019 ; PER Amb Surg Endoscopy

## 2019-04-16 ENCOUNTER — APPOINTMENT (OUTPATIENT)
Dept: RADIATION ONCOLOGY | Facility: CLINIC | Age: 54
End: 2019-04-16
Payer: COMMERCIAL

## 2019-04-16 VITALS
DIASTOLIC BLOOD PRESSURE: 76 MMHG | SYSTOLIC BLOOD PRESSURE: 117 MMHG | BODY MASS INDEX: 24.72 KG/M2 | WEIGHT: 189.93 LBS | RESPIRATION RATE: 18 BRPM | HEART RATE: 79 BPM | OXYGEN SATURATION: 96 %

## 2019-04-16 PROCEDURE — 99024 POSTOP FOLLOW-UP VISIT: CPT | Mod: GC

## 2019-04-16 NOTE — REVIEW OF SYSTEMS
[Dizziness] : dizziness [Easy Bruising] : a tendency for easy bruising [Negative] : Integumentary [Diarrhea: Grade 0] : Diarrhea: Grade 0 [Constipation: Grade 0] : Constipation: Grade 0 [Fatigue: Grade 0] : Fatigue: Grade 0 [Vomiting: Grade 0] : Vomiting: Grade 0 [Nausea: Grade 1 - Loss of appetite without alteration in eating habits] : Nausea: Grade 1 - Loss of appetite without alteration in eating habits [Hematuria: Grade 0] : Hematuria: Grade 0 [Cough: Grade 0] : Cough: Grade 0 [Dyspnea: Grade 0] : Dyspnea: Grade 0

## 2019-04-16 NOTE — VITALS
[Least Pain Intensity: 0/10] : 0/10 [Maximal Pain Intensity: 0/10] : 0/10 [80: Normal activity with effort; some signs or symptoms of disease.] : 80: Normal activity with effort; some signs or symptoms of disease.  [ECOG Performance Status: 0 - Fully active, able to carry on all pre-disease performance without restriction] : Performance Status: 0 - Fully active, able to carry on all pre-disease performance without restriction

## 2019-04-17 ENCOUNTER — APPOINTMENT (OUTPATIENT)
Dept: RADIATION ONCOLOGY | Facility: CLINIC | Age: 54
End: 2019-04-17

## 2019-04-17 ENCOUNTER — APPOINTMENT (OUTPATIENT)
Dept: NEUROSURGERY | Facility: CLINIC | Age: 54
End: 2019-04-17
Payer: COMMERCIAL

## 2019-04-17 VITALS
BODY MASS INDEX: 24.52 KG/M2 | WEIGHT: 189 LBS | DIASTOLIC BLOOD PRESSURE: 75 MMHG | SYSTOLIC BLOOD PRESSURE: 119 MMHG | HEIGHT: 73.5 IN | HEART RATE: 73 BPM

## 2019-04-17 DIAGNOSIS — Z78.9 OTHER SPECIFIED HEALTH STATUS: ICD-10-CM

## 2019-04-17 PROCEDURE — 99214 OFFICE O/P EST MOD 30 MIN: CPT

## 2019-04-18 ENCOUNTER — OUTPATIENT (OUTPATIENT)
Dept: OUTPATIENT SERVICES | Facility: HOSPITAL | Age: 54
LOS: 1 days | End: 2019-04-18

## 2019-04-18 VITALS
OXYGEN SATURATION: 97 % | SYSTOLIC BLOOD PRESSURE: 106 MMHG | RESPIRATION RATE: 16 BRPM | TEMPERATURE: 98 F | DIASTOLIC BLOOD PRESSURE: 74 MMHG | HEIGHT: 73.5 IN | HEART RATE: 92 BPM | WEIGHT: 186.07 LBS

## 2019-04-18 DIAGNOSIS — C79.31 SECONDARY MALIGNANT NEOPLASM OF BRAIN: ICD-10-CM

## 2019-04-18 DIAGNOSIS — Z98.890 OTHER SPECIFIED POSTPROCEDURAL STATES: Chronic | ICD-10-CM

## 2019-04-18 DIAGNOSIS — Z90.89 ACQUIRED ABSENCE OF OTHER ORGANS: Chronic | ICD-10-CM

## 2019-04-18 DIAGNOSIS — I25.10 ATHEROSCLEROTIC HEART DISEASE OF NATIVE CORONARY ARTERY WITHOUT ANGINA PECTORIS: ICD-10-CM

## 2019-04-18 DIAGNOSIS — Z95.5 PRESENCE OF CORONARY ANGIOPLASTY IMPLANT AND GRAFT: Chronic | ICD-10-CM

## 2019-04-18 RX ORDER — SODIUM CHLORIDE 9 MG/ML
1000 INJECTION, SOLUTION INTRAVENOUS
Qty: 0 | Refills: 0 | Status: DISCONTINUED | OUTPATIENT
Start: 2019-04-22 | End: 2019-04-22

## 2019-04-18 NOTE — ASSESSMENT
[FreeTextEntry1] : 54 yo male with h/o esophageal cancer (adenocarcinoma) and sacral mets who had recent hospitalization for dizziness where MRI brain revealed left cerebellar mass concerning brain metastases. He is neurologically intact. Given his recent symptoms and imaging findings, I have recommended a craniotomy for surgical resection of what is most likely an isolated cerebellar metastatic lesion. Risks, benefits and alternatives to surgery were explained to the patient. Patient verbalized a good understanding and would like to proceed surgery. \par - continue to hold off aspirin until the surgery date\par - schedule surgery on 4/22

## 2019-04-18 NOTE — H&P PST ADULT - NSICDXPASTSURGICALHX_GEN_ALL_CORE_FT
PAST SURGICAL HISTORY:  H/O hernia repair 1966    History of esophageal surgery 6/2018    Status post tonsillectomy and adenoidectomy     Stented coronary artery x1 2015

## 2019-04-18 NOTE — H&P PST ADULT - NEGATIVE OPHTHALMOLOGIC SYMPTOMS
no photophobia/no diplopia/no pain L/no blurred vision L/no pain R/no loss of vision L/no loss of vision R/no blurred vision R

## 2019-04-18 NOTE — H&P PST ADULT - NEGATIVE NEUROLOGICAL SYMPTOMS
no tremors/no generalized seizures/no loss of sensation/no difficulty walking/no weakness/no headache/no paresthesias/no transient paralysis/no syncope/no vertigo

## 2019-04-18 NOTE — H&P PST ADULT - NEGATIVE CARDIOVASCULAR SYMPTOMS
no claudication/no orthopnea/no chest pain/no palpitations/no dyspnea on exertion/no paroxysmal nocturnal dyspnea

## 2019-04-18 NOTE — H&P PST ADULT - NSICDXPROBLEM_GEN_ALL_CORE_FT
PROBLEM DIAGNOSES  Problem: Secondary malignant neoplasm of brain  Assessment and Plan:   Pt is scheduled for left suboccipital craniotomy for 4/22/19. Pre-op instructions provided. Pt will take own pantoprazole for GI prophylaxis. Pt stated understanding.     Pt states he "does not want a pagan catheter" during procedure.       Problem: CAD (coronary artery disease)  Assessment and Plan:   CAD s/p MI stent in 2015 in [facility], on ASA. Last echo and stress in chart from 2017. Pt stopped ASA as per surgeon, last dose on 4/12.  Pending cardiac evaluation. Pt states he was evaluated on 4/2/19 by cardiologist.  Last heme/onc note in chart.

## 2019-04-18 NOTE — H&P PST ADULT - OTHER CARE PROVIDERS
Dr. Fuentes(onc)[phone]                                                                                                                                           Dr Pérez, [facility] heme/onc

## 2019-04-18 NOTE — HISTORY OF PRESENT ILLNESS
[FreeTextEntry1] : Mr. SYLVIA VICTOR is a 54 yo male with PMH of CAD, MI (s/p stent 4 years ago, on ASA81), adenocarcinoma of the distal esophagus (s/p robotic lovor-Andrew Esophagogastrectomy on 6/8/18 and neoadjuvant chemo/RT with Dr. Pérez, currently on immunotherapy Keytruda), mets to sacrum (s/p SBRT on 3/15/19) who presented to [facility] ED on 4/13 for headache, nausea and vertigo for 4 days. Initial CT head/MRI revealed left superior cerebellar mass with surrounding vasogenic edema concerning for metastasis. His symptoms moderately improved with steroid and neurologically stable. He was discharged home with outpatient surgery plan and chemo tx. Today he presents for follow up and states his symptoms continue to improve with steroid tx. He denies headache, dizziness, n/v, visual changes, gait disturbance, weakness, or any other focal neuro deficits. He was seen by Dr. Pérez yesterday and plans to receive a dose of Keytruda tomorrow.

## 2019-04-18 NOTE — H&P PST ADULT - NEGATIVE MUSCULOSKELETAL SYMPTOMS
no muscle cramps/no muscle weakness/no neck pain/no back pain/no arthralgia/no stiffness/no arthritis/no joint swelling/no myalgia

## 2019-04-18 NOTE — H&P PST ADULT - NEGATIVE ENMT SYMPTOMS
no post-nasal discharge/no tinnitus/no hearing difficulty/no vertigo/no sinus symptoms/no nasal congestion/no ear pain

## 2019-04-18 NOTE — PHYSICAL EXAM
[General Appearance - Alert] : alert [General Appearance - In No Acute Distress] : in no acute distress [General Appearance - Well Nourished] : well nourished [Impaired Insight] : insight and judgment were intact [Affect] : the affect was normal [Oriented To Time, Place, And Person] : oriented to person, place, and time [Mood] : the mood was normal [Memory Recent] : recent memory was not impaired [Person] : oriented to person [Place] : oriented to place [Short Term Intact] : short term memory intact [Time] : oriented to time [Registration Intact] : recent registration memory intact [Remote Intact] : remote memory intact [Span Intact] : the attention span was normal [Concentration Intact] : normal concentrating ability [Fluency] : fluency intact [Comprehension] : comprehension intact [Past History] : adequate knowledge of personal past history [Current Events] : adequate knowledge of current events [Vocabulary] : adequate range of vocabulary [I: Normal Smell] : smell intact bilaterally [Cranial Nerves Optic (II)] : visual acuity intact bilaterally,  pupils equal round and reactive to light [Cranial Nerves Oculomotor (III)] : extraocular motion intact [Cranial Nerves Facial (VII)] : face symmetrical [Cranial Nerves Trigeminal (V)] : facial sensation intact symmetrically [Cranial Nerves Vestibulocochlear (VIII)] : hearing was intact bilaterally [Cranial Nerves Glossopharyngeal (IX)] : tongue and palate midline [Cranial Nerves Hypoglossal (XII)] : there was no tongue deviation with protrusion [Motor Tone] : muscle tone was normal in all four extremities [Cranial Nerves Accessory (XI - Cranial And Spinal)] : head turning and shoulder shrug symmetric [Motor Strength] : muscle strength was normal in all four extremities [5] : S1 toe walking 5/5 [Abnormal Walk] : normal gait [Balance] : balance was intact [2+] : Ankle jerk left 2+ [Normal] : normal [No Visual Abnormalities] : no visible abnormailities [Able to heel walk] : the patient was able to heel walk [Able to toe walk] : the patient was able to toe walk [Sclera] : the sclera and conjunctiva were normal [Extraocular Movements] : extraocular movements were intact [PERRL With Normal Accommodation] : pupils were equal in size, round, reactive to light, with normal accommodation [Hearing Threshold Finger Rub Not [place]] : hearing was normal [Outer Ear] : the ears and nose were normal in appearance [] : no respiratory distress [Exaggerated Use Of Accessory Muscles For Inspiration] : no accessory muscle use [Edema] : there was no peripheral edema [No CVA Tenderness] : no ~M costovertebral angle tenderness [No Spinal Tenderness] : no spinal tenderness [Over the Past 2 Weeks, Have You Felt Down, Depressed, or Hopeless?] : 1.) Over the past 2 weeks, have you felt down, depressed, or hopeless? No [Over the Past 2 Weeks, Have You Felt Little Interest or Pleasure Doing Things?] : 2.) Over the past 2 weeks, have you felt little interest or pleasure doing things? No [FreeTextEntry1] : denies suicidal ideation [Ross] : Ross's sign was not demonstrated

## 2019-04-18 NOTE — H&P PST ADULT - RS GEN PE MLT RESP DETAILS PC
respirations non-labored/no rales/clear to auscultation bilaterally/airway patent/breath sounds equal/no rhonchi/good air movement/no wheezes

## 2019-04-18 NOTE — H&P PST ADULT - HISTORY OF PRESENT ILLNESS
53 year old male presents to presurgical testing with diagnosis of secondary malignant neoplasm of brain scheduled for left suboccipital craniotomy for 4/22/19. Pt with Hx of esophageal cancer s/p esophagastrectomy in 6/2018, s/p chemotherapy and radiation, with metastasis to bone s/p radiation therapy, completed in 3/2019, found secondary malignant neoplasm of brain after evaluation of headache, dizziness and nausea. Pt was started on dexamethasone and reports occasional lightheadedness. 53 year old male presents to presurgical testing with diagnosis of secondary malignant neoplasm of brain scheduled for left suboccipital craniotomy for 4/22/19. Pt with Hx of esophageal cancer s/p esophagastrectomy in 6/2018, s/p chemotherapy and radiation, with metastasis to bone s/p radiation therapy, completed in 3/2019, now on keydruda, found secondary malignant neoplasm of brain after evaluation of headache, dizziness and nausea. Pt was started on dexamethasone and reports occasional lightheadedness.

## 2019-04-18 NOTE — H&P PST ADULT - NSICDXPASTMEDICALHX_GEN_ALL_CORE_FT
PAST MEDICAL HISTORY:  CAD (coronary artery disease)     Esophageal cancer     Metastasis to bone     MI (myocardial infarction) 2015 with 1 coronary stent mid RCA    Secondary malignant neoplasm of brain

## 2019-04-19 PROBLEM — I21.9 ACUTE MYOCARDIAL INFARCTION, UNSPECIFIED: Chronic | Status: ACTIVE | Noted: 2018-05-29

## 2019-04-19 NOTE — ASU PATIENT PROFILE, ADULT - PMH
CAD (coronary artery disease)    Esophageal cancer    Metastasis to bone    MI (myocardial infarction)  2015 with 1 coronary stent mid RCA  Secondary malignant neoplasm of brain

## 2019-04-19 NOTE — ASU PATIENT PROFILE, ADULT - PSH
H/O hernia repair  1966  History of esophageal surgery  6/2018  Status post tonsillectomy and adenoidectomy    Stented coronary artery  x1 2015

## 2019-04-21 ENCOUNTER — TRANSCRIPTION ENCOUNTER (OUTPATIENT)
Age: 54
End: 2019-04-21

## 2019-04-22 ENCOUNTER — RESULT REVIEW (OUTPATIENT)
Age: 54
End: 2019-04-22

## 2019-04-22 ENCOUNTER — APPOINTMENT (OUTPATIENT)
Dept: NEUROSURGERY | Facility: HOSPITAL | Age: 54
End: 2019-04-22
Payer: COMMERCIAL

## 2019-04-22 ENCOUNTER — INPATIENT (INPATIENT)
Facility: HOSPITAL | Age: 54
LOS: 3 days | Discharge: HOME | End: 2019-04-26
Attending: NEUROLOGICAL SURGERY | Admitting: NEUROLOGICAL SURGERY
Payer: COMMERCIAL

## 2019-04-22 ENCOUNTER — APPOINTMENT (OUTPATIENT)
Dept: GASTROENTEROLOGY | Facility: HOSPITAL | Age: 54
End: 2019-04-22

## 2019-04-22 VITALS
TEMPERATURE: 98 F | HEART RATE: 72 BPM | OXYGEN SATURATION: 96 % | WEIGHT: 181 LBS | RESPIRATION RATE: 18 BRPM | SYSTOLIC BLOOD PRESSURE: 120 MMHG | DIASTOLIC BLOOD PRESSURE: 87 MMHG | HEIGHT: 73 IN

## 2019-04-22 DIAGNOSIS — Z90.89 ACQUIRED ABSENCE OF OTHER ORGANS: Chronic | ICD-10-CM

## 2019-04-22 DIAGNOSIS — C79.31 SECONDARY MALIGNANT NEOPLASM OF BRAIN: ICD-10-CM

## 2019-04-22 DIAGNOSIS — Z98.890 OTHER SPECIFIED POSTPROCEDURAL STATES: Chronic | ICD-10-CM

## 2019-04-22 DIAGNOSIS — Z98.890 OTHER SPECIFIED POSTPROCEDURAL STATES: ICD-10-CM

## 2019-04-22 DIAGNOSIS — Z95.5 PRESENCE OF CORONARY ANGIOPLASTY IMPLANT AND GRAFT: Chronic | ICD-10-CM

## 2019-04-22 LAB
ALBUMIN SERPL ELPH-MCNC: 3.7 G/DL — SIGNIFICANT CHANGE UP (ref 3.3–5)
ALP SERPL-CCNC: 44 U/L — SIGNIFICANT CHANGE UP (ref 40–120)
ALT FLD-CCNC: 14 U/L — SIGNIFICANT CHANGE UP (ref 4–41)
ANION GAP SERPL CALC-SCNC: 12 MMO/L — SIGNIFICANT CHANGE UP (ref 7–14)
APTT BLD: 22 SEC — LOW (ref 27.5–36.3)
AST SERPL-CCNC: 17 U/L — SIGNIFICANT CHANGE UP (ref 4–40)
BASE EXCESS BLDA CALC-SCNC: -3.2 MMOL/L — SIGNIFICANT CHANGE UP
BASE EXCESS BLDA CALC-SCNC: 0.5 MMOL/L — SIGNIFICANT CHANGE UP
BILIRUB SERPL-MCNC: 0.3 MG/DL — SIGNIFICANT CHANGE UP (ref 0.2–1.2)
BUN SERPL-MCNC: 24 MG/DL — HIGH (ref 7–23)
CA-I BLDA-SCNC: 1.04 MMOL/L — LOW (ref 1.15–1.29)
CA-I BLDA-SCNC: 1.15 MMOL/L — SIGNIFICANT CHANGE UP (ref 1.15–1.29)
CALCIUM SERPL-MCNC: 8.8 MG/DL — SIGNIFICANT CHANGE UP (ref 8.4–10.5)
CHLORIDE SERPL-SCNC: 100 MMOL/L — SIGNIFICANT CHANGE UP (ref 98–107)
CO2 SERPL-SCNC: 24 MMOL/L — SIGNIFICANT CHANGE UP (ref 22–31)
CREAT SERPL-MCNC: 0.88 MG/DL — SIGNIFICANT CHANGE UP (ref 0.5–1.3)
GLUCOSE BLDA-MCNC: 118 MG/DL — HIGH (ref 70–99)
GLUCOSE BLDA-MCNC: 77 MG/DL — SIGNIFICANT CHANGE UP (ref 70–99)
GLUCOSE SERPL-MCNC: 127 MG/DL — HIGH (ref 70–99)
HCO3 BLDA-SCNC: 22 MMOL/L — SIGNIFICANT CHANGE UP (ref 22–26)
HCO3 BLDA-SCNC: 25 MMOL/L — SIGNIFICANT CHANGE UP (ref 22–26)
HCT VFR BLD CALC: 38 % — LOW (ref 39–50)
HCT VFR BLDA CALC: 32.2 % — LOW (ref 39–51)
HCT VFR BLDA CALC: 37.8 % — LOW (ref 39–51)
HGB BLD-MCNC: 12.3 G/DL — LOW (ref 13–17)
HGB BLDA-MCNC: 10.4 G/DL — LOW (ref 13–17)
HGB BLDA-MCNC: 12.3 G/DL — LOW (ref 13–17)
INR BLD: 1.05 — SIGNIFICANT CHANGE UP (ref 0.88–1.17)
MAGNESIUM SERPL-MCNC: 2.1 MG/DL — SIGNIFICANT CHANGE UP (ref 1.6–2.6)
MCHC RBC-ENTMCNC: 28.7 PG — SIGNIFICANT CHANGE UP (ref 27–34)
MCHC RBC-ENTMCNC: 32.4 % — SIGNIFICANT CHANGE UP (ref 32–36)
MCV RBC AUTO: 88.8 FL — SIGNIFICANT CHANGE UP (ref 80–100)
NRBC # FLD: 0 K/UL — SIGNIFICANT CHANGE UP (ref 0–0)
PCO2 BLDA: 30 MMHG — LOW (ref 35–48)
PCO2 BLDA: 35 MMHG — SIGNIFICANT CHANGE UP (ref 35–48)
PH BLDA: 7.45 PH — SIGNIFICANT CHANGE UP (ref 7.35–7.45)
PH BLDA: 7.45 PH — SIGNIFICANT CHANGE UP (ref 7.35–7.45)
PHOSPHATE SERPL-MCNC: 4 MG/DL — SIGNIFICANT CHANGE UP (ref 2.5–4.5)
PLATELET # BLD AUTO: 154 K/UL — SIGNIFICANT CHANGE UP (ref 150–400)
PMV BLD: 9.4 FL — SIGNIFICANT CHANGE UP (ref 7–13)
PO2 BLDA: 243 MMHG — HIGH (ref 83–108)
PO2 BLDA: 336 MMHG — HIGH (ref 83–108)
POTASSIUM BLDA-SCNC: 3.1 MMOL/L — LOW (ref 3.4–4.5)
POTASSIUM BLDA-SCNC: 4.7 MMOL/L — HIGH (ref 3.4–4.5)
POTASSIUM SERPL-MCNC: 4.5 MMOL/L — SIGNIFICANT CHANGE UP (ref 3.5–5.3)
POTASSIUM SERPL-SCNC: 4.5 MMOL/L — SIGNIFICANT CHANGE UP (ref 3.5–5.3)
PROT SERPL-MCNC: 6.1 G/DL — SIGNIFICANT CHANGE UP (ref 6–8.3)
PROTHROM AB SERPL-ACNC: 12 SEC — SIGNIFICANT CHANGE UP (ref 9.8–13.1)
RBC # BLD: 4.28 M/UL — SIGNIFICANT CHANGE UP (ref 4.2–5.8)
RBC # FLD: 16.8 % — HIGH (ref 10.3–14.5)
SAO2 % BLDA: 97.4 % — SIGNIFICANT CHANGE UP (ref 95–99)
SAO2 % BLDA: 97.6 % — SIGNIFICANT CHANGE UP (ref 95–99)
SODIUM BLDA-SCNC: 131 MMOL/L — LOW (ref 136–146)
SODIUM BLDA-SCNC: 135 MMOL/L — LOW (ref 136–146)
SODIUM SERPL-SCNC: 136 MMOL/L — SIGNIFICANT CHANGE UP (ref 135–145)
WBC # BLD: 12.69 K/UL — HIGH (ref 3.8–10.5)
WBC # FLD AUTO: 12.69 K/UL — HIGH (ref 3.8–10.5)

## 2019-04-22 PROCEDURE — 69990 MICROSURGERY ADD-ON: CPT | Mod: AS,59

## 2019-04-22 PROCEDURE — 69990 MICROSURGERY ADD-ON: CPT | Mod: 59

## 2019-04-22 PROCEDURE — 88377 M/PHMTRC ALYS ISHQUANT/SEMIQ: CPT | Mod: 26

## 2019-04-22 PROCEDURE — 88307 TISSUE EXAM BY PATHOLOGIST: CPT | Mod: 26

## 2019-04-22 PROCEDURE — 88360 TUMOR IMMUNOHISTOCHEM/MANUAL: CPT | Mod: 26,59

## 2019-04-22 PROCEDURE — 61518 REMOVAL OF BRAIN LESION: CPT | Mod: AS

## 2019-04-22 PROCEDURE — 99254 IP/OBS CNSLTJ NEW/EST MOD 60: CPT

## 2019-04-22 PROCEDURE — 61781 SCAN PROC CRANIAL INTRA: CPT

## 2019-04-22 PROCEDURE — 61781 SCAN PROC CRANIAL INTRA: CPT | Mod: AS

## 2019-04-22 PROCEDURE — 61518 REMOVAL OF BRAIN LESION: CPT

## 2019-04-22 PROCEDURE — 88360 TUMOR IMMUNOHISTOCHEM/MANUAL: CPT | Mod: 26

## 2019-04-22 PROCEDURE — 70450 CT HEAD/BRAIN W/O DYE: CPT | Mod: 26

## 2019-04-22 RX ORDER — ACETAMINOPHEN 500 MG
650 TABLET ORAL EVERY 6 HOURS
Qty: 0 | Refills: 0 | Status: DISCONTINUED | OUTPATIENT
Start: 2019-04-22 | End: 2019-04-26

## 2019-04-22 RX ORDER — CEFAZOLIN SODIUM 1 G
1000 VIAL (EA) INJECTION EVERY 8 HOURS
Qty: 0 | Refills: 0 | Status: COMPLETED | OUTPATIENT
Start: 2019-04-22 | End: 2019-04-23

## 2019-04-22 RX ORDER — PANTOPRAZOLE SODIUM 20 MG/1
1 TABLET, DELAYED RELEASE ORAL
Qty: 0 | Refills: 0 | COMMUNITY

## 2019-04-22 RX ORDER — OXYCODONE HYDROCHLORIDE 5 MG/1
5 TABLET ORAL EVERY 4 HOURS
Qty: 0 | Refills: 0 | Status: DISCONTINUED | OUTPATIENT
Start: 2019-04-22 | End: 2019-04-23

## 2019-04-22 RX ORDER — ONDANSETRON 8 MG/1
4 TABLET, FILM COATED ORAL ONCE
Qty: 0 | Refills: 0 | Status: DISCONTINUED | OUTPATIENT
Start: 2019-04-22 | End: 2019-04-22

## 2019-04-22 RX ORDER — MORPHINE SULFATE 50 MG/1
4 CAPSULE, EXTENDED RELEASE ORAL EVERY 6 HOURS
Qty: 0 | Refills: 0 | Status: DISCONTINUED | OUTPATIENT
Start: 2019-04-22 | End: 2019-04-22

## 2019-04-22 RX ORDER — FENTANYL CITRATE 50 UG/ML
50 INJECTION INTRAVENOUS
Qty: 0 | Refills: 0 | Status: DISCONTINUED | OUTPATIENT
Start: 2019-04-22 | End: 2019-04-22

## 2019-04-22 RX ORDER — SODIUM CHLORIDE 9 MG/ML
1000 INJECTION INTRAMUSCULAR; INTRAVENOUS; SUBCUTANEOUS
Qty: 0 | Refills: 0 | Status: DISCONTINUED | OUTPATIENT
Start: 2019-04-22 | End: 2019-04-22

## 2019-04-22 RX ORDER — HYDROMORPHONE HYDROCHLORIDE 2 MG/ML
0.5 INJECTION INTRAMUSCULAR; INTRAVENOUS; SUBCUTANEOUS
Qty: 0 | Refills: 0 | Status: DISCONTINUED | OUTPATIENT
Start: 2019-04-22 | End: 2019-04-22

## 2019-04-22 RX ORDER — DIAZEPAM 5 MG
5 TABLET ORAL EVERY 8 HOURS
Qty: 0 | Refills: 0 | Status: DISCONTINUED | OUTPATIENT
Start: 2019-04-22 | End: 2019-04-26

## 2019-04-22 RX ORDER — OXYCODONE HYDROCHLORIDE 5 MG/1
5 TABLET ORAL ONCE
Qty: 0 | Refills: 0 | Status: DISCONTINUED | OUTPATIENT
Start: 2019-04-22 | End: 2019-04-22

## 2019-04-22 RX ORDER — PANTOPRAZOLE SODIUM 20 MG/1
40 TABLET, DELAYED RELEASE ORAL DAILY
Qty: 0 | Refills: 0 | Status: DISCONTINUED | OUTPATIENT
Start: 2019-04-22 | End: 2019-04-26

## 2019-04-22 RX ORDER — DOCUSATE SODIUM 100 MG
100 CAPSULE ORAL THREE TIMES A DAY
Qty: 0 | Refills: 0 | Status: DISCONTINUED | OUTPATIENT
Start: 2019-04-22 | End: 2019-04-26

## 2019-04-22 RX ORDER — INFLUENZA VIRUS VACCINE 15; 15; 15; 15 UG/.5ML; UG/.5ML; UG/.5ML; UG/.5ML
0.5 SUSPENSION INTRAMUSCULAR ONCE
Qty: 0 | Refills: 0 | Status: DISCONTINUED | OUTPATIENT
Start: 2019-04-22 | End: 2019-04-26

## 2019-04-22 RX ORDER — DEXAMETHASONE 0.5 MG/5ML
4 ELIXIR ORAL EVERY 6 HOURS
Qty: 0 | Refills: 0 | Status: DISCONTINUED | OUTPATIENT
Start: 2019-04-22 | End: 2019-04-26

## 2019-04-22 RX ORDER — SENNA PLUS 8.6 MG/1
2 TABLET ORAL AT BEDTIME
Qty: 0 | Refills: 0 | Status: DISCONTINUED | OUTPATIENT
Start: 2019-04-22 | End: 2019-04-26

## 2019-04-22 RX ADMIN — OXYCODONE HYDROCHLORIDE 5 MILLIGRAM(S): 5 TABLET ORAL at 23:18

## 2019-04-22 RX ADMIN — Medication 4 MILLIGRAM(S): at 23:19

## 2019-04-22 RX ADMIN — OXYCODONE HYDROCHLORIDE 5 MILLIGRAM(S): 5 TABLET ORAL at 17:52

## 2019-04-22 RX ADMIN — HYDROMORPHONE HYDROCHLORIDE 0.5 MILLIGRAM(S): 2 INJECTION INTRAMUSCULAR; INTRAVENOUS; SUBCUTANEOUS at 18:00

## 2019-04-22 RX ADMIN — Medication 5 MILLIGRAM(S): at 19:58

## 2019-04-22 RX ADMIN — Medication 100 MILLIGRAM(S): at 17:16

## 2019-04-22 RX ADMIN — Medication 4 MILLIGRAM(S): at 17:15

## 2019-04-22 RX ADMIN — HYDROMORPHONE HYDROCHLORIDE 0.5 MILLIGRAM(S): 2 INJECTION INTRAMUSCULAR; INTRAVENOUS; SUBCUTANEOUS at 15:04

## 2019-04-22 RX ADMIN — OXYCODONE HYDROCHLORIDE 5 MILLIGRAM(S): 5 TABLET ORAL at 18:07

## 2019-04-22 NOTE — BRIEF OPERATIVE NOTE - NSICDXBRIEFPOSTOP_GEN_ALL_CORE_FT
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

## 2019-04-22 NOTE — PROGRESS NOTE ADULT - SUBJECTIVE AND OBJECTIVE BOX
Neurosurgery postop  Patient c/o anxiety  ICU Vital Signs Last 24 Hrs  T(C): 36.1 (22 Apr 2019 20:00), Max: 36.8 (22 Apr 2019 14:40)  T(F): 97 (22 Apr 2019 20:00), Max: 98.2 (22 Apr 2019 14:40)  HR: 82 (22 Apr 2019 21:00) (62 - 82)  BP: 148/93 (22 Apr 2019 15:44) (120/87 - 148/93)  BP(mean): 105 (22 Apr 2019 15:44) (80 - 105)  ABP: 158/86 (22 Apr 2019 21:00) (133/63 - 177/68)  ABP(mean): 111 (22 Apr 2019 21:00) (88 - 115)  RR: 17 (22 Apr 2019 21:00) (13 - 21)  SpO2: 95% (22 Apr 2019 21:00) (94% - 98%)    AAO X 3  PERRLA, EOMI  CN 2-12 grossly intact  CASH strength 5/5, no drift  SILT  Coordination intact    MEDICATIONS  (STANDING):  ceFAZolin   IVPB 1000 milliGRAM(s) IV Intermittent every 8 hours  dexamethasone  Injectable 4 milliGRAM(s) IV Push every 6 hours  docusate sodium 100 milliGRAM(s) Oral three times a day  influenza   Vaccine 0.5 milliLiter(s) IntraMuscular once  pantoprazole  Injectable 40 milliGRAM(s) IV Push daily  senna 2 Tablet(s) Oral at bedtime    MEDICATIONS  (PRN):  acetaminophen   Tablet .. 650 milliGRAM(s) Oral every 6 hours PRN Temp greater or equal to 38C (100.4F), Mild Pain (1 - 3)  diazepam    Tablet 5 milliGRAM(s) Oral every 8 hours PRN Anxiety, muscle spasm  oxyCODONE    IR 5 milliGRAM(s) Oral every 4 hours PRN Moderate - Severe pain                          12.3   12.69 )-----------( 154      ( 22 Apr 2019 16:45 )             38.0     04-22    136  |  100  |  24<H>  ----------------------------<  127<H>  4.5   |  24  |  0.88    Ca    8.8      22 Apr 2019 16:45  Phos  4.0     04-22  Mg     2.1     04-22    TPro  6.1  /  Alb  3.7  /  TBili  0.3  /  DBili  x   /  AST  17  /  ALT  14  /  AlkPhos  44  04-22    < from: CT Head No Cont (04.22.19 @ 18:26) >  FINDINGS:    VENTRICLES AND SULCI:  There is mild reexpansion of the fourth ventricle.   Otherwise without significant interval change.    INTRA-AXIAL:  The hyperdense mass previously seen within the left   cerebellum is no longer identified. Low-density changes and air are seen   within the postoperative bed. Scattered foci of hyperdensity are seen   compatible with mild hemorrhage and/or hemostatic material. Low-density   changes/edema is again seen within the cerebellum    EXTRA-AXIAL:  Extra-axial soft tissue is seen underlying the craniectomy   site likely representing fluid intermixed with blood measuring 9 mm in   depth..    VISUALIZED SINUSES:  Clear.    VISUALIZED MASTOIDS:  Clear.    CALVARIUM:  Status post interval suboccipital craniectomy/mesh   cranioplasty. A small amount of pneumocephalus as well as subcutaneous   air is seen. Overlying skin staples are noted.    MISCELLANEOUS:  None.      IMPRESSION:    Status post resection of cerebellar mass seen previously with partial   reexpansion of the fourth ventricle.    < end of copied text >

## 2019-04-22 NOTE — CONSULT NOTE ADULT - SUBJECTIVE AND OBJECTIVE BOX
SICU CONSULT NOTE  --------------------------------------------------------------------------------------------    HPI:  53 year old male presents to presurgical testing with diagnosis of secondary malignant neoplasm of brain scheduled for left suboccipital craniotomy for 4/22/19. Pt with Hx of esophageal cancer s/p esophagastrectomy in 6/2018, s/p chemotherapy and radiation, with metastasis to bone s/p radiation therapy, completed in 3/2019, now on keydruda, found secondary malignant neoplasm of brain after evaluation of headache, dizziness and nausea. Pt was started on dexamethasone and reports occasional lightheadedness.     On 4/22/19 the patient went to the OR with neurosurgery and underwent a left suboccipital craniotomy. The operation was uncomplicated. The patient required no pressors intra-op, and was extubated immediately post-op. At that time he was spontaneously moving all 4 extremities. The SICU was consulted for q1 hr neuro checks.      PAST MEDICAL & SURGICAL HISTORY:  CAD (coronary artery disease)  Secondary malignant neoplasm of brain  Metastasis to bone  Esophageal cancer  MI (myocardial infarction): 2015 with 1 coronary stent mid RCA  History of esophageal surgery: 6/2018  H/O hernia repair: 1966  Status post tonsillectomy and adenoidectomy  Stented coronary artery: x1 2015      FAMILY HISTORY:  Family history of cervical cancer  Family history of throat cancer  Family history not pertinent as reviewed with the patient and family      ALLERGIES: No Known Allergies      CURRENT MEDICATIONS  MEDICATIONS (STANDING): ceFAZolin   IVPB 1000 milliGRAM(s) IV Intermittent every 8 hours  dexamethasone  Injectable 4 milliGRAM(s) IV Push every 6 hours  docusate sodium 100 milliGRAM(s) Oral three times a day  influenza   Vaccine 0.5 milliLiter(s) IntraMuscular once  pantoprazole  Injectable 40 milliGRAM(s) IV Push daily  senna 2 Tablet(s) Oral at bedtime  sodium chloride 0.9%. 1000 milliLiter(s) IV Continuous <Continuous>    MEDICATIONS (PRN):acetaminophen   Tablet .. 650 milliGRAM(s) Oral every 6 hours PRN Temp greater or equal to 38C (100.4F), Mild Pain (1 - 3)  oxyCODONE    IR 5 milliGRAM(s) Oral every 4 hours PRN Moderate - Severe pain    --------------------------------------------------------------------------------------------    Vitals:   T(C): 36.7 (04-22-19 @ 16:00), Max: 36.8 (04-22-19 @ 14:40)  HR: 63 (04-22-19 @ 17:00) (62 - 73)  BP: 148/93 (04-22-19 @ 15:44) (120/87 - 148/93)  RR: 16 (04-22-19 @ 17:00) (13 - 18)  SpO2: 97% (04-22-19 @ 17:00) (94% - 97%)  CAPILLARY BLOOD GLUCOSE    04-22 @ 07:01  -  04-22 @ 18:51  --------------------------------------------------------  IN:    sodium chloride 0.9%.: 160 mL  Total IN: 160 mL    OUT:  Total OUT: 0 mL    Total NET: 160 mL    Height (cm): 185.42 (04-22 @ 06:15)  Weight (kg): 82.1 (04-22 @ 06:15)  BMI (kg/m2): 23.9 (04-22 @ 06:15)  BSA (m2): 2.06 (04-22 @ 06:15)      PHYSICAL EXAM:   General: NAD, Lying in bed comfortably  Neuro: A+Ox3. Cranial nerves II-XII grossly intact.  HEENT: NC/AT, EOMI. Posterior scalp bandage c/d/i  Neck: Soft, supple  Cardio: RRR, nml S1/S2  Resp: Good effort, CTA b/l  GI/Abd: Soft, NT/ND, no rebound/guarding, no masses palpated  Vascular: All 4 extremities warm.  Skin: Intact, no breakdown  Musculoskeletal: All 4 extremities moving spontaneously, no limitations. 5/5 strength in all 4 extremities.  --------------------------------------------------------------------------------------------    LABS  CBC (04-22 @ 16:45)                              12.3<L>                         12.69<H>  )----------------(  154        --    % Neutrophils, --    % Lymphocytes, ANC: --                                  38.0<L>    BMP (04-22 @ 16:45)             136     |  100     |  24<H> 		Ca++ --      Ca 8.8                ---------------------------------( 127<H>		Mg 2.1                4.5     |  24      |  0.88  			Ph 4.0       LFTs (04-22 @ 16:45)      TPro 6.1 / Alb 3.7 / TBili 0.3 / DBili -- / AST 17 / ALT 14 / AlkPhos 44    Coags (04-22 @ 16:45)  aPTT 22.0<L> / INR 1.05 / PT 12.0      ABG (04-22 @ 12:32)     7.45 / 35 / 243<H> / 25 / 0.5 / 97.4%     Lactate:    ABG (04-22 @ 09:10)     7.45 / 30<L> / 336<H> / 22 / -3.2 / 97.6%     Lactate:        --------------------------------------------------------------------------------------------    MICROBIOLOGY      --------------------------------------------------------------------------------------------    IMAGING  < from: CT Head No Cont (04.13.19 @ 12:41) >  FINDINGS:  Left superior cerebellar hyperdensity measuring 2.3 x 3.5 x 1.5 cm is   associated with a moderate amount of surrounding parenchymal vasogenic   edema. There is near complete effacement of the fourth ventricle and the   quadrigeminal plate cistern. The lateral and third ventricles are   slightly prominent, raising the possibility for developing hydrocephalus.   Please correlate clinically.    There are hemispheric white matter areas of low attenuation which are   nonspecific but likely related to sequelae of microvascular disease. No   abnormal extra-axial fluid collections are present. There is no evidence   of acute transcortical territorial infarction.    The calvarium is intact. The visualized intraorbital compartments,   paranasal sinuses and tympanomastoid cavities appear free of acute   disease.    IMPRESSION:  Left superior cerebellar 2.3 x 3.5 x 1.5 cm mass with moderate   surrounding parenchymal vasogenic edema, near complete effacement of the   fourth ventricle and findings worrisome for developing hydrocephalus.   Please correlate clinically.    Otherwise mild microvascular ischemic change.    < end of copied text >

## 2019-04-22 NOTE — CONSULT NOTE ADULT - ASSESSMENT
ASSESSMENT: Patient is a 53M pmhx CAD with stent (2015), esophageal ca s/p sudheer, who is now s/p left suboccipital craniotomy for a metastatic lesion, recovering appropriately.    PLAN:   Neuro:  - q1 hr neuro checks  - Pain control prn with tylenol & oxycodon  - CT head performed 6 hrs post-op (read pending)  - MR head in AM    Pulm:  - No active issues, on room air  - Maintain O2 sat > 92%    CV:  - Continuous hemodynamic monitoring    GI/Nutrition:  - Clear liquid diet, may advance to regular as tolerated  - Senna/colace  - Protonix    /Renal:  - NS @ 80 cc/hr; will IV lock when tolerating diet  - Strict I&Os    ID:  - Ingrid-op ancef x24 hrs  - Monitor for signs of infection    Heme:  - No active issues  - Holding chemical DVT ppx per neurosurgery    Endocrine:  - Decadron 4mg q6hr per neurosurgery    Dispo  - full code      Seen and examined with Dr. Rolando Gilmore, PGY-2   Surgical ICU, [id number]

## 2019-04-22 NOTE — CONSULT NOTE ADULT - ATTENDING COMMENTS
53M pmhx CAD with stent (2015), esophageal ca s/p sudheer, who is now s/p left suboccipital craniotomy for a metastatic lesion, recovering appropriately.    PLAN:   Neuro:  - q1 hr neuro checks  - Pain control prn with tylenol & oxycodon  - CT head performed 6 hrs post-op (read pending)  - MR head in AM    Pulm:  - No active issues, on room air  - Maintain O2 sat > 92%    CV:  - Continuous hemodynamic monitoring    GI/Nutrition:  - Clear liquid diet, may advance to regular as tolerated  - Senna/colace  - Protonix    /Renal:  - NS @ 80 cc/hr; will IV lock when tolerating diet  - Strict I&Os    ID:  - Ingrid-op ancef x24 hrs  - Monitor for signs of infection    Heme:  - No active issues  - Holding chemical DVT ppx per neurosurgery    Endocrine:  - Decadron 4mg q6hr per neurosurgery    Dispo  - full code      The patient is a critical care patient with life threatening hemodynamic and metabolic instability in SICU.  I have personally interviewed when possible and examined the patient, reviewed data and laboratory tests/x-rays and all pertinent electronic images.  I was physically present for the key portions of the evaluation and management (E/M) service provided.   The SICU team has a constant risk benefit analyzes discussion with the primary team, all consultants, House Staff and PA's on all decisions.  These diagnoses are unrelated to the surgical procedure noted above.  I meet with family if needed to get further history, discuss the case and make care decisions for this patient who might not be able to participate.  Time involved in performance of separately billable procedures was not counted toward my critical care time. There is no overlap.  I spent 55-75 minutes of critical care time for the diagnoses, assessment, plan and interventions.

## 2019-04-23 LAB
ANION GAP SERPL CALC-SCNC: 10 MMO/L — SIGNIFICANT CHANGE UP (ref 7–14)
BUN SERPL-MCNC: 18 MG/DL — SIGNIFICANT CHANGE UP (ref 7–23)
CALCIUM SERPL-MCNC: 9 MG/DL — SIGNIFICANT CHANGE UP (ref 8.4–10.5)
CHLORIDE SERPL-SCNC: 103 MMOL/L — SIGNIFICANT CHANGE UP (ref 98–107)
CO2 SERPL-SCNC: 26 MMOL/L — SIGNIFICANT CHANGE UP (ref 22–31)
CREAT SERPL-MCNC: 0.79 MG/DL — SIGNIFICANT CHANGE UP (ref 0.5–1.3)
GLUCOSE SERPL-MCNC: 107 MG/DL — HIGH (ref 70–99)
HCT VFR BLD CALC: 39.3 % — SIGNIFICANT CHANGE UP (ref 39–50)
HGB BLD-MCNC: 12.9 G/DL — LOW (ref 13–17)
MAGNESIUM SERPL-MCNC: 2.2 MG/DL — SIGNIFICANT CHANGE UP (ref 1.6–2.6)
MCHC RBC-ENTMCNC: 28.2 PG — SIGNIFICANT CHANGE UP (ref 27–34)
MCHC RBC-ENTMCNC: 32.8 % — SIGNIFICANT CHANGE UP (ref 32–36)
MCV RBC AUTO: 85.8 FL — SIGNIFICANT CHANGE UP (ref 80–100)
NRBC # FLD: 0 K/UL — SIGNIFICANT CHANGE UP (ref 0–0)
PHOSPHATE SERPL-MCNC: 3.5 MG/DL — SIGNIFICANT CHANGE UP (ref 2.5–4.5)
PLATELET # BLD AUTO: 167 K/UL — SIGNIFICANT CHANGE UP (ref 150–400)
PMV BLD: 9.9 FL — SIGNIFICANT CHANGE UP (ref 7–13)
POTASSIUM SERPL-MCNC: 4.4 MMOL/L — SIGNIFICANT CHANGE UP (ref 3.5–5.3)
POTASSIUM SERPL-SCNC: 4.4 MMOL/L — SIGNIFICANT CHANGE UP (ref 3.5–5.3)
RBC # BLD: 4.58 M/UL — SIGNIFICANT CHANGE UP (ref 4.2–5.8)
RBC # FLD: 16.7 % — HIGH (ref 10.3–14.5)
SODIUM SERPL-SCNC: 139 MMOL/L — SIGNIFICANT CHANGE UP (ref 135–145)
WBC # BLD: 11.54 K/UL — HIGH (ref 3.8–10.5)
WBC # FLD AUTO: 11.54 K/UL — HIGH (ref 3.8–10.5)

## 2019-04-23 PROCEDURE — 99233 SBSQ HOSP IP/OBS HIGH 50: CPT

## 2019-04-23 PROCEDURE — 70553 MRI BRAIN STEM W/O & W/DYE: CPT | Mod: 26

## 2019-04-23 RX ORDER — OXYCODONE HYDROCHLORIDE 5 MG/1
10 TABLET ORAL EVERY 12 HOURS
Qty: 0 | Refills: 0 | Status: DISCONTINUED | OUTPATIENT
Start: 2019-04-23 | End: 2019-04-23

## 2019-04-23 RX ORDER — ACETAMINOPHEN 500 MG
1000 TABLET ORAL ONCE
Qty: 0 | Refills: 0 | Status: COMPLETED | OUTPATIENT
Start: 2019-04-23 | End: 2019-04-23

## 2019-04-23 RX ORDER — ENOXAPARIN SODIUM 100 MG/ML
40 INJECTION SUBCUTANEOUS DAILY
Qty: 0 | Refills: 0 | Status: DISCONTINUED | OUTPATIENT
Start: 2019-04-24 | End: 2019-04-26

## 2019-04-23 RX ORDER — OXYCODONE HYDROCHLORIDE 5 MG/1
10 TABLET ORAL EVERY 4 HOURS
Qty: 0 | Refills: 0 | Status: DISCONTINUED | OUTPATIENT
Start: 2019-04-23 | End: 2019-04-26

## 2019-04-23 RX ORDER — HEPARIN SODIUM 5000 [USP'U]/ML
5000 INJECTION INTRAVENOUS; SUBCUTANEOUS EVERY 8 HOURS
Qty: 0 | Refills: 0 | Status: DISCONTINUED | OUTPATIENT
Start: 2019-04-23 | End: 2019-04-23

## 2019-04-23 RX ORDER — OXYCODONE HYDROCHLORIDE 5 MG/1
10 TABLET ORAL ONCE
Qty: 0 | Refills: 0 | Status: DISCONTINUED | OUTPATIENT
Start: 2019-04-23 | End: 2019-04-23

## 2019-04-23 RX ADMIN — OXYCODONE HYDROCHLORIDE 10 MILLIGRAM(S): 5 TABLET ORAL at 13:15

## 2019-04-23 RX ADMIN — OXYCODONE HYDROCHLORIDE 5 MILLIGRAM(S): 5 TABLET ORAL at 00:00

## 2019-04-23 RX ADMIN — Medication 100 MILLIGRAM(S): at 00:08

## 2019-04-23 RX ADMIN — PANTOPRAZOLE SODIUM 40 MILLIGRAM(S): 20 TABLET, DELAYED RELEASE ORAL at 12:06

## 2019-04-23 RX ADMIN — Medication 400 MILLIGRAM(S): at 01:40

## 2019-04-23 RX ADMIN — OXYCODONE HYDROCHLORIDE 10 MILLIGRAM(S): 5 TABLET ORAL at 21:35

## 2019-04-23 RX ADMIN — Medication 4 MILLIGRAM(S): at 18:17

## 2019-04-23 RX ADMIN — Medication 100 MILLIGRAM(S): at 21:05

## 2019-04-23 RX ADMIN — Medication 100 MILLIGRAM(S): at 16:10

## 2019-04-23 RX ADMIN — OXYCODONE HYDROCHLORIDE 10 MILLIGRAM(S): 5 TABLET ORAL at 12:37

## 2019-04-23 RX ADMIN — SENNA PLUS 2 TABLET(S): 8.6 TABLET ORAL at 21:05

## 2019-04-23 RX ADMIN — Medication 5 MILLIGRAM(S): at 07:12

## 2019-04-23 RX ADMIN — OXYCODONE HYDROCHLORIDE 5 MILLIGRAM(S): 5 TABLET ORAL at 08:28

## 2019-04-23 RX ADMIN — Medication 400 MILLIGRAM(S): at 16:20

## 2019-04-23 RX ADMIN — Medication 1000 MILLIGRAM(S): at 02:02

## 2019-04-23 RX ADMIN — Medication 1000 MILLIGRAM(S): at 16:50

## 2019-04-23 RX ADMIN — Medication 4 MILLIGRAM(S): at 06:02

## 2019-04-23 RX ADMIN — OXYCODONE HYDROCHLORIDE 10 MILLIGRAM(S): 5 TABLET ORAL at 03:28

## 2019-04-23 RX ADMIN — OXYCODONE HYDROCHLORIDE 10 MILLIGRAM(S): 5 TABLET ORAL at 04:03

## 2019-04-23 RX ADMIN — OXYCODONE HYDROCHLORIDE 10 MILLIGRAM(S): 5 TABLET ORAL at 21:05

## 2019-04-23 RX ADMIN — Medication 4 MILLIGRAM(S): at 12:06

## 2019-04-23 RX ADMIN — Medication 100 MILLIGRAM(S): at 08:29

## 2019-04-23 RX ADMIN — Medication 4 MILLIGRAM(S): at 23:00

## 2019-04-23 RX ADMIN — Medication 5 MILLIGRAM(S): at 23:00

## 2019-04-23 RX ADMIN — OXYCODONE HYDROCHLORIDE 5 MILLIGRAM(S): 5 TABLET ORAL at 09:15

## 2019-04-23 NOTE — PROGRESS NOTE ADULT - ASSESSMENT
ASSESSMENT: Patient is a 53M pmhx CAD with stent (2015), esophageal ca s/p keydrtrenton, who is now s/p left suboccipital craniotomy for a metastatic lesion, recovering appropriately.    PLAN:   Neuro:  - q2 hr neuro checks; will downgrade when ok with neurosurg  - Pain control prn with tylenol & oxycodon  - CT head performed 6 hrs post-op, stable  - MR head in AM    Pulm:  - No active issues, on room air  - Maintain O2 sat > 92%    CV:  - Continuous hemodynamic monitoring    GI/Nutrition:  - Regular diet  - Senna/colace  - Protonix    /Renal:  - IV locked  - Strict I&Os    ID:  - Ingrid-op ancef x24 hrs  - Monitor for signs of infection    Heme:  - No active issues  - Holding chemical DVT ppx per neurosurgery    Endocrine:  - Decadron 4mg q6hr per neurosurgery    Dispo  - full code      Antoine Gilmore, PGY-2   Surgical ICU, [id number] ASSESSMENT: Patient is a 53M pmhx CAD with stent (2015), esophageal ca s/p keydruda, who is now s/p left suboccipital craniotomy for a metastatic lesion, recovering appropriately.    PLAN:   Neuro:  - q2 hr neuro checks; will downgrade when ok with neurosurg  - Pain control prn with tylenol & oxycodon  - CT head performed 6 hrs post-op, stable  - MR head in AM    Pulm:  - No active issues, on room air  - Maintain O2 sat > 92%    CV:  - Continuous hemodynamic monitoring, keep MAP >65    GI/Nutrition:  - Regular diet  - Senna/colace  - Protonix    /Renal:  - IV locked  - Strict I&Os    ID:  - Ingrid-op ancef x24 hrs  - Monitor for signs of infection    Heme:  - No active issues  - Holding chemical DVT ppx per neurosurgery    Endocrine:  - Decadron 4mg q6hr per neurosurgery    Dispo  - full code  -Floor      Antoine Gilmore, PGY-2   Surgical ICU, [id number] ASSESSMENT: Patient is a 53M pmhx CAD with stent (2015), esophageal ca s/p keydruda, who is now s/p left suboccipital craniotomy for a metastatic lesion, recovering appropriately.    PLAN:   Neuro:  - q2 hr neuro checks; will downgrade when ok with neurosurg  - Pain control prn with tylenol & oxycodon  - CT head performed 6 hrs post-op, stable  - MR head in AM    Pulm:  - No active issues, on room air  - Maintain O2 sat > 92%    CV:  - Continuous hemodynamic monitoring, keep MAP >65    GI/Nutrition:  - Regular diet  - Senna/colace  - Protonix    /Renal:  - IV locked  - Strict I&Os    ID:  - Ingrid-op ancef x24 hrs  - Monitor for signs of infection    Heme:  - No active issues  - HSQ     Endocrine:  - Decadron 4mg q6hr per neurosurgery    Dispo  - full code  -Floor      Antoine Gilmore, PGY-2   Surgical ICU, [id number]

## 2019-04-23 NOTE — OCCUPATIONAL THERAPY INITIAL EVALUATION ADULT - ANTICIPATED DISCHARGE DISPOSITION, OT EVAL
Anticipate Home with no skilled OT services. Pt. may benefit from PT services. Pt reports his wife is available to assist with ADL's as needed. Anticipate Home with no skilled OT services. Pt. may benefit from PT services.

## 2019-04-23 NOTE — OCCUPATIONAL THERAPY INITIAL EVALUATION ADULT - DIAGNOSIS, OT EVAL
s/p Craniotomy for suboccipital neoplasm; Mild decreased standing balance; Decreased functional mobility; Decreased ADL management

## 2019-04-23 NOTE — OCCUPATIONAL THERAPY INITIAL EVALUATION ADULT - MD ORDER
Occupational Therapy (OT) to evaluate and treat. Occupational Therapy (OT) to evaluate and treat. Per CHANNING Yoder, pt is okay to participate in OT evaluation and perform activity as tolerated.

## 2019-04-23 NOTE — OCCUPATIONAL THERAPY INITIAL EVALUATION ADULT - PERTINENT HX OF CURRENT PROBLEM, REHAB EVAL
Pt is a 53 year old male with hx of esophageal cancer s/p esophagastrectomy in 6/2018, s/p chemotherapy and radiation, with metastasis to bone s/p radiation therapy, completed in 3/2019, now on keydruda. Pt now found secondary malignant neoplasm of brain after evaluation of headache, dizziness and nausea. Pt was started on dexamethasone and reports occasional lightheadedness. Pt is now s/p Craniotomy for suboccipital neoplasm on 4/22/19.

## 2019-04-23 NOTE — OCCUPATIONAL THERAPY INITIAL EVALUATION ADULT - GENERAL OBSERVATIONS, REHAB EVAL
Pt. received semisupine in bed in Surgical ICU. No acute distress. Patient agreed to evaluation from Occupational Therapist. +Clean dry intact dressing to head, +Heplock, +Tele, Pulse ox to Right finger, +Bilateral Venodynes.

## 2019-04-23 NOTE — OCCUPATIONAL THERAPY INITIAL EVALUATION ADULT - ADDITIONAL COMMENTS
Pt. reports he owns a shower chair; however, pt. explains he was not using it prior to hospitalization.

## 2019-04-23 NOTE — OCCUPATIONAL THERAPY INITIAL EVALUATION ADULT - LIVES WITH, PROFILE
Pt. reports he lives with his wife in a house with 2 steps to enter. Once inside, pt. reports bedroom and bathroom are located on the main level. Per pt., he has a shower stall in his bathroom.

## 2019-04-23 NOTE — PROGRESS NOTE ADULT - SUBJECTIVE AND OBJECTIVE BOX
HISTORY  53 year old male presents to presurgical testing with diagnosis of secondary malignant neoplasm of brain scheduled for left suboccipital craniotomy for 4/22/19. Pt with Hx of esophageal cancer s/p esophagastrectomy in 6/2018, s/p chemotherapy and radiation, with metastasis to bone s/p radiation therapy, completed in 3/2019, now on keydruda, found secondary malignant neoplasm of brain after evaluation of headache, dizziness and nausea. Pt was started on dexamethasone and reports occasional lightheadedness.     On 4/22/19 the patient went to the OR with neurosurgery and underwent a left suboccipital craniotomy. The operation was uncomplicated. The patient required no pressors intra-op, and was extubated immediately post-op. At that time he was spontaneously moving all 4 extremities. The SICU was consulted for q1 hr neuro checks.      24 HOUR EVENTS:      SUBJECTIVE/ROS:  [ ] A ten-point review of systems was otherwise negative except as noted.  [ ] Due to altered mental status/intubation, subjective information were not able to be obtained from the patient. History was obtained, to the extent possible, from review of the chart and collateral sources of information.      NEURO  Exam: AAOx3, rude  Meds: acetaminophen   Tablet .. 650 milliGRAM(s) Oral every 6 hours PRN Temp greater or equal to 38C (100.4F), Mild Pain (1 - 3)  diazepam    Tablet 5 milliGRAM(s) Oral every 8 hours PRN Anxiety, muscle spasm  oxyCODONE    IR 5 milliGRAM(s) Oral every 4 hours PRN Moderate - Severe pain    [x] Adequacy of sedation and pain control has been assessed and adjusted      RESPIRATORY  RR: 17 (04-23-19 @ 00:00) (13 - 21)  SpO2: 95% (04-23-19 @ 00:00) (94% - 98%)  Wt(kg): --  Mechanical Ventilation:   ABG - ( 22 Apr 2019 12:32 )  pH: 7.45  /  pCO2: 35    /  pO2: 243   / HCO3: 25    / Base Excess: 0.5   /  SaO2: 97.4    Lactate: x      Exam: CTAB        Meds:       CARDIOVASCULAR  HR: 66 (04-23-19 @ 00:00) (62 - 82)  BP: 148/93 (04-22-19 @ 15:44) (120/87 - 148/93)  BP(mean): 105 (04-22-19 @ 15:44) (80 - 105)  ABP: 136/76 (04-23-19 @ 00:00) (133/63 - 177/68)  ABP(mean): 97 (04-23-19 @ 00:00) (88 - 115)  Wt(kg): --  CVP(cm H2O): --  Exam: RRR, nl S1/S2        GI/NUTRITION  Diet: Reg  Meds: docusate sodium 100 milliGRAM(s) Oral three times a day  pantoprazole  Injectable 40 milliGRAM(s) IV Push daily  senna 2 Tablet(s) Oral at bedtime  Exam: Soft, NT/ND      GENITOURINARY  I&O's Detail    04-22 @ 07:01  -  04-23 @ 00:43  --------------------------------------------------------  IN:    sodium chloride 0.9%: 240 mL  Total IN: 240 mL    OUT:    Voided: 2350 mL  Total OUT: 2350 mL    Total NET: -2110 mL        Weight (kg): 82.1 (04-22 @ 06:15)  04-22    136  |  100  |  24<H>  ----------------------------<  127<H>  4.5   |  24  |  0.88    Ca    8.8      22 Apr 2019 16:45  Phos  4.0     04-22  Mg     2.1     04-22    TPro  6.1  /  Alb  3.7  /  TBili  0.3  /  DBili  x   /  AST  17  /  ALT  14  /  AlkPhos  44  04-22    Meds:       HEMATOLOGIC  Meds:   [x] VTE Prophylaxis                        12.3   12.69 )-----------( 154      ( 22 Apr 2019 16:45 )             38.0     PT/INR - ( 22 Apr 2019 16:45 )   PT: 12.0 SEC;   INR: 1.05          PTT - ( 22 Apr 2019 16:45 )  PTT:22.0 SEC      INFECTIOUS DISEASES  T(C): 36.6 (04-23-19 @ 00:00), Max: 36.8 (04-22-19 @ 14:40)  Wt(kg): --  WBC Count: 12.69 K/uL (04-22 @ 16:45)    Recent Cultures:    Meds: ceFAZolin   IVPB 1000 milliGRAM(s) IV Intermittent every 8 hours  influenza   Vaccine 0.5 milliLiter(s) IntraMuscular once        ENDOCRINE  Capillary Blood Glucose    Meds: dexamethasone  Injectable 4 milliGRAM(s) IV Push every 6 hours        ACCESS DEVICES:  [ ] Peripheral IV  [ ] Central Venous Line	[ ] R	[ ] L	[ ] IJ	[ ] Fem	[ ] SC	Placed:   [ ] Arterial Line		[ ] R	[ ] L	[ ] Fem	[ ] Rad	[ ] Ax	Placed:   [ ] PICC:					[ ] Mediport  [ ] Urinary Catheter, Date Placed:   [ ] Necessity of urinary, arterial, and venous catheters discussed      OTHER MEDICATIONS: HISTORY  53 year old male presents to presurgical testing with diagnosis of secondary malignant neoplasm of brain scheduled for left suboccipital craniotomy for 4/22/19. Pt with Hx of esophageal cancer s/p esophagastrectomy in 6/2018, s/p chemotherapy and radiation, with metastasis to bone s/p radiation therapy, completed in 3/2019, now on keydruda, found secondary malignant neoplasm of brain after evaluation of headache, dizziness and nausea. Pt was started on dexamethasone and reports occasional lightheadedness.     On 4/22/19 the patient went to the OR with neurosurgery and underwent a left suboccipital craniotomy. The operation was uncomplicated. The patient required no pressors intra-op, and was extubated immediately post-op. At that time he was spontaneously moving all 4 extremities. The SICU was consulted for q1 hr neuro checks.      24 HOUR EVENTS: no overnight events. aggressive behavior towards staff.      SUBJECTIVE/ROS:  [ ] A ten-point review of systems was otherwise negative except as noted.  [ ] Due to altered mental status/intubation, subjective information were not able to be obtained from the patient. History was obtained, to the extent possible, from review of the chart and collateral sources of information.      NEURO  Exam: AAOx3, rude  Meds: acetaminophen   Tablet .. 650 milliGRAM(s) Oral every 6 hours PRN Temp greater or equal to 38C (100.4F), Mild Pain (1 - 3)  diazepam    Tablet 5 milliGRAM(s) Oral every 8 hours PRN Anxiety, muscle spasm  oxyCODONE    IR 5 milliGRAM(s) Oral every 4 hours PRN Moderate - Severe pain    [x] Adequacy of sedation and pain control has been assessed and adjusted      RESPIRATORY  RR: 17 (04-23-19 @ 00:00) (13 - 21)  SpO2: 95% (04-23-19 @ 00:00) (94% - 98%)  Wt(kg): --  Mechanical Ventilation:   ABG - ( 22 Apr 2019 12:32 )  pH: 7.45  /  pCO2: 35    /  pO2: 243   / HCO3: 25    / Base Excess: 0.5   /  SaO2: 97.4    Lactate: x      Exam: CTAB        Meds:       CARDIOVASCULAR  HR: 66 (04-23-19 @ 00:00) (62 - 82)  BP: 148/93 (04-22-19 @ 15:44) (120/87 - 148/93)  BP(mean): 105 (04-22-19 @ 15:44) (80 - 105)  ABP: 136/76 (04-23-19 @ 00:00) (133/63 - 177/68)  ABP(mean): 97 (04-23-19 @ 00:00) (88 - 115)  Wt(kg): --  CVP(cm H2O): --  Exam: RRR, nl S1/S2        GI/NUTRITION  Diet: Reg  Meds: docusate sodium 100 milliGRAM(s) Oral three times a day  pantoprazole  Injectable 40 milliGRAM(s) IV Push daily  senna 2 Tablet(s) Oral at bedtime  Exam: Soft, NT/ND      GENITOURINARY  I&O's Detail    04-22 @ 07:01  -  04-23 @ 00:43  --------------------------------------------------------  IN:    sodium chloride 0.9%: 240 mL  Total IN: 240 mL    OUT:    Voided: 2350 mL  Total OUT: 2350 mL    Total NET: -2110 mL        Weight (kg): 82.1 (04-22 @ 06:15)  04-22    136  |  100  |  24<H>  ----------------------------<  127<H>  4.5   |  24  |  0.88    Ca    8.8      22 Apr 2019 16:45  Phos  4.0     04-22  Mg     2.1     04-22    TPro  6.1  /  Alb  3.7  /  TBili  0.3  /  DBili  x   /  AST  17  /  ALT  14  /  AlkPhos  44  04-22    Meds:       HEMATOLOGIC  Meds:   [x] VTE Prophylaxis                        12.3   12.69 )-----------( 154      ( 22 Apr 2019 16:45 )             38.0     PT/INR - ( 22 Apr 2019 16:45 )   PT: 12.0 SEC;   INR: 1.05          PTT - ( 22 Apr 2019 16:45 )  PTT:22.0 SEC      INFECTIOUS DISEASES  T(C): 36.6 (04-23-19 @ 00:00), Max: 36.8 (04-22-19 @ 14:40)  Wt(kg): --  WBC Count: 12.69 K/uL (04-22 @ 16:45)    Recent Cultures:    Meds: ceFAZolin   IVPB 1000 milliGRAM(s) IV Intermittent every 8 hours  influenza   Vaccine 0.5 milliLiter(s) IntraMuscular once        ENDOCRINE  Capillary Blood Glucose    Meds: dexamethasone  Injectable 4 milliGRAM(s) IV Push every 6 hours        ACCESS DEVICES:  [ ] Peripheral IV  [ ] Central Venous Line	[ ] R	[ ] L	[ ] IJ	[ ] Fem	[ ] SC	Placed:   [ ] Arterial Line		[ ] R	[ ] L	[ ] Fem	[ ] Rad	[ ] Ax	Placed:   [ ] PICC:					[ ] Mediport  [ ] Urinary Catheter, Date Placed:   [ ] Necessity of urinary, arterial, and venous catheters discussed      OTHER MEDICATIONS:

## 2019-04-24 PROBLEM — C79.31 SECONDARY MALIGNANT NEOPLASM OF BRAIN: Chronic | Status: ACTIVE | Noted: 2019-04-18

## 2019-04-24 PROBLEM — I25.10 ATHEROSCLEROTIC HEART DISEASE OF NATIVE CORONARY ARTERY WITHOUT ANGINA PECTORIS: Chronic | Status: ACTIVE | Noted: 2019-04-18

## 2019-04-24 PROBLEM — C79.51 SECONDARY MALIGNANT NEOPLASM OF BONE: Chronic | Status: ACTIVE | Noted: 2019-04-18

## 2019-04-24 LAB
ANION GAP SERPL CALC-SCNC: 11 MMO/L — SIGNIFICANT CHANGE UP (ref 7–14)
BASOPHILS # BLD AUTO: 0.02 K/UL — SIGNIFICANT CHANGE UP (ref 0–0.2)
BASOPHILS NFR BLD AUTO: 0.2 % — SIGNIFICANT CHANGE UP (ref 0–2)
BUN SERPL-MCNC: 21 MG/DL — SIGNIFICANT CHANGE UP (ref 7–23)
CALCIUM SERPL-MCNC: 9.4 MG/DL — SIGNIFICANT CHANGE UP (ref 8.4–10.5)
CHLORIDE SERPL-SCNC: 100 MMOL/L — SIGNIFICANT CHANGE UP (ref 98–107)
CO2 SERPL-SCNC: 24 MMOL/L — SIGNIFICANT CHANGE UP (ref 22–31)
CREAT SERPL-MCNC: 0.81 MG/DL — SIGNIFICANT CHANGE UP (ref 0.5–1.3)
EOSINOPHIL # BLD AUTO: 0 K/UL — SIGNIFICANT CHANGE UP (ref 0–0.5)
EOSINOPHIL NFR BLD AUTO: 0 % — SIGNIFICANT CHANGE UP (ref 0–6)
GLUCOSE SERPL-MCNC: 117 MG/DL — HIGH (ref 70–99)
HCT VFR BLD CALC: 43.9 % — SIGNIFICANT CHANGE UP (ref 39–50)
HGB BLD-MCNC: 14.2 G/DL — SIGNIFICANT CHANGE UP (ref 13–17)
IMM GRANULOCYTES NFR BLD AUTO: 0.8 % — SIGNIFICANT CHANGE UP (ref 0–1.5)
LYMPHOCYTES # BLD AUTO: 0.87 K/UL — LOW (ref 1–3.3)
LYMPHOCYTES # BLD AUTO: 6.7 % — LOW (ref 13–44)
MCHC RBC-ENTMCNC: 28.6 PG — SIGNIFICANT CHANGE UP (ref 27–34)
MCHC RBC-ENTMCNC: 32.3 % — SIGNIFICANT CHANGE UP (ref 32–36)
MCV RBC AUTO: 88.5 FL — SIGNIFICANT CHANGE UP (ref 80–100)
MONOCYTES # BLD AUTO: 1.16 K/UL — HIGH (ref 0–0.9)
MONOCYTES NFR BLD AUTO: 8.9 % — SIGNIFICANT CHANGE UP (ref 2–14)
NEUTROPHILS # BLD AUTO: 10.81 K/UL — HIGH (ref 1.8–7.4)
NEUTROPHILS NFR BLD AUTO: 83.4 % — HIGH (ref 43–77)
NRBC # FLD: 0 K/UL — SIGNIFICANT CHANGE UP (ref 0–0)
PLATELET # BLD AUTO: 154 K/UL — SIGNIFICANT CHANGE UP (ref 150–400)
PMV BLD: 9.7 FL — SIGNIFICANT CHANGE UP (ref 7–13)
POTASSIUM SERPL-MCNC: 4.5 MMOL/L — SIGNIFICANT CHANGE UP (ref 3.5–5.3)
POTASSIUM SERPL-SCNC: 4.5 MMOL/L — SIGNIFICANT CHANGE UP (ref 3.5–5.3)
RBC # BLD: 4.96 M/UL — SIGNIFICANT CHANGE UP (ref 4.2–5.8)
RBC # FLD: 16.5 % — HIGH (ref 10.3–14.5)
SODIUM SERPL-SCNC: 135 MMOL/L — SIGNIFICANT CHANGE UP (ref 135–145)
WBC # BLD: 12.97 K/UL — HIGH (ref 3.8–10.5)
WBC # FLD AUTO: 12.97 K/UL — HIGH (ref 3.8–10.5)

## 2019-04-24 PROCEDURE — 99233 SBSQ HOSP IP/OBS HIGH 50: CPT

## 2019-04-24 RX ORDER — ACETAMINOPHEN 500 MG
1000 TABLET ORAL ONCE
Qty: 0 | Refills: 0 | Status: COMPLETED | OUTPATIENT
Start: 2019-04-24 | End: 2019-04-24

## 2019-04-24 RX ADMIN — OXYCODONE HYDROCHLORIDE 10 MILLIGRAM(S): 5 TABLET ORAL at 09:16

## 2019-04-24 RX ADMIN — Medication 400 MILLIGRAM(S): at 17:57

## 2019-04-24 RX ADMIN — OXYCODONE HYDROCHLORIDE 10 MILLIGRAM(S): 5 TABLET ORAL at 05:05

## 2019-04-24 RX ADMIN — Medication 4 MILLIGRAM(S): at 12:02

## 2019-04-24 RX ADMIN — PANTOPRAZOLE SODIUM 40 MILLIGRAM(S): 20 TABLET, DELAYED RELEASE ORAL at 12:03

## 2019-04-24 RX ADMIN — Medication 1000 MILLIGRAM(S): at 18:40

## 2019-04-24 RX ADMIN — Medication 5 MILLIGRAM(S): at 14:56

## 2019-04-24 RX ADMIN — Medication 100 MILLIGRAM(S): at 21:59

## 2019-04-24 RX ADMIN — OXYCODONE HYDROCHLORIDE 10 MILLIGRAM(S): 5 TABLET ORAL at 17:57

## 2019-04-24 RX ADMIN — SENNA PLUS 2 TABLET(S): 8.6 TABLET ORAL at 21:59

## 2019-04-24 RX ADMIN — Medication 4 MILLIGRAM(S): at 05:04

## 2019-04-24 RX ADMIN — OXYCODONE HYDROCHLORIDE 10 MILLIGRAM(S): 5 TABLET ORAL at 05:35

## 2019-04-24 RX ADMIN — OXYCODONE HYDROCHLORIDE 10 MILLIGRAM(S): 5 TABLET ORAL at 09:45

## 2019-04-24 RX ADMIN — Medication 100 MILLIGRAM(S): at 14:42

## 2019-04-24 RX ADMIN — ENOXAPARIN SODIUM 40 MILLIGRAM(S): 100 INJECTION SUBCUTANEOUS at 12:03

## 2019-04-24 RX ADMIN — Medication 5 MILLIGRAM(S): at 07:52

## 2019-04-24 RX ADMIN — OXYCODONE HYDROCHLORIDE 10 MILLIGRAM(S): 5 TABLET ORAL at 16:24

## 2019-04-24 RX ADMIN — Medication 4 MILLIGRAM(S): at 17:57

## 2019-04-24 RX ADMIN — Medication 100 MILLIGRAM(S): at 05:05

## 2019-04-24 NOTE — PROGRESS NOTE ADULT - SUBJECTIVE AND OBJECTIVE BOX
HISTORY  53 year old male presents to presurgical testing with diagnosis of secondary malignant neoplasm of brain scheduled for left suboccipital craniotomy for 4/22/19. Pt with Hx of esophageal cancer s/p esophagastrectomy in 6/2018, s/p chemotherapy and radiation, with metastasis to bone s/p radiation therapy, completed in 3/2019, now on keydruda, found secondary malignant neoplasm of brain after evaluation of headache, dizziness and nausea. Pt was started on dexamethasone and reports occasional lightheadedness.     On 4/22/19 the patient went to the OR with neurosurgery and underwent a left suboccipital craniotomy. The operation was uncomplicated. The patient required no pressors intra-op, and was extubated immediately post-op. At that time he was spontaneously moving all 4 extremities. The SICU was consulted for q1 hr neuro checks.    24 HOUR EVENTS: No overnight events. Pt more cordial towards staff.       SUBJECTIVE/ROS:  [x] A ten-point review of systems was otherwise negative except as noted.  [ ] Due to altered mental status/intubation, subjective information were not able to be obtained from the patient. History was obtained, to the extent possible, from review of the chart and collateral sources of information.      NEURO  Meds: acetaminophen   Tablet .. 650 milliGRAM(s) Oral every 6 hours PRN Temp greater or equal to 38C (100.4F), Mild Pain (1 - 3)  diazepam    Tablet 5 milliGRAM(s) Oral every 8 hours PRN Anxiety, muscle spasm  oxyCODONE    IR 10 milliGRAM(s) Oral every 4 hours PRN Moderate Pain (4 - 6)    [x] Adequacy of sedation and pain control has been assessed and adjusted      RESPIRATORY  RR: 12 (04-24-19 @ 00:00) (12 - 23)  SpO2: 98% (04-24-19 @ 00:00) (95% - 98%)  Wt(kg): --  ABG - ( 22 Apr 2019 12:32 )  pH: 7.45  /  pCO2: 35    /  pO2: 243   / HCO3: 25    / Base Excess: 0.5   /  SaO2: 97.4    Lactate: x      Exam: Lungs CTAB    CARDIOVASCULAR  HR: 82 (04-24-19 @ 00:00) (65 - 109)  BP: 107/72 (04-24-19 @ 00:00) (107/72 - 135/88)  BP(mean): 80 (04-24-19 @ 00:00) (80 - 101)  ABP: 133/90 (04-23-19 @ 09:00) (133/90 - 152/81)  ABP(mean): 105 (04-23-19 @ 09:00) (96 - 114)  Wt(kg): --  CVP(cm H2O): --  Exam: XHRp1a0    GI/NUTRITION  Diet:  Meds: docusate sodium 100 milliGRAM(s) Oral three times a day  pantoprazole  Injectable 40 milliGRAM(s) IV Push daily  senna 2 Tablet(s) Oral at bedtime  Exam: Abdomen soft ntnd      GENITOURINARY  I&O's Detail    04-22 @ 07:01  -  04-23 @ 07:00  --------------------------------------------------------  IN:    IV PiggyBack: 150 mL    sodium chloride 0.9%: 240 mL  Total IN: 390 mL    OUT:    Voided: 4000 mL  Total OUT: 4000 mL    Total NET: -3610 mL      04-23 @ 07:01  -  04-24 @ 02:16  --------------------------------------------------------  IN:    Oral Fluid: 150 mL  Total IN: 150 mL    OUT:    Voided: 1250 mL  Total OUT: 1250 mL    Total NET: -1100 mL          04-23    139  |  103  |  18  ----------------------------<  107<H>  4.4   |  26  |  0.79    Ca    9.0      23 Apr 2019 03:35  Phos  3.5     04-23  Mg     2.2     04-23    TPro  6.1  /  Alb  3.7  /  TBili  0.3  /  DBili  x   /  AST  17  /  ALT  14  /  AlkPhos  44  04-22    HEMATOLOGIC  Meds: enoxaparin Injectable 40 milliGRAM(s) SubCutaneous daily    [x] VTE Prophylaxis                        12.9   11.54 )-----------( 167      ( 23 Apr 2019 03:35 )             39.3     PT/INR - ( 22 Apr 2019 16:45 )   PT: 12.0 SEC;   INR: 1.05          PTT - ( 22 Apr 2019 16:45 )  PTT:22.0 SEC      INFECTIOUS DISEASES  T(C): 37 (04-23-19 @ 20:00), Max: 37.1 (04-23-19 @ 16:00)  Wt(kg): --  WBC Count: 11.54 K/uL (04-23 @ 03:35)      Meds: influenza   Vaccine 0.5 milliLiter(s) IntraMuscular once        ENDOCRINE  Capillary Blood Glucose    Meds: dexamethasone  Injectable 4 milliGRAM(s) IV Push every 6 hours        ACCESS DEVICES:  [ x] Peripheral IV x2  [ ] Central Venous Line	[ ] R	[ ] L	[ ] IJ	[ ] Fem	[ ] SC	Placed:   [ ] Arterial Line		[ ] R	[ ] L	[ ] Fem	[ ] Rad	[ ] Ax	Placed:   [ ] PICC:					[ ] Mediport  [ ] Urinary Catheter, Date Placed:   [ ] Necessity of urinary, arterial, and venous catheters discussed      OTHER MEDICATIONS:

## 2019-04-24 NOTE — PROGRESS NOTE ADULT - ASSESSMENT
ASSESSMENT: Patient is a 53M pmhx CAD with stent (2015), esophageal ca s/p keydrtrenton, who is now s/p left suboccipital craniotomy for a metastatic lesion, recovering appropriately.    PLAN:   Neuro:  - q4 hr neuro checks  - Pain control prn with tylenol & oxycodone  - CT head performed 6 hrs post-op, stable  - MR head stable as well    Pulm:  - No active issues, on room air  - Maintain O2 sat > 92%  - Incentive spirometry    CV:  - Continuous hemodynamic monitoring, keep MAP >65    GI/Nutrition:  - Regular diet  - Senna/colace  - Protonix    /Renal:  - IV locked  - Strict I&Os    ID:  - Ingrid-op ancef x24 hrs  - Monitor for signs of infection    Heme:  - No active issues  - HSQ     Endocrine:  - Decadron 4mg q6hr per neurosurgery    Dispo  - full code  -Floor ASSESSMENT: Patient is a 53M pmhx CAD with stent (2015), esophageal ca s/p sudheer, who is now s/p left suboccipital craniotomy for a metastatic lesion, recovering appropriately.    PLAN:   Neuro:  - q4 hr neuro checks  -Decadron  - Pain control prn with tylenol & oxycodone, valium   - CT head performed 6 hrs post-op, stable  - MR head stable as well    Pulm:  - No active issues, on room air  - Maintain O2 sat > 92%  - Incentive spirometry    CV:  - Continuous hemodynamic monitoring, keep MAP >65    GI/Nutrition:  - Regular diet  - Senna/colace  - Protonix    /Renal:  - IV locked  - Strict I&Os    ID:  afebrile    Heme:  - No active issues  - HSQ     Endocrine:  - Decadron 4mg q6hr per neurosurgery    Dispo  - full code  -Floor

## 2019-04-24 NOTE — PROGRESS NOTE ADULT - SUBJECTIVE AND OBJECTIVE BOX
NEUROSURGERY NOTE  SYLVIA VICTOR   04-24-19 @ 02:50    PAST 24HR EVENTS: no issues overnight, pt stable post op day #2 s/p SOC for resection of tumor      PHYSICAL EXAM:  Vital Signs Last 24 Hrs  T(C): 37 (23 Apr 2019 20:00), Max: 37.1 (23 Apr 2019 16:00)  T(F): 98.6 (23 Apr 2019 20:00), Max: 98.7 (23 Apr 2019 16:00)  HR: 82 (24 Apr 2019 00:00) (65 - 109)  BP: 107/72 (24 Apr 2019 00:00) (107/72 - 135/88)  BP(mean): 80 (24 Apr 2019 00:00) (80 - 101)  RR: 12 (24 Apr 2019 00:00) (12 - 23)  SpO2: 98% (24 Apr 2019 00:00) (95% - 98%)    AAOX3  MAEx4 with good strength   PERRL, EOMI  Incision C/D/I    MEDS:   acetaminophen   Tablet .. 650 milliGRAM(s) Oral every 6 hours PRN  dexamethasone  Injectable 4 milliGRAM(s) IV Push every 6 hours  diazepam    Tablet 5 milliGRAM(s) Oral every 8 hours PRN  docusate sodium 100 milliGRAM(s) Oral three times a day  enoxaparin Injectable 40 milliGRAM(s) SubCutaneous daily  influenza   Vaccine 0.5 milliLiter(s) IntraMuscular once  oxyCODONE    IR 10 milliGRAM(s) Oral every 4 hours PRN  pantoprazole  Injectable 40 milliGRAM(s) IV Push daily  senna 2 Tablet(s) Oral at bedtime      LABS:  [id number] @ 02:50                        12.9   11.54 )-----------( 167      ( 23 Apr 2019 03:35 )             39.3     04-23    139  |  103  |  18  ----------------------------<  107<H>  4.4   |  26  |  0.79    Ca    9.0      23 Apr 2019 03:35  Phos  3.5     04-23  Mg     2.2     04-23  TPro  6.1  /  Alb  3.7  /  TBili  0.3  /  DBili  x   /  AST  17  /  ALT  14  /  AlkPhos  44  04-22  PT/INR - ( 22 Apr 2019 16:45 )   PT: 12.0 SEC;   INR: 1.05     PTT - ( 22 Apr 2019 16:45 )  PTT:22.0 SEC

## 2019-04-24 NOTE — CHART NOTE - NSCHARTNOTEFT_GEN_A_CORE
I spoke with KORINA Cooper- patient is s/p resection of a solitary brain met earlier this week- He will need outpatient radiosurgery consultation at [facility] , Department of Radiation Medicine () with Dr Bryan Murphy, dr Spangler or Hieu...  thanks, dr Diaz  [phone]

## 2019-04-25 ENCOUNTER — TRANSCRIPTION ENCOUNTER (OUTPATIENT)
Age: 54
End: 2019-04-25

## 2019-04-25 LAB
ANION GAP SERPL CALC-SCNC: 12 MMO/L — SIGNIFICANT CHANGE UP (ref 7–14)
BUN SERPL-MCNC: 29 MG/DL — HIGH (ref 7–23)
CALCIUM SERPL-MCNC: 8.9 MG/DL — SIGNIFICANT CHANGE UP (ref 8.4–10.5)
CHLORIDE SERPL-SCNC: 102 MMOL/L — SIGNIFICANT CHANGE UP (ref 98–107)
CO2 SERPL-SCNC: 21 MMOL/L — LOW (ref 22–31)
CREAT SERPL-MCNC: 0.81 MG/DL — SIGNIFICANT CHANGE UP (ref 0.5–1.3)
GLUCOSE SERPL-MCNC: 137 MG/DL — HIGH (ref 70–99)
HCT VFR BLD CALC: 40.3 % — SIGNIFICANT CHANGE UP (ref 39–50)
HGB BLD-MCNC: 13.2 G/DL — SIGNIFICANT CHANGE UP (ref 13–17)
MAGNESIUM SERPL-MCNC: 2.1 MG/DL — SIGNIFICANT CHANGE UP (ref 1.6–2.6)
MCHC RBC-ENTMCNC: 28.2 PG — SIGNIFICANT CHANGE UP (ref 27–34)
MCHC RBC-ENTMCNC: 32.8 % — SIGNIFICANT CHANGE UP (ref 32–36)
MCV RBC AUTO: 86.1 FL — SIGNIFICANT CHANGE UP (ref 80–100)
NRBC # FLD: 0 K/UL — SIGNIFICANT CHANGE UP (ref 0–0)
PHOSPHATE SERPL-MCNC: 2.7 MG/DL — SIGNIFICANT CHANGE UP (ref 2.5–4.5)
PLATELET # BLD AUTO: 191 K/UL — SIGNIFICANT CHANGE UP (ref 150–400)
PMV BLD: 10.2 FL — SIGNIFICANT CHANGE UP (ref 7–13)
POTASSIUM SERPL-MCNC: 4.5 MMOL/L — SIGNIFICANT CHANGE UP (ref 3.5–5.3)
POTASSIUM SERPL-SCNC: 4.5 MMOL/L — SIGNIFICANT CHANGE UP (ref 3.5–5.3)
RBC # BLD: 4.68 M/UL — SIGNIFICANT CHANGE UP (ref 4.2–5.8)
RBC # FLD: 16.3 % — HIGH (ref 10.3–14.5)
SODIUM SERPL-SCNC: 135 MMOL/L — SIGNIFICANT CHANGE UP (ref 135–145)
WBC # BLD: 21.53 K/UL — HIGH (ref 3.8–10.5)
WBC # FLD AUTO: 21.53 K/UL — HIGH (ref 3.8–10.5)

## 2019-04-25 RX ORDER — PANTOPRAZOLE SODIUM 20 MG/1
1 TABLET, DELAYED RELEASE ORAL
Qty: 7 | Refills: 0 | OUTPATIENT
Start: 2019-04-25 | End: 2019-05-01

## 2019-04-25 RX ORDER — DEXAMETHASONE 0.5 MG/5ML
4 ELIXIR ORAL
Qty: 34 | Refills: 0 | OUTPATIENT
Start: 2019-04-25

## 2019-04-25 RX ORDER — POLYETHYLENE GLYCOL 3350 17 G/17G
17 POWDER, FOR SOLUTION ORAL DAILY
Qty: 0 | Refills: 0 | Status: DISCONTINUED | OUTPATIENT
Start: 2019-04-25 | End: 2019-04-26

## 2019-04-25 RX ORDER — OXYCODONE HYDROCHLORIDE 5 MG/1
1 TABLET ORAL
Qty: 18 | Refills: 0 | OUTPATIENT
Start: 2019-04-25 | End: 2019-04-27

## 2019-04-25 RX ORDER — POLYETHYLENE GLYCOL 3350 17 G/17G
17 POWDER, FOR SOLUTION ORAL
Qty: 0 | Refills: 0 | COMMUNITY
Start: 2019-04-25

## 2019-04-25 RX ORDER — ASPIRIN/CALCIUM CARB/MAGNESIUM 324 MG
1 TABLET ORAL
Qty: 0 | Refills: 0 | COMMUNITY

## 2019-04-25 RX ORDER — LANOLIN ALCOHOL/MO/W.PET/CERES
9 CREAM (GRAM) TOPICAL AT BEDTIME
Qty: 0 | Refills: 0 | Status: DISCONTINUED | OUTPATIENT
Start: 2019-04-25 | End: 2019-04-26

## 2019-04-25 RX ORDER — LANOLIN ALCOHOL/MO/W.PET/CERES
3 CREAM (GRAM) TOPICAL AT BEDTIME
Qty: 0 | Refills: 0 | Status: DISCONTINUED | OUTPATIENT
Start: 2019-04-25 | End: 2019-04-25

## 2019-04-25 RX ORDER — MULTIVIT WITH MIN/MFOLATE/K2 340-15/3 G
1 POWDER (GRAM) ORAL ONCE
Qty: 0 | Refills: 0 | Status: COMPLETED | OUTPATIENT
Start: 2019-04-25 | End: 2019-04-25

## 2019-04-25 RX ORDER — DEXAMETHASONE 0.5 MG/5ML
1 ELIXIR ORAL
Qty: 0 | Refills: 0 | COMMUNITY

## 2019-04-25 RX ORDER — DIAZEPAM 5 MG
1 TABLET ORAL
Qty: 9 | Refills: 0 | OUTPATIENT
Start: 2019-04-25 | End: 2019-04-27

## 2019-04-25 RX ORDER — ACETAMINOPHEN 500 MG
2 TABLET ORAL
Qty: 0 | Refills: 0 | DISCHARGE
Start: 2019-04-25

## 2019-04-25 RX ORDER — SODIUM CHLORIDE 9 MG/ML
1000 INJECTION INTRAMUSCULAR; INTRAVENOUS; SUBCUTANEOUS
Qty: 0 | Refills: 0 | Status: DISCONTINUED | OUTPATIENT
Start: 2019-04-25 | End: 2019-04-26

## 2019-04-25 RX ORDER — SENNA PLUS 8.6 MG/1
2 TABLET ORAL
Qty: 0 | Refills: 0 | COMMUNITY
Start: 2019-04-25

## 2019-04-25 RX ORDER — POLYETHYLENE GLYCOL 3350 17 G/17G
17 POWDER, FOR SOLUTION ORAL
Qty: 0 | Refills: 0 | DISCHARGE
Start: 2019-04-25

## 2019-04-25 RX ORDER — ACETAMINOPHEN 500 MG
1000 TABLET ORAL ONCE
Qty: 0 | Refills: 0 | Status: COMPLETED | OUTPATIENT
Start: 2019-04-25 | End: 2019-04-25

## 2019-04-25 RX ORDER — DOCUSATE SODIUM 100 MG
1 CAPSULE ORAL
Qty: 0 | Refills: 0 | DISCHARGE
Start: 2019-04-25

## 2019-04-25 RX ORDER — MULTIVIT WITH MIN/MFOLATE/K2 340-15/3 G
1 POWDER (GRAM) ORAL ONCE
Qty: 0 | Refills: 0 | Status: DISCONTINUED | OUTPATIENT
Start: 2019-04-25 | End: 2019-04-26

## 2019-04-25 RX ADMIN — Medication 9 MILLIGRAM(S): at 22:04

## 2019-04-25 RX ADMIN — Medication 4 MILLIGRAM(S): at 18:31

## 2019-04-25 RX ADMIN — Medication 4 MILLIGRAM(S): at 23:34

## 2019-04-25 RX ADMIN — Medication 5 MILLIGRAM(S): at 00:31

## 2019-04-25 RX ADMIN — SODIUM CHLORIDE 50 MILLILITER(S): 9 INJECTION INTRAMUSCULAR; INTRAVENOUS; SUBCUTANEOUS at 18:31

## 2019-04-25 RX ADMIN — Medication 1 BOTTLE: at 04:00

## 2019-04-25 RX ADMIN — Medication 4 MILLIGRAM(S): at 13:56

## 2019-04-25 RX ADMIN — Medication 3 MILLIGRAM(S): at 03:06

## 2019-04-25 RX ADMIN — SENNA PLUS 2 TABLET(S): 8.6 TABLET ORAL at 22:03

## 2019-04-25 RX ADMIN — Medication 100 MILLIGRAM(S): at 13:57

## 2019-04-25 RX ADMIN — PANTOPRAZOLE SODIUM 40 MILLIGRAM(S): 20 TABLET, DELAYED RELEASE ORAL at 13:57

## 2019-04-25 RX ADMIN — Medication 4 MILLIGRAM(S): at 00:31

## 2019-04-25 RX ADMIN — ENOXAPARIN SODIUM 40 MILLIGRAM(S): 100 INJECTION SUBCUTANEOUS at 13:57

## 2019-04-25 RX ADMIN — Medication 100 MILLIGRAM(S): at 22:03

## 2019-04-25 RX ADMIN — OXYCODONE HYDROCHLORIDE 10 MILLIGRAM(S): 5 TABLET ORAL at 04:36

## 2019-04-25 RX ADMIN — Medication 4 MILLIGRAM(S): at 05:58

## 2019-04-25 RX ADMIN — Medication 100 MILLIGRAM(S): at 05:58

## 2019-04-25 RX ADMIN — Medication 400 MILLIGRAM(S): at 22:04

## 2019-04-25 RX ADMIN — Medication 1000 MILLIGRAM(S): at 22:34

## 2019-04-25 RX ADMIN — OXYCODONE HYDROCHLORIDE 10 MILLIGRAM(S): 5 TABLET ORAL at 04:06

## 2019-04-25 NOTE — DISCHARGE NOTE PROVIDER - CARE PROVIDERS DIRECT ADDRESSES
[email or web address]

## 2019-04-25 NOTE — DISCHARGE NOTE NURSING/CASE MANAGEMENT/SOCIAL WORK - NSDCPNDISPN_GEN_ALL_CORE
Side effects of pain management treatment/Education provided on the pain management plan of care/Activities of daily living, including home environment that might     exacerbate pain or reduce effectiveness of the pain management plan of care as well as strategies to address these issues Education provided on the pain management plan of care/Safe use, storage and disposal of opioids when prescribed/Side effects of pain management treatment/Activities of daily living, including home environment that might     exacerbate pain or reduce effectiveness of the pain management plan of care as well as strategies to address these issues

## 2019-04-25 NOTE — PROGRESS NOTE ADULT - SUBJECTIVE AND OBJECTIVE BOX
NEUROSURGERY NOTE  SYLVIA VICTOR   04-25-19 @ 02:16    PAST 24HR EVENTS: no issues overnight, pt stable post op day #3 s/p SOC for resection of tumor      PHYSICAL EXAM:  Vital Signs Last 24 Hrs  T(C): 36.4 (25 Apr 2019 01:35), Max: 36.8 (24 Apr 2019 08:00)  T(F): 97.5 (25 Apr 2019 01:35), Max: 98.3 (24 Apr 2019 08:00)  HR: 96 (25 Apr 2019 01:35) (90 - 109)  BP: 106/87 (25 Apr 2019 01:35) (106/87 - 134/88)  BP(mean): 91 (24 Apr 2019 15:00) (91 - 98)  RR: 18 (25 Apr 2019 01:35) (16 - 24)  SpO2: 99% (25 Apr 2019 01:35) (96% - 99%)    AAOX3  MAEx4 with good strength   PERRL, EOMI  Incision C/D/I    MEDS:   acetaminophen   Tablet .. 650 milliGRAM(s) Oral every 6 hours PRN  dexamethasone  Injectable 4 milliGRAM(s) IV Push every 6 hours  diazepam    Tablet 5 milliGRAM(s) Oral every 8 hours PRN  docusate sodium 100 milliGRAM(s) Oral three times a day  enoxaparin Injectable 40 milliGRAM(s) SubCutaneous daily  influenza   Vaccine 0.5 milliLiter(s) IntraMuscular once  oxyCODONE    IR 10 milliGRAM(s) Oral every 4 hours PRN  pantoprazole  Injectable 40 milliGRAM(s) IV Push daily  senna 2 Tablet(s) Oral at bedtime      LABS:  [id number] @ 02:16                        14.2   12.97 )-----------( 154      ( 24 Apr 2019 06:00 )             43.9     04-24    135  |  100  |  21  ----------------------------<  117<H>  4.5   |  24  |  0.81    Ca    9.4      24 Apr 2019 06:00  Phos  3.5     04-23  Mg     2.2     04-23

## 2019-04-25 NOTE — DISCHARGE NOTE PROVIDER - CARE PROVIDER_API CALL
Rosaura Rollins)  [facility] Neurosurgery  General  [address], [address]  [place] [address]  Phone: [phone]  Fax: [phone]  Follow Up Time: 1 week Rosaura Rollins)  [facility] Neurosurgery  General  [address], [address]  [place] [address]  Phone: [phone]  Fax: [phone]  Follow Up Time: 1 week    Bryan Murphy)  Radiation Oncology  [address] [place] [address]  [place] [address]  Phone: [phone]  Fax: [phone]  Follow Up Time: 2 weeks

## 2019-04-25 NOTE — PROGRESS NOTE ADULT - ATTENDING COMMENTS
53M pmhx CAD with stent (2015), esophageal ca s/p sudheer, who is now s/p left suboccipital craniotomy for a metastatic lesion, recovering appropriately.    PLAN:   Neuro:  - q4 hr neuro checks  -Decadron  - Pain control prn with tylenol & oxycodone, valium   - CT head performed 6 hrs post-op, stable  - MR head stable as well    Pulm:  - No active issues, on room air  - Maintain O2 sat > 92%  - Incentive spirometry    CV:  - Continuous hemodynamic monitoring, keep MAP >65    GI/Nutrition:  - Regular diet  - Senna/colace  - Protonix    /Renal:  - IV locked  - Strict I&Os    ID:  afebrile    Heme:  - No active issues  - HSQ     Endocrine:  - Decadron 4mg q6hr per neurosurgery    Dispo  - full code  -Floor  The patient is not a critical care patient with no life threatening hemodynamic and metabolic instability in SICU.  I have personally interviewed when possible and examined the patient, reviewed data and laboratory tests/x-rays and all pertinent electronic images.  I was physically present for the key portions of the evaluation and management (E/M) service provided.   The SICU team has a constant risk benefit analyzes discussion with the primary team, all consultants, House Staff and PA's on all decisions.  These diagnoses are unrelated to the surgical procedure noted above.  I meet with family if needed to get further history, discuss the case and make care decisions for this patient who might not be able to participate.  Time involved in performance of separately billable procedures was not counted toward my critical care time. There is no overlap.  I spent 55-75 minutes of critical care time for the diagnoses, assessment, plan and interventions.
Patient seen and examined. Agree with the above assessment and plan.
Patient seen and examined. Agree with the above assessment and plan.
53M pmhx CAD with stent (2015), esophageal ca s/p sudheer, who is now s/p left suboccipital craniotomy for a metastatic lesion, recovering appropriately.    PLAN:   Neuro:  - q2 hr neuro checks; will downgrade when ok with neurosurg  - Pain control prn with tylenol & oxycodon  - CT head performed 6 hrs post-op, stable  - MR head in AM    Pulm:  - No active issues, on room air  - Maintain O2 sat > 92%    CV:  - Continuous hemodynamic monitoring, keep MAP >65    GI/Nutrition:  - Regular diet  - Senna/colace  - Protonix    /Renal:  - IV locked  - Strict I&Os    ID:  - Ingrid-op ancef x24 hrs  - Monitor for signs of infection    Heme:  - No active issues  - HSQ     Endocrine:  - Decadron 4mg q6hr per neurosurgery    Dispo  - full code  -Floor    CCDx. respiratory failure.  The patient is a critical care patient with life threatening hemodynamic and metabolic instability in SICU.  I have personally interviewed when possible and examined the patient, reviewed data and laboratory tests/x-rays and all pertinent electronic images.  I was physically present for the key portions of the evaluation and management (E/M) service provided.   The SICU team has a constant risk benefit analyzes discussion with the primary team, all consultants, House Staff and PA's on all decisions.  These diagnoses are unrelated to the surgical procedure noted above.  I meet with family if needed to get further history, discuss the case and make care decisions for this patient who might not be able to participate.  Time involved in performance of separately billable procedures was not counted toward my critical care time. There is no overlap.  I spent 55-75 minutes of critical care time for the diagnoses, assessment, plan and interventions.
Patient seen and examined. Agree with the above assessment and plan.

## 2019-04-25 NOTE — DISCHARGE NOTE NURSING/CASE MANAGEMENT/SOCIAL WORK - NSDCPECAREGIVERED_GEN_ALL_CORE
patient education regarding medications including side effects (oxycodone, diazepam,dexamethasone) given to patient with printed hand out information/Yes

## 2019-04-25 NOTE — DISCHARGE NOTE PROVIDER - PROVIDER TOKENS
PROVIDER:[TOKEN:[[id number]],FOLLOWUP:[1 week]] PROVIDER:[TOKEN:[[id number]],FOLLOWUP:[1 week]],PROVIDER:[TOKEN:[[id number]],FOLLOWUP:[2 weeks]]

## 2019-04-25 NOTE — DISCHARGE NOTE PROVIDER - NSDCCPCAREPLAN_GEN_ALL_CORE_FT
PRINCIPAL DISCHARGE DIAGNOSIS  Diagnosis: Secondary malignant neoplasm of brain  Assessment and Plan of Treatment: PRINCIPAL DISCHARGE DIAGNOSIS  Diagnosis: Secondary malignant neoplasm of brain  Assessment and Plan of Treatment: TO recover from surgery.   Call to schedule follow up with Dr. Rollins for next week where she will remove your surgical staples.   May take regular showers and get incision wet. Use shampoo as you would normally. Pat incision area dry with clean towel   Call to schedule follow up with Dr. Murphy from Radiation oncology within 2 weeks

## 2019-04-25 NOTE — DISCHARGE NOTE PROVIDER - HOSPITAL COURSE
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    He was taken to the OR on 4/22 underwent a L SOC and resection of tumor. Patient tolerated procedure well and post op pain was well controlled. He was evaluated by PT/OT who deemed he     had no needs. He has been ambulating around the unit and is stable for discharge home today. 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    He was taken to the OR on 4/22 underwent a L SOC and resection of tumor. Patient tolerated procedure well and post op pain was well controlled. He was evaluated by PT/OT who deemed he     had no needs. He has been ambulating around the unit and is stable for discharge home today 4/25. 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    He was taken to the OR on 4/22 underwent a L SOC and resection of tumor. Patient tolerated procedure well and post op pain was well controlled. He was evaluated by PT/OT who deemed he     had no needs. He has been ambulating around the unit and is stable for discharge home today 4/25 discharged 4/26

## 2019-04-25 NOTE — DISCHARGE NOTE NURSING/CASE MANAGEMENT/SOCIAL WORK - NSDCDPATPORTLINK_GEN_ALL_CORE
You can access the [facility] Portal, offered by [facility], by registering with the following website: [email or web address]

## 2019-04-25 NOTE — DISCHARGE NOTE NURSING/CASE MANAGEMENT/SOCIAL WORK - NSDCPNINST_GEN_ALL_CORE
follow up with MD as per instructions. call MD with fever, pain unrelieved with pain meds, nausea, vomiting follow up with MD as per instructions. call MD with fever, pain unrelieved with pain meds, nausea, vomiting, or any changes at incision site , or oozing from the site. keep the incision site dry and intact

## 2019-04-25 NOTE — DISCHARGE NOTE PROVIDER - NSDCFUADDINST_GEN_ALL_CORE_FT
1. Remove top surgical dressing on post operative day 3 unless it was removed by the surgical team prior to your discharge. Incision should be left uncovered after day 3.   2. Begin showering with shampoo on post operative day 4. Avoid long soaks and do not submerge incision in bathtub. Regular shower only and allow soap and water to run over the incision. Pat incision area dry with clean towel- do not scrub. Please shower regularly to ensure incision stays clean to avoid post operative infections.   3. Notify your surgeon if you notice increased redness, drainage or you notice incision area opening.   4. Return to ER immediately for high fevers, severe headache, vomiting, lethargy or  weakness  5. Please call your neurosurgeon following discharge to make follow up appointment in 1 week after discharge unless otherwise specified. See Contact information below.   6. Prescription Post operative medication, if applicable, are sent to [facility] (unless another pharmacy specified)- [facility] is located in [facility] [email or web address]. All post operative prescrptions should be picked up before departing the hospital.  7. Ambulate as tolerate. Continue with all "activities of daily living." Avoid strenuous activity or lifting more than 10 pounds until cleared for additional activity at your follow up appointment.  8. Do not return to work or school until cleared by your neurosurgeon at your follow up visit unless specified to you during your hospital stay

## 2019-04-26 VITALS
SYSTOLIC BLOOD PRESSURE: 132 MMHG | OXYGEN SATURATION: 98 % | HEART RATE: 82 BPM | DIASTOLIC BLOOD PRESSURE: 96 MMHG | TEMPERATURE: 98 F

## 2019-04-26 LAB
APPEARANCE UR: CLEAR — SIGNIFICANT CHANGE UP
BILIRUB UR-MCNC: NEGATIVE — SIGNIFICANT CHANGE UP
BLOOD UR QL VISUAL: NEGATIVE — SIGNIFICANT CHANGE UP
COLOR SPEC: SIGNIFICANT CHANGE UP
GLUCOSE UR-MCNC: NEGATIVE — SIGNIFICANT CHANGE UP
KETONES UR-MCNC: NEGATIVE — SIGNIFICANT CHANGE UP
LEUKOCYTE ESTERASE UR-ACNC: NEGATIVE — SIGNIFICANT CHANGE UP
NITRITE UR-MCNC: NEGATIVE — SIGNIFICANT CHANGE UP
PH UR: 7.5 — SIGNIFICANT CHANGE UP (ref 5–8)
PROT UR-MCNC: 10 — SIGNIFICANT CHANGE UP
SP GR SPEC: 1.02 — SIGNIFICANT CHANGE UP (ref 1–1.04)
UROBILINOGEN FLD QL: NORMAL — SIGNIFICANT CHANGE UP

## 2019-04-26 RX ORDER — MULTIVIT WITH MIN/MFOLATE/K2 340-15/3 G
1 POWDER (GRAM) ORAL ONCE
Qty: 0 | Refills: 0 | Status: COMPLETED | OUTPATIENT
Start: 2019-04-26 | End: 2019-04-26

## 2019-04-26 RX ADMIN — PANTOPRAZOLE SODIUM 40 MILLIGRAM(S): 20 TABLET, DELAYED RELEASE ORAL at 11:51

## 2019-04-26 RX ADMIN — SODIUM CHLORIDE 50 MILLILITER(S): 9 INJECTION INTRAMUSCULAR; INTRAVENOUS; SUBCUTANEOUS at 03:49

## 2019-04-26 RX ADMIN — POLYETHYLENE GLYCOL 3350 17 GRAM(S): 17 POWDER, FOR SOLUTION ORAL at 11:50

## 2019-04-26 RX ADMIN — Medication 4 MILLIGRAM(S): at 11:51

## 2019-04-26 RX ADMIN — Medication 1 BOTTLE: at 04:26

## 2019-04-26 RX ADMIN — Medication 4 MILLIGRAM(S): at 06:29

## 2019-04-26 RX ADMIN — ENOXAPARIN SODIUM 40 MILLIGRAM(S): 100 INJECTION SUBCUTANEOUS at 11:50

## 2019-04-26 RX ADMIN — Medication 100 MILLIGRAM(S): at 14:26

## 2019-04-26 NOTE — PROGRESS NOTE ADULT - PROBLEM SELECTOR PLAN 1
1. can go to floor   2. d/c planning  3. pain control   4. rad onc consult   5. [facility] today   Case discussed with attending neurosurgeon.
1. d/c home today   2. morning UA per patient's request   Case discussed with attending neurosurgeon.
Continue to monitor overnight  May have valium for anxiety  Postop MRI ordered  Likely will transfer to floor in AM
Hold discharge until today  Place patient on protected sleep time and prescribe requested melatonin  Discharge home today

## 2019-04-26 NOTE — PROGRESS NOTE ADULT - SUBJECTIVE AND OBJECTIVE BOX
Patient not discharged yesterday as planned  Patient states that due to headache and lack of sleep that he did not feel ready for discharge and that he would have been unsafe  No issues overnight  Vital Signs Last 24 Hrs  T(C): 36.8 (25 Apr 2019 22:00), Max: 37 (25 Apr 2019 17:41)  T(F): 98.3 (25 Apr 2019 22:00), Max: 98.6 (25 Apr 2019 17:41)  HR: 87 (25 Apr 2019 22:00) (87 - 106)  BP: 124/86 (25 Apr 2019 22:00) (101/78 - 124/86)  BP(mean): --  RR: 17 (25 Apr 2019 22:00) (16 - 20)  SpO2: 100% (25 Apr 2019 22:00) (97% - 100%)    AAO X 3  PERRLA, EOMI  CN 2-12 grossly intact   CASH strength 5/5    MEDICATIONS  (STANDING):  dexamethasone  Injectable 4 milliGRAM(s) IV Push every 6 hours  docusate sodium 100 milliGRAM(s) Oral three times a day  enoxaparin Injectable 40 milliGRAM(s) SubCutaneous daily  influenza   Vaccine 0.5 milliLiter(s) IntraMuscular once  melatonin 9 milliGRAM(s) Oral at bedtime  pantoprazole  Injectable 40 milliGRAM(s) IV Push daily  polyethylene glycol 3350 17 Gram(s) Oral daily  senna 2 Tablet(s) Oral at bedtime  sodium chloride 0.9%. 1000 milliLiter(s) (50 mL/Hr) IV Continuous <Continuous>    MEDICATIONS  (PRN):  acetaminophen   Tablet .. 650 milliGRAM(s) Oral every 6 hours PRN Temp greater or equal to 38C (100.4F), Mild Pain (1 - 3)  diazepam    Tablet 5 milliGRAM(s) Oral every 8 hours PRN Anxiety, muscle spasm  magnesium citrate Oral Solution 1 Bottle Oral once PRN Constipation  oxyCODONE    IR 10 milliGRAM(s) Oral every 4 hours PRN Moderate Pain (4 - 6)                          13.2   21.53 )-----------( 191      ( 25 Apr 2019 06:23 )             40.3     04-25    135  |  102  |  29<H>  ----------------------------<  137<H>  4.5   |  21<L>  |  0.81    Ca    8.9      25 Apr 2019 06:23  Phos  2.7     04-25  Mg     2.1     04-25

## 2019-04-30 ENCOUNTER — APPOINTMENT (OUTPATIENT)
Dept: RADIATION ONCOLOGY | Facility: CLINIC | Age: 54
End: 2019-04-30
Payer: COMMERCIAL

## 2019-04-30 ENCOUNTER — INPATIENT (INPATIENT)
Facility: HOSPITAL | Age: 54
LOS: 2 days | Discharge: PSYCHIATRIC OR SUBSTANCE USE FACILITY | DRG: 885 | End: 2019-05-03
Attending: INTERNAL MEDICINE | Admitting: INTERNAL MEDICINE
Payer: COMMERCIAL

## 2019-04-30 VITALS
SYSTOLIC BLOOD PRESSURE: 127 MMHG | TEMPERATURE: 98 F | RESPIRATION RATE: 18 BRPM | OXYGEN SATURATION: 98 % | DIASTOLIC BLOOD PRESSURE: 89 MMHG | HEART RATE: 122 BPM

## 2019-04-30 DIAGNOSIS — Z29.9 ENCOUNTER FOR PROPHYLACTIC MEASURES, UNSPECIFIED: ICD-10-CM

## 2019-04-30 DIAGNOSIS — Z98.890 OTHER SPECIFIED POSTPROCEDURAL STATES: Chronic | ICD-10-CM

## 2019-04-30 DIAGNOSIS — Z90.89 ACQUIRED ABSENCE OF OTHER ORGANS: Chronic | ICD-10-CM

## 2019-04-30 DIAGNOSIS — R41.82 ALTERED MENTAL STATUS, UNSPECIFIED: ICD-10-CM

## 2019-04-30 DIAGNOSIS — I25.10 ATHEROSCLEROTIC HEART DISEASE OF NATIVE CORONARY ARTERY WITHOUT ANGINA PECTORIS: ICD-10-CM

## 2019-04-30 DIAGNOSIS — C15.9 MALIGNANT NEOPLASM OF ESOPHAGUS, UNSPECIFIED: ICD-10-CM

## 2019-04-30 DIAGNOSIS — F19.94 OTHER PSYCHOACTIVE SUBSTANCE USE, UNSPECIFIED WITH PSYCHOACTIVE SUBSTANCE-INDUCED MOOD DISORDER: ICD-10-CM

## 2019-04-30 DIAGNOSIS — C79.31 SECONDARY MALIGNANT NEOPLASM OF BRAIN: ICD-10-CM

## 2019-04-30 DIAGNOSIS — Z95.5 PRESENCE OF CORONARY ANGIOPLASTY IMPLANT AND GRAFT: Chronic | ICD-10-CM

## 2019-04-30 LAB
ALBUMIN SERPL ELPH-MCNC: 3.2 G/DL — LOW (ref 3.3–5)
ALP SERPL-CCNC: 60 U/L — SIGNIFICANT CHANGE UP (ref 40–120)
ALT FLD-CCNC: 22 U/L — SIGNIFICANT CHANGE UP (ref 10–45)
ANION GAP SERPL CALC-SCNC: 13 MMOL/L — SIGNIFICANT CHANGE UP (ref 5–17)
APAP SERPL-MCNC: <15 UG/ML — SIGNIFICANT CHANGE UP (ref 10–30)
APPEARANCE UR: CLEAR — SIGNIFICANT CHANGE UP
APTT BLD: 25.3 SEC — LOW (ref 27.5–36.3)
AST SERPL-CCNC: 22 U/L — SIGNIFICANT CHANGE UP (ref 10–40)
BASOPHILS # BLD AUTO: 0.1 K/UL — SIGNIFICANT CHANGE UP (ref 0–0.2)
BASOPHILS NFR BLD AUTO: 0.7 % — SIGNIFICANT CHANGE UP (ref 0–2)
BILIRUB SERPL-MCNC: 0.1 MG/DL — LOW (ref 0.2–1.2)
BILIRUB UR-MCNC: NEGATIVE — SIGNIFICANT CHANGE UP
BUN SERPL-MCNC: 17 MG/DL — SIGNIFICANT CHANGE UP (ref 7–23)
CALCIUM SERPL-MCNC: 8.6 MG/DL — SIGNIFICANT CHANGE UP (ref 8.4–10.5)
CHLORIDE SERPL-SCNC: 98 MMOL/L — SIGNIFICANT CHANGE UP (ref 96–108)
CO2 SERPL-SCNC: 22 MMOL/L — SIGNIFICANT CHANGE UP (ref 22–31)
COLOR SPEC: SIGNIFICANT CHANGE UP
CREAT SERPL-MCNC: 0.82 MG/DL — SIGNIFICANT CHANGE UP (ref 0.5–1.3)
DIFF PNL FLD: NEGATIVE — SIGNIFICANT CHANGE UP
EOSINOPHIL # BLD AUTO: 0 K/UL — SIGNIFICANT CHANGE UP (ref 0–0.5)
EOSINOPHIL NFR BLD AUTO: 0.5 % — SIGNIFICANT CHANGE UP (ref 0–6)
ETHANOL SERPL-MCNC: SIGNIFICANT CHANGE UP MG/DL (ref 0–10)
GLUCOSE SERPL-MCNC: 101 MG/DL — HIGH (ref 70–99)
GLUCOSE UR QL: NEGATIVE — SIGNIFICANT CHANGE UP
HCT VFR BLD CALC: 34.5 % — LOW (ref 39–50)
HGB BLD-MCNC: 11.8 G/DL — LOW (ref 13–17)
INR BLD: 1.13 RATIO — SIGNIFICANT CHANGE UP (ref 0.88–1.16)
KETONES UR-MCNC: NEGATIVE — SIGNIFICANT CHANGE UP
LEUKOCYTE ESTERASE UR-ACNC: NEGATIVE — SIGNIFICANT CHANGE UP
LYMPHOCYTES # BLD AUTO: 0.9 K/UL — LOW (ref 1–3.3)
LYMPHOCYTES # BLD AUTO: 8.7 % — LOW (ref 13–44)
MCHC RBC-ENTMCNC: 30.7 PG — SIGNIFICANT CHANGE UP (ref 27–34)
MCHC RBC-ENTMCNC: 34.1 GM/DL — SIGNIFICANT CHANGE UP (ref 32–36)
MCV RBC AUTO: 90.1 FL — SIGNIFICANT CHANGE UP (ref 80–100)
MONOCYTES # BLD AUTO: 1 K/UL — HIGH (ref 0–0.9)
MONOCYTES NFR BLD AUTO: 9.8 % — SIGNIFICANT CHANGE UP (ref 2–14)
NEUTROPHILS # BLD AUTO: 8 K/UL — HIGH (ref 1.8–7.4)
NEUTROPHILS NFR BLD AUTO: 80.4 % — HIGH (ref 43–77)
NITRITE UR-MCNC: NEGATIVE — SIGNIFICANT CHANGE UP
PCP SPEC-MCNC: SIGNIFICANT CHANGE UP
PH UR: 6 — SIGNIFICANT CHANGE UP (ref 5–8)
PLATELET # BLD AUTO: 209 K/UL — SIGNIFICANT CHANGE UP (ref 150–400)
POTASSIUM SERPL-MCNC: 4.2 MMOL/L — SIGNIFICANT CHANGE UP (ref 3.5–5.3)
POTASSIUM SERPL-SCNC: 4.2 MMOL/L — SIGNIFICANT CHANGE UP (ref 3.5–5.3)
PROT SERPL-MCNC: 6.1 G/DL — SIGNIFICANT CHANGE UP (ref 6–8.3)
PROT UR-MCNC: NEGATIVE — SIGNIFICANT CHANGE UP
PROTHROM AB SERPL-ACNC: 13.1 SEC — HIGH (ref 10–12.9)
RBC # BLD: 3.83 M/UL — LOW (ref 4.2–5.8)
RBC # FLD: 15.3 % — HIGH (ref 10.3–14.5)
SALICYLATES SERPL-MCNC: <2 MG/DL — LOW (ref 15–30)
SODIUM SERPL-SCNC: 133 MMOL/L — LOW (ref 135–145)
SP GR SPEC: 1.01 — SIGNIFICANT CHANGE UP (ref 1.01–1.02)
TSH SERPL-MCNC: 3.38 UIU/ML — SIGNIFICANT CHANGE UP (ref 0.27–4.2)
UROBILINOGEN FLD QL: NEGATIVE — SIGNIFICANT CHANGE UP
WBC # BLD: 9.9 K/UL — SIGNIFICANT CHANGE UP (ref 3.8–10.5)
WBC # FLD AUTO: 9.9 K/UL — SIGNIFICANT CHANGE UP (ref 3.8–10.5)

## 2019-04-30 PROCEDURE — 93010 ELECTROCARDIOGRAM REPORT: CPT | Mod: NC

## 2019-04-30 PROCEDURE — 99223 1ST HOSP IP/OBS HIGH 75: CPT

## 2019-04-30 PROCEDURE — 71045 X-RAY EXAM CHEST 1 VIEW: CPT | Mod: 26

## 2019-04-30 PROCEDURE — 99285 EMERGENCY DEPT VISIT HI MDM: CPT | Mod: 25

## 2019-04-30 PROCEDURE — 70450 CT HEAD/BRAIN W/O DYE: CPT | Mod: 26

## 2019-04-30 RX ORDER — DEXAMETHASONE 0.5 MG/5ML
1 ELIXIR ORAL DAILY
Qty: 0 | Refills: 0 | Status: COMPLETED | OUTPATIENT
Start: 2019-05-02 | End: 2019-05-02

## 2019-04-30 RX ORDER — RISPERIDONE 4 MG/1
1 TABLET ORAL AT BEDTIME
Qty: 0 | Refills: 0 | Status: COMPLETED | OUTPATIENT
Start: 2019-04-30 | End: 2019-04-30

## 2019-04-30 RX ORDER — ACETAMINOPHEN 500 MG
650 TABLET ORAL EVERY 6 HOURS
Qty: 0 | Refills: 0 | Status: DISCONTINUED | OUTPATIENT
Start: 2019-04-30 | End: 2019-05-03

## 2019-04-30 RX ORDER — DEXAMETHASONE 0.5 MG/5ML
2 ELIXIR ORAL
Qty: 0 | Refills: 0 | Status: COMPLETED | OUTPATIENT
Start: 2019-04-30 | End: 2019-05-01

## 2019-04-30 RX ORDER — SODIUM CHLORIDE 9 MG/ML
1000 INJECTION, SOLUTION INTRAVENOUS ONCE
Qty: 0 | Refills: 0 | Status: COMPLETED | OUTPATIENT
Start: 2019-04-30 | End: 2019-04-30

## 2019-04-30 RX ORDER — HALOPERIDOL DECANOATE 100 MG/ML
5 INJECTION INTRAMUSCULAR ONCE
Qty: 0 | Refills: 0 | Status: COMPLETED | OUTPATIENT
Start: 2019-04-30 | End: 2019-04-30

## 2019-04-30 RX ORDER — MIDAZOLAM HYDROCHLORIDE 1 MG/ML
4 INJECTION, SOLUTION INTRAMUSCULAR; INTRAVENOUS ONCE
Qty: 0 | Refills: 0 | Status: DISCONTINUED | OUTPATIENT
Start: 2019-04-30 | End: 2019-04-30

## 2019-04-30 RX ORDER — VANCOMYCIN HCL 1 G
1000 VIAL (EA) INTRAVENOUS ONCE
Qty: 0 | Refills: 0 | Status: COMPLETED | OUTPATIENT
Start: 2019-04-30 | End: 2019-04-30

## 2019-04-30 RX ORDER — CEFEPIME 1 G/1
1000 INJECTION, POWDER, FOR SOLUTION INTRAMUSCULAR; INTRAVENOUS EVERY 12 HOURS
Qty: 0 | Refills: 0 | Status: DISCONTINUED | OUTPATIENT
Start: 2019-04-30 | End: 2019-05-03

## 2019-04-30 RX ORDER — DIVALPROEX SODIUM 500 MG/1
500 TABLET, DELAYED RELEASE ORAL
Qty: 0 | Refills: 0 | Status: DISCONTINUED | OUTPATIENT
Start: 2019-05-01 | End: 2019-05-01

## 2019-04-30 RX ORDER — DEXAMETHASONE 0.5 MG/5ML
1 ELIXIR ORAL
Qty: 0 | Refills: 0 | Status: COMPLETED | OUTPATIENT
Start: 2019-05-01 | End: 2019-05-01

## 2019-04-30 RX ORDER — DIVALPROEX SODIUM 500 MG/1
500 TABLET, DELAYED RELEASE ORAL DAILY
Qty: 0 | Refills: 0 | Status: DISCONTINUED | OUTPATIENT
Start: 2019-05-01 | End: 2019-05-03

## 2019-04-30 RX ORDER — PANTOPRAZOLE SODIUM 20 MG/1
40 TABLET, DELAYED RELEASE ORAL
Qty: 0 | Refills: 0 | Status: DISCONTINUED | OUTPATIENT
Start: 2019-04-30 | End: 2019-05-03

## 2019-04-30 RX ORDER — PREGABALIN 225 MG/1
1000 CAPSULE ORAL DAILY
Qty: 0 | Refills: 0 | Status: DISCONTINUED | OUTPATIENT
Start: 2019-04-30 | End: 2019-05-03

## 2019-04-30 RX ORDER — DIVALPROEX SODIUM 500 MG/1
500 TABLET, DELAYED RELEASE ORAL ONCE
Qty: 0 | Refills: 0 | Status: COMPLETED | OUTPATIENT
Start: 2019-04-30 | End: 2019-04-30

## 2019-04-30 RX ORDER — DOCUSATE SODIUM 100 MG
100 CAPSULE ORAL THREE TIMES A DAY
Qty: 0 | Refills: 0 | Status: DISCONTINUED | OUTPATIENT
Start: 2019-04-30 | End: 2019-05-03

## 2019-04-30 RX ORDER — RISPERIDONE 4 MG/1
1 TABLET ORAL
Qty: 0 | Refills: 0 | Status: DISCONTINUED | OUTPATIENT
Start: 2019-05-01 | End: 2019-05-02

## 2019-04-30 RX ADMIN — Medication 100 MILLIGRAM(S): at 22:25

## 2019-04-30 RX ADMIN — HALOPERIDOL DECANOATE 5 MILLIGRAM(S): 100 INJECTION INTRAMUSCULAR at 07:17

## 2019-04-30 RX ADMIN — RISPERIDONE 1 MILLIGRAM(S): 4 TABLET ORAL at 22:25

## 2019-04-30 RX ADMIN — SODIUM CHLORIDE 2000 MILLILITER(S): 9 INJECTION, SOLUTION INTRAVENOUS at 09:30

## 2019-04-30 RX ADMIN — SODIUM CHLORIDE 1000 MILLILITER(S): 9 INJECTION, SOLUTION INTRAVENOUS at 09:30

## 2019-04-30 RX ADMIN — CEFEPIME 100 MILLIGRAM(S): 1 INJECTION, POWDER, FOR SOLUTION INTRAMUSCULAR; INTRAVENOUS at 09:30

## 2019-04-30 RX ADMIN — Medication 650 MILLIGRAM(S): at 21:09

## 2019-04-30 RX ADMIN — MIDAZOLAM HYDROCHLORIDE 4 MILLIGRAM(S): 1 INJECTION, SOLUTION INTRAMUSCULAR; INTRAVENOUS at 07:15

## 2019-04-30 RX ADMIN — CEFEPIME 100 MILLIGRAM(S): 1 INJECTION, POWDER, FOR SOLUTION INTRAMUSCULAR; INTRAVENOUS at 22:26

## 2019-04-30 RX ADMIN — Medication 250 MILLIGRAM(S): at 09:30

## 2019-04-30 RX ADMIN — MIDAZOLAM HYDROCHLORIDE 4 MILLIGRAM(S): 1 INJECTION, SOLUTION INTRAMUSCULAR; INTRAVENOUS at 07:16

## 2019-04-30 NOTE — ED PROVIDER NOTE - CLINICAL SUMMARY MEDICAL DECISION MAKING FREE TEXT BOX
53M, hx of CAD, MI, esophageal cancer, recent brain surgery on 4/22/19 to remove suboccipital met, presenting to ED, w/ unknown chief complaint. Patient verbally and physically aggressive towards ED staff. Not answering any ED questioning at current time. Will administer haldol/versed due to aggression, for safety of ED staff and patient and to enable further medical workup including CT head, labs. No known hx of mental illness, concern for further brain mets vs steroid psychosis vs tox vs metabolic derangements vs other.   Fer Cannon MD, PGY2 Emergency Medicine

## 2019-04-30 NOTE — H&P ADULT - NSHPLABSRESULTS_GEN_ALL_CORE
.  LABS:                         11.8   9.9   )-----------( 209      ( 2019 07:32 )             34.5     04-30    133<L>  |  98  |  17  ----------------------------<  101<H>  4.2   |  22  |  0.82    Ca    8.6      2019 07:32    TPro  6.1  /  Alb  3.2<L>  /  TBili  0.1<L>  /  DBili  x   /  AST  22  /  ALT  22  /  AlkPhos  60  04-30    PT/INR - ( 2019 07:32 )   PT: 13.1 sec;   INR: 1.13 ratio         PTT - ( 2019 07:32 )  PTT:25.3 sec  Urinalysis Basic - ( 2019 09:49 )    Color: Light Yellow / Appearance: Clear / S.012 / pH: x  Gluc: x / Ketone: Negative  / Bili: Negative / Urobili: Negative   Blood: x / Protein: Negative / Nitrite: Negative   Leuk Esterase: Negative / RBC: x / WBC x   Sq Epi: x / Non Sq Epi: x / Bacteria: x                RADIOLOGY, EKG & ADDITIONAL TESTS:   CT head: INTERPRETATION:  Clinical indication: Confusion.    Multiple axial sections were performed from base of skull to vertex for   contrast enhancement. Coronal and sagittal reconstructions were performed   well    This exam is compared with prior noncontrast head CT performed on 2019 and prior brain MRI performed on 2019.    Postop changes compatible with a suboccipital craniectomy and mesh like   cranioplasty is again seen. Abnormal low-attenuation in the postop bed is   again identified. Large portion of the previously noted in the postop bed   has reabsorbed with only small residual area of air seen. There is small   area of high attenuation again seen in the postop region which could be   compatible areas of hemorrhage and postop material. This appears slightly   less conspicuous when compared with the prior exam. Extra-axial   collection in the posterior region is identified and increased in size.   This finding measures approximately 1.8 times widest diameter likely   compatible with a pseudomeningocele.    The size and configuration the ventricles appear unchanged    No new areas of acute hemorrhage is seen    Evaluation of the osseous structures with the appropriate window aside   from postop changes appear unremarkable    Extradural soft tissue swelling and staples are seen in the seen postop   region.    The visualized paranasal sinuses mastoid and middle ear appear clear.    Impression: Postop changes again seen as described above.    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

## 2019-04-30 NOTE — ED ADULT NURSE REASSESSMENT NOTE - NS ED NURSE REASSESS COMMENT FT1
patient refused po meds. Dr. Palacio made aware. Patient became agitated, after attempting to speak calmly to patient that he needs his medications.

## 2019-04-30 NOTE — H&P ADULT - PROBLEM SELECTOR PLAN 2
Seen by psych.   - Will start depakote 500mg BID today, increase to 500mg qAM, 1000mg qHS tomorrow, depakote level after 3 days.   - Start Risperdal 1mg qHS today, increase to 1mg BID tomorrow.   - Haldol 5mg PO/IM/IV PRN q6hrs for severe agitation Seen by psych. U tox positive for benzos however pt received benzos in ed  - Will start depakote 500mg BID today, increase to 500mg qAM, 1000mg qHS tomorrow, depakote level after 3 days.   - Start Risperdal 1mg qHS today, increase to 1mg BID tomorrow.   - Haldol 5mg PO/IM/IV PRN q6hrs for severe agitation

## 2019-04-30 NOTE — ED ADULT NURSE NOTE - OBJECTIVE STATEMENT
53 YOM with psh 53 YOM with psh of brain surgery 1 week ago brought in by PD for abnormal behavior. PD stated patient called 911 on himself and has been non-stop talking and not making sense and patient should not be alone and brought to [facility] ED. 53 YOM with psh of brain surgery 1 week ago brought in by PD for abnormal behavior. PD stated patient called 911 on himself and has been non-stop talking and not making sense and patient should not be alone and brought to [facility] ED. patient noted to have staples to center of occipital area of head. Patient states has a pmh of brain cancer. Patient denies sob, chest pain, n/v/d, headaches & blurry vision. safety maintained.

## 2019-04-30 NOTE — ED PROVIDER NOTE - PHYSICAL EXAMINATION
Attending MD Jude:    Gen:  awake and alert, animated, disorganized thought process   Neck: supple, no swelling, trachea midline  Resp: breathing comfortably  Abd: soft, NT, ND  Extremities: extremities warm to the touch, no peripheral edema   Msk: no extremity deformities or bony tenderness  Pysch: disorganized thought process, animated, paranoid   Neuro: moves all extremities spontaneously, steady gait

## 2019-04-30 NOTE — ED BEHAVIORAL HEALTH ASSESSMENT NOTE - HPI (INCLUDE ILLNESS QUALITY, SEVERITY, DURATION, TIMING, CONTEXT, MODIFYING FACTORS, ASSOCIATED SIGNS AND SYMPTOMS)
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 Patient seen and evaluated by psychiatry for AMS and agitation. Patient states that he sent his father "hundreds" of emails yesterday to which he did not reply and became concerned his father might be dead. Patient has elevated mood, pressured speech, is distractible, grandiose, disorganized, and displaying flight of ideas. Patient states he did not have any concerns about his father before yesterday, had spoken with him via email the day before, emails with him daily. States his father lives in [place], initially asked that his father be contacted, later said he did not want to because he would just "mess everything up" and that he hadn't seen his father in 20 years. Asked that his wife Marnie be called, however states she is in [place], writer left a message at [phone]. States he feels "great" when asked about the status of his cancer, has been taking his medications as prescribed, including steroid taper. Denies other substance use, states he doesn't usually drink alcohol, however drank "2 coronas, a few belts of liquor, and a bottle of wine" yesterday out of concern about his father, however blood alcohol negative today. States he saw a psychiatrist "years and years and years and years and years ago," however no meds or hospitalizations. 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 Patient repeatedly begins shouting and then apologies for yelling.

## 2019-04-30 NOTE — ED BEHAVIORAL HEALTH ASSESSMENT NOTE - RISK ASSESSMENT
Static factors include chronic illness, hx of depression. Modifiable risk factors include current episode. Protective factors include social supports. No evidence of suicidality, low acute risk for self harm, does not require inpatient psychiatric hospitalization at this time.

## 2019-04-30 NOTE — ED BEHAVIORAL HEALTH ASSESSMENT NOTE - SUMMARY
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 Patient seen and evaluated by psychiatry for AMS and agitation.  CBC w/diff, coag studies, CMP, UA unremarkable. Utox +benzos s/p Versed. Serum tox negative. CT head with postop changes, increase in pseudomeningocele. CXR with opacity, PNA cannot be excluded. Patient given Haldol 5mg IM x1, Versed 4mg x2, antibiotics. Patient with unclear psychiatric hx, no collateral, currently manic, s/p brain mets, completed 4/7 days steroid taper. Would discontinue steroids. 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 Patient seen and evaluated by psychiatry for AMS and agitation.  CBC w/diff, coag studies, CMP, UA unremarkable. Utox +benzos s/p Versed. Serum tox negative. CT head with postop changes, increase in pseudomeningocele. CXR with opacity, PNA cannot be excluded. Patient given Haldol 5mg IM x1, Versed 4mg x2, antibiotics. Patient with unclear psychiatric hx, no collateral, currently manic, s/p brain mets, completed 4/7 days steroid taper. Can start depakote 500mg BID today, increase to 500mg qAM and 1000mg qHS tomorrow, with level after 3 days. Start Risperdal 1mg qHS tonight, increase to 1mg BID tomorrow. Haldol 5mg PO/IM/IV PRN for severe agitation.

## 2019-04-30 NOTE — H&P ADULT - NSICDXPASTSURGICALHX_GEN_ALL_CORE_FT
PAST SURGICAL HISTORY:  H/O craniotomy     H/O hernia repair 1966    History of esophageal surgery 6/2018    Status post tonsillectomy and adenoidectomy     Stented coronary artery x1 2015

## 2019-04-30 NOTE — H&P ADULT - PROBLEM SELECTOR PLAN 1
Pt presenting with AMS. At present distractible, grandiose, with disorganized thought process with manic symptoms at present. No signs/symptoms of infections, surgical site looks okay. CXR with atelectasis, UA negative. CT head with postoperative changes. No metabolic derangements on BMP. Perhaps AMS 2/2 steroid induced mood disorder.   - Will continue with steroid taper 2mg PO BID today, 1mg BID tomorrow, then 1mg QD then off  - Psych medications as below

## 2019-04-30 NOTE — CONSULT NOTE ADULT - ATTENDING COMMENTS
Agree with above. Patient is well known to our service. He was doing well until very recently, when wife began to notice behavioral changes. Patient tolerated surgery well but behavior continued to escalate in hospital, and may be attributable in part to steroids.     No indication for additional neurosurgical intervention at this time. Patient is scheduled for outpatient XRT evaluation by Dr. Murphy next week. We are anticipating gamma knife to the resection cavity to prevent recurrence.

## 2019-04-30 NOTE — H&P ADULT - PROBLEM SELECTOR PLAN 3
s/p robotic [place] Esophagogastrectomy on 6/8/18 with osseous mets to the Rt hemisacrum s/p neoadjuvant chemotherapy and RT  - Outpatient Onc follow up

## 2019-04-30 NOTE — H&P ADULT - ASSESSMENT
53 yr old male with h/o CAD, MI, esophageal cancer s/p esophagectomy XRT and chemotherapy, with brain mets and recent hospital stay s/p resection by on 4/22/19, d/c home on 4/26/19 presenting with ams possibly 2/2 substance-induced aftab

## 2019-04-30 NOTE — ED BEHAVIORAL HEALTH ASSESSMENT NOTE - DIFFERENTIAL
Substance-induced aftab, r/o primary affective disorder Substance-induced aftab, r/o primary affective disorder, delirium, bipolar d/o

## 2019-04-30 NOTE — ED BEHAVIORAL HEALTH ASSESSMENT NOTE - DESCRIPTION
VS T 98.5F  /89  CBC w/diff, coag studies, CMP, TSH, UA, utox, serum tox, CT head, EKG sent/performed  Patient initially combative, verbally/physically aggressive to ED staff, refusing testing and treatment, given Haldol 5mg IM x1, Versed 4mg IM x2  Given 2L IVF for tachycardia, CXR showing R lung PNA, started on abx CAD, MI, esophageal cancer s/p esophagectomy XRT and chemotherapy, with recent brain surgery on 4/22/19 to remove suboccipital met lives with wife, is in a band

## 2019-04-30 NOTE — H&P ADULT - PROBLEM SELECTOR PLAN 5
CAD s/p inferior wall MI in [facility] in February of 2015 s/p TATE to the mid RCA followed by Dr. Swan  - [facility] given recent surgery.

## 2019-04-30 NOTE — H&P ADULT - NSHPPHYSICALEXAM_GEN_ALL_CORE
.  VITAL SIGNS:  T(C): 37.2 (04-30-19 @ 08:18), Max: 37.2 (04-30-19 @ 08:18)  T(F): 98.9 (04-30-19 @ 08:18), Max: 98.9 (04-30-19 @ 08:18)  HR: 112 (04-30-19 @ 11:00) (112 - 126)  BP: 127/87 (04-30-19 @ 11:00) (118/84 - 127/89)  BP(mean): --  RR: 18 (04-30-19 @ 11:00) (16 - 18)  SpO2: 100% (04-30-19 @ 11:00) (98% - 100%)  Wt(kg): --    PHYSICAL EXAM:    Constitutional: WDWN resting comfortably in bed; NAD  Head: Midline occipital surgical site with staples  Eyes: PERRL, EOMI, anicteric sclera  ENT: no nasal discharge; uvula midline, no oropharyngeal erythema or exudates; MMM  Neck: supple; no JVD  Respiratory: CTA B/L; no W/R/R,  Cardiac: +S1/S2; RRR; no M/R/G  Gastrointestinal: soft, NT/ND; no rebound or guarding; +BSx4  Back: spine midline, no bony tenderness or step-offs; no CVAT B/L  Extremities: WWP, no clubbing or cyanosis; no peripheral edema  Musculoskeletal: NROM x4; no joint swelling, tenderness or erythema  Dermatologic: skin warm, dry and intact; no rashes, wounds, or scars  Neurologic: AAOx3; CNII-XII grossly intact; no focal deficits  Psychiatric: distractible, grandiose, disorganized thoughts

## 2019-04-30 NOTE — ED BEHAVIORAL HEALTH ASSESSMENT NOTE - CASE SUMMARY
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 Patient seen and evaluated by psychiatry for AMS and agitation. Pt disorganized, manic symptoms noted, grandiose delusions, inc energy, dec sleep, no si/hi. Pt paranoid about his dad, believes he had a heart attack, called police. rec start depakote 500mg po/iv bid for aftab, risperdal 1mg po qhs today for psychosis.

## 2019-04-30 NOTE — ED PROVIDER NOTE - OBJECTIVE STATEMENT
53M, hx of CAD, MI, esophageal cancer s/p esophagectomy XRT and chemotherapy, with recent brain surgery on 4/22/19 to remove suboccipital met, d/c home on 4/26/19. Presents to ED tonight (possibly via EMS, however unknown) with unknown chief complaint. Patient currently verbally and physically aggressive to ED staff, not answering ED questioning, refusing to be interviewed or examined by ED staff. Unknown chief complaint at this time. Unable to assess any ROS at this time. Only history obtained via EMR charting. Patient reportedly on steroids, unknown if compliant. 53M, hx of CAD, MI, esophageal cancer s/p esophagectomy XRT and chemotherapy, with recent brain surgery on 4/22/19 to remove suboccipital met, d/c home on 4/26/19. Presents to ED tonight (possibly via EMS, however unknown) with unknown chief complaint. Patient currently verbally and physically aggressive to ED staff, not answering ED questioning, refusing to be interviewed or examined by ED staff. Unknown chief complaint at this time. Unable to assess any ROS at this time. Only history obtained via EMR charting. Patient reportedly on steroids, unknown if compliant.  Wife

## 2019-04-30 NOTE — ED BEHAVIORAL HEALTH ASSESSMENT NOTE - PSYCHIATRIC ISSUES AND PLAN (INCLUDE STANDING AND PRN MEDICATION)
Start depakote 500mg BID today, increase to 500mg qAM, 1000mg qHS tomorrow, depakote level after 3 days. Start Risperdal 1mg qHS today, increase to 2mg BID tomorrow. Haldol 5mg PO/IM/IV PRN q6hrs for severe agitation. Start depakote 500mg BID today, increase to 500mg qAM, 1000mg qHS tomorrow, depakote level after 3 days. Start Risperdal 1mg qHS today, increase to 1mg BID tomorrow. Haldol 5mg PO/IM/IV PRN q6hrs for severe agitation.

## 2019-04-30 NOTE — H&P ADULT - HISTORY OF PRESENT ILLNESS
This is a 53 yr old male with h/o CAD, MI, esophageal cancer s/p esophagectomy XRT and chemotherapy, with brain mets and recent hospital stay s/p resection by on 4/22/19, d/c home on 4/26/19 presenting with ams. As per patient he states that he called 911 over concerns his father was not answering his emails and EMS brought patient in for evaluation. As per chart review, pt has been intermittently agitated, refusing to answer questions. At present, pt states that he feels great, reports taking all of his medications as prescribed. Denies pain, palpitations, SOB.

## 2019-04-30 NOTE — ED BEHAVIORAL HEALTH ASSESSMENT NOTE - AXIS III
CAD, MI, esophageal cancer s/p esophagectomy XRT and chemotherapy, with recent brain surgery on 4/22/19 to remove suboccipital met

## 2019-04-30 NOTE — ED PROVIDER NOTE - PROGRESS NOTE DETAILS
Krunal KIM: Patient signed out by Dr. Roque pending labs, imaging and admission. Patient is 53 with hx of CAD, MI, esophageal CA, s/p brain surgery for met on 4/22/19 here for altered mental status/ psychosis. Patient has no known psychiatric history was reportedly brought in by police. At time of signout patient was sedated with IM haldol and versed. Dr Palacio: Pt sedated for CTH that does show abscess or obvious bleed. NSR agrees with read, will discuss with attendings and give recs. Pt refuses to allow rectal temp or give urine for UA. will cover for the R lung opacity, AMS and tachycardia with vanc cef Krunal KIM: Patient reassessed - he is cooperative. Continues to be tachycardic. Patient states he has been upset, called 911 because his father is not getting back to him despite emailing him and calling him. He states he was concerned his father might be dead. When I asked why he was brought here when he called the police, he states he shouldn't have been. Denies fevers. Patient is singing, speaking rapidly otherwise appears to be in NAD. Patient states he lives alone, wife is in [place] and his father is in [place]. Dr Palacio: NSR saw pt and have cleared the pt. no NSR intervention. Consulted psych. Dr Palacio: Pt sedated for CTH that doesn't show abscess or obvious bleed. NSR agrees with read, will discuss with attendings and give recs. Pt refuses to allow rectal temp. will cover for the R lung opacity, AMS and tachycardia with vanc cef. Pt states no allergies to meds. Dr Palacio: NSR saw pt and have cleared the pt. no NSR intervention for increased pseumeningocele. f/u with them outpt. Consulted psych. Krunal KIM: Patient was seen by psych, believes the patient is manic. Dr. Palacio spoke to patient's brother who confirmed that patient has a history of bipolar disease with aftab. Patient still tachycardic. Given recent history of brain surgery, esophageal CA and abnormal CXR, patient is not medically cleared. I discussed plan with psych. They wrote their recommendations in their note. Will admit to medicine. Dr Palacio: Dr Vanessa Manriquez has been paged twice. Wanted to know if he has admitting priviledges before admitting to attg. Dr Palacio: Dr Vanessa Manriquez has been paged twice. Called back and says admit to [facility] Krunal KIM: Patient was seen by psych, believes the patient is manic. Dr. Palacio spoke to patient's brother (Jaylon Morales [phone]) who confirmed that patient has a history of bipolar disease with aftab. Patient still tachycardic. Given recent history of brain surgery, esophageal CA and abnormal CXR, patient is not medically cleared. I discussed plan with psych. They wrote their recommendations in their note. Will admit to medicine.

## 2019-05-01 LAB
ANION GAP SERPL CALC-SCNC: 14 MMOL/L — SIGNIFICANT CHANGE UP (ref 5–17)
BUN SERPL-MCNC: 10 MG/DL — SIGNIFICANT CHANGE UP (ref 7–23)
CALCIUM SERPL-MCNC: 9.1 MG/DL — SIGNIFICANT CHANGE UP (ref 8.4–10.5)
CHLORIDE SERPL-SCNC: 99 MMOL/L — SIGNIFICANT CHANGE UP (ref 96–108)
CO2 SERPL-SCNC: 24 MMOL/L — SIGNIFICANT CHANGE UP (ref 22–31)
CREAT SERPL-MCNC: 0.78 MG/DL — SIGNIFICANT CHANGE UP (ref 0.5–1.3)
CULTURE RESULTS: NO GROWTH — SIGNIFICANT CHANGE UP
GLUCOSE SERPL-MCNC: 78 MG/DL — SIGNIFICANT CHANGE UP (ref 70–99)
HCT VFR BLD CALC: 37.5 % — LOW (ref 39–50)
HGB BLD-MCNC: 12.3 G/DL — LOW (ref 13–17)
MAGNESIUM SERPL-MCNC: 2.1 MG/DL — SIGNIFICANT CHANGE UP (ref 1.6–2.6)
MCHC RBC-ENTMCNC: 29 PG — SIGNIFICANT CHANGE UP (ref 27–34)
MCHC RBC-ENTMCNC: 32.8 GM/DL — SIGNIFICANT CHANGE UP (ref 32–36)
MCV RBC AUTO: 88.4 FL — SIGNIFICANT CHANGE UP (ref 80–100)
PLATELET # BLD AUTO: 215 K/UL — SIGNIFICANT CHANGE UP (ref 150–400)
POTASSIUM SERPL-MCNC: 3.9 MMOL/L — SIGNIFICANT CHANGE UP (ref 3.5–5.3)
POTASSIUM SERPL-SCNC: 3.9 MMOL/L — SIGNIFICANT CHANGE UP (ref 3.5–5.3)
RBC # BLD: 4.24 M/UL — SIGNIFICANT CHANGE UP (ref 4.2–5.8)
RBC # FLD: 16.5 % — HIGH (ref 10.3–14.5)
SODIUM SERPL-SCNC: 137 MMOL/L — SIGNIFICANT CHANGE UP (ref 135–145)
SPECIMEN SOURCE: SIGNIFICANT CHANGE UP
TSH SERPL-MCNC: 2.19 UIU/ML — SIGNIFICANT CHANGE UP (ref 0.27–4.2)
WBC # BLD: 7.03 K/UL — SIGNIFICANT CHANGE UP (ref 3.8–10.5)
WBC # FLD AUTO: 7.03 K/UL — SIGNIFICANT CHANGE UP (ref 3.8–10.5)

## 2019-05-01 PROCEDURE — 99222 1ST HOSP IP/OBS MODERATE 55: CPT

## 2019-05-01 RX ORDER — DIVALPROEX SODIUM 500 MG/1
1000 TABLET, DELAYED RELEASE ORAL
Qty: 0 | Refills: 0 | Status: DISCONTINUED | OUTPATIENT
Start: 2019-05-01 | End: 2019-05-03

## 2019-05-01 RX ORDER — CEPHALEXIN 500 MG
500 CAPSULE ORAL EVERY 12 HOURS
Qty: 0 | Refills: 0 | Status: DISCONTINUED | OUTPATIENT
Start: 2019-05-01 | End: 2019-05-01

## 2019-05-01 RX ORDER — DIVALPROEX SODIUM 500 MG/1
500 TABLET, DELAYED RELEASE ORAL ONCE
Qty: 0 | Refills: 0 | Status: COMPLETED | OUTPATIENT
Start: 2019-05-01 | End: 2019-05-01

## 2019-05-01 RX ADMIN — RISPERIDONE 1 MILLIGRAM(S): 4 TABLET ORAL at 17:39

## 2019-05-01 RX ADMIN — DIVALPROEX SODIUM 500 MILLIGRAM(S): 500 TABLET, DELAYED RELEASE ORAL at 00:42

## 2019-05-01 RX ADMIN — Medication 650 MILLIGRAM(S): at 04:00

## 2019-05-01 RX ADMIN — Medication 650 MILLIGRAM(S): at 21:30

## 2019-05-01 RX ADMIN — Medication 650 MILLIGRAM(S): at 20:52

## 2019-05-01 RX ADMIN — Medication 100 MILLIGRAM(S): at 20:52

## 2019-05-01 RX ADMIN — PREGABALIN 1000 MICROGRAM(S): 225 CAPSULE ORAL at 13:11

## 2019-05-01 RX ADMIN — Medication 1 MILLIGRAM(S): at 17:38

## 2019-05-01 RX ADMIN — Medication 500 MILLIGRAM(S): at 02:31

## 2019-05-01 RX ADMIN — Medication 100 MILLIGRAM(S): at 13:11

## 2019-05-01 RX ADMIN — Medication 650 MILLIGRAM(S): at 03:19

## 2019-05-01 RX ADMIN — Medication 100 MILLIGRAM(S): at 05:29

## 2019-05-01 RX ADMIN — DIVALPROEX SODIUM 1000 MILLIGRAM(S): 500 TABLET, DELAYED RELEASE ORAL at 17:39

## 2019-05-01 RX ADMIN — PANTOPRAZOLE SODIUM 40 MILLIGRAM(S): 20 TABLET, DELAYED RELEASE ORAL at 05:29

## 2019-05-01 RX ADMIN — CEFEPIME 100 MILLIGRAM(S): 1 INJECTION, POWDER, FOR SOLUTION INTRAMUSCULAR; INTRAVENOUS at 05:30

## 2019-05-01 RX ADMIN — DIVALPROEX SODIUM 500 MILLIGRAM(S): 500 TABLET, DELAYED RELEASE ORAL at 10:30

## 2019-05-01 RX ADMIN — CEFEPIME 100 MILLIGRAM(S): 1 INJECTION, POWDER, FOR SOLUTION INTRAMUSCULAR; INTRAVENOUS at 18:43

## 2019-05-01 RX ADMIN — RISPERIDONE 1 MILLIGRAM(S): 4 TABLET ORAL at 05:30

## 2019-05-01 RX ADMIN — Medication 2 MILLIGRAM(S): at 05:30

## 2019-05-01 NOTE — CONSULT NOTE ADULT - SUBJECTIVE AND OBJECTIVE BOX
p ([id number])     HPI: 53 yr old M h/o CAD, MI, esophageal cancer s/p esophagectomy XRT and chemotherapy, with brain mets s/p resection by Dr. Rollins on 4/22/19 was d/c home on 4/26/19. Presents to ED by the police being very agitated and combative to which he had to be sedated. He is uncooperative and refusing to answer questions. He states that the police is after him. Patient reportedly on steroids.    PAST MEDICAL HISTORY   CAD (coronary artery disease)  Secondary malignant neoplasm of brain  Metastasis to bone  Esophageal cancer  MI (myocardial infarction)    PAST SURGICAL HISTORY   History of esophageal surgery  H/O hernia repair  Status post tonsillectomy and adenoidectomy  Stented coronary artery        MEDICATIONS:  Antibiotics:    Neuro:    Anticoagulation:    Other:  lactated ringers Bolus 1000 milliLiter(s) IV Bolus once      SOCIAL HISTORY:   Occupation:   Marital Status:     FAMILY HISTORY:  Family history of cervical cancer  Family history of throat cancer      PHYSICAL EXAMINATION:   T(C): 36.9 (04-30-19 @ 07:19), Max: 36.9 (04-30-19 @ 07:19)  HR: 122 (04-30-19 @ 07:19) (122 - 122)  BP: 127/89 (04-30-19 @ 07:19) (127/89 - 127/89)  RR: 18 (04-30-19 @ 07:19) (18 - 18)  SpO2: 98% (04-30-19 @ 07:19) (98% - 98%)  Wt(kg): --    General Examination:     Neurologic Examination:           AOx3, PERRL  CASH well and antigravity, no drift    LABS:                        11.8   9.9   )-----------( 209      ( 30 Apr 2019 07:32 )             34.5     04-30    133<L>  |  98  |  17  ----------------------------<  101<H>  4.2   |  22  |  0.82    Ca    8.6      30 Apr 2019 07:32    TPro  6.1  /  Alb  3.2<L>  /  TBili  0.1<L>  /  DBili  x   /  AST  22  /  ALT  22  /  AlkPhos  60  04-30    PT/INR - ( 30 Apr 2019 07:32 )   PT: 13.1 sec;   INR: 1.13 ratio         PTT - ( 30 Apr 2019 07:32 )  PTT:25.3 sec      RADIOLOGY & ADDITIONAL STUDIES:
CHIEF COMPLAINT: Patient is a 53y old  Male who presents with a chief complaint of AMS (30 Apr 2019 16:16)      HPI:   53 yr old male with h/o CAD s/p remote PCI not on antiplatelet therapy 2/2 recent brain surgery, MI, esophageal cancer s/p esophagectomy XRT and chemotherapy, with brain mets and recent hospital stay s/p resection by on 4/22/19, d/c home on 4/26/19 presenting with ams. As per patient he states that he called 911 over concerns his father was not answering his emails and EMS brought patient in for evaluation. As per chart review, pt has been intermittently agitated, refusing to answer questions. At present, pt states that he feels great, reports taking all of his medications as prescribed.     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 Still confused. Feels exicted.   Denies any chest pain, dyspnea, palpitations, PND, orthopnea, near syncope, syncope, lower extremity edema.      EKG: < from: 12 Lead ECG (04.30.19 @ 07:49) >   SINUS TACHYCARDIA  LEFT AXIS DEVIATION  POSSIBLE INFERIOR INFARCT , AGE UNDETERMINED    < end of copied text >      REVIEW OF SYSTEMS:   All other review of systems are negative unless indicated above    PAST MEDICAL & SURGICAL HISTORY:  CAD (coronary artery disease)  Secondary malignant neoplasm of brain  Metastasis to bone  Esophageal cancer  MI (myocardial infarction): 2015 with 1 coronary stent mid RCA  H/O craniotomy  History of esophageal surgery: 6/2018  H/O hernia repair: 1966  Status post tonsillectomy and adenoidectomy  Stented coronary artery: x1 2015      SOCIAL HISTORY:  No tobacco, ethanol, or drug abuse.    FAMILY HISTORY:  Family history of cervical cancer  Family history of throat cancer    No family history of acute MI or sudden cardiac death.    MEDICATIONS  (STANDING):  cefepime   IVPB 1000 milliGRAM(s) IV Intermittent every 12 hours  cyanocobalamin 1000 MICROGram(s) Oral daily  dexamethasone     Tablet 1 milliGRAM(s) Oral two times a day  diVALproex DR 1000 milliGRAM(s) Oral <User Schedule>  diVALproex  milliGRAM(s) Oral daily  docusate sodium 100 milliGRAM(s) Oral three times a day  pantoprazole    Tablet 40 milliGRAM(s) Oral before breakfast  risperiDONE   Tablet 1 milliGRAM(s) Oral two times a day    MEDICATIONS  (PRN):  acetaminophen   Tablet .. 650 milliGRAM(s) Oral every 6 hours PRN Mild Pain (1 - 3)      Allergies    No Known Allergies    Intolerances        Home meds:  Home Medications:  acetaminophen 325 mg oral tablet: 2 tab(s) orally every 6 hours, As needed, Temp greater or equal to 38C (100.4F), Mild Pain (1 - 3) (30 Apr 2019 16:22)  docusate sodium 100 mg oral capsule: 1 cap(s) orally 3 times a day (30 Apr 2019 16:22)  polyethylene glycol 3350 oral powder for reconstitution: 17 gram(s) orally once a day as needed for constipation (30 Apr 2019 16:22)  Vitamin B12: 1 tab(s) orally once a day (30 Apr 2019 16:22)        VITAL SIGNS:   Vital Signs Last 24 Hrs  T(C): 36.7 (01 May 2019 08:47), Max: 36.8 (30 Apr 2019 18:05)  T(F): 98 (01 May 2019 08:47), Max: 98.3 (30 Apr 2019 18:05)  HR: 109 (01 May 2019 08:47) (90 - 126)  BP: 114/77 (01 May 2019 08:47) (114/75 - 143/101)  BP(mean): --  RR: 18 (01 May 2019 08:47) (16 - 24)  SpO2: 97% (01 May 2019 08:47) (97% - 100%)    I&O's Summary    30 Apr 2019 07:01  -  01 May 2019 07:00  --------------------------------------------------------  IN: 0 mL / OUT: 3450 mL / NET: -3450 mL        On Exam:    Constitutional: NAD, awake and alert, well-developed  HEENT: Moist Mucous Membranes, Anicteric  Pulmonary: Non-labored, breath sounds are clear bilaterally, No wheezing, rales or rhonchi  Cardiovascular: Tachy, S1 and S2, No murmurs, rubs, gallops or clicks  Gastrointestinal: Bowel Sounds present, soft, nontender.   Lymph: No peripheral edema. No lymphadenopathy.  Skin: No visible rashes or ulcers.  Psych: appears manic    LABS: All Labs Reviewed:                        11.8   9.9   )-----------( 209      ( 30 Apr 2019 07:32 )             34.5     01 May 2019 07:21    137    |  99     |  10     ----------------------------<  78     3.9     |  24     |  0.78   30 Apr 2019 07:32    133    |  98     |  17     ----------------------------<  101    4.2     |  22     |  0.82     Ca    9.1        01 May 2019 07:21  Ca    8.6        30 Apr 2019 07:32  Mg     2.1       01 May 2019 07:21    TPro  6.1    /  Alb  3.2    /  TBili  0.1    /  DBili  x      /  AST  22     /  ALT  22     /  AlkPhos  60     30 Apr 2019 07:32    PT/INR - ( 30 Apr 2019 07:32 )   PT: 13.1 sec;   INR: 1.13 ratio         PTT - ( 30 Apr 2019 07:32 )  PTT:25.3 sec      Blood Culture: Organism --  Gram Stain Blood -- Gram Stain --  Specimen Source .Urine  Culture-Blood --        04-30 @ 13:49  TSH: 3.38      RADIOLOGY:    < from: CT Head No Cont (04.30.19 @ 07:41) >    EXAM:  CT BRAIN                            PROCEDURE DATE:  04/30/2019            INTERPRETATION:  Clinical indication: Confusion.    Multiple axial sections were performed from base of skull to vertex for   contrast enhancement. Coronal and sagittal reconstructions were performed   well    This exam is compared with prior noncontrast head CT performed on April 22, 2019 and prior brain MRI performed on April 23, 2019.    Postop changes compatible with a suboccipital craniectomy and mesh like   cranioplasty is again seen. Abnormal low-attenuation in the postop bed is   again identified. Large portion of the previously noted in the postop bed   has reabsorbed with only small residual area of air seen. There is small   area of high attenuation again seen in the postop region which could be   compatible areas of hemorrhage and postop material. This appears slightly   less conspicuous when compared with the prior exam. Extra-axial   collection in the posterior region is identified and increased in size.   This finding measures approximately 1.8 times widest diameter likely   compatible with a pseudomeningocele.    The size and configuration the ventricles appear unchanged    No new areas of acute hemorrhage is seen    Evaluation of theosseous structures with the appropriate window aside   from postop changes appear unremarkable    Extradural soft tissue swelling and staples are seen in the seen postop   region.    The visualized paranasal sinuses mastoid and middle ear appear clear.    Impression: Postop changes again seen as described above.    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                          MARIA A PACHECO M.D., ATTENDING RADIOLOGIST  This document has been electronically signed. Apr 30 2019  8:12AM                < end of copied text >  < from: Xray Chest 1 View- PORTABLE-Urgent (04.30.19 @ 07:27) >    EXAM:  XR CHEST PORTABLE URGENT 1V                            PROCEDURE DATE:  04/30/2019            INTERPRETATION:  CLINICAL INFORMATION: Confusion.    EXAM: Frontal radiograph of the chest. Comparison    IMPRESSION:  Right lower lung opacity likely representing passive atelectasis   secondary to gastric pull-through. Infection is not excluded. No   pneumothorax Heart is normal in size. No acute osseous findings.                AUSTIN JC M.D., RADIOLOGY RESIDENT  This document has been electronically signed.  HOLA ROLAND M.D., ATTENDING RADIOLOGIST  This document has been electronically signed. Apr 30 2019  8:01AM                < end of copied text >  < from: Transthoracic Echocardiogram (07.11.18 @ 02:32) >    Patient name: SYLVIA VICTOR  YOB: 1965   Age: 52 (M)   MR#: [id number]  Study Date: 7/11/2018  [facility]  Study quality: Limited  Referring Physician: Abdifatah Brown MD  Blood Pressure: 107/70 mmHg  Height: 188 cm  Weight: 85 kg  BSA: 2.1 m2  Heart Rate: 105 mmHg  ------------------------------------------------------------------------  PROCEDURE: Transthoracic echocardiogram with 2-D, M-Mode  and complete spectral and color flow Doppler.  INDICATION: Other forms of dyspnea (R06.09)  ------------------------------------------------------------------------  OBSERVATIONS:  Mitral Valve: Normal mitral valve.  Aortic Root: Normal aortic root.  Aortic Valve: Normal trileaflet aortic valve.  Left Atrium: Normal left atrium.  Left Ventricle: Left ventricular ejection fraction, by  visual estimation, is 40-45%.  Normal left ventricular  internal dimensions and wall thicknesses.  Right Heart: Normal right atrium. Right ventricle midly  dilated with reduced systolic function.  Normal tricuspid  valve. Normal pulmonic valve.  Pericardium/PleuraLarge circumferential pericardial  effusion. Largest pocket along RV free wall measuring  1.9cm.  RA and RV inversion present suggestive of pericardial  tamponade physiology.  ------------------------------------------------------------------------  CONCLUSIONS:  Limited study done for evaluation of effusion and r/o  tamponade. Patient is tachycardic throughout the study.  1. Normal left atrium.  2. Left ventricular ejection fraction, by visual  estimation, is 40-45%.  3. Normal right atrium.  4. Right ventricle midly dilated with reduced systolic  function.  5. Large circumferential pericardial effusion. Largest  pocket along RV free wall measuring 1.9cm.  RA and RV inversion present suggestive of tamponade  physiology. Large left pleural effusion.  Findings discussed with cardiology fellow at 4:35am.  ------------------------------------------------------------------------  Confirmed on  7/11/2018 - 04:54:31 by Connie Clements M.D.  ------------------------------------------------------------------------    < end of copied text >

## 2019-05-01 NOTE — CONSULT NOTE ADULT - ASSESSMENT
53 yr old M h/o CAD, MI, esophageal cancer s/p esophagectomy XRT and chemotherapy, with brain mets s/p resection by Dr. Rollins on 4/22/19 was d/c home on 4/26/19. Presents to ED by the police being very agitated and combative to which he had to be sedated. He is uncooperative and refusing to answer questions. He states that the police is after him. Patient reportedly on steroids.     Plan:  - No acute neurosurgical intervention  - Psych evaluation   - Taper steroids
53 yr old male with h/o CAD s/p remote PCI to RCA with residual sig CAD medically managed not on antiplatelet therapy 2/2 recent brain surgery, moderate pericardial effusion, esophageal cancer s/p esophagectomy XRT and chemotherapy, with brain mets and recent hospital stay s/p resection by on 4/22/19  p/w AMS    - No signs of significant ischemia or volume overload.   - EKG without ischemic changes  - Hold antiplatelets given recent neurosurgery  - No tamponade physiology clinically noted  - Tachycardia likely from agitation  - Would hydrate pt  - Check repeat echo  - Previously has refused statin therapy.   - Monitor and replete electrolytes. Keep K>4.0 and Mg>2.0.  - Further cardiac workup will depend on clinical course.   - All other workup per primary team. Will followup.

## 2019-05-02 PROCEDURE — 99233 SBSQ HOSP IP/OBS HIGH 50: CPT

## 2019-05-02 PROCEDURE — 99232 SBSQ HOSP IP/OBS MODERATE 35: CPT

## 2019-05-02 RX ORDER — ACETAMINOPHEN 500 MG
1000 TABLET ORAL ONCE
Qty: 0 | Refills: 0 | Status: COMPLETED | OUTPATIENT
Start: 2019-05-02 | End: 2019-05-02

## 2019-05-02 RX ORDER — RISPERIDONE 4 MG/1
1 TABLET ORAL DAILY
Qty: 0 | Refills: 0 | Status: DISCONTINUED | OUTPATIENT
Start: 2019-05-02 | End: 2019-05-03

## 2019-05-02 RX ORDER — RISPERIDONE 4 MG/1
2 TABLET ORAL AT BEDTIME
Qty: 0 | Refills: 0 | Status: DISCONTINUED | OUTPATIENT
Start: 2019-05-02 | End: 2019-05-03

## 2019-05-02 RX ORDER — ACETAMINOPHEN 500 MG
1000 TABLET ORAL ONCE
Qty: 0 | Refills: 0 | Status: DISCONTINUED | OUTPATIENT
Start: 2019-05-02 | End: 2019-05-02

## 2019-05-02 RX ORDER — ACETAMINOPHEN 500 MG
500 TABLET ORAL ONCE
Qty: 0 | Refills: 0 | Status: COMPLETED | OUTPATIENT
Start: 2019-05-02 | End: 2019-05-02

## 2019-05-02 RX ORDER — HALOPERIDOL DECANOATE 100 MG/ML
5 INJECTION INTRAMUSCULAR ONCE
Qty: 0 | Refills: 0 | Status: DISCONTINUED | OUTPATIENT
Start: 2019-05-02 | End: 2019-05-03

## 2019-05-02 RX ORDER — LANOLIN ALCOHOL/MO/W.PET/CERES
3 CREAM (GRAM) TOPICAL ONCE
Qty: 0 | Refills: 0 | Status: COMPLETED | OUTPATIENT
Start: 2019-05-02 | End: 2019-05-02

## 2019-05-02 RX ORDER — HALOPERIDOL DECANOATE 100 MG/ML
5 INJECTION INTRAMUSCULAR ONCE
Qty: 0 | Refills: 0 | Status: DISCONTINUED | OUTPATIENT
Start: 2019-05-02 | End: 2019-05-02

## 2019-05-02 RX ADMIN — DIVALPROEX SODIUM 1000 MILLIGRAM(S): 500 TABLET, DELAYED RELEASE ORAL at 18:14

## 2019-05-02 RX ADMIN — RISPERIDONE 2 MILLIGRAM(S): 4 TABLET ORAL at 21:31

## 2019-05-02 RX ADMIN — Medication 200 MILLIGRAM(S): at 01:11

## 2019-05-02 RX ADMIN — Medication 400 MILLIGRAM(S): at 23:45

## 2019-05-02 RX ADMIN — PREGABALIN 1000 MICROGRAM(S): 225 CAPSULE ORAL at 18:14

## 2019-05-02 RX ADMIN — CEFEPIME 100 MILLIGRAM(S): 1 INJECTION, POWDER, FOR SOLUTION INTRAMUSCULAR; INTRAVENOUS at 19:53

## 2019-05-02 RX ADMIN — Medication 100 MILLIGRAM(S): at 21:32

## 2019-05-02 RX ADMIN — Medication 3 MILLIGRAM(S): at 03:33

## 2019-05-02 NOTE — CHART NOTE - NSCHARTNOTEFT_GEN_A_CORE
Notified by RN that patient very agitated, walking in  the Hallway.  Evaluated the patient at bedside, patient now lying in the bed, anxious, but not agitated, Patient states " I want know my test results".  explained the patient regarding his labs and plan of care.   Patient requests melatonin for insomnia    Agitation: impulsive behaviour lasted for a brief period only, patient calm now  melatonin for Insomnia  Fall precautions  Will follow    Dmitriy max [facility]  [id number]

## 2019-05-02 NOTE — PROVIDER CONTACT NOTE (OTHER) - BACKGROUND
Pt admitted for AMS, hx of cancer to esophageal, bone, and brain. Pt also has hx of CAD, MI, and induced mood disorder.

## 2019-05-02 NOTE — PROGRESS NOTE BEHAVIORAL HEALTH - NSBHFUPINTERVALHXFT_PSY_A_CORE
Patient seen and evaluated by treatment team. Patient continues to be manic - grandiose, pressured, have flight of ideas, be distractible. Patient with no complaints other than wishing to leave the hospital. States he has never been in a psychiatric hospital or been on psychiatric medication, appears somewhat suspicious when asked.  Per father Jesus Morales [phone], family history of psychiatric illness in mother, "narcissistic and paranoid." Parents , patient grew up with his mother, had strained relationship with father.  After an episode of receiving an incoherent phone call from patient at 11yo, father pushed for pt to have psychiatric evaluation. Was hospitalized at 14yo with "psychosis", was grandiose, paranoid, was on Haldol. Pt cut off contact with his father when pt was in his 20s. Pt was , had 1 child, was , now remarried to third wife. As far as father knows, no subsequent psychiatric treatment or hospitalizations but is not sure.  Pt's wife is returning from [place] on Saturday, can be reached after that.

## 2019-05-02 NOTE — PROVIDER CONTACT NOTE (OTHER) - ASSESSMENT
Pt is extremely restless and agitated, able to answer questions appropriately, but extremely uncooperative. VSS.

## 2019-05-03 ENCOUNTER — TRANSCRIPTION ENCOUNTER (OUTPATIENT)
Age: 54
End: 2019-05-03

## 2019-05-03 ENCOUNTER — INPATIENT (INPATIENT)
Facility: HOSPITAL | Age: 54
LOS: 12 days | Discharge: HOME | End: 2019-05-16
Attending: PSYCHIATRY & NEUROLOGY | Admitting: PSYCHIATRY & NEUROLOGY
Payer: COMMERCIAL

## 2019-05-03 VITALS
DIASTOLIC BLOOD PRESSURE: 76 MMHG | TEMPERATURE: 98 F | HEART RATE: 98 BPM | SYSTOLIC BLOOD PRESSURE: 113 MMHG | RESPIRATION RATE: 16 BRPM | OXYGEN SATURATION: 100 %

## 2019-05-03 VITALS — TEMPERATURE: 98 F | RESPIRATION RATE: 16 BRPM

## 2019-05-03 DIAGNOSIS — Z98.890 OTHER SPECIFIED POSTPROCEDURAL STATES: Chronic | ICD-10-CM

## 2019-05-03 DIAGNOSIS — F33.2 MAJOR DEPRESSIVE DISORDER, RECURRENT SEVERE WITHOUT PSYCHOTIC FEATURES: ICD-10-CM

## 2019-05-03 DIAGNOSIS — Z95.5 PRESENCE OF CORONARY ANGIOPLASTY IMPLANT AND GRAFT: Chronic | ICD-10-CM

## 2019-05-03 DIAGNOSIS — Z90.89 ACQUIRED ABSENCE OF OTHER ORGANS: Chronic | ICD-10-CM

## 2019-05-03 DIAGNOSIS — F31.2 BIPOLAR DISORDER, CURRENT EPISODE MANIC SEVERE WITH PSYCHOTIC FEATURES: ICD-10-CM

## 2019-05-03 LAB
HCT VFR BLD CALC: 39.1 % — SIGNIFICANT CHANGE UP (ref 39–50)
HGB BLD-MCNC: 12.4 G/DL — LOW (ref 13–17)
MCHC RBC-ENTMCNC: 28.2 PG — SIGNIFICANT CHANGE UP (ref 27–34)
MCHC RBC-ENTMCNC: 31.7 GM/DL — LOW (ref 32–36)
MCV RBC AUTO: 88.9 FL — SIGNIFICANT CHANGE UP (ref 80–100)
PLATELET # BLD AUTO: 286 K/UL — SIGNIFICANT CHANGE UP (ref 150–400)
RBC # BLD: 4.4 M/UL — SIGNIFICANT CHANGE UP (ref 4.2–5.8)
RBC # FLD: 16.2 % — HIGH (ref 10.3–14.5)
WBC # BLD: 6.78 K/UL — SIGNIFICANT CHANGE UP (ref 3.8–10.5)
WBC # FLD AUTO: 6.78 K/UL — SIGNIFICANT CHANGE UP (ref 3.8–10.5)

## 2019-05-03 PROCEDURE — 83735 ASSAY OF MAGNESIUM: CPT

## 2019-05-03 PROCEDURE — 80053 COMPREHEN METABOLIC PANEL: CPT

## 2019-05-03 PROCEDURE — 85610 PROTHROMBIN TIME: CPT

## 2019-05-03 PROCEDURE — 85730 THROMBOPLASTIN TIME PARTIAL: CPT

## 2019-05-03 PROCEDURE — 71045 X-RAY EXAM CHEST 1 VIEW: CPT

## 2019-05-03 PROCEDURE — 80307 DRUG TEST PRSMV CHEM ANLYZR: CPT

## 2019-05-03 PROCEDURE — 87086 URINE CULTURE/COLONY COUNT: CPT

## 2019-05-03 PROCEDURE — 93005 ELECTROCARDIOGRAM TRACING: CPT

## 2019-05-03 PROCEDURE — 80048 BASIC METABOLIC PNL TOTAL CA: CPT

## 2019-05-03 PROCEDURE — 99285 EMERGENCY DEPT VISIT HI MDM: CPT | Mod: 25

## 2019-05-03 PROCEDURE — 96375 TX/PRO/DX INJ NEW DRUG ADDON: CPT

## 2019-05-03 PROCEDURE — 85027 COMPLETE CBC AUTOMATED: CPT

## 2019-05-03 PROCEDURE — 96374 THER/PROPH/DIAG INJ IV PUSH: CPT

## 2019-05-03 PROCEDURE — 84443 ASSAY THYROID STIM HORMONE: CPT

## 2019-05-03 PROCEDURE — 81003 URINALYSIS AUTO W/O SCOPE: CPT

## 2019-05-03 PROCEDURE — 99232 SBSQ HOSP IP/OBS MODERATE 35: CPT

## 2019-05-03 PROCEDURE — 99233 SBSQ HOSP IP/OBS HIGH 50: CPT

## 2019-05-03 PROCEDURE — 99221 1ST HOSP IP/OBS SF/LOW 40: CPT

## 2019-05-03 PROCEDURE — 70450 CT HEAD/BRAIN W/O DYE: CPT

## 2019-05-03 PROCEDURE — 96372 THER/PROPH/DIAG INJ SC/IM: CPT | Mod: XU

## 2019-05-03 RX ORDER — DIPHENHYDRAMINE HCL 50 MG
50 CAPSULE ORAL EVERY 4 HOURS
Qty: 0 | Refills: 0 | Status: DISCONTINUED | OUTPATIENT
Start: 2019-05-03 | End: 2019-05-16

## 2019-05-03 RX ORDER — ACETAMINOPHEN 500 MG
650 TABLET ORAL EVERY 6 HOURS
Qty: 0 | Refills: 0 | Status: DISCONTINUED | OUTPATIENT
Start: 2019-05-03 | End: 2019-05-05

## 2019-05-03 RX ORDER — HALOPERIDOL DECANOATE 100 MG/ML
5 INJECTION INTRAMUSCULAR ONCE
Qty: 0 | Refills: 0 | Status: DISCONTINUED | OUTPATIENT
Start: 2019-05-03 | End: 2019-05-16

## 2019-05-03 RX ORDER — HALOPERIDOL DECANOATE 100 MG/ML
5 INJECTION INTRAMUSCULAR EVERY 4 HOURS
Qty: 0 | Refills: 0 | Status: DISCONTINUED | OUTPATIENT
Start: 2019-05-03 | End: 2019-05-16

## 2019-05-03 RX ORDER — DIVALPROEX SODIUM 500 MG/1
500 TABLET, DELAYED RELEASE ORAL DAILY
Qty: 0 | Refills: 0 | Status: DISCONTINUED | OUTPATIENT
Start: 2019-05-03 | End: 2019-05-12

## 2019-05-03 RX ORDER — PANTOPRAZOLE SODIUM 20 MG/1
1 TABLET, DELAYED RELEASE ORAL
Qty: 0 | Refills: 0 | DISCHARGE
Start: 2019-05-03

## 2019-05-03 RX ORDER — RISPERIDONE 4 MG/1
2 TABLET ORAL AT BEDTIME
Qty: 0 | Refills: 0 | Status: DISCONTINUED | OUTPATIENT
Start: 2019-05-03 | End: 2019-05-16

## 2019-05-03 RX ORDER — DIVALPROEX SODIUM 500 MG/1
1 TABLET, DELAYED RELEASE ORAL
Qty: 0 | Refills: 0 | DISCHARGE
Start: 2019-05-03

## 2019-05-03 RX ORDER — POLYETHYLENE GLYCOL 3350 17 G/17G
17 POWDER, FOR SOLUTION ORAL DAILY
Qty: 0 | Refills: 0 | Status: DISCONTINUED | OUTPATIENT
Start: 2019-05-03 | End: 2019-05-16

## 2019-05-03 RX ORDER — RISPERIDONE 4 MG/1
1 TABLET ORAL
Qty: 0 | Refills: 0 | DISCHARGE
Start: 2019-05-03

## 2019-05-03 RX ORDER — DOCUSATE SODIUM 100 MG
100 CAPSULE ORAL THREE TIMES A DAY
Qty: 0 | Refills: 0 | Status: DISCONTINUED | OUTPATIENT
Start: 2019-05-03 | End: 2019-05-16

## 2019-05-03 RX ORDER — DIVALPROEX SODIUM 500 MG/1
2 TABLET, DELAYED RELEASE ORAL
Qty: 0 | Refills: 0 | DISCHARGE
Start: 2019-05-03

## 2019-05-03 RX ORDER — DIPHENHYDRAMINE HCL 50 MG
50 CAPSULE ORAL EVERY 6 HOURS
Qty: 0 | Refills: 0 | Status: DISCONTINUED | OUTPATIENT
Start: 2019-05-03 | End: 2019-05-16

## 2019-05-03 RX ORDER — PANTOPRAZOLE SODIUM 20 MG/1
40 TABLET, DELAYED RELEASE ORAL
Qty: 0 | Refills: 0 | Status: DISCONTINUED | OUTPATIENT
Start: 2019-05-03 | End: 2019-05-16

## 2019-05-03 RX ORDER — PREGABALIN 225 MG/1
1000 CAPSULE ORAL DAILY
Qty: 0 | Refills: 0 | Status: DISCONTINUED | OUTPATIENT
Start: 2019-05-03 | End: 2019-05-16

## 2019-05-03 RX ORDER — PREGABALIN 225 MG/1
1 CAPSULE ORAL
Qty: 0 | Refills: 0 | DISCHARGE
Start: 2019-05-03

## 2019-05-03 RX ORDER — ACETAMINOPHEN 500 MG
650 TABLET ORAL ONCE
Qty: 0 | Refills: 0 | Status: COMPLETED | OUTPATIENT
Start: 2019-05-03 | End: 2019-05-03

## 2019-05-03 RX ORDER — DIVALPROEX SODIUM 500 MG/1
1000 TABLET, DELAYED RELEASE ORAL AT BEDTIME
Qty: 0 | Refills: 0 | Status: DISCONTINUED | OUTPATIENT
Start: 2019-05-03 | End: 2019-05-13

## 2019-05-03 RX ORDER — RISPERIDONE 4 MG/1
1 TABLET ORAL DAILY
Qty: 0 | Refills: 0 | Status: DISCONTINUED | OUTPATIENT
Start: 2019-05-03 | End: 2019-05-16

## 2019-05-03 RX ORDER — LANOLIN ALCOHOL/MO/W.PET/CERES
3 CREAM (GRAM) TOPICAL AT BEDTIME
Qty: 0 | Refills: 0 | Status: DISCONTINUED | OUTPATIENT
Start: 2019-05-03 | End: 2019-05-16

## 2019-05-03 RX ADMIN — Medication 650 MILLIGRAM(S): at 23:52

## 2019-05-03 RX ADMIN — Medication 1000 MILLIGRAM(S): at 00:29

## 2019-05-03 RX ADMIN — Medication 100 MILLIGRAM(S): at 05:07

## 2019-05-03 RX ADMIN — DIVALPROEX SODIUM 500 MILLIGRAM(S): 500 TABLET, DELAYED RELEASE ORAL at 13:34

## 2019-05-03 RX ADMIN — DIVALPROEX SODIUM 1000 MILLIGRAM(S): 500 TABLET, DELAYED RELEASE ORAL at 18:00

## 2019-05-03 RX ADMIN — Medication 100 MILLIGRAM(S): at 13:34

## 2019-05-03 RX ADMIN — CEFEPIME 100 MILLIGRAM(S): 1 INJECTION, POWDER, FOR SOLUTION INTRAMUSCULAR; INTRAVENOUS at 05:07

## 2019-05-03 RX ADMIN — PREGABALIN 1000 MICROGRAM(S): 225 CAPSULE ORAL at 13:34

## 2019-05-03 RX ADMIN — PANTOPRAZOLE SODIUM 40 MILLIGRAM(S): 20 TABLET, DELAYED RELEASE ORAL at 05:07

## 2019-05-03 RX ADMIN — Medication 650 MILLIGRAM(S): at 18:00

## 2019-05-03 RX ADMIN — RISPERIDONE 1 MILLIGRAM(S): 4 TABLET ORAL at 13:34

## 2019-05-03 RX ADMIN — Medication 650 MILLIGRAM(S): at 22:51

## 2019-05-03 RX ADMIN — Medication 3 MILLIGRAM(S): at 22:09

## 2019-05-03 NOTE — PROGRESS NOTE BEHAVIORAL HEALTH - AXIS III
CAD, MI, esophageal cancer s/p esophagectomy XRT and chemotherapy, with recent brain surgery on 4/22/19 to remove suboccipital met
CAD, MI, esophageal cancer s/p esophagectomy XRT and chemotherapy, with recent brain surgery on 4/22/19 to remove suboccipital met

## 2019-05-03 NOTE — PROGRESS NOTE ADULT - SUBJECTIVE AND OBJECTIVE BOX
SUBJECTIVE / OVERNIGHT EVENTS: pt denies headache, nausea,  v      MEDICATIONS  (STANDING):  cefepime   IVPB 1000 milliGRAM(s) IV Intermittent every 12 hours  cyanocobalamin 1000 MICROGram(s) Oral daily  dexamethasone     Tablet 1 milliGRAM(s) Oral two times a day  diVALproex DR 1000 milliGRAM(s) Oral <User Schedule>  diVALproex  milliGRAM(s) Oral daily  docusate sodium 100 milliGRAM(s) Oral three times a day  pantoprazole    Tablet 40 milliGRAM(s) Oral before breakfast  risperiDONE   Tablet 1 milliGRAM(s) Oral two times a day    MEDICATIONS  (PRN):  acetaminophen   Tablet .. 650 milliGRAM(s) Oral every 6 hours PRN Mild Pain (1 - 3)    Vital Signs Last 24 Hrs  T(C): 36.7 (01 May 2019 08:47), Max: 36.8 (2019 18:05)  T(F): 98 (01 May 2019 08:47), Max: 98.3 (2019 18:05)  HR: 109 (01 May 2019 08:47) (90 - 109)  BP: 114/77 (01 May 2019 08:47) (114/75 - 143/101)  BP(mean): --  RR: 18 (01 May 2019 08:47) (18 - 24)  SpO2: 97% (01 May 2019 08:47) (97% - 100%)    CAPILLARY BLOOD GLUCOSE        I&O's Summary    2019 07:01  -  01 May 2019 07:00  --------------------------------------------------------  IN: 0 mL / OUT: 3450 mL / NET: -3450 mL        Constitutional: No fever, fatigue  Skin: No rash.  Eyes: No recent vision problems or eye pain.  ENT: No congestion, ear pain, or sore throat.  Cardiovascular: No chest pain or palpation.  Respiratory: No cough, shortness of breath, congestion, or wheezing.  Gastrointestinal: No abdominal pain, nausea, vomiting, or diarrhea.  Genitourinary: No dysuria.  Musculoskeletal: No joint swelling.  Neurologic: No headache.    PHYSICAL EXAM:  GENERAL: NAD  EYES: EOMI, PERRLA  NECK: Supple, No JVD  CHEST/LUNG: dec breath sounds at bases   HEART:  S1 , S2 +  ABDOMEN: soft , bs+  EXTREMITIES:  no edema  NEUROLOGY:alert awake oriented to place and person       LABS:                        12.3   7.03  )-----------( 215      ( 01 May 2019 09:39 )             37.5     05-    137  |  99  |  10  ----------------------------<  78  3.9   |  24  |  0.78    Ca    9.1      01 May 2019 07:21  Mg     2.1     05-    TPro  6.1  /  Alb  3.2<L>  /  TBili  0.1<L>  /  DBili  x   /  AST  22  /  ALT  22  /  AlkPhos  60  04-30    PT/INR - ( 2019 07:32 )   PT: 13.1 sec;   INR: 1.13 ratio         PTT - ( 2019 07:32 )  PTT:25.3 sec      Urinalysis Basic - ( 2019 09:49 )    Color: Light Yellow / Appearance: Clear / S.012 / pH: x  Gluc: x / Ketone: Negative  / Bili: Negative / Urobili: Negative   Blood: x / Protein: Negative / Nitrite: Negative   Leuk Esterase: Negative / RBC: x / WBC x   Sq Epi: x / Non Sq Epi: x / Bacteria: x        RADIOLOGY & ADDITIONAL TESTS:    Imaging Personally Reviewed:    Consultant(s) Notes Reviewed:      Care Discussed with Consultants/Other Providers:
[facility] - Jamilah Manning, Akash, Traci, Vanessa, Gildardo Coyne  Office Number:  [phone]    Patient resting comfortably in bed in NAD.  Agitated overnight.  Very pressured speech on my exam.  Delusional.  NO chest pain or dyspnea.    Follow up for CAD      MEDICATIONS  (STANDING):  cefepime   IVPB 1000 milliGRAM(s) IV Intermittent every 12 hours  cyanocobalamin 1000 MICROGram(s) Oral daily  dexamethasone     Tablet 1 milliGRAM(s) Oral daily  diVALproex DR 1000 milliGRAM(s) Oral <User Schedule>  diVALproex  milliGRAM(s) Oral daily  docusate sodium 100 milliGRAM(s) Oral three times a day  haloperidol    Injectable 5 milliGRAM(s) IV Push once  pantoprazole    Tablet 40 milliGRAM(s) Oral before breakfast  risperiDONE   Tablet 1 milliGRAM(s) Oral two times a day    MEDICATIONS  (PRN):  acetaminophen   Tablet .. 650 milliGRAM(s) Oral every 6 hours PRN Mild Pain (1 - 3)      Allergies    No Known Allergies          Vital Signs Last 24 Hrs  T(C): 36.7 (02 May 2019 00:15), Max: 36.7 (01 May 2019 08:47)  T(F): 98 (02 May 2019 00:15), Max: 98 (01 May 2019 08:47)  HR: 118 (02 May 2019 00:15) (109 - 118)  BP: 130/88 (02 May 2019 00:15) (107/70 - 130/88)  BP(mean): --  RR: 18 (02 May 2019 00:15) (18 - 19)  SpO2: 97% (02 May 2019 00:15) (97% - 97%)    I&O's Summary      ON EXAM:    General: NAD, awake and alert, oriented x 3  HEENT: Mucous membranes are moist, anicteric  Lungs: Non-labored, breath sounds are clear bilaterally, No wheezing, rales or rhonchi  Cardiovascular: Regular, S1 and S2, no murmurs, rubs, or gallops  Gastrointestinal: Bowel Sounds present, soft, nontender.   Lymph: No peripheral edema. No lymphadenopathy.  Skin: No rashes or ulcers  Psych: Manic    LABS: All Labs Reviewed:                        12.3   7.03  )-----------( 215      ( 01 May 2019 09:39 )             37.5                         11.8   9.9   )-----------( 209      ( 30 Apr 2019 07:32 )             34.5     01 May 2019 07:21    137    |  99     |  10     ----------------------------<  78     3.9     |  24     |  0.78   30 Apr 2019 07:32    133    |  98     |  17     ----------------------------<  101    4.2     |  22     |  0.82     Ca    9.1        01 May 2019 07:21  Ca    8.6        30 Apr 2019 07:32  Mg     2.1       01 May 2019 07:21    TPro  6.1    /  Alb  3.2    /  TBili  0.1    /  DBili  x      /  AST  22     /  ALT  22     /  AlkPhos  60     30 Apr 2019 07:32    PT/INR - ( 30 Apr 2019 07:32 )   PT: 13.1 sec;   INR: 1.13 ratio         PTT - ( 30 Apr 2019 07:32 )  PTT:25.3 sec      Blood Culture: Organism --  Gram Stain Blood -- Gram Stain --  Specimen Source .Urine  Culture-Blood --        05-01 @ 09:56  TSH: 2.19  04-30 @ 13:49  TSH: 3.38
SUBJECTIVE / OVERNIGHT EVENTS: pt denies headache, nausea,  v    MEDICATIONS  (STANDING):  cefepime   IVPB 1000 milliGRAM(s) IV Intermittent every 12 hours  cyanocobalamin 1000 MICROGram(s) Oral daily  diVALproex DR 1000 milliGRAM(s) Oral <User Schedule>  diVALproex  milliGRAM(s) Oral daily  docusate sodium 100 milliGRAM(s) Oral three times a day  haloperidol    Injectable 5 milliGRAM(s) IV Push once  pantoprazole    Tablet 40 milliGRAM(s) Oral before breakfast  risperiDONE   Tablet 1 milliGRAM(s) Oral daily  risperiDONE   Tablet 2 milliGRAM(s) Oral at bedtime    MEDICATIONS  (PRN):  acetaminophen   Tablet .. 650 milliGRAM(s) Oral every 6 hours PRN Mild Pain (1 - 3)    Vital Signs Last 24 Hrs  T(C): 36.7 (02 May 2019 16:51), Max: 36.7 (02 May 2019 00:15)  T(F): 98.1 (02 May 2019 16:51), Max: 98.1 (02 May 2019 16:51)  HR: 98 (02 May 2019 16:51) (98 - 118)  BP: 111/78 (02 May 2019 16:51) (100/70 - 130/88)  BP(mean): --  RR: 18 (02 May 2019 16:51) (18 - 18)  SpO2: 98% (02 May 2019 16:51) (96% - 98%)    Constitutional: No fever, fatigue  Skin: No rash.  Eyes: No recent vision problems or eye pain.  ENT: No congestion, ear pain, or sore throat.  Cardiovascular: No chest pain or palpation.  Respiratory: No cough, shortness of breath, congestion, or wheezing.  Gastrointestinal: No abdominal pain, nausea, vomiting, or diarrhea.  Genitourinary: No dysuria.  Musculoskeletal: No joint swelling.  Neurologic: No headache.    PHYSICAL EXAM:  GENERAL: NAD  EYES: EOMI, PERRLA  NECK: Supple, No JVD  CHEST/LUNG: dec breath sounds at bases   HEART:  S1 , S2 +  ABDOMEN: soft , bs+  EXTREMITIES:  no edema  NEUROLOGY:alert awake oriented to place and person     LABS:      137  |  99  |  10  ----------------------------<  78  3.9   |  24  |  0.78    Ca    9.1      01 May 2019 07:21  Mg     2.1     05-01      Creatinine Trend: 0.78 <--, 0.82 <--                        12.3   7.03  )-----------( 215      ( 01 May 2019 09:39 )             37.5     Urine Studies:  Urinalysis Basic - ( 2019 09:49 )    Color: Light Yellow / Appearance: Clear / S.012 / pH:   Gluc:  / Ketone: Negative  / Bili: Negative / Urobili: Negative   Blood:  / Protein: Negative / Nitrite: Negative   Leuk Esterase: Negative / RBC:  / WBC    Sq Epi:  / Non Sq Epi:  / Bacteria:                   Consultant(s) Notes Reviewed:      Care Discussed with Consultants/Other Providers:
SUBJECTIVE / OVERNIGHT EVENTS: pt denies headache, nausea,  v    MEDICATIONS  (STANDING):  cyanocobalamin 1000 MICROGram(s) Oral daily  diVALproex DR 1000 milliGRAM(s) Oral <User Schedule>  diVALproex  milliGRAM(s) Oral daily  docusate sodium 100 milliGRAM(s) Oral three times a day  haloperidol    Injectable 5 milliGRAM(s) IV Push once  pantoprazole    Tablet 40 milliGRAM(s) Oral before breakfast  risperiDONE   Tablet 1 milliGRAM(s) Oral daily  risperiDONE   Tablet 2 milliGRAM(s) Oral at bedtime    MEDICATIONS  (PRN):  acetaminophen   Tablet .. 650 milliGRAM(s) Oral every 6 hours PRN Mild Pain (1 - 3)    Vital Signs Last 24 Hrs  T(C): 36.2 (03 May 2019 07:40), Max: 36.7 (02 May 2019 16:51)  T(F): 97.2 (03 May 2019 07:40), Max: 98.1 (02 May 2019 16:51)  HR: 106 (03 May 2019 07:40) (97 - 106)  BP: 104/68 (03 May 2019 07:40) (104/68 - 111/78)  BP(mean): --  RR: 18 (03 May 2019 07:40) (18 - 18)  SpO2: 97% (03 May 2019 07:40) (97% - 98%)    Constitutional: No fever, fatigue  Skin: No rash.  Eyes: No recent vision problems or eye pain.  ENT: No congestion, ear pain, or sore throat.  Cardiovascular: No chest pain or palpation.  Respiratory: No cough, shortness of breath, congestion, or wheezing.  Gastrointestinal: No abdominal pain, nausea, vomiting, or diarrhea.  Genitourinary: No dysuria.  Musculoskeletal: No joint swelling.  Neurologic: No headache.    PHYSICAL EXAM:  GENERAL: NAD  EYES: EOMI, PERRLA  NECK: Supple, No JVD  CHEST/LUNG: dec breath sounds at bases   HEART:  S1 , S2 +  ABDOMEN: soft , bs+  EXTREMITIES:  no edema  NEUROLOGY:alert awake oriented to place and person     LABS:        Creatinine Trend: 0.78 <--, 0.82 <--                        12.4   6.78  )-----------( 286      ( 03 May 2019 10:01 )             39.1     Urine Studies:  Urinalysis Basic - ( 2019 09:49 )    Color: Light Yellow / Appearance: Clear / S.012 / pH:   Gluc:  / Ketone: Negative  / Bili: Negative / Urobili: Negative   Blood:  / Protein: Negative / Nitrite: Negative   Leuk Esterase: Negative / RBC:  / WBC    Sq Epi:  / Non Sq Epi:  / Bacteria:
[facility] - Jamilah Manning, Akash, Traci, Vanessa, Gildardo Coyne  Office Number:  [phone]    Patient resting comfortably in bed in NAD.  Laying flat with no respiratory distress.  No complaints of chest pain, dyspnea, palpitations, PND, or orthopnea.  feeling much better this morning    ROS: negative unless otherwise mentioned.    Telemetry:  off    MEDICATIONS  (STANDING):  cefepime   IVPB 1000 milliGRAM(s) IV Intermittent every 12 hours  cyanocobalamin 1000 MICROGram(s) Oral daily  diVALproex DR 1000 milliGRAM(s) Oral <User Schedule>  diVALproex  milliGRAM(s) Oral daily  docusate sodium 100 milliGRAM(s) Oral three times a day  haloperidol    Injectable 5 milliGRAM(s) IV Push once  pantoprazole    Tablet 40 milliGRAM(s) Oral before breakfast  risperiDONE   Tablet 1 milliGRAM(s) Oral daily  risperiDONE   Tablet 2 milliGRAM(s) Oral at bedtime    MEDICATIONS  (PRN):  acetaminophen   Tablet .. 650 milliGRAM(s) Oral every 6 hours PRN Mild Pain (1 - 3)      Allergies    No Known Allergies    Intolerances        Vital Signs Last 24 Hrs  T(C): 36.2 (03 May 2019 07:40), Max: 36.7 (02 May 2019 16:51)  T(F): 97.2 (03 May 2019 07:40), Max: 98.1 (02 May 2019 16:51)  HR: 106 (03 May 2019 07:40) (97 - 106)  BP: 104/68 (03 May 2019 07:40) (100/70 - 111/78)  BP(mean): --  RR: 18 (03 May 2019 07:40) (18 - 18)  SpO2: 97% (03 May 2019 07:40) (96% - 98%)    I&O's Summary    02 May 2019 07:01  -  03 May 2019 07:00  --------------------------------------------------------  IN: 560 mL / OUT: 800 mL / NET: -240 mL        ON EXAM:    General: NAD, awake and alert, oriented x 3  HEENT: Mucous membranes are moist, anicteric  Lungs: Non-labored, breath sounds are clear bilaterally, No wheezing, rales or rhonchi  Cardiovascular: Regular, S1 and S2, no murmurs, rubs, or gallops  Gastrointestinal: Bowel Sounds present, soft, nontender.   Lymph: No peripheral edema. No lymphadenopathy.  Skin: No rashes or ulcers  Psych: awake and alert    LABS: All Labs Reviewed:                        12.3   7.03  )-----------( 215      ( 01 May 2019 09:39 )             37.5     01 May 2019 07:21    137    |  99     |  10     ----------------------------<  78     3.9     |  24     |  0.78     Ca    9.1        01 May 2019 07:21  Mg     2.1       01 May 2019 07:21            Blood Culture: Organism --  Gram Stain Blood -- Gram Stain --  Specimen Source .Urine  Culture-Blood --        05-01 @ 09:56  TSH: 2.19  04-30 @ 13:49  TSH: 3.38

## 2019-05-03 NOTE — PROGRESS NOTE ADULT - PROBLEM SELECTOR PROBLEM 4
Secondary malignant neoplasm of brain
CAD (coronary artery disease)
Secondary malignant neoplasm of brain

## 2019-05-03 NOTE — PROGRESS NOTE BEHAVIORAL HEALTH - NSBHFUPINTERVALHXFT_PSY_A_CORE
Patient seen and evaluated by treatment team. Intermittently refusing medications. Patient continues to be manic - grandiose, pressured, has flight of ideas, be distractible. States he slept 3 hrs last night. Patient becomes irritable, aggressive when asked about psychiatric history, continually asks "do you want to push me more." States that he is going to andres the hospital for 10 million dollars, acknowledges that he has been verbally aggressive toward staff but says he should have done "much worse," should have "thrown people out the window."

## 2019-05-03 NOTE — PROGRESS NOTE ADULT - PROBLEM SELECTOR PLAN 1
head ct  frequent neuro checks  neuro sx evaluated pt , no aucte neuro sx intervention  neuro / psych eval
frequent neuro checks  neuro sx evaluated pt , no acute neuro sx intervention  neuro / psych eval
neuro sx evaluated pt , no acute neuro sx intervention  psychiatry evaluated pt - pt has h/o bipolar disorder agitated paranoid towards staff / family members   need inpatient psychiatry for safety reasons  [facility]   no need for echo and antibiotics at present since pt been afebrile and euvolemic

## 2019-05-03 NOTE — PROGRESS NOTE BEHAVIORAL HEALTH - NSBHCHARTREVIEWVS_PSY_A_CORE FT
Vital Signs Last 24 Hrs  T(C): 36.4 (02 May 2019 08:30), Max: 36.7 (02 May 2019 00:15)  T(F): 97.6 (02 May 2019 08:30), Max: 98 (02 May 2019 00:15)  HR: 98 (02 May 2019 08:30) (98 - 118)  BP: 100/70 (02 May 2019 08:30) (100/70 - 130/88)  BP(mean): --  RR: 18 (02 May 2019 08:30) (18 - 19)  SpO2: 96% (02 May 2019 08:30) (96% - 97%)
Vital Signs Last 24 Hrs  T(C): 36.2 (03 May 2019 07:40), Max: 36.7 (02 May 2019 16:51)  T(F): 97.2 (03 May 2019 07:40), Max: 98.1 (02 May 2019 16:51)  HR: 106 (03 May 2019 07:40) (97 - 106)  BP: 104/68 (03 May 2019 07:40) (104/68 - 111/78)  BP(mean): --  RR: 18 (03 May 2019 07:40) (18 - 18)  SpO2: 97% (03 May 2019 07:40) (97% - 98%)

## 2019-05-03 NOTE — PROGRESS NOTE BEHAVIORAL HEALTH - RISK ASSESSMENT
Static factors include chronic illness, hx of psychiatric illness, impulsivity. Modifiable risk factors include current episode. Protective factors include social supports. Intermittently aggressive, threatening towards others (e.g. "I should have thrown them out the window"). Requires inpatient psychiatric hospitalization for safety and stabilization.
Static factors include chronic illness, hx of psychiatric illness, impulsivity. Modifiable risk factors include current episode. Protective factors include social supports. May require inpatient psychiatric hospitalization at this time.

## 2019-05-03 NOTE — PROGRESS NOTE ADULT - ASSESSMENT
53 yr old male with h/o CAD, MI, esophageal cancer s/p esophagectomy XRT and chemotherapy, with brain mets and recent hospital stay s/p resection by on 4/22/19, d/c home on 4/26/19 presenting with ams possibly 2/2 substance-induced aftab
53 yr old male with h/o CAD s/p remote PCI to RCA with residual sig CAD medically managed not on antiplatelet therapy 2/2 recent brain surgery, moderate pericardial effusion, esophageal cancer s/p esophagectomy XRT and chemotherapy, with brain mets and recent hospital stay s/p resection by on 4/22/19  p/w AMS    - No signs of significant ischemia or volume overload.   - EKG without ischemic changes  - Hold antiplatelets given recent neurosurgery  - No tamponade physiology clinically noted  - Would hydrate pt  - Check repeat echo  - Previously has refused statin therapy.   - Monitor and replete electrolytes. Keep K>4.0 and Mg>2.0.  - Further cardiac workup will depend on clinical course.   - All other workup per primary team. Will followup.
53 yr old male with h/o CAD s/p remote PCI to RCA with residual sig CAD medically managed not on antiplatelet therapy 2/2 recent brain surgery, moderate pericardial effusion, esophageal cancer s/p esophagectomy XRT and chemotherapy, with brain mets and recent hospital stay s/p resection by on 4/22/19 p/w AMS    - No signs of significant ischemia or volume overload.   - EKG without ischemic changes  - Hold antiplatelets given recent neurosurgery  - No tamponade physiology clinically noted  - Would hydrate pt  - Check repeat echo to evaluate pericardial effusion  - Previously has refused statin therapy.   - Monitor and replete electrolytes. Keep K>4.0 and Mg>2.0.  - Further cardiac workup will depend on clinical course.   - All other workup per primary team. Will followup.
53 yr old male with h/o CAD, MI, esophageal cancer s/p esophagectomy XRT and chemotherapy, with brain mets and recent hospital stay s/p resection by on 4/22/19, d/c home on 4/26/19 presenting with ams possibly 2/2 substance-induced aftab
53 yr old male with h/o CAD, MI, esophageal cancer s/p esophagectomy XRT and chemotherapy, with brain mets and recent hospital stay s/p resection by on 4/22/19, d/c home on 4/26/19 presenting with ams possibly 2/2 substance-induced aftab

## 2019-05-03 NOTE — PROGRESS NOTE BEHAVIORAL HEALTH - CASE SUMMARY
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 Patient seen and evaluated by psychiatry for AMS and agitation. pt remains paranoid about staff, disorganized, restless, poor insight into illness, rec inc risperdal to 2mg qhs , cont depakote. pt may need psych admission once cleared.
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 Patient seen and evaluated by psychiatry for AMS and agitation. pt irritable, remains disorganized, no si/hi, has poor insight into illness. cont 1:1, pt will be admitted to psych 2pc status, cont current meds.

## 2019-05-03 NOTE — DISCHARGE NOTE PROVIDER - CARE PROVIDERS DIRECT ADDRESSES
,[email or web address],[email or web address],DirectAddress_Unknown

## 2019-05-03 NOTE — PROGRESS NOTE BEHAVIORAL HEALTH - NSBHCHARTREVIEWIMAGING_PSY_A_CORE FT
EXAM:  CT BRAIN                            PROCEDURE DATE:  04/30/2019                Impression: Postop changes again seen as described above.    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                          MARIA A PACHECO M.D., ATTENDING RADIOLOGIST  This document has been electronically signed. Apr 30 2019  8:12AM
EXAM:  CT BRAIN                            PROCEDURE DATE:  04/30/2019                Impression: Postop changes again seen as described above.    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                          MARIA A PACHECO M.D., ATTENDING RADIOLOGIST  This document has been electronically signed. Apr 30 2019  8:12AM

## 2019-05-03 NOTE — PROGRESS NOTE BEHAVIORAL HEALTH - NSBHCHARTREVIEWLAB_PSY_A_CORE FT
12.3   7.03  )-----------( 215      ( 01 May 2019 09:39 )             37.5     05-01    137  |  99  |  10  ----------------------------<  78  3.9   |  24  |  0.78    Ca    9.1      01 May 2019 07:21  Mg     2.1     05-01
12.4   6.78  )-----------( 286      ( 03 May 2019 10:01 )             39.1

## 2019-05-03 NOTE — PROGRESS NOTE ADULT - PROBLEM SELECTOR PLAN 5
CAD s/p inferior wall MI in [facility] in February of 2015 s/p TATE to the mid RCA f  - holding ASA given recent surgery.
CAD s/p inferior wall MI in [facility] in February of 2015 s/p TATE to the mid RCA f  - holding ASA given recent surgery.
Hold HSQ given recent surgery  Regular diet

## 2019-05-03 NOTE — DISCHARGE NOTE PROVIDER - NSDCCPCAREPLAN_GEN_ALL_CORE_FT
PRINCIPAL DISCHARGE DIAGNOSIS  Diagnosis: Altered mental status  Assessment and Plan of Treatment: storm related to 2/2 substance-induced aftab vs pyschiatric illness. As per Psychiatry - diagnosed with bipolar affective disorder. Will be admitted to Pyschiatric facility.      SECONDARY DISCHARGE DIAGNOSES  Diagnosis: Esophageal cancer  Assessment and Plan of Treatment: Please follow up with outpt Oncologist    Diagnosis: Secondary malignant neoplasm of brain  Assessment and Plan of Treatment: Patient is scheduled for outpatient XRT evaluation by Dr. Murphy next week. We are anticipating gamma knife to the resection cavity to prevent recurrence.       Diagnosis: CAD (coronary artery disease)  Assessment and Plan of Treatment: Continue to hold anitplatelet medications due to recent brain surgery   Coronary artery disease is a condition where the arteries the supply the heart muscle get clogges with fatty deposits & puts you at risk for a heart attack  Call your doctor if you have any new pain, pressure, or discomfort in the center of your chest, pain, tingling or discomfort in arms, back, neck, jaw, or stomach, shortness of breath, nausea, vomiting, burping or heartburn, sweating, cold and clammy skin, racing or abnormal heartbeat for more than 10 minutes or if they keep coming & going.  Call 911 and do not tr to get to hospital by care  You can help yourself with lefestyle changes (quitting smoking if you smoke), eat lots of fruits & vegetables & low fat dairy products, not a lot of meat & fatty foods, walk or some form of physical activity most days of the week, lose weight if you are overweight  Take your cardiac medication as prescribed to lower cholesterol, to lower blood pressure, aspirin to prevent blood clots, and diabetes control  Make sure to keep appointments with doctor for cardiac follow up care      Diagnosis: Bipolar affective disorder, currently manic, severe, with psychotic features  Assessment and Plan of Treatment: [facility]

## 2019-05-03 NOTE — PROGRESS NOTE ADULT - PROBLEM SELECTOR PLAN 2
Seen by psych. U tox positive for benzos however pt received benzos in ed  - Will start depakote 500mg BID today, increase to 500mg qAM, 1000mg qHS tomorrow, depakote level after 3 days.   - Start Risperdal 1mg qHS today, increase to 1mg BID   - Haldol 5mg PO/IM/IV PRN q6hrs for severe agitation
Seen by psych. U tox positive for benzos however pt received benzos in ed  - Will start depakote 500mg BID today, increase to 500mg qAM, 1000mg qHS tomorrow, depakote level after 3 days.   - Start Risperdal 1mg qHS today, increase to 1mg BID   - Haldol 5mg PO/IM/IV PRN q6hrs for severe agitation
s/p robotic [place] Esophagogastrectomy on 6/8/18 with osseous mets to the Rt hemisacrum s/p neoadjuvant chemotherapy and RT  - Outpatient Onc follow up

## 2019-05-03 NOTE — PROGRESS NOTE BEHAVIORAL HEALTH - NSBHCONSULTMEDS_PSY_A_CORE FT
Continue Risperdal 1mg qAM, 2mg qHS  Continue Depakote 500mg qAM, 1000mg qHS. Draw level before AM dose on Sat 5/4  Haldol 5mg PO/IM/IV PRN q6hrs for agitation
Increased Risperdal to 1mg qAM, 2mg qHS  Continue Depakote 500mg qAM, 1000mg qHS. Draw level before AM dose on Sat 5/4  Haldol 5mg PO/IM/IV PRN q6hrs for agitation

## 2019-05-03 NOTE — PROGRESS NOTE ADULT - PROBLEM SELECTOR PLAN 4
Patient is scheduled for outpatient XRT evaluation by Dr. Murphy next week
CAD s/p inferior wall MI in [facility] in February of 2015 s/p TATE to the mid RCA f  - holding ASA given recent surgery.
Patient is scheduled for outpatient XRT evaluation by Dr. Murphy next week

## 2019-05-03 NOTE — DISCHARGE NOTE NURSING/CASE MANAGEMENT/SOCIAL WORK - NSDCDPATPORTLINK_GEN_ALL_CORE
You can access the [facility] Portal, offered by [facility], by registering with the following website: [email or web address]

## 2019-05-03 NOTE — DISCHARGE NOTE PROVIDER - CARE PROVIDER_API CALL
Bryan Murphy)  Radiation Oncology  [address]  [place] [address]  Phone: [phone]  Fax: [phone]  Follow Up Time:     Declan Mendez)  Internal Medicine  [address] [place] [address]  Phone: [phone]  Fax: [phone]  Follow Up Time:     Rosaura Rollins)  [facility] Neurosurgery  General  [address] [place] [address], [address]  [place] [address]  Phone: [phone]  Fax: [phone]  Follow Up Time:     Joaquin Fuentes  Oncologist   [phone]  Phone: (   )    -  Fax: (   )    -  Follow Up Time:     [facility],   [address] [place] [id number]  Phone: [phone]  Phone: (   )    -  Fax: (   )    -  Follow Up Time:

## 2019-05-03 NOTE — DISCHARGE NOTE PROVIDER - PROVIDER TOKENS
PROVIDER:[TOKEN:[[id number]]],PROVIDER:[TOKEN:[[id number]]],PROVIDER:[TOKEN:[[id number]]],FREE:[LAST:[Alfredo],FIRST:[Joaquin],PHONE:[(   )    -],FAX:[(   )    -],ADDRESS:[Oncologist   [phone]]],FREE:[LAST:[[facility]],PHONE:[(   )    -],FAX:[(   )    -],ADDRESS:[[address] [place] [address]  Phone: [phone]]]

## 2019-05-03 NOTE — PROGRESS NOTE ADULT - PROBLEM SELECTOR PLAN 3
s/p robotic [place] Esophagogastrectomy on 6/8/18 with osseous mets to the Rt hemisacrum s/p neoadjuvant chemotherapy and RT  - Outpatient Onc follow up
Patient is scheduled for outpatient XRT evaluation by Dr. Murphy next week
s/p robotic [place]-Andrew Esophagogastrectomy on 6/8/18 with osseous mets to the Rt hemisacrum s/p neoadjuvant chemotherapy and RT  - Outpatient Onc follow up

## 2019-05-03 NOTE — DISCHARGE NOTE PROVIDER - NSDCFUADDINST_GEN_ALL_CORE_FT
Please follow up with your own Oncologist, Dr. Fuentes. [facility] information is also provided should you want to continue with [facility] follow up .  Please follow up with Neuro Rad/Onc- as you have an appointment  Please follow up with NeuroSx - follow up with removal of staples

## 2019-05-03 NOTE — PROGRESS NOTE BEHAVIORAL HEALTH - NSBHCHARTREVIEWINVESTIGATE_PSY_A_CORE FT
Ventricular Rate 114 BPM    Atrial Rate 114 BPM    P-R Interval 168 ms    QRS Duration 88 ms    Q-T Interval 326 ms    QTC Calculation(Bezet) 449 ms    P Axis 60 degrees    R Axis -30 degrees    T Axis 24 degrees    Diagnosis Line SINUS TACHYCARDIA  LEFT AXIS DEVIATION  POSSIBLE INFERIOR INFARCT , AGE UNDETERMINED  ABNORMAL ECG    Confirmed by ATTENDING, ED ([id number]),  AGATHA MEDINA ([id number]) on 4/30/2019 7:51:35 AM
Ventricular Rate 114 BPM    Atrial Rate 114 BPM    P-R Interval 168 ms    QRS Duration 88 ms    Q-T Interval 326 ms    QTC Calculation(Bezet) 449 ms    P Axis 60 degrees    R Axis -30 degrees    T Axis 24 degrees    Diagnosis Line SINUS TACHYCARDIA  LEFT AXIS DEVIATION  POSSIBLE INFERIOR INFARCT , AGE UNDETERMINED  ABNORMAL ECG    Confirmed by ATTENDING, ED ([id number]),  AGATHA MEDINA ([id number]) on 4/30/2019 7:51:35 AM

## 2019-05-03 NOTE — PROGRESS NOTE BEHAVIORAL HEALTH - SUMMARY
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 Patient seen and evaluated by psychiatry for AMS and agitation.  Patient continues to be manic, at times restless, poor sleep. Psychiatric hx unclear but appears to have had episode of psychosis vs aftab in adolescence. Continue Risperdal 1mg qAM, 2mg qHS. Continue Depakote 500mg qAM and 1000mg qHS, with level before AM dose on Sat 5/4. Haldol 5mg PO/IM/IV PRN for severe agitation. Patient intermittently aggressive, threatening toward staff and others. Requires inpatient psychiatric hospitalization.
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 Patient seen and evaluated by psychiatry for AMS and agitation.  Patient continues to be manic, at times restless, poor sleep. Psychiatric hx unclear but appears to have had episode of psychosis vs aftab in adolescence. Can increase Risperdal to 1mg qAM, 2mg qHS. Continue Depakote 500mg qAM and 1000mg qHS, with level before AM dose on Sat 5/4. Haldol 5mg PO/IM/IV PRN for severe agitation. May require inpatient psychiatric admission when medically cleared.

## 2019-05-03 NOTE — PROGRESS NOTE BEHAVIORAL HEALTH - PRIMARY DX
Substance induced mood disorder Bipolar affective disorder, currently manic, severe, with psychotic features

## 2019-05-03 NOTE — DISCHARGE NOTE PROVIDER - HOSPITAL COURSE
53 yr old male with h/o CAD, MI, esophageal cancer s/p esophagectomy XRT and chemotherapy, with brain mets and recent hospital stay s/p resection by on 4/22/19, d/c home on 4/26/19 presenting with ams possibly 2/2 substance-induced aftab. Neuro sx evaluated pt , no acute neuro sx intervention    Psychiatry evaluated pt, dx with Bipolar affective disorder, currently manic, severe, with psychotic features. Psychiatric hx unclear but appears to have had episode of psychosis vs aftab in adolescence. Patient intermittently aggressive, threatening toward staff and others- need inpatient psychiatry for safety reasons    Pt medically clear for transfer to inpatient psych. No need for echo and antibiotics at present since pt been afebrile and euvolemic. Although pt with  CAD s/p inferior wall MI  in February of 2015 s/p TATE to the mid RCA - holding ASA given recent surgery.     Pt stable for discharge to inpt psych facility and to follow up with PCP, NeuroSX, RT/ONC, Cards, and his Oncologist.

## 2019-05-04 RX ORDER — HALOPERIDOL DECANOATE 100 MG/ML
5 INJECTION INTRAMUSCULAR ONCE
Qty: 0 | Refills: 0 | Status: COMPLETED | OUTPATIENT
Start: 2019-05-04 | End: 2019-05-04

## 2019-05-04 RX ADMIN — Medication 100 MILLIGRAM(S): at 21:45

## 2019-05-04 RX ADMIN — HALOPERIDOL DECANOATE 5 MILLIGRAM(S): 100 INJECTION INTRAMUSCULAR at 09:18

## 2019-05-04 RX ADMIN — Medication 50 MILLIGRAM(S): at 23:32

## 2019-05-04 RX ADMIN — HALOPERIDOL DECANOATE 5 MILLIGRAM(S): 100 INJECTION INTRAMUSCULAR at 23:32

## 2019-05-04 RX ADMIN — Medication 2 MILLIGRAM(S): at 23:32

## 2019-05-04 RX ADMIN — DIVALPROEX SODIUM 1000 MILLIGRAM(S): 500 TABLET, DELAYED RELEASE ORAL at 21:45

## 2019-05-04 RX ADMIN — RISPERIDONE 2 MILLIGRAM(S): 4 TABLET ORAL at 21:45

## 2019-05-04 RX ADMIN — Medication 2 MILLIGRAM(S): at 09:18

## 2019-05-04 NOTE — CONSULT NOTE ADULT - ASSESSMENT
53 year old male with acute mental status changes after craniostomy for metastatic mass with a hx of esophageal CA treated with steroid post off.  1.  CAD S/P MI WITH STENT:  NO CHEST PAIN.  2.  ESOPHAGEAL CA S/P RESECTION 06/2018 WITH METS TO BONES AND BRAIN S/P CRANIOTOMY FOR MASS 04/22/2019, S/P CHEMO NOW PENDING GAMMA KNIFE RADIATION.  PT SCHEDULED FOR  EVALUATION DR. IRELAND NEXT WEEK.  3.  GERD:  PROTONIX  4.  CONSTIPATION:  COLACE.  5.  B12 DEF:  REPLACED DAILY  6.  PSYCH: AS PER ATTENDING 53 year old male with acute mental status changes after craniostomy for metastatic mass with a hx of esophageal CA treated with steroid post off.  1.  CAD S/P MI WITH STENT:  NO CHEST PAIN.  2.  ESOPHAGEAL CA S/P RESECTION 06/2018 WITH METS TO BONES AND BRAIN S/P CRANIOTOMY FOR MASS 04/22/2019, S/P CHEMO NOW PENDING GAMMA KNIFE RADIATION.  PT SCHEDULED FOR  EVALUATION DR. IRELAND NEXT WEEK.  3.  GERD:  PROTONIX  4.  CONSTIPATION:  COLACE AND METAMUCIL  5.  B12 DEF:  REPLACED DAILY PO  6.  PSYCH: AS PER ATTENDING 53 year old male with acute mental status changes after craniostomy for metastatic mass with a hx of esophageal CA treated with steroid post off.  1.  CAD S/P MI WITH STENT:  NO CHEST PAIN.  2.  ESOPHAGEAL CA S/P RESECTION 06/2018 WITH METS TO BONES AND BRAIN S/P CRANIOTOMY FOR MASS 04/22/2019, S/P CHEMO NOW PENDING GAMMA KNIFE RADIATION.  PT SCHEDULED FOR  EVALUATION DR. IRELAND NEXT WEEK.  PT HAS STAPLES BACK OF HEAD AND WILL NEED TO BE REMOVED.  NEED TO CHECK WITH NEUROSURGERY WHEN TO REMOVE.  3.  GERD:  PROTONIX  4.  CONSTIPATION:  COLACE AND METAMUCIL  5.  B12 DEF:  REPLACED DAILY PO  6.  PSYCH: AS PER ATTENDING

## 2019-05-04 NOTE — CONSULT NOTE ADULT - CONSULT REASON
Asked by attending to see this 53 year old male with acute mental status changes thought to be steroid induced, admitted to [facility] with multiple medical problems including Metastatic Esophageal CA s/p resection, chemo and now resection of brain met on 04/22/2019 pending gamma knife radiation, +CAD s/p MI with stent and other medical problems now transferred to [facility].

## 2019-05-04 NOTE — CONSULT NOTE ADULT - SUBJECTIVE AND OBJECTIVE BOX
HPI:   53 year old male with CAD s/p MI, with one stent , Esophagela CA s/p resection  with mets, chemo.  Pt s/p resection of brain met 2019 and discharged home 2019 on steroids.  Pt with acute mental status changes and readmitted to [facility] medicine 3019.      +GERD, +CONSTIPATION.    PAST MEDICAL & SURGICAL HISTORY:  CAD (coronary artery disease)  Secondary malignant neoplasm of brain  Metastasis to bone  Esophageal cancer  MI (myocardial infarction):  with 1 coronary stent mid RCA  H/O craniotomy  History of esophageal surgery: 2018  H/O hernia repair: 1966  Status post tonsillectomy and adenoidectomy  Stented coronary artery: x1       Review of Systems:   CONSTITUTIONAL: No fever, weight loss, or fatigue  EYES: No eye pain, visual disturbances, or discharge  ENT:  No difficulty hearing, tinnitus, vertigo; No sinus or throat pain  NECK: No pain or stiffness  RESPIRATORY: No cough, wheezing, chills or hemoptysis; No shortness of breath  CARDIOVASCULAR: S/P MI WITH STENT   GASTROINTESTINAL: ESOPHAGEAL CA WITH METS  GENITOURINARY: No dysuria, frequency, hematuria, or incontinence  NEUROLOGICAL:  BRAIN MET S/P RESECTION 2019  SKIN: No itching, burning, rashes, or lesions   LYMPH NODES: No enlarged glands  ENDOCRINE: No heat or cold intolerance; No hair loss  MUSCULOSKELETAL: No joint pain or swelling; No muscle, back, or extremity pain  HEME/LYMPH: No easy bruising, or bleeding gums  ALLERY AND IMMUNOLOGIC: No hives or eczema    Allergies    No Known Allergies    Social History:   -CIGS, -ETOH. -DRUGS    FAMILY HISTORY:  Family history of cervical cancer  Family history of throat cancer      MEDICATIONS  (STANDING):  cyanocobalamin 1000 MICROGram(s) Oral daily  diVALproex  milliGRAM(s) Oral daily  diVALproex DR 1000 milliGRAM(s) Oral at bedtime  docusate sodium 100 milliGRAM(s) Oral three times a day  pantoprazole    Tablet 40 milliGRAM(s) Oral before breakfast  risperiDONE   Tablet 1 milliGRAM(s) Oral daily  risperiDONE   Tablet 2 milliGRAM(s) Oral at bedtime    MEDICATIONS  (PRN):  acetaminophen   Tablet .. 650 milliGRAM(s) Oral every 6 hours PRN Temp greater or equal to 38.5C (101.3F)  diphenhydrAMINE 50 milliGRAM(s) Oral every 4 hours PRN Extrapyramidal prophylaxis  diphenhydrAMINE   Injectable 50 milliGRAM(s) IntraMuscular every 6 hours PRN Extrapyramidal prophylaxis  haloperidol     Tablet 5 milliGRAM(s) Oral every 4 hours PRN agitation  haloperidol    Injectable 5 milliGRAM(s) IntraMuscular once PRN agitation  LORazepam     Tablet 2 milliGRAM(s) Oral every 4 hours PRN Agitation  LORazepam   Injectable 2 milliGRAM(s) IntraMuscular once PRN Agitation  melatonin. 3 milliGRAM(s) Oral at bedtime PRN Insomnia  polyethylene glycol 3350 17 Gram(s) Oral daily PRN Constipation    VS:  112/75  90 SITTING 99/75 80 STANDING T 98.0    PHYSICAL EXAM:  GENERAL: NAD, well-developed  HEAD:  S/PCRANIOTOMY  EYES:  EOMI,, conjunctiva and sclera clear  NECK: Supple, No JVD  CHEST/LUNG: Clear to auscultation bilaterally; No wheeze  HEART: Regular rate and rhythm; No murmurs, rubs, or gallops  ABDOMEN: Soft, Nontender, Nondistended; Bowel sounds present  EXTREMITIES:   No clubbing, cyanosis, or edema  NEUROLOGY: non-focal  SKIN: No rashes or lesions    LABS:                        12.4   6.78  )-----------( 286      ( 03 May 2019 10:01 )             39.1     2019  TSH 2.19   K 3.9 CL 99 CO2 24 GLUC 78 BUN 10 CR 0.78   CA 9.1 MG 2.1     2019  PROT 6.1 ALB 3.2 ALK PHOS 60 SGOT 22 SGPT 22    2019 U/A NEG    2019  HGAIC 5.1    EK2019  ST LAD NONSPECIFIC CHANGES QTC .449    RADIOLOGY & ADDITIONAL TESTS:    2019  CT OF HEAD:  SUBOCCIPITAL CRANIOTOMY AND MESH LIKE CRANIOPLASTY.  POST OP CHANGES.  2019  CT OF ABD:  GASTRIC CA S/P ESOPHAGECTOMY AND GASTRIC PULL THROUGH.  MESENTERIC NODULARITY  SUSPICIOUS FOR PERITONEAL CARCINOMATOSIS.    2019  MRI OF HEAD:  L CEREBELAR MASS C/W PRIMARY CNS NEOPLASM.    Consultant(s) Notes Reviewed:  NOTES REVIEWED    Care Discussed with Consultants/Other Providers:  ATTENDING AND STAFF. HPI:   53 year old male with CAD s/p MI, with one stent , Esophagela CA s/p resection  with mets, chemo.  Pt s/p resection of brain met 2019 and discharged home 2019 on steroids.  Pt with acute mental status changes and readmitted to [facility] medicine 3019.      +GERD, +CONSTIPATION.    PAST MEDICAL & SURGICAL HISTORY:  CAD (coronary artery disease)  Secondary malignant neoplasm of brain  Metastasis to bone  Esophageal cancer  MI (myocardial infarction):  with 1 coronary stent mid RCA  H/O craniotomy  History of esophageal surgery: 2018  H/O hernia repair: 1966  Status post tonsillectomy and adenoidectomy  Stented coronary artery: x1       Review of Systems:   CONSTITUTIONAL: No fever, weight loss, or fatigue  EYES: No eye pain, visual disturbances, or discharge  ENT:  No difficulty hearing, tinnitus, vertigo; No sinus or throat pain  NECK: No pain or stiffness  RESPIRATORY: No cough, wheezing, chills or hemoptysis; No shortness of breath  CARDIOVASCULAR: S/P MI WITH STENT   GASTROINTESTINAL: ESOPHAGEAL CA WITH METS  GENITOURINARY: No dysuria, frequency, hematuria, or incontinence  NEUROLOGICAL:  BRAIN MET S/P RESECTION 2019  SKIN: No itching, burning, rashes, or lesions   LYMPH NODES: No enlarged glands  ENDOCRINE: No heat or cold intolerance; No hair loss  MUSCULOSKELETAL: No joint pain or swelling; No muscle, back, or extremity pain  HEME/LYMPH: No easy bruising, or bleeding gums  ALLERY AND IMMUNOLOGIC: No hives or eczema    Allergies    No Known Allergies    Social History:   -CIGS, -ETOH. -DRUGS    FAMILY HISTORY:  Family history of cervical cancer  Family history of throat cancer      MEDICATIONS  (STANDING):  cyanocobalamin 1000 MICROGram(s) Oral daily  diVALproex  milliGRAM(s) Oral daily  diVALproex DR 1000 milliGRAM(s) Oral at bedtime  docusate sodium 100 milliGRAM(s) Oral three times a day  pantoprazole    Tablet 40 milliGRAM(s) Oral before breakfast  risperiDONE   Tablet 1 milliGRAM(s) Oral daily  risperiDONE   Tablet 2 milliGRAM(s) Oral at bedtime    MEDICATIONS  (PRN):  acetaminophen   Tablet .. 650 milliGRAM(s) Oral every 6 hours PRN Temp greater or equal to 38.5C (101.3F)  diphenhydrAMINE 50 milliGRAM(s) Oral every 4 hours PRN Extrapyramidal prophylaxis  diphenhydrAMINE   Injectable 50 milliGRAM(s) IntraMuscular every 6 hours PRN Extrapyramidal prophylaxis  haloperidol     Tablet 5 milliGRAM(s) Oral every 4 hours PRN agitation  haloperidol    Injectable 5 milliGRAM(s) IntraMuscular once PRN agitation  LORazepam     Tablet 2 milliGRAM(s) Oral every 4 hours PRN Agitation  LORazepam   Injectable 2 milliGRAM(s) IntraMuscular once PRN Agitation  melatonin. 3 milliGRAM(s) Oral at bedtime PRN Insomnia  polyethylene glycol 3350 17 Gram(s) Oral daily PRN Constipation    VS:  112/75  90 SITTING 99/75 80 STANDING T 98.0    PHYSICAL EXAM:  GENERAL: NAD, well-developed  HEAD:  S/PCRANIOTOMY  EYES:  EOMI,, conjunctiva and sclera clear  NECK: Supple, No JVD  CHEST/LUNG: Clear to auscultation bilaterally; No wheeze  HEART: Regular rate and rhythm; No murmurs, rubs, or gallops  ABDOMEN: Soft, Nontender, Nondistended; Bowel sounds present  EXTREMITIES:   No clubbing, cyanosis, or edema  NEUROLOGY: non-focal  SKIN: No rashes or lesions    LABS:                        12.4   6.78  )-----------( 286      ( 03 May 2019 10:01 )             39.1     2019  TSH 2.19   K 3.9 CL 99 CO2 24 GLUC 78 BUN 10 CR 0.78   CA 9.1 MG 2.1     2019  PROT 6.1 ALB 3.2 ALK PHOS 60 SGOT 22 SGPT 22    2019 U/A NEG    2019  HGAIC 5.1    EK2019  ST LAD NONSPECIFIC CHANGES QTC .449    RADIOLOGY & ADDITIONAL TESTS:    2019  CT OF HEAD:  SUBOCCIPITAL CRANIOTOMY AND MESH LIKE CRANIOPLASTY.  POST OP CHANGES.  2019  CT OF ABD:  GASTRIC CA S/P ESOPHAGECTOMY AND GASTRIC PULL THROUGH.  MESENTERIC NODULARITY  SUSPICIOUS FOR PERITONEAL CARCINOMATOSIS.    2019  MRI OF HEAD:  L CEREBELAR MASS C/W PRIMARY CNS NEOPLASM.    Consultant(s) Notes Reviewed:  NOTES REVIEWED    Care Discussed with Consultants/Other Providers:  ATTENDING AND STAFF. HPI:   53 year old male with CAD s/p MI, with one stent , Esophagela CA s/p resection  with mets, chemo.  Pt s/p resection of brain met 2019 and discharged home 2019 on steroids.  Pt with acute mental status changes and readmitted to [facility] medicine 3019.      +GERD, +CONSTIPATION.    ON UNIT PATIENT VERY AGITATED AND AGGRESSIVE AS PER NURSING.    PAST MEDICAL & SURGICAL HISTORY:  CAD (coronary artery disease)  Secondary malignant neoplasm of brain  Metastasis to bone  Esophageal cancer  MI (myocardial infarction):  with 1 coronary stent mid RCA  H/O craniotomy  History of esophageal surgery: 2018  H/O hernia repair: 1966  Status post tonsillectomy and adenoidectomy  Stented coronary artery: x1       Review of Systems:   PT SEDATED WITH PRN.  ALL SYSTEMS REVIEWED BUT PT TOO SLEEPY TO RESPOND.      Allergies    No Known Allergies    Social History:   -CIGS, -ETOH. -DRUGS    FAMILY HISTORY:  Family history of cervical cancer  Family history of throat cancer      MEDICATIONS  (STANDING):  cyanocobalamin 1000 MICROGram(s) Oral daily  diVALproex  milliGRAM(s) Oral daily  diVALproex DR 1000 milliGRAM(s) Oral at bedtime  docusate sodium 100 milliGRAM(s) Oral three times a day  pantoprazole    Tablet 40 milliGRAM(s) Oral before breakfast  risperiDONE   Tablet 1 milliGRAM(s) Oral daily  risperiDONE   Tablet 2 milliGRAM(s) Oral at bedtime    MEDICATIONS  (PRN):  acetaminophen   Tablet .. 650 milliGRAM(s) Oral every 6 hours PRN Temp greater or equal to 38.5C (101.3F)  diphenhydrAMINE 50 milliGRAM(s) Oral every 4 hours PRN Extrapyramidal prophylaxis  diphenhydrAMINE   Injectable 50 milliGRAM(s) IntraMuscular every 6 hours PRN Extrapyramidal prophylaxis  haloperidol     Tablet 5 milliGRAM(s) Oral every 4 hours PRN agitation  haloperidol    Injectable 5 milliGRAM(s) IntraMuscular once PRN agitation  LORazepam     Tablet 2 milliGRAM(s) Oral every 4 hours PRN Agitation  LORazepam   Injectable 2 milliGRAM(s) IntraMuscular once PRN Agitation  melatonin. 3 milliGRAM(s) Oral at bedtime PRN Insomnia  polyethylene glycol 3350 17 Gram(s) Oral daily PRN Constipation    VS:  112/75  90 SITTING 99/75 80 STANDING T 98.0    PHYSICAL EXAM:  GENERAL: NAD, well-developed SEDATED  HEAD:  S/PCRANIOTOMY  WITH STAPLES POSTERIOR HEAD  EYES:  EOMI,, conjunctiva and sclera clear  NECK: Supple, No JVD  CHEST/LUNG: Clear to auscultation bilaterally; No wheeze  HEART: Regular rate and rhythm; No murmurs, rubs, or gallops  ABDOMEN: Soft, Nontender, Nondistended; Bowel sounds present  EXTREMITIES:   No clubbing, cyanosis, or edema  NEUROLOGY: non-focal  SKIN: No rashes or lesions    LABS:                        12.4   6.78  )-----------( 286      ( 03 May 2019 10:01 )             39.1     2019  TSH 2.19   K 3.9 CL 99 CO2 24 GLUC 78 BUN 10 CR 0.78   CA 9.1 MG 2.1     2019  PROT 6.1 ALB 3.2 ALK PHOS 60 SGOT 22 SGPT 22    2019 U/A NEG    2019  HGAIC 5.1    EK2019  ST LAD NONSPECIFIC CHANGES QTC .449    RADIOLOGY & ADDITIONAL TESTS:    2019  CT OF HEAD:  SUBOCCIPITAL CRANIOTOMY AND MESH LIKE CRANIOPLASTY.  POST OP CHANGES.  2019  CT OF ABD:  GASTRIC CA S/P ESOPHAGECTOMY AND GASTRIC PULL THROUGH.  MESENTERIC NODULARITY  SUSPICIOUS FOR PERITONEAL CARCINOMATOSIS.    2019  MRI OF HEAD:  L CEREBELAR MASS C/W PRIMARY CNS NEOPLASM.    Consultant(s) Notes Reviewed:  NOTES REVIEWED    Care Discussed with Consultants/Other Providers:  ATTENDING AND STAFF.

## 2019-05-05 PROCEDURE — 99232 SBSQ HOSP IP/OBS MODERATE 35: CPT

## 2019-05-05 RX ORDER — SENNA PLUS 8.6 MG/1
2 TABLET ORAL ONCE
Qty: 0 | Refills: 0 | Status: COMPLETED | OUTPATIENT
Start: 2019-05-05 | End: 2019-05-05

## 2019-05-05 RX ORDER — HYDROXYZINE HCL 10 MG
50 TABLET ORAL ONCE
Qty: 0 | Refills: 0 | Status: COMPLETED | OUTPATIENT
Start: 2019-05-05 | End: 2019-05-05

## 2019-05-05 RX ORDER — HYDROMORPHONE HYDROCHLORIDE 2 MG/ML
2 INJECTION INTRAMUSCULAR; INTRAVENOUS; SUBCUTANEOUS EVERY 6 HOURS
Qty: 0 | Refills: 0 | Status: DISCONTINUED | OUTPATIENT
Start: 2019-05-05 | End: 2019-05-06

## 2019-05-05 RX ORDER — PHENYLEPHRINE-SHARK LIVER OIL-MINERAL OIL-PETROLATUM RECTAL OINTMENT
1 OINTMENT (GRAM) RECTAL EVERY 4 HOURS
Qty: 0 | Refills: 0 | Status: DISCONTINUED | OUTPATIENT
Start: 2019-05-05 | End: 2019-05-07

## 2019-05-05 RX ORDER — ACETAMINOPHEN 500 MG
650 TABLET ORAL EVERY 6 HOURS
Qty: 0 | Refills: 0 | Status: DISCONTINUED | OUTPATIENT
Start: 2019-05-05 | End: 2019-05-16

## 2019-05-05 RX ADMIN — RISPERIDONE 1 MILLIGRAM(S): 4 TABLET ORAL at 09:24

## 2019-05-05 RX ADMIN — PREGABALIN 1000 MICROGRAM(S): 225 CAPSULE ORAL at 09:24

## 2019-05-05 RX ADMIN — PANTOPRAZOLE SODIUM 40 MILLIGRAM(S): 20 TABLET, DELAYED RELEASE ORAL at 09:24

## 2019-05-05 RX ADMIN — Medication 650 MILLIGRAM(S): at 14:35

## 2019-05-05 RX ADMIN — DIVALPROEX SODIUM 500 MILLIGRAM(S): 500 TABLET, DELAYED RELEASE ORAL at 09:24

## 2019-05-05 RX ADMIN — POLYETHYLENE GLYCOL 3350 17 GRAM(S): 17 POWDER, FOR SOLUTION ORAL at 09:00

## 2019-05-05 RX ADMIN — DIVALPROEX SODIUM 1000 MILLIGRAM(S): 500 TABLET, DELAYED RELEASE ORAL at 20:24

## 2019-05-05 RX ADMIN — RISPERIDONE 2 MILLIGRAM(S): 4 TABLET ORAL at 20:24

## 2019-05-05 RX ADMIN — Medication 100 MILLIGRAM(S): at 09:24

## 2019-05-05 RX ADMIN — HYDROMORPHONE HYDROCHLORIDE 2 MILLIGRAM(S): 2 INJECTION INTRAMUSCULAR; INTRAVENOUS; SUBCUTANEOUS at 13:45

## 2019-05-05 RX ADMIN — SENNA PLUS 2 TABLET(S): 8.6 TABLET ORAL at 22:57

## 2019-05-05 RX ADMIN — Medication 650 MILLIGRAM(S): at 15:20

## 2019-05-05 RX ADMIN — Medication 100 MILLIGRAM(S): at 13:11

## 2019-05-05 RX ADMIN — Medication 100 MILLIGRAM(S): at 20:24

## 2019-05-05 RX ADMIN — HYDROMORPHONE HYDROCHLORIDE 2 MILLIGRAM(S): 2 INJECTION INTRAMUSCULAR; INTRAVENOUS; SUBCUTANEOUS at 12:45

## 2019-05-05 NOTE — CHART NOTE - NSCHARTNOTEFT_GEN_A_CORE
53 year old male with CAD s/p MI, with one stent 2015, Esophagela CA s/p resection 2018 with mets, chemo.  Pt s/p resection of brain met 04/22/2019 and discharged home 04/26/2019 on steroids.  Pt with acute mental status changes and readmitted to [facility] medicine 04/30/3019 presenitng today complaining of hemorrhoids and constipation x 7days. Pt states he has not had "full BM" for a week now. Pt also complaining of hemorrhoid pain. Denies any blood in stool. Pt refused to allow me to complete physical exam. Order for Preparation H for hemorrhoid pain and Dulcolax for constipation placed. Will continue to monitor pt overnight.

## 2019-05-06 PROCEDURE — 90832 PSYTX W PT 30 MINUTES: CPT

## 2019-05-06 PROCEDURE — 99232 SBSQ HOSP IP/OBS MODERATE 35: CPT

## 2019-05-06 RX ORDER — HYDROMORPHONE HYDROCHLORIDE 2 MG/ML
4 INJECTION INTRAMUSCULAR; INTRAVENOUS; SUBCUTANEOUS EVERY 6 HOURS
Qty: 0 | Refills: 0 | Status: DISCONTINUED | OUTPATIENT
Start: 2019-05-06 | End: 2019-05-07

## 2019-05-06 RX ADMIN — Medication 650 MILLIGRAM(S): at 09:03

## 2019-05-06 RX ADMIN — Medication 650 MILLIGRAM(S): at 02:35

## 2019-05-06 RX ADMIN — HYDROMORPHONE HYDROCHLORIDE 2 MILLIGRAM(S): 2 INJECTION INTRAMUSCULAR; INTRAVENOUS; SUBCUTANEOUS at 17:03

## 2019-05-06 RX ADMIN — HYDROMORPHONE HYDROCHLORIDE 2 MILLIGRAM(S): 2 INJECTION INTRAMUSCULAR; INTRAVENOUS; SUBCUTANEOUS at 01:56

## 2019-05-06 RX ADMIN — HYDROMORPHONE HYDROCHLORIDE 4 MILLIGRAM(S): 2 INJECTION INTRAMUSCULAR; INTRAVENOUS; SUBCUTANEOUS at 20:55

## 2019-05-06 RX ADMIN — HYDROMORPHONE HYDROCHLORIDE 2 MILLIGRAM(S): 2 INJECTION INTRAMUSCULAR; INTRAVENOUS; SUBCUTANEOUS at 09:12

## 2019-05-06 RX ADMIN — Medication 650 MILLIGRAM(S): at 17:02

## 2019-05-06 RX ADMIN — RISPERIDONE 1 MILLIGRAM(S): 4 TABLET ORAL at 08:12

## 2019-05-06 RX ADMIN — Medication 650 MILLIGRAM(S): at 10:03

## 2019-05-06 RX ADMIN — PHENYLEPHRINE-SHARK LIVER OIL-MINERAL OIL-PETROLATUM RECTAL OINTMENT 1 APPLICATION(S): at 18:34

## 2019-05-06 RX ADMIN — DIVALPROEX SODIUM 1000 MILLIGRAM(S): 500 TABLET, DELAYED RELEASE ORAL at 20:55

## 2019-05-06 RX ADMIN — PREGABALIN 1000 MICROGRAM(S): 225 CAPSULE ORAL at 08:11

## 2019-05-06 RX ADMIN — HYDROMORPHONE HYDROCHLORIDE 4 MILLIGRAM(S): 2 INJECTION INTRAMUSCULAR; INTRAVENOUS; SUBCUTANEOUS at 21:55

## 2019-05-06 RX ADMIN — Medication 100 MILLIGRAM(S): at 13:15

## 2019-05-06 RX ADMIN — Medication 3 MILLIGRAM(S): at 01:34

## 2019-05-06 RX ADMIN — PANTOPRAZOLE SODIUM 40 MILLIGRAM(S): 20 TABLET, DELAYED RELEASE ORAL at 08:12

## 2019-05-06 RX ADMIN — HYDROMORPHONE HYDROCHLORIDE 2 MILLIGRAM(S): 2 INJECTION INTRAMUSCULAR; INTRAVENOUS; SUBCUTANEOUS at 08:12

## 2019-05-06 RX ADMIN — Medication 650 MILLIGRAM(S): at 01:35

## 2019-05-06 RX ADMIN — DIVALPROEX SODIUM 500 MILLIGRAM(S): 500 TABLET, DELAYED RELEASE ORAL at 08:11

## 2019-05-06 RX ADMIN — Medication 100 MILLIGRAM(S): at 08:11

## 2019-05-06 RX ADMIN — HYDROMORPHONE HYDROCHLORIDE 2 MILLIGRAM(S): 2 INJECTION INTRAMUSCULAR; INTRAVENOUS; SUBCUTANEOUS at 00:56

## 2019-05-06 RX ADMIN — Medication 100 MILLIGRAM(S): at 20:55

## 2019-05-06 RX ADMIN — RISPERIDONE 2 MILLIGRAM(S): 4 TABLET ORAL at 20:55

## 2019-05-07 PROCEDURE — 99232 SBSQ HOSP IP/OBS MODERATE 35: CPT

## 2019-05-07 RX ORDER — PHENYLEPHRINE-SHARK LIVER OIL-MINERAL OIL-PETROLATUM RECTAL OINTMENT
1 OINTMENT (GRAM) RECTAL EVERY 4 HOURS
Qty: 0 | Refills: 0 | Status: DISCONTINUED | OUTPATIENT
Start: 2019-05-07 | End: 2019-05-16

## 2019-05-07 RX ORDER — ONDANSETRON 8 MG/1
4 TABLET, FILM COATED ORAL ONCE
Qty: 0 | Refills: 0 | Status: DISCONTINUED | OUTPATIENT
Start: 2019-05-07 | End: 2019-05-16

## 2019-05-07 RX ORDER — HYDROMORPHONE HYDROCHLORIDE 2 MG/ML
4 INJECTION INTRAMUSCULAR; INTRAVENOUS; SUBCUTANEOUS EVERY 6 HOURS
Qty: 0 | Refills: 0 | Status: DISCONTINUED | OUTPATIENT
Start: 2019-05-07 | End: 2019-05-14

## 2019-05-07 RX ADMIN — Medication 100 MILLIGRAM(S): at 21:51

## 2019-05-07 RX ADMIN — DIVALPROEX SODIUM 1000 MILLIGRAM(S): 500 TABLET, DELAYED RELEASE ORAL at 21:51

## 2019-05-07 RX ADMIN — Medication 100 MILLIGRAM(S): at 08:28

## 2019-05-07 RX ADMIN — PREGABALIN 1000 MICROGRAM(S): 225 CAPSULE ORAL at 08:28

## 2019-05-07 RX ADMIN — RISPERIDONE 1 MILLIGRAM(S): 4 TABLET ORAL at 08:28

## 2019-05-07 RX ADMIN — Medication 2 MILLIGRAM(S): at 23:48

## 2019-05-07 RX ADMIN — RISPERIDONE 2 MILLIGRAM(S): 4 TABLET ORAL at 21:51

## 2019-05-07 RX ADMIN — Medication 100 MILLIGRAM(S): at 12:38

## 2019-05-07 RX ADMIN — Medication 3 MILLIGRAM(S): at 21:47

## 2019-05-07 RX ADMIN — DIVALPROEX SODIUM 500 MILLIGRAM(S): 500 TABLET, DELAYED RELEASE ORAL at 08:28

## 2019-05-07 RX ADMIN — PANTOPRAZOLE SODIUM 40 MILLIGRAM(S): 20 TABLET, DELAYED RELEASE ORAL at 08:28

## 2019-05-07 NOTE — CHART NOTE - NSCHARTNOTEFT_GEN_A_CORE
Approximately 30 staples removed from patient's head and neck.  Surgical wound is healed, clean.  Area sterilized with alcohol wipes, no dressing is required.

## 2019-05-07 NOTE — CHART NOTE - NSCHARTNOTEFT_GEN_A_CORE
54 yo M seen alongside staff for cervical spine staples removal. Pt refusing to have staples removed by provider and is requesting to have them removed by his surgeon. Advised that if he leaves the staples in longer than it may cause infection. He continues to refuse and  asked provider to leave. Unable to remove staples at this time.

## 2019-05-08 PROCEDURE — 99232 SBSQ HOSP IP/OBS MODERATE 35: CPT

## 2019-05-08 RX ADMIN — HYDROMORPHONE HYDROCHLORIDE 4 MILLIGRAM(S): 2 INJECTION INTRAMUSCULAR; INTRAVENOUS; SUBCUTANEOUS at 17:35

## 2019-05-08 RX ADMIN — Medication 100 MILLIGRAM(S): at 20:18

## 2019-05-08 RX ADMIN — DIVALPROEX SODIUM 500 MILLIGRAM(S): 500 TABLET, DELAYED RELEASE ORAL at 08:47

## 2019-05-08 RX ADMIN — PANTOPRAZOLE SODIUM 40 MILLIGRAM(S): 20 TABLET, DELAYED RELEASE ORAL at 07:49

## 2019-05-08 RX ADMIN — RISPERIDONE 2 MILLIGRAM(S): 4 TABLET ORAL at 20:17

## 2019-05-08 RX ADMIN — Medication 2 MILLIGRAM(S): at 22:50

## 2019-05-08 RX ADMIN — PREGABALIN 1000 MICROGRAM(S): 225 CAPSULE ORAL at 08:47

## 2019-05-08 RX ADMIN — Medication 3 MILLIGRAM(S): at 22:50

## 2019-05-08 RX ADMIN — HYDROMORPHONE HYDROCHLORIDE 4 MILLIGRAM(S): 2 INJECTION INTRAMUSCULAR; INTRAVENOUS; SUBCUTANEOUS at 18:15

## 2019-05-08 RX ADMIN — DIVALPROEX SODIUM 1000 MILLIGRAM(S): 500 TABLET, DELAYED RELEASE ORAL at 20:18

## 2019-05-08 RX ADMIN — Medication 2 MILLIGRAM(S): at 13:05

## 2019-05-08 RX ADMIN — RISPERIDONE 1 MILLIGRAM(S): 4 TABLET ORAL at 08:47

## 2019-05-08 RX ADMIN — Medication 100 MILLIGRAM(S): at 13:00

## 2019-05-08 RX ADMIN — Medication 100 MILLIGRAM(S): at 08:47

## 2019-05-09 PROCEDURE — 99232 SBSQ HOSP IP/OBS MODERATE 35: CPT

## 2019-05-09 PROCEDURE — 90832 PSYTX W PT 30 MINUTES: CPT

## 2019-05-09 RX ORDER — IBUPROFEN 200 MG
600 TABLET ORAL EVERY 6 HOURS
Refills: 0 | Status: DISCONTINUED | OUTPATIENT
Start: 2019-05-09 | End: 2019-05-16

## 2019-05-09 RX ADMIN — RISPERIDONE 1 MILLIGRAM(S): 4 TABLET ORAL at 08:46

## 2019-05-09 RX ADMIN — Medication 650 MILLIGRAM(S): at 12:45

## 2019-05-09 RX ADMIN — Medication 600 MILLIGRAM(S): at 15:25

## 2019-05-09 RX ADMIN — Medication 100 MILLIGRAM(S): at 20:40

## 2019-05-09 RX ADMIN — RISPERIDONE 2 MILLIGRAM(S): 4 TABLET ORAL at 20:40

## 2019-05-09 RX ADMIN — PANTOPRAZOLE SODIUM 40 MILLIGRAM(S): 20 TABLET, DELAYED RELEASE ORAL at 07:34

## 2019-05-09 RX ADMIN — Medication 2 MILLIGRAM(S): at 15:20

## 2019-05-09 RX ADMIN — DIVALPROEX SODIUM 500 MILLIGRAM(S): 500 TABLET, DELAYED RELEASE ORAL at 08:46

## 2019-05-09 RX ADMIN — Medication 3 MILLIGRAM(S): at 23:28

## 2019-05-09 RX ADMIN — Medication 100 MILLIGRAM(S): at 08:46

## 2019-05-09 RX ADMIN — DIVALPROEX SODIUM 1000 MILLIGRAM(S): 500 TABLET, DELAYED RELEASE ORAL at 20:40

## 2019-05-09 RX ADMIN — Medication 2 MILLIGRAM(S): at 23:28

## 2019-05-09 RX ADMIN — Medication 650 MILLIGRAM(S): at 11:50

## 2019-05-09 RX ADMIN — PREGABALIN 1000 MICROGRAM(S): 225 CAPSULE ORAL at 08:46

## 2019-05-09 RX ADMIN — Medication 100 MILLIGRAM(S): at 12:43

## 2019-05-10 PROCEDURE — 99232 SBSQ HOSP IP/OBS MODERATE 35: CPT

## 2019-05-10 PROCEDURE — 90853 GROUP PSYCHOTHERAPY: CPT

## 2019-05-10 RX ORDER — LANOLIN ALCOHOL/MO/W.PET/CERES
3 CREAM (GRAM) TOPICAL ONCE
Refills: 0 | Status: DISCONTINUED | OUTPATIENT
Start: 2019-05-10 | End: 2019-05-16

## 2019-05-10 RX ADMIN — PANTOPRAZOLE SODIUM 40 MILLIGRAM(S): 20 TABLET, DELAYED RELEASE ORAL at 08:39

## 2019-05-10 RX ADMIN — Medication 3 MILLIGRAM(S): at 23:32

## 2019-05-10 RX ADMIN — Medication 600 MILLIGRAM(S): at 20:20

## 2019-05-10 RX ADMIN — HYDROMORPHONE HYDROCHLORIDE 4 MILLIGRAM(S): 2 INJECTION INTRAMUSCULAR; INTRAVENOUS; SUBCUTANEOUS at 16:15

## 2019-05-10 RX ADMIN — RISPERIDONE 1 MILLIGRAM(S): 4 TABLET ORAL at 08:40

## 2019-05-10 RX ADMIN — DIVALPROEX SODIUM 500 MILLIGRAM(S): 500 TABLET, DELAYED RELEASE ORAL at 08:40

## 2019-05-10 RX ADMIN — Medication 100 MILLIGRAM(S): at 08:39

## 2019-05-10 RX ADMIN — RISPERIDONE 2 MILLIGRAM(S): 4 TABLET ORAL at 20:22

## 2019-05-10 RX ADMIN — Medication 100 MILLIGRAM(S): at 20:21

## 2019-05-10 RX ADMIN — HYDROMORPHONE HYDROCHLORIDE 4 MILLIGRAM(S): 2 INJECTION INTRAMUSCULAR; INTRAVENOUS; SUBCUTANEOUS at 17:19

## 2019-05-10 RX ADMIN — Medication 600 MILLIGRAM(S): at 11:06

## 2019-05-10 RX ADMIN — Medication 600 MILLIGRAM(S): at 12:06

## 2019-05-10 RX ADMIN — Medication 2 MILLIGRAM(S): at 16:19

## 2019-05-10 RX ADMIN — PREGABALIN 1000 MICROGRAM(S): 225 CAPSULE ORAL at 08:40

## 2019-05-10 RX ADMIN — Medication 650 MILLIGRAM(S): at 06:14

## 2019-05-10 RX ADMIN — Medication 100 MILLIGRAM(S): at 13:27

## 2019-05-10 RX ADMIN — DIVALPROEX SODIUM 1000 MILLIGRAM(S): 500 TABLET, DELAYED RELEASE ORAL at 20:21

## 2019-05-11 PROCEDURE — 99232 SBSQ HOSP IP/OBS MODERATE 35: CPT

## 2019-05-11 RX ADMIN — DIVALPROEX SODIUM 1000 MILLIGRAM(S): 500 TABLET, DELAYED RELEASE ORAL at 20:02

## 2019-05-11 RX ADMIN — RISPERIDONE 1 MILLIGRAM(S): 4 TABLET ORAL at 08:16

## 2019-05-11 RX ADMIN — Medication 3 MILLIGRAM(S): at 21:38

## 2019-05-11 RX ADMIN — Medication 2 MILLIGRAM(S): at 21:39

## 2019-05-11 RX ADMIN — Medication 100 MILLIGRAM(S): at 08:16

## 2019-05-11 RX ADMIN — Medication 600 MILLIGRAM(S): at 20:02

## 2019-05-11 RX ADMIN — Medication 30 MILLILITER(S): at 00:50

## 2019-05-11 RX ADMIN — PREGABALIN 1000 MICROGRAM(S): 225 CAPSULE ORAL at 08:15

## 2019-05-11 RX ADMIN — PANTOPRAZOLE SODIUM 40 MILLIGRAM(S): 20 TABLET, DELAYED RELEASE ORAL at 08:16

## 2019-05-11 RX ADMIN — Medication 100 MILLIGRAM(S): at 12:08

## 2019-05-11 RX ADMIN — RISPERIDONE 2 MILLIGRAM(S): 4 TABLET ORAL at 20:02

## 2019-05-11 RX ADMIN — DIVALPROEX SODIUM 500 MILLIGRAM(S): 500 TABLET, DELAYED RELEASE ORAL at 08:15

## 2019-05-11 RX ADMIN — Medication 100 MILLIGRAM(S): at 20:02

## 2019-05-12 LAB
ALBUMIN SERPL ELPH-MCNC: 3.3 G/DL — SIGNIFICANT CHANGE UP (ref 3.3–5)
ALP SERPL-CCNC: 58 U/L — SIGNIFICANT CHANGE UP (ref 40–120)
ALT FLD-CCNC: 25 U/L — SIGNIFICANT CHANGE UP (ref 4–41)
AMMONIA BLD-MCNC: 30 UMOL/L — SIGNIFICANT CHANGE UP (ref 11–55)
ANION GAP SERPL CALC-SCNC: 9 MMO/L — SIGNIFICANT CHANGE UP (ref 7–14)
ANISOCYTOSIS BLD QL: SLIGHT — SIGNIFICANT CHANGE UP
AST SERPL-CCNC: 30 U/L — SIGNIFICANT CHANGE UP (ref 4–40)
BASOPHILS # BLD AUTO: 0.03 K/UL — SIGNIFICANT CHANGE UP (ref 0–0.2)
BASOPHILS NFR BLD AUTO: 0.6 % — SIGNIFICANT CHANGE UP (ref 0–2)
BASOPHILS NFR SPEC: 0 % — SIGNIFICANT CHANGE UP (ref 0–2)
BILIRUB SERPL-MCNC: < 0.2 MG/DL — LOW (ref 0.2–1.2)
BLASTS # FLD: 0 % — SIGNIFICANT CHANGE UP (ref 0–0)
BUN SERPL-MCNC: 16 MG/DL — SIGNIFICANT CHANGE UP (ref 7–23)
CALCIUM SERPL-MCNC: 8.6 MG/DL — SIGNIFICANT CHANGE UP (ref 8.4–10.5)
CHLORIDE SERPL-SCNC: 102 MMOL/L — SIGNIFICANT CHANGE UP (ref 98–107)
CO2 SERPL-SCNC: 28 MMOL/L — SIGNIFICANT CHANGE UP (ref 22–31)
CREAT SERPL-MCNC: 0.87 MG/DL — SIGNIFICANT CHANGE UP (ref 0.5–1.3)
EOSINOPHIL # BLD AUTO: 0.1 K/UL — SIGNIFICANT CHANGE UP (ref 0–0.5)
EOSINOPHIL NFR BLD AUTO: 2.2 % — SIGNIFICANT CHANGE UP (ref 0–6)
EOSINOPHIL NFR FLD: 1.8 % — SIGNIFICANT CHANGE UP (ref 0–6)
GIANT PLATELETS BLD QL SMEAR: PRESENT — SIGNIFICANT CHANGE UP
GLUCOSE SERPL-MCNC: 92 MG/DL — SIGNIFICANT CHANGE UP (ref 70–99)
HCT VFR BLD CALC: 37.4 % — LOW (ref 39–50)
HGB BLD-MCNC: 11.6 G/DL — LOW (ref 13–17)
IMM GRANULOCYTES NFR BLD AUTO: 2.6 % — HIGH (ref 0–1.5)
LYMPHOCYTES # BLD AUTO: 0.66 K/UL — LOW (ref 1–3.3)
LYMPHOCYTES # BLD AUTO: 14.2 % — SIGNIFICANT CHANGE UP (ref 13–44)
LYMPHOCYTES NFR SPEC AUTO: 8 % — LOW (ref 13–44)
MACROCYTES BLD QL: SLIGHT — SIGNIFICANT CHANGE UP
MCHC RBC-ENTMCNC: 28.6 PG — SIGNIFICANT CHANGE UP (ref 27–34)
MCHC RBC-ENTMCNC: 31 % — LOW (ref 32–36)
MCV RBC AUTO: 92.1 FL — SIGNIFICANT CHANGE UP (ref 80–100)
METAMYELOCYTES # FLD: 0 % — SIGNIFICANT CHANGE UP (ref 0–1)
MONOCYTES # BLD AUTO: 1.11 K/UL — HIGH (ref 0–0.9)
MONOCYTES NFR BLD AUTO: 23.9 % — HIGH (ref 2–14)
MONOCYTES NFR BLD: 17.9 % — HIGH (ref 2–9)
MYELOCYTES NFR BLD: 1.8 % — HIGH (ref 0–0)
NEUTROPHIL AB SER-ACNC: 58.9 % — SIGNIFICANT CHANGE UP (ref 43–77)
NEUTROPHILS # BLD AUTO: 2.62 K/UL — SIGNIFICANT CHANGE UP (ref 1.8–7.4)
NEUTROPHILS NFR BLD AUTO: 56.5 % — SIGNIFICANT CHANGE UP (ref 43–77)
NEUTS BAND # BLD: 7.1 % — HIGH (ref 0–6)
NRBC # FLD: 0 K/UL — SIGNIFICANT CHANGE UP (ref 0–0)
OTHER - HEMATOLOGY %: 0 — SIGNIFICANT CHANGE UP
OVALOCYTES BLD QL SMEAR: SLIGHT — SIGNIFICANT CHANGE UP
PLATELET # BLD AUTO: 233 K/UL — SIGNIFICANT CHANGE UP (ref 150–400)
PLATELET COUNT - ESTIMATE: NORMAL — SIGNIFICANT CHANGE UP
PMV BLD: 8.6 FL — SIGNIFICANT CHANGE UP (ref 7–13)
POIKILOCYTOSIS BLD QL AUTO: SLIGHT — SIGNIFICANT CHANGE UP
POLYCHROMASIA BLD QL SMEAR: SIGNIFICANT CHANGE UP
POTASSIUM SERPL-MCNC: 4.1 MMOL/L — SIGNIFICANT CHANGE UP (ref 3.5–5.3)
POTASSIUM SERPL-SCNC: 4.1 MMOL/L — SIGNIFICANT CHANGE UP (ref 3.5–5.3)
PROMYELOCYTES # FLD: 0 % — SIGNIFICANT CHANGE UP (ref 0–0)
PROT SERPL-MCNC: 6.3 G/DL — SIGNIFICANT CHANGE UP (ref 6–8.3)
RBC # BLD: 4.06 M/UL — LOW (ref 4.2–5.8)
RBC # FLD: 15.7 % — HIGH (ref 10.3–14.5)
SODIUM SERPL-SCNC: 139 MMOL/L — SIGNIFICANT CHANGE UP (ref 135–145)
VALPROATE SERPL-MCNC: 51.9 UG/ML — SIGNIFICANT CHANGE UP (ref 50–100)
VARIANT LYMPHS # BLD: 4.5 % — SIGNIFICANT CHANGE UP
WBC # BLD: 4.64 K/UL — SIGNIFICANT CHANGE UP (ref 3.8–10.5)
WBC # FLD AUTO: 4.64 K/UL — SIGNIFICANT CHANGE UP (ref 3.8–10.5)

## 2019-05-12 RX ORDER — DIVALPROEX SODIUM 500 MG/1
750 TABLET, DELAYED RELEASE ORAL DAILY
Refills: 0 | Status: DISCONTINUED | OUTPATIENT
Start: 2019-05-12 | End: 2019-05-16

## 2019-05-12 RX ORDER — BENZOCAINE AND MENTHOL 5; 1 G/100ML; G/100ML
1 LIQUID ORAL EVERY 4 HOURS
Refills: 0 | Status: DISCONTINUED | OUTPATIENT
Start: 2019-05-12 | End: 2019-05-16

## 2019-05-12 RX ADMIN — Medication 3 MILLIGRAM(S): at 23:07

## 2019-05-12 RX ADMIN — HYDROMORPHONE HYDROCHLORIDE 4 MILLIGRAM(S): 2 INJECTION INTRAMUSCULAR; INTRAVENOUS; SUBCUTANEOUS at 16:20

## 2019-05-12 RX ADMIN — Medication 100 MILLIGRAM(S): at 12:48

## 2019-05-12 RX ADMIN — BENZOCAINE AND MENTHOL 1 LOZENGE: 5; 1 LIQUID ORAL at 15:01

## 2019-05-12 RX ADMIN — PREGABALIN 1000 MICROGRAM(S): 225 CAPSULE ORAL at 08:17

## 2019-05-12 RX ADMIN — Medication 100 MILLIGRAM(S): at 08:17

## 2019-05-12 RX ADMIN — PANTOPRAZOLE SODIUM 40 MILLIGRAM(S): 20 TABLET, DELAYED RELEASE ORAL at 07:36

## 2019-05-12 RX ADMIN — Medication 100 MILLIGRAM(S): at 20:45

## 2019-05-12 RX ADMIN — DIVALPROEX SODIUM 500 MILLIGRAM(S): 500 TABLET, DELAYED RELEASE ORAL at 08:17

## 2019-05-12 RX ADMIN — Medication 600 MILLIGRAM(S): at 23:08

## 2019-05-12 RX ADMIN — RISPERIDONE 2 MILLIGRAM(S): 4 TABLET ORAL at 20:45

## 2019-05-12 RX ADMIN — DIVALPROEX SODIUM 1000 MILLIGRAM(S): 500 TABLET, DELAYED RELEASE ORAL at 20:45

## 2019-05-12 RX ADMIN — RISPERIDONE 1 MILLIGRAM(S): 4 TABLET ORAL at 08:17

## 2019-05-13 PROCEDURE — 90853 GROUP PSYCHOTHERAPY: CPT

## 2019-05-13 PROCEDURE — 99232 SBSQ HOSP IP/OBS MODERATE 35: CPT

## 2019-05-13 RX ORDER — DIVALPROEX SODIUM 500 MG/1
1250 TABLET, DELAYED RELEASE ORAL AT BEDTIME
Refills: 0 | Status: DISCONTINUED | OUTPATIENT
Start: 2019-05-13 | End: 2019-05-16

## 2019-05-13 RX ADMIN — PANTOPRAZOLE SODIUM 40 MILLIGRAM(S): 20 TABLET, DELAYED RELEASE ORAL at 09:13

## 2019-05-13 RX ADMIN — Medication 100 MILLIGRAM(S): at 21:45

## 2019-05-13 RX ADMIN — DIVALPROEX SODIUM 1250 MILLIGRAM(S): 500 TABLET, DELAYED RELEASE ORAL at 21:45

## 2019-05-13 RX ADMIN — RISPERIDONE 1 MILLIGRAM(S): 4 TABLET ORAL at 09:13

## 2019-05-13 RX ADMIN — PREGABALIN 1000 MICROGRAM(S): 225 CAPSULE ORAL at 09:12

## 2019-05-13 RX ADMIN — RISPERIDONE 2 MILLIGRAM(S): 4 TABLET ORAL at 21:45

## 2019-05-13 RX ADMIN — DIVALPROEX SODIUM 750 MILLIGRAM(S): 500 TABLET, DELAYED RELEASE ORAL at 09:12

## 2019-05-13 RX ADMIN — Medication 2 MILLIGRAM(S): at 23:21

## 2019-05-13 RX ADMIN — Medication 2 MILLIGRAM(S): at 00:18

## 2019-05-13 RX ADMIN — Medication 100 MILLIGRAM(S): at 09:12

## 2019-05-13 RX ADMIN — Medication 600 MILLIGRAM(S): at 23:21

## 2019-05-13 RX ADMIN — Medication 3 MILLIGRAM(S): at 23:21

## 2019-05-13 RX ADMIN — Medication 600 MILLIGRAM(S): at 00:43

## 2019-05-14 PROCEDURE — 90853 GROUP PSYCHOTHERAPY: CPT

## 2019-05-14 PROCEDURE — 99231 SBSQ HOSP IP/OBS SF/LOW 25: CPT

## 2019-05-14 RX ORDER — HYDROMORPHONE HYDROCHLORIDE 2 MG/ML
4 INJECTION INTRAMUSCULAR; INTRAVENOUS; SUBCUTANEOUS EVERY 6 HOURS
Refills: 0 | Status: DISCONTINUED | OUTPATIENT
Start: 2019-05-14 | End: 2019-05-16

## 2019-05-14 RX ADMIN — Medication 100 MILLIGRAM(S): at 08:18

## 2019-05-14 RX ADMIN — DIVALPROEX SODIUM 1250 MILLIGRAM(S): 500 TABLET, DELAYED RELEASE ORAL at 22:05

## 2019-05-14 RX ADMIN — RISPERIDONE 2 MILLIGRAM(S): 4 TABLET ORAL at 22:49

## 2019-05-14 RX ADMIN — PREGABALIN 1000 MICROGRAM(S): 225 CAPSULE ORAL at 08:18

## 2019-05-14 RX ADMIN — Medication 100 MILLIGRAM(S): at 22:49

## 2019-05-14 RX ADMIN — DIVALPROEX SODIUM 750 MILLIGRAM(S): 500 TABLET, DELAYED RELEASE ORAL at 08:18

## 2019-05-14 RX ADMIN — PANTOPRAZOLE SODIUM 40 MILLIGRAM(S): 20 TABLET, DELAYED RELEASE ORAL at 08:18

## 2019-05-14 RX ADMIN — RISPERIDONE 1 MILLIGRAM(S): 4 TABLET ORAL at 08:18

## 2019-05-14 NOTE — HISTORY OF PRESENT ILLNESS
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 He completed neoadjuvant chemoradiation to 50.4Gy in Jan 2018. He returned for follow up in Feb 2019 and was found to have biopsy proven new lytic sacral lesion. He underwent SBRT 30Gy in 5 fx completed on 3/15/19. He returns for follow up.\par \par He reports that his sacrum feels about the same; he experiences occasional twitches but no pain. He developed dizziness over the weekend and presented to the ED on Sunday. Inpatient CT showed sclerosis of sacral met, mesenteric nodularity suspicious for peritoneal carcinomatosis. MRI brain showed  a 3.7 cm left superior cerebellar lesion concerning for primary brain tumor v mets. He was discharged with steroids and had 90% improvement his dizziness. He is scheduled for resection of the brain lesion next Monday, and will be scheduled for evaluation of SRS afterwards.\par \par Reports chronic constipation. No urinary complaints.Has started Keytruda

## 2019-05-14 NOTE — DISEASE MANAGEMENT
[Clinical] : TNM Stage: c [IV] : IV [FreeTextEntry4] : esophageal adenocarcinoma to sacrum [TTNM] : - [NTNM] : - [MTNM] : 1 [de-identified] : sacrum

## 2019-05-15 PROCEDURE — 99232 SBSQ HOSP IP/OBS MODERATE 35: CPT

## 2019-05-15 RX ORDER — DOCUSATE SODIUM 100 MG
1 CAPSULE ORAL
Qty: 45 | Refills: 0
Start: 2019-05-15 | End: 2019-05-29

## 2019-05-15 RX ORDER — RISPERIDONE 4 MG/1
1 TABLET ORAL
Qty: 15 | Refills: 0
Start: 2019-05-15 | End: 2019-05-29

## 2019-05-15 RX ORDER — DIVALPROEX SODIUM 500 MG/1
1 TABLET, DELAYED RELEASE ORAL
Qty: 45 | Refills: 0
Start: 2019-05-15 | End: 2019-05-29

## 2019-05-15 RX ORDER — DIVALPROEX SODIUM 500 MG/1
1 TABLET, DELAYED RELEASE ORAL
Qty: 30 | Refills: 0
Start: 2019-05-15 | End: 2019-05-30

## 2019-05-15 RX ORDER — PREGABALIN 225 MG/1
1 CAPSULE ORAL
Qty: 15 | Refills: 0
Start: 2019-05-15 | End: 2019-05-30

## 2019-05-15 RX ORDER — DIVALPROEX SODIUM 500 MG/1
1 TABLET, DELAYED RELEASE ORAL
Qty: 45 | Refills: 0
Start: 2019-05-15 | End: 2019-05-30

## 2019-05-15 RX ORDER — PREGABALIN 225 MG/1
1 CAPSULE ORAL
Qty: 15 | Refills: 0
Start: 2019-05-15 | End: 2019-05-29

## 2019-05-15 RX ORDER — IBUPROFEN 200 MG
1 TABLET ORAL
Qty: 0 | Refills: 0 | DISCHARGE
Start: 2019-05-15

## 2019-05-15 RX ORDER — LANOLIN ALCOHOL/MO/W.PET/CERES
1 CREAM (GRAM) TOPICAL
Qty: 15 | Refills: 0
Start: 2019-05-15 | End: 2019-05-30

## 2019-05-15 RX ORDER — RISPERIDONE 4 MG/1
1 TABLET ORAL
Qty: 15 | Refills: 0
Start: 2019-05-15 | End: 2019-05-30

## 2019-05-15 RX ORDER — PANTOPRAZOLE SODIUM 20 MG/1
1 TABLET, DELAYED RELEASE ORAL
Qty: 15 | Refills: 0
Start: 2019-05-15 | End: 2019-05-30

## 2019-05-15 RX ORDER — LANOLIN ALCOHOL/MO/W.PET/CERES
1 CREAM (GRAM) TOPICAL
Qty: 15 | Refills: 0
Start: 2019-05-15 | End: 2019-05-29

## 2019-05-15 RX ORDER — PANTOPRAZOLE SODIUM 20 MG/1
1 TABLET, DELAYED RELEASE ORAL
Qty: 15 | Refills: 0
Start: 2019-05-15 | End: 2019-05-29

## 2019-05-15 RX ORDER — DOCUSATE SODIUM 100 MG
1 CAPSULE ORAL
Qty: 45 | Refills: 0
Start: 2019-05-15 | End: 2019-05-30

## 2019-05-15 RX ORDER — PREGABALIN 225 MG/1
1 CAPSULE ORAL
Qty: 0 | Refills: 0 | DISCHARGE

## 2019-05-15 RX ORDER — DIVALPROEX SODIUM 500 MG/1
1 TABLET, DELAYED RELEASE ORAL
Qty: 30 | Refills: 0
Start: 2019-05-15 | End: 2019-05-29

## 2019-05-15 RX ADMIN — PANTOPRAZOLE SODIUM 40 MILLIGRAM(S): 20 TABLET, DELAYED RELEASE ORAL at 08:16

## 2019-05-15 RX ADMIN — RISPERIDONE 1 MILLIGRAM(S): 4 TABLET ORAL at 13:45

## 2019-05-15 RX ADMIN — RISPERIDONE 2 MILLIGRAM(S): 4 TABLET ORAL at 21:26

## 2019-05-15 RX ADMIN — DIVALPROEX SODIUM 1250 MILLIGRAM(S): 500 TABLET, DELAYED RELEASE ORAL at 21:26

## 2019-05-15 RX ADMIN — DIVALPROEX SODIUM 750 MILLIGRAM(S): 500 TABLET, DELAYED RELEASE ORAL at 13:45

## 2019-05-15 RX ADMIN — Medication 100 MILLIGRAM(S): at 21:26

## 2019-05-15 RX ADMIN — Medication 600 MILLIGRAM(S): at 02:51

## 2019-05-15 RX ADMIN — Medication 100 MILLIGRAM(S): at 13:45

## 2019-05-15 RX ADMIN — PREGABALIN 1000 MICROGRAM(S): 225 CAPSULE ORAL at 13:45

## 2019-05-15 RX ADMIN — Medication 600 MILLIGRAM(S): at 01:51

## 2019-05-16 VITALS — HEART RATE: 84 BPM

## 2019-05-16 LAB
ALBUMIN SERPL ELPH-MCNC: 3.4 G/DL — SIGNIFICANT CHANGE UP (ref 3.3–5)
ALP SERPL-CCNC: 60 U/L — SIGNIFICANT CHANGE UP (ref 40–120)
ALT FLD-CCNC: 18 U/L — SIGNIFICANT CHANGE UP (ref 4–41)
ANION GAP SERPL CALC-SCNC: 10 MMO/L — SIGNIFICANT CHANGE UP (ref 7–14)
ANISOCYTOSIS BLD QL: SLIGHT — SIGNIFICANT CHANGE UP
AST SERPL-CCNC: 23 U/L — SIGNIFICANT CHANGE UP (ref 4–40)
BASOPHILS # BLD AUTO: 0.04 K/UL — SIGNIFICANT CHANGE UP (ref 0–0.2)
BASOPHILS NFR BLD AUTO: 0.7 % — SIGNIFICANT CHANGE UP (ref 0–2)
BASOPHILS NFR SPEC: 0 % — SIGNIFICANT CHANGE UP (ref 0–2)
BILIRUB SERPL-MCNC: < 0.2 MG/DL — LOW (ref 0.2–1.2)
BLASTS # FLD: 0 % — SIGNIFICANT CHANGE UP (ref 0–0)
BUN SERPL-MCNC: 13 MG/DL — SIGNIFICANT CHANGE UP (ref 7–23)
CALCIUM SERPL-MCNC: 9.2 MG/DL — SIGNIFICANT CHANGE UP (ref 8.4–10.5)
CHLORIDE SERPL-SCNC: 102 MMOL/L — SIGNIFICANT CHANGE UP (ref 98–107)
CO2 SERPL-SCNC: 29 MMOL/L — SIGNIFICANT CHANGE UP (ref 22–31)
CREAT SERPL-MCNC: 1.02 MG/DL — SIGNIFICANT CHANGE UP (ref 0.5–1.3)
EOSINOPHIL # BLD AUTO: 0.08 K/UL — SIGNIFICANT CHANGE UP (ref 0–0.5)
EOSINOPHIL NFR BLD AUTO: 1.5 % — SIGNIFICANT CHANGE UP (ref 0–6)
EOSINOPHIL NFR FLD: 0.9 % — SIGNIFICANT CHANGE UP (ref 0–6)
GIANT PLATELETS BLD QL SMEAR: PRESENT — SIGNIFICANT CHANGE UP
GLUCOSE SERPL-MCNC: 78 MG/DL — SIGNIFICANT CHANGE UP (ref 70–99)
HCT VFR BLD CALC: 35.8 % — LOW (ref 39–50)
HGB BLD-MCNC: 11.1 G/DL — LOW (ref 13–17)
HYPOCHROMIA BLD QL: SLIGHT — SIGNIFICANT CHANGE UP
IMM GRANULOCYTES NFR BLD AUTO: 5.9 % — HIGH (ref 0–1.5)
LYMPHOCYTES # BLD AUTO: 0.63 K/UL — LOW (ref 1–3.3)
LYMPHOCYTES # BLD AUTO: 11.7 % — LOW (ref 13–44)
LYMPHOCYTES NFR SPEC AUTO: 10.6 % — LOW (ref 13–44)
MANUAL SMEAR VERIFICATION: SIGNIFICANT CHANGE UP
MCHC RBC-ENTMCNC: 28.3 PG — SIGNIFICANT CHANGE UP (ref 27–34)
MCHC RBC-ENTMCNC: 31 % — LOW (ref 32–36)
MCV RBC AUTO: 91.3 FL — SIGNIFICANT CHANGE UP (ref 80–100)
METAMYELOCYTES # FLD: 2.7 % — HIGH (ref 0–1)
MONOCYTES # BLD AUTO: 1.32 K/UL — HIGH (ref 0–0.9)
MONOCYTES NFR BLD AUTO: 24.5 % — HIGH (ref 2–14)
MONOCYTES NFR BLD: 18.6 % — HIGH (ref 2–9)
MYELOCYTES NFR BLD: 6.2 % — HIGH (ref 0–0)
NEUTROPHIL AB SER-ACNC: 56.6 % — SIGNIFICANT CHANGE UP (ref 43–77)
NEUTROPHILS # BLD AUTO: 2.99 K/UL — SIGNIFICANT CHANGE UP (ref 1.8–7.4)
NEUTROPHILS NFR BLD AUTO: 55.7 % — SIGNIFICANT CHANGE UP (ref 43–77)
NEUTS BAND # BLD: 0.9 % — SIGNIFICANT CHANGE UP (ref 0–6)
NRBC # FLD: 0 K/UL — SIGNIFICANT CHANGE UP (ref 0–0)
OTHER - HEMATOLOGY %: 0 — SIGNIFICANT CHANGE UP
OVALOCYTES BLD QL SMEAR: SLIGHT — SIGNIFICANT CHANGE UP
PLATELET # BLD AUTO: 285 K/UL — SIGNIFICANT CHANGE UP (ref 150–400)
PLATELET COUNT - ESTIMATE: NORMAL — SIGNIFICANT CHANGE UP
PMV BLD: 9.1 FL — SIGNIFICANT CHANGE UP (ref 7–13)
POTASSIUM SERPL-MCNC: 4.9 MMOL/L — SIGNIFICANT CHANGE UP (ref 3.5–5.3)
POTASSIUM SERPL-SCNC: 4.9 MMOL/L — SIGNIFICANT CHANGE UP (ref 3.5–5.3)
PROMYELOCYTES # FLD: 0 % — SIGNIFICANT CHANGE UP (ref 0–0)
PROT SERPL-MCNC: 6.4 G/DL — SIGNIFICANT CHANGE UP (ref 6–8.3)
RBC # BLD: 3.92 M/UL — LOW (ref 4.2–5.8)
RBC # FLD: 15.8 % — HIGH (ref 10.3–14.5)
SMUDGE CELLS # BLD: PRESENT — SIGNIFICANT CHANGE UP
SODIUM SERPL-SCNC: 141 MMOL/L — SIGNIFICANT CHANGE UP (ref 135–145)
VALPROATE SERPL-MCNC: 73.5 UG/ML — SIGNIFICANT CHANGE UP (ref 50–100)
VARIANT LYMPHS # BLD: 3.5 % — SIGNIFICANT CHANGE UP
WBC # BLD: 5.38 K/UL — SIGNIFICANT CHANGE UP (ref 3.8–10.5)
WBC # FLD AUTO: 5.38 K/UL — SIGNIFICANT CHANGE UP (ref 3.8–10.5)

## 2019-05-16 PROCEDURE — 99238 HOSP IP/OBS DSCHRG MGMT 30/<: CPT

## 2019-05-16 RX ADMIN — PANTOPRAZOLE SODIUM 40 MILLIGRAM(S): 20 TABLET, DELAYED RELEASE ORAL at 09:45

## 2019-05-16 RX ADMIN — Medication 100 MILLIGRAM(S): at 13:06

## 2019-05-16 RX ADMIN — DIVALPROEX SODIUM 750 MILLIGRAM(S): 500 TABLET, DELAYED RELEASE ORAL at 09:45

## 2019-05-16 RX ADMIN — PREGABALIN 1000 MICROGRAM(S): 225 CAPSULE ORAL at 09:45

## 2019-05-16 RX ADMIN — RISPERIDONE 1 MILLIGRAM(S): 4 TABLET ORAL at 09:45

## 2019-05-16 RX ADMIN — Medication 100 MILLIGRAM(S): at 09:45

## 2019-05-20 ENCOUNTER — OUTPATIENT (OUTPATIENT)
Dept: OUTPATIENT SERVICES | Facility: HOSPITAL | Age: 54
LOS: 1 days | Discharge: HOME | End: 2019-05-20

## 2019-05-20 DIAGNOSIS — Z95.5 PRESENCE OF CORONARY ANGIOPLASTY IMPLANT AND GRAFT: Chronic | ICD-10-CM

## 2019-05-20 DIAGNOSIS — Z98.890 OTHER SPECIFIED POSTPROCEDURAL STATES: Chronic | ICD-10-CM

## 2019-05-20 DIAGNOSIS — Z90.89 ACQUIRED ABSENCE OF OTHER ORGANS: Chronic | ICD-10-CM

## 2019-05-23 ENCOUNTER — APPOINTMENT (OUTPATIENT)
Dept: RADIATION ONCOLOGY | Facility: CLINIC | Age: 54
End: 2019-05-23
Payer: COMMERCIAL

## 2019-05-23 VITALS
RESPIRATION RATE: 18 BRPM | SYSTOLIC BLOOD PRESSURE: 117 MMHG | DIASTOLIC BLOOD PRESSURE: 83 MMHG | HEART RATE: 100 BPM | TEMPERATURE: 97.5 F | OXYGEN SATURATION: 97 %

## 2019-05-23 PROCEDURE — 99215 OFFICE O/P EST HI 40 MIN: CPT | Mod: 25

## 2019-05-23 NOTE — REVIEW OF SYSTEMS
[Fatigue] : fatigue [Abdominal Pain] : abdominal pain [Dizziness] : dizziness [Hot Flashes] : hot flashes [Negative] : Respiratory [Fever] : no fever [Chills] : no chills [Night Sweats] : no night sweats [Dysphagia] : no dysphagia [Loss of Hearing] : no loss of hearing [Muscle Weakness] : no muscle weakness [Fainting] : no fainting [Difficulty Walking] : no difficulty walking [FreeTextEntry7] : intermittent nausea after meals [FreeTextEntry9] : notes slight weakness to right abdomen [de-identified] : patient is angry

## 2019-05-23 NOTE — HISTORY OF PRESENT ILLNESS
[FreeTextEntry1] : Mr. Morales presents today for consideration for gamma knife radiation therapy.\par \par He is a 53 year old male with a PMH of MI and CAD\par \par He is a 53 year old male with PMH of adenocarcinoma of the distal esophagus, s/p neoadjuvant chemo/RT in 54 Gy completed 1/2018. \par Completed RT to a sacral lytic lesion in the right inferior sacrum, which he completed RT to in 3/2019. Was started on immunotherapy with keytruda. \par \par He presented to the [facility] ED on 4/13/19 with headache, nausea, and vertigo X 4 days. \par \par MRI brain on 4/13/19 showed a left superior cerebellar mass, 3.0 x 3.7 x 2.3 cm, whose imaging characteristics are most worrisome for primary CNS neoplasm. A enhancement of contacting tentorial leaflet may represent reactive change though invasion is not excluded. Single intracranial metastatic disease is not entirely excluded. \par Mild to moderate ventriculomegaly worrisome for obstructive hydrocephalus. \par Nonenhancing signal abnormality involving the right parietal cortical/subcortical white matter which may represent focal cortical dysplasia however, nonenhancing neoplastic disease is not excluded. \par \par He underwent craniotomy on 4/22/19, which revealed metastatic poorly differentiated carcinoma.\par \par Post op MRI showed Tiny nodular area of enhancement is seen involving portion postop bed. \par \par Notably, Mr. Morales was brought in by police to to [facility] with agitation on 4/30/19. Was with inpatient psych admission on 5/2 after this. 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

## 2019-05-24 ENCOUNTER — OUTPATIENT (OUTPATIENT)
Dept: OUTPATIENT SERVICES | Facility: HOSPITAL | Age: 54
LOS: 1 days | Discharge: HOME | End: 2019-05-24
Payer: COMMERCIAL

## 2019-05-24 DIAGNOSIS — Z98.890 OTHER SPECIFIED POSTPROCEDURAL STATES: Chronic | ICD-10-CM

## 2019-05-24 DIAGNOSIS — Z95.5 PRESENCE OF CORONARY ANGIOPLASTY IMPLANT AND GRAFT: Chronic | ICD-10-CM

## 2019-05-24 DIAGNOSIS — Z90.89 ACQUIRED ABSENCE OF OTHER ORGANS: Chronic | ICD-10-CM

## 2019-05-28 ENCOUNTER — APPOINTMENT (OUTPATIENT)
Dept: NEUROLOGY | Facility: CLINIC | Age: 54
End: 2019-05-28
Payer: COMMERCIAL

## 2019-05-28 PROCEDURE — 99204 OFFICE O/P NEW MOD 45 MIN: CPT

## 2019-05-28 PROCEDURE — 99244 OFF/OP CNSLTJ NEW/EST MOD 40: CPT

## 2019-05-28 RX ORDER — MELATONIN 3 MG
3 CAPSULE ORAL
Refills: 0 | Status: DISCONTINUED | COMMUNITY
Start: 2019-05-23 | End: 2019-05-28

## 2019-05-28 RX ORDER — CYANOCOBALAMIN 1000 UG/ML
1000 INJECTION INTRAMUSCULAR; SUBCUTANEOUS
Refills: 0 | Status: DISCONTINUED | COMMUNITY
Start: 2019-05-23 | End: 2019-05-28

## 2019-05-28 RX ORDER — IBUPROFEN 600 MG/1
600 TABLET, FILM COATED ORAL
Refills: 0 | Status: DISCONTINUED | COMMUNITY
Start: 2019-05-23 | End: 2019-05-28

## 2019-05-28 NOTE — HISTORY OF PRESENT ILLNESS
[FreeTextEntry1] : Mr. Jam Morales was referred to me by Dr. Murphy. He is a 52 yo right handed man who was diagnosed with adenocarcinoma of the distal esophagus when he presented with dysphagia in 8/17 and was referred for endoscopy and found to have a partially obstructing mass in the distal esophagus.  - treated with chemotherapy from 11/17 to 2/29/18 followed by RT.\par \par PET/CT on 4/13/18 showed response to treatment. \par Follow up PET/CT on 11/18 showed a new hypermetabolic lesion in the right hemisacrum compatible with an osseous met. CT guided biopsy of this lesion on 1/25/19 was consistent with metastatic poorly differentiated ca. He underwent SBRT 30 Gy in 5 fractions which completed on 3/15/19. He was then started on Keytruda.\par \par Most recently, he presented with a 4 day history of headaches and dizziness and came to the ER at [facility] where an MRI brain performed 4/13 showed a solitary area of enhancement in the left superior cerebellum measuring 1 x 3.7 x 2.3 cm with effacement of the 4th ventricle. He underwent resection by Dr. Rollins on 4/11 - pathology was read as metastatic poorly differentiated adenocarcinoma. Post-operative MRI scan showed a good resection. He was discharged on decadron but was brought back to the ER with extreme agitation and was transferred to the psychiatry service. History per the notes, described at least one prior psychiatric hospitalization when he was a teenager.\par \par He is a bit irritated today - he complains of being tired - related to the Resperidal he is taking. He says he felt better on the Decadron. He has no headache or dizziness - does report "fogginess."\par \par PMH also significant for MI.\par

## 2019-05-28 NOTE — DISCUSSION/SUMMARY
[FreeTextEntry1] : In summary, Mr. Morales is post resection of a solitary right cerebellar metastasis from esophageal primary. \par I do not recommend Avastin. Would try to accomplish RT without decadron. If he needs steroids, psychiatry will have to be involved to work with medications. However, this does not appear as a primary steroid induced psychosis based on his history.

## 2019-05-28 NOTE — PHYSICAL EXAM
[FreeTextEntry1] : He is awake and alert - oriented and fluent.\par He is minimally irritable but mostly appropriate.\par Follows commands well.\par EOMI, VFF\par Face is symmetric\par Tongue midline\par No drift\par FFM are equal bilaterally\par NATY normal bilaterally\par Strength UE and LE

## 2019-05-31 ENCOUNTER — APPOINTMENT (OUTPATIENT)
Dept: OTOLARYNGOLOGY | Facility: CLINIC | Age: 54
End: 2019-05-31

## 2019-06-03 ENCOUNTER — OTHER (OUTPATIENT)
Age: 54
End: 2019-06-03

## 2019-06-05 ENCOUNTER — APPOINTMENT (OUTPATIENT)
Dept: MRI IMAGING | Facility: IMAGING CENTER | Age: 54
End: 2019-06-05

## 2019-06-06 ENCOUNTER — CHART COPY (OUTPATIENT)
Age: 54
End: 2019-06-06

## 2019-06-06 ENCOUNTER — APPOINTMENT (OUTPATIENT)
Dept: NEUROSURGERY | Facility: AMBULATORY SURGERY CENTER | Age: 54
End: 2019-06-06

## 2019-06-09 ENCOUNTER — FORM ENCOUNTER (OUTPATIENT)
Age: 54
End: 2019-06-09

## 2019-06-10 ENCOUNTER — APPOINTMENT (OUTPATIENT)
Dept: MRI IMAGING | Facility: IMAGING CENTER | Age: 54
End: 2019-06-10

## 2019-06-10 ENCOUNTER — OUTPATIENT (OUTPATIENT)
Dept: OUTPATIENT SERVICES | Facility: HOSPITAL | Age: 54
LOS: 1 days | End: 2019-06-10
Payer: COMMERCIAL

## 2019-06-10 DIAGNOSIS — Z98.890 OTHER SPECIFIED POSTPROCEDURAL STATES: Chronic | ICD-10-CM

## 2019-06-10 DIAGNOSIS — C79.31 SECONDARY MALIGNANT NEOPLASM OF BRAIN: ICD-10-CM

## 2019-06-10 DIAGNOSIS — Z95.5 PRESENCE OF CORONARY ANGIOPLASTY IMPLANT AND GRAFT: Chronic | ICD-10-CM

## 2019-06-10 DIAGNOSIS — Z90.89 ACQUIRED ABSENCE OF OTHER ORGANS: Chronic | ICD-10-CM

## 2019-06-10 PROCEDURE — 76498 UNLISTED MR PROCEDURE: CPT

## 2019-06-10 PROCEDURE — 77334 RADIATION TREATMENT AID(S): CPT | Mod: 26

## 2019-06-10 PROCEDURE — 77290 THER RAD SIMULAJ FIELD CPLX: CPT | Mod: 26

## 2019-06-12 PROCEDURE — 77295 3-D RADIOTHERAPY PLAN: CPT | Mod: 26

## 2019-06-12 PROCEDURE — 77334 RADIATION TREATMENT AID(S): CPT | Mod: 26

## 2019-06-12 PROCEDURE — 77300 RADIATION THERAPY DOSE PLAN: CPT | Mod: 26

## 2019-06-12 PROCEDURE — 77263 THER RADIOLOGY TX PLNG CPLX: CPT

## 2019-06-13 ENCOUNTER — APPOINTMENT (OUTPATIENT)
Dept: NEUROSURGERY | Facility: AMBULATORY SURGERY CENTER | Age: 54
End: 2019-06-13
Payer: COMMERCIAL

## 2019-06-13 PROCEDURE — 61798 SRS CRANIAL LESION COMPLEX: CPT | Mod: 78

## 2019-06-14 ENCOUNTER — EMERGENCY (EMERGENCY)
Facility: HOSPITAL | Age: 54
LOS: 1 days | Discharge: HOME | End: 2019-06-14
Attending: EMERGENCY MEDICINE | Admitting: EMERGENCY MEDICINE
Payer: COMMERCIAL

## 2019-06-14 VITALS
HEART RATE: 114 BPM | TEMPERATURE: 99 F | RESPIRATION RATE: 16 BRPM | SYSTOLIC BLOOD PRESSURE: 135 MMHG | OXYGEN SATURATION: 100 % | DIASTOLIC BLOOD PRESSURE: 95 MMHG

## 2019-06-14 DIAGNOSIS — Z98.890 OTHER SPECIFIED POSTPROCEDURAL STATES: Chronic | ICD-10-CM

## 2019-06-14 DIAGNOSIS — Z95.5 PRESENCE OF CORONARY ANGIOPLASTY IMPLANT AND GRAFT: Chronic | ICD-10-CM

## 2019-06-14 DIAGNOSIS — Z98.890 OTHER SPECIFIED POSTPROCEDURAL STATES: ICD-10-CM

## 2019-06-14 DIAGNOSIS — F19.94 OTHER PSYCHOACTIVE SUBSTANCE USE, UNSPECIFIED WITH PSYCHOACTIVE SUBSTANCE-INDUCED MOOD DISORDER: ICD-10-CM

## 2019-06-14 DIAGNOSIS — Z90.89 ACQUIRED ABSENCE OF OTHER ORGANS: Chronic | ICD-10-CM

## 2019-06-14 LAB
ALBUMIN SERPL ELPH-MCNC: 3.4 G/DL — SIGNIFICANT CHANGE UP (ref 3.3–5)
ALP SERPL-CCNC: 47 U/L — SIGNIFICANT CHANGE UP (ref 40–120)
ALT FLD-CCNC: 12 U/L — SIGNIFICANT CHANGE UP (ref 4–41)
ANION GAP SERPL CALC-SCNC: 12 MMO/L — SIGNIFICANT CHANGE UP (ref 7–14)
AST SERPL-CCNC: 20 U/L — SIGNIFICANT CHANGE UP (ref 4–40)
BASOPHILS # BLD AUTO: 0.01 K/UL — SIGNIFICANT CHANGE UP (ref 0–0.2)
BASOPHILS NFR BLD AUTO: 0.2 % — SIGNIFICANT CHANGE UP (ref 0–2)
BILIRUB SERPL-MCNC: 0.4 MG/DL — SIGNIFICANT CHANGE UP (ref 0.2–1.2)
BUN SERPL-MCNC: 12 MG/DL — SIGNIFICANT CHANGE UP (ref 7–23)
CALCIUM SERPL-MCNC: 9.2 MG/DL — SIGNIFICANT CHANGE UP (ref 8.4–10.5)
CHLORIDE SERPL-SCNC: 98 MMOL/L — SIGNIFICANT CHANGE UP (ref 98–107)
CO2 SERPL-SCNC: 26 MMOL/L — SIGNIFICANT CHANGE UP (ref 22–31)
CREAT SERPL-MCNC: 0.93 MG/DL — SIGNIFICANT CHANGE UP (ref 0.5–1.3)
EOSINOPHIL # BLD AUTO: 0.03 K/UL — SIGNIFICANT CHANGE UP (ref 0–0.5)
EOSINOPHIL NFR BLD AUTO: 0.5 % — SIGNIFICANT CHANGE UP (ref 0–6)
GLUCOSE SERPL-MCNC: 94 MG/DL — SIGNIFICANT CHANGE UP (ref 70–99)
HCT VFR BLD CALC: 39.2 % — SIGNIFICANT CHANGE UP (ref 39–50)
HGB BLD-MCNC: 12.5 G/DL — LOW (ref 13–17)
IMM GRANULOCYTES NFR BLD AUTO: 0.6 % — SIGNIFICANT CHANGE UP (ref 0–1.5)
LIDOCAIN IGE QN: 24 U/L — SIGNIFICANT CHANGE UP (ref 7–60)
LYMPHOCYTES # BLD AUTO: 0.64 K/UL — LOW (ref 1–3.3)
LYMPHOCYTES # BLD AUTO: 9.8 % — LOW (ref 13–44)
MAGNESIUM SERPL-MCNC: 2 MG/DL — SIGNIFICANT CHANGE UP (ref 1.6–2.6)
MCHC RBC-ENTMCNC: 27.8 PG — SIGNIFICANT CHANGE UP (ref 27–34)
MCHC RBC-ENTMCNC: 31.9 % — LOW (ref 32–36)
MCV RBC AUTO: 87.1 FL — SIGNIFICANT CHANGE UP (ref 80–100)
MONOCYTES # BLD AUTO: 0.97 K/UL — HIGH (ref 0–0.9)
MONOCYTES NFR BLD AUTO: 14.9 % — HIGH (ref 2–14)
NEUTROPHILS # BLD AUTO: 4.83 K/UL — SIGNIFICANT CHANGE UP (ref 1.8–7.4)
NEUTROPHILS NFR BLD AUTO: 74 % — SIGNIFICANT CHANGE UP (ref 43–77)
NRBC # FLD: 0 K/UL — SIGNIFICANT CHANGE UP (ref 0–0)
PLATELET # BLD AUTO: 279 K/UL — SIGNIFICANT CHANGE UP (ref 150–400)
PMV BLD: 9.8 FL — SIGNIFICANT CHANGE UP (ref 7–13)
POTASSIUM SERPL-MCNC: 4.2 MMOL/L — SIGNIFICANT CHANGE UP (ref 3.5–5.3)
POTASSIUM SERPL-SCNC: 4.2 MMOL/L — SIGNIFICANT CHANGE UP (ref 3.5–5.3)
PROT SERPL-MCNC: 6.8 G/DL — SIGNIFICANT CHANGE UP (ref 6–8.3)
RBC # BLD: 4.5 M/UL — SIGNIFICANT CHANGE UP (ref 4.2–5.8)
RBC # FLD: 14.1 % — SIGNIFICANT CHANGE UP (ref 10.3–14.5)
SODIUM SERPL-SCNC: 136 MMOL/L — SIGNIFICANT CHANGE UP (ref 135–145)
WBC # BLD: 6.52 K/UL — SIGNIFICANT CHANGE UP (ref 3.8–10.5)
WBC # FLD AUTO: 6.52 K/UL — SIGNIFICANT CHANGE UP (ref 3.8–10.5)

## 2019-06-14 PROCEDURE — 77435 SBRT MANAGEMENT: CPT

## 2019-06-14 PROCEDURE — 99281 EMR DPT VST MAYX REQ PHY/QHP: CPT

## 2019-06-14 PROCEDURE — 70450 CT HEAD/BRAIN W/O DYE: CPT | Mod: 26

## 2019-06-14 PROCEDURE — 99285 EMERGENCY DEPT VISIT HI MDM: CPT

## 2019-06-14 RX ORDER — SODIUM CHLORIDE 9 MG/ML
1000 INJECTION INTRAMUSCULAR; INTRAVENOUS; SUBCUTANEOUS ONCE
Refills: 0 | Status: COMPLETED | OUTPATIENT
Start: 2019-06-14 | End: 2019-06-14

## 2019-06-14 RX ORDER — ONDANSETRON 8 MG/1
4 TABLET, FILM COATED ORAL ONCE
Refills: 0 | Status: COMPLETED | OUTPATIENT
Start: 2019-06-14 | End: 2019-06-14

## 2019-06-14 RX ORDER — FAMOTIDINE 10 MG/ML
20 INJECTION INTRAVENOUS ONCE
Refills: 0 | Status: COMPLETED | OUTPATIENT
Start: 2019-06-14 | End: 2019-06-14

## 2019-06-14 RX ORDER — METOCLOPRAMIDE HCL 10 MG
10 TABLET ORAL ONCE
Refills: 0 | Status: COMPLETED | OUTPATIENT
Start: 2019-06-14 | End: 2019-06-14

## 2019-06-14 RX ORDER — ONDANSETRON 8 MG/1
1 TABLET, FILM COATED ORAL
Qty: 9 | Refills: 0
Start: 2019-06-14 | End: 2019-06-16

## 2019-06-14 RX ADMIN — ONDANSETRON 4 MILLIGRAM(S): 8 TABLET, FILM COATED ORAL at 21:34

## 2019-06-14 RX ADMIN — Medication 10 MILLIGRAM(S): at 19:27

## 2019-06-14 RX ADMIN — SODIUM CHLORIDE 2000 MILLILITER(S): 9 INJECTION INTRAMUSCULAR; INTRAVENOUS; SUBCUTANEOUS at 17:38

## 2019-06-14 RX ADMIN — ONDANSETRON 4 MILLIGRAM(S): 8 TABLET, FILM COATED ORAL at 17:28

## 2019-06-14 RX ADMIN — SODIUM CHLORIDE 1000 MILLILITER(S): 9 INJECTION INTRAMUSCULAR; INTRAVENOUS; SUBCUTANEOUS at 22:20

## 2019-06-14 RX ADMIN — Medication 1 MILLIGRAM(S): at 22:20

## 2019-06-14 RX ADMIN — FAMOTIDINE 20 MILLIGRAM(S): 10 INJECTION INTRAVENOUS at 17:28

## 2019-06-14 NOTE — ED PROVIDER NOTE - PHYSICAL EXAMINATION
NEURO: pupils 3 mm, PERRL, EOMI (CN III, IV, VI), facial sensation intact to light touch in all 3 divisions bilat (CN V), face is symmetric with normal eye closure, eye opening, and smile (CN VII), hearing is normal to rubbing fingers (CN VII), palate elevates symmetrically, phonation is normal (CN IX, X),  shoulder shrug intact bilat (CN XI), tongue is midline with nl movements and no atrophy (CN XII), finger to nose test nl bilat, negative pronator drift bilat,  speech is clear; 5/5 motor strength BUE and BLE: deltoids, biceps, triceps, wrist flexors/extensors, hand , hip flexors, knee flexors/extensors, plantar/dorsiflexors, hallux flexors/extensors; sensation intact to light touch BUE and BLE: C5-T1 and L3-S1; gait wnl

## 2019-06-14 NOTE — CONSULT NOTE ADULT - ASSESSMENT
52 YO male s/p resection of a cerebellar metastasis, s/p initiation of gamma knife XRT with fatigue and nausea. patient has a stable CT and there is no need for restarting steroids at this time

## 2019-06-14 NOTE — ED PROVIDER NOTE - PROGRESS NOTE DETAILS
Neurosurgery consulted, would like CT head. Lucila: pt continues to be symptomatic. CT results reviewed. Evaluated by NS who recommend symptomatic management, no steroids due to prior psychosis.  Will try Ativan, more IVFs, if no improvement, will admit. pt's nausea resolved after ativan. appears comfortable and well hydrated.  tolerating po.  will send rx for ativan and zofran and has fu with his primary medical doctor.

## 2019-06-14 NOTE — CONSULT NOTE ADULT - SUBJECTIVE AND OBJECTIVE BOX
54 yo M c PMH of esophageal ca (s/p esophagectomy 1 y/a) and L cerebellar metastasis resected 4/24/19, started RT on brain this past Wednesday p/w 3 day h/o severe nausea and generalized fatigue. Denies focal motor or sensory loss, no visual disturbance, no headaches, no gait disturbances.    WDWN male, extremely anxious, in NAD  Vital Signs Last 24 Hrs  T(C): 36.8 (14 Jun 2019 21:21), Max: 37.1 (14 Jun 2019 16:27)  T(F): 98.2 (14 Jun 2019 21:21), Max: 98.8 (14 Jun 2019 16:27)  HR: 96 (14 Jun 2019 21:21) (95 - 114)  BP: 126/80 (14 Jun 2019 21:21) (126/80 - 138/87)  BP(mean): --  RR: 17 (14 Jun 2019 21:21) (16 - 18)  SpO2: 98% (14 Jun 2019 21:21) (98% - 100%)    AAO X 3  PERRLA, EOMI  CN 2-12 grossly intact  CASH strength 5/5  No drift  Coordination intact  SILT                          12.5   6.52  )-----------( 279      ( 14 Jun 2019 17:35 )             39.2     06-14    136  |  98  |  12  ----------------------------<  94  4.2   |  26  |  0.93    Ca    9.2      14 Jun 2019 17:35  Mg     2.0     06-14    TPro  6.8  /  Alb  3.4  /  TBili  0.4  /  DBili  x   /  AST  20  /  ALT  12  /  AlkPhos  47  06-14      < from: CT Head No Cont (06.14.19 @ 20:04) >  2019.    Postop changes compatible with a left cerebellar craniectomy and mesh   like cranioplasty is again seen. There is evidence of abnormal   low-attenuation identified in the postop bed which is compatible postop   changes. Previously noted high attenuation postop region is less   conspicuous which is compatible with expected evolutionary changes of   areas of hemorrhage and or postop material. There is evidence of an extra   axial fluid identified postop region. This finding measures 3.8 cm in   widest diameter and previously measured approximately 1.8 cm widest   diameter. This is likely compatible with an enlarging pseudomeningocele.    There is no evidence of acute hemorrhage mass mass effect in posterior   fossa or supratentorial region.    Evaluation of the osseous structures with the appropriate window aside   from postop changes appear unremarkable.    Impression: Postop changes are again seen.     < end of copied text >

## 2019-06-14 NOTE — CONSULT NOTE ADULT - PROBLEM SELECTOR RECOMMENDATION 9
No need for surgical intervention  No steroids due to adverse effects  Symptomatic treatment for nausea

## 2019-06-14 NOTE — ED ADULT NURSE NOTE - OBJECTIVE STATEMENT
pt is in bed A and OX 3 in NAD, resting comfortably, pt c/o  feeling nauseas, s/p radiation hx of brain tumor. pt reports was not prescribed nausea medications by oncology. PERRLA extremities are strong and equal, denies changes in vision, denies abd pain, orders noted and completed.

## 2019-06-14 NOTE — ED PROVIDER NOTE - CLINICAL SUMMARY MEDICAL DECISION MAKING FREE TEXT BOX
52 yo M c PMH of esophageal ca (s/p esophagectomy 1 y/a) and L cerebellar brain tumor (?met, s/p craniotomy 1 m/a) started RT on brain this past Wednesday p/w 3 day h/o severe nausea and difficulty swallowing. On exam, HR 110s VS otherwise wnl, non-focal neuro exam, mild abdominal ttp. tx: IVF/zofran/pepcid. neurosurgery aware, recommended CTH then will see pt, labs pending, will reassess. .

## 2019-06-14 NOTE — ED PROVIDER NOTE - NSFOLLOWUPINSTRUCTIONS_ED_ALL_ED_FT
1) You were here for nausea and vomiting.   2) Take ativan and zofran as needed for nausea.   3) Follow up with your primary doctor for further evaluation and to answer any questions you have.    4) Return to the emergency department if you experience worsening symptoms, pain, fever, chills, nausea, vomiting or other concerning symptoms.

## 2019-06-14 NOTE — ED PROVIDER NOTE - ATTENDING CONTRIBUTION TO CARE
Dr. Gaytan: I have personally performed a face to face bedside history and physical examination of this patient. I have discussed the history, examination, review of systems, assessment and plan of management with the resident. I have reviewed the electronic medical record and amended it to reflect my history, review of systems, physical exam, assessment and plan.    53M with pmh of esophageal ca, s/p esophagectomy, cerebellar tumor s/p resection 1 month, started brain RT treatment last week p/w  worsening n/v x 3 days. +mild epigastric discomfort.  Pt denies headache, vision changes, focal numbness/weakness, chest pain, diarrhea. Not able to tolerate PO today.    GEN - NAD; well appearing; A+O x3   HEAD - NC/AT     EYES - EOMI, no conjunctival pallor, no scleral icterus  ENT -   mucous membranes  moist , no discharge      NECK - Neck supple  PULM - CTA b/l,  symmetric breath sounds  COR -  RRR, S1 S2, no murmurs  ABD - , ND, minimal epigastric tenderness, soft, no guarding, no rebound, no masses    BACK - no CVA tenderness, nontender spine     EXTREMS - no edema, no deformity, warm and well perfused    SKIN - no rash or bruising      NEUROLOGIC - A&Ox3, PERRLA, EOMI, no nystagmus, CN2-12 grossly intact, no pronator drift, normal finger to nose and rapid alternating finger movements, normal sensation and 5/5 strength in all extremities, symmetric patellar reflexes    53M with pmh of metastatic esophageal ca, s/p recent brain tumor resection and radiation here with n/v. normal neuro exam. mild epigastric dyscomfort. r/o recurrence of mass, ICH, brain edema, AGNES, electrolyte abnormality, less likely ACS, GI related. Plan for antiemetics, CT head, IVFs, neurosurgery c/s, will get screening EKG.

## 2019-06-14 NOTE — ED PROVIDER NOTE - OBJECTIVE STATEMENT
52 yo M c PMH of esophageal ca (s/p esophagectomy 1 y/a) and L cerebellar brain tumor (?met, s/p craniotomy 1 m/a) started RT on brain this past Wednesday p/w 3 day h/o severe nausea and difficulty swallowing. denies focal weakness, numbness, or ataxia. spoke with Dr. Rollins (nsx) office today who sent him to ED for assessment.

## 2019-06-14 NOTE — ED ADULT TRIAGE NOTE - CHIEF COMPLAINT QUOTE
pt comes to ED for nausea x 3 days. pt has hx of brain tumor with surgery and radiation. pt last radiation was today. pt denies any chemo recently. pt VSS NAD pt states he has been having diffculty eating food. pt denies having a appetite. pt denies any CP or SOB

## 2019-06-14 NOTE — ED PROVIDER NOTE - PSH
H/O craniotomy    H/O hernia repair  1966  History of esophageal surgery  6/2018  Status post tonsillectomy and adenoidectomy    Stented coronary artery  x1 2015

## 2019-06-14 NOTE — ED PROVIDER NOTE - FAMILY HISTORY
Mother  Still living? Unknown  Family history of cervical cancer, Age at diagnosis: Age Unknown     Father  Still living? Unknown  Family history of throat cancer, Age at diagnosis: Age Unknown

## 2019-06-15 VITALS
SYSTOLIC BLOOD PRESSURE: 120 MMHG | HEART RATE: 98 BPM | RESPIRATION RATE: 18 BRPM | TEMPERATURE: 98 F | DIASTOLIC BLOOD PRESSURE: 82 MMHG | OXYGEN SATURATION: 98 %

## 2019-06-24 ENCOUNTER — OTHER (OUTPATIENT)
Age: 54
End: 2019-06-24

## 2019-06-26 ENCOUNTER — OTHER (OUTPATIENT)
Age: 54
End: 2019-06-26

## 2019-07-02 ENCOUNTER — OTHER (OUTPATIENT)
Age: 54
End: 2019-07-02

## 2019-07-03 ENCOUNTER — OUTPATIENT (OUTPATIENT)
Dept: OUTPATIENT SERVICES | Facility: HOSPITAL | Age: 54
LOS: 1 days | Discharge: HOME | End: 2019-07-03

## 2019-07-03 ENCOUNTER — OTHER (OUTPATIENT)
Age: 54
End: 2019-07-03

## 2019-07-03 DIAGNOSIS — Z98.890 OTHER SPECIFIED POSTPROCEDURAL STATES: Chronic | ICD-10-CM

## 2019-07-03 DIAGNOSIS — C15.9 MALIGNANT NEOPLASM OF ESOPHAGUS, UNSPECIFIED: ICD-10-CM

## 2019-07-03 DIAGNOSIS — Z95.5 PRESENCE OF CORONARY ANGIOPLASTY IMPLANT AND GRAFT: Chronic | ICD-10-CM

## 2019-07-03 DIAGNOSIS — Z90.89 ACQUIRED ABSENCE OF OTHER ORGANS: Chronic | ICD-10-CM

## 2019-07-08 ENCOUNTER — APPOINTMENT (OUTPATIENT)
Dept: HEMATOLOGY ONCOLOGY | Facility: CLINIC | Age: 54
End: 2019-07-08
Payer: COMMERCIAL

## 2019-07-08 VITALS
RESPIRATION RATE: 16 BRPM | SYSTOLIC BLOOD PRESSURE: 105 MMHG | OXYGEN SATURATION: 99 % | WEIGHT: 184.09 LBS | HEIGHT: 73.43 IN | DIASTOLIC BLOOD PRESSURE: 75 MMHG | HEART RATE: 117 BPM | BODY MASS INDEX: 23.88 KG/M2 | TEMPERATURE: 97.5 F

## 2019-07-08 PROCEDURE — 99215 OFFICE O/P EST HI 40 MIN: CPT

## 2019-07-08 RX ORDER — DIVALPROEX SODIUM 500 MG/1
500 TABLET, DELAYED RELEASE ORAL DAILY
Qty: 30 | Refills: 0 | Status: DISCONTINUED | COMMUNITY
Start: 2019-05-23 | End: 2019-07-08

## 2019-07-08 RX ORDER — OMEGA-3/DHA/EPA/FISH OIL 35-113.5MG
1000 TABLET,CHEWABLE ORAL DAILY
Qty: 30 | Refills: 0 | Status: DISCONTINUED | COMMUNITY
Start: 2019-05-28 | End: 2019-07-08

## 2019-07-08 RX ORDER — RISPERIDONE 1 MG/1
1 TABLET, FILM COATED ORAL DAILY
Qty: 30 | Refills: 0 | Status: DISCONTINUED | COMMUNITY
Start: 2019-05-23 | End: 2019-07-08

## 2019-07-08 RX ORDER — DOCUSATE SODIUM 100 MG/1
100 CAPSULE ORAL
Refills: 0 | Status: DISCONTINUED | COMMUNITY
Start: 2019-05-23 | End: 2019-07-08

## 2019-07-08 RX ORDER — RISPERIDONE 2 MG/1
2 TABLET, FILM COATED ORAL
Qty: 30 | Refills: 0 | Status: DISCONTINUED | COMMUNITY
Start: 2019-05-23 | End: 2019-07-08

## 2019-07-08 RX ORDER — PANTOPRAZOLE 40 MG/1
40 TABLET, DELAYED RELEASE ORAL
Refills: 0 | Status: DISCONTINUED | COMMUNITY
Start: 2019-05-23 | End: 2019-07-08

## 2019-07-10 ENCOUNTER — FORM ENCOUNTER (OUTPATIENT)
Age: 54
End: 2019-07-10

## 2019-07-11 ENCOUNTER — OUTPATIENT (OUTPATIENT)
Dept: OUTPATIENT SERVICES | Facility: HOSPITAL | Age: 54
LOS: 1 days | End: 2019-07-11
Payer: COMMERCIAL

## 2019-07-11 ENCOUNTER — APPOINTMENT (OUTPATIENT)
Dept: CT IMAGING | Facility: IMAGING CENTER | Age: 54
End: 2019-07-11
Payer: COMMERCIAL

## 2019-07-11 DIAGNOSIS — Z98.890 OTHER SPECIFIED POSTPROCEDURAL STATES: Chronic | ICD-10-CM

## 2019-07-11 DIAGNOSIS — C15.9 MALIGNANT NEOPLASM OF ESOPHAGUS, UNSPECIFIED: ICD-10-CM

## 2019-07-11 DIAGNOSIS — Z90.89 ACQUIRED ABSENCE OF OTHER ORGANS: Chronic | ICD-10-CM

## 2019-07-11 DIAGNOSIS — Z95.5 PRESENCE OF CORONARY ANGIOPLASTY IMPLANT AND GRAFT: Chronic | ICD-10-CM

## 2019-07-11 PROCEDURE — 74177 CT ABD & PELVIS W/CONTRAST: CPT | Mod: 26

## 2019-07-11 PROCEDURE — 71260 CT THORAX DX C+: CPT | Mod: 26

## 2019-07-11 PROCEDURE — 71260 CT THORAX DX C+: CPT

## 2019-07-11 PROCEDURE — 74177 CT ABD & PELVIS W/CONTRAST: CPT

## 2019-07-11 NOTE — REVIEW OF SYSTEMS
[Fatigue] : fatigue [Recent Change In Weight] : ~T recent weight change [Muscle Weakness] : muscle weakness [Difficulty Walking] : difficulty walking [Anxiety] : anxiety [Depression] : depression [Negative] : Allergic/Immunologic

## 2019-07-12 ENCOUNTER — APPOINTMENT (OUTPATIENT)
Dept: PULMONOLOGY | Facility: CLINIC | Age: 54
End: 2019-07-12
Payer: COMMERCIAL

## 2019-07-12 VITALS
WEIGHT: 191 LBS | SYSTOLIC BLOOD PRESSURE: 115 MMHG | RESPIRATION RATE: 17 BRPM | TEMPERATURE: 97.7 F | DIASTOLIC BLOOD PRESSURE: 82 MMHG | OXYGEN SATURATION: 96 % | BODY MASS INDEX: 24.78 KG/M2 | HEART RATE: 106 BPM | HEIGHT: 73.43 IN

## 2019-07-12 DIAGNOSIS — C80.1 SECONDARY MALIGNANT NEOPLASM OF RETROPERITONEUM AND PERITONEUM: ICD-10-CM

## 2019-07-12 DIAGNOSIS — C78.6 SECONDARY MALIGNANT NEOPLASM OF RETROPERITONEUM AND PERITONEUM: ICD-10-CM

## 2019-07-12 DIAGNOSIS — M53.3 SACROCOCCYGEAL DISORDERS, NOT ELSEWHERE CLASSIFIED: ICD-10-CM

## 2019-07-12 PROCEDURE — 99204 OFFICE O/P NEW MOD 45 MIN: CPT

## 2019-07-12 RX ORDER — CHLORHEXIDINE GLUCONATE 4 %
LIQUID (ML) TOPICAL
Refills: 0 | Status: ACTIVE | COMMUNITY

## 2019-07-12 RX ORDER — ONDANSETRON 8 MG/1
8 TABLET ORAL EVERY 8 HOURS
Qty: 21 | Refills: 0 | Status: DISCONTINUED | COMMUNITY
Start: 2019-06-13 | End: 2019-07-12

## 2019-07-15 ENCOUNTER — FORM ENCOUNTER (OUTPATIENT)
Age: 54
End: 2019-07-15

## 2019-07-16 ENCOUNTER — RESULT REVIEW (OUTPATIENT)
Age: 54
End: 2019-07-16

## 2019-07-16 ENCOUNTER — OUTPATIENT (OUTPATIENT)
Dept: OUTPATIENT SERVICES | Facility: HOSPITAL | Age: 54
LOS: 1 days | End: 2019-07-16
Payer: COMMERCIAL

## 2019-07-16 ENCOUNTER — APPOINTMENT (OUTPATIENT)
Dept: PULMONOLOGY | Facility: HOSPITAL | Age: 54
End: 2019-07-16

## 2019-07-16 DIAGNOSIS — Z98.890 OTHER SPECIFIED POSTPROCEDURAL STATES: Chronic | ICD-10-CM

## 2019-07-16 DIAGNOSIS — Z90.89 ACQUIRED ABSENCE OF OTHER ORGANS: Chronic | ICD-10-CM

## 2019-07-16 DIAGNOSIS — J90 PLEURAL EFFUSION, NOT ELSEWHERE CLASSIFIED: ICD-10-CM

## 2019-07-16 DIAGNOSIS — Z95.5 PRESENCE OF CORONARY ANGIOPLASTY IMPLANT AND GRAFT: Chronic | ICD-10-CM

## 2019-07-16 LAB
ALBUMIN FLD-MCNC: 2.6 G/DL — SIGNIFICANT CHANGE UP
B PERT IGG+IGM PNL SER: ABNORMAL
COLOR FLD: SIGNIFICANT CHANGE UP
FLUID INTAKE SUBSTANCE CLASS: SIGNIFICANT CHANGE UP
GLUCOSE FLD-MCNC: 78 MG/DL — SIGNIFICANT CHANGE UP
LDH SERPL L TO P-CCNC: 168 U/L — SIGNIFICANT CHANGE UP
PH FLD: 7.46 — SIGNIFICANT CHANGE UP
PROT FLD-MCNC: 4.6 G/DL — SIGNIFICANT CHANGE UP
RCV VOL RI: HIGH /UL (ref 0–5)
TOTAL NUCLEATED CELL COUNT, BODY FLUID: 315 /UL — HIGH (ref 0–5)
TUBE TYPE: SIGNIFICANT CHANGE UP

## 2019-07-16 PROCEDURE — 87102 FUNGUS ISOLATION CULTURE: CPT

## 2019-07-16 PROCEDURE — 87075 CULTR BACTERIA EXCEPT BLOOD: CPT

## 2019-07-16 PROCEDURE — 82042 OTHER SOURCE ALBUMIN QUAN EA: CPT

## 2019-07-16 PROCEDURE — 82945 GLUCOSE OTHER FLUID: CPT

## 2019-07-16 PROCEDURE — 88112 CYTOPATH CELL ENHANCE TECH: CPT

## 2019-07-16 PROCEDURE — 88305 TISSUE EXAM BY PATHOLOGIST: CPT

## 2019-07-16 PROCEDURE — 88112 CYTOPATH CELL ENHANCE TECH: CPT | Mod: 26

## 2019-07-16 PROCEDURE — 71045 X-RAY EXAM CHEST 1 VIEW: CPT | Mod: 26

## 2019-07-16 PROCEDURE — 87205 SMEAR GRAM STAIN: CPT

## 2019-07-16 PROCEDURE — 84157 ASSAY OF PROTEIN OTHER: CPT

## 2019-07-16 PROCEDURE — 32555 ASPIRATE PLEURA W/ IMAGING: CPT | Mod: RT

## 2019-07-16 PROCEDURE — 32555 ASPIRATE PLEURA W/ IMAGING: CPT | Mod: GC

## 2019-07-16 PROCEDURE — 71045 X-RAY EXAM CHEST 1 VIEW: CPT

## 2019-07-16 PROCEDURE — 89051 BODY FLUID CELL COUNT: CPT

## 2019-07-16 PROCEDURE — 83986 ASSAY PH BODY FLUID NOS: CPT

## 2019-07-16 PROCEDURE — 83615 LACTATE (LD) (LDH) ENZYME: CPT

## 2019-07-16 PROCEDURE — 87070 CULTURE OTHR SPECIMN AEROBIC: CPT

## 2019-07-16 PROCEDURE — 88305 TISSUE EXAM BY PATHOLOGIST: CPT | Mod: 26

## 2019-07-16 NOTE — PROCEDURE NOTE - NSPROCDETAILS_GEN_ALL_CORE
location identified, draped/prepped, sterile technique used, needle inserted/introduced/ultrasound assessment of effusion (localization)/catheter inserted over needle/connection to syringe

## 2019-07-16 NOTE — PROCEDURE NOTE - NSICDXPROCEDURE_GEN_ALL_CORE_FT
PROCEDURES:  US guided thoracentesis 16-Jul-2019 14:36:53  Tony Willett  Thoracentesis, right 16-Jul-2019 14:36:35  Tony Willett

## 2019-07-17 ENCOUNTER — RESULT REVIEW (OUTPATIENT)
Age: 54
End: 2019-07-17

## 2019-07-17 LAB
FLUID SEGMENTED GRANULOCYTES: 5 % — SIGNIFICANT CHANGE UP
GRAM STN FLD: SIGNIFICANT CHANGE UP
LYMPHOCYTES # FLD: 27 % — SIGNIFICANT CHANGE UP
MESOTHL CELL # FLD: 1 % — SIGNIFICANT CHANGE UP
MONOS+MACROS # FLD: 67 % — SIGNIFICANT CHANGE UP
SPECIMEN SOURCE: SIGNIFICANT CHANGE UP

## 2019-07-18 LAB — NON-GYNECOLOGICAL CYTOLOGY STUDY: SIGNIFICANT CHANGE UP

## 2019-07-18 PROCEDURE — 88342 IMHCHEM/IMCYTCHM 1ST ANTB: CPT | Mod: 26

## 2019-07-20 NOTE — PROGRESS NOTE ADULT - PROVIDER SPECIALTY LIST ADULT
CT Surgery
Critical Care
Gastroenterology
Internal Medicine
Pain Medicine
Surgery
Thoracic Surgery
Thoracic Surgery
Critical Care
Gastroenterology
Internal Medicine
Surgery
Surgery
no pedal edema
no pedal edema

## 2019-07-21 LAB
CULTURE RESULTS: SIGNIFICANT CHANGE UP
SPECIMEN SOURCE: SIGNIFICANT CHANGE UP

## 2019-07-22 ENCOUNTER — APPOINTMENT (OUTPATIENT)
Dept: NEUROSURGERY | Facility: CLINIC | Age: 54
End: 2019-07-22
Payer: COMMERCIAL

## 2019-07-22 VITALS
HEART RATE: 98 BPM | DIASTOLIC BLOOD PRESSURE: 84 MMHG | HEIGHT: 73.43 IN | SYSTOLIC BLOOD PRESSURE: 130 MMHG | WEIGHT: 185 LBS | BODY MASS INDEX: 24 KG/M2

## 2019-07-22 DIAGNOSIS — F19.950: ICD-10-CM

## 2019-07-22 DIAGNOSIS — Z78.9 OTHER SPECIFIED HEALTH STATUS: ICD-10-CM

## 2019-07-22 DIAGNOSIS — F31.9 BIPOLAR DISORDER, UNSPECIFIED: ICD-10-CM

## 2019-07-22 DIAGNOSIS — Z86.39 PERSONAL HISTORY OF OTHER ENDOCRINE, NUTRITIONAL AND METABOLIC DISEASE: ICD-10-CM

## 2019-07-22 DIAGNOSIS — Z79.01 LONG TERM (CURRENT) USE OF ANTICOAGULANTS: ICD-10-CM

## 2019-07-22 DIAGNOSIS — Z82.49 FAMILY HISTORY OF ISCHEMIC HEART DISEASE AND OTHER DISEASES OF THE CIRCULATORY SYSTEM: ICD-10-CM

## 2019-07-22 DIAGNOSIS — Z87.19 PERSONAL HISTORY OF OTHER DISEASES OF THE DIGESTIVE SYSTEM: ICD-10-CM

## 2019-07-22 DIAGNOSIS — Z87.898 PERSONAL HISTORY OF OTHER SPECIFIED CONDITIONS: ICD-10-CM

## 2019-07-22 DIAGNOSIS — R59.0 LOCALIZED ENLARGED LYMPH NODES: ICD-10-CM

## 2019-07-22 DIAGNOSIS — I21.9 ACUTE MYOCARDIAL INFARCTION, UNSPECIFIED: ICD-10-CM

## 2019-07-22 DIAGNOSIS — R63.0 ANOREXIA: ICD-10-CM

## 2019-07-22 DIAGNOSIS — K22.9 DISEASE OF ESOPHAGUS, UNSPECIFIED: ICD-10-CM

## 2019-07-22 DIAGNOSIS — Z80.0 FAMILY HISTORY OF MALIGNANT NEOPLASM OF DIGESTIVE ORGANS: ICD-10-CM

## 2019-07-22 DIAGNOSIS — Z09 ENCOUNTER FOR FOLLOW-UP EXAMINATION AFTER COMPLETED TREATMENT FOR CONDITIONS OTHER THAN MALIGNANT NEOPLASM: ICD-10-CM

## 2019-07-22 PROCEDURE — 99024 POSTOP FOLLOW-UP VISIT: CPT

## 2019-07-22 NOTE — HISTORY OF PRESENT ILLNESS
[FreeTextEntry1] : Mr. Morales is a 53-year-old male with a history of CAD with inferior wall MI in 2/2015 s/p RCA stent, HLD, and Stage IV Esophageal cancer with mets to brain, bone (sacrum), and peritoneum (?pleural and adrenal mets currently). He was diagnosed in 2017, received neoadjuvant chemo/RT Dec 2017 followed by Lamy Andrew esophagogastrectomy in June 2018 that was complicated by a pericardial effusion s/p drainage. He then had radiation to the right sacral met in March followed by Keytruda. He was subsequently found to have L cerebellar brain met s/p resection and steroids followed by radiation therapy, completed June 14. \par \par Most recently, found on CT chest (July 11, 2019) to have a large right pleural effusion with complete collapse of the RML and partial collapse of the RLL and associated mediastinal shift on CT chest from yesterday (July 11, 2019). There was also a trace left pleural effusion. CT also with interval increase in peritoneal carcinomatosis with moderate volume abdominal and pelvic free fluid and omental caking/nodularity most pronounced in the upper abdomen. There was also an indeterminate left adrenal nodule measuring 1.1 cm. \par \par He was called by oncologist yesterday and he decided to cancel his trip to [place] with his father given progression of disease. He has noticed progressively worsening dyspnea since May 2019. Reports that he cannot do deep breathing exercises like he previously used to. Denies any chest pain, no pleurisy.

## 2019-07-22 NOTE — PROCEDURE
[FreeTextEntry1] : Lung Ultrasound: large right pleural effusion with adjacent compressive atelectasis; trace left effusion

## 2019-07-22 NOTE — PHYSICAL EXAM
[General Appearance - Well Developed] : well developed [Normal Appearance] : normal appearance [Well Groomed] : well groomed [General Appearance - Well Nourished] : well nourished [Normal Conjunctiva] : the conjunctiva exhibited no abnormalities [General Appearance - In No Acute Distress] : no acute distress [Normal Oropharynx] : normal oropharynx [Neck Appearance] : the appearance of the neck was normal [Neck Cervical Mass (___cm)] : no neck mass was observed [Heart Rate And Rhythm] : heart rate and rhythm were normal [Heart Sounds] : normal S1 and S2 [Edema] : no peripheral edema present [Murmurs] : no murmurs present [Arterial Pulses Normal] : the arterial pulses were normal [Respiration, Rhythm And Depth] : normal respiratory rhythm and effort [Abdomen Soft] : soft [Exaggerated Use Of Accessory Muscles For Inspiration] : no accessory muscle use [Nail Clubbing] : no clubbing of the fingernails [Abnormal Walk] : normal gait [Cyanosis, Localized] : no localized cyanosis [Skin Turgor] : normal skin turgor [Skin Color & Pigmentation] : normal skin color and pigmentation [] : no rash [Cranial Nerves] : cranial nerves 2-12 were intact [Motor Exam] : the motor exam was normal [Oriented To Time, Place, And Person] : oriented to person, place, and time [Mood] : the mood was normal [Affect] : the affect was normal [FreeTextEntry1] : mild tenderness to palpation

## 2019-07-22 NOTE — CONSULT LETTER
[Dear  ___] : Dear  [unfilled], [Consult Letter:] : I had the pleasure of evaluating your patient, [unfilled]. [Please see my note below.] : Please see my note below. [Sincerely,] : Sincerely, [FreeTextEntry2] : Dr. Ashleigh Mackey, DO\par [address] [place] [address]\[place] [address]\par Office: [phone]\par Fax: [phone] [FreeTextEntry3] : Hector Ray MD\par Attending Physician in Pulmonary & Critical Care Medicine\par  of Medicine\par [employer] [facility]

## 2019-07-22 NOTE — ASSESSMENT
[FreeTextEntry1] : Mr. Morales is a 53-year-old male with a history of CAD with inferior wall MI in 2/2015 s/p RCA stent, HLD, and Stage IV Esophageal cancer with mets to brain, bone (sacrum), and peritoneum (?pleural and adrenal mets currently). He was diagnosed in 2017, received neoadjuvant chemo/RT Dec 2017 followed by Fletcher Andrew esophagogastrectomy in June 2018 that was complicated by a pericardial effusion s/p drainage. He then had radiation to the right sacral met in March followed by Keytruda. He was subsequently found to have L cerebellar brain met s/p resection and steroids followed by radiation therapy, completed June 14. \par \par Most recently, he has developed a large symptomatic right pleural effusion with adjacent compressive atelectasis and mediastinal shift to the left (CT chest July 11, 2019). There was also a trace left pleural effusion, peritoneal carcinomatosis, moderate abdominal and pelvic ascites, omental caking/nodularity, and an indeterminate left adrenal nodule measuring 1.1 cm. \par \par 1. Right Pleural Effusion:\par - Suspect effusion is malignant\par - Plan for diagnostic and therapeutic thoracentesis\par - Patient informed that if effusion recurs, he may require an indwelling pleural caheter\par \par 2. Stage IV Esophageal Cancer:\par - Patient counseled to follow-up closely with Oncology regarding further chemo given advanced malignancy\par - Most recently was on Keytruda\par \par 3. Follow-up after thoracentesis\par \par \par ***Addendum (7/16/19):\par Diagnostic/therapeutic thoracentesis performed with drainage of 3.4 liters of deborah fluid. Fluid is monocyte/macrophage-predominant with negative cultures and cytopath negative for malignancy. Will need follow-up if dyspnea recurs.

## 2019-07-23 PROBLEM — Z86.39 HISTORY OF OBESITY: Status: RESOLVED | Noted: 2017-01-19 | Resolved: 2019-07-23

## 2019-07-23 PROBLEM — F19.950 STEROID-INDUCED PSYCHOSIS, WITH DELUSIONS: Status: ACTIVE | Noted: 2019-07-23

## 2019-07-23 PROBLEM — Z79.01 LONG TERM (CURRENT) USE OF ANTICOAGULANTS: Chronic | Status: ACTIVE | Noted: 2019-07-16

## 2019-07-23 PROBLEM — Z87.898 PERSONAL HISTORY OF OTHER SPECIFIED CONDITIONS: Chronic | Status: ACTIVE | Noted: 2019-07-16

## 2019-07-23 PROBLEM — Z82.49 FAMILY HISTORY OF CARDIAC DISORDER: Status: ACTIVE | Noted: 2017-09-26

## 2019-07-23 PROBLEM — R59.0 LOCALIZED ENLARGED LYMPH NODES: Chronic | Status: ACTIVE | Noted: 2019-07-16

## 2019-07-23 PROBLEM — F31.9 BIPOLAR DISORDER: Status: ACTIVE | Noted: 2019-05-28

## 2019-07-23 PROBLEM — R59.0 HILAR LYMPHADENOPATHY: Status: RESOLVED | Noted: 2017-11-02 | Resolved: 2019-07-23

## 2019-07-23 PROBLEM — K22.9 ESOPHAGEAL MASS: Status: RESOLVED | Noted: 2017-10-17 | Resolved: 2019-07-23

## 2019-07-23 PROBLEM — I21.9 ACUTE MYOCARDIAL INFARCTION: Status: RESOLVED | Noted: 2017-01-19 | Resolved: 2019-07-23

## 2019-07-23 PROBLEM — Z78.9 ALCOHOL USE: Status: ACTIVE | Noted: 2017-09-26

## 2019-07-23 PROBLEM — Z82.49 FAMILY HISTORY OF ACUTE MYOCARDIAL INFARCTION: Status: ACTIVE | Noted: 2017-01-19

## 2019-07-23 PROBLEM — Z78.9 CAFFEINE USE: Status: ACTIVE | Noted: 2017-09-26

## 2019-07-23 PROBLEM — Z87.898 FORMER CONSUMPTION OF ALCOHOL: Status: ACTIVE | Noted: 2018-07-25

## 2019-07-23 PROBLEM — Z79.01 CHRONIC ANTICOAGULATION: Status: RESOLVED | Noted: 2017-09-26 | Resolved: 2019-07-23

## 2019-07-23 PROBLEM — R63.0 POOR APPETITE: Status: RESOLVED | Noted: 2018-01-23 | Resolved: 2019-07-23

## 2019-07-23 PROBLEM — Z87.898 HISTORY OF NAUSEA: Status: RESOLVED | Noted: 2018-01-23 | Resolved: 2019-07-23

## 2019-07-23 PROBLEM — Z09 POSTOP CHECK: Status: RESOLVED | Noted: 2018-09-04 | Resolved: 2019-07-23

## 2019-07-23 PROBLEM — Z87.19 PERSONAL HISTORY OF OTHER DISEASES OF THE DIGESTIVE SYSTEM: Chronic | Status: ACTIVE | Noted: 2019-07-16

## 2019-07-23 PROBLEM — Z87.19 HISTORY OF DYSPHAGIA: Status: RESOLVED | Noted: 2017-09-26 | Resolved: 2019-07-23

## 2019-07-23 PROBLEM — Z80.0 FAMILY HISTORY OF THROAT CANCER: Status: ACTIVE | Noted: 2017-09-26

## 2019-07-23 NOTE — HISTORY OF PRESENT ILLNESS
[FreeTextEntry1] : Mr. SYLVIA VICTOR is a 54 yo male with PMH of CAD, MI (s/p stent 4 years ago, on ASA81), adenocarcinoma of the distal esophagus (s/p robotic lovor-Andrew Esophagogastrectomy on 6/8/18 and neoadjuvant chemo/RT with Dr. Pérez, current tx of immunotherapy/ Keytruda), mets to sacrum (s/p SBRT on 3/15/19) who initially presented to [facility] ED on 4/13/19 for headache, nausea and vertigo for 4 days. Initial CT head/MRI revealed left superior cerebellar mass with surrounding vasogenic edema concerning for metastasis. His symptoms moderately improved with steroid and neurologically stable. He was discharged home with outpatient surgery plan and chemo tx. Today he presents for follow up and states his symptoms continue to improve with steroid tx. He denies headache, dizziness, n/v, visual changes, gait disturbance, weakness, or any other focal neuro deficits. He was seen by Dr. Pérez yesterday and plans to receive a dose of Keytruda tomorrow.

## 2019-07-23 NOTE — CONSULT LETTER
[Dear  ___] : Dear  [unfilled], [Courtesy Letter:] : I had the pleasure of seeing your patient, [unfilled], in my office today. [Please see my note below.] : Please see my note below. [Consult Closing:] : Thank you very much for allowing me to participate in the care of this patient.  If you have any questions, please do not hesitate to contact me. [Sincerely,] : Sincerely, [FreeTextEntry3] : Ashleigh Mackey D.O. \par Attending Physician \par [facility] H[facility]ology\par  \par [facility] \par Tel: [phone]\par Fax: [phone]\par

## 2019-07-23 NOTE — HISTORY OF PRESENT ILLNESS
[Disease: _____________________] : Disease: [unfilled] [AJCC Stage: ____] : AJCC Stage: [unfilled] [Patient given social work contact information and resource sheet] : Patient was given social work contact information and resource sheet [Date: ____________] : Patient's last distress assessment performed on [unfilled]. 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 He was initially seen by Dr. Ryan at [facility] and was recommended to start chemotherapy with concurrent radiation however, decided to switch care to Dr. Fuentes. Initial PET/CT in Oct 2017 showed hypermetabolic wall thickening of the distal esophagus and gastric cardia as well as metastatic perigastric lymphadenopathy. A PET+ paraesophageal mass was noted and was bx proven positive for adenocarcinoma. He began weekly Carbo AUC 2 and Taxol 50 mg/m2 along with RT which was completed 1/30/18. Reportedly patient tolerated treatment poorly. Subsequently underwent Arley Andrew Esophagogastrectomy.on 6/14/18, final pathology was c/w T1N1. Caris report did not reveal any actionable mutations. \par \par Jam had a PET/CT in Nov 2018 which showed a new lesion in the R pelvis. Bx of the bone was positive for poorly differentiated carcinoma, favoring adenocarcinoma of the esophagus. Received Keytruda 2/14-4/18/19 and SBRT to the lesion from 3/5-3/15/19.\par \par On 4/13/19 he presented to [facility] with HA, vertigo and nausea, found to have a L superior cerebellar mass 3 x 3.7 x 2.3 cm s/p craniotomy on 4/22 which revealed metastatic poorly differentiated carcinoma. Post op MRI showed tiny nodular area of enhancement in the operative bed. Post operative course was c/b admission on 4/30/19 with acute psychotic deterioration which resulted in a 2 week stay at a in patient psychiatric facility (5/2-5/16). After psych stabilization, he underwent 3 session of gamma knife c/b nausea.vomiting and fatigue. This was completed middle of June 2019. The last set of CT CAP was in April 2019 which showed peritoneal disease, mild ascites. 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 Very close to his father.  \par \par PMD/Cardiologist: Dr Mendez. \par \par Patient advised to f/u with medical oncology for further management of his disease. He decided to transfer his care back to [facility] as all his doctor's are in the system. \par \par \par \par \par  [de-identified] : invasive adenocarcinoma moderate to poorly differentiated [de-identified] : Her 2 negative \par VIANEY [de-identified] : Here for initial consultation. He is overall feeling much better physically and emotionally since 1 mos ago. Is off all psych meds now, feels more awake. Has not f/u with his psychiatrist. Has been talking to his dad, who he feels is a calming influence on him. He feels anxious talking about his disease, prognosis. He absolutely refuses chemotherapy or immunotherapy. He is looking for alternative therapy for his disease. He is in denial that he has active disease for the moment. Would like to remain as optimistic as possible. \par appetite is improving slowly. Has lost 68 lbs in the last 2 yrs. Thinks he is gaining some weight back. \par Occasional diarrhea - takes magnesium citrate, olive oil to help move his bowels. \par Denies any abdominal pain, distention. n/v resolved. \par Denies any significant SOB/CP. \par He ambulates slowly but independently. Can perform all ADLs. \par He is planning a trip with his dad to [place] next week

## 2019-07-23 NOTE — HISTORY OF PRESENT ILLNESS
[AJCC Stage: ____] : AJCC Stage: [unfilled] [Disease: _____________________] : Disease: [unfilled] [0 - No Distress] : Distress Level: 0 [Patient given social work contact information and resource sheet] : Patient was given social work contact information and resource sheet [Date: ____________] : Patient's last distress assessment performed on [unfilled]. [de-identified] : 51 y/o Male with PMH of CAD s/p inferior wall MI in 02/2015 s/p RCA stent in [place], HLD, who was diagnosed with Stage III lower esophageal adenocarcinoma (T3N3) in the fall of 2017. He was initially seen by Dr. Ryan at [facility] and was recommended to start chemotherapy with concurrent radiation however, decided to switch care to Dr. Fuentes. Initial PET/CT in Oct 2017 showed hypermetabolic wall thickening of the distal esophagus and gastric cardia as well as metastatic perigastric lymphadenopathy. A PET+ paraesophageal mass was noted and was bx proven positive for adenocarcinoma. He began weekly Carbo AUC 2 and Taxol 50 mg/m2 along with RT which was completed 1/30/18. Reportedly patient tolerated treatment poorly. Subsequently underwent Arley Andrew Esophagogastrectomy.on 6/14/18, final pathology was c/w T1N1. Caris report did not reveal any actionable mutations. \par \par Jam had a PET/CT in Nov 2018 which showed a new lesion in the R pelvis. Bx of the bone was positive for poorly differentiated carcinoma, favoring adenocarcinoma of the esophagus. Received Keytruda 2/14-4/18/19 and SBRT to the lesion from 3/5-3/15/19.\par \par On 4/13/19 he presented to [facility] with HA, vertigo and nausea, found to have a L superior cerebellar mass 3 x 3.7 x 2.3 cm s/p craniotomy on 4/22 which revealed metastatic poorly differentiated carcinoma. Post op MRI showed tiny nodular area of enhancement in the operative bed. Post operative course was c/b admission on 4/30/19 with acute psychotic deterioration which resulted in a 2 week stay at a in patient psychiatric facility (5/2-5/16). After psych stabilization, he underwent 3 session of gamma knife c/b nausea.vomiting and fatigue. This was completed middle of June 2019. The last set of CT CAP was in April 2019 which showed peritoneal disease, mild ascites. 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 Very close to his father.  \par \par PMD/Cardiologist: Dr Mendez. \par \par Patient advised to f/u with medical oncology for further management of his disease. He decided to transfer his care back to [facility] as all his doctor's are in the system. \par \par \par \par \par  [de-identified] : invasive adenocarcinoma moderate to poorly differentiated [de-identified] : Her 2 negative \par VIANEY [de-identified] : Here for initial consultation. He is overall feeling much better physically and emotionally since 1 mos ago. Is off all psych meds now, feels more awake. Has not f/u with his psychiatrist. Has been talking to his dad, who he feels is a calming influence on him. He feels anxious talking about his disease, prognosis. He absolutely refuses chemotherapy or immunotherapy. He is looking for alternative therapy for his disease. He is in denial that he has active disease for the moment. Would like to remain as optimistic as possible. \par appetite is improving slowly. Has lost 68 lbs in the last 2 yrs. Thinks he is gaining some weight back. \par Occasional diarrhea - takes magnesium citrate, olive oil to help move his bowels. \par Denies any abdominal pain, distention. n/v resolved. \par Denies any significant SOB/CP. \par He ambulates slowly but independently. Can perform all ADLs. \par He is planning a trip with his dad to [place] next week

## 2019-07-23 NOTE — ASSESSMENT
[FreeTextEntry1] : 52 yo male with h/o esophageal cancer (adenocarcinoma) and sacral mets who had recent hospitalization for dizziness where MRI brain revealed left cerebellar mass concerning brain metastases. He is neurologically intact. Given his recent symptoms and imaging findings, I have recommended a craniotomy for surgical resection of what is most likely an isolated cerebellar metastatic lesion. Risks, benefits and alternatives to surgery were explained to the patient. Patient verbalized a good understanding and would like to proceed surgery. \par - continue to hold off aspirin until the surgery date\par - schedule surgery on 4/22

## 2019-07-23 NOTE — PHYSICAL EXAM
[General Appearance - Alert] : alert [General Appearance - In No Acute Distress] : in no acute distress [General Appearance - Well Nourished] : well nourished [Impaired Insight] : insight and judgment were intact [Oriented To Time, Place, And Person] : oriented to person, place, and time [Affect] : the affect was normal [Mood] : the mood was normal [Over the Past 2 Weeks, Have You Felt Down, Depressed, or Hopeless?] : 1.) Over the past 2 weeks, have you felt down, depressed, or hopeless? No [Memory Recent] : recent memory was not impaired [Over the Past 2 Weeks, Have You Felt Little Interest or Pleasure Doing Things?] : 2.) Over the past 2 weeks, have you felt little interest or pleasure doing things? No [FreeTextEntry1] : denies suicidal ideation [Place] : oriented to place [Person] : oriented to person [Time] : oriented to time [Short Term Intact] : short term memory intact [Span Intact] : the attention span was normal [Remote Intact] : remote memory intact [Registration Intact] : recent registration memory intact [Concentration Intact] : normal concentrating ability [Fluency] : fluency intact [Current Events] : adequate knowledge of current events [Comprehension] : comprehension intact [Past History] : adequate knowledge of personal past history [I: Normal Smell] : smell intact bilaterally [Cranial Nerves Optic (II)] : visual acuity intact bilaterally,  pupils equal round and reactive to light [Vocabulary] : adequate range of vocabulary [Cranial Nerves Oculomotor (III)] : extraocular motion intact [Cranial Nerves Trigeminal (V)] : facial sensation intact symmetrically [Cranial Nerves Vestibulocochlear (VIII)] : hearing was intact bilaterally [Cranial Nerves Facial (VII)] : face symmetrical [Cranial Nerves Glossopharyngeal (IX)] : tongue and palate midline [Cranial Nerves Hypoglossal (XII)] : there was no tongue deviation with protrusion [Cranial Nerves Accessory (XI - Cranial And Spinal)] : head turning and shoulder shrug symmetric [Motor Tone] : muscle tone was normal in all four extremities [Motor Strength] : muscle strength was normal in all four extremities [5] : S1 toe walking 5/5 [Abnormal Walk] : normal gait [Balance] : balance was intact [2+] : Ankle jerk left 2+ [Ross] : Ross's sign was not demonstrated [No Visual Abnormalities] : no visible abnormailities [Able to toe walk] : the patient was able to toe walk [Normal] : normal [Sclera] : the sclera and conjunctiva were normal [Able to heel walk] : the patient was able to heel walk [PERRL With Normal Accommodation] : pupils were equal in size, round, reactive to light, with normal accommodation [Outer Ear] : the ears and nose were normal in appearance [Extraocular Movements] : extraocular movements were intact [] : no respiratory distress [Exaggerated Use Of Accessory Muscles For Inspiration] : no accessory muscle use [Hearing Threshold Finger Rub Not [place]] : hearing was normal [Edema] : there was no peripheral edema [No CVA Tenderness] : no ~M costovertebral angle tenderness [No Spinal Tenderness] : no spinal tenderness

## 2019-07-23 NOTE — CONSULT LETTER
[Dear  ___] : Dear  [unfilled], [Courtesy Letter:] : I had the pleasure of seeing your patient, [unfilled], in my office today. [Please see my note below.] : Please see my note below. [Consult Closing:] : Thank you very much for allowing me to participate in the care of this patient.  If you have any questions, please do not hesitate to contact me. [Sincerely,] : Sincerely, [FreeTextEntry3] : Ashleigh Mackey D.O. \par Attending Physician \par [facility] of Medical Oncology and Hematology\par  \par [facility] \par Tel: [phone]\par Fax: [phone]\par

## 2019-07-24 ENCOUNTER — APPOINTMENT (OUTPATIENT)
Dept: HEMATOLOGY ONCOLOGY | Facility: CLINIC | Age: 54
End: 2019-07-24

## 2019-07-24 ENCOUNTER — APPOINTMENT (OUTPATIENT)
Age: 54
End: 2019-07-24

## 2019-07-24 ENCOUNTER — RESULT REVIEW (OUTPATIENT)
Age: 54
End: 2019-07-24

## 2019-07-24 ENCOUNTER — LABORATORY RESULT (OUTPATIENT)
Age: 54
End: 2019-07-24

## 2019-07-24 ENCOUNTER — APPOINTMENT (OUTPATIENT)
Dept: HEMATOLOGY ONCOLOGY | Facility: CLINIC | Age: 54
End: 2019-07-24
Payer: COMMERCIAL

## 2019-07-24 VITALS
HEART RATE: 100 BPM | RESPIRATION RATE: 16 BRPM | OXYGEN SATURATION: 96 % | SYSTOLIC BLOOD PRESSURE: 119 MMHG | TEMPERATURE: 98.6 F | BODY MASS INDEX: 24.44 KG/M2 | WEIGHT: 187.39 LBS | DIASTOLIC BLOOD PRESSURE: 80 MMHG

## 2019-07-24 DIAGNOSIS — F41.9 ANXIETY DISORDER, UNSPECIFIED: ICD-10-CM

## 2019-07-24 DIAGNOSIS — F60.89 OTHER SPECIFIC PERSONALITY DISORDERS: ICD-10-CM

## 2019-07-24 DIAGNOSIS — C79.51 SECONDARY MALIGNANT NEOPLASM OF BONE: ICD-10-CM

## 2019-07-24 DIAGNOSIS — F51.05 ANXIETY DISORDER, UNSPECIFIED: ICD-10-CM

## 2019-07-24 LAB
BASOPHILS # BLD AUTO: 0 K/UL — SIGNIFICANT CHANGE UP (ref 0–0.2)
BASOPHILS NFR BLD AUTO: 0 % — SIGNIFICANT CHANGE UP (ref 0–2)
EOSINOPHIL # BLD AUTO: 0 K/UL — SIGNIFICANT CHANGE UP (ref 0–0.5)
EOSINOPHIL NFR BLD AUTO: 0.4 % — SIGNIFICANT CHANGE UP (ref 0–6)
HCT VFR BLD CALC: 37.6 % — LOW (ref 39–50)
HGB BLD-MCNC: 12 G/DL — LOW (ref 13–17)
LYMPHOCYTES # BLD AUTO: 0.7 K/UL — LOW (ref 1–3.3)
LYMPHOCYTES # BLD AUTO: 11.4 % — LOW (ref 13–44)
MCHC RBC-ENTMCNC: 26.7 PG — LOW (ref 27–34)
MCHC RBC-ENTMCNC: 31.8 G/DL — LOW (ref 32–36)
MCV RBC AUTO: 83.9 FL — SIGNIFICANT CHANGE UP (ref 80–100)
MONOCYTES # BLD AUTO: 0.9 K/UL — SIGNIFICANT CHANGE UP (ref 0–0.9)
MONOCYTES NFR BLD AUTO: 13.1 % — SIGNIFICANT CHANGE UP (ref 2–14)
NEUTROPHILS # BLD AUTO: 4.9 K/UL — SIGNIFICANT CHANGE UP (ref 1.8–7.4)
NEUTROPHILS NFR BLD AUTO: 75.1 % — SIGNIFICANT CHANGE UP (ref 43–77)
PLATELET # BLD AUTO: 396 K/UL — SIGNIFICANT CHANGE UP (ref 150–400)
RBC # BLD: 4.49 M/UL — SIGNIFICANT CHANGE UP (ref 4.2–5.8)
RBC # FLD: 15.2 % — HIGH (ref 10.3–14.5)
WBC # BLD: 6.5 K/UL — SIGNIFICANT CHANGE UP (ref 3.8–10.5)
WBC # FLD AUTO: 6.5 K/UL — SIGNIFICANT CHANGE UP (ref 3.8–10.5)

## 2019-07-24 PROCEDURE — 99214 OFFICE O/P EST MOD 30 MIN: CPT

## 2019-07-24 PROCEDURE — 90791 PSYCH DIAGNOSTIC EVALUATION: CPT

## 2019-07-24 RX ORDER — RISPERIDONE 1 MG/1
1 TABLET, FILM COATED ORAL
Refills: 0 | Status: ACTIVE | COMMUNITY
Start: 2019-07-24

## 2019-07-24 RX ORDER — RANITIDINE 150 MG/1
150 TABLET ORAL
Qty: 30 | Refills: 0 | Status: ACTIVE | COMMUNITY
Start: 2019-07-24 | End: 1900-01-01

## 2019-07-25 PROBLEM — C79.51 METASTATIC ADENOCARCINOMA TO BONE: Status: ACTIVE | Noted: 2019-02-01

## 2019-07-25 PROBLEM — F41.9 INSOMNIA SECONDARY TO ANXIETY: Status: ACTIVE | Noted: 2019-07-24

## 2019-07-25 NOTE — REVIEW OF SYSTEMS
[Fatigue] : fatigue [Shortness Of Breath] : shortness of breath [Insomnia] : insomnia [Anxiety] : anxiety [Negative] : Allergic/Immunologic [FreeTextEntry7] : + dyspepsia

## 2019-07-25 NOTE — HISTORY OF PRESENT ILLNESS
[Disease: _____________________] : Disease: [unfilled] [AJCC Stage: ____] : AJCC Stage: [unfilled] [Therapy: ___] : Therapy: [unfilled] [Cycle: ___] : Cycle: [unfilled] [de-identified] : 51 y/o Male with PMH of CAD s/p inferior wall MI in 02/2015 s/p RCA stent in [place], HLD, who was diagnosed with Stage III lower esophageal adenocarcinoma (T3N3) in the fall of 2017. He was initially seen by Dr. Ryan at [facility] and was recommended to start chemotherapy with concurrent radiation however, decided to switch care to Dr. Fuentes. Initial PET/CT in Oct 2017 showed hypermetabolic wall thickening of the distal esophagus and gastric cardia as well as metastatic perigastric lymphadenopathy. A PET+ paraesophageal mass was noted and was bx proven positive for adenocarcinoma. He began weekly Carbo AUC 2 and Taxol 50 mg/m2 along with RT which was completed 1/30/18. Reportedly patient tolerated treatment poorly. Subsequently underwent Arley Andrew Esophagogastrectomy.on 6/14/18, final pathology was c/w T1N1. Caris report did not reveal any actionable mutations. \par \par Jam had a PET/CT in Nov 2018 which showed a new lesion in the R pelvis. Bx of the bone was positive for poorly differentiated carcinoma, favoring adenocarcinoma of the esophagus. Received Keytruda 2/14-4/18/19 and SBRT to the lesion from 3/5-3/15/19.\par \par On 4/13/19 he presented to [facility] with HA, vertigo and nausea, found to have a L superior cerebellar mass 3 x 3.7 x 2.3 cm s/p craniotomy on 4/22 which revealed metastatic poorly differentiated carcinoma. Post op MRI showed tiny nodular area of enhancement in the operative bed. Post operative course was c/b admission on 4/30/19 with acute psychotic deterioration which resulted in a 2 week stay at a in patient psychiatric facility (5/2-5/16). After psych stabilization, he underwent 3 session of gamma knife c/b nausea.vomiting and fatigue. This was completed middle of June 2019. The last set of CT CAP was in April 2019 which showed peritoneal disease, mild ascites. 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 Very close to his father.  \par \par PMD/Cardiologist: Dr Mendez. \par \par Patient advised to f/u with medical oncology for further management of his disease. He decided to transfer his care back to [facility] as all his doctor's are in the system. \par \par 7/24/19: C1 Slava\par \par \par  [de-identified] : invasive adenocarcinoma moderate to poorly differentiated [de-identified] : Her 2 negative \par VIANEY [de-identified] : His CT scans performed 2 weeks ago showed a large pleural effusion. Pt underwent thoracentesis last tues with removal of 3.4 L. Cytology negative for malignancy. \par Pt reports SOB has improved. He is here for first dose of Keytruda today. \par Planning to go to [facility] on Sunday for 2 weeks to try an alternative therapy. \par Eating better now. Gained 4 lbs since last visit. \par + heartburn\par + anxiety causes insomnia.

## 2019-07-25 NOTE — PHYSICAL EXAM
"Chief Complaint   Patient presents with     Derm Problem       Initial /74 mmHg  Pulse 78  Temp(Src) 97.7  F (36.5  C) (Oral)  Wt 256 lb 6.4 oz (116.302 kg)  SpO2 100% Estimated body mass index is 40.15 kg/(m^2) as calculated from the following:    Height as of 11/7/16: 5' 7.01\" (1.702 m).    Weight as of this encounter: 256 lb 6.4 oz (116.302 kg).  BP completed using cuff size: yvonne Mera CMA    " [Restricted in physically strenuous activity but ambulatory and able to carry out work of a light or sedentary nature] : Status 1- Restricted in physically strenuous activity but ambulatory and able to carry out work of a light or sedentary nature, e.g., light house work, office work [Normal] : affect appropriate [de-identified] : + decreased BS R >L at bases

## 2019-07-31 DIAGNOSIS — C79.31 SECONDARY MALIGNANT NEOPLASM OF BRAIN: ICD-10-CM

## 2019-07-31 DIAGNOSIS — R11.2 NAUSEA WITH VOMITING, UNSPECIFIED: ICD-10-CM

## 2019-07-31 DIAGNOSIS — Z51.11 ENCOUNTER FOR ANTINEOPLASTIC CHEMOTHERAPY: ICD-10-CM

## 2019-08-02 NOTE — ASSESSMENT
[FreeTextEntry1] : This is a 52 yo male with esophageal cancer (adenocarcinoma) to brain who is s/p craniectomy for resection of left cerebellar mets ~ 3 months ago with adjuvant GK. His previous post op complication (steroid induced aftab)  markedly improved and neurologically intact today. Last MRI in June showed stable resection cavity without evidence of residual/recurrence. I have recommended\par - continue physical therapy \par - MRI brain w/wo in 6 monhts\par - RTC after image

## 2019-08-02 NOTE — HISTORY OF PRESENT ILLNESS
[FreeTextEntry1] : Mr. SYLVIA VICTOR is a 54 yo male with PMH of HLD, CAD, MI (s/p stent 4 years ago, on ASA81), adenocarcinoma of the distal esophagus (s/p robotic lovor-Andrew Esophagogastrectomy on 6/8/18 and neoadjuvant chemo/RT with Dr. Pérez, immunotherapy/ Keytruda 2/14-4/18/19), mets to sacrum, pelvis (s/p SBRT 3/5/19 - 3/15/19) and most recently found to peritoneum (pleural and adrenal mets) who initially presented to [facility] ED on 4/13/19 for headache, nausea and vertigo for 4 days. Initial CT head/MRI revealed left superior cerebellar mass with surrounding vasogenic edema concerning for metastasis. On 4/22/19, he underwent suboccipital craniectomy for resection. Post operative hospital stay was uneventful and discharged to home on 4/26/19 but he returned back to the hospital on 4/30/19 for severe anxiety and hospitalized at [facility] for steroid induced aftab and discharged to home on 5/7/19. Subsequently he underwent gamma knife SRS to the resection cavity (6/12/19-6/14/19) with post procedure c/o nausea/vomiting which was controlled well by Zofran. He returns today for routine follow up and states he is doing well at home. His previous anxiety subsided and has occasional lightheadedness but denies headache, n/v, visual changes, gait disturbance, weakness, or any other focal neuro deficits. Surgical incision appears to be well healed.

## 2019-08-02 NOTE — PHYSICAL EXAM
[General Appearance - Alert] : alert [General Appearance - In No Acute Distress] : in no acute distress [General Appearance - Well Nourished] : well nourished [Oriented To Time, Place, And Person] : oriented to person, place, and time [Impaired Insight] : insight and judgment were intact [Affect] : the affect was normal [Mood] : the mood was normal [Memory Recent] : recent memory was not impaired [Person] : oriented to person [Place] : oriented to place [Time] : oriented to time [Short Term Intact] : short term memory intact [Remote Intact] : remote memory intact [Span Intact] : the attention span was normal [Registration Intact] : recent registration memory intact [Concentration Intact] : normal concentrating ability [Fluency] : fluency intact [Comprehension] : comprehension intact [Current Events] : adequate knowledge of current events [Past History] : adequate knowledge of personal past history [Vocabulary] : adequate range of vocabulary [Cranial Nerves Optic (II)] : visual acuity intact bilaterally,  pupils equal round and reactive to light [Cranial Nerves Oculomotor (III)] : extraocular motion intact [Cranial Nerves Trigeminal (V)] : facial sensation intact symmetrically [Cranial Nerves Vestibulocochlear (VIII)] : hearing was intact bilaterally [Cranial Nerves Facial (VII)] : face symmetrical [Cranial Nerves Glossopharyngeal (IX)] : tongue and palate midline [Cranial Nerves Accessory (XI - Cranial And Spinal)] : head turning and shoulder shrug symmetric [Cranial Nerves Hypoglossal (XII)] : there was no tongue deviation with protrusion [Motor Strength] : muscle strength was normal in all four extremities [5] : S1 toe walking 5/5 [Balance] : balance was intact [2+] : Ankle jerk left 2+ [No Visual Abnormalities] : no visible abnormailities [Normal] : normal [Able to toe walk] : the patient was able to toe walk [Able to heel walk] : the patient was able to heel walk [Sclera] : the sclera and conjunctiva were normal [Outer Ear] : the ears and nose were normal in appearance [Extraocular Movements] : extraocular movements were intact [PERRL With Normal Accommodation] : pupils were equal in size, round, reactive to light, with normal accommodation [] : no respiratory distress [Hearing Threshold Finger Rub Not [place]] : hearing was normal [Edema] : there was no peripheral edema [Exaggerated Use Of Accessory Muscles For Inspiration] : no accessory muscle use [No CVA Tenderness] : no ~M costovertebral angle tenderness [No Spinal Tenderness] : no spinal tenderness [Longitudinal] : longitudinal [Clean] : clean [Dry] : dry [Well-Healed] : well-healed [No Drainage] : without drainage [Normal Skin] : normal [Visual Intact] : visual attention was ~T not ~L decreased [Reading] : reading intact [Abnormal Walk] : normal gait [Motor Tone] : muscle strength and tone were normal [Erythema] : not erythematous [Warm] : not warm [Tender] : not tender [Indurated] : not indurated [Fluctuant] : not fluctuant [Over the Past 2 Weeks, Have You Felt Little Interest or Pleasure Doing Things?] : 2.) Over the past 2 weeks, have you felt little interest or pleasure doing things? No [Over the Past 2 Weeks, Have You Felt Down, Depressed, or Hopeless?] : 1.) Over the past 2 weeks, have you felt down, depressed, or hopeless? No [FreeTextEntry1] : denies suicidal ideation [Ross] : Ross's sign was not demonstrated [FreeTextEntry8] : abnormal tandem gait

## 2019-08-08 ENCOUNTER — OUTPATIENT (OUTPATIENT)
Dept: OUTPATIENT SERVICES | Facility: HOSPITAL | Age: 54
LOS: 1 days | Discharge: HOME | End: 2019-08-08

## 2019-08-08 DIAGNOSIS — Z90.89 ACQUIRED ABSENCE OF OTHER ORGANS: Chronic | ICD-10-CM

## 2019-08-08 DIAGNOSIS — Z98.890 OTHER SPECIFIED POSTPROCEDURAL STATES: Chronic | ICD-10-CM

## 2019-08-08 DIAGNOSIS — C15.9 MALIGNANT NEOPLASM OF ESOPHAGUS, UNSPECIFIED: ICD-10-CM

## 2019-08-08 DIAGNOSIS — Z95.5 PRESENCE OF CORONARY ANGIOPLASTY IMPLANT AND GRAFT: Chronic | ICD-10-CM

## 2019-08-10 ENCOUNTER — FORM ENCOUNTER (OUTPATIENT)
Age: 54
End: 2019-08-10

## 2019-08-11 ENCOUNTER — OUTPATIENT (OUTPATIENT)
Dept: OUTPATIENT SERVICES | Facility: HOSPITAL | Age: 54
LOS: 1 days | End: 2019-08-11
Payer: COMMERCIAL

## 2019-08-11 ENCOUNTER — APPOINTMENT (OUTPATIENT)
Dept: MRI IMAGING | Facility: IMAGING CENTER | Age: 54
End: 2019-08-11
Payer: COMMERCIAL

## 2019-08-11 DIAGNOSIS — J90 PLEURAL EFFUSION, NOT ELSEWHERE CLASSIFIED: ICD-10-CM

## 2019-08-11 DIAGNOSIS — Z95.5 PRESENCE OF CORONARY ANGIOPLASTY IMPLANT AND GRAFT: Chronic | ICD-10-CM

## 2019-08-11 DIAGNOSIS — K21.9 GASTRO-ESOPHAGEAL REFLUX DISEASE W/OUT ESOPHAGITIS: ICD-10-CM

## 2019-08-11 DIAGNOSIS — Z90.89 ACQUIRED ABSENCE OF OTHER ORGANS: Chronic | ICD-10-CM

## 2019-08-11 DIAGNOSIS — Z98.890 OTHER SPECIFIED POSTPROCEDURAL STATES: Chronic | ICD-10-CM

## 2019-08-11 DIAGNOSIS — C79.31 SECONDARY MALIGNANT NEOPLASM OF BRAIN: ICD-10-CM

## 2019-08-11 PROCEDURE — 70553 MRI BRAIN STEM W/O & W/DYE: CPT

## 2019-08-11 PROCEDURE — A9585: CPT

## 2019-08-11 PROCEDURE — 70553 MRI BRAIN STEM W/O & W/DYE: CPT | Mod: 26

## 2019-08-12 ENCOUNTER — INPATIENT (INPATIENT)
Facility: HOSPITAL | Age: 54
LOS: 12 days | DRG: 326 | End: 2019-08-25
Attending: INTERNAL MEDICINE | Admitting: STUDENT IN AN ORGANIZED HEALTH CARE EDUCATION/TRAINING PROGRAM
Payer: COMMERCIAL

## 2019-08-12 ENCOUNTER — APPOINTMENT (OUTPATIENT)
Dept: HEMATOLOGY ONCOLOGY | Facility: CLINIC | Age: 54
End: 2019-08-12
Payer: COMMERCIAL

## 2019-08-12 ENCOUNTER — OTHER (OUTPATIENT)
Age: 54
End: 2019-08-12

## 2019-08-12 VITALS
TEMPERATURE: 97.5 F | HEART RATE: 114 BPM | SYSTOLIC BLOOD PRESSURE: 111 MMHG | WEIGHT: 176.81 LBS | DIASTOLIC BLOOD PRESSURE: 81 MMHG | OXYGEN SATURATION: 96 % | BODY MASS INDEX: 23.06 KG/M2 | RESPIRATION RATE: 16 BRPM

## 2019-08-12 VITALS
SYSTOLIC BLOOD PRESSURE: 116 MMHG | TEMPERATURE: 98 F | HEIGHT: 73 IN | DIASTOLIC BLOOD PRESSURE: 89 MMHG | OXYGEN SATURATION: 95 % | HEART RATE: 110 BPM | RESPIRATION RATE: 17 BRPM | WEIGHT: 175.93 LBS

## 2019-08-12 VITALS — HEART RATE: 120 BPM | OXYGEN SATURATION: 93 %

## 2019-08-12 DIAGNOSIS — C15.9 MALIGNANT NEOPLASM OF ESOPHAGUS, UNSPECIFIED: ICD-10-CM

## 2019-08-12 DIAGNOSIS — F41.9 ANXIETY DISORDER, UNSPECIFIED: ICD-10-CM

## 2019-08-12 DIAGNOSIS — F06.4 ANXIETY DISORDER DUE TO KNOWN PHYSIOLOGICAL CONDITION: ICD-10-CM

## 2019-08-12 DIAGNOSIS — Z98.890 OTHER SPECIFIED POSTPROCEDURAL STATES: Chronic | ICD-10-CM

## 2019-08-12 DIAGNOSIS — R53.83 OTHER FATIGUE: ICD-10-CM

## 2019-08-12 DIAGNOSIS — E87.1 HYPO-OSMOLALITY AND HYPONATREMIA: ICD-10-CM

## 2019-08-12 DIAGNOSIS — J91.0 MALIGNANT PLEURAL EFFUSION: ICD-10-CM

## 2019-08-12 DIAGNOSIS — Z95.5 PRESENCE OF CORONARY ANGIOPLASTY IMPLANT AND GRAFT: Chronic | ICD-10-CM

## 2019-08-12 DIAGNOSIS — Z90.89 ACQUIRED ABSENCE OF OTHER ORGANS: Chronic | ICD-10-CM

## 2019-08-12 DIAGNOSIS — Z29.9 ENCOUNTER FOR PROPHYLACTIC MEASURES, UNSPECIFIED: ICD-10-CM

## 2019-08-12 DIAGNOSIS — R63.0 ANOREXIA: ICD-10-CM

## 2019-08-12 DIAGNOSIS — C79.31 SECONDARY MALIGNANT NEOPLASM OF BRAIN: ICD-10-CM

## 2019-08-12 DIAGNOSIS — I25.10 ATHEROSCLEROTIC HEART DISEASE OF NATIVE CORONARY ARTERY WITHOUT ANGINA PECTORIS: ICD-10-CM

## 2019-08-12 PROBLEM — K21.9 GERD (GASTROESOPHAGEAL REFLUX DISEASE): Status: ACTIVE | Noted: 2019-07-24

## 2019-08-12 PROBLEM — J90 PLEURAL EFFUSION: Status: ACTIVE | Noted: 2019-07-12

## 2019-08-12 LAB
ALBUMIN SERPL ELPH-MCNC: 3.7 G/DL — SIGNIFICANT CHANGE UP (ref 3.3–5)
ALP SERPL-CCNC: 70 U/L — SIGNIFICANT CHANGE UP (ref 40–120)
ALT FLD-CCNC: 10 U/L — SIGNIFICANT CHANGE UP (ref 10–45)
ANION GAP SERPL CALC-SCNC: 10 MMOL/L — SIGNIFICANT CHANGE UP (ref 5–17)
APTT BLD: 31.4 SEC — SIGNIFICANT CHANGE UP (ref 27.5–36.3)
AST SERPL-CCNC: 11 U/L — SIGNIFICANT CHANGE UP (ref 10–40)
BASOPHILS # BLD AUTO: 0 K/UL — SIGNIFICANT CHANGE UP (ref 0–0.2)
BASOPHILS NFR BLD AUTO: 0 % — SIGNIFICANT CHANGE UP (ref 0–2)
BILIRUB SERPL-MCNC: 0.4 MG/DL — SIGNIFICANT CHANGE UP (ref 0.2–1.2)
BUN SERPL-MCNC: 12 MG/DL — SIGNIFICANT CHANGE UP (ref 7–23)
CALCIUM SERPL-MCNC: 10 MG/DL — SIGNIFICANT CHANGE UP (ref 8.4–10.5)
CHLORIDE SERPL-SCNC: 94 MMOL/L — LOW (ref 96–108)
CO2 SERPL-SCNC: 26 MMOL/L — SIGNIFICANT CHANGE UP (ref 22–31)
CREAT SERPL-MCNC: 0.99 MG/DL — SIGNIFICANT CHANGE UP (ref 0.5–1.3)
EOSINOPHIL # BLD AUTO: 0 K/UL — SIGNIFICANT CHANGE UP (ref 0–0.5)
EOSINOPHIL NFR BLD AUTO: 0.5 % — SIGNIFICANT CHANGE UP (ref 0–6)
GLUCOSE SERPL-MCNC: 117 MG/DL — HIGH (ref 70–99)
HCT VFR BLD CALC: 44.9 % — SIGNIFICANT CHANGE UP (ref 39–50)
HGB BLD-MCNC: 13.5 G/DL — SIGNIFICANT CHANGE UP (ref 13–17)
INR BLD: 1.21 RATIO — HIGH (ref 0.88–1.16)
LYMPHOCYTES # BLD AUTO: 0.7 K/UL — LOW (ref 1–3.3)
LYMPHOCYTES # BLD AUTO: 11.4 % — LOW (ref 13–44)
MCHC RBC-ENTMCNC: 24.5 PG — LOW (ref 27–34)
MCHC RBC-ENTMCNC: 30.1 GM/DL — LOW (ref 32–36)
MCV RBC AUTO: 81.4 FL — SIGNIFICANT CHANGE UP (ref 80–100)
MONOCYTES # BLD AUTO: 0.7 K/UL — SIGNIFICANT CHANGE UP (ref 0–0.9)
MONOCYTES NFR BLD AUTO: 11.6 % — SIGNIFICANT CHANGE UP (ref 2–14)
NEUTROPHILS # BLD AUTO: 4.9 K/UL — SIGNIFICANT CHANGE UP (ref 1.8–7.4)
NEUTROPHILS NFR BLD AUTO: 76.4 % — SIGNIFICANT CHANGE UP (ref 43–77)
PLATELET # BLD AUTO: 441 K/UL — HIGH (ref 150–400)
POTASSIUM SERPL-MCNC: 4.4 MMOL/L — SIGNIFICANT CHANGE UP (ref 3.5–5.3)
POTASSIUM SERPL-SCNC: 4.4 MMOL/L — SIGNIFICANT CHANGE UP (ref 3.5–5.3)
PROT SERPL-MCNC: 7.5 G/DL — SIGNIFICANT CHANGE UP (ref 6–8.3)
PROTHROM AB SERPL-ACNC: 13.9 SEC — HIGH (ref 10–12.9)
RBC # BLD: 5.52 M/UL — SIGNIFICANT CHANGE UP (ref 4.2–5.8)
RBC # FLD: 14.6 % — HIGH (ref 10.3–14.5)
SODIUM SERPL-SCNC: 130 MMOL/L — LOW (ref 135–145)
WBC # BLD: 6.5 K/UL — SIGNIFICANT CHANGE UP (ref 3.8–10.5)
WBC # FLD AUTO: 6.5 K/UL — SIGNIFICANT CHANGE UP (ref 3.8–10.5)

## 2019-08-12 PROCEDURE — 93010 ELECTROCARDIOGRAM REPORT: CPT | Mod: NC

## 2019-08-12 PROCEDURE — 71046 X-RAY EXAM CHEST 2 VIEWS: CPT | Mod: 26

## 2019-08-12 PROCEDURE — 99233 SBSQ HOSP IP/OBS HIGH 50: CPT | Mod: GC

## 2019-08-12 PROCEDURE — 99215 OFFICE O/P EST HI 40 MIN: CPT

## 2019-08-12 PROCEDURE — 99285 EMERGENCY DEPT VISIT HI MDM: CPT

## 2019-08-12 PROCEDURE — 99223 1ST HOSP IP/OBS HIGH 75: CPT | Mod: GC

## 2019-08-12 RX ORDER — ACETAMINOPHEN 500 MG
650 TABLET ORAL EVERY 4 HOURS
Refills: 0 | Status: DISCONTINUED | OUTPATIENT
Start: 2019-08-12 | End: 2019-08-14

## 2019-08-12 RX ORDER — RISPERIDONE 4 MG/1
3 TABLET ORAL
Qty: 0 | Refills: 0 | DISCHARGE

## 2019-08-12 RX ORDER — RISPERIDONE 4 MG/1
3 TABLET ORAL AT BEDTIME
Refills: 0 | Status: DISCONTINUED | OUTPATIENT
Start: 2019-08-12 | End: 2019-08-13

## 2019-08-12 RX ORDER — DOCUSATE SODIUM 100 MG
100 CAPSULE ORAL THREE TIMES A DAY
Refills: 0 | Status: DISCONTINUED | OUTPATIENT
Start: 2019-08-12 | End: 2019-08-14

## 2019-08-12 RX ORDER — PEMBROLIZUMAB 25 MG/ML
0 INJECTION, SOLUTION INTRAVENOUS
Qty: 0 | Refills: 0 | DISCHARGE

## 2019-08-12 RX ORDER — HEPARIN SODIUM 5000 [USP'U]/ML
5000 INJECTION INTRAVENOUS; SUBCUTANEOUS EVERY 8 HOURS
Refills: 0 | Status: COMPLETED | OUTPATIENT
Start: 2019-08-12 | End: 2019-08-12

## 2019-08-12 RX ORDER — DRONABINOL 2.5 MG
2.5 CAPSULE ORAL AT BEDTIME
Refills: 0 | Status: DISCONTINUED | OUTPATIENT
Start: 2019-08-12 | End: 2019-08-14

## 2019-08-12 RX ORDER — ONDANSETRON 8 MG/1
4 TABLET, FILM COATED ORAL EVERY 6 HOURS
Refills: 0 | Status: DISCONTINUED | OUTPATIENT
Start: 2019-08-12 | End: 2019-08-14

## 2019-08-12 RX ADMIN — Medication 100 MILLIGRAM(S): at 23:22

## 2019-08-12 RX ADMIN — HEPARIN SODIUM 5000 UNIT(S): 5000 INJECTION INTRAVENOUS; SUBCUTANEOUS at 23:21

## 2019-08-12 RX ADMIN — RISPERIDONE 3 MILLIGRAM(S): 4 TABLET ORAL at 23:22

## 2019-08-12 NOTE — H&P ADULT - PROBLEM SELECTOR PLAN 8
- DVT ppx: HSQ, hold am dose for thoracentesis   - Diet: regular diet   - Dispo: PT consult - hx of CAD, s/p stents  - on aspirin at home, hold in the setting of thoracentesis

## 2019-08-12 NOTE — H&P ADULT - NSHPREVIEWOFSYSTEMS_GEN_ALL_CORE
REVIEW OF SYSTEMS:    CONSTITUTIONAL: + generalized weakness, no fevers or chills  EYES/ENT: No visual changes;  No vertigo or throat pain   NECK: No pain or stiffness  RESPIRATORY: No cough, wheezing, hemoptysis; + shortness of breath  CARDIOVASCULAR: No chest pain or palpitations  GASTROINTESTINAL: No abdominal pain; nausea, vomiting;  diarrhea or constipation. No hemetemesis, melena or hematochezia. + decreased appetite   GENITOURINARY: Decreased urination   NEUROLOGICAL: No focal weakness  SKIN: No itching, burning, rashes, or lesions   All other review of systems is negative unless indicated above.

## 2019-08-12 NOTE — ED CLERICAL - NS ED CLERK NOTE PRE-ARRIVAL INFORMATION; ADDITIONAL PRE-ARRIVAL INFORMATION
Pt sees Dr. Ashleigh Mackey at [facility]  In additional to medical workup, Pt may benefit from a psychiatric evaluation due to depressive and anxious symptoms. Sees psycho-oncology at [facility] for therapy. Pt sees Dr. Ashleigh Mackey at [facility]  In additional to medical workup, Pt may benefit from a psychiatric evaluation due to depressive and anxious symptoms.

## 2019-08-12 NOTE — ED PROVIDER NOTE - PHYSICAL EXAMINATION
Gen: Well appearing, NAD  Head: NCAT  HEENT: PERRL, MMM, normal conjunctiva, anicteric, neck supple  Lung: diminished breath sounds on R, no adventitious sounds  CV: RRR, no murmurs  Abd: soft, NTND, no rebound or guarding, no CVAT  MSK: No edema, no visible deformities  Neuro: Moving all extremity grossly  Skin: Warm and dry, no evidence of rash  Psych: normal mood and affect

## 2019-08-12 NOTE — H&P ADULT - PROBLEM SELECTOR PLAN 4
- Patient with mets to the brain s/p craniotomy  - MRI brain done outpatient  - Continue to follow up outpatient - Patient with mets to the brain s/p craniotomy  - MRI brain done outpatient, follow up with onc regarding MRI read   - Continue to follow up outpatient

## 2019-08-12 NOTE — H&P ADULT - NSICDXPASTMEDICALHX_GEN_ALL_CORE_FT
PAST MEDICAL HISTORY:  CAD (coronary artery disease)     Chronic anticoagulation     Esophageal cancer     H/O nausea     Hilar lymphadenopathy     History of dysphagia     Metastasis to bone     MI (myocardial infarction) 2015 with 1 coronary stent mid RCA    Secondary malignant neoplasm of brain

## 2019-08-12 NOTE — H&P ADULT - PROBLEM SELECTOR PLAN 6
- hx of CAD, s/p stents  - continue aspirin - Patient with hx of anxiety, currently takes risperidone 3mg at bedtime 1-2 times a week for anxiety or sleep  - can continue as needed - encourage po intake  - start marinol 2.5 once a day for appetite stimulant, consider inc dose as appropriate

## 2019-08-12 NOTE — H&P ADULT - NSHPSOCIALHISTORY_GEN_ALL_CORE
Patient currently lives with wife at home. Patient used to smoke a pack a day for 3-4 years as a teenager. Used to drink 3-4 drinks/ day, but quit 2 years ago. Currently not smoking, no drinking, no recreational drugs. Patient is a local musician.

## 2019-08-12 NOTE — CONSULT NOTE ADULT - ASSESSMENT
53M hx esophageal CA (dx'ed 2017) s/p chemoRT mets to R pelvis and brain (L superior cerebellar mass s/p craniotomy in 04/2019) currently on Keytruda, CAD s/p RCA stent in 2015, presents from outpatient oncology office with pleural effusion. {t currently has no symptoms of dyspnea and feels overall better, now that he has more "oxygen." In the past, he has been tapped and did not find much relief despite removing > 3L of fluid. He is saturating >92%. At this time, no need to urgently perform thoracentesis though the rapid accumulation. Unclear if pleural effusion is in it of itself malignant (cyto negative) vs. an endobronchial lesion causing subsequent collapse and pleural effusion   - after thoracentesis, please obtain CTPA  - please hold AC tonight and AM dose  - coags if able  - may need pleurx but at this time pt is asymptomatic and unclear whether this is due to an endobronchial lesion    ----------------------------------------  Deisy Mcfarland MD PGY-6  Pulmonary/Critical Care Fellow  Pager # [phone] ([facility]), [phone] ([facility]) 53M hx esophageal CA (dx'ed 2017) s/p chemoRT mets to R pelvis and brain (L superior cerebellar mass s/p craniotomy in 04/2019) currently on Keytruda, CAD s/p RCA stent in 2015, presents from outpatient oncology office with pleural effusion. {t currently has no symptoms of dyspnea and feels overall better, now that he has more "oxygen." In the past, he has been tapped and did not find much relief despite removing > 3L of fluid. He is saturating >92%. At this time, no need to urgently perform thoracentesis though the rapid accumulation. Unclear if pleural effusion is in it of itself malignant (cyto negative) vs. an endobronchial lesion causing subsequent collapse and pleural effusion   - plan for thoracentesis tomorrow (8/13)  - after thoracentesis, please obtain CTPA  - please hold AC tonight and AM dose  - coags if able  - may need pleurx but at this time pt is asymptomatic and unclear whether this is due to an endobronchial lesion    ----------------------------------------  Deisy Mcfarland MD PGY-6  Pulmonary/Critical Care Fellow  Pager # [phone] ([facility]), [phone] ([facility])

## 2019-08-12 NOTE — ED PROVIDER NOTE - PMH
CAD (coronary artery disease)    Chronic anticoagulation    Esophageal cancer    H/O nausea    Hilar lymphadenopathy    History of dysphagia    Metastasis to bone    MI (myocardial infarction)  2015 with 1 coronary stent mid RCA  Secondary malignant neoplasm of brain

## 2019-08-12 NOTE — H&P ADULT - ASSESSMENT
53 M with hx of esophageal CA diagnosed in 2017 s/p chemo and radiation and with mets to R pelvis and brain mets to the L superior cerebellar mass (s/p craniotomy in 04/2019) currently on Keytruda, CAD s/p RCA stent in 2015, p/w worsening sob for several days found to have large R sided pleural effusion. 53 M with hx of esophageal CA diagnosed in 2017 s/p chemo and radiation and with mets to R pelvis and brain mets to the L superior cerebellar mass (s/p craniotomy in 04/2019) currently on Keytruda, CAD s/p RCA stent in 2015, p/w worsening sob for several days found to have large R sided pleural effusion concerning for malignant pleural effusion.

## 2019-08-12 NOTE — ED ADULT NURSE NOTE - OBJECTIVE STATEMENT
53 year old male presents to the ED complaining of SOB and weakness, pt has esophageal CA and had a thoracentesis x 2 weeks ago with 3L removed. Pt has metastasis to the bone and secondary neoplasm of brain. Pt was sent by Oncologist for evaluation of pleural effusion of lung. Lung sounds diminished on right side, breathing is symmetrical and unlabored, Pt speaking clearly, comfortable in appearance, Pt gaunt in appearance. Pt is A&O x 4, VSS, afebrile, ambulating independently. Pt denies fever, chills, NVD.

## 2019-08-12 NOTE — CONSULT NOTE ADULT - SUBJECTIVE AND OBJECTIVE BOX
CHIEF COMPLAINT:    HPI:    PAST MEDICAL & SURGICAL HISTORY:  H/O nausea  History of dysphagia  Hilar lymphadenopathy  Chronic anticoagulation  CAD (coronary artery disease)  Secondary malignant neoplasm of brain  Metastasis to bone  Esophageal cancer  MI (myocardial infarction): 2015 with 1 coronary stent mid RCA  H/O craniotomy  History of esophageal surgery: 6/2018  H/O hernia repair: 1966  Status post tonsillectomy and adenoidectomy  Stented coronary artery: x1 2015      FAMILY HISTORY:  Family history of cervical cancer  Family history of throat cancer      SOCIAL HISTORY:  Smoking:  Substance Use:   EtOH Use:  Marital Status: [ ] Single [ ]  [ ]  [ ]   Sexual History:   Occupation:  Recent Travel:  Country of Birth:  Advance Directives:    Allergies    No Known Allergies    Intolerances        HOME MEDICATIONS:    REVIEW OF SYSTEMS:  Constitutional: [ ] negative [ ] fevers [ ] chills [ ] weight loss [ ] weight gain  HEENT: [ ] negative [ ] dry eyes [ ] eye irritation [ ] postnasal drip [ ] nasal congestion  CV: [ ] negative  [ ] chest pain [ ] orthopnea [ ] palpitations [ ] murmur  Resp: [ ] negative [ ] cough [ ] shortness of breath [ ] dyspnea [ ] wheezing [ ] sputum [ ] hemoptysis  GI: [ ] negative [ ] nausea [ ] vomiting [ ] diarrhea [ ] constipation [ ] abd pain [ ] dysphagia   : [ ] negative [ ] dysuria [ ] nocturia [ ] hematuria [ ] increased urinary frequency  Musculoskeletal: [ ] negative [ ] back pain [ ] myalgias [ ] arthralgias [ ] fracture  Skin: [ ] negative [ ] rash [ ] itch  Neurological: [ ] negative [ ] headache [ ] dizziness [ ] syncope [ ] weakness [ ] numbness  Psychiatric: [ ] negative [ ] anxiety [ ] depression  Endocrine: [ ] negative [ ] diabetes [ ] thyroid problem  Hematologic/Lymphatic: [ ] negative [ ] anemia [ ] bleeding problem  Allergic/Immunologic: [ ] negative [ ] itchy eyes [ ] nasal discharge [ ] hives [ ] angioedema  [ ] All other systems negative  [ ] Unable to assess ROS because ________    OBJECTIVE:  ICU Vital Signs Last 24 Hrs  T(C): 36.6 (12 Aug 2019 18:19), Max: 36.6 (12 Aug 2019 16:51)  T(F): 97.8 (12 Aug 2019 18:19), Max: 97.8 (12 Aug 2019 16:51)  HR: 100 (12 Aug 2019 18:19) (100 - 110)  BP: 125/88 (12 Aug 2019 18:19) (116/89 - 125/88)  BP(mean): --  ABP: --  ABP(mean): --  RR: 18 (12 Aug 2019 18:19) (17 - 18)  SpO2: 98% (12 Aug 2019 18:19) (95% - 98%)        CAPILLARY BLOOD GLUCOSE          PHYSICAL EXAM:  General:   HEENT:   Respiratory:   Cardiovascular:   Abdomen:   Extremities:   Skin:   Neurological:    HOSPITAL MEDICATIONS:            acetaminophen   Tablet .. 650 milliGRAM(s) Oral every 4 hours PRN  ondansetron Injectable 4 milliGRAM(s) IV Push every 6 hours PRN    docusate sodium 100 milliGRAM(s) Oral three times a day                  LABS:                        13.5   6.5   )-----------( 441      ( 12 Aug 2019 15:04 )             44.9     Hgb Trend: 13.5<--  08-12    130<L>  |  94<L>  |  12  ----------------------------<  117<H>  4.4   |  26  |  0.99    Ca    10.0      12 Aug 2019 15:04    TPro  7.5  /  Alb  3.7  /  TBili  0.4  /  DBili  x   /  AST  11  /  ALT  10  /  AlkPhos  70  08-12    Creatinine Trend: 0.99<--  PT/INR - ( 12 Aug 2019 15:04 )   PT: 13.9 sec;   INR: 1.21 ratio         PTT - ( 12 Aug 2019 15:04 )  PTT:31.4 sec          MICROBIOLOGY:     RADIOLOGY:  [ ] Reviewed and interpreted by me    PULMONARY FUNCTION TESTS:    EKG: CHIEF COMPLAINT:    HPI:    PAST MEDICAL & SURGICAL HISTORY:  H/O nausea  History of dysphagia  Hilar lymphadenopathy  Chronic anticoagulation  CAD (coronary artery disease)  Secondary malignant neoplasm of brain  Metastasis to bone  Esophageal cancer  MI (myocardial infarction): 2015 with 1 coronary stent mid RCA  H/O craniotomy  History of esophageal surgery: 6/2018  H/O hernia repair: 1966  Status post tonsillectomy and adenoidectomy  Stented coronary artery: x1 2015      FAMILY HISTORY:  Family history of cervical cancer  Family history of throat cancer      SOCIAL HISTORY:  Smoking:  Substance Use:   EtOH Use:  Marital Status: [ ] Single [ ]  [ ]  [ ]   Sexual History:   Occupation:  Recent Travel:  Country of Birth:  Advance Directives:    Allergies    No Known Allergies    Intolerances        HOME MEDICATIONS:    REVIEW OF SYSTEMS:  Constitutional: [ ] negative [ ] fevers [ ] chills [ ] weight loss [ ] weight gain  HEENT: [ ] negative [ ] dry eyes [ ] eye irritation [ ] postnasal drip [ ] nasal congestion  CV: [ ] negative  [ ] chest pain [ ] orthopnea [ ] palpitations [ ] murmur  Resp: [ ] negative [ ] cough [ ] shortness of breath [ ] dyspnea [ ] wheezing [ ] sputum [ ] hemoptysis  GI: [ ] negative [ ] nausea [ ] vomiting [ ] diarrhea [ ] constipation [ ] abd pain [ ] dysphagia   : [ ] negative [ ] dysuria [ ] nocturia [ ] hematuria [ ] increased urinary frequency  Musculoskeletal: [ ] negative [ ] back pain [ ] myalgias [ ] arthralgias [ ] fracture  Skin: [ ] negative [ ] rash [ ] itch  Neurological: [ ] negative [ ] headache [ ] dizziness [ ] syncope [ ] weakness [ ] numbness  Psychiatric: [ ] negative [ ] anxiety [ ] depression  Endocrine: [ ] negative [ ] diabetes [ ] thyroid problem  Hematologic/Lymphatic: [ ] negative [ ] anemia [ ] bleeding problem  Allergic/Immunologic: [ ] negative [ ] itchy eyes [ ] nasal discharge [ ] hives [ ] angioedema  [ ] All other systems negative  [ ] Unable to assess ROS because ________    OBJECTIVE:  ICU Vital Signs Last 24 Hrs  T(C): 36.6 (12 Aug 2019 18:19), Max: 36.6 (12 Aug 2019 16:51)  T(F): 97.8 (12 Aug 2019 18:19), Max: 97.8 (12 Aug 2019 16:51)  HR: 100 (12 Aug 2019 18:19) (100 - 110)  BP: 125/88 (12 Aug 2019 18:19) (116/89 - 125/88)  BP(mean): --  ABP: --  ABP(mean): --  RR: 18 (12 Aug 2019 18:19) (17 - 18)  SpO2: 98% (12 Aug 2019 18:19) (95% - 98%)        CAPILLARY BLOOD GLUCOSE          PHYSICAL EXAM:  General:   HEENT:   Respiratory:   Cardiovascular:   Abdomen:   Extremities:   Skin:   Neurological:    HOSPITAL MEDICATIONS:            acetaminophen   Tablet .. 650 milliGRAM(s) Oral every 4 hours PRN  ondansetron Injectable 4 milliGRAM(s) IV Push every 6 hours PRN    docusate sodium 100 milliGRAM(s) Oral three times a day                  LABS:                        13.5   6.5   )-----------( 441      ( 12 Aug 2019 15:04 )             44.9     Hgb Trend: 13.5<--  08-12    130<L>  |  94<L>  |  12  ----------------------------<  117<H>  4.4   |  26  |  0.99    Ca    10.0      12 Aug 2019 15:04    TPro  7.5  /  Alb  3.7  /  TBili  0.4  /  DBili  x   /  AST  11  /  ALT  10  /  AlkPhos  70  08-12    Creatinine Trend: 0.99<--  PT/INR - ( 12 Aug 2019 15:04 )   PT: 13.9 sec;   INR: 1.21 ratio         PTT - ( 12 Aug 2019 15:04 )  PTT:31.4 sec      < from: Xray Chest Decub 2 Views, Bilateral (08.12.19 @ 15:42) >      INTERPRETATION:  2 views of the chest with performed in both lateral   decubiti projection on August 12, 2019.    Indication: Follow-up pleural effusions.    Impression:    Complete opacification of the right hemithorax compatible with right   pleural effusion and collapse lung. Small left pleural effusion is   present as well. This appears to be worsening when compared to previous   study done on July 16, 2019.        < end of copied text >      MICROBIOLOGY:     RADIOLOGY:  [ ] Reviewed and interpreted by me    PULMONARY FUNCTION TESTS:    EKG: CHIEF COMPLAINT: outpatient referral for pleural effusion    HPI:  53M hx esophageal CA (dx'ed 2017) s/p chemoRT mets to R pelvis and brain (L superior cerebellar mass s/p craniotomy in 04/2019) currently on Keytruda, CAD s/p RCA stent in 2015, presents from outpatient oncology office with pleural effusion. Most recently had pleural effusion drained at [place] under similar circumstances where he was initially evaluated for SOB (which resolved upon arriving to ED) and upon workup was found to have large right sided pleural effusion. Pt states that he has not really felt more SOB over the past few weeks. He has METCALF but in the past when fluid was drained, pt did not feel that his breathing improved. Denies fevers, chills, chest pain, cough, abdominal pain, nausea/vomiting, diarrhea, b/l LE Edema.     Pulmonary consulted for R pleural effusion. Previously has been tapped on 7/16 at [facility] with removal of > 3L. At that time, fluid was negative for malignancy. As stated above, he had second thoracentesis. After each procedure, he felt very little relief in his symptoms.     PAST MEDICAL & SURGICAL HISTORY:  H/O nausea  History of dysphagia  Hilar lymphadenopathy  Chronic anticoagulation  CAD (coronary artery disease)  Secondary malignant neoplasm of brain  Metastasis to bone  Esophageal cancer  MI (myocardial infarction): 2015 with 1 coronary stent mid RCA  H/O craniotomy  History of esophageal surgery: 6/2018  H/O hernia repair: 1966  Status post tonsillectomy and adenoidectomy  Stented coronary artery: x1 2015      FAMILY HISTORY:  Family history of cervical cancer  Family history of throat cancer      SOCIAL HISTORY:  Smoking:  Substance Use: remove hx of marijuana  EtOH Use: 3-4 x week, quick 2017  , lives with spouse, prev worked as textile import/export    Allergies  No Known Allergies    HOME MEDICATIONS:  CurrentMeds_4_twCiteListControlStart Aspirin Low Dose 81 MG TABS  Melatonin TABS  raNITIdine HCl - 150 MG Oral Tablet; TAKE 1 TABLET AT BEDTIME.or as needed for  heartburn  risperiDONE 1 MG Oral Tablet; TAKE 1 TABLET AT BEDTIME    REVIEW OF SYSTEMS:  Constitutional: [x] negative [ ] fevers [ ] chills [ ] weight loss [ ] weight gain  HEENT: [x] negative [ ] dry eyes [ ] eye irritation [ ] postnasal drip [ ] nasal congestion  CV: [x] negative  [ ] chest pain [ ] orthopnea [ ] palpitations [ ] murmur  Resp: [ x] negative [ ] cough [ ] shortness of breath [ ] dyspnea [ ] wheezing [ ] sputum [ ] hemoptysis  GI: [x] negative [ ] nausea [ ] vomiting [ ] diarrhea [ ] constipation [ ] abd pain [ ] dysphagia   : [x] negative [ ] dysuria [ ] nocturia [ ] hematuria [ ] increased urinary frequency  Musculoskeletal: [x] negative [ ] back pain [ ] myalgias [ ] arthralgias [ ] fracture  Skin: [x] negative [ ] rash [ ] itch  Neurological: [x] negative [ ] headache [ ] dizziness [ ] syncope [ ] weakness [ ] numbness  Psychiatric: [x] negative [ ] anxiety [ ] depression  Endocrine: [x] negative [ ] diabetes [ ] thyroid problem  Hematologic/Lymphatic: [x] negative [ ] anemia [ ] bleeding problem  Allergic/Immunologic: [x] negative [ ] itchy eyes [ ] nasal discharge [ ] hives [ ] angioedema    OBJECTIVE:  ICU Vital Signs Last 24 Hrs  T(C): 36.6 (12 Aug 2019 18:19), Max: 36.6 (12 Aug 2019 16:51)  T(F): 97.8 (12 Aug 2019 18:19), Max: 97.8 (12 Aug 2019 16:51)  HR: 100 (12 Aug 2019 18:19) (100 - 110)  BP: 125/88 (12 Aug 2019 18:19) (116/89 - 125/88)  BP(mean): --  ABP: --  ABP(mean): --  RR: 18 (12 Aug 2019 18:19) (17 - 18)  SpO2: 98% (12 Aug 2019 18:19) (95% - 98%)        PHYSICAL EXAM:  General: NAD, pleasant  HEENT: NCAT, moist mucosal membranes  Respiratory: decreased breath sounds on right base  Cardiovascular: S1/S2 normal, RRR, no murmurs/rubs/gallops appreciated  Abdomen: soft, non-tender, non-distended with normal bowel sounds  Extremities: no b/l LE edema, no cyanosis/clubbing  Skin: warm/dry  Neurological: AAOx3, answering questions [facility] MEDICATIONS:    acetaminophen   Tablet .. 650 milliGRAM(s) Oral every 4 hours PRN  ondansetron Injectable 4 milliGRAM(s) IV Push every 6 hours PRN    docusate sodium 100 milliGRAM(s) Oral three times a day          LABS:                        13.5   6.5   )-----------( 441      ( 12 Aug 2019 15:04 )             44.9     Hgb Trend: 13.5<--  08-12    130<L>  |  94<L>  |  12  ----------------------------<  117<H>  4.4   |  26  |  0.99    Ca    10.0      12 Aug 2019 15:04    TPro  7.5  /  Alb  3.7  /  TBili  0.4  /  DBili  x   /  AST  11  /  ALT  10  /  AlkPhos  70  08-12    Creatinine Trend: 0.99<--  PT/INR - ( 12 Aug 2019 15:04 )   PT: 13.9 sec;   INR: 1.21 ratio         PTT - ( 12 Aug 2019 15:04 )  PTT:31.4 sec      < from: Xray Chest Decub 2 Views, Bilateral (08.12.19 @ 15:42) >      INTERPRETATION:  2 views of the chest with performed in both lateral   decubiti projection on August 12, 2019.    Indication: Follow-up pleural effusions.    Impression:    Complete opacification of the right hemithorax compatible with right   pleural effusion and collapse lung. Small left pleural effusion is   present as well. This appears to be worsening when compared to previous   study done on July 16, 2019.        < end of copied text >

## 2019-08-12 NOTE — H&P ADULT - PROBLEM SELECTOR PLAN 3
- Patient with hx of esophageal cancer with mets, s/p resection, chemotherapy and radiation therapy  - Continue to follow with Dr. Mackey outpatient - Patient with hx of esophageal cancer with mets, s/p resection, chemotherapy and radiation therapy  - Call oncology in am for status on malignancy - Patient with hx of esophageal cancer with mets, s/p resection, chemotherapy and radiation therapy  - Call oncology in am for status on malignancy  - Discuss when keytruda should be restarted

## 2019-08-12 NOTE — H&P ADULT - PROBLEM SELECTOR PLAN 5
- Patient with hx of anxiety, currently takes risperidone 3mg at bedtime 1-2 times a week for anxiety or sleep  - can continue as needed - generalized fatigue, weakness, most likely 2/2 to malignancy, decreased appetite  - PT consult  - fall precautions  - check orthostatics   - start marinol for appetite stimulant - generalized fatigue, weakness, most likely 2/2 to malignancy, decreased appetite, poor po intake   - PT consult  - fall precautions  - check orthostatics

## 2019-08-12 NOTE — ED PROVIDER NOTE - OBJECTIVE STATEMENT
52yo M hx esophageal CA mets to brain on keytruda p/w worsening sob x several days followed with her oncologist Dr. Maceky today who on exam dx pt with pleural effusion. Pt has had recurrent effusions last drained 1.5 weeks ago draining 3L. Denies fever, chills, cp, abd pain, n/v/d.

## 2019-08-12 NOTE — H&P ADULT - NSHPLABSRESULTS_GEN_ALL_CORE
CBC Full  -  ( 12 Aug 2019 15:04 )  WBC Count : 6.5 K/uL  Hemoglobin : 13.5 g/dL  Hematocrit : 44.9 %  Platelet Count - Automated : 441 K/uL  Mean Cell Volume : 81.4 fl  Mean Cell Hemoglobin : 24.5 pg  Mean Cell Hemoglobin Concentration : 30.1 gm/dL  Auto Neutrophil # : 4.9 K/uL  Auto Lymphocyte # : 0.7 K/uL  Auto Monocyte # : 0.7 K/uL  Auto Eosinophil # : 0.0 K/uL  Auto Basophil # : 0.0 K/uL  Auto Neutrophil % : 76.4 %  Auto Lymphocyte % : 11.4 %  Auto Monocyte % : 11.6 %  Auto Eosinophil % : 0.5 %  Auto Basophil % : 0.0 %    08-12    130<L>  |  94<L>  |  12  ----------------------------<  117<H>  4.4   |  26  |  0.99    Ca    10.0      12 Aug 2019 15:04    TPro  7.5  /  Alb  3.7  /  TBili  0.4  /  DBili  x   /  AST  11  /  ALT  10  /  AlkPhos  70  08-12    LIVER FUNCTIONS - ( 12 Aug 2019 15:04 )  Alb: 3.7 g/dL / Pro: 7.5 g/dL / ALK PHOS: 70 U/L / ALT: 10 U/L / AST: 11 U/L / GGT: x             PT/INR - ( 12 Aug 2019 15:04 )   PT: 13.9 sec;   INR: 1.21 ratio         PTT - ( 12 Aug 2019 15:04 )  PTT:31.4 sec    EKG reviewed    < from: Xray Chest Decub 2 Views, Bilateral (08.12.19 @ 15:42) >      EXAM:  XR CHEST DECUBITUS BI 2V                            PROCEDURE DATE:  08/12/2019            INTERPRETATION:  2 views of the chest with performed in both lateral   decubiti projection on August 12, 2019.    Indication: Follow-up pleural effusions.    Impression:    Complete opacification of the right hemithorax compatible with right   pleural effusion and collapse lung. Small left pleural effusion is   present as well. This appears to be worsening when compared to previous   study done on July 16, 2019.        < end of copied text > CBC Full  -  ( 12 Aug 2019 15:04 )  WBC Count : 6.5 K/uL  Hemoglobin : 13.5 g/dL  Hematocrit : 44.9 %  Platelet Count - Automated : 441 K/uL  Mean Cell Volume : 81.4 fl  Mean Cell Hemoglobin : 24.5 pg  Mean Cell Hemoglobin Concentration : 30.1 gm/dL  Auto Neutrophil # : 4.9 K/uL  Auto Lymphocyte # : 0.7 K/uL  Auto Monocyte # : 0.7 K/uL  Auto Eosinophil # : 0.0 K/uL  Auto Basophil # : 0.0 K/uL  Auto Neutrophil % : 76.4 %  Auto Lymphocyte % : 11.4 %  Auto Monocyte % : 11.6 %  Auto Eosinophil % : 0.5 %  Auto Basophil % : 0.0 %    08-12    130<L>  |  94<L>  |  12  ----------------------------<  117<H>  4.4   |  26  |  0.99    Ca    10.0      12 Aug 2019 15:04    TPro  7.5  /  Alb  3.7  /  TBili  0.4  /  DBili  x   /  AST  11  /  ALT  10  /  AlkPhos  70  08-12    LIVER FUNCTIONS - ( 12 Aug 2019 15:04 )  Alb: 3.7 g/dL / Pro: 7.5 g/dL / ALK PHOS: 70 U/L / ALT: 10 U/L / AST: 11 U/L / GGT: x             PT/INR - ( 12 Aug 2019 15:04 )   PT: 13.9 sec;   INR: 1.21 ratio         PTT - ( 12 Aug 2019 15:04 )  PTT:31.4 sec    EKG reviewed- Rate 99, normal sinus rhythm, normal axis deviation  RI internval 162 ms, QRS 80ms, QTc 436    < from: Xray Chest Decub 2 Views, Bilateral (08.12.19 @ 15:42) >      EXAM:  XR CHEST DECUBITUS BI 2V                            PROCEDURE DATE:  08/12/2019            INTERPRETATION:  2 views of the chest with performed in both lateral   decubiti projection on August 12, 2019.    Indication: Follow-up pleural effusions.    Impression:    Complete opacification of the right hemithorax compatible with right   pleural effusion and collapse lung. Small left pleural effusion is   present as well. This appears to be worsening when compared to previous   study done on July 16, 2019.        < end of copied text > CBC Full  -  ( 12 Aug 2019 15:04 )  WBC Count : 6.5 K/uL  Hemoglobin : 13.5 g/dL  Hematocrit : 44.9 %  Platelet Count - Automated : 441 K/uL  Mean Cell Volume : 81.4 fl  Mean Cell Hemoglobin : 24.5 pg  Mean Cell Hemoglobin Concentration : 30.1 gm/dL  Auto Neutrophil # : 4.9 K/uL  Auto Lymphocyte # : 0.7 K/uL  Auto Monocyte # : 0.7 K/uL  Auto Eosinophil # : 0.0 K/uL  Auto Basophil # : 0.0 K/uL  Auto Neutrophil % : 76.4 %  Auto Lymphocyte % : 11.4 %  Auto Monocyte % : 11.6 %  Auto Eosinophil % : 0.5 %  Auto Basophil % : 0.0 %    08-12    130<L>  |  94<L>  |  12  ----------------------------<  117<H>  4.4   |  26  |  0.99    Ca    10.0      12 Aug 2019 15:04    TPro  7.5  /  Alb  3.7  /  TBili  0.4  /  DBili  x   /  AST  11  /  ALT  10  /  AlkPhos  70  08-12    LIVER FUNCTIONS - ( 12 Aug 2019 15:04 )  Alb: 3.7 g/dL / Pro: 7.5 g/dL / ALK PHOS: 70 U/L / ALT: 10 U/L / AST: 11 U/L / GGT: x             PT/INR - ( 12 Aug 2019 15:04 )   PT: 13.9 sec;   INR: 1.21 ratio         PTT - ( 12 Aug 2019 15:04 )  PTT:31.4 sec    EKG reviewed- Rate 99, normal sinus rhythm, normal axis deviation  NM internval 162 ms, QRS 80ms, QTc 436    CXR personally reviewed-    EXAM:  XR CHEST DECUBITUS BI 2V                            PROCEDURE DATE:  08/12/2019            INTERPRETATION:  2 views of the chest with performed in both lateral   decubiti projection on August 12, 2019.    Indication: Follow-up pleural effusions.    Impression:    Complete opacification of the right hemithorax compatible with right   pleural effusion and collapse lung. Small left pleural effusion is   present as well. This appears to be worsening when compared to previous   study done on July 16, 2019.

## 2019-08-12 NOTE — H&P ADULT - HISTORY OF PRESENT ILLNESS
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 Mackey today who on exam dx pt with pleural effusion. Pt has had recurrent effusions last drained 1.5 weeks ago draining 3L. Denies fever, chills, cp, abd pain, n/v/d. 53 M with hx of esophageal CA diagnosed in 2017 s/p chemo and radiation and with mets to R pelvis and brain mets to the L superior cerebellar mass (s/p craniotomy in 04/2019) currently on Keytruda, CAD s/p RCA stent in 2015, p/w worsening sob for several days. Patient went to oncologist Dr. Mackey today who was concerned for pleural effusion and sent patient to ED. Patient had a R sided pleural effusion with about 3 L drained in July. He recently had another 3 L drained in [place] while he was on vacation. Patient currently only has dyspnea on minimal exertion. No chest pain, palpitations, fevers, or chills. No nausea, vomiting, or abdominal pain. Patient had decreased appetite and weight loss for past few months. Patient denies any headache or dizziness, but has worsening fatigue. He had a brain MRI done a few days ago to assess status of brain mets. 53 M with hx of esophageal CA diagnosed in 2017 s/p chemo and radiation and with mets to R pelvis and brain mets to the L superior cerebellar mass (s/p craniotomy in 04/2019) currently on Keytruda, CAD s/p RCA stent in 2015, p/w worsening sob for several days. Patient went to oncologist Dr. Mackey today who was concerned for pleural effusion and sent patient to ED. Patient had a R sided pleural effusion with about 3 L drained in July. He recently had another 3 L drained in [place] while he was on vacation. Patient currently only has dyspnea on minimal exertion. No chest pain, palpitations, fevers, or chills. No nausea, vomiting, or abdominal pain. Patient had decreased appetite and weight loss for past few months. Patient denies any headache or dizziness, but has worsening fatigue. He had a brain MRI done yesterday to assess status of brain mets.

## 2019-08-12 NOTE — ED PROVIDER NOTE - CLINICAL SUMMARY MEDICAL DECISION MAKING FREE TEXT BOX
Impression:  symptomatic R sided pleural effusion (tachycardia).  Hypoxic in clinic.  No respiratory distress in ED (but we suspect that patient is minimizing his symptoms).   Plan:  cxr w/R-decubitus.  basic labs.  oncology consult.   Pulmonary consult for PleurX cath.

## 2019-08-12 NOTE — H&P ADULT - PROBLEM SELECTOR PLAN 7
- DVT ppx:   - Diet: regular diet   - Dispo: PT consult - hx of CAD, s/p stents  - on aspirin at home, hold in the setting of thoracentesis - Patient with hx of anxiety, currently takes risperidone 3mg at bedtime 1-2 times a week for anxiety or sleep  - can continue as needed

## 2019-08-12 NOTE — H&P ADULT - PROBLEM SELECTOR PLAN 1
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 Low concern for PE   - Pulmonology consulted- will consider thoracentesis in am, possible pleurX catheter given recurrent pleural effusions.   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 Low concern for PE   - Pulmonology consulted- will consider thoracentesis in am, possible pleurX catheter given recurrent pleural effusions.   - Consider repeat CT chest to r/o any endobronchial disease

## 2019-08-12 NOTE — H&P ADULT - PROBLEM SELECTOR PLAN 2
- Patient with Na of 130 on admission  - Most likely in the setting of decreased po intake  - Can check Serum osm, urine osm, U Na - Patient with Na of 130 on admission  - Most likely in the setting of decreased po intake vs SIADH  - Repeat Na in am, can check Serum osm, urine osm, U Na

## 2019-08-12 NOTE — H&P ADULT - NSHPPHYSICALEXAM_GEN_ALL_CORE
PHYSICAL EXAM:    Vital Signs Last 24 Hrs  T(C): 36.6 (12 Aug 2019 18:19), Max: 36.6 (12 Aug 2019 16:51)  T(F): 97.8 (12 Aug 2019 18:19), Max: 97.8 (12 Aug 2019 16:51)  HR: 100 (12 Aug 2019 18:19) (100 - 110)  BP: 125/88 (12 Aug 2019 18:19) (116/89 - 125/88)  BP(mean): --  RR: 18 (12 Aug 2019 18:19) (17 - 18)  SpO2: 98% (12 Aug 2019 18:19) (95% - 98%)    General: No acute distress.  HEENT: No scleral icterus or injection.  Dry MM.  No oropharyngeal exudates.    Neck: Supple.  Full ROM.    Heart: RRR.  Normal S1 and S2.  No murmurs, rubs, or gallops.   Lungs: CTAB. Decreased breath sounds on the R compared to L  Abdomen: BS+, soft, NT/ND.  No organomegaly.  Skin: Warm and dry.  No rashes.  Extremities: No edema, clubbing, or cyanosis.  2+ peripheral pulses b/l.  Musculoskeletal: No deformities.  No spinal or paraspinal tenderness.  Neuro: A&Ox3. PHYSICAL EXAM:    Vital Signs Last 24 Hrs  T(C): 36.6 (12 Aug 2019 18:19), Max: 36.6 (12 Aug 2019 16:51)  T(F): 97.8 (12 Aug 2019 18:19), Max: 97.8 (12 Aug 2019 16:51)  HR: 100 (12 Aug 2019 18:19) (100 - 110)  BP: 125/88 (12 Aug 2019 18:19) (116/89 - 125/88)  BP(mean): --  RR: 18 (12 Aug 2019 18:19) (17 - 18)  SpO2: 98% (12 Aug 2019 18:19) (95% - 98%)    General: No acute distress.  HEENT: No scleral icterus or injection.  Dry MM.  No oropharyngeal exudates.    Neck: Supple.  Full ROM.    Heart: sinus tachycardia, Normal S1 and S2.  No murmurs, rubs, or gallops.   Lungs: CTAB. Decreased breath sounds on the R compared to L  Abdomen: BS+, soft, NT/ND.  No organomegaly.  Skin: Warm and dry.  No rashes.  Extremities: No edema, clubbing, or cyanosis.  2+ peripheral pulses b/l.  Musculoskeletal: No deformities.  No spinal or paraspinal tenderness.  Neuro: A&Ox3.

## 2019-08-12 NOTE — ED PROVIDER NOTE - ATTENDING CONTRIBUTION TO CARE
MD Conway:  patient seen and evaluated with the resident.  I was present for key portions of the History & Physical, and I agree with the Impression & Plan.  MD Conway:  54 yo M, c/o sent to ED by primary oncologist.  Mhx of metastatic esophageal CA, R-sided pleural effusion - multiple thoracenteses; most recently < 2wk ago 3L removed.    .  BP normal.  no hypoxia.  Physical Exam: adult M, gaunt, NAD, NCAT, PERRL, EOMI, Cardiac: tachy.  Lungs: breath sounds are diminished on the R.  No JVD.  Impression:  symptomatic R sided pleural effusion (tachycardia), without hypoxia or respiratory distress.   Plan:  cxr w/R-decubitus.  basic labs.  oncology consult.

## 2019-08-12 NOTE — ED ADULT NURSE NOTE - NSIMPLEMENTINTERV_GEN_ALL_ED
Implemented All Universal Safety Interventions:  [place] to call system. Call bell, personal items and telephone within reach. Instruct patient to call for assistance. Room bathroom lighting operational. Non-slip footwear when patient is off stretcher. Physically safe environment: no spills, clutter or unnecessary equipment. Stretcher in lowest position, wheels locked, appropriate side rails in place.

## 2019-08-13 ENCOUNTER — RESULT REVIEW (OUTPATIENT)
Age: 54
End: 2019-08-13

## 2019-08-13 DIAGNOSIS — F32.9 MAJOR DEPRESSIVE DISORDER, SINGLE EPISODE, UNSPECIFIED: ICD-10-CM

## 2019-08-13 PROBLEM — F06.4 ANXIETY DISORDER DUE TO GENERAL MEDICAL CONDITION: Status: ACTIVE | Noted: 2019-07-24

## 2019-08-13 PROBLEM — C15.9: Status: ACTIVE | Noted: 2017-10-30

## 2019-08-13 LAB
ALBUMIN FLD-MCNC: 2.2 G/DL — SIGNIFICANT CHANGE UP
ANION GAP SERPL CALC-SCNC: 12 MMOL/L — SIGNIFICANT CHANGE UP (ref 5–17)
B PERT IGG+IGM PNL SER: ABNORMAL
BASOPHILS # BLD AUTO: 0 K/UL — SIGNIFICANT CHANGE UP (ref 0–0.2)
BASOPHILS NFR BLD AUTO: 0.8 % — SIGNIFICANT CHANGE UP (ref 0–2)
BUN SERPL-MCNC: 12 MG/DL — SIGNIFICANT CHANGE UP (ref 7–23)
CALCIUM SERPL-MCNC: 8.8 MG/DL — SIGNIFICANT CHANGE UP (ref 8.4–10.5)
CHLORIDE SERPL-SCNC: 99 MMOL/L — SIGNIFICANT CHANGE UP (ref 96–108)
CO2 SERPL-SCNC: 25 MMOL/L — SIGNIFICANT CHANGE UP (ref 22–31)
COLOR FLD: SIGNIFICANT CHANGE UP
COMMENT - FLUIDS: SIGNIFICANT CHANGE UP
CREAT SERPL-MCNC: 0.79 MG/DL — SIGNIFICANT CHANGE UP (ref 0.5–1.3)
EOSINOPHIL # BLD AUTO: 0.1 K/UL — SIGNIFICANT CHANGE UP (ref 0–0.5)
EOSINOPHIL # FLD: 1 % — SIGNIFICANT CHANGE UP
EOSINOPHIL NFR BLD AUTO: 1.3 % — SIGNIFICANT CHANGE UP (ref 0–6)
FLUID INTAKE SUBSTANCE CLASS: SIGNIFICANT CHANGE UP
FLUID SEGMENTED GRANULOCYTES: 1 % — SIGNIFICANT CHANGE UP
GLUCOSE FLD-MCNC: 69 MG/DL — SIGNIFICANT CHANGE UP
GLUCOSE SERPL-MCNC: 87 MG/DL — SIGNIFICANT CHANGE UP (ref 70–99)
GRAM STN FLD: SIGNIFICANT CHANGE UP
HCT VFR BLD CALC: 39.2 % — SIGNIFICANT CHANGE UP (ref 39–50)
HGB BLD-MCNC: 11.4 G/DL — LOW (ref 13–17)
LDH SERPL L TO P-CCNC: 176 U/L — SIGNIFICANT CHANGE UP
LYMPHOCYTES # BLD AUTO: 0.5 K/UL — LOW (ref 1–3.3)
LYMPHOCYTES # BLD AUTO: 10.4 % — LOW (ref 13–44)
LYMPHOCYTES # FLD: 61 % — SIGNIFICANT CHANGE UP
MAGNESIUM SERPL-MCNC: 2.1 MG/DL — SIGNIFICANT CHANGE UP (ref 1.6–2.6)
MCHC RBC-ENTMCNC: 23.6 PG — LOW (ref 27–34)
MCHC RBC-ENTMCNC: 29.1 GM/DL — LOW (ref 32–36)
MCV RBC AUTO: 81 FL — SIGNIFICANT CHANGE UP (ref 80–100)
MONOCYTES # BLD AUTO: 0.7 K/UL — SIGNIFICANT CHANGE UP (ref 0–0.9)
MONOCYTES NFR BLD AUTO: 13.5 % — SIGNIFICANT CHANGE UP (ref 2–14)
MONOS+MACROS # FLD: 35 % — SIGNIFICANT CHANGE UP
NEUTROPHILS # BLD AUTO: 3.8 K/UL — SIGNIFICANT CHANGE UP (ref 1.8–7.4)
NEUTROPHILS NFR BLD AUTO: 73.9 % — SIGNIFICANT CHANGE UP (ref 43–77)
OSMOLALITY SERPL: 289 MOSMOL/KG — SIGNIFICANT CHANGE UP (ref 275–300)
OSMOLALITY UR: 757 MOS/KG — SIGNIFICANT CHANGE UP (ref 300–900)
OTHER CELLS FLD MANUAL: 2 % — SIGNIFICANT CHANGE UP
PH FLD: 7.45 — SIGNIFICANT CHANGE UP
PHOSPHATE SERPL-MCNC: 3.7 MG/DL — SIGNIFICANT CHANGE UP (ref 2.5–4.5)
PLATELET # BLD AUTO: 355 K/UL — SIGNIFICANT CHANGE UP (ref 150–400)
POTASSIUM SERPL-MCNC: 4 MMOL/L — SIGNIFICANT CHANGE UP (ref 3.5–5.3)
POTASSIUM SERPL-SCNC: 4 MMOL/L — SIGNIFICANT CHANGE UP (ref 3.5–5.3)
PROT FLD-MCNC: 3.5 G/DL — SIGNIFICANT CHANGE UP
RBC # BLD: 4.84 M/UL — SIGNIFICANT CHANGE UP (ref 4.2–5.8)
RBC # FLD: 14.5 % — SIGNIFICANT CHANGE UP (ref 10.3–14.5)
RCV VOL RI: HIGH /UL (ref 0–5)
SODIUM SERPL-SCNC: 136 MMOL/L — SIGNIFICANT CHANGE UP (ref 135–145)
SODIUM UR-SCNC: 42 MMOL/L — SIGNIFICANT CHANGE UP
SPECIMEN SOURCE FLD: SIGNIFICANT CHANGE UP
SPECIMEN SOURCE: SIGNIFICANT CHANGE UP
TOTAL NUCLEATED CELL COUNT, BODY FLUID: 43 /UL — HIGH (ref 0–5)
TUBE TYPE: SIGNIFICANT CHANGE UP
WBC # BLD: 5.2 K/UL — SIGNIFICANT CHANGE UP (ref 3.8–10.5)
WBC # FLD AUTO: 5.2 K/UL — SIGNIFICANT CHANGE UP (ref 3.8–10.5)

## 2019-08-13 PROCEDURE — 88112 CYTOPATH CELL ENHANCE TECH: CPT | Mod: 26

## 2019-08-13 PROCEDURE — 88305 TISSUE EXAM BY PATHOLOGIST: CPT | Mod: 26

## 2019-08-13 PROCEDURE — 71250 CT THORAX DX C-: CPT | Mod: 26

## 2019-08-13 PROCEDURE — 32555 ASPIRATE PLEURA W/ IMAGING: CPT | Mod: GC

## 2019-08-13 PROCEDURE — 99222 1ST HOSP IP/OBS MODERATE 55: CPT

## 2019-08-13 PROCEDURE — 88108 CYTOPATH CONCENTRATE TECH: CPT | Mod: 26,59

## 2019-08-13 PROCEDURE — 99223 1ST HOSP IP/OBS HIGH 75: CPT | Mod: GC

## 2019-08-13 PROCEDURE — 99233 SBSQ HOSP IP/OBS HIGH 50: CPT | Mod: GC

## 2019-08-13 PROCEDURE — 99233 SBSQ HOSP IP/OBS HIGH 50: CPT | Mod: GC,25

## 2019-08-13 RX ORDER — HEPARIN SODIUM 5000 [USP'U]/ML
5000 INJECTION INTRAVENOUS; SUBCUTANEOUS EVERY 8 HOURS
Refills: 0 | Status: DISCONTINUED | OUTPATIENT
Start: 2019-08-13 | End: 2019-08-13

## 2019-08-13 RX ORDER — HEPARIN SODIUM 5000 [USP'U]/ML
5000 INJECTION INTRAVENOUS; SUBCUTANEOUS EVERY 8 HOURS
Refills: 0 | Status: COMPLETED | OUTPATIENT
Start: 2019-08-13 | End: 2019-08-13

## 2019-08-13 RX ORDER — TRAZODONE HCL 50 MG
50 TABLET ORAL AT BEDTIME
Refills: 0 | Status: DISCONTINUED | OUTPATIENT
Start: 2019-08-13 | End: 2019-08-14

## 2019-08-13 RX ADMIN — Medication 100 MILLIGRAM(S): at 13:30

## 2019-08-13 RX ADMIN — Medication 100 MILLIGRAM(S): at 21:49

## 2019-08-13 RX ADMIN — Medication 50 MILLIGRAM(S): at 21:49

## 2019-08-13 RX ADMIN — Medication 100 MILLIGRAM(S): at 05:03

## 2019-08-13 RX ADMIN — HEPARIN SODIUM 5000 UNIT(S): 5000 INJECTION INTRAVENOUS; SUBCUTANEOUS at 21:49

## 2019-08-13 NOTE — BEHAVIORAL HEALTH ASSESSMENT NOTE - NSBHCHARTREVIEWIMAGING_PSY_A_CORE FT
< from: MR Head w/wo IV Cont (08.11.19 @ 15:02) >    EXAM:  MR BRAIN WAW IC     PROCEDURE DATE:  08/11/2019       INTERPRETATION:  Clinical indications: Status post surgery for brain   tumor.    There is evidence of a nodular area of extra-axial enhancement seen along   the right tentorial region. This is seen just superior to the tentorium   and measures approximately 0.9 x 0.8 cm (best seen on series 900 image 19   and series 10 image 10). This finding is new when compared the prior exam   and could be compatible with a dural metastasis given patient's history.    There is evidence of an area of abnormal enhancement seen involving the   lateral edge of postop bed. Best seen on series 10 image 9. This finding   measures approximately 1.1 x 0.9 cm and could be compatible with an area   of residual recurrent tumor.    Nodular area of enhancement is seen just below the left tentorium. This   best seen on series 900 image 28. This could becompatible with postop   changes though the possibility of areas of pathology cannot because   excluded. This finding measures approximately 0.5 x 0.4 cm.    This stable area of nonenhancing T2 prolongation involving the right   posterior frontal/parietal cortex is again seen.    No significant shift or herniation is seen.    There is no evidence acute hemorrhage in posterior fossa supratentorial    Abnormal signal seen involving the left internal jugular vein in the   jugular canal. This is likely compatible with sluggish flow, though the   possibly of underlying thrombus cannot be entirely excluded. CT scan of   the soft tissue neck can be done for further evaluation if clinically   indicated.    The size and configuration of the ventriclesappear unchanged.    The visualized paranasal sinuses mastoid and middle ear regions appear   clear.    Impression: New areas of abnormal areas of enhancement identified as   described above.    < end of copied text >
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
## 2019-08-13 NOTE — PHYSICAL THERAPY INITIAL EVALUATION ADULT - ASR WT BEARING STATUS EVAL
WBAT as per Dr. Priest noting heme/onc note referencing mets to R pelvis,/no weight-bearing restrictions

## 2019-08-13 NOTE — BEHAVIORAL HEALTH ASSESSMENT NOTE - SUMMARY
54 y/o  male, employed as a musician, domiciled with wife in [place], with PPHx of steroid induced psychosis, aftab, inpatient hospitalization at [facility] in May 2019, no h/o SA/SIB, no h/o substance abuse, with PMHx of esophageal CA diagnosed in 2017 s/p chemo and radiation and with mets to R pelvis and brain mets to the L superior cerebellar mass (s/p craniotomy in 04/2019) currently on Keytruda, CAD s/p RCA stent in 2015, p/w worsening sob for several days. Patient went to oncologist Dr. Mackey today who was concerned for pleural effusion and sent patient to ED.  Psychiatry consulted to assess for anxiety.  Patient seen and evaluated, awake and alert oriented x 3, able to state reason for this hospitalization.  States was at [facility] in May for steroid induced psychosis- was d/c'ed on Depakote and Risperdal, however has poor insight into why he was taking it.  States currently being off all steroid medications, denies symptoms of aftab, does endorse at times feeling depressed and anxious in the context of his medical issues.  Denies S/H/I/I/P, A/V/H, or thoughts of paranoia.  Wife states patient's symptoms have been stable, denies that he has been manic, has been asymptomatic since he was tapered off his steroids. Reports patient has been taking left over Risperdal he has for insomnia. Reports patient has an appointment with psychologist Celia Arredondo at [facility], states has been more lonely and depressed at home due to his medical issues.  Denies having concerns for patient's safety or concerns for suicidality.

## 2019-08-13 NOTE — PROGRESS NOTE ADULT - PROBLEM SELECTOR PLAN 5
- fatigue, weakness, likely 2/2 to malignancy, decreased appetite, poor PO intake   - PT consult  - fall precautions  - check orthostatics - encourage PO intake  - dronabinol 2.5 mg once a day for appetite stimulant, consider inc dose if needed

## 2019-08-13 NOTE — PROGRESS NOTE ADULT - ATTENDING COMMENTS
pt seen and examined     agree with assessment plan of Dr Mcfarland  Bronchoscopy in AM  discussed with the patient

## 2019-08-13 NOTE — PROGRESS NOTE ADULT - PROBLEM SELECTOR PLAN 6
- encourage PO intake  - start Marinol 2.5 once a day for appetite stimulant, consider inc dose if needed - encourage PO intake  - dronabinol 2.5 mg once a day for appetite stimulant, consider inc dose if needed Hx anxiety, takes risperidone 3 mg at bedtime 1-2 times a week for anxiety/sleep.  - consulted psychiatry; recommended trazodone 50 mg qpm PRN

## 2019-08-13 NOTE — BEHAVIORAL HEALTH ASSESSMENT NOTE - AXIS III
esophageal CA diagnosed in 2017 s/p chemo and radiation and with mets to R pelvis and brain mets to the L superior cerebellar mass (s/p craniotomy in 04/2019) currently on Keytruda, CAD s/p RCA stent in 2015

## 2019-08-13 NOTE — PROGRESS NOTE ADULT - SUBJECTIVE AND OBJECTIVE BOX
Hemanth Voss MD  Beeper: [phone]    Subjective:    Patient is a 53 year old male who presents with a chief complaint of pleural effusion.    Overnight events: Patient was admitted overnight.    MEDICATIONS  (STANDING):  docusate sodium 100 milliGRAM(s) Oral three times a day  dronabinol 2.5 milliGRAM(s) Oral at bedtime  risperiDONE   Tablet 3 milliGRAM(s) Oral at bedtime    MEDICATIONS  (PRN):  acetaminophen   Tablet .. 650 milliGRAM(s) Oral every 4 hours PRN Temp greater or equal to 38C (100.4F), Mild Pain (1 - 3)  ondansetron Injectable 4 milliGRAM(s) IV Push every 6 hours PRN Nausea and/or Vomiting    Objective:    Vitals:  T(F): 98.4 (08-13-19 @ 04:28), Max: 98.4 (08-13-19 @ 04:28)  HR: 114 (08-13-19 @ 04:28) (100 - 114)  BP: 108/76 (08-13-19 @ 04:28) (108/76 - 125/88)  RR: 18 (08-13-19 @ 04:28) (17 - 20)  SpO2: 98% (08-13-19 @ 04:28) (95% - 98%)                I&O's Summary    12 Aug 2019 07:01  -  13 Aug 2019 07:00  --------------------------------------------------------  IN: 120 mL / OUT: 350 mL / NET: -230 mL    PHYSICAL EXAM:  GENERAL: NAD, well-groomed, well-developed  HEAD:  Atraumatic, Normocephalic  EYES: EOMI, PERRLA, conjunctiva and sclera clear  ENMT: No tonsillar erythema, exudates, or enlargement; Moist mucous membranes, Good dentition, No lesions  NECK: Supple, No JVD, Normal thyroid  CHEST/LUNG: Clear to percussion bilaterally; No rales, rhonchi, wheezing, or rubs  HEART: Regular rate and rhythm; No murmurs, rubs, or gallops  ABDOMEN: Soft, Nontender, Nondistended; Bowel sounds present  EXTREMITIES:  2+ Peripheral Pulses, No clubbing, cyanosis, or edema  LYMPH: No lymphadenopathy noted  SKIN: No rashes or lesions  NERVOUS SYSTEM:  Alert & Oriented X3, Good concentration; Motor Strength 5/5 B/L upper and lower extremities; DTRs 2+ intact and symmetric                                             LABS:  08-13    136  |  99  |  12  ----------------------------<  87  4.0   |  25  |  0.79    Ca    8.8      13 Aug 2019 06:22  Phos  3.7     08-13  Mg     2.1     08-13    TPro  7.5  /  Alb  3.7  /  TBili  0.4  /  DBili  x   /  AST  11  /  ALT  10  /  AlkPhos  70  08-12  PT/INR - ( 12 Aug 2019 15:04 )   PT: 13.9 sec;   INR: 1.21 ratio    PTT - ( 12 Aug 2019 15:04 )  PTT:31.4 sec                         11.4   5.2   )-----------( 355      ( 13 Aug 2019 06:22 )             39.2 Hemanth Voss MD  Beeper: [phone]    Subjective:    Patient is a 53 year old male who presents with a chief complaint of pleural effusion.    Overnight events: Patient was admitted overnight.    States that it is not hard to breathe, but it is not easy either. Denies cough, sore throat. Denies pain anywhere, including chest pain. Patient is aware that oncology is on board, is aware that he is getting a thoracentesis at 10:30 a.m. today per pulmonology.    MEDICATIONS  (STANDING):  docusate sodium 100 milliGRAM(s) Oral three times a day  dronabinol 2.5 milliGRAM(s) Oral at bedtime  risperiDONE   Tablet 3 milliGRAM(s) Oral at bedtime    MEDICATIONS  (PRN):  acetaminophen   Tablet .. 650 milliGRAM(s) Oral every 4 hours PRN Temp greater or equal to 38C (100.4F), Mild Pain (1 - 3)  ondansetron Injectable 4 milliGRAM(s) IV Push every 6 hours PRN Nausea and/or Vomiting    Objective:    Vitals:  T(F): 98.4 (08-13-19 @ 04:28), Max: 98.4 (08-13-19 @ 04:28)  HR: 114 (08-13-19 @ 04:28) (100 - 114)  BP: 108/76 (08-13-19 @ 04:28) (108/76 - 125/88)  RR: 18 (08-13-19 @ 04:28) (17 - 20)  SpO2: 98% (08-13-19 @ 04:28) (95% - 98%)                I&O's Summary    12 Aug 2019 07:01  -  13 Aug 2019 07:00  --------------------------------------------------------  IN: 120 mL / OUT: 350 mL / NET: -230 mL    PHYSICAL EXAM:  GENERAL: NAD, well-groomed, thin  HEAD:  atraumatic, normocephalic  ENMT: moist mucous membranes  CHEST/LUNG: clear to auscultation bilaterally in  upper lung fields; patient unable to take deep breaths, and unable to hear lung sounds in lower right lung; no rales, rhonchi, wheezing, or rubs  HEART: fast rate, regular rhythm; no murmurs, rubs, or gallops  EXTREMITIES:  2+ peripheral pulses, no lower extremity edema    LABS:  08-13    136  |  99  |  12  ----------------------------<  87  4.0   |  25  |  0.79    Ca    8.8      13 Aug 2019 06:22  Phos  3.7     08-13  Mg     2.1     08-13    TPro  7.5  /  Alb  3.7  /  TBili  0.4  /  DBili  x   /  AST  11  /  ALT  10  /  AlkPhos  70  08-12  PT/INR - ( 12 Aug 2019 15:04 )   PT: 13.9 sec;   INR: 1.21 ratio    PTT - ( 12 Aug 2019 15:04 )  PTT:31.4 sec                         11.4   5.2   )-----------( 355      ( 13 Aug 2019 06:22 )             39.2 Hemanth Voss MD  Beeper: [phone]    Subjective:    Patient is a 53 year old male who presents with a chief complaint of pleural effusion.    Overnight events: Patient was admitted overnight.    States that it is not hard to breathe, but it is not easy either. Denies cough, sore throat. Denies pain anywhere, including chest pain. Patient is aware that oncology is on board, is aware that he is getting a thoracentesis at 10:30 a.m. today per pulmonology.    MEDICATIONS  (STANDING):  docusate sodium 100 milliGRAM(s) Oral three times a day  dronabinol 2.5 milliGRAM(s) Oral at bedtime  risperiDONE   Tablet 3 milliGRAM(s) Oral at bedtime    MEDICATIONS  (PRN):  acetaminophen   Tablet .. 650 milliGRAM(s) Oral every 4 hours PRN Temp greater or equal to 38C (100.4F), Mild Pain (1 - 3)  ondansetron Injectable 4 milliGRAM(s) IV Push every 6 hours PRN Nausea and/or Vomiting    Objective:    Vitals:  T(F): 98.4 (08-13-19 @ 04:28), Max: 98.4 (08-13-19 @ 04:28)  HR: 114 (08-13-19 @ 04:28) (100 - 114)  BP: 108/76 (08-13-19 @ 04:28) (108/76 - 125/88)  RR: 18 (08-13-19 @ 04:28) (17 - 20)  SpO2: 98% (08-13-19 @ 04:28) (95% - 98%)                I&O's Summary    12 Aug 2019 07:01  -  13 Aug 2019 07:00  --------------------------------------------------------  IN: 120 mL / OUT: 350 mL / NET: -230 mL    PHYSICAL EXAM:  GENERAL: NAD, well-groomed, thin  HEAD:  atraumatic, normocephalic  ENMT: moist mucous membranes  CHEST/LUNG: clear to auscultation bilaterally in  upper lung fields; patient unable to take deep breaths, and unable to hear lung sounds in lower right lung; no rales, rhonchi, wheezing, or rubs  HEART: fast rate, regular rhythm; no murmurs, rubs, or gallops  EXTREMITIES:  2+ peripheral pulses, no lower extremity edema    LABS:  08-13    136  |  99  |  12  ----------------------------<  87  4.0   |  25  |  0.79    Ca    8.8      13 Aug 2019 06:22  Phos  3.7     08-13  Mg     2.1     08-13    TPro  7.5  /  Alb  3.7  /  TBili  0.4  /  DBili  x   /  AST  11  /  ALT  10  /  AlkPhos  70  08-12  PT/INR - ( 12 Aug 2019 15:04 )   PT: 13.9 sec;   INR: 1.21 ratio    PTT - ( 12 Aug 2019 15:04 )  PTT:31.4 sec                         11.4   5.2   )-----------( 355      ( 13 Aug 2019 06:22 )             39.2     Thoracentesis fluid:  pH fluid 7.45  protein total, fluid 3.5  LDH, fluid 176  glucose, fluid 69  albumin, fluid 2.2 Hemanth Voss MD  Beeper: [phone]    Subjective:    Patient is a 53 year old male who presents with a chief complaint of pleural effusion.    Overnight events: Patient was admitted overnight.    States that it is not hard to breathe, but it is not easy either. Denies cough, sore throat. Denies pain anywhere, including chest pain. Patient is aware that oncology is on board, is aware that he is getting a thoracentesis at 10:30 a.m. today per pulmonology.    MEDICATIONS  (STANDING):  docusate sodium 100 milliGRAM(s) Oral three times a day  dronabinol 2.5 milliGRAM(s) Oral at bedtime  risperiDONE   Tablet 3 milliGRAM(s) Oral at bedtime    MEDICATIONS  (PRN):  acetaminophen   Tablet .. 650 milliGRAM(s) Oral every 4 hours PRN Temp greater or equal to 38C (100.4F), Mild Pain (1 - 3)  ondansetron Injectable 4 milliGRAM(s) IV Push every 6 hours PRN Nausea and/or Vomiting    Objective:    Vitals:  T(F): 98.4 (08-13-19 @ 04:28), Max: 98.4 (08-13-19 @ 04:28)  HR: 114 (08-13-19 @ 04:28) (100 - 114)  BP: 108/76 (08-13-19 @ 04:28) (108/76 - 125/88)  RR: 18 (08-13-19 @ 04:28) (17 - 20)  SpO2: 98% (08-13-19 @ 04:28) (95% - 98%)                I&O's Summary    12 Aug 2019 07:01  -  13 Aug 2019 07:00  --------------------------------------------------------  IN: 120 mL / OUT: 350 mL / NET: -230 mL    PHYSICAL EXAM:  GENERAL: NAD, well-groomed, thin  HEAD:  atraumatic, normocephalic  ENMT: moist mucous membranes  CHEST/LUNG: clear to auscultation bilaterally in  upper lung fields; patient unable to take deep breaths, and unable to hear lung sounds in lower right lung; no rales, rhonchi, wheezing, or rubs  HEART: fast rate, regular rhythm; no murmurs, rubs, or gallops  EXTREMITIES:  2+ peripheral pulses, no lower extremity edema    LABS:  08-13    136  |  99  |  12  ----------------------------<  87  4.0   |  25  |  0.79    Ca    8.8      13 Aug 2019 06:22  Phos  3.7     08-13  Mg     2.1     08-13    TPro  7.5  /  Alb  3.7  /  TBili  0.4  /  DBili  x   /  AST  11  /  ALT  10  /  AlkPhos  70  08-12  PT/INR - ( 12 Aug 2019 15:04 )   PT: 13.9 sec;   INR: 1.21 ratio    PTT - ( 12 Aug 2019 15:04 )  PTT:31.4 sec                         11.4   5.2   )-----------( 355      ( 13 Aug 2019 06:22 )             39.2     Thoracentesis fluid:  pH fluid 7.45  protein total, fluid 3.5  LDH, fluid 176  glucose, fluid 69  albumin, fluid 2.2  CONSISTENT WITH TRANSUDATE

## 2019-08-13 NOTE — PROGRESS NOTE ADULT - PROBLEM SELECTOR PLAN 2
Patient with Na 130 on admission, now 136; likely decreased PO intake vs SIADH. Urine Na = 42, Serum Osm = 289, Urine Osm = 757. No need for further treatment. Hx of eso CA with mets, s/p resection, chemotherapy and radiation therapy.   - oncology e-mailed about status on malignancy; restart chemotherapy after d/c

## 2019-08-13 NOTE — BEHAVIORAL HEALTH ASSESSMENT NOTE - CASE SUMMARY
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 Patient went to oncologist Dr. Mackey today who was concerned for pleural effusion and sent patient to ED.  Psychiatry consulted to assess for anxiety. pt reported vague anxiety symptoms, no si/hi, no psychosis, aftab. can d/c risperdal. can give trazodone prn for insomnia

## 2019-08-13 NOTE — PROGRESS NOTE ADULT - PROBLEM SELECTOR PLAN 3
Patient with hx of esophageal cancer with mets, s/p resection, chemotherapy and radiation therapy.   - oncology has been e-mailed about status on malignancy  - discuss when Keytruda should be restarted Patient with hx of esophageal cancer with mets, s/p resection, chemotherapy and radiation therapy.   - oncology has been e-mailed about status on malignancy; will wait until discharge to restart chemotherapy  - discuss when Keytruda should be restarted - patient with mets to the brain s/p craniotomy  - MRI brain done outpatient, follow up with onc regarding MRI read   - continue to follow up outpatient - patient with mets to the brain s/p craniotomy  - MRI brain outpatient showed worsening of brain lesions; call rad onc tomorrow  - continue to follow up outpatient

## 2019-08-13 NOTE — PHYSICAL THERAPY INITIAL EVALUATION ADULT - PERTINENT HX OF CURRENT PROBLEM, REHAB EVAL
Pt is a 52 y/o male who presents with worsening SOB for several days. He was sent here by his oncologist who was concerned for pleural effusion Pt is a 52 y/o male who presents with worsening SOB for several days. He was sent here by his oncologist who was concerned for pleural effusion. Chest xray 8/12: right hemithorax compatible with right pleural effusion and collapse lung. Small left pleural effusion is present as well.

## 2019-08-13 NOTE — PROGRESS NOTE ADULT - PROBLEM SELECTOR PLAN 1
Patient with shortness of breath found to have complete opacification of R hemithorax with R pleural effusion and collapse of lung.  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  - currently saturating well on room air, use supplemental O2 as needed  - likely malignant pleural effusion in setting of esophageal cancer with mets  - pulmonology consulted - thoracentesis today morning, possible pleurX catheter given recurrent pleural effusions  - consider repeat CT chest to r/o any endobronchial disease Patient with shortness of breath found to have complete opacification of R hemithorax with R pleural effusion and collapse of lung.  - currently saturating well on room air, use O2 as needed  - likely malignant pleural effusion in setting of eso cancer with mets  - pulmonology consulted - thoracentesis today morning, possible pleurX catheter given recurrent pleural effusions  - CT chest w/o contrast to r/o any endobronchial disease; urgent Patient with shortness of breath found to have complete opacification of R hemithorax with R pleural effusion and collapse of lung.  - currently saturating well on room air, use O2 as needed  - likely malignant pleural effusion in setting of eso cancer with mets  - pulmonology consulted - thoracentesis done today morning  - urgent CT chest w/o contrast to r/o any endobronchial disease  - NPO after midnight, bronchoscopy tomorrow  - possible pleurX catheter at later date given recurrent pleural effusions Patient with shortness of breath found to have complete opacification of R hemithorax with R pleural effusion and collapse of lung.  - currently saturating well on room air, use O2 as needed  - likely malignant pleural effusion in setting of eso cancer with mets  - pulmonology consulted - thoracentesis done today morning; transudative  - urgent CT chest w/o contrast to r/o any endobronchial disease  - NPO after midnight, bronchoscopy tomorrow  - possible pleurX catheter at later date given recurrent pleural effusions

## 2019-08-13 NOTE — PROGRESS NOTE ADULT - PROBLEM SELECTOR PLAN 7
Patient with hx of anxiety, currently takes risperidone 3 mg at bedtime 1-2 times a week for anxiety or sleep.  - can continue as needed Patient with hx of anxiety, currently takes risperidone 3 mg at bedtime 1-2 times a week for anxiety or sleep.  - consulted psychiatry; appreciate recommendations regarding medications Patient with Na 130 on admission, now 136; likely decreased PO intake vs SIADH. Urine Na = 42, Serum Osm = 289, Urine Osm = 757. No need for further treatment.

## 2019-08-13 NOTE — BEHAVIORAL HEALTH ASSESSMENT NOTE - HPI (INCLUDE ILLNESS QUALITY, SEVERITY, DURATION, TIMING, CONTEXT, MODIFYING FACTORS, ASSOCIATED SIGNS AND SYMPTOMS)
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 Patient went to oncologist Dr. Mackey today who was concerned for pleural effusion and sent patient to ED.  Psychiatry consulted to assess for anxiety.    Patient seen and evaluated, awake and alert, oriented x 3, states reason for coming in the hospital, states was hospitalized at [facility] inpatient in May, however unclear to him as to why he was admitted, states possibly due to steroid induced psychosis.  States having a bad experience there, was discharged on Risperdal and Depakote, saw an outpt psychiatrist at [facility] clinic, however he and wife did not find it beneficial.  States that the psychiatrist had told them that he could stop Depakote due to weight gain and appetite and resume Risperdal.  Patient however states that he feels no need to take it, has been taking it in the last 2 months "as needed."  Reportedly has been taking it 1-3 times a week for insomnia, however has been having poor relief of his insomnia.  Denies having any racing thoughts, poor concentration, elevated mood, irritability, or going several nights with out sleeping.  Does report feeling depressed 2/2 to his medical issues, low energy due to the malignancy.  He denies S/H/I/I/P, A/V/H, or thoughts of paranoia.  No delusions elicited.  Denies any recent illicit substance use.      Per wife, Marnie, who is at bedside, patient admitted to [facility] for aftab, 2/2 to steroid use, states at that time patient needed psychiatric medications to manage mood symptoms.  States since coming off of steroid, patient has returned to baseline, has not been manic, does not think he needs Risperdal.  States patient has been more lonely, depressed, states has strained relations with his mother and father.  She denies having any acute concerns for patient's safety or concerns for suicidality.  She endorses patient has difficulty sleeping and has been resorting to taking his left over Risperdal from his admission at [facility]. 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 Patient went to oncologist Dr. Mackey today who was concerned for pleural effusion and sent patient to ED.  Psychiatry consulted to assess for anxiety.    Patient seen and evaluated, awake and alert, oriented x 3, states reason for coming in the hospital, states was hospitalized at [facility] inpatient in May, however unclear to him as to why he was admitted, states possibly due to steroid induced psychosis.  States having a bad experience there, was discharged on Risperdal and Depakote, saw an outpt psychiatrist at [facility] clinic, however he and wife did not find it beneficial.  States that the psychiatrist had told them that he could stop Depakote due to weight gain and appetite and resume Risperdal.  Patient however states that he feels no need to take it, has been taking it in the last 2 months "as needed."  Reportedly has been taking it 1-3 times a week for insomnia, however has been having poor relief of his insomnia.  Denies having any racing thoughts, poor concentration, elevated mood, irritability, or going several nights with out sleeping.  Does report feeling depressed 2/2 to his medical issues, low energy due to the malignancy.  He denies S/H/I/I/P, A/V/H, or thoughts of paranoia.  No delusions elicited.  Reports at times feeling anxious due to his medical conditions, however has not impacting his overall functioning, denies panic attacks.  Denies any recent illicit substance use.      Per wife, Marnie, who is at bedside, patient admitted to [facility] for aftab, 2/2 to steroid use, states at that time patient needed psychiatric medications to manage mood symptoms.  States since coming off of steroid, patient has returned to baseline, has not been manic, does not think he needs Risperdal.  States patient has been more lonely, depressed, states has strained relations with his mother and father.  She denies having any acute concerns for patient's safety or concerns for suicidality.  She endorses patient has difficulty sleeping and has been resorting to taking his left over Risperdal from his admission at [facility].

## 2019-08-13 NOTE — BEHAVIORAL HEALTH ASSESSMENT NOTE - NSBHCHARTREVIEWINVESTIGATE_PSY_A_CORE FT
< from: 12 Lead ECG (08.12.19 @ 15:44) >      Ventricular Rate 99 BPM    Atrial Rate 99 BPM    P-R Interval 162 ms    QRS Duration 80 ms    Q-T Interval 340 ms    QTC Calculation(Bezet) 436 ms    P Axis 55 degrees    R Axis -25 degrees    T Axis 31 degrees    Diagnosis Line NORMAL SINUS RHYTHM  ANTERIOR INFARCT , AGE UNDETERMINED  ABNORMAL ECG    Confirmed by ATTENDING, ED ([id number]),  WALTER RIGGS ([id number]) on 8/13/2019 6:23:21 AM    < end of copied text >

## 2019-08-13 NOTE — PHYSICAL THERAPY INITIAL EVALUATION ADULT - GAIT DEVIATIONS NOTED, PT EVAL
decreased step length/increased time in double stance/decreased crys/decreased weight-shifting ability

## 2019-08-13 NOTE — PHYSICAL THERAPY INITIAL EVALUATION ADULT - ACTIVE RANGE OF MOTION EXAMINATION, REHAB EVAL
bilateral lower extremity Active ROM was WNL (within normal limits)/lelo. upper extremity Active ROM was WNL (within normal limits)

## 2019-08-13 NOTE — CONSULT NOTE ADULT - SUBJECTIVE AND OBJECTIVE BOX
HPI:  54 yo M with pmhx of metastatic esophageal CA diagnosed in 2017 s/p chemo and radiation and with mets to R pelvis and brain mets to the L superior cerebellar mass (s/p craniotomy in 04/2019) currently on Keytruda, CAD s/p PCI who presented with worsening dyspnea. He had appointment with oncologist Dr. Mackey who sent him to ED given worsening respiratory complaints and weakness. Of note, he has had pleural effusion drained in the past, most recently in [place] while on vacation. Currently, he reports trouble breathing, however no pleuritic pain. No fevers, chills, chest discomfort, abdominal pain, or headache. Here, he was found to have near white out of lung.     In terms of malignancy history, he was initially diagnosed with T3N3 stage III disease, underwent neoadjuvant treatment and subsequent esophagogastrectomy. He then had PET which was concerning for bony disease. He had RT to area and was started on keytruda from 2/2019-4/2019 from outside physician. He later was found to have brain mass 4/2019 s/p craniotomy and has undergone gamma knife to lesions. He re presented to [facility] and was restarted on keytruda, last dose 7/24/19.     14 point ROS otherwise negative    PAST MEDICAL & SURGICAL HISTORY:  H/O nausea  History of dysphagia  Hilar lymphadenopathy  Chronic anticoagulation  CAD (coronary artery disease)  Secondary malignant neoplasm of brain  Metastasis to bone  Esophageal cancer  MI (myocardial infarction): 2015 with 1 coronary stent mid RCA  H/O craniotomy  History of esophageal surgery: 6/2018  H/O hernia repair: 1966  Status post tonsillectomy and adenoidectomy  Stented coronary artery: x1 2015      Allergies    No Known Allergies    Intolerances        MEDICATIONS  (STANDING):  docusate sodium 100 milliGRAM(s) Oral three times a day  dronabinol 2.5 milliGRAM(s) Oral at bedtime  risperiDONE   Tablet 3 milliGRAM(s) Oral at bedtime    MEDICATIONS  (PRN):  acetaminophen   Tablet .. 650 milliGRAM(s) Oral every 4 hours PRN Temp greater or equal to 38C (100.4F), Mild Pain (1 - 3)  ondansetron Injectable 4 milliGRAM(s) IV Push every 6 hours PRN Nausea and/or Vomiting      FAMILY HISTORY:  Family history of cervical cancer  Family history of throat cancer      SOCIAL HISTORY: Social drinker, former smoker. , lives with wife.     Height (cm): 185.4 (08-12 @ 18:19)  Weight (kg): 80.6 (08-12 @ 18:19)  BMI (kg/m2): 23.4 (08-12 @ 18:19)  BSA (m2): 2.05 (08-12 @ 18:19)    VITALS:   T(F): 98.4 (08-13-19 @ 04:28), Max: 98.4 (08-13-19 @ 04:28)  HR: 114 (08-13-19 @ 04:28)  BP: 108/76 (08-13-19 @ 04:28)  RR: 18 (08-13-19 @ 04:28)  SpO2: 98% (08-13-19 @ 04:28)  Wt(kg): --    PHYSICAL EXAM    GENERAL: NAD, slightly increased work of breathing   HEAD:  Atraumatic, Normocephalic  EYES: EOMI, PERRLA, conjunctiva and sclera clear  NECK: Supple, No JVD  CHEST/LUNG: decreased breath sounds throughout rt lung fields   HEART: Regular rate and rhythm; No murmurs, rubs, or gallops  ABDOMEN: Soft, Nontender, Nondistended; Bowel sounds present  EXTREMITIES:  No clubbing, cyanosis, or edema  MSK: full range of motion of all extremities   NEUROLOGY: non-focal  SKIN: No rashes or lesions    LABS:                         11.4   5.2   )-----------( 355      ( 13 Aug 2019 06:22 )             39.2     08-13    136  |  99  |  12  ----------------------------<  87  4.0   |  25  |  0.79    Ca    8.8      13 Aug 2019 06:22  Phos  3.7     08-13  Mg     2.1     08-13    TPro  7.5  /  Alb  3.7  /  TBili  0.4  /  DBili  x   /  AST  11  /  ALT  10  /  AlkPhos  70  08-12    Magnesium, Serum: 2.1 mg/dL (08-13 @ 06:22)  Phosphorus Level, Serum: 3.7 mg/dL (08-13 @ 06:22)    PT/INR - ( 12 Aug 2019 15:04 )   PT: 13.9 sec;   INR: 1.21 ratio         PTT - ( 12 Aug 2019 15:04 )  PTT:31.4 sec  .Body Fluid  07-17 @ 02:35   No growth at 5 days  --    polymorphonuclear leukocytes seen  No organisms seen  by cytocentrifuge          IMAGING:    EXAM:  XR CHEST DECUBITUS BI 2V                            PROCEDURE DATE:  08/12/2019            INTERPRETATION:  2 views of the chest with performed in both lateral   decubiti projection on August 12, 2019.    Indication: Follow-up pleural effusions.    Impression:    Complete opacification of the right hemithorax compatible with right   pleural effusion and collapse lung. Small left pleural effusion is   present as well. This appears to be worsening when compared to previous   study done on July 16, 2019. HPI:  54 yo M with pmhx of metastatic esophageal CA diagnosed in 2017 s/p chemo and radiation and with mets to R pelvis and brain mets to the L superior cerebellar mass (s/p craniotomy in 04/2019) currently on Keytruda, CAD s/p PCI who presented with worsening dyspnea. He had appointment with oncologist Dr. Mackey who sent him to ED given worsening respiratory complaints and weakness. Of note, he has had pleural effusion drained in the past, most recently in [place] while on vacation. Currently, he reports trouble breathing, however no pleuritic pain. No fevers, chills, chest discomfort, abdominal pain, or headache. Here, he was found to have near white out of lung.     In terms of malignancy history, he was initially diagnosed with T3N3 stage III disease, underwent neoadjuvant treatment and subsequent esophagogastrectomy. He then had PET which was concerning for bony disease. He had RT to area and was started on keytruda from 2/2019-4/2019 from outside physician. He later was found to have brain mass 4/2019 s/p craniotomy and has undergone gamma knife to lesions. He re presented to [facility] and was restarted on keytruda, last dose 7/24/19.     14 point ROS otherwise negative    PAST MEDICAL & SURGICAL HISTORY:  H/O nausea  History of dysphagia  Hilar lymphadenopathy  Chronic anticoagulation  CAD (coronary artery disease)  Secondary malignant neoplasm of brain  Metastasis to bone  Esophageal cancer  MI (myocardial infarction): 2015 with 1 coronary stent mid RCA  H/O craniotomy  History of esophageal surgery: 6/2018  H/O hernia repair: 1966  Status post tonsillectomy and adenoidectomy  Stented coronary artery: x1 2015      Allergies    No Known Allergies    Intolerances    +    MEDICATIONS  (STANDING):  docusate sodium 100 milliGRAM(s) Oral three times a day  dronabinol 2.5 milliGRAM(s) Oral at bedtime  risperiDONE   Tablet 3 milliGRAM(s) Oral at bedtime    MEDICATIONS  (PRN):  acetaminophen   Tablet .. 650 milliGRAM(s) Oral every 4 hours PRN Temp greater or equal to 38C (100.4F), Mild Pain (1 - 3)  ondansetron Injectable 4 milliGRAM(s) IV Push every 6 hours PRN Nausea and/or Vomiting      FAMILY HISTORY:  Family history of cervical cancer  Family history of throat cancer      SOCIAL HISTORY: Social drinker, former smoker. , lives with wife.     Height (cm): 185.4 (08-12 @ 18:19)  Weight (kg): 80.6 (08-12 @ 18:19)  BMI (kg/m2): 23.4 (08-12 @ 18:19)  BSA (m2): 2.05 (08-12 @ 18:19)    VITALS:   T(F): 98.4 (08-13-19 @ 04:28), Max: 98.4 (08-13-19 @ 04:28)  HR: 114 (08-13-19 @ 04:28)  BP: 108/76 (08-13-19 @ 04:28)  RR: 18 (08-13-19 @ 04:28)  SpO2: 98% (08-13-19 @ 04:28)  Wt(kg): --    PHYSICAL EXAM    GENERAL: NAD, slightly increased work of breathing   HEAD:  Atraumatic, Normocephalic  EYES: EOMI, PERRLA, conjunctiva and sclera clear  NECK: Supple, No JVD  CHEST/LUNG: decreased breath sounds throughout rt lung fields   HEART: Regular rate and rhythm; No murmurs, rubs, or gallops  ABDOMEN: Soft, Nontender, Nondistended; Bowel sounds present  EXTREMITIES:  No clubbing, cyanosis, or edema  MSK: full range of motion of all extremities   NEUROLOGY: non-focal  SKIN: No rashes or lesions    LABS:                         11.4   5.2   )-----------( 355      ( 13 Aug 2019 06:22 )             39.2     08-13    136  |  99  |  12  ----------------------------<  87  4.0   |  25  |  0.79    Ca    8.8      13 Aug 2019 06:22  Phos  3.7     08-13  Mg     2.1     08-13    TPro  7.5  /  Alb  3.7  /  TBili  0.4  /  DBili  x   /  AST  11  /  ALT  10  /  AlkPhos  70  08-12    Magnesium, Serum: 2.1 mg/dL (08-13 @ 06:22)  Phosphorus Level, Serum: 3.7 mg/dL (08-13 @ 06:22)    PT/INR - ( 12 Aug 2019 15:04 )   PT: 13.9 sec;   INR: 1.21 ratio         PTT - ( 12 Aug 2019 15:04 )  PTT:31.4 sec  .Body Fluid  07-17 @ 02:35   No growth at 5 days  --    polymorphonuclear leukocytes seen  No organisms seen  by cytocentrifuge          IMAGING:    EXAM:  XR CHEST DECUBITUS BI 2V                            PROCEDURE DATE:  08/12/2019            INTERPRETATION:  2 views of the chest with performed in both lateral   decubiti projection on August 12, 2019.    Indication: Follow-up pleural effusions.    Impression:    Complete opacification of the right hemithorax compatible with right   pleural effusion and collapse lung. Small left pleural effusion is   present as well. This appears to be worsening when compared to previous   study done on July 16, 2019.

## 2019-08-13 NOTE — BEHAVIORAL HEALTH ASSESSMENT NOTE - NSBHCONSULTFOLLOWAFTERCARE_PSY_A_CORE FT
per primary team  can f/u outpt GIANNA fortune in clinic [phone] or Dr. Cheek at [facility] on [facility] [phone]

## 2019-08-13 NOTE — PROGRESS NOTE ADULT - PROBLEM SELECTOR PLAN 8
- hx of CAD, s/p stents  - on aspirin at home, hold in the setting of thoracentesis - hx of CAD, s/p stents  - on aspirin at home, hold in the setting of pulm interventions

## 2019-08-13 NOTE — BEHAVIORAL HEALTH ASSESSMENT NOTE - NSBHCHARTREVIEWVS_PSY_A_CORE FT
Vital Signs Last 24 Hrs  T(C): 36.4 (13 Aug 2019 13:25), Max: 36.9 (13 Aug 2019 04:28)  T(F): 97.5 (13 Aug 2019 13:25), Max: 98.4 (13 Aug 2019 04:28)  HR: 109 (13 Aug 2019 13:25) (100 - 114)  BP: 107/78 (13 Aug 2019 13:25) (107/78 - 125/88)  BP(mean): --  RR: 18 (13 Aug 2019 13:25) (18 - 20)  SpO2: 98% (13 Aug 2019 13:25) (96% - 98%)

## 2019-08-13 NOTE — PROGRESS NOTE ADULT - SUBJECTIVE AND OBJECTIVE BOX
OBJECTIVE:  ICU Vital Signs Last 24 Hrs  T(C): 36.9 (13 Aug 2019 04:28), Max: 36.9 (13 Aug 2019 04:28)  T(F): 98.4 (13 Aug 2019 04:28), Max: 98.4 (13 Aug 2019 04:28)  HR: 114 (13 Aug 2019 04:28) (100 - 114)  BP: 108/76 (13 Aug 2019 04:28) (108/76 - 125/88)  BP(mean): --  ABP: --  ABP(mean): --  RR: 18 (13 Aug 2019 04:28) (17 - 20)  SpO2: 98% (13 Aug 2019 04:28) (95% - 98%)        08-12 @ 07:01  -  08-13 @ 07:00  --------------------------------------------------------  IN: 120 mL / OUT: 350 mL / NET: -230 mL      CAPILLARY BLOOD GLUCOSE          PHYSICAL EXAM:  General:   HEENT:   Respiratory:   Cardiovascular:   Abdomen:   Extremities:   Skin:   Neurological:    HOSPITAL MEDICATIONS:            acetaminophen   Tablet .. 650 milliGRAM(s) Oral every 4 hours PRN  dronabinol 2.5 milliGRAM(s) Oral at bedtime  ondansetron Injectable 4 milliGRAM(s) IV Push every 6 hours PRN  risperiDONE   Tablet 3 milliGRAM(s) Oral at bedtime    docusate sodium 100 milliGRAM(s) Oral three times a day                  LABS:                        11.4   5.2   )-----------( 355      ( 13 Aug 2019 06:22 )             39.2     Hgb Trend: 11.4<--, 13.5<--  08-13    136  |  99  |  12  ----------------------------<  87  4.0   |  25  |  0.79    Ca    8.8      13 Aug 2019 06:22  Phos  3.7     08-13  Mg     2.1     08-13    TPro  7.5  /  Alb  3.7  /  TBili  0.4  /  DBili  x   /  AST  11  /  ALT  10  /  AlkPhos  70  08-12    Creatinine Trend: 0.79<--, 0.99<--  PT/INR - ( 12 Aug 2019 15:04 )   PT: 13.9 sec;   INR: 1.21 ratio         PTT - ( 12 Aug 2019 15:04 )  PTT:31.4 sec          MICROBIOLOGY:     RADIOLOGY:  [ ] Reviewed and interpreted by me    PULMONARY FUNCTION TESTS: Pt seen and examined by the bedside. No acute events overnight. Feeling anxious and maybe SOB but unsure.     OBJECTIVE:  ICU Vital Signs Last 24 Hrs  T(C): 36.9 (13 Aug 2019 04:28), Max: 36.9 (13 Aug 2019 04:28)  T(F): 98.4 (13 Aug 2019 04:28), Max: 98.4 (13 Aug 2019 04:28)  HR: 114 (13 Aug 2019 04:28) (100 - 114)  BP: 108/76 (13 Aug 2019 04:28) (108/76 - 125/88)  BP(mean): --  ABP: --  ABP(mean): --  RR: 18 (13 Aug 2019 04:28) (17 - 20)  SpO2: 98% (13 Aug 2019 04:28) (95% - 98%)        08-12 @ 07:01  -  08-13 @ 07:00  --------------------------------------------------------  IN: 120 mL / OUT: 350 mL / NET: -230 mL      PHYSICAL EXAM:  General: NAD, pleasant  HEENT: NCAT, moist mucosal membranes  Respiratory: decreased breath sounds on right base  Cardiovascular: S1/S2 normal, RRR, no murmurs/rubs/gallops appreciated  Abdomen: soft, non-tender, non-distended with normal bowel sounds  Extremities: no b/l LE edema, no cyanosis/clubbing  Skin: warm/dry  Neurological: AAOx3, answering questions appropriately    HOSPITAL MEDICATIONS:    acetaminophen   Tablet .. 650 milliGRAM(s) Oral every 4 hours PRN  dronabinol 2.5 milliGRAM(s) Oral at bedtime  ondansetron Injectable 4 milliGRAM(s) IV Push every 6 hours PRN  risperiDONE   Tablet 3 milliGRAM(s) Oral at bedtime    docusate sodium 100 milliGRAM(s) Oral three times a day      LABS:                        11.4   5.2   )-----------( 355      ( 13 Aug 2019 06:22 )             39.2     Hgb Trend: 11.4<--, 13.5<--  08-13    136  |  99  |  12  ----------------------------<  87  4.0   |  25  |  0.79    Ca    8.8      13 Aug 2019 06:22  Phos  3.7     08-13  Mg     2.1     08-13    TPro  7.5  /  Alb  3.7  /  TBili  0.4  /  DBili  x   /  AST  11  /  ALT  10  /  AlkPhos  70  08-12    Creatinine Trend: 0.79<--, 0.99<--  PT/INR - ( 12 Aug 2019 15:04 )   PT: 13.9 sec;   INR: 1.21 ratio         PTT - ( 12 Aug 2019 15:04 )  PTT:31.4 sec

## 2019-08-13 NOTE — PROGRESS NOTE ADULT - ASSESSMENT
----------------------------------------  Deisy Mcfarland MD PGY-6  Pulmonary/Critical Care Fellow  Pager # [phone] ([facility]), [phone] ([facility]) 53M hx esophageal CA (dx'ed 2017) s/p chemoRT mets to R pelvis and brain (L superior cerebellar mass s/p craniotomy in 04/2019) currently on Keytruda, CAD s/p RCA stent in 2015, presents from outpatient oncology office with pleural effusion. {t currently has no symptoms of dyspnea and feels overall better, now that he has more "oxygen." In the past, he has been tapped and did not find much relief despite removing > 3L of fluid. He is saturating >92%. At this time, no need to urgently perform thoracentesis though the rapid accumulation. Unclear if pleural effusion is in it of itself malignant (cyto negative) vs. an endobronchial lesion causing subsequent collapse and pleural effusion. s/p thoracentesis on 8/13 with removal of 3.2L (orange-tinged fluid). Pleura difficult to penetrate.   - please obtain CTPA to eval parenchyma and for endobronchial lesions  - f/u pleural fluid studies including repeat cyto  - will schedule for bronch tomorrow, please keep NPO after midnight and hold AC tonight, coags  - may need pleurx in the future but need to investigate whether this is simply a malignant pleural effusion or due to endobronchial lesion     ----------------------------------------  Deisy Mcfarland MD PGY-6  Pulmonary/Critical Care Fellow  Pager # [phone] ([facility]), [phone] ([facility])

## 2019-08-13 NOTE — BEHAVIORAL HEALTH ASSESSMENT NOTE - RISK ASSESSMENT
Risk factors:  h/o psych admissions, acute/chronic medical conditions, chronic pain, noncompliant with treatment, not receiving treatment, homelessness    Protective factors: no current SIIP/HIIP, no h/o SA/SIB, no active substance abuse, domiciled, intact marriage, social supports, positive therapeutic relationship    Overall, pt is a low risk of harm to self/others and does not require psychiatric admission for safety and stabilization.

## 2019-08-13 NOTE — PROGRESS NOTE ADULT - ATTENDING COMMENTS
Seen, examined the patient with house staff around 10am   - resting in bed, no fever, cough but SOB on and off, anxious    reviewed labs, imaging ( right sided total opacification on CXR)  - spoke to Pulmonary, s/p thoracentesis- transudative pleural fluid on analysis but given recurrence possibly malignant     CT chest ordered.    Bronch tomorrow, NPO p MN    Might need Pleurax, will ask Pulmonary tomorrow after bronch  - Onc consult appreciated. Metastatic Esophageal Cancer- recent brain MRI w/ possible areas of recurrence, on keytruda, last cycle in July  - will hold treatments per onc rec.  - spoke to psych, recommended d/c Risperdal and ordered Trazodone 50mg qhs prn

## 2019-08-13 NOTE — PROGRESS NOTE ADULT - ASSESSMENT
53 M with hx of esophageal CA diagnosed in 2017 s/p chemo and radiation and with mets to R pelvis and brain mets to the L superior cerebellar mass (s/p craniotomy in 04/2019) currently on Keytruda, CAD s/p RCA stent in 2015, p/w worsening sob for several days found to have large R sided pleural effusion concerning for malignant pleural effusion.

## 2019-08-13 NOTE — BEHAVIORAL HEALTH ASSESSMENT NOTE - NSBHCHARTREVIEWLAB_PSY_A_CORE FT
11.4   5.2   )-----------( 355      ( 13 Aug 2019 06:22 )             39.2     08-13    136  |  99  |  12  ----------------------------<  87  4.0   |  25  |  0.79    Ca    8.8      13 Aug 2019 06:22  Phos  3.7     08-13  Mg     2.1     08-13    TPro  7.5  /  Alb  3.7  /  TBili  0.4  /  DBili  x   /  AST  11  /  ALT  10  /  AlkPhos  70  08-12

## 2019-08-13 NOTE — PROGRESS NOTE ADULT - PROBLEM SELECTOR PLAN 4
- patient with mets to the brain s/p craniotomy  - MRI brain done outpatient, follow up with onc regarding MRI read   - continue to follow up outpatient - fatigue, weakness, likely 2/2 to malignancy, decreased appetite, poor PO intake   - PT consult, fall precautions, check orthostatics if endorses dizziness

## 2019-08-13 NOTE — BEHAVIORAL HEALTH ASSESSMENT NOTE - NSBHSUICPROTECTFACT_PSY_A_CORE
Future oriented/Positive therapeutic relationships/Identifies reasons for living/Responsibility to family and others/Supportive social network or family/Fear of death or dying due to pain/suffering

## 2019-08-13 NOTE — CONSULT NOTE ADULT - ASSESSMENT
52 yo M with pmhx of metastatic esophageal CA diagnosed in 2017 s/p NAC chemo/RT and esophagogastrectomy, now with mets to R pelvis and brain mets to the L superior cerebellar mass (s/p craniotomy in 04/2019) currently on Keytruda who presented with worsening dyspnea, found to have large rt pleural effusion.    1) Pleural Effusion  - past cytology negative, however given reaccumulation concern for malignant effusion   - pulm following, plans for thoracentesis today   - would send off cytology in addition to other studies  - given reaccumulation, would favor pleurx placement; would consult CT surgery to make arrangements  - continue supportive care, oxygen as needed    2) Metastatic Esophageal Cancer  - history outlined above  - currently receiving keytruda, last cycle in July  - will hold treatments for now  - can f/u as outpatient with Dr. Mackey upon discharge    3) Hx of Anxiety  - takes Risperdal on prn basis  - had recent inpatient psychiatric stay in May as well  - unclear underlying diagnosis   - would consult psychiatry to address and hopefully provide proper medication regimen    Nithya Priest, PGY-5  Hematology-Oncology Fellow  [phone] ([place]) [phone] ([facility]) 52 yo M with pmhx of metastatic esophageal CA diagnosed in 2017 s/p NAC chemo/RT and esophagogastrectomy, now with mets to R pelvis and brain mets to the L superior cerebellar mass (s/p craniotomy in 04/2019) currently on Keytruda who presented with worsening dyspnea, found to have large rt pleural effusion.    1) Pleural Effusion  - past cytology negative, however given reaccumulation concern for malignant effusion   - pulm following, plans for thoracentesis today   - would send off cytology in addition to other studies  - given reaccumulation, would favor pleurx placement; will f/u pulm reccs   - continue supportive care, oxygen as needed    2) Metastatic Esophageal Cancer  - history outlined above  - recent brain MRI w/ possible areas of recurrence, would reach out to rad onc to further discuss. Pt very hesitant about further radiation therapy   - currently receiving keytruda, last cycle in July  - will hold treatments for now  - can f/u as outpatient with Dr. Mackey upon discharge    3) Hx of Anxiety  - takes Risperdal on prn basis  - had recent inpatient psychiatric stay in May as well  - unclear underlying diagnosis   - would consult psychiatry to address and hopefully provide proper medication regimen    Nithya Priest, PGY-5  Hematology-Oncology Fellow  [phone] ([place]) [phone] ([facility])

## 2019-08-13 NOTE — PROGRESS NOTE ADULT - PROBLEM SELECTOR PLAN 9
- DVT ppx: HSQ, hold am dose for thoracentesis   - Diet: regular diet   - Dispo: PT consult - DVT ppx: HSQ, hold am dose for bronchoscopy  - Diet: regular diet   - Dispo: PT consult

## 2019-08-13 NOTE — PHYSICAL THERAPY INITIAL EVALUATION ADULT - ADDITIONAL COMMENTS
Pt lives with his wife in a home with 2 steps to enter. PTA pt was independent with all ADLs and functional mobility with no assistive device.

## 2019-08-14 ENCOUNTER — RESULT REVIEW (OUTPATIENT)
Age: 54
End: 2019-08-14

## 2019-08-14 ENCOUNTER — APPOINTMENT (OUTPATIENT)
Age: 54
End: 2019-08-14

## 2019-08-14 ENCOUNTER — APPOINTMENT (OUTPATIENT)
Dept: HEMATOLOGY ONCOLOGY | Facility: CLINIC | Age: 54
End: 2019-08-14

## 2019-08-14 LAB
ALBUMIN SERPL ELPH-MCNC: 2.7 G/DL — LOW (ref 3.3–5)
ALBUMIN SERPL ELPH-MCNC: 2.9 G/DL — LOW (ref 3.3–5)
ALP SERPL-CCNC: 64 U/L — SIGNIFICANT CHANGE UP (ref 40–120)
ALP SERPL-CCNC: 70 U/L — SIGNIFICANT CHANGE UP (ref 40–120)
ALT FLD-CCNC: 10 U/L — SIGNIFICANT CHANGE UP (ref 10–45)
ALT FLD-CCNC: 8 U/L — LOW (ref 10–45)
AMYLASE FLD-CCNC: 32 U/L — SIGNIFICANT CHANGE UP
AMYLASE FLD-CCNC: 46 U/L — SIGNIFICANT CHANGE UP
ANION GAP SERPL CALC-SCNC: 15 MMOL/L — SIGNIFICANT CHANGE UP (ref 5–17)
ANION GAP SERPL CALC-SCNC: 9 MMOL/L — SIGNIFICANT CHANGE UP (ref 5–17)
APTT BLD: 29 SEC — SIGNIFICANT CHANGE UP (ref 27.5–36.3)
APTT BLD: 29.8 SEC — SIGNIFICANT CHANGE UP (ref 27.5–36.3)
AST SERPL-CCNC: 12 U/L — SIGNIFICANT CHANGE UP (ref 10–40)
AST SERPL-CCNC: 13 U/L — SIGNIFICANT CHANGE UP (ref 10–40)
B PERT IGG+IGM PNL SER: ABNORMAL
BASE EXCESS BLDV CALC-SCNC: -0.8 MMOL/L — SIGNIFICANT CHANGE UP (ref -2–2)
BASOPHILS # BLD AUTO: 0 K/UL — SIGNIFICANT CHANGE UP (ref 0–0.2)
BASOPHILS # BLD AUTO: 0.1 K/UL — SIGNIFICANT CHANGE UP (ref 0–0.2)
BASOPHILS NFR BLD AUTO: 0 % — SIGNIFICANT CHANGE UP (ref 0–2)
BASOPHILS NFR BLD AUTO: 1.7 % — SIGNIFICANT CHANGE UP (ref 0–2)
BILIRUB FLD-MCNC: <0.1 MG/DL — SIGNIFICANT CHANGE UP
BILIRUB SERPL-MCNC: 0.3 MG/DL — SIGNIFICANT CHANGE UP (ref 0.2–1.2)
BILIRUB SERPL-MCNC: 0.5 MG/DL — SIGNIFICANT CHANGE UP (ref 0.2–1.2)
BLD GP AB SCN SERPL QL: NEGATIVE — SIGNIFICANT CHANGE UP
BUN SERPL-MCNC: 10 MG/DL — SIGNIFICANT CHANGE UP (ref 7–23)
BUN SERPL-MCNC: 12 MG/DL — SIGNIFICANT CHANGE UP (ref 7–23)
CA-I SERPL-SCNC: 1.17 MMOL/L — SIGNIFICANT CHANGE UP (ref 1.12–1.3)
CALCIUM SERPL-MCNC: 8.5 MG/DL — SIGNIFICANT CHANGE UP (ref 8.4–10.5)
CALCIUM SERPL-MCNC: 8.8 MG/DL — SIGNIFICANT CHANGE UP (ref 8.4–10.5)
CHLORIDE BLDV-SCNC: 102 MMOL/L — SIGNIFICANT CHANGE UP (ref 96–108)
CHLORIDE SERPL-SCNC: 101 MMOL/L — SIGNIFICANT CHANGE UP (ref 96–108)
CHLORIDE SERPL-SCNC: 98 MMOL/L — SIGNIFICANT CHANGE UP (ref 96–108)
CO2 BLDV-SCNC: 28 MMOL/L — SIGNIFICANT CHANGE UP (ref 22–30)
CO2 SERPL-SCNC: 19 MMOL/L — LOW (ref 22–31)
CO2 SERPL-SCNC: 26 MMOL/L — SIGNIFICANT CHANGE UP (ref 22–31)
COLOR FLD: SIGNIFICANT CHANGE UP
CREAT SERPL-MCNC: 0.79 MG/DL — SIGNIFICANT CHANGE UP (ref 0.5–1.3)
CREAT SERPL-MCNC: 0.81 MG/DL — SIGNIFICANT CHANGE UP (ref 0.5–1.3)
CULTURE RESULTS: SIGNIFICANT CHANGE UP
EOSINOPHIL # BLD AUTO: 0 K/UL — SIGNIFICANT CHANGE UP (ref 0–0.5)
EOSINOPHIL # BLD AUTO: 0.1 K/UL — SIGNIFICANT CHANGE UP (ref 0–0.5)
EOSINOPHIL NFR BLD AUTO: 0.1 % — SIGNIFICANT CHANGE UP (ref 0–6)
EOSINOPHIL NFR BLD AUTO: 1.2 % — SIGNIFICANT CHANGE UP (ref 0–6)
FLUID INTAKE SUBSTANCE CLASS: SIGNIFICANT CHANGE UP
FLUID SEGMENTED GRANULOCYTES: SIGNIFICANT CHANGE UP
GAS PNL BLDA: SIGNIFICANT CHANGE UP
GAS PNL BLDV: 132 MMOL/L — LOW (ref 135–145)
GAS PNL BLDV: SIGNIFICANT CHANGE UP
GAS PNL BLDV: SIGNIFICANT CHANGE UP
GLUCOSE BLDC GLUCOMTR-MCNC: 96 MG/DL — SIGNIFICANT CHANGE UP (ref 70–99)
GLUCOSE BLDV-MCNC: 110 MG/DL — HIGH (ref 70–99)
GLUCOSE SERPL-MCNC: 153 MG/DL — HIGH (ref 70–99)
GLUCOSE SERPL-MCNC: 94 MG/DL — SIGNIFICANT CHANGE UP (ref 70–99)
GRAM STN FLD: SIGNIFICANT CHANGE UP
HCO3 BLDV-SCNC: 26 MMOL/L — SIGNIFICANT CHANGE UP (ref 21–29)
HCT VFR BLD CALC: 41.5 % — SIGNIFICANT CHANGE UP (ref 39–50)
HCT VFR BLD CALC: 49.8 % — SIGNIFICANT CHANGE UP (ref 39–50)
HCT VFR BLDA CALC: 42 % — SIGNIFICANT CHANGE UP (ref 39–50)
HGB BLD CALC-MCNC: 13.9 G/DL — SIGNIFICANT CHANGE UP (ref 13–17)
HGB BLD-MCNC: 12.9 G/DL — LOW (ref 13–17)
HGB BLD-MCNC: 14.5 G/DL — SIGNIFICANT CHANGE UP (ref 13–17)
HOROWITZ INDEX BLDV+IHG-RTO: 100 — SIGNIFICANT CHANGE UP
INR BLD: 1.27 RATIO — HIGH (ref 0.88–1.16)
INR BLD: 1.27 RATIO — HIGH (ref 0.88–1.16)
LACTATE BLDV-MCNC: 3.1 MMOL/L — HIGH (ref 0.7–2)
LYMPHOCYTES # BLD AUTO: 0.4 K/UL — LOW (ref 1–3.3)
LYMPHOCYTES # BLD AUTO: 0.6 K/UL — LOW (ref 1–3.3)
LYMPHOCYTES # BLD AUTO: 10.4 % — LOW (ref 13–44)
LYMPHOCYTES # BLD AUTO: 4.5 % — LOW (ref 13–44)
MCHC RBC-ENTMCNC: 23.9 PG — LOW (ref 27–34)
MCHC RBC-ENTMCNC: 25.3 PG — LOW (ref 27–34)
MCHC RBC-ENTMCNC: 29 GM/DL — LOW (ref 32–36)
MCHC RBC-ENTMCNC: 31.1 GM/DL — LOW (ref 32–36)
MCV RBC AUTO: 81.3 FL — SIGNIFICANT CHANGE UP (ref 80–100)
MCV RBC AUTO: 82.3 FL — SIGNIFICANT CHANGE UP (ref 80–100)
MONOCYTES # BLD AUTO: 0.1 K/UL — SIGNIFICANT CHANGE UP (ref 0–0.9)
MONOCYTES # BLD AUTO: 0.8 K/UL — SIGNIFICANT CHANGE UP (ref 0–0.9)
MONOCYTES NFR BLD AUTO: 1.4 % — LOW (ref 2–14)
MONOCYTES NFR BLD AUTO: 13.2 % — SIGNIFICANT CHANGE UP (ref 2–14)
NEUTROPHILS # BLD AUTO: 4.4 K/UL — SIGNIFICANT CHANGE UP (ref 1.8–7.4)
NEUTROPHILS # BLD AUTO: 8.9 K/UL — HIGH (ref 1.8–7.4)
NEUTROPHILS NFR BLD AUTO: 73.5 % — SIGNIFICANT CHANGE UP (ref 43–77)
NEUTROPHILS NFR BLD AUTO: 94 % — HIGH (ref 43–77)
OTHER CELLS CSF MANUAL: 9 ML/DL — LOW (ref 18–22)
PCO2 BLDV: 56 MMHG — HIGH (ref 35–50)
PH BLDV: 7.29 — LOW (ref 7.35–7.45)
PLATELET # BLD AUTO: 302 K/UL — SIGNIFICANT CHANGE UP (ref 150–400)
PLATELET # BLD AUTO: 476 K/UL — HIGH (ref 150–400)
PO2 BLDV: 32 MMHG — SIGNIFICANT CHANGE UP (ref 25–45)
POTASSIUM BLDV-SCNC: 3.9 MMOL/L — SIGNIFICANT CHANGE UP (ref 3.5–5.3)
POTASSIUM SERPL-MCNC: 4 MMOL/L — SIGNIFICANT CHANGE UP (ref 3.5–5.3)
POTASSIUM SERPL-MCNC: 4.1 MMOL/L — SIGNIFICANT CHANGE UP (ref 3.5–5.3)
POTASSIUM SERPL-SCNC: 4 MMOL/L — SIGNIFICANT CHANGE UP (ref 3.5–5.3)
POTASSIUM SERPL-SCNC: 4.1 MMOL/L — SIGNIFICANT CHANGE UP (ref 3.5–5.3)
PROT SERPL-MCNC: 5.9 G/DL — LOW (ref 6–8.3)
PROT SERPL-MCNC: 6.4 G/DL — SIGNIFICANT CHANGE UP (ref 6–8.3)
PROTHROM AB SERPL-ACNC: 14.6 SEC — HIGH (ref 10–12.9)
PROTHROM AB SERPL-ACNC: 14.7 SEC — HIGH (ref 10–12.9)
RBC # BLD: 5.1 M/UL — SIGNIFICANT CHANGE UP (ref 4.2–5.8)
RBC # BLD: 6.06 M/UL — HIGH (ref 4.2–5.8)
RBC # FLD: 14.7 % — HIGH (ref 10.3–14.5)
RBC # FLD: 14.9 % — HIGH (ref 10.3–14.5)
RCV VOL RI: 1 /UL — SIGNIFICANT CHANGE UP (ref 0–5)
RH IG SCN BLD-IMP: POSITIVE — SIGNIFICANT CHANGE UP
SAO2 % BLDV: 48 % — LOW (ref 67–88)
SODIUM SERPL-SCNC: 133 MMOL/L — LOW (ref 135–145)
SODIUM SERPL-SCNC: 135 MMOL/L — SIGNIFICANT CHANGE UP (ref 135–145)
SPECIMEN SOURCE FLD: SIGNIFICANT CHANGE UP
SPECIMEN SOURCE FLD: SIGNIFICANT CHANGE UP
SPECIMEN SOURCE: SIGNIFICANT CHANGE UP
SPECIMEN SOURCE: SIGNIFICANT CHANGE UP
TOTAL NUCLEATED CELL COUNT, BODY FLUID: 110 /UL — HIGH (ref 0–5)
TRIGL FLD-MCNC: 45 MG/DL — SIGNIFICANT CHANGE UP
TUBE TYPE: SIGNIFICANT CHANGE UP
WBC # BLD: 5.9 K/UL — SIGNIFICANT CHANGE UP (ref 3.8–10.5)
WBC # BLD: 9.4 K/UL — SIGNIFICANT CHANGE UP (ref 3.8–10.5)
WBC # FLD AUTO: 5.9 K/UL — SIGNIFICANT CHANGE UP (ref 3.8–10.5)
WBC # FLD AUTO: 9.4 K/UL — SIGNIFICANT CHANGE UP (ref 3.8–10.5)

## 2019-08-14 PROCEDURE — 99232 SBSQ HOSP IP/OBS MODERATE 35: CPT

## 2019-08-14 PROCEDURE — 99233 SBSQ HOSP IP/OBS HIGH 50: CPT | Mod: GC,25

## 2019-08-14 PROCEDURE — 31624 DX BRONCHOSCOPE/LAVAGE: CPT | Mod: GC

## 2019-08-14 PROCEDURE — 88112 CYTOPATH CELL ENHANCE TECH: CPT | Mod: 26

## 2019-08-14 PROCEDURE — 99233 SBSQ HOSP IP/OBS HIGH 50: CPT

## 2019-08-14 PROCEDURE — 88305 TISSUE EXAM BY PATHOLOGIST: CPT | Mod: 26

## 2019-08-14 PROCEDURE — 71045 X-RAY EXAM CHEST 1 VIEW: CPT | Mod: 26

## 2019-08-14 PROCEDURE — 93010 ELECTROCARDIOGRAM REPORT: CPT

## 2019-08-14 RX ORDER — VASOPRESSIN 20 [USP'U]/ML
0.04 INJECTION INTRAVENOUS
Qty: 50 | Refills: 0 | Status: DISCONTINUED | OUTPATIENT
Start: 2019-08-14 | End: 2019-08-24

## 2019-08-14 RX ORDER — PIPERACILLIN AND TAZOBACTAM 4; .5 G/20ML; G/20ML
3.38 INJECTION, POWDER, LYOPHILIZED, FOR SOLUTION INTRAVENOUS ONCE
Refills: 0 | Status: COMPLETED | OUTPATIENT
Start: 2019-08-14 | End: 2019-08-14

## 2019-08-14 RX ORDER — PROPOFOL 10 MG/ML
40 INJECTION, EMULSION INTRAVENOUS
Qty: 1000 | Refills: 0 | Status: DISCONTINUED | OUTPATIENT
Start: 2019-08-14 | End: 2019-08-25

## 2019-08-14 RX ORDER — NOREPINEPHRINE BITARTRATE/D5W 8 MG/250ML
0.5 PLASTIC BAG, INJECTION (ML) INTRAVENOUS
Qty: 8 | Refills: 0 | Status: DISCONTINUED | OUTPATIENT
Start: 2019-08-14 | End: 2019-08-15

## 2019-08-14 RX ORDER — DOCUSATE SODIUM 100 MG
100 CAPSULE ORAL THREE TIMES A DAY
Refills: 0 | Status: DISCONTINUED | OUTPATIENT
Start: 2019-08-14 | End: 2019-08-14

## 2019-08-14 RX ORDER — PHENYLEPHRINE HYDROCHLORIDE 10 MG/ML
0.17 INJECTION INTRAVENOUS
Qty: 40 | Refills: 0 | Status: DISCONTINUED | OUTPATIENT
Start: 2019-08-14 | End: 2019-08-21

## 2019-08-14 RX ORDER — DOCUSATE SODIUM 100 MG
100 CAPSULE ORAL THREE TIMES A DAY
Refills: 0 | Status: DISCONTINUED | OUTPATIENT
Start: 2019-08-14 | End: 2019-08-24

## 2019-08-14 RX ORDER — FENTANYL CITRATE 50 UG/ML
0.5 INJECTION INTRAVENOUS
Qty: 2500 | Refills: 0 | Status: DISCONTINUED | OUTPATIENT
Start: 2019-08-14 | End: 2019-08-14

## 2019-08-14 RX ORDER — PROPOFOL 10 MG/ML
40 INJECTION, EMULSION INTRAVENOUS
Qty: 1000 | Refills: 0 | Status: DISCONTINUED | OUTPATIENT
Start: 2019-08-14 | End: 2019-08-14

## 2019-08-14 RX ORDER — ENOXAPARIN SODIUM 100 MG/ML
40 INJECTION SUBCUTANEOUS DAILY
Refills: 0 | Status: DISCONTINUED | OUTPATIENT
Start: 2019-08-14 | End: 2019-08-14

## 2019-08-14 RX ORDER — FENTANYL CITRATE 50 UG/ML
0.4 INJECTION INTRAVENOUS
Qty: 5000 | Refills: 0 | Status: DISCONTINUED | OUTPATIENT
Start: 2019-08-14 | End: 2019-08-21

## 2019-08-14 RX ORDER — HEPARIN SODIUM 5000 [USP'U]/ML
INJECTION INTRAVENOUS; SUBCUTANEOUS
Qty: 25000 | Refills: 0 | Status: DISCONTINUED | OUTPATIENT
Start: 2019-08-14 | End: 2019-08-15

## 2019-08-14 RX ORDER — PANTOPRAZOLE SODIUM 20 MG/1
40 TABLET, DELAYED RELEASE ORAL DAILY
Refills: 0 | Status: DISCONTINUED | OUTPATIENT
Start: 2019-08-14 | End: 2019-08-25

## 2019-08-14 RX ORDER — FENTANYL CITRATE 50 UG/ML
100 INJECTION INTRAVENOUS ONCE
Refills: 0 | Status: DISCONTINUED | OUTPATIENT
Start: 2019-08-14 | End: 2019-08-14

## 2019-08-14 RX ORDER — SODIUM CHLORIDE 9 MG/ML
10 INJECTION INTRAMUSCULAR; INTRAVENOUS; SUBCUTANEOUS
Refills: 0 | Status: DISCONTINUED | OUTPATIENT
Start: 2019-08-14 | End: 2019-08-25

## 2019-08-14 RX ORDER — CHLORHEXIDINE GLUCONATE 213 G/1000ML
1 SOLUTION TOPICAL
Refills: 0 | Status: DISCONTINUED | OUTPATIENT
Start: 2019-08-14 | End: 2019-08-25

## 2019-08-14 RX ORDER — CISATRACURIUM BESYLATE 2 MG/ML
20 INJECTION INTRAVENOUS ONCE
Refills: 0 | Status: COMPLETED | OUTPATIENT
Start: 2019-08-14 | End: 2019-08-14

## 2019-08-14 RX ORDER — PIPERACILLIN AND TAZOBACTAM 4; .5 G/20ML; G/20ML
3.38 INJECTION, POWDER, LYOPHILIZED, FOR SOLUTION INTRAVENOUS EVERY 8 HOURS
Refills: 0 | Status: DISCONTINUED | OUTPATIENT
Start: 2019-08-14 | End: 2019-08-21

## 2019-08-14 RX ORDER — SODIUM CHLORIDE 9 MG/ML
1000 INJECTION, SOLUTION INTRAVENOUS ONCE
Refills: 0 | Status: COMPLETED | OUTPATIENT
Start: 2019-08-14 | End: 2019-08-14

## 2019-08-14 RX ORDER — PROPOFOL 10 MG/ML
30 INJECTION, EMULSION INTRAVENOUS ONCE
Refills: 0 | Status: COMPLETED | OUTPATIENT
Start: 2019-08-14 | End: 2019-08-14

## 2019-08-14 RX ORDER — ASPIRIN/CALCIUM CARB/MAGNESIUM 324 MG
81 TABLET ORAL DAILY
Refills: 0 | Status: DISCONTINUED | OUTPATIENT
Start: 2019-08-14 | End: 2019-08-25

## 2019-08-14 RX ORDER — IPRATROPIUM/ALBUTEROL SULFATE 18-103MCG
3 AEROSOL WITH ADAPTER (GRAM) INHALATION EVERY 6 HOURS
Refills: 0 | Status: DISCONTINUED | OUTPATIENT
Start: 2019-08-14 | End: 2019-08-14

## 2019-08-14 RX ORDER — HEPARIN SODIUM 5000 [USP'U]/ML
5000 INJECTION INTRAVENOUS; SUBCUTANEOUS EVERY 8 HOURS
Refills: 0 | Status: DISCONTINUED | OUTPATIENT
Start: 2019-08-14 | End: 2019-08-14

## 2019-08-14 RX ORDER — MIDAZOLAM HYDROCHLORIDE 1 MG/ML
4 INJECTION, SOLUTION INTRAMUSCULAR; INTRAVENOUS ONCE
Refills: 0 | Status: DISCONTINUED | OUTPATIENT
Start: 2019-08-14 | End: 2019-08-14

## 2019-08-14 RX ORDER — FENTANYL CITRATE 50 UG/ML
0.5 INJECTION INTRAVENOUS
Qty: 5000 | Refills: 0 | Status: DISCONTINUED | OUTPATIENT
Start: 2019-08-14 | End: 2019-08-14

## 2019-08-14 RX ORDER — SODIUM CHLORIDE 9 MG/ML
1000 INJECTION, SOLUTION INTRAVENOUS ONCE
Refills: 0 | Status: COMPLETED | OUTPATIENT
Start: 2019-08-14 | End: 2019-08-15

## 2019-08-14 RX ORDER — FENTANYL CITRATE 50 UG/ML
1 INJECTION INTRAVENOUS
Qty: 2500 | Refills: 0 | Status: DISCONTINUED | OUTPATIENT
Start: 2019-08-14 | End: 2019-08-14

## 2019-08-14 RX ORDER — CHLORHEXIDINE GLUCONATE 213 G/1000ML
15 SOLUTION TOPICAL EVERY 12 HOURS
Refills: 0 | Status: DISCONTINUED | OUTPATIENT
Start: 2019-08-14 | End: 2019-08-25

## 2019-08-14 RX ORDER — SODIUM BICARBONATE 1 MEQ/ML
50 SYRINGE (ML) INTRAVENOUS ONCE
Refills: 0 | Status: COMPLETED | OUTPATIENT
Start: 2019-08-14 | End: 2019-08-15

## 2019-08-14 RX ORDER — IPRATROPIUM/ALBUTEROL SULFATE 18-103MCG
3 AEROSOL WITH ADAPTER (GRAM) INHALATION EVERY 6 HOURS
Refills: 0 | Status: DISCONTINUED | OUTPATIENT
Start: 2019-08-14 | End: 2019-08-25

## 2019-08-14 RX ORDER — MIDAZOLAM HYDROCHLORIDE 1 MG/ML
6 INJECTION, SOLUTION INTRAMUSCULAR; INTRAVENOUS ONCE
Refills: 0 | Status: DISCONTINUED | OUTPATIENT
Start: 2019-08-14 | End: 2019-08-14

## 2019-08-14 RX ORDER — VANCOMYCIN HCL 1 G
1000 VIAL (EA) INTRAVENOUS EVERY 12 HOURS
Refills: 0 | Status: DISCONTINUED | OUTPATIENT
Start: 2019-08-14 | End: 2019-08-17

## 2019-08-14 RX ADMIN — SODIUM CHLORIDE 2000 MILLILITER(S): 9 INJECTION, SOLUTION INTRAVENOUS at 17:05

## 2019-08-14 RX ADMIN — PHENYLEPHRINE HYDROCHLORIDE 5 MICROGRAM(S)/KG/MIN: 10 INJECTION INTRAVENOUS at 18:56

## 2019-08-14 RX ADMIN — PROPOFOL 30 MILLIGRAM(S): 10 INJECTION, EMULSION INTRAVENOUS at 16:35

## 2019-08-14 RX ADMIN — FENTANYL CITRATE 100 MICROGRAM(S): 50 INJECTION INTRAVENOUS at 16:45

## 2019-08-14 RX ADMIN — MIDAZOLAM HYDROCHLORIDE 4 MILLIGRAM(S): 1 INJECTION, SOLUTION INTRAMUSCULAR; INTRAVENOUS at 16:40

## 2019-08-14 RX ADMIN — Medication 75.56 MICROGRAM(S)/KG/MIN: at 18:20

## 2019-08-14 RX ADMIN — PIPERACILLIN AND TAZOBACTAM 200 GRAM(S): 4; .5 INJECTION, POWDER, LYOPHILIZED, FOR SOLUTION INTRAVENOUS at 20:29

## 2019-08-14 RX ADMIN — FENTANYL CITRATE 2.02 MICROGRAM(S)/KG/HR: 50 INJECTION INTRAVENOUS at 18:55

## 2019-08-14 RX ADMIN — Medication 250 MILLIGRAM(S): at 19:06

## 2019-08-14 RX ADMIN — Medication 100 MILLIGRAM(S): at 21:23

## 2019-08-14 RX ADMIN — Medication 3 MILLILITER(S): at 17:36

## 2019-08-14 RX ADMIN — Medication 1 MILLIGRAM(S): at 19:14

## 2019-08-14 RX ADMIN — Medication 100 MILLIGRAM(S): at 05:30

## 2019-08-14 RX ADMIN — VASOPRESSIN 2.4 UNIT(S)/MIN: 20 INJECTION INTRAVENOUS at 23:28

## 2019-08-14 RX ADMIN — CISATRACURIUM BESYLATE 20 MILLIGRAM(S): 2 INJECTION INTRAVENOUS at 16:50

## 2019-08-14 RX ADMIN — PROPOFOL 19.34 MICROGRAM(S)/KG/MIN: 10 INJECTION, EMULSION INTRAVENOUS at 18:57

## 2019-08-14 RX ADMIN — HEPARIN SODIUM 1500 UNIT(S)/HR: 5000 INJECTION INTRAVENOUS; SUBCUTANEOUS at 22:37

## 2019-08-14 RX ADMIN — FENTANYL CITRATE 100 MICROGRAM(S): 50 INJECTION INTRAVENOUS at 18:05

## 2019-08-14 NOTE — PROGRESS NOTE BEHAVIORAL HEALTH - NSBHCHARTREVIEWVS_PSY_A_CORE FT
Vital Signs Last 24 Hrs  T(C): 36.4 (14 Aug 2019 05:30), Max: 36.4 (13 Aug 2019 13:25)  T(F): 97.5 (14 Aug 2019 05:30), Max: 97.5 (13 Aug 2019 13:25)  HR: 109 (14 Aug 2019 05:30) (105 - 109)  BP: 112/79 (14 Aug 2019 05:30) (107/78 - 120/86)  BP(mean): --  RR: 20 (14 Aug 2019 05:30) (18 - 20)  SpO2: 96% (14 Aug 2019 05:30) (96% - 98%)

## 2019-08-14 NOTE — PROGRESS NOTE BEHAVIORAL HEALTH - CASE SUMMARY
52 y/o  male, employed as a musician, domiciled with wife in [place], with PPHx of steroid induced psychosis, aftab, inpatient hospitalization at [facility] in May 2019, no h/o SA/SIB, no h/o substance abuse, with PMHx of esophageal CA diagnosed in 2017 s/p chemo and radiation and with mets to R pelvis and brain mets to the L superior cerebellar mass (s/p craniotomy in 04/2019) currently on Keytruda, CAD s/p RCA stent in 2015, p/w worsening sob for several days. Patient went to oncologist Dr. Mackey today who was concerned for pleural effusion and sent patient to ED.  Psychiatry consulted to assess for anxiety.  Patient seen and evaluated, awake and alert oriented x 3, able to state reason for this hospitalization.  pt denies anxiety, slept better. cont current meds

## 2019-08-14 NOTE — PROGRESS NOTE BEHAVIORAL HEALTH - SUMMARY
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 Patient went to oncologist Dr. Mackey today who was concerned for pleural effusion and sent patient to ED.  Psychiatry consulted to assess for anxiety.  Patient seen and evaluated, awake and alert oriented x 3, able to state reason for this hospitalization.  States was at [facility] in May for steroid induced psychosis- was d/c'ed on Depakote and Risperdal, however has poor insight into why he was taking it.  States currently being off all steroid medications, denies symptoms of aftab, does endorse at times feeling depressed and anxious in the context of his medical issues.  Denies S/H/I/I/P, A/V/H, or thoughts of paranoia.  Wife states patient's symptoms have been stable, denies that he has been manic, has been asymptomatic since he was tapered off his steroids. Reports patient has been taking left over Risperdal he has for insomnia. Reports patient has an appointment with psychologist Celia Arredondo at [facility], states has been more lonely and depressed at home due to his medical issues.  Denies having concerns for patient's safety or concerns for suicidality.

## 2019-08-14 NOTE — PROGRESS NOTE ADULT - PROBLEM SELECTOR PLAN 1
Patient with shortness of breath found to have complete opacification of R hemithorax with R pleural effusion and collapse of lung.  - currently saturating well on room air, use O2 as needed  - ?malignant pleural effusion in setting of eso cancer with mets  - pulmonology consulted - thoracentesis done yesterday was transudative  - urgent CT chest w/o contrast found no obvious endobronchial disease  - has been NPO since after midnight, bronchoscopy today  - will ask pulmonology about pleurX catheter after bronchoscopy

## 2019-08-14 NOTE — PROGRESS NOTE ADULT - PROBLEM SELECTOR PLAN 6
Hx anxiety, takes risperidone 3 mg at bedtime 1-2 times a week for anxiety/sleep.  - per psychiatry recommendations, trazodone 50 mg qpm PRN, d/c'ed risperidone Hx anxiety, takes risperidone 3 mg at bedtime 1-2 times a week for anxiety/sleep.  - per psychiatry recommendations, trazodone 50 mg qpm PRN

## 2019-08-14 NOTE — PROGRESS NOTE ADULT - PROBLEM SELECTOR PLAN 5
- encourage PO intake  - dronabinol 2.5 mg once a day for appetite stimulant, consider inc dose if needed - encourage PO intake  - dronabinol 2.5 mg once a day for appetite stimulant, consider inc dose if needed; patient is willing to start today

## 2019-08-14 NOTE — PROGRESS NOTE ADULT - ATTENDING COMMENTS
Seen, examined the patient with house staff this am   - Afebrile, resting in bed, no cough but SOB on and off, anxious, s/p thoracentesis yesterday at bedside by pulmonary    reviewed labs, imaging ( CT chest right sided collapse mid lung, loculated pleural effusion B/L, compressive atelectasis )  - spoke to Pulmonary, plans for Bronch today, NPO now  - transudative pleural fluid on analysis but given recurrence possibly malignant ( awaiting on cytology)    Might need Pleurax for recurrence of pleural effusion  - Called Cardiology- Dr Ewing given his last echo finding- pericardial effusion ( CT chest neg for pericardial effusion), repeat Echo ordered.  - Onc consult appreciated. Metastatic Esophageal Cancer- recent brain MRI w/ possible areas of recurrence, on keytruda, last cycle in July  - will hold treatments per onc rec.  - Rad/Onc and Psych f/u noted  ** spoke to wife Marnie

## 2019-08-14 NOTE — PROGRESS NOTE BEHAVIORAL HEALTH - NSBHCHARTREVIEWINVESTIGATE_PSY_A_CORE FT
< from: 12 Lead ECG (08.12.19 @ 15:44) >      Ventricular Rate 99 BPM    Atrial Rate 99 BPM    P-R Interval 162 ms    QRS Duration 80 ms    Q-T Interval 340 ms    QTC Calculation(Bezet) 436 ms    P Axis 55 degrees    R Axis -25 degrees    T Axis 31 degrees    Diagnosis Line NORMAL SINUS RHYTHM  ANTERIOR INFARCT , AGE UNDETERMINED  ABNORMAL ECG    Confirmed by ATTENDING, ED ([id number]),  WALTER RIGGS ([id number]) on 8/13/2019 6:23:21 AM    < end of copied text >

## 2019-08-14 NOTE — PROCEDURE NOTE - ADDITIONAL PROCEDURE DETAILS
pneumo thorax was seen on the right by POCUS this was part of why the Right was chose as the site for cannulation.    Possible Left subclavian thrombus was seen as well.

## 2019-08-14 NOTE — PROGRESS NOTE ADULT - SUBJECTIVE AND OBJECTIVE BOX
OBJECTIVE:  ICU Vital Signs Last 24 Hrs  T(C): 36.4 (14 Aug 2019 05:30), Max: 36.4 (13 Aug 2019 13:25)  T(F): 97.5 (14 Aug 2019 05:30), Max: 97.5 (13 Aug 2019 13:25)  HR: 109 (14 Aug 2019 05:30) (105 - 109)  BP: 112/79 (14 Aug 2019 05:30) (107/78 - 120/86)  BP(mean): --  ABP: --  ABP(mean): --  RR: 20 (14 Aug 2019 05:30) (18 - 20)  SpO2: 96% (14 Aug 2019 05:30) (96% - 98%)        08-13 @ 07:01  -  08-14 @ 07:00  --------------------------------------------------------  IN: 60 mL / OUT: 650 mL / NET: -590 mL      CAPILLARY BLOOD GLUCOSE          PHYSICAL EXAM:  General:   HEENT:   Respiratory:   Cardiovascular:   Abdomen:   Extremities:   Skin:   Neurological:    HOSPITAL MEDICATIONS:            acetaminophen   Tablet .. 650 milliGRAM(s) Oral every 4 hours PRN  dronabinol 2.5 milliGRAM(s) Oral at bedtime  ondansetron Injectable 4 milliGRAM(s) IV Push every 6 hours PRN  traZODone 50 milliGRAM(s) Oral at bedtime PRN    docusate sodium 100 milliGRAM(s) Oral three times a day                  LABS:                        12.9   5.9   )-----------( 302      ( 14 Aug 2019 06:43 )             41.5     Hgb Trend: 12.9<--, 11.4<--, 13.5<--  08-14    133<L>  |  98  |  10  ----------------------------<  94  4.1   |  26  |  0.79    Ca    8.8      14 Aug 2019 06:40  Phos  3.7     08-13  Mg     2.1     08-13    TPro  5.9<L>  /  Alb  2.7<L>  /  TBili  0.3  /  DBili  x   /  AST  12  /  ALT  8<L>  /  AlkPhos  64  08-14    Creatinine Trend: 0.79<--, 0.79<--, 0.99<--  PT/INR - ( 14 Aug 2019 06:41 )   PT: 14.6 sec;   INR: 1.27 ratio         PTT - ( 14 Aug 2019 06:41 )  PTT:29.8 sec          MICROBIOLOGY:     RADIOLOGY:  [ ] Reviewed and interpreted by me    PULMONARY FUNCTION TESTS: Pt seen and examined by the bedside this morning. Breathing is about the same. Planned for bronchoscopy today. Procedure went well but afterwards became tachypneic, had increased WOB and remained hypoxic. Pt failed bilevel and was ultimately intubated. Transferred to MICU.     OBJECTIVE:  ICU Vital Signs Last 24 Hrs  T(C): 36.4 (14 Aug 2019 05:30), Max: 36.4 (13 Aug 2019 13:25)  T(F): 97.5 (14 Aug 2019 05:30), Max: 97.5 (13 Aug 2019 13:25)  HR: 109 (14 Aug 2019 05:30) (105 - 109)  BP: 112/79 (14 Aug 2019 05:30) (107/78 - 120/86)  BP(mean): --  ABP: --  ABP(mean): --  RR: 20 (14 Aug 2019 05:30) (18 - 20)  SpO2: 96% (14 Aug 2019 05:30) (96% - 98%)        08-13 @ 07:01  -  08-14 @ 07:00  --------------------------------------------------------  IN: 60 mL / OUT: 650 mL / NET: -590 mL        PHYSICAL EXAM:  General: mild distress   HEENT: NCAT, moist mucosal membranes, intubated  Respiratory: tachypneic, decreased berath sounds on right  Cardiovascular: S1/S2 normal, tachycardia, regular rhythm, no murmurs/rubs/gallops  Abdomen: soft, non-tender, non-distended with normal bowel sounds  Extremities: no b/l LE edema, no cyanosis/clubbing  Skin: warm/dry  Neurological: sedated    HOSPITAL MEDICATIONS:  acetaminophen   Tablet .. 650 milliGRAM(s) Oral every 4 hours PRN  dronabinol 2.5 milliGRAM(s) Oral at bedtime  ondansetron Injectable 4 milliGRAM(s) IV Push every 6 hours PRN  traZODone 50 milliGRAM(s) Oral at bedtime PRN    docusate sodium 100 milliGRAM(s) Oral three times a day            LABS:                        12.9   5.9   )-----------( 302      ( 14 Aug 2019 06:43 )             41.5     Hgb Trend: 12.9<--, 11.4<--, 13.5<--  08-14    133<L>  |  98  |  10  ----------------------------<  94  4.1   |  26  |  0.79    Ca    8.8      14 Aug 2019 06:40  Phos  3.7     08-13  Mg     2.1     08-13    TPro  5.9<L>  /  Alb  2.7<L>  /  TBili  0.3  /  DBili  x   /  AST  12  /  ALT  8<L>  /  AlkPhos  64  08-14    Creatinine Trend: 0.79<--, 0.79<--, 0.99<--  PT/INR - ( 14 Aug 2019 06:41 )   PT: 14.6 sec;   INR: 1.27 ratio         PTT - ( 14 Aug 2019 06:41 )  PTT:29.8 sec

## 2019-08-14 NOTE — PROGRESS NOTE ADULT - PROBLEM SELECTOR PLAN 2
Hx of eso CA with mets, s/p resection, chemotherapy and radiation therapy.   - patient will restart chemotherapy after d/c per oncology recommendations

## 2019-08-14 NOTE — PROGRESS NOTE ADULT - PROBLEM SELECTOR PLAN 7
Patient with Na 130 on admission, now 136; likely decreased PO intake vs SIADH. Urine Na = 42, Serum Osm = 289, Urine Osm = 757. No need for further treatment.

## 2019-08-14 NOTE — PROGRESS NOTE ADULT - PROBLEM SELECTOR PLAN 4
- fatigue, weakness, likely 2/2 to malignancy, decreased appetite, poor PO intake   - PT consult, fall precautions, check orthostatics if endorses dizziness

## 2019-08-14 NOTE — RAPID RESPONSE TEAM SUMMARY - NSMEDICATIONSRRT_GEN_ALL_CORE
Propofol   Succinylcholine   Dilaudid  Versed Propofol   Succinylcholine   Dilaudid  Versed  Phenylephrine

## 2019-08-14 NOTE — PROGRESS NOTE ADULT - SUBJECTIVE AND OBJECTIVE BOX
Hemanth Voss MD  Beeper: [phone]    Subjective:    Patient is a 53 year old male who presents with a chief complaint of pleural effusion.    Overnight events: Overnight CT showed no obvious endobrachial lesion, large loculated R pleural effusion, small L pleural effusion, complete collapse of right middle lobe and near complete collapse of right lower lobe.    MEDICATIONS  (STANDING):  docusate sodium 100 milliGRAM(s) Oral three times a day  dronabinol 2.5 milliGRAM(s) Oral at bedtime    MEDICATIONS  (PRN):  acetaminophen   Tablet .. 650 milliGRAM(s) Oral every 4 hours PRN Temp greater or equal to 38C (100.4F), Mild Pain (1 - 3)  ondansetron Injectable 4 milliGRAM(s) IV Push every 6 hours PRN Nausea and/or Vomiting  traZODone 50 milliGRAM(s) Oral at bedtime PRN anxiety, insomnia    Objective:    Vitals:   T(F): 97.5 (08-14-19 @ 05:30), Max: 97.5 (08-13-19 @ 13:25)  HR: 109 (08-14-19 @ 05:30) (105 - 109)  BP: 112/79 (08-14-19 @ 05:30) (107/78 - 120/86)  RR: 20 (08-14-19 @ 05:30) (18 - 20)  SpO2: 96% (08-14-19 @ 05:30) (96% - 98%)                I&O's Summary    13 Aug 2019 07:01  -  14 Aug 2019 07:00  --------------------------------------------------------  IN: 60 mL / OUT: 650 mL / NET: -590 mL    PHYSICAL EXAM:  GENERAL: NAD, well-groomed, well-developed  HEAD:  Atraumatic, Normocephalic  EYES: EOMI, PERRLA, conjunctiva and sclera clear  ENMT: No tonsillar erythema, exudates, or enlargement; Moist mucous membranes, Good dentition, No lesions  NECK: Supple, No JVD, Normal thyroid  CHEST/LUNG: Clear to percussion bilaterally; No rales, rhonchi, wheezing, or rubs  HEART: Regular rate and rhythm; No murmurs, rubs, or gallops  ABDOMEN: Soft, Nontender, Nondistended; Bowel sounds present  EXTREMITIES:  2+ Peripheral Pulses, No clubbing, cyanosis, or edema  LYMPH: No lymphadenopathy noted  SKIN: No rashes or lesions  NERVOUS SYSTEM:  Alert & Oriented X3, Good concentration; Motor Strength 5/5 B/L upper and lower extremities; DTRs 2+ intact and symmetric                                             LABS:    IMAGING:    Preliminary chest CT    INTERPRETATION:  No obvious endobronchial lesion.     Large loculated right pleura effusion. Small left pleural effusion.     Complete collapse of the right middle lobe. Near complete collapse of the   right lower lobe. Subsemental right upper and left lower lobe   atelectasis.     Status post esophagectomy with gastric pullthrough filled with debris to   the level of mid-cervical esophagus. Hemanth Voss MD  Beeper: [phone]    Subjective:    Patient is a 53 year old male who presents with a chief complaint of pleural effusion.    Overnight events: Overnight CT showed no obvious endobrachial lesion, large loculated R pleural effusion, small L pleural effusion, complete collapse of right middle lobe and near complete collapse of right lower lobe.    Patient states that he feels better than yesterday and that it is easier to breathe. However, he doesn't feel good overall because he was unable to sleep well and he is nervous about his bronchoscopy. He would like a more thorough discussion of risks and benefits of the bronchoscopy and how it will affect his need for pleurx catheter. No chest pain or sore throat.    MEDICATIONS  (STANDING):  docusate sodium 100 milliGRAM(s) Oral three times a day  dronabinol 2.5 milliGRAM(s) Oral at bedtime    MEDICATIONS  (PRN):  acetaminophen   Tablet .. 650 milliGRAM(s) Oral every 4 hours PRN Temp greater or equal to 38C (100.4F), Mild Pain (1 - 3)  ondansetron Injectable 4 milliGRAM(s) IV Push every 6 hours PRN Nausea and/or Vomiting  traZODone 50 milliGRAM(s) Oral at bedtime PRN anxiety, insomnia    Objective:    Vitals:   T(F): 97.5 (08-14-19 @ 05:30), Max: 97.5 (08-13-19 @ 13:25)  HR: 109 (08-14-19 @ 05:30) (105 - 109)  BP: 112/79 (08-14-19 @ 05:30) (107/78 - 120/86)  RR: 20 (08-14-19 @ 05:30) (18 - 20)  SpO2: 96% (08-14-19 @ 05:30) (96% - 98%)                I&O's Summary    13 Aug 2019 07:01  -  14 Aug 2019 07:00  --------------------------------------------------------  IN: 60 mL / OUT: 650 mL / NET: -590 mL    PHYSICAL EXAM:  GENERAL: NAD, well-groomed, very thin  HEAD:  atraumatic, normocephalic  ENMT: moist mucous membranes  CHEST/LUNG: much improved breath sounds bilaterally; clear on left side  HEART: regular rate and rhythm; no murmurs, rubs, or gallops  EXTREMITIES:  2+ peripheral pulses, no lower extremity edema    LABS:  08-14    133<L>  |  98  |  10  ----------------------------<  94  4.1   |  26  |  0.79    Ca    8.8      14 Aug 2019 06:40  TPro  5.9<L>  /  Alb  2.7<L>  /  TBili  0.3  /  DBili  x   /  AST  12  /  ALT  8<L>  /  AlkPhos  64  08-14  PT/INR - ( 14 Aug 2019 06:41 )   PT: 14.6 sec;   INR: 1.27 ratio    PTT - ( 14 Aug 2019 06:41 )  PTT:29.8 sec                                    12.9   5.9   )-----------( 302      ( 14 Aug 2019 06:43 )             41.5          IMAGING:    Preliminary chest CT    INTERPRETATION:  No obvious endobronchial lesion.     Large loculated right pleura effusion. Small left pleural effusion.     Complete collapse of the right middle lobe. Near complete collapse of the   right lower lobe. Subsemental right upper and left lower lobe   atelectasis.     Status post esophagectomy with gastric pullthrough filled with debris to   the level of mid-cervical esophagus. Hemanth Voss MD  Beeper: [phone]    Subjective:    Patient is a 53 year old male who presents with a chief complaint of pleural effusion.    Overnight events: Overnight CT showed no obvious endobrachial lesion, large loculated R pleural effusion, small L pleural effusion, complete collapse of right middle lobe and near complete collapse of right lower lobe.    Patient states that he feels better than yesterday and that it is easier to breathe. However, he doesn't feel good overall because he was unable to sleep well and he is nervous about his bronchoscopy. He would like a more thorough discussion of risks and benefits of the bronchoscopy and how it will affect his need for pleurx catheter. No chest pain or sore throat.    MEDICATIONS  (STANDING):  docusate sodium 100 milliGRAM(s) Oral three times a day  dronabinol 2.5 milliGRAM(s) Oral at bedtime    MEDICATIONS  (PRN):  acetaminophen   Tablet .. 650 milliGRAM(s) Oral every 4 hours PRN Temp greater or equal to 38C (100.4F), Mild Pain (1 - 3)  ondansetron Injectable 4 milliGRAM(s) IV Push every 6 hours PRN Nausea and/or Vomiting  traZODone 50 milliGRAM(s) Oral at bedtime PRN anxiety, insomnia    Objective:    Vitals:   T(F): 97.5 (08-14-19 @ 05:30), Max: 97.5 (08-13-19 @ 13:25)  HR: 109 (08-14-19 @ 05:30) (105 - 109)  BP: 112/79 (08-14-19 @ 05:30) (107/78 - 120/86)  RR: 20 (08-14-19 @ 05:30) (18 - 20)  SpO2: 96% (08-14-19 @ 05:30) (96% - 98%)                I&O's Summary    13 Aug 2019 07:01  -  14 Aug 2019 07:00  --------------------------------------------------------  IN: 60 mL / OUT: 650 mL / NET: -590 mL    PHYSICAL EXAM:  GENERAL: NAD, well-groomed, very thin  HEAD:  atraumatic, normocephalic  ENMT: moist mucous membranes  CHEST/LUNG: much improved breath sounds bilaterally; clear on left side  HEART: regular rate and rhythm; no murmurs, rubs, or gallops  EXTREMITIES:  2+ peripheral pulses, no lower extremity edema    LABS:  08-14    133<L>  |  98  |  10  ----------------------------<  94  4.1   |  26  |  0.79    Ca    8.8      14 Aug 2019 06:40  TPro  5.9<L>  /  Alb  2.7<L>  /  TBili  0.3  /  DBili  x   /  AST  12  /  ALT  8<L>  /  AlkPhos  64  08-14  PT/INR - ( 14 Aug 2019 06:41 )   PT: 14.6 sec;   INR: 1.27 ratio    PTT - ( 14 Aug 2019 06:41 )  PTT:29.8 sec                                    12.9   5.9   )-----------( 302      ( 14 Aug 2019 06:43 )             41.5          IMAGING:    CT Chest    IMPRESSION:    No endobronchial lesion.    Stable large loculated right pleural effusion. Complete compressive   atelectasis of the right lower and middle lobe and partial  right upper   lobes secondary to large loculated right pleural effusion and large right   hemithorax gastric pull-through.    New moderate left pleural effusion with partial  atelectasis of the   basilar left lower lobe.    Collapse of the distal right middle, right lower and left lower lobe   distal bronchi secondary to mass effect from large associated pleural   effusions.

## 2019-08-14 NOTE — CHART NOTE - NSCHARTNOTEFT_GEN_A_CORE
Dr. Tuyet Alarcon MD  Internal Medicine, PGY-1  Pager # [phone]      [facility] Accept Note  ------------------------    CHIEF COMPLAINT:     HPI / INTERVAL HISTORY:  HPI:  53 M with hx of esophageal CA diagnosed in 2017 s/p chemo and radiation and with mets to R pelvis and brain mets to the L superior cerebellar mass (s/p craniotomy in 04/2019) currently on Keytruda, CAD s/p RCA stent in 2015, p/w worsening sob for several days. Patient went to oncologist Dr. Mackey today who was concerned for pleural effusion and sent patient to ED. Patient had a R sided pleural effusion with about 3 L drained in July. He recently had another 3 L drained in [place] while he was on vacation. Patient currently only has dyspnea on minimal exertion. No chest pain, palpitations, fevers, or chills. No nausea, vomiting, or abdominal pain. Patient had decreased appetite and weight loss for past few months. Patient denies any headache or dizziness, but has worsening fatigue. He had a brain MRI done yesterday to assess status of brain mets. (12 Aug 2019 19:08)      Unable to obtain ROS due to intubation and sedation      PAST MEDICAL & SURGICAL HISTORY:  H/O nausea  History of dysphagia  Hilar lymphadenopathy  Chronic anticoagulation  CAD (coronary artery disease)  Secondary malignant neoplasm of brain  Metastasis to bone  Esophageal cancer  MI (myocardial infarction): 2015 with 1 coronary stent mid RCA  H/O craniotomy  History of esophageal surgery: 6/2018  H/O hernia repair: 1966  Status post tonsillectomy and adenoidectomy  Stented coronary artery: x1 2015      FAMILY HISTORY:  Family history of cervical cancer  Family history of throat cancer      SOCIAL HISTORY:  Smoking: [  ] Never Smoked  [  ] Former Smoker (# packs x # years), quit   [  ] Current Smoker (# packs x # years)  Substance Use: [  ] None; [   ] Yes:  EtOH Use: [   ] None; [   ] Rare; [   ] Social; [   ] Frequent:   Marital Status: [  ] Single  [  ]   [  ]   [  ]   Dependents:  Occupation:  Barriers to treatment:   Advance Directives:     HOME MEDICATIONS:  Home Medications:  aspirin 81 mg oral tablet: 1 tab(s) orally once a day (12 Aug 2019 21:21)  Keytruda:  (12 Aug 2019 21:21)  risperiDONE 1 mg oral tablet: 3 tab(s) orally once a day (at bedtime), As Needed (12 Aug 2019 21:21)      ALLERGIES:  Allergies    No Known Allergies    Intolerances        OBJECTIVE:  ICU Vital Signs Last 24 Hrs  T(C): 36.4 (14 Aug 2019 05:30), Max: 36.4 (13 Aug 2019 20:46)  T(F): 97.5 (14 Aug 2019 05:30), Max: 97.5 (13 Aug 2019 20:46)  HR: 140 (14 Aug 2019 15:22) (106 - 140)  BP: 112/79 (14 Aug 2019 05:30) (107/78 - 112/79)  BP(mean): --  ABP: --  ABP(mean): --  RR: 20 (14 Aug 2019 05:30) (20 - 20)  SpO2: 85% (14 Aug 2019 15:22) (85% - 98%)        08-13 @ 07:01 - 08-14 @ 07:00  --------------------------------------------------------  IN: 60 mL / OUT: 650 mL / NET: -590 mL    08-14 @ 07:01  -  08-14 @ 17:21  --------------------------------------------------------  IN: 0 mL / OUT: 200 mL / NET: -200 mL      CAPILLARY BLOOD GLUCOSE      POCT Blood Glucose.: 96 mg/dL (14 Aug 2019 15:11)      PHYSICAL EXAM  GENERAL: No acute distress  NEURO: A+O x 3, follows commands; spontaneously moving all 4 extremities; strength and sensation grossly intact  HEENT: Pupil equal and reactive to light; extra-ocular movements intact; clear conjunctiva; normal mucus membrane and oropharynx; neck supple  RESP: Normal respiratory effort on room air; clear to auscultation bilateral lung fields  CVS: S1/S2 present, normal rate and rhythm; no murmurs, gallops or rubs appreciated  ABD: Soft, non-tender, non-distended, no masses appreciated; bowel sound normal at all 4 quadrants  EXT: Grossly normal ROM; 2+ pedal pulses bilaterally, no BLE edema  SKIN: Warm, dry, and appropirate color for skin tone; No new rashes    LINES:       HOSPITAL MEDICATIONS:  MEDICATIONS  (STANDING):  ALBUTerol/ipratropium for Nebulization 3 milliLiter(s) Nebulizer every 6 hours  chlorhexidine 0.12% Liquid 15 milliLiter(s) Oral Mucosa every 12 hours  chlorhexidine 4% Liquid 1 Application(s) Topical <User Schedule>  cisatracurium Injectable 20 milliGRAM(s) IV Push once  docusate sodium 100 milliGRAM(s) Oral three times a day  enoxaparin Injectable 40 milliGRAM(s) SubCutaneous daily  fentaNYL    Injectable 100 MICROGram(s) IV Push once  lactated ringers Bolus 1000 milliLiter(s) IV Bolus once  midazolam Injectable 4 milliGRAM(s) IV Push once  phenylephrine    Infusion 0.165 MICROgram(s)/kG/Min (5 mL/Hr) IV Continuous <Continuous>  propofol Infusion 40 MICROgram(s)/kG/Min (19.344 mL/Hr) IV Continuous <Continuous>  propofol Injectable 30 milliGRAM(s) IV Push once    MEDICATIONS  (PRN):        LABS:                        14.5   9.4   )-----------( 476      ( 14 Aug 2019 16:54 )             49.8     Hgb Trend: 14.5<--, 12.9<--, 11.4<--, 13.5<--  08-14    133<L>  |  98  |  10  ----------------------------<  94  4.1   |  26  |  0.79    Ca    8.8      14 Aug 2019 06:40  Phos  3.7     08-13  Mg     2.1     08-13    TPro  5.9<L>  /  Alb  2.7<L>  /  TBili  0.3  /  DBili  x   /  AST  12  /  ALT  8<L>  /  AlkPhos  64  08-14    Creatinine Trend: 0.79<--, 0.79<--, 0.99<--  PT/INR - ( 14 Aug 2019 16:54 )   PT: 14.7 sec;   INR: 1.27 ratio         PTT - ( 14 Aug 2019 16:54 )  PTT:29.0 sec    Arterial Blood Gas:  08-14 @ 16:50  7.29/47/55/22/81/-4.4  ABG lactate: --        MICROBIOLOGY:     Culture - Fungal, Body Fluid (collected 13 Aug 2019 16:23)  Source: .Body Fluid  Preliminary Report (14 Aug 2019 09:55):    Testing in progress    Culture - Body Fluid with Gram Stain (collected 13 Aug 2019 16:23)  Source: .Body Fluid  Gram Stain (13 Aug 2019 22:46):    No polymorphonuclear cells seen    No organisms seen    by cytocentrifuge    Assessment:    Patient is a       #Neuro  -       #Resp  -      #CV  -      #GI  -      #ID  -      #Renal  -      #Heme  -      #Endo  -      #DVT PPx  -       #GOC/Ethics  - Code  - GOC meeting  - Pallative consult Dr. Tuyet Alarcon MD  Internal Medicine, PGY-1  Pager # [phone]      [facility] Accept Note  ------------------------    CHIEF COMPLAINT:     HPI / INTERVAL HISTORY:  HPI:  53 M with hx of esophageal CA diagnosed in 2017 s/p chemo and radiation and with mets to R pelvis and brain mets to the L superior cerebellar mass (s/p craniotomy in 04/2019) currently on Keytruda, CAD s/p RCA stent in 2015, p/w worsening sob for several days. Patient went to oncologist Dr. Mackey today who was concerned for pleural effusion and sent patient to ED. Patient had a R sided pleural effusion with about 3 L drained in July. He recently had another 3 L drained in [place] while he was on vacation. Patient currently only has dyspnea on minimal exertion. No chest pain, palpitations, fevers, or chills. No nausea, vomiting, or abdominal pain. Patient had decreased appetite and weight loss for past few months. Patient denies any headache or dizziness, but has worsening fatigue. He had a brain MRI done yesterday to assess status of brain mets. (12 Aug 2019 19:08)    Patient was admitted to medicine for dyspnea in the setting of significant R pleural effusion, s/p thoracentesis 8/13 with transudative fluid. Overnight CT showed no obvious endobrachial lesion, large loculated R pleural effusion, small L pleural effusion, complete collapse of right middle lobe and near complete collapse of right lower lobe. Keytruda was held inpatient. Bronchoscopy was performed on 8/14. However, patient experienced desaturation to 65% on NRB in the recovery room, and was intubated and transferred to MICU for further management of hypoxic respiratory failure.       Unable to obtain ROS due to intubation and sedation      PAST MEDICAL & SURGICAL HISTORY:  H/O nausea  History of dysphagia  Hilar lymphadenopathy  Chronic anticoagulation  CAD (coronary artery disease)  Secondary malignant neoplasm of brain  Metastasis to bone  Esophageal cancer  MI (myocardial infarction): 2015 with 1 coronary stent mid RCA  H/O craniotomy  History of esophageal surgery: 6/2018  H/O hernia repair: 1966  Status post tonsillectomy and adenoidectomy  Stented coronary artery: x1 2015      FAMILY HISTORY:  Family history of cervical cancer  Family history of throat cancer      SOCIAL HISTORY:  Patient currently lives with wife at home. Patient used to smoke a pack a day for 3-4 years as a teenager. Used to drink 3-4 drinks/ day, but quit 2 years ago. Currently not smoking, no drinking, no recreational drugs. Patient is a local musician.    HOME MEDICATIONS:  Home Medications:  aspirin 81 mg oral tablet: 1 tab(s) orally once a day (12 Aug 2019 21:21)  Keytruda:  (12 Aug 2019 21:21)  risperiDONE 1 mg oral tablet: 3 tab(s) orally once a day (at bedtime), As Needed (12 Aug 2019 21:21)      ALLERGIES:  Allergies    No Known Allergies    Intolerances        OBJECTIVE:  ICU Vital Signs Last 24 Hrs  T(C): 36.4 (14 Aug 2019 05:30), Max: 36.4 (13 Aug 2019 20:46)  T(F): 97.5 (14 Aug 2019 05:30), Max: 97.5 (13 Aug 2019 20:46)  HR: 140 (14 Aug 2019 15:22) (106 - 140)  BP: 112/79 (14 Aug 2019 05:30) (107/78 - 112/79)  BP(mean): --  ABP: --  ABP(mean): --  RR: 20 (14 Aug 2019 05:30) (20 - 20)  SpO2: 85% (14 Aug 2019 15:22) (85% - 98%)        08-13 @ 07:01 - 08-14 @ 07:00  --------------------------------------------------------  IN: 60 mL / OUT: 650 mL / NET: -590 mL    08-14 @ 07:01 - 08-14 @ 17:21  --------------------------------------------------------  IN: 0 mL / OUT: 200 mL / NET: -200 mL      CAPILLARY BLOOD GLUCOSE      POCT Blood Glucose.: 96 mg/dL (14 Aug 2019 15:11)      PHYSICAL EXAM  GENERAL: Intubated  NEURO: Sedated, unresponsive  HEENT: B/l pupil constrictive and reactive to light; clear conjunctiva; neck supple; OGT with wall suction of 150cc bilious fluid  RESP: Intubated; clear to auscultation bilateral lung fields  CVS: S1/S2 present, tachycardic, regular rhythm; no murmurs, gallops or rubs appreciated  ABD: Soft, non-tender, non-distended, no masses appreciated; bowel sound normal at all 4 quadrants  EXT: 2+ pedal pulses bilaterally, no BLE edema  SKIN: Warm, dry, and appropirate color for skin tone; No new rashes    LINES: PIVs, ET/OG      HOSPITAL MEDICATIONS:  MEDICATIONS  (STANDING):  ALBUTerol/ipratropium for Nebulization 3 milliLiter(s) Nebulizer every 6 hours  chlorhexidine 0.12% Liquid 15 milliLiter(s) Oral Mucosa every 12 hours  chlorhexidine 4% Liquid 1 Application(s) Topical <User Schedule>  cisatracurium Injectable 20 milliGRAM(s) IV Push once  docusate sodium 100 milliGRAM(s) Oral three times a day  enoxaparin Injectable 40 milliGRAM(s) SubCutaneous daily  fentaNYL    Injectable 100 MICROGram(s) IV Push once  lactated ringers Bolus 1000 milliLiter(s) IV Bolus once  midazolam Injectable 4 milliGRAM(s) IV Push once  phenylephrine    Infusion 0.165 MICROgram(s)/kG/Min (5 mL/Hr) IV Continuous <Continuous>  propofol Infusion 40 MICROgram(s)/kG/Min (19.344 mL/Hr) IV Continuous <Continuous>  propofol Injectable 30 milliGRAM(s) IV Push once    MEDICATIONS  (PRN):        LABS:                        14.5   9.4   )-----------( 476      ( 14 Aug 2019 16:54 )             49.8     Hgb Trend: 14.5<--, 12.9<--, 11.4<--, 13.5<--  08-14    133<L>  |  98  |  10  ----------------------------<  94  4.1   |  26  |  0.79    Ca    8.8      14 Aug 2019 06:40  Phos  3.7     08-13  Mg     2.1     08-13    TPro  5.9<L>  /  Alb  2.7<L>  /  TBili  0.3  /  DBili  x   /  AST  12  /  ALT  8<L>  /  AlkPhos  64  08-14    Creatinine Trend: 0.79<--, 0.79<--, 0.99<--  PT/INR - ( 14 Aug 2019 16:54 )   PT: 14.7 sec;   INR: 1.27 ratio         PTT - ( 14 Aug 2019 16:54 )  PTT:29.0 sec    Arterial Blood Gas:  08-14 @ 16:50  7.29/47/55/22/81/-4.4  ABG lactate: --        MICROBIOLOGY:     Culture - Fungal, Body Fluid (collected 13 Aug 2019 16:23)  Source: .Body Fluid  Preliminary Report (14 Aug 2019 09:55):    Testing in progress    Culture - Body Fluid with Gram Stain (collected 13 Aug 2019 16:23)  Source: .Body Fluid  Gram Stain (13 Aug 2019 22:46):    No polymorphonuclear cells seen    No organisms seen    by cytocentrifuge    Assessment:    Patient is a 53 M with hx of esophageal CA with mets to R pelvis and brain, CAD s/p RCA stent in 2015, p/w dyspnea 2/2 R pleural effusion s/p thoracentesis and bronchoscopy, admitted to MICU hypoxic respiratory failure and hypotension.      #Neuro  -       #Resp  -      #CV  -      #GI  -      #ID  -      #Renal  -      #Heme  -      #Endo  -      #DVT PPx  -       #GOC/Ethics  - Code  - [facility] meeting  - Pallative consult Dr. Tuyet Alarcon MD  Internal Medicine, PGY-1  Pager # [phone]      [facility] Accept Note  ------------------------    CHIEF COMPLAINT:     HPI / INTERVAL HISTORY:  HPI:  53 M with hx of esophageal CA diagnosed in 2017 s/p chemo and radiation and with mets to R pelvis and brain mets to the L superior cerebellar mass (s/p craniotomy in 04/2019) currently on Keytruda, CAD s/p RCA stent in 2015, p/w worsening sob for several days. Patient went to oncologist Dr. Mackey today who was concerned for pleural effusion and sent patient to ED. Patient had a R sided pleural effusion with about 3 L drained in July. He recently had another 3 L drained in [place] while he was on vacation. Patient currently only has dyspnea on minimal exertion. No chest pain, palpitations, fevers, or chills. No nausea, vomiting, or abdominal pain. Patient had decreased appetite and weight loss for past few months. Patient denies any headache or dizziness, but has worsening fatigue. He had a brain MRI done yesterday to assess status of brain mets. (12 Aug 2019 19:08)    Patient was admitted to medicine for dyspnea in the setting of significant R pleural effusion, s/p thoracentesis 8/13 with transudative fluid. Overnight CT showed no obvious endobrachial lesion, large loculated R pleural effusion, small L pleural effusion, complete collapse of right middle lobe and near complete collapse of right lower lobe. Keytruda was held inpatient. Bronchoscopy was performed on 8/14. However, patient experienced desaturation to 65% on NRB in the recovery room, and was intubated and transferred to MICU for further management of hypoxic respiratory failure.       Unable to obtain ROS due to intubation and sedation      PAST MEDICAL & SURGICAL HISTORY:  H/O nausea  History of dysphagia  Hilar lymphadenopathy  Chronic anticoagulation  CAD (coronary artery disease)  Secondary malignant neoplasm of brain  Metastasis to bone  Esophageal cancer  MI (myocardial infarction): 2015 with 1 coronary stent mid RCA  H/O craniotomy  History of esophageal surgery: 6/2018  H/O hernia repair: 1966  Status post tonsillectomy and adenoidectomy  Stented coronary artery: x1 2015      FAMILY HISTORY:  Family history of cervical cancer  Family history of throat cancer      SOCIAL HISTORY:  Patient currently lives with wife at home. Patient used to smoke a pack a day for 3-4 years as a teenager. Used to drink 3-4 drinks/ day, but quit 2 years ago. Currently not smoking, no drinking, no recreational drugs. Patient is a local musician.    HOME MEDICATIONS:  Home Medications:  aspirin 81 mg oral tablet: 1 tab(s) orally once a day (12 Aug 2019 21:21)  Keytruda:  (12 Aug 2019 21:21)  risperiDONE 1 mg oral tablet: 3 tab(s) orally once a day (at bedtime), As Needed (12 Aug 2019 21:21)      ALLERGIES:  Allergies    No Known Allergies    Intolerances        OBJECTIVE:  ICU Vital Signs Last 24 Hrs  T(C): 36.4 (14 Aug 2019 05:30), Max: 36.4 (13 Aug 2019 20:46)  T(F): 97.5 (14 Aug 2019 05:30), Max: 97.5 (13 Aug 2019 20:46)  HR: 140 (14 Aug 2019 15:22) (106 - 140)  BP: 112/79 (14 Aug 2019 05:30) (107/78 - 112/79)  BP(mean): --  ABP: --  ABP(mean): --  RR: 20 (14 Aug 2019 05:30) (20 - 20)  SpO2: 85% (14 Aug 2019 15:22) (85% - 98%)        08-13 @ 07:01 - 08-14 @ 07:00  --------------------------------------------------------  IN: 60 mL / OUT: 650 mL / NET: -590 mL    08-14 @ 07:01 - 08-14 @ 17:21  --------------------------------------------------------  IN: 0 mL / OUT: 200 mL / NET: -200 mL      CAPILLARY BLOOD GLUCOSE      POCT Blood Glucose.: 96 mg/dL (14 Aug 2019 15:11)      PHYSICAL EXAM  GENERAL: Intubated  NEURO: Sedated, unresponsive  HEENT: B/l pupil constrictive and reactive to light; clear conjunctiva; neck supple; OGT with wall suction of 150cc bilious fluid  RESP: Intubated; clear to auscultation bilateral lung fields  CVS: S1/S2 present, tachycardic, regular rhythm; no murmurs, gallops or rubs appreciated  ABD: Soft, non-tender, non-distended, no masses appreciated; bowel sound normal at all 4 quadrants  EXT: 2+ pedal pulses bilaterally, no BLE edema  SKIN: Warm, dry, and appropirate color for skin tone; No new rashes    LINES: PIVs, ET/OG      HOSPITAL MEDICATIONS:  MEDICATIONS  (STANDING):  ALBUTerol/ipratropium for Nebulization 3 milliLiter(s) Nebulizer every 6 hours  chlorhexidine 0.12% Liquid 15 milliLiter(s) Oral Mucosa every 12 hours  chlorhexidine 4% Liquid 1 Application(s) Topical <User Schedule>  cisatracurium Injectable 20 milliGRAM(s) IV Push once  docusate sodium 100 milliGRAM(s) Oral three times a day  enoxaparin Injectable 40 milliGRAM(s) SubCutaneous daily  fentaNYL    Injectable 100 MICROGram(s) IV Push once  lactated ringers Bolus 1000 milliLiter(s) IV Bolus once  midazolam Injectable 4 milliGRAM(s) IV Push once  phenylephrine    Infusion 0.165 MICROgram(s)/kG/Min (5 mL/Hr) IV Continuous <Continuous>  propofol Infusion 40 MICROgram(s)/kG/Min (19.344 mL/Hr) IV Continuous <Continuous>  propofol Injectable 30 milliGRAM(s) IV Push once    MEDICATIONS  (PRN):        LABS:                        14.5   9.4   )-----------( 476      ( 14 Aug 2019 16:54 )             49.8     Hgb Trend: 14.5<--, 12.9<--, 11.4<--, 13.5<--  08-14    133<L>  |  98  |  10  ----------------------------<  94  4.1   |  26  |  0.79    Ca    8.8      14 Aug 2019 06:40  Phos  3.7     08-13  Mg     2.1     08-13    TPro  5.9<L>  /  Alb  2.7<L>  /  TBili  0.3  /  DBili  x   /  AST  12  /  ALT  8<L>  /  AlkPhos  64  08-14    Creatinine Trend: 0.79<--, 0.79<--, 0.99<--  PT/INR - ( 14 Aug 2019 16:54 )   PT: 14.7 sec;   INR: 1.27 ratio         PTT - ( 14 Aug 2019 16:54 )  PTT:29.0 sec    Arterial Blood Gas:  08-14 @ 16:50  7.29/47/55/22/81/-4.4  ABG lactate: --        MICROBIOLOGY:     Culture - Fungal, Body Fluid (collected 13 Aug 2019 16:23)  Source: .Body Fluid  Preliminary Report (14 Aug 2019 09:55):    Testing in progress    Culture - Body Fluid with Gram Stain (collected 13 Aug 2019 16:23)  Source: .Body Fluid  Gram Stain (13 Aug 2019 22:46):    No polymorphonuclear cells seen    No organisms seen    by cytocentrifuge    Assessment:    Patient is a 53 M with hx of esophageal CA with mets to R pelvis and brain, CAD s/p RCA stent in 2015, p/w dyspnea 2/2 R pleural effusion s/p thoracentesis and bronchoscopy, admitted to MICU hypoxic respiratory failure and hypotension.      #Neuro  - Sedated on fentanyl and propofol  - Wean as tolerated      #Resp  - Hypoxic respiratory failure 2/2 R pleural effusion vs. infection  - s/p thoracentesis 8/13 and bronchoscopy 8/14  - Intubated; vent setting RR 24, vt 400, PEEP 10, FiO2 100%; wean as tolerated  - f/u CXR  - Chest PT and suction prn  - f/u BAL fluid cytology, lipase, triglyceride,   - Terbutaline x 1      #CV  - On li, titrate to MAP 65      #GI  - OGT in place, wall suction with bilious gastric content  - NPO x meds for now  - Protonix 40 mg IV daily      #ID  - ?Septic shock  - C/w vanc (8/14 - )and zosyn (8/14 - )  - f/u Bcx, BAL cx, AFB      #Renal  - No active issue currently  - Monitor I's and O's      #Onc  - Metastatic esophageal CA with mets to pelvis and brain  - Holding Keytruda inpatient (last cycle in July), will f/u outpatient with Dr. Mackey  - Onc following      #Endo  - F/u A1C  - No active issue currently      #DVT PPx  - Lovenox 40 mg daily      #GOC/Ethics  - Full code Dr. Tuyet Alarcon MD  Internal Medicine, PGY-1  Pager # [phone]      MICU Accept Note  ------------------------    CHIEF COMPLAINT:     HPI / INTERVAL HISTORY:  HPI:  53 M with hx of esophageal CA diagnosed in 2017 s/p chemo and radiation and with mets to R pelvis and brain mets to the L superior cerebellar mass (s/p craniotomy in 04/2019) currently on Keytruda, CAD s/p RCA stent in 2015, p/w worsening sob for several days. Patient went to oncologist Dr. Mackey today who was concerned for pleural effusion and sent patient to ED. Patient had a R sided pleural effusion with about 3 L drained in July. He recently had another 3 L drained in [place] while he was on vacation. Patient currently only has dyspnea on minimal exertion. No chest pain, palpitations, fevers, or chills. No nausea, vomiting, or abdominal pain. Patient had decreased appetite and weight loss for past few months. Patient denies any headache or dizziness, but has worsening fatigue. He had a brain MRI done yesterday to assess status of brain mets. (12 Aug 2019 19:08)    Patient was admitted to medicine for dyspnea in the setting of significant R pleural effusion, s/p thoracentesis 8/13 with transudative fluid. Overnight CT showed no obvious endobrachial lesion, large loculated R pleural effusion, small L pleural effusion, complete collapse of right middle lobe and near complete collapse of right lower lobe. Keytruda was held inpatient. Bronchoscopy was performed on 8/14. However, patient experienced desaturation to 65% on NRB in the recovery room, and was intubated and transferred to MICU for further management of hypoxic respiratory failure.       Unable to obtain ROS due to intubation and sedation      PAST MEDICAL & SURGICAL HISTORY:  H/O nausea  History of dysphagia  Hilar lymphadenopathy  Chronic anticoagulation  CAD (coronary artery disease)  Secondary malignant neoplasm of brain  Metastasis to bone  Esophageal cancer  MI (myocardial infarction): 2015 with 1 coronary stent mid RCA  H/O craniotomy  History of esophageal surgery: 6/2018  H/O hernia repair: 1966  Status post tonsillectomy and adenoidectomy  Stented coronary artery: x1 2015      FAMILY HISTORY:  Family history of cervical cancer  Family history of throat cancer      SOCIAL HISTORY:  Patient currently lives with wife at home. Patient used to smoke a pack a day for 3-4 years as a teenager. Used to drink 3-4 drinks/ day, but quit 2 years ago. Currently not smoking, no drinking, no recreational drugs. Patient is a local musician.    HOME MEDICATIONS:  Home Medications:  aspirin 81 mg oral tablet: 1 tab(s) orally once a day (12 Aug 2019 21:21)  Keytruda:  (12 Aug 2019 21:21)  risperiDONE 1 mg oral tablet: 3 tab(s) orally once a day (at bedtime), As Needed (12 Aug 2019 21:21)      ALLERGIES:  Allergies    No Known Allergies    Intolerances        OBJECTIVE:  ICU Vital Signs Last 24 Hrs  T(C): 36.4 (14 Aug 2019 05:30), Max: 36.4 (13 Aug 2019 20:46)  T(F): 97.5 (14 Aug 2019 05:30), Max: 97.5 (13 Aug 2019 20:46)  HR: 140 (14 Aug 2019 15:22) (106 - 140)  BP: 112/79 (14 Aug 2019 05:30) (107/78 - 112/79)  BP(mean): --  ABP: --  ABP(mean): --  RR: 20 (14 Aug 2019 05:30) (20 - 20)  SpO2: 85% (14 Aug 2019 15:22) (85% - 98%)        08-13 @ 07:01 - 08-14 @ 07:00  --------------------------------------------------------  IN: 60 mL / OUT: 650 mL / NET: -590 mL    08-14 @ 07:01 - 08-14 @ 17:21  --------------------------------------------------------  IN: 0 mL / OUT: 200 mL / NET: -200 mL      CAPILLARY BLOOD GLUCOSE      POCT Blood Glucose.: 96 mg/dL (14 Aug 2019 15:11)      PHYSICAL EXAM  GENERAL: Intubated  NEURO: Sedated, unresponsive  HEENT: B/l pupil constrictive and reactive to light; clear conjunctiva; neck supple; OGT with wall suction of 150cc bilious fluid  RESP: Intubated; clear to auscultation bilateral lung fields  CVS: S1/S2 present, tachycardic, regular rhythm; no murmurs, gallops or rubs appreciated  ABD: Soft, non-tender, non-distended, no masses appreciated; bowel sound normal at all 4 quadrants  EXT: 2+ pedal pulses bilaterally, no BLE edema  SKIN: Warm, dry, and appropirate color for skin tone; No new rashes    LINES: PIVs, ET/OG      HOSPITAL MEDICATIONS:  MEDICATIONS  (STANDING):  ALBUTerol/ipratropium for Nebulization 3 milliLiter(s) Nebulizer every 6 hours  chlorhexidine 0.12% Liquid 15 milliLiter(s) Oral Mucosa every 12 hours  chlorhexidine 4% Liquid 1 Application(s) Topical <User Schedule>  cisatracurium Injectable 20 milliGRAM(s) IV Push once  docusate sodium 100 milliGRAM(s) Oral three times a day  enoxaparin Injectable 40 milliGRAM(s) SubCutaneous daily  fentaNYL    Injectable 100 MICROGram(s) IV Push once  lactated ringers Bolus 1000 milliLiter(s) IV Bolus once  midazolam Injectable 4 milliGRAM(s) IV Push once  phenylephrine    Infusion 0.165 MICROgram(s)/kG/Min (5 mL/Hr) IV Continuous <Continuous>  propofol Infusion 40 MICROgram(s)/kG/Min (19.344 mL/Hr) IV Continuous <Continuous>  propofol Injectable 30 milliGRAM(s) IV Push once    MEDICATIONS  (PRN):        LABS:                        14.5   9.4   )-----------( 476      ( 14 Aug 2019 16:54 )             49.8     Hgb Trend: 14.5<--, 12.9<--, 11.4<--, 13.5<--  08-14    133<L>  |  98  |  10  ----------------------------<  94  4.1   |  26  |  0.79    Ca    8.8      14 Aug 2019 06:40  Phos  3.7     08-13  Mg     2.1     08-13    TPro  5.9<L>  /  Alb  2.7<L>  /  TBili  0.3  /  DBili  x   /  AST  12  /  ALT  8<L>  /  AlkPhos  64  08-14    Creatinine Trend: 0.79<--, 0.79<--, 0.99<--  PT/INR - ( 14 Aug 2019 16:54 )   PT: 14.7 sec;   INR: 1.27 ratio         PTT - ( 14 Aug 2019 16:54 )  PTT:29.0 sec    Arterial Blood Gas:  08-14 @ 16:50  7.29/47/55/22/81/-4.4  ABG lactate: --        MICROBIOLOGY:     Culture - Fungal, Body Fluid (collected 13 Aug 2019 16:23)  Source: .Body Fluid  Preliminary Report (14 Aug 2019 09:55):    Testing in progress    Culture - Body Fluid with Gram Stain (collected 13 Aug 2019 16:23)  Source: .Body Fluid  Gram Stain (13 Aug 2019 22:46):    No polymorphonuclear cells seen    No organisms seen    by cytocentrifuge    Assessment:    Patient is a 53 M with hx of esophageal CA with mets to R pelvis and brain, CAD s/p RCA stent in 2015, p/w dyspnea 2/2 R pleural effusion s/p thoracentesis and bronchoscopy, admitted to MICU hypoxic respiratory failure and hypotension.      #Neuro  - Sedated on fentanyl and propofol  - Wean as tolerated      #Resp  - Hypoxic respiratory failure 2/2 R pleural effusion vs. infection  - s/p thoracentesis 8/13 and bronchoscopy 8/14  - Intubated; vent setting RR 24, vt 400, PEEP 10, FiO2 100%; wean as tolerated  - f/u CXR  - Chest PT and suction prn  - f/u BAL fluid cytology, lipase, triglyceride,   - Terbutaline x 1      #CV  - s/p RCA stent in 2015  - On li, titrate to MAP 65      #GI  - OGT in place, wall suction with bilious gastric content  - NPO x meds for now  - Protonix 40 mg IV daily      #ID  - ?Septic shock  - C/w vanc (8/14 - )and zosyn (8/14 - )  - f/u Bcx, BAL cx, AFB      #Renal  - No active issue currently  - Monitor I's and O's      #Onc  - Metastatic esophageal CA with mets to pelvis and brain  - Holding Keytruda inpatient (last cycle in July), will f/u outpatient with Dr. Mackey  - Onc following      #Endo  - F/u A1C  - No active issue currently      #DVT PPx  - Lovenox 40 mg daily      #GOC/Ethics  - Full code Dr. Tuyet Alarcon MD  Internal Medicine, PGY-1  Pager # [phone]      MICU Accept Note  ------------------------    CHIEF COMPLAINT:     HPI / INTERVAL HISTORY:  HPI:  53 M with hx of esophageal CA diagnosed in 2017 s/p chemo and radiation and with mets to R pelvis and brain mets to the L superior cerebellar mass (s/p craniotomy in 04/2019) currently on Keytruda, CAD s/p RCA stent in 2015, p/w worsening sob for several days. Patient went to oncologist Dr. Mackey today who was concerned for pleural effusion and sent patient to ED. Patient had a R sided pleural effusion with about 3 L drained in July. He recently had another 3 L drained in [place] while he was on vacation. Patient currently only has dyspnea on minimal exertion. No chest pain, palpitations, fevers, or chills. No nausea, vomiting, or abdominal pain. Patient had decreased appetite and weight loss for past few months. Patient denies any headache or dizziness, but has worsening fatigue. He had a brain MRI done yesterday to assess status of brain mets. (12 Aug 2019 19:08)    Patient was admitted to medicine for dyspnea in the setting of significant R pleural effusion, s/p thoracentesis 8/13 with transudative fluid. Overnight CT showed no obvious endobrachial lesion, large loculated R pleural effusion, small L pleural effusion, complete collapse of right middle lobe and near complete collapse of right lower lobe. Keytruda was held inpatient. Bronchoscopy was performed on 8/14. However, patient experienced desaturation to 65% on NRB in the recovery room, and was intubated and transferred to MICU for further management of hypoxic respiratory failure.       Unable to obtain ROS due to intubation and sedation      PAST MEDICAL & SURGICAL HISTORY:  H/O nausea  History of dysphagia  Hilar lymphadenopathy  Chronic anticoagulation  CAD (coronary artery disease)  Secondary malignant neoplasm of brain  Metastasis to bone  Esophageal cancer  MI (myocardial infarction): 2015 with 1 coronary stent mid RCA  H/O craniotomy  History of esophageal surgery: 6/2018  H/O hernia repair: 1966  Status post tonsillectomy and adenoidectomy  Stented coronary artery: x1 2015      FAMILY HISTORY:  Family history of cervical cancer  Family history of throat cancer      SOCIAL HISTORY:  Patient currently lives with wife at home. Patient used to smoke a pack a day for 3-4 years as a teenager. Used to drink 3-4 drinks/ day, but quit 2 years ago. Currently not smoking, no drinking, no recreational drugs. Patient is a local musician.    HOME MEDICATIONS:  Home Medications:  aspirin 81 mg oral tablet: 1 tab(s) orally once a day (12 Aug 2019 21:21)  Keytruda:  (12 Aug 2019 21:21)  risperiDONE 1 mg oral tablet: 3 tab(s) orally once a day (at bedtime), As Needed (12 Aug 2019 21:21)      ALLERGIES:  Allergies    No Known Allergies    Intolerances        OBJECTIVE:  ICU Vital Signs Last 24 Hrs  T(C): 36.4 (14 Aug 2019 05:30), Max: 36.4 (13 Aug 2019 20:46)  T(F): 97.5 (14 Aug 2019 05:30), Max: 97.5 (13 Aug 2019 20:46)  HR: 140 (14 Aug 2019 15:22) (106 - 140)  BP: 112/79 (14 Aug 2019 05:30) (107/78 - 112/79)  BP(mean): --  ABP: --  ABP(mean): --  RR: 20 (14 Aug 2019 05:30) (20 - 20)  SpO2: 85% (14 Aug 2019 15:22) (85% - 98%)        08-13 @ 07:01 - 08-14 @ 07:00  --------------------------------------------------------  IN: 60 mL / OUT: 650 mL / NET: -590 mL    08-14 @ 07:01 - 08-14 @ 17:21  --------------------------------------------------------  IN: 0 mL / OUT: 200 mL / NET: -200 mL      CAPILLARY BLOOD GLUCOSE      POCT Blood Glucose.: 96 mg/dL (14 Aug 2019 15:11)      PHYSICAL EXAM  GENERAL: Intubated  NEURO: Sedated, unresponsive  HEENT: B/l pupil constrictive and reactive to light; clear conjunctiva; neck supple; OGT with wall suction of 150cc bilious fluid  RESP: Intubated; clear to auscultation bilateral lung fields  CVS: S1/S2 present, tachycardic, regular rhythm; no murmurs, gallops or rubs appreciated  ABD: Soft, non-tender, non-distended, no masses appreciated; bowel sound normal at all 4 quadrants  EXT: 2+ pedal pulses bilaterally, no BLE edema  SKIN: Warm, dry, and appropirate color for skin tone; No new rashes    LINES: PIVs, ET/OG      HOSPITAL MEDICATIONS:  MEDICATIONS  (STANDING):  ALBUTerol/ipratropium for Nebulization 3 milliLiter(s) Nebulizer every 6 hours  chlorhexidine 0.12% Liquid 15 milliLiter(s) Oral Mucosa every 12 hours  chlorhexidine 4% Liquid 1 Application(s) Topical <User Schedule>  cisatracurium Injectable 20 milliGRAM(s) IV Push once  docusate sodium 100 milliGRAM(s) Oral three times a day  enoxaparin Injectable 40 milliGRAM(s) SubCutaneous daily  fentaNYL    Injectable 100 MICROGram(s) IV Push once  lactated ringers Bolus 1000 milliLiter(s) IV Bolus once  midazolam Injectable 4 milliGRAM(s) IV Push once  phenylephrine    Infusion 0.165 MICROgram(s)/kG/Min (5 mL/Hr) IV Continuous <Continuous>  propofol Infusion 40 MICROgram(s)/kG/Min (19.344 mL/Hr) IV Continuous <Continuous>  propofol Injectable 30 milliGRAM(s) IV Push once    MEDICATIONS  (PRN):        LABS:                        14.5   9.4   )-----------( 476      ( 14 Aug 2019 16:54 )             49.8     Hgb Trend: 14.5<--, 12.9<--, 11.4<--, 13.5<--  08-14    133<L>  |  98  |  10  ----------------------------<  94  4.1   |  26  |  0.79    Ca    8.8      14 Aug 2019 06:40  Phos  3.7     08-13  Mg     2.1     08-13    TPro  5.9<L>  /  Alb  2.7<L>  /  TBili  0.3  /  DBili  x   /  AST  12  /  ALT  8<L>  /  AlkPhos  64  08-14    Creatinine Trend: 0.79<--, 0.79<--, 0.99<--  PT/INR - ( 14 Aug 2019 16:54 )   PT: 14.7 sec;   INR: 1.27 ratio         PTT - ( 14 Aug 2019 16:54 )  PTT:29.0 sec    Arterial Blood Gas:  08-14 @ 16:50  7.29/47/55/22/81/-4.4  ABG lactate: --        MICROBIOLOGY:     Culture - Fungal, Body Fluid (collected 13 Aug 2019 16:23)  Source: .Body Fluid  Preliminary Report (14 Aug 2019 09:55):    Testing in progress    Culture - Body Fluid with Gram Stain (collected 13 Aug 2019 16:23)  Source: .Body Fluid  Gram Stain (13 Aug 2019 22:46):    No polymorphonuclear cells seen    No organisms seen    by cytocentrifuge    Assessment:    Patient is a 53 M with hx of esophageal CA with mets to R pelvis and brain, CAD s/p RCA stent in 2015, p/w dyspnea 2/2 R pleural effusion s/p thoracentesis and bronchoscopy, admitted to MICU hypoxic respiratory failure and hypotension.      #Neuro  - Sedated on fentanyl and propofol  - Wean as tolerated      #Resp  - Hypoxic respiratory failure 2/2 R pleural effusion vs. infection  - s/p thoracentesis 8/13 and bronchoscopy 8/14  - Intubated; vent setting RR 24, vt 400, PEEP 10, FiO2 100%; wean as tolerated  - f/u CXR  - Chest PT and suction prn  - f/u BAL fluid cytology, lipase, triglyceride,   - Terbutaline x 1      #CV  - s/p RCA stent in 2015  - On li, titrate to MAP 65      #GI  - OGT in place, wall suction with bilious gastric content  - concern for gastric/pleural fistula  will need CT Chest abdomen with oral contrast.   - NPO x meds for now  - Protonix 40 mg IV daily      #ID  - ?Septic shock  - C/w vanc (8/14 - )and zosyn (8/14 - )  - f/u Bcx, BAL cx, AFB      #Renal  - No active issue currently  - Monitor I's and O's      #Onc  - Metastatic esophageal CA with mets to pelvis and brain  - Holding Keytruda inpatient (last cycle in July), will f/u outpatient with Dr. Mackey  - Onc following      #Endo  - F/u A1C  - No active issue currently      #DVT PPx  - Lovenox 40 mg daily      #GOC/Ethics  - Full code

## 2019-08-14 NOTE — CHART NOTE - NSCHARTNOTEFT_GEN_A_CORE
RUE phenylephrine extravasation noted, confirmed with US. Phenylephrine stopped immediately and moved to central line for infusion. ~5cc of extravasated pressor aspirated from line with 10cc syringe. 5 separate 2cc Subcutaneous injections of terbutaline were placed circumferentially around the area of extravasation. No nitropaste was used. The IV line was subsequently removed. Intact distal pulses are present. No skin discoloration present.

## 2019-08-14 NOTE — PROGRESS NOTE BEHAVIORAL HEALTH - NSBHCHARTREVIEWLAB_PSY_A_CORE FT
12.9   5.9   )-----------( 302      ( 14 Aug 2019 06:43 )             41.5     08-14    133<L>  |  98  |  10  ----------------------------<  94  4.1   |  26  |  0.79    Ca    8.8      14 Aug 2019 06:40  Phos  3.7     08-13  Mg     2.1     08-13    TPro  5.9<L>  /  Alb  2.7<L>  /  TBili  0.3  /  DBili  x   /  AST  12  /  ALT  8<L>  /  AlkPhos  64  08-14

## 2019-08-14 NOTE — PROGRESS NOTE ADULT - PROBLEM SELECTOR PLAN 9
- DVT ppx: HSQ, hold am dose for bronchoscopy  - Diet: regular diet   - Dispo: PT consult - DVT ppx: HSQ  - Diet: NPO for bronch  - Dispo: PT consult

## 2019-08-14 NOTE — PROGRESS NOTE BEHAVIORAL HEALTH - NSBHFUPINTERVALHXFT_PSY_A_CORE
Patient seen and evaluated, awake and alert oriented, states sleeping poorly last night, states unsure if it was due to ineffectiveness of medication or if from the stressor of his medical issues, reports feeling upset that his wife is upset over his medical issues.  Denies S/H/I/I/P, A/V/H, or thoughts of paranoia.  No delusions elicited.  Reports no changes in appetite. No agitation noted.

## 2019-08-14 NOTE — PROGRESS NOTE BEHAVIORAL HEALTH - NSBHCONSULTFOLLOWAFTERCARE_PSY_A_CORE FT
per primary team  can f/u outpt [facility] fortune in clinic [phone] or Dr. Cheek at [facility] on [facility] [phone]

## 2019-08-14 NOTE — PROGRESS NOTE ADULT - ATTENDING COMMENTS
pt seen and examined  d/w the team    awaiting bronchoscopy  f/u the pleural fluid cytology and amylase and triglyceride levels  need to r/o chylothorax pt seen and examined  d/w the team    f/u the pleural fluid cytology and amylase and triglyceride levels  need to r/o chylothorax    bronchoscopy performed--     Evidence of greenish [?biliary colored  fluid ] in the right lung- was suctioned out.    Pt in the endoscopy recovery had evidence of desaturation ---was intubated and then transferred to MICU  ---d/w  with wife in great details

## 2019-08-14 NOTE — PROGRESS NOTE ADULT - PROBLEM SELECTOR PLAN 3
- patient with mets to the brain s/p craniotomy  - MRI brain outpatient showed worsening of brain lesions  - will consult radiation oncology today  - continue to follow up outpatient - patient with mets to the brain s/p craniotomy  - MRI brain outpatient showed worsening of brain lesions  - per rad onc patient should f/u gamma knife surgery outpatient after d/c

## 2019-08-14 NOTE — CHART NOTE - NSCHARTNOTEFT_GEN_A_CORE
MRI reviewed has 1-2 new areas (small ) of disease... not a candidate for whole brain radiation, should be considered for Gamma Knife SRS; I recommend reconsultation with my colleague dr Bryan Murphy in Rad. Medicine Dep't. at [facility] () after discharge...  thanks, Cuba Diaz MD  cell

## 2019-08-14 NOTE — RAPID RESPONSE TEAM SUMMARY - NSSITUATIONBACKGROUNDRRT_GEN_ALL_CORE
54 y/o Male w/ a pmh significant for esophageal CA (dx'ed 2017) s/p chemoRT mets to R pelvis and brain (L superior cerebellar mass s/p craniotomy in 04/2019) currently on Keytruda, CAD s/p RCA stent in 2015 who initially presented from outpatient Oncology for worsening hypoxemia found to have large R sided pleural effusion w/ almost complete opacification of R hemithorax and lung collapse. Pt is s/p bronchoscopy today after which pt developed persistent hypoxemia and tachypnea. RRT called in Endoscopy suite. Upon arrival to bedside, pt afebrile, /86, -160 bpm, RR 30-40 (65-84% on NRB). Pt anxious, using accessory muscles w/ increased work of breathing. Pt transitioned from NRB to BiPAP however with persistent hypoxemia and tachypnea. Suctioning w/ mild improvement in hypoxemia. Decision made to intubate patient and transfer to MICU for further management and care.

## 2019-08-14 NOTE — PROGRESS NOTE ADULT - ASSESSMENT
53M hx esophageal CA (dx'ed 2017) s/p chemoRT mets to R pelvis and brain (L superior cerebellar mass s/p craniotomy in 04/2019) currently on Keytruda, CAD s/p RCA stent in 2015, presents from outpatient oncology office with pleural effusion. {t currently has no symptoms of dyspnea and feels overall better, now that he has more "oxygen." In the past, he has been tapped and did not find much relief despite removing > 3L of fluid. He is saturating >92%. At this time, no need to urgently perform thoracentesis though the rapid accumulation. Unclear if pleural effusion is in it of itself malignant (cyto negative) vs. an endobronchial lesion causing subsequent collapse and pleural effusion. s/p thoracentesis on 8/13 with removal of 3.2L (orange-tinged fluid). Pleura difficult to penetrate.       ----------------------------------------  Deisy Mcfarland MD PGY-6  Pulmonary/Critical Care Fellow  Pager # [phone] (NS), [phone] ([facility]) 53M hx esophageal CA (dx'ed 2017) s/p chemoRT mets to R pelvis and brain (L superior cerebellar mass s/p craniotomy in 04/2019) currently on Keytruda, CAD s/p RCA stent in 2015, presents from outpatient oncology office with pleural effusion. {t currently has no symptoms of dyspnea and feels overall better, now that he has more "oxygen." In the past, he has been tapped and did not find much relief despite removing > 3L of fluid. He is saturating >92%. At this time, no need to urgently perform thoracentesis though the rapid accumulation. Unclear if pleural effusion is in it of itself malignant (cyto negative) vs. an endobronchial lesion causing subsequent collapse and pleural effusion. s/p thoracentesis on 8/13 with removal of 3.2L (orange-tinged fluid). Pleura difficult to penetrate, likely has pleural thickening. CT Chest without obvious endobronchial lesion. s/p bronchoscopy on 8/14 without endobronchial lesions but BAL appeared green and bilious. Unclear if pt has a fistula that has formed between the lung parenchyma and stomach/esophagus. Pleural effusion may be from ascites vs. esophageal fistula though pH is 7.45 vs. malignancy itself.   - f/u BAL studies  - f/u final pleural studies including cyto, TG, cultures  - transfer to MICU for further care    ----------------------------------------  Deisy Mcfarland MD PGY-6  Pulmonary/Critical Care Fellow  Pager # [phone] (NS), [phone] ([facility])

## 2019-08-15 LAB
ANION GAP SERPL CALC-SCNC: 17 MMOL/L — SIGNIFICANT CHANGE UP (ref 5–17)
ANION GAP SERPL CALC-SCNC: 20 MMOL/L — HIGH (ref 5–17)
APTT BLD: 32.6 SEC — SIGNIFICANT CHANGE UP (ref 27.5–36.3)
BUN SERPL-MCNC: 15 MG/DL — SIGNIFICANT CHANGE UP (ref 7–23)
BUN SERPL-MCNC: 16 MG/DL — SIGNIFICANT CHANGE UP (ref 7–23)
CALCIUM SERPL-MCNC: 8.5 MG/DL — SIGNIFICANT CHANGE UP (ref 8.4–10.5)
CALCIUM SERPL-MCNC: 8.8 MG/DL — SIGNIFICANT CHANGE UP (ref 8.4–10.5)
CHLORIDE SERPL-SCNC: 98 MMOL/L — SIGNIFICANT CHANGE UP (ref 96–108)
CHLORIDE SERPL-SCNC: 99 MMOL/L — SIGNIFICANT CHANGE UP (ref 96–108)
CO2 SERPL-SCNC: 14 MMOL/L — LOW (ref 22–31)
CO2 SERPL-SCNC: 17 MMOL/L — LOW (ref 22–31)
CREAT SERPL-MCNC: 1.24 MG/DL — SIGNIFICANT CHANGE UP (ref 0.5–1.3)
CREAT SERPL-MCNC: 1.47 MG/DL — HIGH (ref 0.5–1.3)
GAS PNL BLDA: SIGNIFICANT CHANGE UP
GAS PNL BLDA: SIGNIFICANT CHANGE UP
GLUCOSE SERPL-MCNC: 175 MG/DL — HIGH (ref 70–99)
GLUCOSE SERPL-MCNC: 232 MG/DL — HIGH (ref 70–99)
HCT VFR BLD CALC: 43.2 % — SIGNIFICANT CHANGE UP (ref 39–50)
HCT VFR BLD CALC: 48.6 % — SIGNIFICANT CHANGE UP (ref 39–50)
HGB BLD-MCNC: 12.9 G/DL — LOW (ref 13–17)
HGB BLD-MCNC: 13.9 G/DL — SIGNIFICANT CHANGE UP (ref 13–17)
MAGNESIUM SERPL-MCNC: 1.7 MG/DL — SIGNIFICANT CHANGE UP (ref 1.6–2.6)
MAGNESIUM SERPL-MCNC: 1.9 MG/DL — SIGNIFICANT CHANGE UP (ref 1.6–2.6)
MCHC RBC-ENTMCNC: 24.1 PG — LOW (ref 27–34)
MCHC RBC-ENTMCNC: 25.3 PG — LOW (ref 27–34)
MCHC RBC-ENTMCNC: 28.7 GM/DL — LOW (ref 32–36)
MCHC RBC-ENTMCNC: 29.9 GM/DL — LOW (ref 32–36)
MCV RBC AUTO: 84.2 FL — SIGNIFICANT CHANGE UP (ref 80–100)
MCV RBC AUTO: 84.7 FL — SIGNIFICANT CHANGE UP (ref 80–100)
NON-GYNECOLOGICAL CYTOLOGY STUDY: SIGNIFICANT CHANGE UP
OSMOLALITY SERPL: 286 MOSMOL/KG — SIGNIFICANT CHANGE UP (ref 275–300)
OSMOLALITY UR: 750 MOS/KG — SIGNIFICANT CHANGE UP (ref 300–900)
PHOSPHATE SERPL-MCNC: 5.3 MG/DL — HIGH (ref 2.5–4.5)
PHOSPHATE SERPL-MCNC: 5.4 MG/DL — HIGH (ref 2.5–4.5)
PLATELET # BLD AUTO: 406 K/UL — HIGH (ref 150–400)
PLATELET # BLD AUTO: 477 K/UL — HIGH (ref 150–400)
POTASSIUM SERPL-MCNC: 4.2 MMOL/L — SIGNIFICANT CHANGE UP (ref 3.5–5.3)
POTASSIUM SERPL-MCNC: 4.2 MMOL/L — SIGNIFICANT CHANGE UP (ref 3.5–5.3)
POTASSIUM SERPL-SCNC: 4.2 MMOL/L — SIGNIFICANT CHANGE UP (ref 3.5–5.3)
POTASSIUM SERPL-SCNC: 4.2 MMOL/L — SIGNIFICANT CHANGE UP (ref 3.5–5.3)
RBC # BLD: 5.1 M/UL — SIGNIFICANT CHANGE UP (ref 4.2–5.8)
RBC # BLD: 5.77 M/UL — SIGNIFICANT CHANGE UP (ref 4.2–5.8)
RBC # FLD: 14.5 % — SIGNIFICANT CHANGE UP (ref 10.3–14.5)
RBC # FLD: 14.8 % — HIGH (ref 10.3–14.5)
SODIUM SERPL-SCNC: 132 MMOL/L — LOW (ref 135–145)
SODIUM SERPL-SCNC: 133 MMOL/L — LOW (ref 135–145)
TRIGL FLD-MCNC: 36 MG/DL — SIGNIFICANT CHANGE UP
WBC # BLD: 18.1 K/UL — HIGH (ref 3.8–10.5)
WBC # BLD: 24 K/UL — HIGH (ref 3.8–10.5)
WBC # FLD AUTO: 18.1 K/UL — HIGH (ref 3.8–10.5)
WBC # FLD AUTO: 24 K/UL — HIGH (ref 3.8–10.5)

## 2019-08-15 PROCEDURE — 99233 SBSQ HOSP IP/OBS HIGH 50: CPT | Mod: GC

## 2019-08-15 PROCEDURE — 99291 CRITICAL CARE FIRST HOUR: CPT

## 2019-08-15 PROCEDURE — 71260 CT THORAX DX C+: CPT | Mod: 26

## 2019-08-15 PROCEDURE — 71045 X-RAY EXAM CHEST 1 VIEW: CPT | Mod: 26

## 2019-08-15 PROCEDURE — 74177 CT ABD & PELVIS W/CONTRAST: CPT | Mod: 26

## 2019-08-15 PROCEDURE — 70450 CT HEAD/BRAIN W/O DYE: CPT | Mod: 26

## 2019-08-15 RX ADMIN — VASOPRESSIN 2.4 UNIT(S)/MIN: 20 INJECTION INTRAVENOUS at 17:07

## 2019-08-15 RX ADMIN — Medication 81 MILLIGRAM(S): at 12:46

## 2019-08-15 RX ADMIN — PIPERACILLIN AND TAZOBACTAM 25 GRAM(S): 4; .5 INJECTION, POWDER, LYOPHILIZED, FOR SOLUTION INTRAVENOUS at 21:25

## 2019-08-15 RX ADMIN — PIPERACILLIN AND TAZOBACTAM 25 GRAM(S): 4; .5 INJECTION, POWDER, LYOPHILIZED, FOR SOLUTION INTRAVENOUS at 05:05

## 2019-08-15 RX ADMIN — PIPERACILLIN AND TAZOBACTAM 25 GRAM(S): 4; .5 INJECTION, POWDER, LYOPHILIZED, FOR SOLUTION INTRAVENOUS at 14:00

## 2019-08-15 RX ADMIN — PHENYLEPHRINE HYDROCHLORIDE 5 MICROGRAM(S)/KG/MIN: 10 INJECTION INTRAVENOUS at 21:25

## 2019-08-15 RX ADMIN — CHLORHEXIDINE GLUCONATE 1 APPLICATION(S): 213 SOLUTION TOPICAL at 05:06

## 2019-08-15 RX ADMIN — FENTANYL CITRATE 2.02 MICROGRAM(S)/KG/HR: 50 INJECTION INTRAVENOUS at 07:39

## 2019-08-15 RX ADMIN — Medication 250 MILLIGRAM(S): at 05:05

## 2019-08-15 RX ADMIN — Medication 1 DROP(S): at 14:00

## 2019-08-15 RX ADMIN — Medication 100 MILLIGRAM(S): at 05:05

## 2019-08-15 RX ADMIN — CHLORHEXIDINE GLUCONATE 15 MILLILITER(S): 213 SOLUTION TOPICAL at 17:07

## 2019-08-15 RX ADMIN — Medication 3 MILLILITER(S): at 17:18

## 2019-08-15 RX ADMIN — PANTOPRAZOLE SODIUM 40 MILLIGRAM(S): 20 TABLET, DELAYED RELEASE ORAL at 12:46

## 2019-08-15 RX ADMIN — Medication 250 MILLIGRAM(S): at 18:30

## 2019-08-15 RX ADMIN — Medication 100 MILLIGRAM(S): at 17:02

## 2019-08-15 RX ADMIN — Medication 3 MILLILITER(S): at 12:10

## 2019-08-15 RX ADMIN — Medication 50 MILLIEQUIVALENT(S): at 00:17

## 2019-08-15 RX ADMIN — SODIUM CHLORIDE 3000 MILLILITER(S): 9 INJECTION, SOLUTION INTRAVENOUS at 00:17

## 2019-08-15 RX ADMIN — PHENYLEPHRINE HYDROCHLORIDE 5 MICROGRAM(S)/KG/MIN: 10 INJECTION INTRAVENOUS at 17:04

## 2019-08-15 RX ADMIN — Medication 1 APPLICATION(S): at 14:00

## 2019-08-15 RX ADMIN — CHLORHEXIDINE GLUCONATE 15 MILLILITER(S): 213 SOLUTION TOPICAL at 05:05

## 2019-08-15 RX ADMIN — Medication 3 MILLILITER(S): at 00:36

## 2019-08-15 RX ADMIN — PHENYLEPHRINE HYDROCHLORIDE 5 MICROGRAM(S)/KG/MIN: 10 INJECTION INTRAVENOUS at 12:47

## 2019-08-15 RX ADMIN — Medication 100 MILLIGRAM(S): at 21:25

## 2019-08-15 RX ADMIN — PROPOFOL 19.34 MICROGRAM(S)/KG/MIN: 10 INJECTION, EMULSION INTRAVENOUS at 17:05

## 2019-08-15 NOTE — DIETITIAN INITIAL EVALUATION ADULT. - OTHER INFO
Pt seen for: MICU Length Of Stay    Adm dx:  respiratory failure, h/o esophageal    GI issues:   Last BM:     Food allergies/Intolerances:    Vit/supplement PTA:    Diet PTA:     Subjective/Objective information:    Education: Pt seen for: MICU Length Of Stay    Adm dx:  respiratory failure,  esophageal Ca mets to brain, pelvis. S/P esophagogastrectomy June 2018   GI issues: OGT to LWS. no BM since adm    Food allergies/Intolerances: NKFA   Vit/supplement PTA: none noted    Diet PTA: unable to assess at this time      Subjective/Objective information: family at bedside, emotional, able to answer a few questions, report poor po intake PTA. Wt 220 lb ~ 1 year ago    Education: Not indicated at this time

## 2019-08-15 NOTE — CONSULT NOTE ADULT - SUBJECTIVE AND OBJECTIVE BOX
CHIEF COMPLAINT: Patient is a 53y old  Male who presents with a chief complaint of pleural effusion (15 Aug 2019 06:20)      HPI: Pt known to our practive. Currently unable to provide any info. taken from chart review.     53 M with hx of esophageal CA diagnosed in 2017 s/p chemo and radiation and with mets to R pelvis and brain mets to the L superior cerebellar mass (s/p craniotomy in 04/2019) currently on Keytruda, CAD s/p RCA stent in 2015, p/w worsening sob for several days. Patient went to oncologist Dr. Mackey today who was concerned for pleural effusion and sent patient to ED. Patient had a R sided pleural effusion with about 3 L drained in July. He recently had another 3 L drained in [place] while he was on vacation. Patient currently only has dyspnea on minimal exertion. No chest pain, palpitations, fevers, or chills. No nausea, vomiting, or abdominal pain. Patient had decreased appetite and weight loss for past few months. Patient denies any headache or dizziness, but has worsening fatigue.     Patient was admitted to medicine for dyspnea in the setting of significant R pleural effusion, s/p thoracentesis 8/13 with transudative fluid. Overnight CT showed no obvious endobrachial lesion, large loculated R pleural effusion, small L pleural effusion, complete collapse of right middle lobe and near complete collapse of right lower lobe. Keytruda was held inpatient. Bronchoscopy was performed on 8/14. However, patient experienced desaturation to 65% on NRB in the recovery room, and was intubated and transferred to MICU for further management of hypoxic respiratory failure.     He is now maintain of multiple pressors. Last night he was noted to have a tension PTX and chest tube was placed. The patient is currently still intubated and requiring mechanical ventilatory support.     EKG: < from: 12 Lead ECG (08.12.19 @ 15:44) >  NORMAL SINUS RHYTHM  ANTERIOR INFARCT , AGE UNDETERMINED  ABNORMAL ECG    < end of copied text >      REVIEW OF SYSTEMS:   All other review of systems are negative unless indicated above    PAST MEDICAL & SURGICAL HISTORY:  H/O nausea  History of dysphagia  Hilar lymphadenopathy  Chronic anticoagulation  CAD (coronary artery disease)  Secondary malignant neoplasm of brain  Metastasis to bone  Esophageal cancer  MI (myocardial infarction): 2015 with 1 coronary stent mid RCA  H/O craniotomy  History of esophageal surgery: 6/2018  H/O hernia repair: 1966  Status post tonsillectomy and adenoidectomy  Stented coronary artery: x1 2015      SOCIAL HISTORY:  No tobacco, ethanol, or drug abuse.    FAMILY HISTORY:  Family history of cervical cancer  Family history of throat cancer    No family history of acute MI or sudden cardiac death.    MEDICATIONS  (STANDING):  ALBUTerol/ipratropium for Nebulization 3 milliLiter(s) Nebulizer every 6 hours  aspirin  chewable 81 milliGRAM(s) Oral daily  chlorhexidine 0.12% Liquid 15 milliLiter(s) Oral Mucosa every 12 hours  chlorhexidine 4% Liquid 1 Application(s) Topical <User Schedule>  chlorhexidine 4% Liquid 1 Application(s) Topical <User Schedule>  docusate sodium Liquid 100 milliGRAM(s) Oral three times a day  fentaNYL   Infusion. 0.5 MICROgram(s)/kG/Hr (2.015 mL/Hr) IV Continuous <Continuous>  norepinephrine Infusion 0.5 MICROgram(s)/kG/Min (75.563 mL/Hr) IV Continuous <Continuous>  pantoprazole  Injectable 40 milliGRAM(s) IV Push daily  phenylephrine    Infusion 0.165 MICROgram(s)/kG/Min (5 mL/Hr) IV Continuous <Continuous>  piperacillin/tazobactam IVPB.. 3.375 Gram(s) IV Intermittent every 8 hours  propofol Infusion 40 MICROgram(s)/kG/Min (19.344 mL/Hr) IV Continuous <Continuous>  vancomycin  IVPB 1000 milliGRAM(s) IV Intermittent every 12 hours  vasopressin Infusion 0.04 Unit(s)/Min (2.4 mL/Hr) IV Continuous <Continuous>    MEDICATIONS  (PRN):  sodium chloride 0.9% lock flush 10 milliLiter(s) IV Push every 1 hour PRN Pre/post blood products, medications, blood draw, and to maintain line patency      Allergies    No Known Allergies    Intolerances        Home meds:  Home Medications:  aspirin 81 mg oral tablet: 1 tab(s) orally once a day (12 Aug 2019 21:21)  Keytruda:  (12 Aug 2019 21:21)  risperiDONE 1 mg oral tablet: 3 tab(s) orally once a day (at bedtime), As Needed (12 Aug 2019 21:21)        VITAL SIGNS:   Vital Signs Last 24 Hrs  T(C): 37.1 (15 Aug 2019 04:00), Max: 38.2 (14 Aug 2019 17:00)  T(F): 98.8 (15 Aug 2019 04:00), Max: 100.8 (14 Aug 2019 17:00)  HR: 97 (15 Aug 2019 08:37) (97 - 161)  BP: 98/72 (15 Aug 2019 07:00) (61/46 - 132/96)  BP(mean): 80 (15 Aug 2019 07:00) (45 - 110)  RR: 30 (15 Aug 2019 07:00) (13 - 67)  SpO2: 100% (15 Aug 2019 08:37) (83% - 100%)    I&O's Summary    14 Aug 2019 07:01  -  15 Aug 2019 07:00  --------------------------------------------------------  IN: 5495.4 mL / OUT: 2035 mL / NET: 3460.4 mL        On Exam:  TELE: - ST  Constitutional: intubated, sedated  HEENT: ETT in place  Pulmonary: Decreased breath sounds b/l. R>L  Cardiovascular: tachy , S1 and S2, distant   Gastrointestinal: Bowel Sounds present, soft, nontender.   Lymph: No peripheral edema. No lymphadenopathy.  Skin: No visible rashes or ulcers. + chest tube  Psych:  unable to assess    LABS: All Labs Reviewed:                        12.9   24.0  )-----------( 406      ( 15 Aug 2019 06:22 )             43.2                         13.9   18.1  )-----------( 477      ( 15 Aug 2019 00:16 )             48.6                         14.5   9.4   )-----------( 476      ( 14 Aug 2019 16:54 )             49.8     15 Aug 2019 06:22    132    |  98     |  16     ----------------------------<  232    4.2     |  17     |  1.24   15 Aug 2019 00:16    133    |  99     |  15     ----------------------------<  175    4.2     |  14     |  1.47   14 Aug 2019 16:54    135    |  101    |  12     ----------------------------<  153    4.0     |  19     |  0.81     Ca    8.5        15 Aug 2019 06:22  Ca    8.8        15 Aug 2019 00:16  Ca    8.5        14 Aug 2019 16:54  Phos  5.4       15 Aug 2019 06:22  Phos  5.3       15 Aug 2019 00:16  Phos  3.7       13 Aug 2019 06:22  Mg     1.7       15 Aug 2019 06:22  Mg     1.9       15 Aug 2019 00:16  Mg     2.1       13 Aug 2019 06:22    TPro  6.4    /  Alb  2.9    /  TBili  0.5    /  DBili  x      /  AST  13     /  ALT  10     /  AlkPhos  70     14 Aug 2019 16:54  TPro  5.9    /  Alb  2.7    /  TBili  0.3    /  DBili  x      /  AST  12     /  ALT  8      /  AlkPhos  64     14 Aug 2019 06:40  TPro  7.5    /  Alb  3.7    /  TBili  0.4    /  DBili  x      /  AST  11     /  ALT  10     /  AlkPhos  70     12 Aug 2019 15:04    PT/INR - ( 14 Aug 2019 16:54 )   PT: 14.7 sec;   INR: 1.27 ratio         PTT - ( 15 Aug 2019 06:22 )  PTT:32.6 sec      Blood Culture: Organism --  Gram Stain Blood -- Gram Stain   Moderate polymorphonuclear leukocytes per low power field  Few Squamous epithelial cells per low power field  Few Yeast like cells per oil power field  Few Gram Negative Rods per oil power field  Specimen Source Bronch Wash  Culture-Blood --    Organism --  Gram Stain Blood -- Gram Stain   No polymorphonuclear cells seen  No organisms seen  by cytocentrifuge  Specimen Source .Body Fluid  Culture-Blood --            RADIOLOGY:    < from: Transthoracic Echocardiogram (07.11.18 @ 02:32) >    Patient name: SYLVIA VICTOR  YOB: 1965   Age: 52 (M)   MR#: [id number]  Study Date: 7/11/2018  [facility]  Study quality: Limited  Referring Physician: Abdifatah Brown MD  Blood Pressure: 107/70 mmHg  Height: 188 cm  Weight: 85 kg  BSA: 2.1 m2  Heart Rate: 105 mmHg  ------------------------------------------------------------------------  PROCEDURE: Transthoracic echocardiogram with 2-D, M-Mode  and complete spectral and color flow Doppler.  INDICATION: Other forms of dyspnea (R06.09)  ------------------------------------------------------------------------  OBSERVATIONS:  Mitral Valve: Normal mitral valve.  Aortic Root: Normal aortic root.  Aortic Valve: Normal trileaflet aortic valve.  Left Atrium: Normal left atrium.  Left Ventricle: Left ventricular ejection fraction, by  visual estimation, is 40-45%.  Normal left ventricular  internal dimensions and wall thicknesses.  Right Heart: Normal right atrium. Right ventricle midly  dilated with reduced systolic function.  Normal tricuspid  valve. Normal pulmonic valve.  Pericardium/PleuraLarge circumferential pericardial  effusion. Largest pocket along RV free wall measuring  1.9cm.  RA and RV inversion present suggestive of pericardial  tamponade physiology.  ------------------------------------------------------------------------  CONCLUSIONS:  Limited study done for evaluation of effusion and r/o  tamponade. Patient is tachycardic throughout the study.  1. Normal left atrium.  2. Left ventricular ejection fraction, by visual  estimation, is 40-45%.  3. Normal right atrium.  4. Right ventricle midly dilated with reduced systolic  function.  5. Large circumferential pericardial effusion. Largest  pocket along RV free wall measuring 1.9cm.  RA and RV inversion present suggestive of tamponade  physiology. Large left pleural effusion.  Findings discussed with cardiology fellow at 4:35am.  ------------------------------------------------------------------------  Confirmed on  7/11/2018 - 04:54:31 by Connie Clements M.D.  ------------------------------------------------------------------------    < end of copied text >     < from: CT Head No Cont (08.15.19 @ 05:29) >    EXAM:  CT BRAIN                            PROCEDURE DATE:  08/15/2019            INTERPRETATION:  INDICATIONS:  unequal pupils  . History of brain tumor   surgery.    TECHNIQUE:  Serial axial images were obtained from the skull base to the   vertex without intravenous contrast. Coronal and sagittal reformatted   images were obtained.    COMPARISON EXAMINATION: 6/14/2019 CT scan and 8/11/2019 MR scan    FINDINGS:    VENTRICLES AND SULCI:  Unchanged in appearance.    INTRA-AXIAL:  An area of lowdensity is seen underlying the cranioplasty   site within the left cerebellar hemisphere likely representing   postoperative changes.  No mass effect or hemorrhage is seen.    EXTRA-AXIAL:  No mass or collection is seen.    VISUALIZED SINUSES:  Clear.    VISUALIZED MASTOIDS:  Clear.    CALVARIUM:  Suboccipital craniectomy and cranioplasty. The   pseudomeningocele previously seen in the region of the cranioplasty on   the prior CT has almost completely resolved    MISCELLANEOUS:  An ET tube and OGtube are in place.      IMPRESSION:    Postoperative changes. No mass effect or hemorrhage.                    THUY LAMA M.D., ATTENDING RADIOLOGIST  This document has been electronically signed. Aug 15 2019  8:18AM                < end of copied text >    < from: CT Chest w/ IV Cont (08.15.19 @ 02:07) >    ******PRELIMINARY REPORT******    ******PRELIMINARY REPORT******          EXAM:  CT CHEST IC                            PROCEDURE DATE:  08/15/2019      ******PRELIMINARY REPORT******    ******PRELIMINARY REPORT******              INTERPRETATION:  New moderate RT ptx with mediastinal shift concerning   for tension. No gross extrasation of oral contrast into the pleural space   from the gastric pull through.    New free intraperitoneal air of uncertain etiology. No def signs of bowel   ischemia.No obstrxn. Air possible may be dissecting down from the ptx.     MICU attending is aware of findings at 3:25 am. Chest tube to be placed   and repeat chest xray.              ******PRELIMINARY REPORT******    ******PRELIMINARY REPORT******              MELODY GALARZA M.D., RADIOLOGIST                  < end of copied text >

## 2019-08-15 NOTE — DIETITIAN INITIAL EVALUATION ADULT. - PROBLEM SELECTOR PLAN 4
- Patient with mets to the brain s/p craniotomy  - MRI brain done outpatient, follow up with onc regarding MRI read   - Continue to follow up outpatient

## 2019-08-15 NOTE — DIETITIAN INITIAL EVALUATION ADULT. - PROBLEM SELECTOR PLAN 7
- Patient with hx of anxiety, currently takes risperidone 3mg at bedtime 1-2 times a week for anxiety or sleep  - can continue as needed

## 2019-08-15 NOTE — DIETITIAN INITIAL EVALUATION ADULT. - PROBLEM SELECTOR PLAN 2
- Patient with Na of 130 on admission  - Most likely in the setting of decreased po intake vs SIADH  - Repeat Na in am, can check Serum osm, urine osm, U Na

## 2019-08-15 NOTE — PROCEDURE NOTE - NSSITEPREP_SKIN_A_CORE
chlorhexidine
chlorhexidine
chlorhexidine/Adherence to aseptic technique: hand hygiene prior to donning barriers (gown, gloves), don cap and mask, sterile drape over patient

## 2019-08-15 NOTE — DIETITIAN INITIAL EVALUATION ADULT. - PROBLEM SELECTOR PLAN 5
- generalized fatigue, weakness, most likely 2/2 to malignancy, decreased appetite, poor po intake   - PT consult  - fall precautions  - check orthostatics

## 2019-08-15 NOTE — DIETITIAN INITIAL EVALUATION ADULT. - ENTERAL
If EN initiated, recommend Vital 1.2 goal 80cc/hr x 18 hrs provides 1720 kcals, 108 gm protein, 1168cc free water  meets 21 Kcal/Kg, 1.3Gm/kg dosing wt 80.6 kg

## 2019-08-15 NOTE — CONSULT NOTE ADULT - ASSESSMENT
53 M with hx of esophageal CA diagnosed in 2017 s/p chemo and radiation and with mets to R pelvis and brain mets to the L superior cerebellar mass (s/p craniotomy in 04/2019) currently on Keytruda, CAD s/p RCA stent in 2015, p/w pleural effusion, now with hypoxic resp failure post bronch with resulting tension PTX now s/p chest tube    - No signs of significant ischemia or volume overload.   - EKG without ischemic changes  - Cont asa  - Tele demonstrates Sinus tachy which is likely all reactive to his underlying condition.     - Cont pressor support to maintain MAP at least >60. Titrate off as blood pressure tolerates.     - PTx rx per MICU  - Moniotr Chest tube output.     - Monitor and replete electrolytes. Keep K>4.0 and Mg>2.0.   - Further cardiac workup will depend on clinical course.   All other workup per MICU team. Will followup. 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 No signs of ADHF at this time.   - EKG without ischemic changes  - Cont asa  - Tele demonstrates Sinus tachy which is likely all reactive to his underlying condition.     - Cont pressor support to maintain MAP at least >60. Titrate off as blood pressure tolerates.     - PTx rx per MICU  - Moniotr Chest tube output.     - Monitor and replete electrolytes. Keep K>4.0 and Mg>2.0.   - Further cardiac workup will depend on clinical course.   All other workup per MICU team. Will followup.

## 2019-08-15 NOTE — DIETITIAN INITIAL EVALUATION ADULT. - PROBLEM SELECTOR PLAN 1
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 Low concern for PE   - Pulmonology consulted- will consider thoracentesis in am, possible pleurX catheter given recurrent pleural effusions.   - Consider repeat CT chest to r/o any endobronchial disease

## 2019-08-15 NOTE — PROGRESS NOTE ADULT - SUBJECTIVE AND OBJECTIVE BOX
Dr. Tuyet Alarcon  Internal Medicine, PGY-1  Pager #: [phone]      Patient is a 53 M with hx of esophageal CA with mets to R pelvis and brain, CAD s/p RCA stent in 2015, p/w dyspnea 2/2 R pleural effusion s/p thoracentesis and bronchoscopy, admitted to MICU hypoxic respiratory failure and hypotension.    INTERVAL HPI/OVERNIGHT EVENTS:    ON CT chest concerning for tension PTX, s/p chest tube.     SUBJECTIVE: Patient seen and examined at bedside.     Unable to obtain ROS due to mental status.     OBJECTIVE:    VITAL SIGNS:  ICU Vital Signs Last 24 Hrs  T(C): 37.1 (15 Aug 2019 04:00), Max: 38.2 (14 Aug 2019 17:00)  T(F): 98.8 (15 Aug 2019 04:00), Max: 100.8 (14 Aug 2019 17:00)  HR: 105 (15 Aug 2019 05:40) (101 - 161)  BP: 80/59 (15 Aug 2019 05:40) (61/46 - 132/96)  BP(mean): 65 (15 Aug 2019 05:40) (45 - 110)  ABP: --  ABP(mean): --  RR: 36 (15 Aug 2019 05:40) (13 - 67)  SpO2: 100% (15 Aug 2019 05:40) (83% - 100%)    Mode: AC/ CMV (Assist Control/ Continuous Mandatory Ventilation), RR (machine): 30, TV (machine): 400, FiO2: 100, PEEP: 10, ITime: 1, MAP: 17, PIP: 28    08-13 @ 07:01 - 08-14 @ 07:00  --------------------------------------------------------  IN: 60 mL / OUT: 650 mL / NET: -590 mL    08-14 @ 07:01 - 08-15 @ 06:20  --------------------------------------------------------  IN: 4884.5 mL / OUT: 1485 mL / NET: 3399.5 mL      CAPILLARY BLOOD GLUCOSE      POCT Blood Glucose.: 96 mg/dL (14 Aug 2019 15:11)      PHYSICAL EXAM:    GENERAL: Intubated  NEURO: Sedated, unresponsive  HEENT: B/l pupil constrictive and reactive to light; clear conjunctiva; neck supple; OGT with wall suction of bilious fluid  RESP: Intubated; decreased breath sound on right  CVS: S1/S2 present, tachycardic, regular rhythm; no murmurs, gallops or rubs appreciated  ABD: Soft, non-tender, non-distended, no masses appreciated; bowel sound normal at all 4 quadrants  EXT: 2+ pedal pulses bilaterally, no BLE edema  SKIN: Warm, dry, and appropirate color for skin tone; No new rashes    MEDICATIONS:  MEDICATIONS  (STANDING):  ALBUTerol/ipratropium for Nebulization 3 milliLiter(s) Nebulizer every 6 hours  aspirin  chewable 81 milliGRAM(s) Oral daily  chlorhexidine 0.12% Liquid 15 milliLiter(s) Oral Mucosa every 12 hours  chlorhexidine 4% Liquid 1 Application(s) Topical <User Schedule>  chlorhexidine 4% Liquid 1 Application(s) Topical <User Schedule>  docusate sodium Liquid 100 milliGRAM(s) Oral three times a day  fentaNYL   Infusion. 0.5 MICROgram(s)/kG/Hr (2.015 mL/Hr) IV Continuous <Continuous>  norepinephrine Infusion 0.5 MICROgram(s)/kG/Min (75.563 mL/Hr) IV Continuous <Continuous>  pantoprazole  Injectable 40 milliGRAM(s) IV Push daily  phenylephrine    Infusion 0.165 MICROgram(s)/kG/Min (5 mL/Hr) IV Continuous <Continuous>  piperacillin/tazobactam IVPB.. 3.375 Gram(s) IV Intermittent every 8 hours  propofol Infusion 40 MICROgram(s)/kG/Min (19.344 mL/Hr) IV Continuous <Continuous>  vancomycin  IVPB 1000 milliGRAM(s) IV Intermittent every 12 hours  vasopressin Infusion 0.04 Unit(s)/Min (2.4 mL/Hr) IV Continuous <Continuous>    MEDICATIONS  (PRN):  sodium chloride 0.9% lock flush 10 milliLiter(s) IV Push every 1 hour PRN Pre/post blood products, medications, blood draw, and to maintain line patency      ALLERGIES:  Allergies    No Known Allergies    Intolerances        LABS:                        13.9   18.1  )-----------( 477      ( 15 Aug 2019 00:16 )             48.6     08-15    133<L>  |  99  |  15  ----------------------------<  175<H>  4.2   |  14<L>  |  1.47<H>    Ca    8.8      15 Aug 2019 00:16  Phos  5.3     08-15  Mg     1.9     08-15    TPro  6.4  /  Alb  2.9<L>  /  TBili  0.5  /  DBili  x   /  AST  13  /  ALT  10  /  AlkPhos  70  08-14    PT/INR - ( 14 Aug 2019 16:54 )   PT: 14.7 sec;   INR: 1.27 ratio         PTT - ( 14 Aug 2019 16:54 )  PTT:29.0 sec      RADIOLOGY & ADDITIONAL TESTS: Reviewed. Dr. Tuyet Alarcon  Internal Medicine, PGY-1  Pager #: [phone]      Patient is a 53 M with hx of esophageal CA with mets to R pelvis and brain, CAD s/p RCA stent in 2015, p/w dyspnea 2/2 R pleural effusion s/p thoracentesis and bronchoscopy, admitted to MICU hypoxic respiratory failure and hypotension.    INTERVAL HPI/OVERNIGHT EVENTS:    ON CT chest concerning for tension PTX, s/p chest tube. Heparin gtt started for L subclavian vein thrombosis, and d/c'ed due to new finding of L dilated pupil concerning for ICH. CTH prelim read no acute changes.     SUBJECTIVE: Patient seen and examined at bedside.     Unable to obtain ROS due to mental status.     OBJECTIVE:    VITAL SIGNS:  ICU Vital Signs Last 24 Hrs  T(C): 37.1 (15 Aug 2019 04:00), Max: 38.2 (14 Aug 2019 17:00)  T(F): 98.8 (15 Aug 2019 04:00), Max: 100.8 (14 Aug 2019 17:00)  HR: 105 (15 Aug 2019 05:40) (101 - 161)  BP: 80/59 (15 Aug 2019 05:40) (61/46 - 132/96)  BP(mean): 65 (15 Aug 2019 05:40) (45 - 110)  ABP: --  ABP(mean): --  RR: 36 (15 Aug 2019 05:40) (13 - 67)  SpO2: 100% (15 Aug 2019 05:40) (83% - 100%)    Mode: AC/ CMV (Assist Control/ Continuous Mandatory Ventilation), RR (machine): 30, TV (machine): 400, FiO2: 100, PEEP: 10, ITime: 1, MAP: 17, PIP: 28    08-13 @ 07:01 - 08-14 @ 07:00  --------------------------------------------------------  IN: 60 mL / OUT: 650 mL / NET: -590 mL    08-14 @ 07:01  -  08-15 @ 06:20  --------------------------------------------------------  IN: 4884.5 mL / OUT: 1485 mL / NET: 3399.5 mL      CAPILLARY BLOOD GLUCOSE      POCT Blood Glucose.: 96 mg/dL (14 Aug 2019 15:11)      PHYSICAL EXAM:    GENERAL: Intubated  NEURO: Sedated, unresponsive  HEENT: L pupil dilated, R pupil constricted and reactive to light; clear conjunctiva; neck supple; (+) OGT  RESP: Intubated; decreased breath sound on right  CVS: S1/S2 present, tachycardic, regular rhythm; no murmurs, gallops or rubs appreciated  ABD: Soft, non-tender, non-distended, no masses appreciated; diminished BS  EXT: 2+ pedal pulses bilaterally, no BLE edema  SKIN: Warm, dry, and appropirate color for skin tone; No new rashes    MEDICATIONS:  MEDICATIONS  (STANDING):  ALBUTerol/ipratropium for Nebulization 3 milliLiter(s) Nebulizer every 6 hours  aspirin  chewable 81 milliGRAM(s) Oral daily  chlorhexidine 0.12% Liquid 15 milliLiter(s) Oral Mucosa every 12 hours  chlorhexidine 4% Liquid 1 Application(s) Topical <User Schedule>  chlorhexidine 4% Liquid 1 Application(s) Topical <User Schedule>  docusate sodium Liquid 100 milliGRAM(s) Oral three times a day  fentaNYL   Infusion. 0.5 MICROgram(s)/kG/Hr (2.015 mL/Hr) IV Continuous <Continuous>  norepinephrine Infusion 0.5 MICROgram(s)/kG/Min (75.563 mL/Hr) IV Continuous <Continuous>  pantoprazole  Injectable 40 milliGRAM(s) IV Push daily  phenylephrine    Infusion 0.165 MICROgram(s)/kG/Min (5 mL/Hr) IV Continuous <Continuous>  piperacillin/tazobactam IVPB.. 3.375 Gram(s) IV Intermittent every 8 hours  propofol Infusion 40 MICROgram(s)/kG/Min (19.344 mL/Hr) IV Continuous <Continuous>  vancomycin  IVPB 1000 milliGRAM(s) IV Intermittent every 12 hours  vasopressin Infusion 0.04 Unit(s)/Min (2.4 mL/Hr) IV Continuous <Continuous>    MEDICATIONS  (PRN):  sodium chloride 0.9% lock flush 10 milliLiter(s) IV Push every 1 hour PRN Pre/post blood products, medications, blood draw, and to maintain line patency      ALLERGIES:  Allergies    No Known Allergies    Intolerances        LABS:                        13.9   18.1  )-----------( 477      ( 15 Aug 2019 00:16 )             48.6     08-15    133<L>  |  99  |  15  ----------------------------<  175<H>  4.2   |  14<L>  |  1.47<H>    Ca    8.8      15 Aug 2019 00:16  Phos  5.3     08-15  Mg     1.9     08-15    TPro  6.4  /  Alb  2.9<L>  /  TBili  0.5  /  DBili  x   /  AST  13  /  ALT  10  /  AlkPhos  70  08-14    PT/INR - ( 14 Aug 2019 16:54 )   PT: 14.7 sec;   INR: 1.27 ratio         PTT - ( 14 Aug 2019 16:54 )  PTT:29.0 sec      RADIOLOGY & ADDITIONAL TESTS: Reviewed. Dr. Tuyet Alarcon  Internal Medicine, PGY-1  Pager #: [phone]      Patient is a 53 M with hx of esophageal CA with mets to R pelvis and brain, CAD s/p RCA stent in 2015, p/w dyspnea 2/2 R pleural effusion s/p thoracentesis and bronchoscopy, admitted to MICU hypoxic respiratory failure and hypotension.    INTERVAL HPI/OVERNIGHT EVENTS:    ON CT chest concerning for tension PTX, s/p chest tube. Heparin gtt started for L subclavian vein thrombosis, and d/c'ed due to new finding of L dilated pupil concerning for ICH. CTH prelim read no acute changes.     SUBJECTIVE: Patient seen and examined at bedside.     Unable to obtain ROS due to mental status.     OBJECTIVE:    VITAL SIGNS:  ICU Vital Signs Last 24 Hrs  T(C): 37.1 (15 Aug 2019 04:00), Max: 38.2 (14 Aug 2019 17:00)  T(F): 98.8 (15 Aug 2019 04:00), Max: 100.8 (14 Aug 2019 17:00)  HR: 105 (15 Aug 2019 05:40) (101 - 161)  BP: 80/59 (15 Aug 2019 05:40) (61/46 - 132/96)  BP(mean): 65 (15 Aug 2019 05:40) (45 - 110)  ABP: --  ABP(mean): --  RR: 36 (15 Aug 2019 05:40) (13 - 67)  SpO2: 100% (15 Aug 2019 05:40) (83% - 100%)    Mode: AC/ CMV (Assist Control/ Continuous Mandatory Ventilation), RR (machine): 30, TV (machine): 400, FiO2: 100, PEEP: 10, ITime: 1, MAP: 17, PIP: 28    08-13 @ 07:01 - 08-14 @ 07:00  --------------------------------------------------------  IN: 60 mL / OUT: 650 mL / NET: -590 mL    08-14 @ 07:01  -  08-15 @ 06:20  --------------------------------------------------------  IN: 4884.5 mL / OUT: 1485 mL / NET: 3399.5 mL      CAPILLARY BLOOD GLUCOSE      POCT Blood Glucose.: 96 mg/dL (14 Aug 2019 15:11)      PHYSICAL EXAM:    GENERAL: Intubated  NEURO: Sedated, unresponsive  HEENT: L pupil dilated, R pupil constricted and reactive to light; clear conjunctiva; neck supple; (+) OGT  RESP: Intubated; decreased breath sound on right  CVS: S1/S2 present, tachycardic, regular rhythm; no murmurs, gallops or rubs appreciated  ABD: Soft, non-tender, non-distended, no masses appreciated; diminished BS  EXT: 2+ pedal pulses bilaterally, no BLE edema  SKIN: Warm, dry, and appropirate color for skin tone; No new rashes    MEDICATIONS:  MEDICATIONS  (STANDING):  ALBUTerol/ipratropium for Nebulization 3 milliLiter(s) Nebulizer every 6 hours  aspirin  chewable 81 milliGRAM(s) Oral daily  chlorhexidine 0.12% Liquid 15 milliLiter(s) Oral Mucosa every 12 hours  chlorhexidine 4% Liquid 1 Application(s) Topical <User Schedule>  chlorhexidine 4% Liquid 1 Application(s) Topical <User Schedule>  docusate sodium Liquid 100 milliGRAM(s) Oral three times a day  fentaNYL   Infusion. 0.5 MICROgram(s)/kG/Hr (2.015 mL/Hr) IV Continuous <Continuous>  norepinephrine Infusion 0.5 MICROgram(s)/kG/Min (75.563 mL/Hr) IV Continuous <Continuous>  pantoprazole  Injectable 40 milliGRAM(s) IV Push daily  phenylephrine    Infusion 0.165 MICROgram(s)/kG/Min (5 mL/Hr) IV Continuous <Continuous>  piperacillin/tazobactam IVPB.. 3.375 Gram(s) IV Intermittent every 8 hours  propofol Infusion 40 MICROgram(s)/kG/Min (19.344 mL/Hr) IV Continuous <Continuous>  vancomycin  IVPB 1000 milliGRAM(s) IV Intermittent every 12 hours  vasopressin Infusion 0.04 Unit(s)/Min (2.4 mL/Hr) IV Continuous <Continuous>    MEDICATIONS  (PRN):  sodium chloride 0.9% lock flush 10 milliLiter(s) IV Push every 1 hour PRN Pre/post blood products, medications, blood draw, and to maintain line patency      ALLERGIES:  Allergies    No Known Allergies    Intolerances        LABS:                        13.9   18.1  )-----------( 477      ( 15 Aug 2019 00:16 )             48.6     08-15    133<L>  |  99  |  15  ----------------------------<  175<H>  4.2   |  14<L>  |  1.47<H>    Ca    8.8      15 Aug 2019 00:16  Phos  5.3     08-15  Mg     1.9     08-15    TPro  6.4  /  Alb  2.9<L>  /  TBili  0.5  /  DBili  x   /  AST  13  /  ALT  10  /  AlkPhos  70  08-14    PT/INR - ( 14 Aug 2019 16:54 )   PT: 14.7 sec;   INR: 1.27 ratio         PTT - ( 14 Aug 2019 16:54 )  PTT:29.0 sec    < from: CT Chest w/ IV Cont (08.15.19 @ 02:07) >  ******PRELIMINARY REPORT******              INTERPRETATION:  New moderate RT ptx with mediastinal shift concerning   for tension. No gross extrasation of oral contrast into the pleural space   from the gastric pull through.    New free intraperitoneal air of uncertain etiology. No def signs of bowel   ischemia.No obstrxn. Air possible may be dissecting down from the ptx.     MICU attending is aware of findings at 3:25 am. Chest tube to be placed   and repeat chest xray.    < end of copied text > Dr. Tuyet Alarcon  Internal Medicine, PGY-1  Pager #: [phone]      Patient is a 53 M with hx of esophageal CA with mets to R pelvis and brain, CAD s/p RCA stent in 2015, p/w dyspnea 2/2 R pleural effusion s/p thoracentesis and bronchoscopy, admitted to MICU hypoxic respiratory failure and hypotension.    INTERVAL HPI/OVERNIGHT EVENTS:    ON CT chest concerning for tension PTX, s/p chest tube. Heparin gtt started for L subclavian vein thrombosis, and d/c'ed due to new finding of L dilated pupil concerning for ICH. CTH prelim read no acute changes.     SUBJECTIVE: Patient seen and examined at bedside.     Unable to obtain ROS due to mental status.     OBJECTIVE:    VITAL SIGNS:  ICU Vital Signs Last 24 Hrs  T(C): 37.1 (15 Aug 2019 04:00), Max: 38.2 (14 Aug 2019 17:00)  T(F): 98.8 (15 Aug 2019 04:00), Max: 100.8 (14 Aug 2019 17:00)  HR: 105 (15 Aug 2019 05:40) (101 - 161)  BP: 80/59 (15 Aug 2019 05:40) (61/46 - 132/96)  BP(mean): 65 (15 Aug 2019 05:40) (45 - 110)  ABP: --  ABP(mean): --  RR: 36 (15 Aug 2019 05:40) (13 - 67)  SpO2: 100% (15 Aug 2019 05:40) (83% - 100%)    Mode: AC/ CMV (Assist Control/ Continuous Mandatory Ventilation), RR (machine): 30, TV (machine): 400, FiO2: 100, PEEP: 10, ITime: 1, MAP: 17, PIP: 28    08-13 @ 07:01 - 08-14 @ 07:00  --------------------------------------------------------  IN: 60 mL / OUT: 650 mL / NET: -590 mL    08-14 @ 07:01  -  08-15 @ 06:20  --------------------------------------------------------  IN: 4884.5 mL / OUT: 1485 mL / NET: 3399.5 mL      CAPILLARY BLOOD GLUCOSE      POCT Blood Glucose.: 96 mg/dL (14 Aug 2019 15:11)      PHYSICAL EXAM:    GENERAL: Intubated  NEURO: Sedated, unresponsive  HEENT: L pupil dilated, R pupil constricted and reactive to light; clear conjunctiva; neck supple; (+) OGT  RESP: Intubated; decreased breath sound on right  CVS: S1/S2 present, tachycardic, regular rhythm; no murmurs, gallops or rubs appreciated; (+) R subclavian TLC  ABD: Soft, non-tender, non-distended, no masses appreciated; diminished BS  EXT: 2+ pedal pulses bilaterally, no BLE edema  SKIN: Warm, dry, and appropirate color for skin tone; No new rashes    MEDICATIONS:  MEDICATIONS  (STANDING):  ALBUTerol/ipratropium for Nebulization 3 milliLiter(s) Nebulizer every 6 hours  aspirin  chewable 81 milliGRAM(s) Oral daily  chlorhexidine 0.12% Liquid 15 milliLiter(s) Oral Mucosa every 12 hours  chlorhexidine 4% Liquid 1 Application(s) Topical <User Schedule>  chlorhexidine 4% Liquid 1 Application(s) Topical <User Schedule>  docusate sodium Liquid 100 milliGRAM(s) Oral three times a day  fentaNYL   Infusion. 0.5 MICROgram(s)/kG/Hr (2.015 mL/Hr) IV Continuous <Continuous>  norepinephrine Infusion 0.5 MICROgram(s)/kG/Min (75.563 mL/Hr) IV Continuous <Continuous>  pantoprazole  Injectable 40 milliGRAM(s) IV Push daily  phenylephrine    Infusion 0.165 MICROgram(s)/kG/Min (5 mL/Hr) IV Continuous <Continuous>  piperacillin/tazobactam IVPB.. 3.375 Gram(s) IV Intermittent every 8 hours  propofol Infusion 40 MICROgram(s)/kG/Min (19.344 mL/Hr) IV Continuous <Continuous>  vancomycin  IVPB 1000 milliGRAM(s) IV Intermittent every 12 hours  vasopressin Infusion 0.04 Unit(s)/Min (2.4 mL/Hr) IV Continuous <Continuous>    MEDICATIONS  (PRN):  sodium chloride 0.9% lock flush 10 milliLiter(s) IV Push every 1 hour PRN Pre/post blood products, medications, blood draw, and to maintain line patency      ALLERGIES:  Allergies    No Known Allergies    Intolerances        LABS:                        13.9   18.1  )-----------( 477      ( 15 Aug 2019 00:16 )             48.6     08-15    133<L>  |  99  |  15  ----------------------------<  175<H>  4.2   |  14<L>  |  1.47<H>    Ca    8.8      15 Aug 2019 00:16  Phos  5.3     08-15  Mg     1.9     08-15    TPro  6.4  /  Alb  2.9<L>  /  TBili  0.5  /  DBili  x   /  AST  13  /  ALT  10  /  AlkPhos  70  08-14    PT/INR - ( 14 Aug 2019 16:54 )   PT: 14.7 sec;   INR: 1.27 ratio         PTT - ( 14 Aug 2019 16:54 )  PTT:29.0 sec    < from: CT Chest w/ IV Cont (08.15.19 @ 02:07) >  ******PRELIMINARY REPORT******              INTERPRETATION:  New moderate RT ptx with mediastinal shift concerning   for tension. No gross extrasation of oral contrast into the pleural space   from the gastric pull through.    New free intraperitoneal air of uncertain etiology. No def signs of bowel   ischemia.No obstrxn. Air possible may be dissecting down from the ptx.     MICU attending is aware of findings at 3:25 am. Chest tube to be placed   and repeat chest xray.    < end of copied text > Dr. Tuyet Alarcon  Internal Medicine, PGY-1  Pager #: [phone]      Patient is a 53 M with hx of esophageal CA with mets to R pelvis and brain, CAD s/p RCA stent in 2015, p/w dyspnea 2/2 R pleural effusion s/p thoracentesis and bronchoscopy, admitted to MICU hypoxic respiratory failure and hypotension.    INTERVAL HPI/OVERNIGHT EVENTS:    ON CT chest concerning for tension PTX, s/p chest tube. Heparin gtt started for L subclavian vein thrombosis, and d/c'ed due to new finding of L dilated pupil concerning for ICH. CTH prelim read no acute changes.     SUBJECTIVE: Patient seen and examined at bedside.     Unable to obtain ROS due to mental status.     OBJECTIVE:    VITAL SIGNS:  ICU Vital Signs Last 24 Hrs  T(C): 37.1 (15 Aug 2019 04:00), Max: 38.2 (14 Aug 2019 17:00)  T(F): 98.8 (15 Aug 2019 04:00), Max: 100.8 (14 Aug 2019 17:00)  HR: 105 (15 Aug 2019 05:40) (101 - 161)  BP: 80/59 (15 Aug 2019 05:40) (61/46 - 132/96)  BP(mean): 65 (15 Aug 2019 05:40) (45 - 110)  ABP: --  ABP(mean): --  RR: 36 (15 Aug 2019 05:40) (13 - 67)  SpO2: 100% (15 Aug 2019 05:40) (83% - 100%)    Mode: AC/ CMV (Assist Control/ Continuous Mandatory Ventilation), RR (machine): 30, TV (machine): 400, FiO2: 100, PEEP: 10, ITime: 1, MAP: 17, PIP: 28    08-13 @ 07:01 - 08-14 @ 07:00  --------------------------------------------------------  IN: 60 mL / OUT: 650 mL / NET: -590 mL    08-14 @ 07:01  -  08-15 @ 06:20  --------------------------------------------------------  IN: 4884.5 mL / OUT: 1485 mL / NET: 3399.5 mL      CAPILLARY BLOOD GLUCOSE      POCT Blood Glucose.: 96 mg/dL (14 Aug 2019 15:11)      PHYSICAL EXAM:    GENERAL: Intubated  NEURO: Sedated, unresponsive  HEENT: L pupil dilated, R pupil constricted and reactive to light; clear conjunctiva; neck supple; (+) OGT with wall suction, bilious gastric content  RESP: Intubated; decreased breath sound on right  CVS: S1/S2 present, tachycardic, regular rhythm; no murmurs, gallops or rubs appreciated; (+) R subclavian TLC  ABD: Soft, non-tender, non-distended, no masses appreciated; diminished BS  EXT: 2+ pedal pulses bilaterally, no BLE edema  SKIN: Warm, dry, and appropirate color for skin tone; No new rashes    MEDICATIONS:  MEDICATIONS  (STANDING):  ALBUTerol/ipratropium for Nebulization 3 milliLiter(s) Nebulizer every 6 hours  aspirin  chewable 81 milliGRAM(s) Oral daily  chlorhexidine 0.12% Liquid 15 milliLiter(s) Oral Mucosa every 12 hours  chlorhexidine 4% Liquid 1 Application(s) Topical <User Schedule>  chlorhexidine 4% Liquid 1 Application(s) Topical <User Schedule>  docusate sodium Liquid 100 milliGRAM(s) Oral three times a day  fentaNYL   Infusion. 0.5 MICROgram(s)/kG/Hr (2.015 mL/Hr) IV Continuous <Continuous>  norepinephrine Infusion 0.5 MICROgram(s)/kG/Min (75.563 mL/Hr) IV Continuous <Continuous>  pantoprazole  Injectable 40 milliGRAM(s) IV Push daily  phenylephrine    Infusion 0.165 MICROgram(s)/kG/Min (5 mL/Hr) IV Continuous <Continuous>  piperacillin/tazobactam IVPB.. 3.375 Gram(s) IV Intermittent every 8 hours  propofol Infusion 40 MICROgram(s)/kG/Min (19.344 mL/Hr) IV Continuous <Continuous>  vancomycin  IVPB 1000 milliGRAM(s) IV Intermittent every 12 hours  vasopressin Infusion 0.04 Unit(s)/Min (2.4 mL/Hr) IV Continuous <Continuous>    MEDICATIONS  (PRN):  sodium chloride 0.9% lock flush 10 milliLiter(s) IV Push every 1 hour PRN Pre/post blood products, medications, blood draw, and to maintain line patency      ALLERGIES:  Allergies    No Known Allergies    Intolerances        LABS:                        13.9   18.1  )-----------( 477      ( 15 Aug 2019 00:16 )             48.6     08-15    133<L>  |  99  |  15  ----------------------------<  175<H>  4.2   |  14<L>  |  1.47<H>    Ca    8.8      15 Aug 2019 00:16  Phos  5.3     08-15  Mg     1.9     08-15    TPro  6.4  /  Alb  2.9<L>  /  TBili  0.5  /  DBili  x   /  AST  13  /  ALT  10  /  AlkPhos  70  08-14    PT/INR - ( 14 Aug 2019 16:54 )   PT: 14.7 sec;   INR: 1.27 ratio         PTT - ( 14 Aug 2019 16:54 )  PTT:29.0 sec    < from: CT Chest w/ IV Cont (08.15.19 @ 02:07) >  ******PRELIMINARY REPORT******              INTERPRETATION:  New moderate RT ptx with mediastinal shift concerning   for tension. No gross extrasation of oral contrast into the pleural space   from the gastric pull through.    New free intraperitoneal air of uncertain etiology. No def signs of bowel   ischemia.No obstrxn. Air possible may be dissecting down from the ptx.     MICU attending is aware of findings at 3:25 am. Chest tube to be placed   and repeat chest xray.    < end of copied text >

## 2019-08-15 NOTE — PROCEDURE NOTE - NSPOSTPRCRAD_GEN_A_CORE
no pneumothorax/post-US
post-procedure radiography performed/central line located in the superior vena cava
chest tube in correct position

## 2019-08-15 NOTE — DIETITIAN INITIAL EVALUATION ADULT. - PROBLEM SELECTOR PLAN 6
- encourage po intake  - start marinol 2.5 once a day for appetite stimulant, consider inc dose as appropriate

## 2019-08-15 NOTE — PROCEDURE NOTE - NSPROCDETAILS_GEN_ALL_CORE
ultrasound assessment of effusion (size)/location identified, draped/prepped, sterile technique used, needle inserted/introduced/Seldinger technique/catheter inserted over needle/ultrasound assessment of effusion (localization)
guidewire recovered/sterile dressing applied/sterile technique, catheter placed/ultrasound guidance/lumen(s) aspirated and flushed
supine position/sterile dressing applied/Seldinger technique/secured in place

## 2019-08-15 NOTE — CHART NOTE - NSCHARTNOTEFT_GEN_A_CORE
Indication: r/o endobronchial lesion, recurrence of esophageal cancer    Operators: Lenny Gooden (attending), Francis Pina (fellow)    Pre-op Dx: Large R pleural effusion    History: 54 y/o M with PMHx of esophageal ca s/p esophagectomy with gastric pull through in 2014, currently on keytruda, who presents with gradually worsening SOB for the past month. Found to have large R pleural effusion with near complete atelectasis of R lung on admission. S/p thoracentesis on 8/13 with removal of 3.2L exudative effusion, repeat imaging reveals rapid reaccumulation of R pleural effusion.    Preop medications: as per [place]    CT Findings:  No endobronchial lesion.    Stable large loculated right pleural effusion. Complete compressive   atelectasis of the right lower and middle lobe and partial  right upper   lobes secondary to large loculated right pleural effusion and large right   hemithorax gastric pull-through.    New moderate left pleural effusion with partial  atelectasis of the   basilar left lower lobe.    Collapse of the distal right middle, right lower and left lower lobe   distal bronchi secondary to mass effect from large associated pleural   effusions      Findings:  Bronchoscope inserted through LMA. Vocal cords identified and appeared grossly normal. Topical 2% lidocaine was administered and the bronchoscope was inserted into the trachea. Main sangeetha identified and appeared slightly pulled towards the R maintstem bronchus. Copious amounts of coarse, green secretions were seen originating from the R lung and pooling in the L lung. The left lung airways were then visualized to the subsegmental level which appeared grossly normal. The bronchoscope was then inserted into the R lung, however the RUL and RML could not be visualized due to lobar collapse from adjacent pleural effusion. The RLL was visualized to the subsegmental level and appeared to be the source of the coarse, green secretions. BAL was then taken of the RLL with adequate return. No endobronchial lesion was seen. The bronchoscope was then removed from the airways.    Complications:  About 1 hour after the procedure, the patient's respiratory status acutely worsened in PACU and the patient became more tachypnic, hypoxic and had increased work of breathing. CXR revealed known large R sided pleural effusion with a developing infiltrate in the LLL. No pneumothorax was visualized on initial CXR. The patient was given duonebs and supplemental O2 via NC, 100% nonrebreather and bilevel, however he remained hypoxic with SpO2 into 70-80s and was ultimately intubated and sent to MICU.    Specimens:  RLL BAL for cytology, micro and cell count    Pre-Bronchoscopy Tuberculosis Risk Screening Tool  To reduce the risk for airborne transmission of Mycobacterium tuberculosis, this assessment form must be completed prior to bronchoscopy procedures being performed outside of a negative pressure environment.    Procedure Date: 8/14/19  Health Care Provider Name: Francis Pina  Form Completed By: Francis Pina  Reason for the Bronchoscopy: r/o endobronchial lesion  Planned Location for the Procedure:  [ ] Emergency Department     [ ] Intensive Care Unit     [x] Operating Room     Other: ___________________    Risk Assessment  I. I have personally evaluated this patient for Mycobacterium tuberculosis and I determined the following risk:  [x] No Risk for TB     [ ] Low risk or TB     [ ] Significant risk for TB    II. Additional Findings:     III. Based on the Determined Risk for TB the following Action(s) are Suggested:  1. If there are no risk factors for TB infection proceed with the procedure.  2. If there is low risk or significant risk for TB infection the following recommendations should be followed:            a. Perform the procedure in a negative pressure room, with N95 respirator.            b. If not feasible to move the patient or defer the procedure:                    i. Use a single-bedded room low traffic area to perform the bronchoscopy procedure.                   ii. Place a portable high-efficiency particulate air (HEPA) filter in the space prior to starting the procedure and keep closed.                       Refer to the INF.1132 titled “Tuberculosis Control Strategy Plan” for additional information.                  iii. All Health Care Providers within the procedure room shall wear an N95 respirator.            c. Documentation of the tuberculosis risk assessment should be included within the patient’s medical record.

## 2019-08-15 NOTE — PROGRESS NOTE ADULT - SUBJECTIVE AND OBJECTIVE BOX
INTERVAL HPI/OVERNIGHT EVENTS:  overnight events noted with emergent intubation as well as chest tube placement. This AM pt  sedated/paralyzed. Wife/family at bedside.     MEDICATIONS  (STANDING):  ALBUTerol/ipratropium for Nebulization 3 milliLiter(s) Nebulizer every 6 hours  artificial tears (preservative free) Ophthalmic Solution 1 Drop(s) Both EYES two times a day  aspirin  chewable 81 milliGRAM(s) Oral daily  chlorhexidine 0.12% Liquid 15 milliLiter(s) Oral Mucosa every 12 hours  chlorhexidine 4% Liquid 1 Application(s) Topical <User Schedule>  chlorhexidine 4% Liquid 1 Application(s) Topical <User Schedule>  docusate sodium Liquid 100 milliGRAM(s) Oral three times a day  fentaNYL   Infusion. 0.5 MICROgram(s)/kG/Hr (2.015 mL/Hr) IV Continuous <Continuous>  norepinephrine Infusion 0.5 MICROgram(s)/kG/Min (75.563 mL/Hr) IV Continuous <Continuous>  pantoprazole  Injectable 40 milliGRAM(s) IV Push daily  petrolatum Ophthalmic Ointment 1 Application(s) Both EYES two times a day  phenylephrine    Infusion 0.165 MICROgram(s)/kG/Min (5 mL/Hr) IV Continuous <Continuous>  piperacillin/tazobactam IVPB.. 3.375 Gram(s) IV Intermittent every 8 hours  propofol Infusion 40 MICROgram(s)/kG/Min (19.344 mL/Hr) IV Continuous <Continuous>  vancomycin  IVPB 1000 milliGRAM(s) IV Intermittent every 12 hours  vasopressin Infusion 0.04 Unit(s)/Min (2.4 mL/Hr) IV Continuous <Continuous>    MEDICATIONS  (PRN):  sodium chloride 0.9% lock flush 10 milliLiter(s) IV Push every 1 hour PRN Pre/post blood products, medications, blood draw, and to maintain line patency    Allergies    No Known Allergies    Intolerances          VITAL SIGNS:  T(F): 99.1 (08-15-19 @ 12:00)  HR: 90 (08-15-19 @ 14:00)  BP: 107/76 (08-15-19 @ 13:45)  RR: 92 (08-15-19 @ 14:00)  SpO2: 100% (08-15-19 @ 14:00)  Wt(kg): --    PHYSICAL EXAM:    Constitutional: intubated, no distress currently   Eyes: left eye pupil slightly dilated   Neck: supple, no masses, no JVD  Respiratory: CTAB; no r/r/w  Cardiovascular: RRR, no M/R/G  Gastrointestinal: +BS, soft, NT/ND, no hepatosplenomegaly  Extremities: no c/c/e  Neurological: limited exam, unresponsive     LABS:                        12.9   24.0  )-----------( 406      ( 15 Aug 2019 06:22 )             43.2     08-15    132<L>  |  98  |  16  ----------------------------<  232<H>  4.2   |  17<L>  |  1.24    Ca    8.5      15 Aug 2019 06:22  Phos  5.4     08-15  Mg     1.7     08-15    TPro  6.4  /  Alb  2.9<L>  /  TBili  0.5  /  DBili  x   /  AST  13  /  ALT  10  /  AlkPhos  70  08-14    PT/INR - ( 14 Aug 2019 16:54 )   PT: 14.7 sec;   INR: 1.27 ratio         PTT - ( 15 Aug 2019 06:22 )  PTT:32.6 sec      RADIOLOGY & ADDITIONAL TESTS:  Studies reviewed.

## 2019-08-15 NOTE — DIETITIAN INITIAL EVALUATION ADULT. - ENERGY NEEDS
Ht: 73"  Wt: 177  BMI: 23.4 kg/m2   IBW: 184 (+/-10%)     within IBW  Edema: none     Skin: no pressure injuries documented

## 2019-08-15 NOTE — PROGRESS NOTE ADULT - ASSESSMENT
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 Course complicated by hypoxic respiratory failure now intubated with chest tube placed     1) Metastatic Esophageal Cancer  - recent brain MRI w/ possible areas of recurrence  - currently receiving keytruda, last cycle in July  - hospital course reviewed, pt now very sick in ICU care. Currently unresponsive with concerning mental status  - given course, transition to comfort may be most appropriate  - based on past conversations, pt would not want to go through with radiation and possible further treatments  - discussed with wife at bedside who acknowledges course; pt is DNR. Would like to see course today to see if any improvement and then may consider transition to comfort oriented approach   - no plan for onc related treatment at present       Nithya Priest, PGY-5  Hematology-Oncology Fellow  [phone] ([place]) [phone] ([facility])

## 2019-08-15 NOTE — DIETITIAN INITIAL EVALUATION ADULT. - ETIOLOGY
inadequate protein-energy intake w/ increased protein-energy needs in setting of metastatic esophageal cancer

## 2019-08-15 NOTE — DIETITIAN INITIAL EVALUATION ADULT. - PERTINENT MEDS FT
MEDICATIONS  (STANDING):  ALBUTerol/ipratropium for Nebulization 3 milliLiter(s) Nebulizer every 6 hours  artificial tears (preservative free) Ophthalmic Solution 1 Drop(s) Both EYES two times a day  aspirin  chewable 81 milliGRAM(s) Oral daily  chlorhexidine 0.12% Liquid 15 milliLiter(s) Oral Mucosa every 12 hours  chlorhexidine 4% Liquid 1 Application(s) Topical <User Schedule>  chlorhexidine 4% Liquid 1 Application(s) Topical <User Schedule>  docusate sodium Liquid 100 milliGRAM(s) Oral three times a day  fentaNYL   Infusion. 0.5 MICROgram(s)/kG/Hr (2.015 mL/Hr) IV Continuous <Continuous>  norepinephrine Infusion 0.5 MICROgram(s)/kG/Min (75.563 mL/Hr) IV Continuous <Continuous>  pantoprazole  Injectable 40 milliGRAM(s) IV Push daily  petrolatum Ophthalmic Ointment 1 Application(s) Both EYES two times a day  phenylephrine    Infusion 0.165 MICROgram(s)/kG/Min (5 mL/Hr) IV Continuous <Continuous>  piperacillin/tazobactam IVPB.. 3.375 Gram(s) IV Intermittent every 8 hours  propofol Infusion 40 MICROgram(s)/kG/Min (19.344 mL/Hr) IV Continuous <Continuous>  vancomycin  IVPB 1000 milliGRAM(s) IV Intermittent every 12 hours  vasopressin Infusion 0.04 Unit(s)/Min (2.4 mL/Hr) IV Continuous <Continuous>    MEDICATIONS  (PRN):  sodium chloride 0.9% lock flush 10 milliLiter(s) IV Push every 1 hour PRN Pre/post blood products, medications, blood draw, and to maintain line patency

## 2019-08-15 NOTE — PROGRESS NOTE ADULT - ASSESSMENT
Patient is a 53 M with hx of esophageal CA with mets to R pelvis and brain, CAD s/p RCA stent in 2015, p/w dyspnea 2/2 R pleural effusion s/p thoracentesis and bronchoscopy, admitted to MICU hypoxic respiratory failure and hypotension.      #Neuro  - Sedated on fentanyl and propofol  - Wean as tolerated      #Resp  - Hypoxic respiratory failure 2/2 R pleural effusion vs. infection, s/p intubation  - ABG revealed hypercarbic/respiratory acidosis   - s/p thoracentesis 8/13 and bronchoscopy 8/14  - CXR (+) PTX, and chest CT concerning for tension PTX, s/p chest tube placement  - Chest PT and suction prn  - f/u BAL fluid cytology, lipase, triglyceride,       #CV  - s/p RCA stent in 2015  - On li and vaso, off levo      #GI  - OGT in place, wall suction with bilious gastric content  - concern for gastric/pleural fistula, f/u final report of chest CT  - NPO x meds for now  - Protonix 40 mg IV daily      #ID  - ?Septic shock, no with leukocytosis  - C/w vanc (8/14 - )and zosyn (8/14 - )  - Trend lactate, 3.3 --> 4.1 --> 6.4 --> 6.1  - f/u Bcx, BAL cx, AFB      #Renal  - AGNES Cr. 1.47 <-- 0.81  - BUN 15, likely intra-renal  - Hyponatremia  - Monitor I's and O's      #Onc  - Metastatic esophageal CA with mets to pelvis and brain  - Holding Keytruda inpatient (last cycle in July), will f/u outpatient with Dr. Mackey  - Onc following      #Endo  - F/u A1C  - No active issue currently      #DVT PPx  - Lovenox 40 mg daily      #GOC/Ethics  - Full code. Patient is a 53 M with hx of esophageal CA with mets to R pelvis and brain, CAD s/p RCA stent in 2015, p/w dyspnea 2/2 R pleural effusion s/p thoracentesis and bronchoscopy, admitted to MICU hypoxic respiratory failure and hypotension.      #Neuro  - Sedated on fentanyl and propofol  - Wean as tolerated  - New finding of dilated left pupil, CTH prelim read no acute changes. f/u final report      #Resp  - Hypoxic respiratory failure 2/2 R pleural effusion vs. infection, s/p intubation  - ABG revealed hypercarbic/respiratory acidosis   - FiO2 decreased to 80%, f/u abg  - s/p thoracentesis 8/13 and bronchoscopy 8/14  - CXR (+) PTX, and chest CT concerning for tension PTX, s/p chest tube placement  - Chest PT and suction prn  - f/u BAL fluid cytology, lipase, triglyceride,       #CV  - s/p RCA stent in 2015  - On li and vaso, off levo  - Cardiology following, no acute interventions      #GI  - OGT in place  - concern for gastric/pleural fistula, f/u final report of chest CT  - NPO x meds for now  - Protonix 40 mg IV daily      #ID  - ?Septic shock, now with leukocytosis to 24  - C/w vanc (8/14 - )and zosyn (8/14 - )  - Trend lactate, 3.3 --> 4.1 --> 6.4 --> 6.1  - f/u Bcx, BAL cx, AFB      #Renal  - AGNES Cr. 1.47 <-- 0.81  - BUN 15, likely intra-renal, f/u FeNa  - Hyponatremia, SIADH vs. renal excretion 2/2 ATN?; repeat urine lytes, serum osmo, urine osmo  - Monitor I's and O's      #Onc  - Metastatic esophageal CA with mets to pelvis and brain  - Holding Keytruda inpatient (last cycle in July), will f/u outpatient with Dr. Mackey  - Onc following      #Endo  - F/u A1C  - No active issue currently      #DVT PPx  - POCUS finding of left subclavian vein thrombosis during central line insertion; Switched to heparin gtt  - Heparin gtt d/c'ed overnight due to concern for ICH  - Now on SCD    #GOC/Ethics  - Full code. Patient is a 53 M with hx of esophageal CA with mets to R pelvis and brain, CAD s/p RCA stent in 2015, p/w dyspnea 2/2 R pleural effusion s/p thoracentesis and bronchoscopy, admitted to MICU hypoxic respiratory failure and hypotension.      #Neuro  - Sedated on fentanyl and propofol  - Wean as tolerated  - New finding of dilated left pupil, CTH prelim read no acute changes. f/u final report      #Resp  - Hypoxic respiratory failure 2/2 R pleural effusion vs. infection, s/p intubation  - ABG revealed hypercarbic/respiratory acidosis   - FiO2 decreased to 80%, f/u abg  - s/p thoracentesis 8/13 and bronchoscopy 8/14  - CXR (+) PTX, and chest CT concerning for tension PTX, s/p chest tube placement  - Chest PT and suction prn  - f/u BAL fluid cytology, lipase, triglyceride,       #CV  - s/p RCA stent in 2015  - On li and vaso, off levo  - Cardiology following, no acute interventions      #GI  - OGT in place, with wall suction, bilious gastric content  - concern for gastric/pleural fistula, f/u final report of chest CT  - NPO x meds for now  - Protonix 40 mg IV daily      #ID  - ?Septic shock, now with leukocytosis to 24  - C/w vanc (8/14 - )and zosyn (8/14 - )  - Trend lactate, 3.3 --> 4.1 --> 6.4 --> 6.1  - f/u Bcx, BAL cx, AFB      #Renal  - AGNES Cr. 1.47 <-- 0.81  - BUN 15, likely intra-renal, f/u FeNa  - Hyponatremia, SIADH vs. renal excretion 2/2 ATN?; repeat urine lytes, serum osmo, urine osmo  - (+) Rodriguez  - Monitor I's and O's      #Onc  - Metastatic esophageal CA with mets to pelvis and brain  - Holding Keytruda inpatient (last cycle in July), will f/u outpatient with Dr. Mackey  - Onc following      #Endo  - F/u A1C  - No active issue currently      #DVT PPx  - POCUS finding of left subclavian vein thrombosis during central line insertion; Switched to heparin gtt  - Heparin gtt d/c'ed overnight due to concern for ICH  - Now on SCD    #GOC/Ethics  - Full code. Patient is a 53 M with hx of esophageal CA with mets to R pelvis and brain, CAD s/p RCA stent in 2015, p/w dyspnea 2/2 R pleural effusion s/p thoracentesis and bronchoscopy, admitted to MICU hypoxic respiratory failure and hypotension.      #Neuro  - Sedated on fentanyl and propofol  - New finding of dilated left pupil, CTH showed no acute changes.      #Resp  - Hypoxic respiratory failure 2/2 R pleural effusion vs. infection, s/p intubation  - ABG revealed hypercarbic/respiratory acidosis   - FiO2 decreased to 80%, r/p abg 7.24 and improved  - s/p thoracentesis 8/13 with 3.2 L removal and bronchoscopy 8/14  - CXR (+) PTX, and chest CT concerning for tension PTX, s/p chest tube placement  - Chest PT and suction prn  - f/u BAL fluid cytology; lipase, triglyceride wnl,       #CV  - s/p RCA stent in 2015  - On li and vaso, off levo  - Cardiology following, no acute interventions      #GI  - OGT in place, with wall suction, bilious gastric content  - concern for gastric/pleural fistula, f/u final report of chest CT  - NPO x meds for now  - Protonix 40 mg IV daily      #ID  - ?Septic shock, now with leukocytosis to 24  - C/w vanc (8/14 - )and zosyn (8/14 - )  - Trend lactate, 3.3 --> 4.1 --> 6.4 --> 6.1  - f/u Bcx, BAL cx, AFB      #Renal  - AGNES Cr. 1.47 <-- 0.81  - BUN 15, likely intra-renal, f/u FeNa  - Hyponatremia, SIADH vs. renal excretion 2/2 ATN?; repeat urine lytes, serum osmo, urine osmo  - (+) Rodriguez  - Monitor I's and O's      #Onc  - Metastatic esophageal CA with mets to pelvis and brain  - Holding Keytruda inpatient (last cycle in July), will f/u outpatient with Dr. Mackey  - Onc following      #Endo  - F/u A1C  - No active issue currently      #DVT PPx  - POCUS finding of left subclavian vein thrombosis during central line insertion; Switched to heparin gtt  - Heparin gtt d/c'ed overnight due to concern for ICH  - Now on SCD    #GOC/Ethics  - DNR  - MOLST form in chart.

## 2019-08-15 NOTE — PROGRESS NOTE ADULT - ATTENDING COMMENTS
Agree with above.  Esophageal ca with mets.  Now with recurrent persistent pleural effusions on R side.  Hypoxemic respiratory failure after bronchoscopy.  Now intubated with chest tube with persistent leak. 60%/+10  Unclear where fluid is coming from. While CT showed ?tension hydropneumothorax, it appears more likely that the patient has a trapped lung from the complicated fluid seen in pleural space.  On multiple pressors. On empiric vanco / Zosyn.  Had anisocoria overnight, head CT only shows known metastatic lesion.  Had long discussion with family, including wife (PEEWEE). She states that he has told her he did not want "tubes" and "things that prolong me."  We will cap pressors at current rate, but keep all other interventions at this time. We will continue current management with no escalation of care. No CPR if his heart stops.  Patient now DNR. Overall prognosis is very poor, but there is still a non-zero chance that he may recover from this specific incident. 15

## 2019-08-15 NOTE — PROGRESS NOTE ADULT - ATTENDING COMMENTS
Patient interviewed/examined on rounds with Dr. Priest.    Agree with history, PE, A/P as above.    Stressed the importance of symptom management as ultimate goal.  Support provided to wife/father who are leaning towards palliative approach.    D/W Dr. Mackey today.      Sourav Yousif MD  [phone]

## 2019-08-15 NOTE — DIETITIAN INITIAL EVALUATION ADULT. - PROBLEM SELECTOR PLAN 3
- Patient with hx of esophageal cancer with mets, s/p resection, chemotherapy and radiation therapy  - Call oncology in am for status on malignancy  - Discuss when keytruda should be restarted

## 2019-08-16 ENCOUNTER — APPOINTMENT (OUTPATIENT)
Dept: SURGICAL ONCOLOGY | Facility: CLINIC | Age: 54
End: 2019-08-16

## 2019-08-16 LAB
ANION GAP SERPL CALC-SCNC: 13 MMOL/L — SIGNIFICANT CHANGE UP (ref 5–17)
APTT BLD: 31.4 SEC — SIGNIFICANT CHANGE UP (ref 27.5–36.3)
BUN SERPL-MCNC: 19 MG/DL — SIGNIFICANT CHANGE UP (ref 7–23)
CALCIUM SERPL-MCNC: 9.1 MG/DL — SIGNIFICANT CHANGE UP (ref 8.4–10.5)
CHLORIDE SERPL-SCNC: 96 MMOL/L — SIGNIFICANT CHANGE UP (ref 96–108)
CO2 SERPL-SCNC: 23 MMOL/L — SIGNIFICANT CHANGE UP (ref 22–31)
CREAT SERPL-MCNC: 0.87 MG/DL — SIGNIFICANT CHANGE UP (ref 0.5–1.3)
CULTURE RESULTS: SIGNIFICANT CHANGE UP
GAS PNL BLDA: SIGNIFICANT CHANGE UP
GLUCOSE SERPL-MCNC: 135 MG/DL — HIGH (ref 70–99)
HCT VFR BLD CALC: 39.5 % — SIGNIFICANT CHANGE UP (ref 39–50)
HGB BLD-MCNC: 11.3 G/DL — LOW (ref 13–17)
INR BLD: 1.21 RATIO — HIGH (ref 0.88–1.16)
MAGNESIUM SERPL-MCNC: 2 MG/DL — SIGNIFICANT CHANGE UP (ref 1.6–2.6)
MCHC RBC-ENTMCNC: 23.8 PG — LOW (ref 27–34)
MCHC RBC-ENTMCNC: 28.6 GM/DL — LOW (ref 32–36)
MCV RBC AUTO: 83.1 FL — SIGNIFICANT CHANGE UP (ref 80–100)
PHOSPHATE SERPL-MCNC: 3 MG/DL — SIGNIFICANT CHANGE UP (ref 2.5–4.5)
PLATELET # BLD AUTO: 310 K/UL — SIGNIFICANT CHANGE UP (ref 150–400)
POTASSIUM SERPL-MCNC: 4.6 MMOL/L — SIGNIFICANT CHANGE UP (ref 3.5–5.3)
POTASSIUM SERPL-SCNC: 4.6 MMOL/L — SIGNIFICANT CHANGE UP (ref 3.5–5.3)
PROTHROM AB SERPL-ACNC: 13.9 SEC — HIGH (ref 10–12.9)
RBC # BLD: 4.75 M/UL — SIGNIFICANT CHANGE UP (ref 4.2–5.8)
RBC # FLD: 14.5 % — SIGNIFICANT CHANGE UP (ref 10.3–14.5)
SODIUM SERPL-SCNC: 132 MMOL/L — LOW (ref 135–145)
SPECIMEN SOURCE: SIGNIFICANT CHANGE UP
VANCOMYCIN TROUGH SERPL-MCNC: 8.3 UG/ML — LOW (ref 10–20)
WBC # BLD: 23.8 K/UL — HIGH (ref 3.8–10.5)
WBC # FLD AUTO: 23.8 K/UL — HIGH (ref 3.8–10.5)

## 2019-08-16 PROCEDURE — 93306 TTE W/DOPPLER COMPLETE: CPT | Mod: 26

## 2019-08-16 PROCEDURE — 99291 CRITICAL CARE FIRST HOUR: CPT

## 2019-08-16 PROCEDURE — 99233 SBSQ HOSP IP/OBS HIGH 50: CPT

## 2019-08-16 PROCEDURE — 71045 X-RAY EXAM CHEST 1 VIEW: CPT | Mod: 26,59

## 2019-08-16 RX ORDER — FUROSEMIDE 40 MG
40 TABLET ORAL ONCE
Refills: 0 | Status: COMPLETED | OUTPATIENT
Start: 2019-08-16 | End: 2019-08-16

## 2019-08-16 RX ADMIN — Medication 40 MILLIGRAM(S): at 11:00

## 2019-08-16 RX ADMIN — CHLORHEXIDINE GLUCONATE 15 MILLILITER(S): 213 SOLUTION TOPICAL at 17:03

## 2019-08-16 RX ADMIN — CHLORHEXIDINE GLUCONATE 15 MILLILITER(S): 213 SOLUTION TOPICAL at 05:50

## 2019-08-16 RX ADMIN — Medication 81 MILLIGRAM(S): at 13:47

## 2019-08-16 RX ADMIN — Medication 250 MILLIGRAM(S): at 17:03

## 2019-08-16 RX ADMIN — Medication 250 MILLIGRAM(S): at 05:50

## 2019-08-16 RX ADMIN — Medication 3 MILLILITER(S): at 00:46

## 2019-08-16 RX ADMIN — PIPERACILLIN AND TAZOBACTAM 25 GRAM(S): 4; .5 INJECTION, POWDER, LYOPHILIZED, FOR SOLUTION INTRAVENOUS at 06:54

## 2019-08-16 RX ADMIN — PHENYLEPHRINE HYDROCHLORIDE 5 MICROGRAM(S)/KG/MIN: 10 INJECTION INTRAVENOUS at 19:17

## 2019-08-16 RX ADMIN — Medication 1 APPLICATION(S): at 05:53

## 2019-08-16 RX ADMIN — PANTOPRAZOLE SODIUM 40 MILLIGRAM(S): 20 TABLET, DELAYED RELEASE ORAL at 12:00

## 2019-08-16 RX ADMIN — FENTANYL CITRATE 2.02 MICROGRAM(S)/KG/HR: 50 INJECTION INTRAVENOUS at 06:54

## 2019-08-16 RX ADMIN — FENTANYL CITRATE 2.02 MICROGRAM(S)/KG/HR: 50 INJECTION INTRAVENOUS at 19:17

## 2019-08-16 RX ADMIN — Medication 1 DROP(S): at 02:59

## 2019-08-16 RX ADMIN — Medication 100 MILLIGRAM(S): at 05:50

## 2019-08-16 RX ADMIN — CHLORHEXIDINE GLUCONATE 1 APPLICATION(S): 213 SOLUTION TOPICAL at 05:50

## 2019-08-16 RX ADMIN — PIPERACILLIN AND TAZOBACTAM 25 GRAM(S): 4; .5 INJECTION, POWDER, LYOPHILIZED, FOR SOLUTION INTRAVENOUS at 21:28

## 2019-08-16 RX ADMIN — Medication 1 DROP(S): at 14:00

## 2019-08-16 RX ADMIN — Medication 3 MILLILITER(S): at 05:07

## 2019-08-16 RX ADMIN — Medication 3 MILLILITER(S): at 17:35

## 2019-08-16 RX ADMIN — Medication 1 APPLICATION(S): at 17:03

## 2019-08-16 RX ADMIN — Medication 100 MILLIGRAM(S): at 13:46

## 2019-08-16 RX ADMIN — PHENYLEPHRINE HYDROCHLORIDE 5 MICROGRAM(S)/KG/MIN: 10 INJECTION INTRAVENOUS at 21:28

## 2019-08-16 RX ADMIN — Medication 3 MILLILITER(S): at 12:06

## 2019-08-16 RX ADMIN — VASOPRESSIN 2.4 UNIT(S)/MIN: 20 INJECTION INTRAVENOUS at 06:54

## 2019-08-16 RX ADMIN — PROPOFOL 19.34 MICROGRAM(S)/KG/MIN: 10 INJECTION, EMULSION INTRAVENOUS at 17:03

## 2019-08-16 RX ADMIN — PROPOFOL 19.34 MICROGRAM(S)/KG/MIN: 10 INJECTION, EMULSION INTRAVENOUS at 21:32

## 2019-08-16 RX ADMIN — PIPERACILLIN AND TAZOBACTAM 25 GRAM(S): 4; .5 INJECTION, POWDER, LYOPHILIZED, FOR SOLUTION INTRAVENOUS at 13:47

## 2019-08-16 NOTE — PROGRESS NOTE ADULT - ASSESSMENT
53 M with hx of esophageal CA diagnosed in 2017 s/p chemo and radiation and with mets to R pelvis and brain mets to the L superior cerebellar mass (s/p craniotomy in 04/2019) currently on Keytruda, CAD s/p RCA stent in 2015, p/w pleural effusion, now with hypoxic respiratory failure post bronch with resulting tension PTX now s/p chest tube.    - No signs of significant ischemia   - Pleural effusion is likely malignant. No signs of ADHF at this time.   - EKG without ischemic changes  - Cont asa  - Tele demonstrates Sinus tachycardia which is likely all reactive to his underlying condition.     - Cont pressor support to maintain MAP at least >60. Titrate off as blood pressure tolerates.   - Echo in 2018 with large pericardial effusion, though no significant pericardial effusion noted on CT during this admission    - PTX and chest tube management per MICU  - Monitor Chest tube output.     - Monitor and replete electrolytes. Keep K>4.0 and Mg>2.0.  - Further cardiac workup will depend on clinical course.   - All other workup per MICU team. Will followup.

## 2019-08-16 NOTE — PROGRESS NOTE ADULT - SUBJECTIVE AND OBJECTIVE BOX
[facility] - Jamilah Manning, Akash, Traci, Vanessa, Gildardo Coyne  Office Number:  [phone]    Patient resting comfortably in bed in NAD. Remains intubated on phenylephrine and vasopressin, and sedated on propofol.  chest tube remains in    ROS: negative unless otherwise mentioned.    Telemetry:  sr/st    MEDICATIONS  (STANDING):  ALBUTerol/ipratropium for Nebulization 3 milliLiter(s) Nebulizer every 6 hours  artificial tears (preservative free) Ophthalmic Solution 1 Drop(s) Both EYES two times a day  aspirin  chewable 81 milliGRAM(s) Oral daily  chlorhexidine 0.12% Liquid 15 milliLiter(s) Oral Mucosa every 12 hours  chlorhexidine 4% Liquid 1 Application(s) Topical <User Schedule>  chlorhexidine 4% Liquid 1 Application(s) Topical <User Schedule>  docusate sodium Liquid 100 milliGRAM(s) Oral three times a day  fentaNYL   Infusion. 0.5 MICROgram(s)/kG/Hr (2.015 mL/Hr) IV Continuous <Continuous>  pantoprazole  Injectable 40 milliGRAM(s) IV Push daily  petrolatum Ophthalmic Ointment 1 Application(s) Both EYES two times a day  phenylephrine    Infusion 0.165 MICROgram(s)/kG/Min (5 mL/Hr) IV Continuous <Continuous>  piperacillin/tazobactam IVPB.. 3.375 Gram(s) IV Intermittent every 8 hours  propofol Infusion 40 MICROgram(s)/kG/Min (19.344 mL/Hr) IV Continuous <Continuous>  vancomycin  IVPB 1000 milliGRAM(s) IV Intermittent every 12 hours  vasopressin Infusion 0.04 Unit(s)/Min (2.4 mL/Hr) IV Continuous <Continuous>    MEDICATIONS  (PRN):  sodium chloride 0.9% lock flush 10 milliLiter(s) IV Push every 1 hour PRN Pre/post blood products, medications, blood draw, and to maintain line patency      Allergies    No Known Allergies    Intolerances        Vital Signs Last 24 Hrs  T(C): 37.4 (16 Aug 2019 04:00), Max: 37.4 (16 Aug 2019 00:00)  T(F): 99.3 (16 Aug 2019 04:00), Max: 99.3 (16 Aug 2019 00:00)  HR: 84 (16 Aug 2019 07:00) (67 - 121)  BP: 122/79 (16 Aug 2019 07:00) (88/62 - 140/93)  BP(mean): 98 (16 Aug 2019 07:00) (70 - 111)  RR: 67 (16 Aug 2019 07:00) (26 - 95)  SpO2: 96% (16 Aug 2019 07:00) (83% - 100%)    I&O's Summary    15 Aug 2019 07:01  -  16 Aug 2019 07:00  --------------------------------------------------------  IN: 2900.5 mL / OUT: 2500 mL / NET: 400.5 mL        ON EXAM:    Constitutional: intubated, sedated  HEENT: ETT in place  Pulmonary: Decreased breath sounds b/l. R>L  Cardiovascular: tachy , S1 and S2, distant   Gastrointestinal: Bowel Sounds present, soft, nontender.   Lymph: No peripheral edema. No lymphadenopathy.  Skin: No visible rashes or ulcers. + chest tube  Psych:  unable to assess    LABS: All Labs Reviewed:                        11.3   23.8  )-----------( 310      ( 16 Aug 2019 01:14 )             39.5                         12.9   24.0  )-----------( 406      ( 15 Aug 2019 06:22 )             43.2                         13.9   18.1  )-----------( 477      ( 15 Aug 2019 00:16 )             48.6     16 Aug 2019 01:14    132    |  96     |  19     ----------------------------<  135    4.6     |  23     |  0.87   15 Aug 2019 06:22    132    |  98     |  16     ----------------------------<  232    4.2     |  17     |  1.24   15 Aug 2019 00:16    133    |  99     |  15     ----------------------------<  175    4.2     |  14     |  1.47     Ca    9.1        16 Aug 2019 01:14  Ca    8.5        15 Aug 2019 06:22  Ca    8.8        15 Aug 2019 00:16  Phos  3.0       16 Aug 2019 01:14  Phos  5.4       15 Aug 2019 06:22  Phos  5.3       15 Aug 2019 00:16  Mg     2.0       16 Aug 2019 01:14  Mg     1.7       15 Aug 2019 06:22  Mg     1.9       15 Aug 2019 00:16    TPro  6.4    /  Alb  2.9    /  TBili  0.5    /  DBili  x      /  AST  13     /  ALT  10     /  AlkPhos  70     14 Aug 2019 16:54  TPro  5.9    /  Alb  2.7    /  TBili  0.3    /  DBili  x      /  AST  12     /  ALT  8      /  AlkPhos  64     14 Aug 2019 06:40    PT/INR - ( 16 Aug 2019 01:14 )   PT: 13.9 sec;   INR: 1.21 ratio         PTT - ( 16 Aug 2019 01:14 )  PTT:31.4 sec      Blood Culture: Organism --  Gram Stain Blood -- Gram Stain --  Specimen Source .Blood  Culture-Blood --    Organism --  Gram Stain Blood -- Gram Stain   Moderate polymorphonuclear leukocytes per low power field  Few Squamous epithelial cells per low power field  Few Yeast like cells per oil power field  Few Gram Negative Rods per oil power field  Specimen Source Bronch Wash  Culture-Blood --    Organism --  Gram Stain Blood -- Gram Stain   No polymorphonuclear cells seen  No organisms seen  by cytocentrifuge  Specimen Source .Body Fluid  Culture-Blood --

## 2019-08-16 NOTE — PROGRESS NOTE ADULT - ATTENDING COMMENTS
Agree with above.  Clinically stabilized with decrease in pressors (now on Melvin 1.3 / vaso 0.04)  Chest tube output ~1L, net I/O 2900/2500  Remains intubated 26/450/50%/+10, decreased PEEP to 8. Can decrease PEEP to 5 if remains with adequate oxygenation, and ok with going up on FiO2 if necessary (i.e., if patient becomes hypoxemic on less PEEP).  POCU/S: good cardiac contractility, R side large pleural effusion with thickened pleura and small echogenic particles in pleural fluid. L side with smaller pleural effusion at base with subjacent atelectasis.  Goal net negative 500cc.  Empiric antibiotics.  Continue current care with no escalation. If oxygen requirements come down to ~40–50% and PEEP 5, can start daily SAT / SBT.  Had discussion with family at bedside and updated them about his condition. They continue to confirm that their main concern is that he not be in any pain or anxiety.

## 2019-08-16 NOTE — CHART NOTE - NSCHARTNOTEFT_GEN_A_CORE
Malnutrition f/u    Initial Nutrition Assessment completed yesterday (EN recommendation if able to be initiated), dx severe malnutrition. Pt intubated, OGT to LWS prn, pressors capped, no aggressive measures. Propofol intake 8/16 388 kcals. Continue to monitor medical course.

## 2019-08-16 NOTE — PROGRESS NOTE ADULT - SUBJECTIVE AND OBJECTIVE BOX
Dr. Tuyet Alarcon  Internal Medicine, PGY-1  Pager #: [phone]      Patient is a 53 M with hx of esophageal CA with mets to R pelvis and brain, CAD s/p RCA stent in 2015, p/w dyspnea 2/2 R pleural effusion s/p thoracentesis and bronchoscopy, admitted to MICU for hypoxic respiratory failure s/p intubation and hypotension.    INTERVAL HPI/OVERNIGHT EVENTS:  No acute events overnight.     SUBJECTIVE: Patient seen and examined at bedside.     Unable to obtain ROS due to mental status    OBJECTIVE:    VITAL SIGNS:  ICU Vital Signs Last 24 Hrs  T(C): 37.4 (16 Aug 2019 04:00), Max: 37.4 (16 Aug 2019 00:00)  T(F): 99.3 (16 Aug 2019 04:00), Max: 99.3 (16 Aug 2019 00:00)  HR: 82 (16 Aug 2019 05:45) (67 - 110)  BP: 134/95 (16 Aug 2019 05:45) (88/62 - 137/93)  BP(mean): 110 (16 Aug 2019 05:45) (70 - 110)  ABP: --  ABP(mean): --  RR: 45 (16 Aug 2019 05:45) (26 - 95)  SpO2: 100% (16 Aug 2019 05:45) (83% - 100%)    Mode: AC/ CMV (Assist Control/ Continuous Mandatory Ventilation), RR (machine): 32, TV (machine): 450, FiO2: 50, PEEP: 10, ITime: 1, MAP: 17, PIP: 30    08-14 @ 07:01  -  08-15 @ 07:00  --------------------------------------------------------  IN: 5495.4 mL / OUT: 2035 mL / NET: 3460.4 mL    08-15 @ 07:01  -  08-16 @ 06:35  --------------------------------------------------------  IN: 2788.3 mL / OUT: 2240 mL / NET: 548.3 mL      CAPILLARY BLOOD GLUCOSE      POCT Blood Glucose.: 96 mg/dL (14 Aug 2019 15:11)      PHYSICAL EXAM:    GENERAL: Intubated  NEURO: Sedated, unresponsive  HEENT: L pupil dilated, R pupil constricted and reactive to light; clear conjunctiva; neck supple;  RESP: Intubated; decreased breath sound on right  CVS: S1/S2 present, RRR. no murmurs, gallops or rubs appreciated; (+) R subclavian TLC  ABD: Soft, non-tender, non-distended, no masses appreciated; diminished BS  EXT: 2+ pedal pulses bilaterally, no BLE edema  SKIN: Warm, dry, and appropirate color for skin tone; No new rashes    MEDICATIONS:  MEDICATIONS  (STANDING):  ALBUTerol/ipratropium for Nebulization 3 milliLiter(s) Nebulizer every 6 hours  artificial tears (preservative free) Ophthalmic Solution 1 Drop(s) Both EYES two times a day  aspirin  chewable 81 milliGRAM(s) Oral daily  chlorhexidine 0.12% Liquid 15 milliLiter(s) Oral Mucosa every 12 hours  chlorhexidine 4% Liquid 1 Application(s) Topical <User Schedule>  chlorhexidine 4% Liquid 1 Application(s) Topical <User Schedule>  docusate sodium Liquid 100 milliGRAM(s) Oral three times a day  fentaNYL   Infusion. 0.5 MICROgram(s)/kG/Hr (2.015 mL/Hr) IV Continuous <Continuous>  pantoprazole  Injectable 40 milliGRAM(s) IV Push daily  petrolatum Ophthalmic Ointment 1 Application(s) Both EYES two times a day  phenylephrine    Infusion 0.165 MICROgram(s)/kG/Min (5 mL/Hr) IV Continuous <Continuous>  piperacillin/tazobactam IVPB.. 3.375 Gram(s) IV Intermittent every 8 hours  propofol Infusion 40 MICROgram(s)/kG/Min (19.344 mL/Hr) IV Continuous <Continuous>  vancomycin  IVPB 1000 milliGRAM(s) IV Intermittent every 12 hours  vasopressin Infusion 0.04 Unit(s)/Min (2.4 mL/Hr) IV Continuous <Continuous>    MEDICATIONS  (PRN):  sodium chloride 0.9% lock flush 10 milliLiter(s) IV Push every 1 hour PRN Pre/post blood products, medications, blood draw, and to maintain line patency      ALLERGIES:  Allergies    No Known Allergies    Intolerances        LABS:                        11.3   23.8  )-----------( 310      ( 16 Aug 2019 01:14 )             39.5     08-16    132<L>  |  96  |  19  ----------------------------<  135<H>  4.6   |  23  |  0.87    Ca    9.1      16 Aug 2019 01:14  Phos  3.0     08-16  Mg     2.0     08-16    TPro  6.4  /  Alb  2.9<L>  /  TBili  0.5  /  DBili  x   /  AST  13  /  ALT  10  /  AlkPhos  70  08-14    PT/INR - ( 16 Aug 2019 01:14 )   PT: 13.9 sec;   INR: 1.21 ratio         PTT - ( 16 Aug 2019 01:14 )  PTT:31.4 sec      RADIOLOGY & ADDITIONAL TESTS: Reviewed. Dr. Tuyet Alarcon  Internal Medicine, PGY-1  Pager #: [phone]      Patient is a 53 M with hx of esophageal CA with mets to R pelvis and brain, CAD s/p RCA stent in 2015, p/w dyspnea 2/2 R pleural effusion s/p thoracentesis and bronchoscopy, admitted to MICU for hypoxic respiratory failure s/p intubation and hypotension.    INTERVAL HPI/OVERNIGHT EVENTS:  No acute events overnight.     SUBJECTIVE: Patient seen and examined at bedside.     Unable to obtain ROS due to mental status    OBJECTIVE:    VITAL SIGNS:  ICU Vital Signs Last 24 Hrs  T(C): 37.4 (16 Aug 2019 04:00), Max: 37.4 (16 Aug 2019 00:00)  T(F): 99.3 (16 Aug 2019 04:00), Max: 99.3 (16 Aug 2019 00:00)  HR: 82 (16 Aug 2019 05:45) (67 - 110)  BP: 134/95 (16 Aug 2019 05:45) (88/62 - 137/93)  BP(mean): 110 (16 Aug 2019 05:45) (70 - 110)  ABP: --  ABP(mean): --  RR: 45 (16 Aug 2019 05:45) (26 - 95)  SpO2: 100% (16 Aug 2019 05:45) (83% - 100%)    Mode: AC/ CMV (Assist Control/ Continuous Mandatory Ventilation), RR (machine): 32, TV (machine): 450, FiO2: 50, PEEP: 10, ITime: 1, MAP: 17, PIP: 30    08-14 @ 07:01  -  08-15 @ 07:00  --------------------------------------------------------  IN: 5495.4 mL / OUT: 2035 mL / NET: 3460.4 mL    08-15 @ 07:01  -  08-16 @ 06:35  --------------------------------------------------------  IN: 2788.3 mL / OUT: 2240 mL / NET: 548.3 mL      CAPILLARY BLOOD GLUCOSE      POCT Blood Glucose.: 96 mg/dL (14 Aug 2019 15:11)      PHYSICAL EXAM:    GENERAL: Intubated  NEURO: Sedated, unresponsive  HEENT: b/l pupil constricted and reactive to light; clear conjunctiva;  RESP: Intubated; decreased breath sound bibasilar fields; (+) crackles R upper and mid lung fields with improved air movements  CVS: S1/S2 present, RRR. no murmurs, gallops or rubs appreciated; (+) R subclavian TLC  ABD: Soft, non-tender, non-distended, no masses appreciated; diminished BS  EXT: 2+ pedal pulses bilaterally, no BLE edema  SKIN: Warm, dry, and appropirate color for skin tone; No new rashes    MEDICATIONS:  MEDICATIONS  (STANDING):  ALBUTerol/ipratropium for Nebulization 3 milliLiter(s) Nebulizer every 6 hours  artificial tears (preservative free) Ophthalmic Solution 1 Drop(s) Both EYES two times a day  aspirin  chewable 81 milliGRAM(s) Oral daily  chlorhexidine 0.12% Liquid 15 milliLiter(s) Oral Mucosa every 12 hours  chlorhexidine 4% Liquid 1 Application(s) Topical <User Schedule>  chlorhexidine 4% Liquid 1 Application(s) Topical <User Schedule>  docusate sodium Liquid 100 milliGRAM(s) Oral three times a day  fentaNYL   Infusion. 0.5 MICROgram(s)/kG/Hr (2.015 mL/Hr) IV Continuous <Continuous>  pantoprazole  Injectable 40 milliGRAM(s) IV Push daily  petrolatum Ophthalmic Ointment 1 Application(s) Both EYES two times a day  phenylephrine    Infusion 0.165 MICROgram(s)/kG/Min (5 mL/Hr) IV Continuous <Continuous>  piperacillin/tazobactam IVPB.. 3.375 Gram(s) IV Intermittent every 8 hours  propofol Infusion 40 MICROgram(s)/kG/Min (19.344 mL/Hr) IV Continuous <Continuous>  vancomycin  IVPB 1000 milliGRAM(s) IV Intermittent every 12 hours  vasopressin Infusion 0.04 Unit(s)/Min (2.4 mL/Hr) IV Continuous <Continuous>    MEDICATIONS  (PRN):  sodium chloride 0.9% lock flush 10 milliLiter(s) IV Push every 1 hour PRN Pre/post blood products, medications, blood draw, and to maintain line patency      ALLERGIES:  Allergies    No Known Allergies    Intolerances        LABS:                        11.3   23.8  )-----------( 310      ( 16 Aug 2019 01:14 )             39.5     08-16    132<L>  |  96  |  19  ----------------------------<  135<H>  4.6   |  23  |  0.87    Ca    9.1      16 Aug 2019 01:14  Phos  3.0     08-16  Mg     2.0     08-16    TPro  6.4  /  Alb  2.9<L>  /  TBili  0.5  /  DBili  x   /  AST  13  /  ALT  10  /  AlkPhos  70  08-14    PT/INR - ( 16 Aug 2019 01:14 )   PT: 13.9 sec;   INR: 1.21 ratio         PTT - ( 16 Aug 2019 01:14 )  PTT:31.4 sec      RADIOLOGY & ADDITIONAL TESTS: Reviewed.

## 2019-08-16 NOTE — PROGRESS NOTE ADULT - ASSESSMENT
Patient is a 53 M with hx of esophageal CA with mets to R pelvis and brain, CAD s/p RCA stent in 2015, p/w dyspnea 2/2 R pleural effusion s/p thoracentesis and bronchoscopy, admitted to MICU hypoxic respiratory failure s/p intubation and hypotension, on pressors.      #Neuro  - Sedated on fentanyl and propofol  - New finding of dilated left pupil, CTH showed no acute changes.      #Resp  - Hypoxic respiratory failure 2/2 R pleural effusion vs. infection, s/p intubation  - FiO2 decreased to 50%, r/p abg 7.35 and improved  - s/p thoracentesis 8/13 with 3.2 L removal and bronchoscopy 8/14  - Chest tube suction ~ 400 cc overnight  - Chest PT and suction prn  - f/u BAL fluid cytology; lipase, triglyceride wnl,       #CV  - s/p RCA stent in 2015  - On li and vaso, off levo  - Cardiology following, no acute interventions      #GI  - OGT in place, with wall suction prn  - CT chest (+) large R pleural effusion and small L pleural effusion, (-) gastric leak.   - NPO x meds for now  - Protonix 40 mg IV daily      #ID  - ?Septic shock, (+) leukocytosis 18 --> 24.1 --> 23.8  - C/w vanc (8/14 - )and zosyn (8/14 - ); vanc trough 8.3 8/16 AM  - Trend lactate, 3.3 --> 4.1 --> 6.4 --> 6.1 --> 1.5  - Bcx, BAL cx, AFB NGTD.       #Renal  - AGNES Cr. 0.81 --> 1.47 --> 0.87; resolved  - Hyponatremia, SIADH vs. renal excretion 2/2 ATN?  - (+) Rodriguez  - Monitor I's and O's      #Onc  - Metastatic esophageal CA with mets to pelvis and brain  - Holding Keytruda inpatient (last cycle in July), will f/u outpatient with Dr. Mackey  - Onc following      #Endo  - No active issue currently      #DVT PPx  - POCUS finding of left subclavian vein thrombosis during central line insertion; Switched to heparin gtt; Heparin gtt d/c'ed due to concern for ICH  - Now on SCD    #GOC/Ethics  - DNR; Cap pressors, c/w current treatment, no aggressive measures  - MOLST form in chart. Patient is a 53 M with hx of esophageal CA with mets to R pelvis and brain, CAD s/p RCA stent in 2015, p/w dyspnea 2/2 R pleural effusion s/p thoracentesis and bronchoscopy, admitted to MICU hypoxic respiratory failure s/p intubation and hypotension, on pressors.      #Neuro  - Sedated on fentanyl and propofol  - New finding of dilated left pupil, CTH showed no acute changes. Pupils are constricted b/l currently      #Resp  - Hypoxic respiratory failure 2/2 R pleural effusion vs. infection, s/p intubation  - FiO2 decreased to 50%, r/p abg 7.35 and improved  - s/p thoracentesis 8/13 with 3.2 L removal and bronchoscopy 8/14  - Chest tube suction ~ 400 cc overnight; (+) air leak  - Chest PT and suction prn  - f/u BAL fluid cytology; lipase, triglyceride wnl,       #CV  - s/p RCA stent in 2015  - On li and vaso, off levo  - Cardiology following, no acute interventions      #GI  - OGT in place, with wall suction prn  - CT chest (+) large R pleural effusion and small L pleural effusion, (-) gastric leak.   - NPO x meds for now  - Protonix 40 mg IV daily      #ID  - ?Septic shock, (+) leukocytosis 18 --> 24.1 --> 23.8  - C/w vanc (8/14 - )and zosyn (8/14 - ); vanc trough 8.3 dosed x 1 8/16 AM  - Trend lactate, 3.3 --> 4.1 --> 6.4 --> 6.1 --> 1.5  - Bcx, BAL cx, AFB NGTD.       #Renal  - AGNES Cr. 0.81 --> 1.47 --> 0.87; resolved  - Hyponatremia, SIADH vs. renal excretion 2/2 intrinsic kidney injury  - (+) Rodriguez  - Monitor I's and O's      #Onc  - Metastatic esophageal CA with mets to pelvis and brain  - Holding Keytruda inpatient (last cycle in July), will f/u outpatient with Dr. Mackey  - Onc following      #Endo  - No active issue currently      #DVT PPx  - POCUS finding of left subclavian vein thrombosis during central line insertion; Switched to heparin gtt; Heparin gtt d/c'ed due to concern for ICH  - Now on SCD    #GOC/Ethics  - DNR; Cap pressors, c/w current treatment, no aggressive measures  - MOLST form in chart. Patient is a 53 M with hx of esophageal CA with mets to R pelvis and brain, CAD s/p RCA stent in 2015, p/w dyspnea 2/2 R pleural effusion s/p thoracentesis and bronchoscopy, admitted to MICU hypoxic respiratory failure s/p intubation and hypotension, on pressors.      #Neuro  - Sedated on fentanyl and propofol  - New finding of dilated left pupil 8/14, s/p CTH showed no acute changes. Pupils are constricted b/l currently      #Resp  - Hypoxic respiratory failure 2/2 R pleural effusion vs. infection, s/p intubation  - FiO2 decreased to 50%, rate 26 and PEEP 8.  - s/p thoracentesis 8/13 with 3.2 L removal and bronchoscopy 8/14  - Chest tube suction ~1700 cc over 24 hours; (+) air leak, c/w wall suction  - Chest PT and suction prn  - f/u BAL fluid cytology; lipase, triglyceride wnl,       #CV  - s/p RCA stent in 2015  - On li and vaso, off levo  - Cardiology following, no acute interventions  - (-) pericardial effusion on POCUS    #GI  - OGT in place, with wall suction prn  - CT chest (+) large R pleural effusion and small L pleural effusion, (-) gastric leak.   - NPO x meds for now  - Protonix 40 mg IV daily      #ID  - ?Septic shock, (+) leukocytosis 18 --> 24.1 --> 23.8  - C/w vanc (8/14 - )and zosyn (8/14 - ); vanc trough 8.3 dosed x 1g 8/16 AM  - Trend lactate, 3.3 --> 4.1 --> 6.4 --> 6.1 --> 1.5  - Bcx, BAL cx, AFB NGTD.       #Renal  - AGNES Cr. 0.81 --> 1.47 --> 0.87; resolved  - Lasix prn for goal net even by tomorrow  - Hyponatremia, SIADH vs. renal excretion 2/2 intrinsic kidney injury  - (+) Rodriguez for strict I's and O's      #Onc  - Metastatic esophageal CA with mets to pelvis and brain  - Holding Keytruda inpatient (last cycle in July), will f/u outpatient with Dr. Mackey  - Onc following      #Endo  - No active issue currently      #DVT PPx  - POCUS finding of left subclavian vein thrombosis during central line insertion; Switched to heparin gtt; Heparin gtt d/c'ed due to concern for ICH  - Now on SCD    #GOC/Ethics  - DNR; Cap pressors, c/w current treatment, no aggressive measures  - MOLST form in chart. Patient is a 53 M with hx of esophageal CA with mets to R pelvis and brain, CAD s/p RCA stent in 2015, p/w dyspnea 2/2 R pleural effusion s/p thoracentesis and bronchoscopy, admitted to MICU hypoxic respiratory failure s/p intubation and hypotension, on pressors.      #Neuro  - Sedated on fentanyl and propofol  - New finding of dilated left pupil 8/14, s/p CTH showed no acute changes. Pupils are constricted b/l currently      #Resp  - Hypoxic respiratory failure 2/2 R pleural effusion vs. infection, s/p intubation  - FiO2 decreased to 50%, rate 26 and PEEP 8.  - s/p thoracentesis 8/13 with 3.2 L removal and bronchoscopy 8/14  - Chest tube suction ~1700 cc over 24 hours; (+) air leak, c/w wall suction  - Chest PT and suction prn  - f/u BAL fluid cytology; lipase, triglyceride wnl,       #CV  - s/p RCA stent in 2015  - On li and vaso, off levo  - Cardiology following, no acute interventions  - (-) pericardial effusion on POCUS    #GI  - OGT in place, with wall suction prn  - CT chest (+) large R pleural effusion and small L pleural effusion, (-) gastric leak.   - NPO x meds for now  - Protonix 40 mg IV daily      #ID  - ?Septic shock, (+) leukocytosis 18 --> 24.1 --> 23.8  - C/w vanc (8/14 - )and zosyn (8/14 - ); vanc trough 8.3 dosed x 1g 8/16 AM  - Trend lactate, 3.3 --> 4.1 --> 6.4 --> 6.1 --> 1.5  - Bcx, BAL cx, AFB NGTD.       #Renal  - AGNES Cr. 0.81 --> 1.47 --> 0.87; resolved  - Lasix prn for goal net -500 by tomorrow  - Hyponatremia, SIADH vs. renal excretion 2/2 intrinsic kidney injury  - (+) Rodriguez for strict I's and O's      #Onc  - Metastatic esophageal CA with mets to pelvis and brain  - Holding Keytruda inpatient (last cycle in July), will f/u outpatient with Dr. Mackey  - Onc following      #Endo  - No active issue currently      #DVT PPx  - POCUS finding of left subclavian vein thrombosis during central line insertion; Switched to heparin gtt; Heparin gtt d/c'ed due to concern for ICH  - Now on SCD    #GOC/Ethics  - DNR; Cap pressors, c/w current treatment, no aggressive measures  - MOLST form in chart.

## 2019-08-17 LAB
ANION GAP SERPL CALC-SCNC: 10 MMOL/L — SIGNIFICANT CHANGE UP (ref 5–17)
APTT BLD: 27.6 SEC — SIGNIFICANT CHANGE UP (ref 27.5–36.3)
BUN SERPL-MCNC: 19 MG/DL — SIGNIFICANT CHANGE UP (ref 7–23)
CALCIUM SERPL-MCNC: 9 MG/DL — SIGNIFICANT CHANGE UP (ref 8.4–10.5)
CHLORIDE SERPL-SCNC: 95 MMOL/L — LOW (ref 96–108)
CO2 SERPL-SCNC: 28 MMOL/L — SIGNIFICANT CHANGE UP (ref 22–31)
CREAT SERPL-MCNC: 0.7 MG/DL — SIGNIFICANT CHANGE UP (ref 0.5–1.3)
GLUCOSE SERPL-MCNC: 116 MG/DL — HIGH (ref 70–99)
HCT VFR BLD CALC: 36 % — LOW (ref 39–50)
HGB BLD-MCNC: 10.9 G/DL — LOW (ref 13–17)
INR BLD: 1.08 RATIO — SIGNIFICANT CHANGE UP (ref 0.88–1.16)
MAGNESIUM SERPL-MCNC: 2.1 MG/DL — SIGNIFICANT CHANGE UP (ref 1.6–2.6)
MCHC RBC-ENTMCNC: 24.9 PG — LOW (ref 27–34)
MCHC RBC-ENTMCNC: 30.3 GM/DL — LOW (ref 32–36)
MCV RBC AUTO: 82.2 FL — SIGNIFICANT CHANGE UP (ref 80–100)
PHOSPHATE SERPL-MCNC: 2 MG/DL — LOW (ref 2.5–4.5)
PLATELET # BLD AUTO: 261 K/UL — SIGNIFICANT CHANGE UP (ref 150–400)
POTASSIUM SERPL-MCNC: 4.3 MMOL/L — SIGNIFICANT CHANGE UP (ref 3.5–5.3)
POTASSIUM SERPL-SCNC: 4.3 MMOL/L — SIGNIFICANT CHANGE UP (ref 3.5–5.3)
PROTHROM AB SERPL-ACNC: 12.4 SEC — SIGNIFICANT CHANGE UP (ref 10–12.9)
RBC # BLD: 4.39 M/UL — SIGNIFICANT CHANGE UP (ref 4.2–5.8)
RBC # FLD: 14.4 % — SIGNIFICANT CHANGE UP (ref 10.3–14.5)
SODIUM SERPL-SCNC: 133 MMOL/L — LOW (ref 135–145)
WBC # BLD: 22.3 K/UL — HIGH (ref 3.8–10.5)
WBC # FLD AUTO: 22.3 K/UL — HIGH (ref 3.8–10.5)

## 2019-08-17 PROCEDURE — 99291 CRITICAL CARE FIRST HOUR: CPT

## 2019-08-17 RX ORDER — FENTANYL CITRATE 50 UG/ML
25 INJECTION INTRAVENOUS ONCE
Refills: 0 | Status: DISCONTINUED | OUTPATIENT
Start: 2019-08-17 | End: 2019-08-17

## 2019-08-17 RX ORDER — VANCOMYCIN HCL 1 G
1000 VIAL (EA) INTRAVENOUS EVERY 8 HOURS
Refills: 0 | Status: DISCONTINUED | OUTPATIENT
Start: 2019-08-17 | End: 2019-08-18

## 2019-08-17 RX ORDER — SODIUM CHLORIDE 9 MG/ML
500 INJECTION, SOLUTION INTRAVENOUS ONCE
Refills: 0 | Status: COMPLETED | OUTPATIENT
Start: 2019-08-17 | End: 2019-08-17

## 2019-08-17 RX ORDER — VANCOMYCIN HCL 1 G
1500 VIAL (EA) INTRAVENOUS EVERY 12 HOURS
Refills: 0 | Status: DISCONTINUED | OUTPATIENT
Start: 2019-08-17 | End: 2019-08-17

## 2019-08-17 RX ADMIN — SODIUM CHLORIDE 1000 MILLILITER(S): 9 INJECTION, SOLUTION INTRAVENOUS at 14:48

## 2019-08-17 RX ADMIN — VASOPRESSIN 2.4 UNIT(S)/MIN: 20 INJECTION INTRAVENOUS at 05:57

## 2019-08-17 RX ADMIN — CHLORHEXIDINE GLUCONATE 15 MILLILITER(S): 213 SOLUTION TOPICAL at 05:43

## 2019-08-17 RX ADMIN — Medication 3 MILLILITER(S): at 23:46

## 2019-08-17 RX ADMIN — Medication 3 MILLILITER(S): at 17:21

## 2019-08-17 RX ADMIN — PIPERACILLIN AND TAZOBACTAM 25 GRAM(S): 4; .5 INJECTION, POWDER, LYOPHILIZED, FOR SOLUTION INTRAVENOUS at 06:00

## 2019-08-17 RX ADMIN — Medication 250 MILLIGRAM(S): at 21:11

## 2019-08-17 RX ADMIN — CHLORHEXIDINE GLUCONATE 15 MILLILITER(S): 213 SOLUTION TOPICAL at 17:47

## 2019-08-17 RX ADMIN — Medication 3 MILLILITER(S): at 00:44

## 2019-08-17 RX ADMIN — Medication 1 DROP(S): at 13:32

## 2019-08-17 RX ADMIN — Medication 1 DROP(S): at 02:45

## 2019-08-17 RX ADMIN — Medication 1 APPLICATION(S): at 17:47

## 2019-08-17 RX ADMIN — Medication 250 MILLIGRAM(S): at 05:43

## 2019-08-17 RX ADMIN — PIPERACILLIN AND TAZOBACTAM 25 GRAM(S): 4; .5 INJECTION, POWDER, LYOPHILIZED, FOR SOLUTION INTRAVENOUS at 21:11

## 2019-08-17 RX ADMIN — Medication 3 MILLILITER(S): at 11:32

## 2019-08-17 RX ADMIN — FENTANYL CITRATE 2.02 MICROGRAM(S)/KG/HR: 50 INJECTION INTRAVENOUS at 07:21

## 2019-08-17 RX ADMIN — Medication 1 APPLICATION(S): at 05:44

## 2019-08-17 RX ADMIN — FENTANYL CITRATE 25 MICROGRAM(S): 50 INJECTION INTRAVENOUS at 18:48

## 2019-08-17 RX ADMIN — PIPERACILLIN AND TAZOBACTAM 25 GRAM(S): 4; .5 INJECTION, POWDER, LYOPHILIZED, FOR SOLUTION INTRAVENOUS at 13:30

## 2019-08-17 RX ADMIN — Medication 3 MILLILITER(S): at 05:29

## 2019-08-17 RX ADMIN — Medication 62.5 MILLIMOLE(S): at 01:43

## 2019-08-17 RX ADMIN — CHLORHEXIDINE GLUCONATE 1 APPLICATION(S): 213 SOLUTION TOPICAL at 05:44

## 2019-08-17 RX ADMIN — PROPOFOL 19.34 MICROGRAM(S)/KG/MIN: 10 INJECTION, EMULSION INTRAVENOUS at 05:56

## 2019-08-17 RX ADMIN — FENTANYL CITRATE 25 MICROGRAM(S): 50 INJECTION INTRAVENOUS at 19:07

## 2019-08-17 RX ADMIN — PANTOPRAZOLE SODIUM 40 MILLIGRAM(S): 20 TABLET, DELAYED RELEASE ORAL at 12:13

## 2019-08-17 NOTE — PROGRESS NOTE ADULT - ASSESSMENT
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 No signs of ADHF at this time.   - EKG without ischemic changes  - Cont asa  - Tele demonstrates NSR/Sinus tachy which is likely all reactive to his underlying condition.     - Cont pressor support to maintain MAP at least >60. Titrate off as blood pressure tolerates.     - PTx rx per MICU  - Monitor Chest tube output.     - Monitor and replete electrolytes. Keep K>4.0 and Mg>2.0.   - Further cardiac workup will depend on clinical course.   All other workup per MICU team. Will followup.

## 2019-08-17 NOTE — PROGRESS NOTE ADULT - ATTENDING COMMENTS
Patient seen and examined. Laboratory data and imaging reviewed. Patient with a history of esophageal cancer with mets and CAD with prior stents p/w dyspnea in setting of pleural effusion. He has had a thoracentesis and then subsequently a bronchoscopy after which he went into hypoxemic respiratory failure requiring intubation    1. Acute Hypoxemic Respiratory Failure  - Continue mechanical ventilation to maintain O2 sats > 90  - Will attempt to decrease sedation to see if patient can wean  - Continue chest tube and monitor output  2. Oropharyngeal dysphagia  - Prior NGT removed  - Patient with prior esophageal Ca surgery and gastric pull through - so NGTs will appear to be in chest when they are in stomach  - Will hold off replacement of NGT for now in hopes that patient will be able to weaned off sedation  3. ID  - Cultures thus far negative  - Continue antibiotics

## 2019-08-17 NOTE — PROGRESS NOTE ADULT - ASSESSMENT
Patient is a 53 M with hx of esophageal CA with mets to R pelvis and brain, CAD s/p RCA stent in 2015, p/w dyspnea 2/2 R pleural effusion s/p thoracentesis and bronchoscopy, admitted to MICU hypoxic respiratory failure s/p intubation and hypotension, on pressors.      #Neuro  - Sedated on fentanyl and propofol  - New finding of dilated left pupil 8/14, s/p CTH showed no acute changes. Pupils are constricted b/l currently      #Resp  - Hypoxic respiratory failure 2/2 R pleural effusion vs. infection, s/p intubation  - FiO2 decreased to 50%, rate 26 and PEEP 8.  - s/p thoracentesis 8/13 with 3.2 L removal and bronchoscopy 8/14  - Chest tube suction ~1700 cc over 24 hours; (+) air leak, c/w wall suction  - Chest PT and suction prn  - f/u BAL fluid cytology; lipase, triglyceride wnl,       #CV  - s/p RCA stent in 2015  - On li and vaso, off levo  - Cardiology following, no acute interventions  - (-) pericardial effusion on POCUS    #GI  - OGT in place, with wall suction prn  - CT chest (+) large R pleural effusion and small L pleural effusion, (-) gastric leak.   - NPO x meds for now  - Protonix 40 mg IV daily      #ID  - ?Septic shock, (+) leukocytosis 18 --> 24.1 --> 23.8  - C/w vanc (8/14 - )and zosyn (8/14 - ); vanc trough 8.3 dosed x 1g 8/16 AM  - Trend lactate, 3.3 --> 4.1 --> 6.4 --> 6.1 --> 1.5  - Bcx, BAL cx, AFB NGTD.       #Renal  - AGNES Cr. 0.81 --> 1.47 --> 0.87; resolved  - Lasix prn for goal net -500 by tomorrow  - Hyponatremia, SIADH vs. renal excretion 2/2 intrinsic kidney injury  - (+) Rodriguez for strict I's and O's      #Onc  - Metastatic esophageal CA with mets to pelvis and brain  - Holding Keytruda inpatient (last cycle in July), will f/u outpatient with Dr. Mackey  - Onc following      #Endo  - No active issue currently      #DVT PPx  - POCUS finding of left subclavian vein thrombosis during central line insertion; Switched to heparin gtt; Heparin gtt d/c'ed due to concern for ICH  - Now on SCD    #GOC/Ethics  - DNR; Cap pressors, c/w current treatment, no aggressive measures  - MOLST form in chart. Patient is a 53 M with hx of esophageal CA with mets to R pelvis and brain, CAD s/p RCA stent in 2015, p/w dyspnea 2/2 R pleural effusion s/p thoracentesis and bronchoscopy, admitted to MICU hypoxic respiratory failure s/p intubation and hypotension, on pressors.      #Neuro  - Sedated on fentanyl and propofol; wean on sedation as tolerated  - New finding of dilated left pupil 8/14, s/p CTH showed no acute changes. Pupils are constricted b/l currently      #Resp  - Hypoxic respiratory failure 2/2 R pleural effusion vs. infection, s/p intubation  - FiO2 decreased to 50%, rate 26 and PEEP 8.  - s/p thoracentesis 8/13 with 3.2 L removal and bronchoscopy 8/14  - Chest tube suction 290 cc overnight; (+) air leak, c/w wall suction  - Chest PT and suction prn  - f/u BAL fluid cytology; lipase, triglyceride wnl,       #CV  - s/p RCA stent in 2015  - On li and vaso, off levo  - Cardiology following, no acute interventions  - (-) pericardial effusion on POCUS    #GI  - OGT removed, NPO now.   - CT chest (+) large R pleural effusion and small L pleural effusion, (-) gastric leak.   - Protonix 40 mg IV daily      #ID  - ?Septic shock, (+) persistent leukocytosis  - C/w vanc (8/14 - )and zosyn (8/14 - ); vanc increased to 1500 BID due to low trough  - Lactate wnl  - Bcx, BAL cx, AFB NGTD.       #Renal  - AGNES resolved  - UOP 15 cc/hr now, 500 cc LR bolus, and re-assess  - Hyponatremia, SIADH vs. renal excretion 2/2 intrinsic kidney injury  - (+) Rodriguez for strict I's and O's      #Onc  - Metastatic esophageal CA with mets to pelvis and brain  - Holding Keytruda inpatient (last cycle in July), will f/u outpatient with Dr. Mackey  - Onc following      #Endo  - No active issue currently      #DVT PPx  - POCUS finding of left subclavian vein thrombosis during central line insertion; Switched to heparin gtt; Heparin gtt d/c'ed due to concern for ICH  - Now on SCD    #GOC/Ethics  - DNR; Cap pressors, c/w current treatment, no aggressive measures  - MOLST form in chart.

## 2019-08-17 NOTE — PROGRESS NOTE ADULT - SUBJECTIVE AND OBJECTIVE BOX
Dr. Tuyet Alarcon  Internal Medicine, PGY-1  Pager #: [phone]      Patient is a 53 M with hx of esophageal CA with mets to R pelvis and brain, CAD s/p RCA stent in 2015, p/w dyspnea 2/2 R pleural effusion s/p thoracentesis and bronchoscopy, admitted to MICU for hypoxic respiratory failure s/p intubation and hypotension.    INTERVAL HPI/OVERNIGHT EVENTS:  No acute events overnight.     SUBJECTIVE: Patient seen and examined at bedside.       Unable to obtain ROS due to sedation and intubation    OBJECTIVE:    VITAL SIGNS:  ICU Vital Signs Last 24 Hrs  T(C): 37.6 (17 Aug 2019 04:00), Max: 37.7 (17 Aug 2019 00:00)  T(F): 99.7 (17 Aug 2019 04:00), Max: 99.9 (17 Aug 2019 00:00)  HR: 82 (17 Aug 2019 06:45) (72 - 95)  BP: 113/78 (17 Aug 2019 06:30) (91/63 - 135/91)  BP(mean): 91 (17 Aug 2019 06:30) (73 - 108)  ABP: --  ABP(mean): --  RR: 26 (16 Aug 2019 19:30) (0 - 32)  SpO2: 97% (17 Aug 2019 06:45) (93% - 99%)    Mode: AC/ CMV (Assist Control/ Continuous Mandatory Ventilation), RR (machine): 26, TV (machine): 450, FiO2: 50, PEEP: 8, ITime: 1, MAP: 14, PIP: 27    08-16 @ 07:01  -  08-17 @ 07:00  --------------------------------------------------------  IN: 2924.5 mL / OUT: 3225 mL / NET: -300.5 mL      CAPILLARY BLOOD GLUCOSE          PHYSICAL EXAM:    GENERAL: Intubated  NEURO: Sedated, unresponsive  HEENT: b/l pupil constricted and reactive to light; clear conjunctiva;  RESP: Intubated; decreased breath sound bibasilar fields; (+) crackles R upper and mid lung fields with improved air movements  CVS: S1/S2 present, RRR. no murmurs, gallops or rubs appreciated; (+) R subclavian TLC  ABD: Soft, non-tender, non-distended, no masses appreciated; diminished BS  EXT: 2+ pedal pulses bilaterally, no BLE edema  SKIN: Warm, dry, and appropirate color for skin tone; No new rashes    MEDICATIONS:  MEDICATIONS  (STANDING):  ALBUTerol/ipratropium for Nebulization 3 milliLiter(s) Nebulizer every 6 hours  artificial tears (preservative free) Ophthalmic Solution 1 Drop(s) Both EYES two times a day  aspirin  chewable 81 milliGRAM(s) Oral daily  chlorhexidine 0.12% Liquid 15 milliLiter(s) Oral Mucosa every 12 hours  chlorhexidine 4% Liquid 1 Application(s) Topical <User Schedule>  chlorhexidine 4% Liquid 1 Application(s) Topical <User Schedule>  docusate sodium Liquid 100 milliGRAM(s) Oral three times a day  fentaNYL   Infusion. 0.5 MICROgram(s)/kG/Hr (2.015 mL/Hr) IV Continuous <Continuous>  pantoprazole  Injectable 40 milliGRAM(s) IV Push daily  petrolatum Ophthalmic Ointment 1 Application(s) Both EYES two times a day  phenylephrine    Infusion 0.165 MICROgram(s)/kG/Min (5 mL/Hr) IV Continuous <Continuous>  piperacillin/tazobactam IVPB.. 3.375 Gram(s) IV Intermittent every 8 hours  propofol Infusion 40 MICROgram(s)/kG/Min (19.344 mL/Hr) IV Continuous <Continuous>  vancomycin  IVPB 1000 milliGRAM(s) IV Intermittent every 12 hours  vasopressin Infusion 0.04 Unit(s)/Min (2.4 mL/Hr) IV Continuous <Continuous>    MEDICATIONS  (PRN):  sodium chloride 0.9% lock flush 10 milliLiter(s) IV Push every 1 hour PRN Pre/post blood products, medications, blood draw, and to maintain line patency      ALLERGIES:  Allergies    No Known Allergies    Intolerances              LABS:                        10.9   22.3  )-----------( 261      ( 17 Aug 2019 01:03 )             36.0     08-17    133<L>  |  95<L>  |  19  ----------------------------<  116<H>  4.3   |  28  |  0.70    Ca    9.0      17 Aug 2019 01:03  Phos  2.0     08-17  Mg     2.1     08-17      PT/INR - ( 17 Aug 2019 01:03 )   PT: 12.4 sec;   INR: 1.08 ratio         PTT - ( 17 Aug 2019 01:03 )  PTT:27.6 sec      RADIOLOGY & ADDITIONAL TESTS: Reviewed. Dr. Tuyet Alarcon  Internal Medicine, PGY-1  Pager #: [phone]      Patient is a 53 M with hx of esophageal CA with mets to R pelvis and brain, CAD s/p RCA stent in 2015, p/w dyspnea 2/2 R pleural effusion s/p thoracentesis and bronchoscopy, admitted to MICU for hypoxic respiratory failure s/p intubation and hypotension.    INTERVAL HPI/OVERNIGHT EVENTS:  No acute events overnight. Chest tube drain 290 cc overnight.    SUBJECTIVE: Patient seen and examined at bedside.       Unable to obtain ROS due to sedation and intubation    OBJECTIVE:    VITAL SIGNS:  ICU Vital Signs Last 24 Hrs  T(C): 37.6 (17 Aug 2019 04:00), Max: 37.7 (17 Aug 2019 00:00)  T(F): 99.7 (17 Aug 2019 04:00), Max: 99.9 (17 Aug 2019 00:00)  HR: 82 (17 Aug 2019 06:45) (72 - 95)  BP: 113/78 (17 Aug 2019 06:30) (91/63 - 135/91)  BP(mean): 91 (17 Aug 2019 06:30) (73 - 108)  ABP: --  ABP(mean): --  RR: 26 (16 Aug 2019 19:30) (0 - 32)  SpO2: 97% (17 Aug 2019 06:45) (93% - 99%)    Mode: AC/ CMV (Assist Control/ Continuous Mandatory Ventilation), RR (machine): 26, TV (machine): 450, FiO2: 50, PEEP: 8, ITime: 1, MAP: 14, PIP: 27    08-16 @ 07:01  -  08-17 @ 07:00  --------------------------------------------------------  IN: 2924.5 mL / OUT: 3225 mL / NET: -300.5 mL      CAPILLARY BLOOD GLUCOSE          PHYSICAL EXAM:    GENERAL: Intubated  NEURO: Sedated, unresponsive  HEENT: b/l pupil constricted and reactive to light; clear conjunctiva;  RESP: Intubated; decreased breath sound bibasilar fields; (+) crackles R upper and mid lung fields with improved air movements  CVS: S1/S2 present, RRR. no murmurs, gallops or rubs appreciated; (+) R subclavian TLC  ABD: Soft, non-tender, non-distended, no masses appreciated; diminished BS  EXT: 2+ pedal pulses bilaterally, no BLE edema  SKIN: Warm, dry, and appropirate color for skin tone; No new rashes    MEDICATIONS:  MEDICATIONS  (STANDING):  ALBUTerol/ipratropium for Nebulization 3 milliLiter(s) Nebulizer every 6 hours  artificial tears (preservative free) Ophthalmic Solution 1 Drop(s) Both EYES two times a day  aspirin  chewable 81 milliGRAM(s) Oral daily  chlorhexidine 0.12% Liquid 15 milliLiter(s) Oral Mucosa every 12 hours  chlorhexidine 4% Liquid 1 Application(s) Topical <User Schedule>  chlorhexidine 4% Liquid 1 Application(s) Topical <User Schedule>  docusate sodium Liquid 100 milliGRAM(s) Oral three times a day  fentaNYL   Infusion. 0.5 MICROgram(s)/kG/Hr (2.015 mL/Hr) IV Continuous <Continuous>  pantoprazole  Injectable 40 milliGRAM(s) IV Push daily  petrolatum Ophthalmic Ointment 1 Application(s) Both EYES two times a day  phenylephrine    Infusion 0.165 MICROgram(s)/kG/Min (5 mL/Hr) IV Continuous <Continuous>  piperacillin/tazobactam IVPB.. 3.375 Gram(s) IV Intermittent every 8 hours  propofol Infusion 40 MICROgram(s)/kG/Min (19.344 mL/Hr) IV Continuous <Continuous>  vancomycin  IVPB 1000 milliGRAM(s) IV Intermittent every 12 hours  vasopressin Infusion 0.04 Unit(s)/Min (2.4 mL/Hr) IV Continuous <Continuous>    MEDICATIONS  (PRN):  sodium chloride 0.9% lock flush 10 milliLiter(s) IV Push every 1 hour PRN Pre/post blood products, medications, blood draw, and to maintain line patency      ALLERGIES:  Allergies    No Known Allergies    Intolerances              LABS:                        10.9   22.3  )-----------( 261      ( 17 Aug 2019 01:03 )             36.0     08-17    133<L>  |  95<L>  |  19  ----------------------------<  116<H>  4.3   |  28  |  0.70    Ca    9.0      17 Aug 2019 01:03  Phos  2.0     08-17  Mg     2.1     08-17      PT/INR - ( 17 Aug 2019 01:03 )   PT: 12.4 sec;   INR: 1.08 ratio         PTT - ( 17 Aug 2019 01:03 )  PTT:27.6 sec      RADIOLOGY & ADDITIONAL TESTS: Reviewed.

## 2019-08-17 NOTE — PROGRESS NOTE ADULT - SUBJECTIVE AND OBJECTIVE BOX
· Subjective and Objective:   [facility] CARDIOLOGY CONSULTANTS:    Jamilah Manning, Traci Bustos, Stanford Mendez, Tiffany Ewing      [phone]    CHIEF COMPLAINT: Patient is a 53y old  Male who presents with a chief complaint of pleural effusion (17 Aug 2019 07:19)    Pt intubated on sedation - chart reviewed  No overnight events  Tele: NSR without ectopy    Unable to obtain ROS as pt intubated/sedated        PAST MEDICAL & SURGICAL HISTORY:  H/O nausea  History of dysphagia  Hilar lymphadenopathy  Chronic anticoagulation  CAD (coronary artery disease)  Secondary malignant neoplasm of brain  Metastasis to bone  Esophageal cancer  MI (myocardial infarction):  with 1 coronary stent mid RCA  H/O craniotomy  History of esophageal surgery: 2018  H/O hernia repair: 1966  Status post tonsillectomy and adenoidectomy  Stented coronary artery: x1       MEDICATIONS  (STANDING):  ALBUTerol/ipratropium for Nebulization 3 milliLiter(s) Nebulizer every 6 hours  artificial tears (preservative free) Ophthalmic Solution 1 Drop(s) Both EYES two times a day  aspirin  chewable 81 milliGRAM(s) Oral daily  chlorhexidine 0.12% Liquid 15 milliLiter(s) Oral Mucosa every 12 hours  chlorhexidine 4% Liquid 1 Application(s) Topical <User Schedule>  chlorhexidine 4% Liquid 1 Application(s) Topical <User Schedule>  docusate sodium Liquid 100 milliGRAM(s) Oral three times a day  fentaNYL   Infusion. 0.5 MICROgram(s)/kG/Hr (2.015 mL/Hr) IV Continuous <Continuous>  pantoprazole  Injectable 40 milliGRAM(s) IV Push daily  petrolatum Ophthalmic Ointment 1 Application(s) Both EYES two times a day  phenylephrine    Infusion 0.165 MICROgram(s)/kG/Min (5 mL/Hr) IV Continuous <Continuous>  piperacillin/tazobactam IVPB.. 3.375 Gram(s) IV Intermittent every 8 hours  propofol Infusion 40 MICROgram(s)/kG/Min (19.344 mL/Hr) IV Continuous <Continuous>  vancomycin  IVPB 1000 milliGRAM(s) IV Intermittent every 12 hours  vasopressin Infusion 0.04 Unit(s)/Min (2.4 mL/Hr) IV Continuous <Continuous>      Allergies    No Known Allergies    Intolerances                              10.9   22.3  )-----------( 261      ( 17 Aug 2019 01:03 )             36.0       08-17    133<L>  |  95<L>  |  19  ----------------------------<  116<H>  4.3   |  28  |  0.70    Ca    9.0      17 Aug 2019 01:03  Phos  2.0       Mg     2.1                 PT/INR - ( 17 Aug 2019 01: )   PT: 12.4 sec;   INR: 1.08 ratio         PTT - ( 17 Aug 2019 01: )  PTT:27.6 sec                      Daily     Daily Weight in k.6 (17 Aug 2019 04:00)    I&O's Summary    16 Aug 2019 07:  -  17 Aug 2019 07:00  --------------------------------------------------------  IN: 2924.5 mL / OUT: 3225 mL / NET: -300.5 mL    17 Aug 2019 07:01  -  17 Aug 2019 10:10  --------------------------------------------------------  IN: 198 mL / OUT: 30 mL / NET: 168 mL        Vital Signs Last 24 Hrs  T(C): 37.7 (17 Aug 2019 08:00), Max: 37.7 (17 Aug 2019 00:00)  T(F): 99.9 (17 Aug 2019 08:00), Max: 99.9 (17 Aug 2019 00:00)  HR: 72 (17 Aug 2019 09:30) (72 - 95)  BP: 122/83 (17 Aug 2019 09:30) (91/63 - 128/88)  BP(mean): 98 (17 Aug 2019 09:30) (73 - 103)  RR: 26 (17 Aug 2019 09:) (0 - 28)  SpO2: 100% (17 Aug 2019 09:30) (93% - 100%)    PHYSICAL EXAM:   · Constitutional	Well-developed, well nourished  · Eyes	EOMI; PERRL; no drainage or redness  · ENMT	No oral lesions; no gross abnormalities  · Neck	No bruits; no thyromegaly or nodules  · Respiratory	Normal breath sounds b/l, No RRW  · Cardiovascular	Regular rate & rhythm, normal S1, S2; no murmurs, gallops or rubs; no S3, S4  · Gastrointestinal	Soft, non-tender, no hepatosplenomegaly, normal bowel sounds  · Extremities	No cyanosis, clubbing or edema  · Vascular	Equal and normal pulses (carotid, femoral, dorsalis pedis)  · Neurological	Alert & oriented; no sensory, motor or coordination deficits, normal reflexes

## 2019-08-18 LAB
ANION GAP SERPL CALC-SCNC: 9 MMOL/L — SIGNIFICANT CHANGE UP (ref 5–17)
APPEARANCE UR: ABNORMAL
APTT BLD: 28.8 SEC — SIGNIFICANT CHANGE UP (ref 27.5–36.3)
BACTERIA # UR AUTO: ABNORMAL
BILIRUB UR-MCNC: NEGATIVE — SIGNIFICANT CHANGE UP
BUN SERPL-MCNC: 17 MG/DL — SIGNIFICANT CHANGE UP (ref 7–23)
CALCIUM SERPL-MCNC: 8.5 MG/DL — SIGNIFICANT CHANGE UP (ref 8.4–10.5)
CHLORIDE SERPL-SCNC: 93 MMOL/L — LOW (ref 96–108)
CO2 SERPL-SCNC: 29 MMOL/L — SIGNIFICANT CHANGE UP (ref 22–31)
COLOR SPEC: ABNORMAL
CREAT SERPL-MCNC: 0.56 MG/DL — SIGNIFICANT CHANGE UP (ref 0.5–1.3)
CULTURE RESULTS: SIGNIFICANT CHANGE UP
DIFF PNL FLD: ABNORMAL
EPI CELLS # UR: 2 — SIGNIFICANT CHANGE UP
GLUCOSE SERPL-MCNC: 109 MG/DL — HIGH (ref 70–99)
GLUCOSE UR QL: NEGATIVE — SIGNIFICANT CHANGE UP
GRAN CASTS # UR COMP ASSIST: 1 /LPF — SIGNIFICANT CHANGE UP
HCT VFR BLD CALC: 32.7 % — LOW (ref 39–50)
HGB BLD-MCNC: 10.1 G/DL — LOW (ref 13–17)
HYALINE CASTS # UR AUTO: 2 /LPF — SIGNIFICANT CHANGE UP (ref 0–7)
INR BLD: 1.25 RATIO — HIGH (ref 0.88–1.16)
KETONES UR-MCNC: ABNORMAL
LEUKOCYTE ESTERASE UR-ACNC: NEGATIVE — SIGNIFICANT CHANGE UP
MAGNESIUM SERPL-MCNC: 2 MG/DL — SIGNIFICANT CHANGE UP (ref 1.6–2.6)
MCHC RBC-ENTMCNC: 24.9 PG — LOW (ref 27–34)
MCHC RBC-ENTMCNC: 30.8 GM/DL — LOW (ref 32–36)
MCV RBC AUTO: 80.9 FL — SIGNIFICANT CHANGE UP (ref 80–100)
NITRITE UR-MCNC: NEGATIVE — SIGNIFICANT CHANGE UP
NON-GYNECOLOGICAL CYTOLOGY STUDY: SIGNIFICANT CHANGE UP
PH UR: 6 — SIGNIFICANT CHANGE UP (ref 5–8)
PHOSPHATE SERPL-MCNC: 1.4 MG/DL — LOW (ref 2.5–4.5)
PLATELET # BLD AUTO: 183 K/UL — SIGNIFICANT CHANGE UP (ref 150–400)
POTASSIUM SERPL-MCNC: 3.8 MMOL/L — SIGNIFICANT CHANGE UP (ref 3.5–5.3)
POTASSIUM SERPL-SCNC: 3.8 MMOL/L — SIGNIFICANT CHANGE UP (ref 3.5–5.3)
PROT UR-MCNC: 100 — SIGNIFICANT CHANGE UP
PROTHROM AB SERPL-ACNC: 14.3 SEC — HIGH (ref 10–12.9)
RBC # BLD: 4.05 M/UL — LOW (ref 4.2–5.8)
RBC # FLD: 14.3 % — SIGNIFICANT CHANGE UP (ref 10.3–14.5)
RBC CASTS # UR COMP ASSIST: >50 /HPF — SIGNIFICANT CHANGE UP (ref 0–4)
SODIUM SERPL-SCNC: 131 MMOL/L — LOW (ref 135–145)
SP GR SPEC: >1.05 (ref 1.01–1.02)
SPECIMEN SOURCE: SIGNIFICANT CHANGE UP
UROBILINOGEN FLD QL: ABNORMAL
VANCOMYCIN TROUGH SERPL-MCNC: 8.6 UG/ML — LOW (ref 10–20)
WBC # BLD: 16.8 K/UL — HIGH (ref 3.8–10.5)
WBC # FLD AUTO: 16.8 K/UL — HIGH (ref 3.8–10.5)
WBC UR QL: 5 /HPF — SIGNIFICANT CHANGE UP (ref 0–5)

## 2019-08-18 PROCEDURE — 99291 CRITICAL CARE FIRST HOUR: CPT

## 2019-08-18 RX ORDER — VANCOMYCIN HCL 1 G
1250 VIAL (EA) INTRAVENOUS EVERY 8 HOURS
Refills: 0 | Status: DISCONTINUED | OUTPATIENT
Start: 2019-08-18 | End: 2019-08-21

## 2019-08-18 RX ORDER — FENTANYL CITRATE 50 UG/ML
50 INJECTION INTRAVENOUS ONCE
Refills: 0 | Status: DISCONTINUED | OUTPATIENT
Start: 2019-08-18 | End: 2019-08-18

## 2019-08-18 RX ORDER — ACETAMINOPHEN 500 MG
650 TABLET ORAL ONCE
Refills: 0 | Status: COMPLETED | OUTPATIENT
Start: 2019-08-18 | End: 2019-08-18

## 2019-08-18 RX ORDER — POTASSIUM PHOSPHATE, MONOBASIC POTASSIUM PHOSPHATE, DIBASIC 236; 224 MG/ML; MG/ML
30 INJECTION, SOLUTION INTRAVENOUS ONCE
Refills: 0 | Status: COMPLETED | OUTPATIENT
Start: 2019-08-18 | End: 2019-08-18

## 2019-08-18 RX ORDER — MIDAZOLAM HYDROCHLORIDE 1 MG/ML
2 INJECTION, SOLUTION INTRAMUSCULAR; INTRAVENOUS ONCE
Refills: 0 | Status: DISCONTINUED | OUTPATIENT
Start: 2019-08-18 | End: 2019-08-18

## 2019-08-18 RX ADMIN — PIPERACILLIN AND TAZOBACTAM 25 GRAM(S): 4; .5 INJECTION, POWDER, LYOPHILIZED, FOR SOLUTION INTRAVENOUS at 05:00

## 2019-08-18 RX ADMIN — Medication 3 MILLILITER(S): at 05:57

## 2019-08-18 RX ADMIN — CHLORHEXIDINE GLUCONATE 15 MILLILITER(S): 213 SOLUTION TOPICAL at 17:42

## 2019-08-18 RX ADMIN — VASOPRESSIN 2.4 UNIT(S)/MIN: 20 INJECTION INTRAVENOUS at 05:16

## 2019-08-18 RX ADMIN — CHLORHEXIDINE GLUCONATE 1 APPLICATION(S): 213 SOLUTION TOPICAL at 05:01

## 2019-08-18 RX ADMIN — Medication 250 MILLIGRAM(S): at 05:00

## 2019-08-18 RX ADMIN — FENTANYL CITRATE 50 MICROGRAM(S): 50 INJECTION INTRAVENOUS at 11:08

## 2019-08-18 RX ADMIN — Medication 1 DROP(S): at 15:15

## 2019-08-18 RX ADMIN — Medication 250 MILLIGRAM(S): at 13:52

## 2019-08-18 RX ADMIN — Medication 1 DROP(S): at 01:26

## 2019-08-18 RX ADMIN — CHLORHEXIDINE GLUCONATE 15 MILLILITER(S): 213 SOLUTION TOPICAL at 05:01

## 2019-08-18 RX ADMIN — POTASSIUM PHOSPHATE, MONOBASIC POTASSIUM PHOSPHATE, DIBASIC 83.33 MILLIMOLE(S): 236; 224 INJECTION, SOLUTION INTRAVENOUS at 02:21

## 2019-08-18 RX ADMIN — Medication 1 APPLICATION(S): at 17:42

## 2019-08-18 RX ADMIN — Medication 3 MILLILITER(S): at 17:18

## 2019-08-18 RX ADMIN — PANTOPRAZOLE SODIUM 40 MILLIGRAM(S): 20 TABLET, DELAYED RELEASE ORAL at 11:09

## 2019-08-18 RX ADMIN — Medication 250 MILLIGRAM(S): at 23:31

## 2019-08-18 RX ADMIN — Medication 650 MILLIGRAM(S): at 14:07

## 2019-08-18 RX ADMIN — FENTANYL CITRATE 50 MICROGRAM(S): 50 INJECTION INTRAVENOUS at 11:43

## 2019-08-18 RX ADMIN — Medication 3 MILLILITER(S): at 23:26

## 2019-08-18 RX ADMIN — FENTANYL CITRATE 2.02 MICROGRAM(S)/KG/HR: 50 INJECTION INTRAVENOUS at 05:16

## 2019-08-18 RX ADMIN — PROPOFOL 19.34 MICROGRAM(S)/KG/MIN: 10 INJECTION, EMULSION INTRAVENOUS at 06:23

## 2019-08-18 RX ADMIN — MIDAZOLAM HYDROCHLORIDE 2 MILLIGRAM(S): 1 INJECTION, SOLUTION INTRAMUSCULAR; INTRAVENOUS at 11:43

## 2019-08-18 RX ADMIN — Medication 650 MILLIGRAM(S): at 12:47

## 2019-08-18 RX ADMIN — PIPERACILLIN AND TAZOBACTAM 25 GRAM(S): 4; .5 INJECTION, POWDER, LYOPHILIZED, FOR SOLUTION INTRAVENOUS at 13:53

## 2019-08-18 RX ADMIN — PIPERACILLIN AND TAZOBACTAM 25 GRAM(S): 4; .5 INJECTION, POWDER, LYOPHILIZED, FOR SOLUTION INTRAVENOUS at 21:08

## 2019-08-18 RX ADMIN — Medication 3 MILLILITER(S): at 11:08

## 2019-08-18 RX ADMIN — Medication 1 APPLICATION(S): at 05:01

## 2019-08-18 NOTE — PROGRESS NOTE ADULT - ASSESSMENT
Patient is a 53 M with hx of esophageal CA with mets to R pelvis and brain, CAD s/p RCA stent in 2015, p/w dyspnea 2/2 R pleural effusion s/p thoracentesis and bronchoscopy, admitted to MICU hypoxic respiratory failure s/p intubation and hypotension, on pressors.    #Neuro  - Sedated on fentanyl and propofol; wean on sedation as tolerated  - New finding of dilated left pupil 8/14, s/p CTH showed no acute changes. Pupils are constricted b/l currently    #Resp  - Hypoxic respiratory failure 2/2 R pleural effusion vs. infection, s/p intubation  - FiO2 decreased to 50%, rate 26 and PEEP 8.  - s/p thoracentesis 8/13 with 3.2 L removal and bronchoscopy 8/14  - Chest tube suction 290 cc overnight; (+) air leak, c/w wall suction  - Chest PT and suction prn  - f/u BAL fluid cytology; lipase, triglyceride wnl,     #CV  - s/p RCA stent in 2015  - On li and vaso, off levo  - Cardiology following, no acute interventions  - (-) pericardial effusion on POCUS    #GI  - OGT removed, NPO now.   - CT chest (+) large R pleural effusion and small L pleural effusion, (-) gastric leak.   - Protonix 40 mg IV daily    #ID  - ?Septic shock, (+) persistent leukocytosis  - C/w vanc (8/14 - )and zosyn (8/14 - ); vanc increased to 1500 BID due to low trough  - Lactate wnl  - Bcx, BAL cx, AFB NGTD.     #Renal  - AGNES resolved  - UOP 15 cc/hr now, 500 cc LR bolus, and re-assess  - Hyponatremia, SIADH vs. renal excretion 2/2 intrinsic kidney injury  - (+) Rodriguez for strict I's and O's    #Onc  - Metastatic esophageal CA with mets to pelvis and brain  - Holding Keytruda inpatient (last cycle in July), will f/u outpatient with Dr. Mackey  - Onc following    #Endo  - No active issue currently    #DVT PPx  - POCUS finding of left subclavian vein thrombosis during central line insertion; Switched to heparin gtt; Heparin gtt d/c'ed due to concern for ICH  - Now on SCD    #GOC/Ethics  - DNR; Cap pressors, c/w current treatment, no aggressive measures  - MOLST form in chart. Patient is a 53 M with hx of esophageal CA with mets to R pelvis and brain, CAD s/p RCA stent in 2015, p/w dyspnea 2/2 R pleural effusion s/p thoracentesis and bronchoscopy, admitted to MICU for hypoxic respiratory failure post bronch with resulting tension pneumothorax now s/p chest tube and intubation, course further c/b hypotension, on pressors.     #Neuro  - Sedated on fentanyl and propofol; wean on sedation as tolerated  - New finding of dilated left pupil 8/14, s/p CTH showed no acute changes. Pupils are constricted b/l currently    #Resp  - Hypoxic respiratory failure 2/2 R pleural effusion vs. infection, s/p intubation  - Pleural effusion is likely malignant. No signs of ADHF at this time.   - FiO2 decreased to 50%, rate 26 and PEEP 8.  - s/p thoracentesis 8/13 with 3.2 L removal and bronchoscopy 8/14  - Chest tube suction 290 cc overnight; (+) air leak, c/w wall suction  - Chest PT and suction prn  - f/u BAL fluid cytology; lipase, triglyceride wnl,     #CV  - s/p RCA stent in 2015  - On li and vaso, off levo  - Cardiology following, no acute interventions  - (-) pericardial effusion on POCUS  - No signs of significant ischemia   - EKG without ischemic changes  - Tele demonstrates NSR/Sinus tachy which is likely all reactive to his underlying condition.   - Cont asa    #GI  - OGT removed, NPO now.   - CT chest (+) large R pleural effusion and small L pleural effusion, (-) gastric leak.   - Protonix 40 mg IV daily    #ID  - ?Septic shock, (+) persistent leukocytosis  - C/w vanc (8/14 - )and zosyn (8/14 - ); vanc increased to 1500 BID due to low trough  - Lactate wnl  - Bcx, BAL cx, AFB NGTD.     #Renal  - AGNES resolved  - UOP 15 cc/hr now, 500 cc LR bolus, and re-assess  - Hyponatremia, SIADH vs. renal excretion 2/2 intrinsic kidney injury  - (+) Rodriguez for strict I's and O's    #Onc  - Metastatic esophageal CA with mets to pelvis and brain  - Holding Keytruda inpatient (last cycle in July), will f/u outpatient with Dr. Mackey  - Onc following    #Endo  - No active issue currently    #DVT PPx  - POCUS finding of left subclavian vein thrombosis during central line insertion; Switched to heparin gtt; Heparin gtt d/c'ed due to concern for ICH  - Now on SCD    #GOC/Ethics  - DNR; Cap pressors, c/w current treatment, no aggressive measures  - MOLST form in chart. Patient is a 53 M with hx of esophageal CA with mets to R pelvis and brain, CAD s/p RCA stent in 2015, p/w dyspnea 2/2 R pleural effusion s/p thoracentesis and bronchoscopy, admitted to MICU for hypoxic respiratory failure post bronch with resulting tension pneumothorax now s/p chest tube and intubation, course further c/b hypotension, on pressors.     #Neuro  - Sedated on fentanyl and propofol; wean on sedation as tolerated  - New finding of dilated left pupil 8/14, s/p CTH showed no acute changes. Pupils are constricted b/l currently    #Resp  - Hypoxic respiratory failure 2/2 R pleural effusion vs. infection, s/p intubation  - Pleural effusion is likely malignant. No signs of ADHF at this time.   - FiO2 decreased to 50%, rate 26 and PEEP 8.  - s/p thoracentesis 8/13 with 3.2 L removal and bronchoscopy 8/14  - Chest tube suction 630 cc overnight; (+) air leak, c/w wall suction  - Chest PT and suction prn  - f/u BAL fluid cytology; lipase, triglyceride wnl    #CV  - s/p RCA stent in 2015  - On li and vaso, off levo  - Cardiology following, no acute interventions  - (-) pericardial effusion on POCUS  - No signs of significant ischemia   - EKG without ischemic changes  - Tele demonstrates NSR/Sinus tachy which is likely all reactive to his underlying condition.   - Cont asa    #GI  - OGT removed, NPO now.   - CT chest (+) large R pleural effusion and small L pleural effusion, (-) gastric leak.   - Protonix 40 mg IV daily    #ID  - ?Septic shock, (+) persistent leukocytosis  - C/w vanc (8/14 - )and zosyn (8/14 - ); vanc increased to 1500 BID due to low trough  - Lactate wnl  - Bcx, BAL cx, AFB NGTD.   - febrile on 8/18, repeat BCx and UA pending    #Renal  - AGNES resolved  - UOP 15 cc/hr now, 500 cc LR bolus, and re-assess  - Hyponatremia, SIADH vs. renal excretion 2/2 intrinsic kidney injury  - Rodriguez with clot removed. Replaced with condom cath.    #Onc  - Metastatic esophageal CA with mets to pelvis and brain  - Holding Keytruda inpatient (last cycle in July), will f/u outpatient with Dr. Mackey  - Onc following    #Endo  - No active issue currently    #DVT PPx  - POCUS finding of left subclavian vein thrombosis during central line insertion; Switched to heparin gtt; Heparin gtt d/c'ed due to concern for ICH  - Now on SCD    #GOC/Ethics  - DNR; Cap pressors, c/w current treatment, no aggressive measures  - MOLST form in chart.

## 2019-08-18 NOTE — PROGRESS NOTE ADULT - SUBJECTIVE AND OBJECTIVE BOX
Debra Avelar, PGY1  [id number]  [phone]    CHIEF COMPLAINT: Patient is a 53y old  Male who presents with a chief complaint of pleural effusion (17 Aug 2019 10:09)    Interval Events:    REVIEW OF SYSTEMS:  Unable to assess ROS because AMS    OBJECTIVE:  ICU Vital Signs Last 24 Hrs  T(C): 37.7 (18 Aug 2019 04:00), Max: 37.7 (17 Aug 2019 08:00)  T(F): 99.9 (18 Aug 2019 04:00), Max: 99.9 (17 Aug 2019 08:00)  HR: 106 (18 Aug 2019 05:58) (72 - 114)  BP: 129/68 (18 Aug 2019 05:15) (91/59 - 131/91)  BP(mean): 88 (18 Aug 2019 05:15) (70 - 107)  RR: 31 (18 Aug 2019 05:15) (12 - 31)  SpO2: 99% (18 Aug 2019 05:58) (94% - 100%)    Mode: AC/ CMV (Assist Control/ Continuous Mandatory Ventilation), RR (machine): 26, TV (machine): 450, FiO2: 50, PEEP: 8, ITime: 1, MAP: 14, PIP: 31    08-17 @ 07:01  -  08-18 @ 06:17  --------------------------------------------------------  IN: 2232.1 mL / OUT: 1790 mL / NET: 442.1 mL    PHYSICAL EXAM:  GENERAL: Intubated  NEURO: Sedated, unresponsive  HEENT: b/l pupil constricted and reactive to light; clear conjunctiva;  RESP: Intubated; decreased breath sound bibasilar fields; (+) crackles R upper and mid lung fields with improved air movements  CVS: S1/S2 present, RRR. no murmurs, gallops or rubs appreciated; (+) R subclavian TLC  ABD: Soft, non-tender, non-distended, no masses appreciated; diminished BS  EXT: 2+ pedal pulses bilaterally, no BLE edema  SKIN: Warm, dry, and appropirate color for skin tone; No new rashes    LINES:    HOSPITAL MEDICATIONS:  MEDICATIONS  (STANDING):  ALBUTerol/ipratropium for Nebulization 3 milliLiter(s) Nebulizer every 6 hours  artificial tears (preservative free) Ophthalmic Solution 1 Drop(s) Both EYES two times a day  aspirin  chewable 81 milliGRAM(s) Oral daily  chlorhexidine 0.12% Liquid 15 milliLiter(s) Oral Mucosa every 12 hours  chlorhexidine 4% Liquid 1 Application(s) Topical <User Schedule>  chlorhexidine 4% Liquid 1 Application(s) Topical <User Schedule>  docusate sodium Liquid 100 milliGRAM(s) Oral three times a day  fentaNYL   Infusion. 0.5 MICROgram(s)/kG/Hr (2.015 mL/Hr) IV Continuous <Continuous>  pantoprazole  Injectable 40 milliGRAM(s) IV Push daily  petrolatum Ophthalmic Ointment 1 Application(s) Both EYES two times a day  phenylephrine    Infusion 0.165 MICROgram(s)/kG/Min (5 mL/Hr) IV Continuous <Continuous>  piperacillin/tazobactam IVPB.. 3.375 Gram(s) IV Intermittent every 8 hours  propofol Infusion 40 MICROgram(s)/kG/Min (19.344 mL/Hr) IV Continuous <Continuous>  vancomycin  IVPB 1000 milliGRAM(s) IV Intermittent every 8 hours  vasopressin Infusion 0.04 Unit(s)/Min (2.4 mL/Hr) IV Continuous <Continuous>    MEDICATIONS  (PRN):  sodium chloride 0.9% lock flush 10 milliLiter(s) IV Push every 1 hour PRN Pre/post blood products, medications, blood draw, and to maintain line patency    LABS:  (08-18 @ 00:02)                        10.1  16.8 )-----------( 183                 32.7    WBC Trend: 16.8<--, 22.3<--, 23.8<--  Hb Trend: 10.1<--, 10.9<--, 11.3<--, 12.9<--, 13.9<--  Plt Trend: 183<--, 261<--, 310<--, 406<--, 477<--  08-18    131<L>  |  93<L>  |  17  ----------------------------<  109<H>  3.8   |  29  |  0.56    Ca    8.5      18 Aug 2019 00:02  Culture - Acid Fast (08.13.19 @ 20:59)    AFB Specimen Processing: Concentration    Acid-Fast Smear: Negative: Performed At: [address] [id number]  Du Malloy MD Ph:[phone]    Phos  1.4     08-18  Mg     2.0     08-18    Creatinine Trend: 0.56<--, 0.70<--, 0.87<--, 1.24<--, 1.47<--, 0.81<--    PT/INR - ( 18 Aug 2019 00:02 )   PT: 14.3 sec;   INR: 1.25 ratio  PTT - ( 18 Aug 2019 00:02 )  PTT:28.8 sec    MICROBIOLOGY:   Culture - Blood (08.14.19 @ 21:54)    Specimen Source: .Blood    Culture Results:   No growth to date.    Culture - Bronchial (08.14.19 @ 17:59)    Gram Stain:   Moderate polymorphonuclear leukocytes per low power field  Few Squamous epithelial cells per low power field  Few Yeast like cells per oil power field  Few Gram Negative Rods per oil power field    Specimen Source: Bronch Wash    Culture Results:   Normal Respiratory Vilma present    Culture - Acid Fast (08.13.19 @ 20:59)    AFB Specimen Processing: Concentration    Acid-Fast Smear: Negative: Performed At: [address] [id number]  Du Malloy MD Ph:[phone]    Culture - Fungal, Body Fluid (08.13.19 @ 16:23)    Specimen Source: .Body Fluid    Culture Results:   Testing in progress    Culture - Body Fluid with Gram Stain (08.13.19 @ 16:23)    Gram Stain:   No polymorphonuclear cells seen  No organisms seen  by cytocentrifuge    Specimen Source: .Body Fluid    Culture Results:   No growth to date.    RADIOLOGY:  < from: Xray Chest 1 View- PORTABLE-Urgent (08.16.19 @ 21:15) >  Impression:  Status post gastric the pull-through. NG tube in place with tip extending   to the right heart border. This is in the expected location of the   gastric pull-through. Small right pleural effusion which appears   moderately improved compared to the prior examination. There is mild   pulmonary vascular congestion.    < from: CT Head No Cont (08.15.19 @ 05:29) >  IMPRESSION:    Postoperative changes. No mass effect or hemorrhage.    < from: CT Chest w/ IV Cont (08.15.19 @ 02:07) >  IMPRESSION:   Moderate to large right tension hydropneumothorax.  Small pneumoperitoneum of uncertain etiologybut may be secondary to   pneumothorax.  Small left pleural effusion with atelectasis of the left lower lobe.  No evidence of leak from gastric pull-through.  Increased ascites in the abdomen with peritoneal carcinomatosis again   noted. Indeterminate liver lesions.    < from: CT Abdomen and Pelvis w/ Oral Cont and w/ IV Cont (08.15.19 @ 02:07) >  IMPRESSION:   Moderate to large right tension hydropneumothorax.  Small pneumoperitoneum of uncertain etiologybut may be secondary to   pneumothorax.  Small left pleural effusion with atelectasis of the left lower lobe.  No evidence of leak from gastric pull-through.  Increased ascites in the abdomen with peritoneal carcinomatosis again   noted. Indeterminate liver lesions.    < from: MR Head w/wo IV Cont (08.11.19 @ 15:02) >  Impression: New areas of abnormal areas of enhancement identified as   described above.    < from: Transthoracic Echocardiogram (08.16.19 @ 07:52) >  EF (Visual Estimate): 60-65 %  Conclusions:  1. Mitral valve not well visualized, probably normal.  Minimal mitral regurgitation.  2. Aortic valve not well visualized; probably normal. No  aortic valve regurgitation seen.  3. Normal left ventricular systolic function. No segmental  wall motion abnormalities. Flattening of the  interventricular septum in both systole and diastole is  consistent with right ventricular pressure overload.  4. Moderate diastolic dysfunction (Stage II).  5. Right ventricular enlargement with normal right  ventricular systolic function.  6. Normal pericardium with no pericardial effusion.  7. Right pleural effusion.  *** No previous Echo exam.    EKG: < from: 12 Lead ECG (08.14.19 @ 17:57) >  Diagnosis Line SINUS TACHYCARDIAWITH PREMATURE ATRIAL COMPLEXES  NONSPECIFIC ST AND T WAVE ABNORMALITY  ABNORMAL ECG Debra Avelar, PGY1  [id number]  [phone]    CHIEF COMPLAINT: Patient is a 53y old  Male who presents with a chief complaint of pleural effusion (17 Aug 2019 10:09)    Interval Events: Leaking around pagan overnight. Pagan had clot in it. Condom cath placed. T 100.8. Tylenol given. BCx sent    REVIEW OF SYSTEMS:  Unable to assess ROS because AMS    OBJECTIVE:  ICU Vital Signs Last 24 Hrs  T(C): 37.7 (18 Aug 2019 04:00), Max: 37.7 (17 Aug 2019 08:00)  T(F): 99.9 (18 Aug 2019 04:00), Max: 99.9 (17 Aug 2019 08:00)  HR: 106 (18 Aug 2019 05:58) (72 - 114)  BP: 129/68 (18 Aug 2019 05:15) (91/59 - 131/91)  BP(mean): 88 (18 Aug 2019 05:15) (70 - 107)  RR: 31 (18 Aug 2019 05:15) (12 - 31)  SpO2: 99% (18 Aug 2019 05:58) (94% - 100%)    Mode: AC/ CMV (Assist Control/ Continuous Mandatory Ventilation), RR (machine): 26, TV (machine): 450, FiO2: 50, PEEP: 8, ITime: 1, MAP: 14, PIP: 31    08-17 @ 07:01  -  08-18 @ 06:17  --------------------------------------------------------  IN: 2232.1 mL / OUT: 1790 mL / NET: 442.1 mL    PHYSICAL EXAM:  GENERAL: Intubated  NEURO: Sedated, unresponsive  HEENT: b/l pupil constricted and reactive to light; clear conjunctiva;  RESP: Intubated; decreased breath sound bibasilar fields; (+) crackles R upper and mid lung fields with improved air movements  CVS: S1/S2 present, RRR. no murmurs, gallops or rubs appreciated; (+) R subclavian TLC  ABD: Soft, non-distended, no masses appreciated; diminished BS  EXT: 2+ pedal pulses bilaterally, no BLE edema  SKIN: Warm, dry. No new rashes    HOSPITAL MEDICATIONS:  MEDICATIONS  (STANDING):  ALBUTerol/ipratropium for Nebulization 3 milliLiter(s) Nebulizer every 6 hours  artificial tears (preservative free) Ophthalmic Solution 1 Drop(s) Both EYES two times a day  aspirin  chewable 81 milliGRAM(s) Oral daily  chlorhexidine 0.12% Liquid 15 milliLiter(s) Oral Mucosa every 12 hours  chlorhexidine 4% Liquid 1 Application(s) Topical <User Schedule>  chlorhexidine 4% Liquid 1 Application(s) Topical <User Schedule>  docusate sodium Liquid 100 milliGRAM(s) Oral three times a day  fentaNYL   Infusion. 0.5 MICROgram(s)/kG/Hr (2.015 mL/Hr) IV Continuous <Continuous>  pantoprazole  Injectable 40 milliGRAM(s) IV Push daily  petrolatum Ophthalmic Ointment 1 Application(s) Both EYES two times a day  phenylephrine    Infusion 0.165 MICROgram(s)/kG/Min (5 mL/Hr) IV Continuous <Continuous>  piperacillin/tazobactam IVPB.. 3.375 Gram(s) IV Intermittent every 8 hours  propofol Infusion 40 MICROgram(s)/kG/Min (19.344 mL/Hr) IV Continuous <Continuous>  vancomycin  IVPB 1000 milliGRAM(s) IV Intermittent every 8 hours  vasopressin Infusion 0.04 Unit(s)/Min (2.4 mL/Hr) IV Continuous <Continuous>    MEDICATIONS  (PRN):  sodium chloride 0.9% lock flush 10 milliLiter(s) IV Push every 1 hour PRN Pre/post blood products, medications, blood draw, and to maintain line patency    LABS:  (08-18 @ 00:02)                        10.1  16.8 )-----------( 183                 32.7    WBC Trend: 16.8<--, 22.3<--, 23.8<--  Hb Trend: 10.1<--, 10.9<--, 11.3<--, 12.9<--, 13.9<--  Plt Trend: 183<--, 261<--, 310<--, 406<--, 477<--  08-18    131<L>  |  93<L>  |  17  ----------------------------<  109<H>  3.8   |  29  |  0.56    Ca    8.5      18 Aug 2019 00:02  Phos  1.4     08-18  Mg     2.0     08-18    Creatinine Trend: 0.56<--, 0.70<--, 0.87<--, 1.24<--, 1.47<--, 0.81<--    PT/INR - ( 18 Aug 2019 00:02 )   PT: 14.3 sec;   INR: 1.25 ratio  PTT - ( 18 Aug 2019 00:02 )  PTT:28.8 sec    MICROBIOLOGY:   Culture - Blood (08.14.19 @ 21:54)    Specimen Source: .Blood    Culture Results:   No growth to date.    Culture - Bronchial (08.14.19 @ 17:59)    Gram Stain:   Moderate polymorphonuclear leukocytes per low power field  Few Squamous epithelial cells per low power field  Few Yeast like cells per oil power field  Few Gram Negative Rods per oil power field    Specimen Source: Bronch Wash    Culture Results:   Normal Respiratory Vilma present    Culture - Acid Fast (08.13.19 @ 20:59)    AFB Specimen Processing: Concentration    Acid-Fast Smear: Negative: Performed At: [address] [id number]  Du Malloy MD Ph:[phone]    Culture - Fungal, Body Fluid (08.13.19 @ 16:23)    Specimen Source: .Body Fluid    Culture Results:   Testing in progress    Culture - Body Fluid with Gram Stain (08.13.19 @ 16:23)    Gram Stain:   No polymorphonuclear cells seen  No organisms seen  by cytocentrifuge    Specimen Source: .Body Fluid    Culture Results:   No growth to date.    RADIOLOGY:  < from: Xray Chest 1 View- PORTABLE-Urgent (08.16.19 @ 21:15) >  Impression:  Status post gastric the pull-through. NG tube in place with tip extending   to the right heart border. This is in the expected location of the   gastric pull-through. Small right pleural effusion which appears   moderately improved compared to the prior examination. There is mild   pulmonary vascular congestion.    < from: CT Head No Cont (08.15.19 @ 05:29) >  IMPRESSION:    Postoperative changes. No mass effect or hemorrhage.    < from: CT Chest w/ IV Cont (08.15.19 @ 02:07) >  IMPRESSION:   Moderate to large right tension hydropneumothorax.  Small pneumoperitoneum of uncertain etiologybut may be secondary to   pneumothorax.  Small left pleural effusion with atelectasis of the left lower lobe.  No evidence of leak from gastric pull-through.  Increased ascites in the abdomen with peritoneal carcinomatosis again   noted. Indeterminate liver lesions.    < from: CT Abdomen and Pelvis w/ Oral Cont and w/ IV Cont (08.15.19 @ 02:07) >  IMPRESSION:   Moderate to large right tension hydropneumothorax.  Small pneumoperitoneum of uncertain etiologybut may be secondary to   pneumothorax.  Small left pleural effusion with atelectasis of the left lower lobe.  No evidence of leak from gastric pull-through.  Increased ascites in the abdomen with peritoneal carcinomatosis again   noted. Indeterminate liver lesions.    < from: MR Head w/wo IV Cont (08.11.19 @ 15:02) >  Impression: New areas of abnormal areas of enhancement identified as   described above.    < from: Transthoracic Echocardiogram (08.16.19 @ 07:52) >  EF (Visual Estimate): 60-65 %  Conclusions:  1. Mitral valve not well visualized, probably normal.  Minimal mitral regurgitation.  2. Aortic valve not well visualized; probably normal. No  aortic valve regurgitation seen.  3. Normal left ventricular systolic function. No segmental  wall motion abnormalities. Flattening of the  interventricular septum in both systole and diastole is  consistent with right ventricular pressure overload.  4. Moderate diastolic dysfunction (Stage II).  5. Right ventricular enlargement with normal right  ventricular systolic function.  6. Normal pericardium with no pericardial effusion.  7. Right pleural effusion.  *** No previous Echo exam.    EKG: < from: 12 Lead ECG (08.14.19 @ 17:57) >  Diagnosis Line SINUS TACHYCARDIAWITH PREMATURE ATRIAL COMPLEXES  NONSPECIFIC ST AND T WAVE ABNORMALITY  ABNORMAL ECG

## 2019-08-18 NOTE — PROGRESS NOTE ADULT - ATTENDING COMMENTS
Patient seen and examined. Laboratory data and imaging reviewed. Patient with a history of esophageal cancer with mets and CAD with prior stents p/w dyspnea in setting of pleural effusion. He has had a thoracentesis and then subsequently a bronchoscopy after which he went into hypoxemic respiratory failure requiring intubation    1. Acute Hypoxemic Respiratory Failure  - Continue mechanical ventilation to maintain O2 sats > 90  - Will attempt to decrease sedation to see if patient can wean  - Continue chest tube and monitor output  - Continue PEEP 8 - given desaturation with lower PEEP  2. Oropharyngeal dysphagia  - NGT feeds  - Patient with prior esophageal Ca surgery and gastric pull through - so NGTs will appear to be in chest when they are in stomach  - Will hold off replacement of NGT for now in hopes that patient will be able to weaned off sedation  3. ID  - Cultures thus far negative  - Continue antibiotics  4. GOC  - Patient presently DNR but family seems conflicted regarding reintubation. Will need to clarify this prior to extubation attempt

## 2019-08-18 NOTE — PROGRESS NOTE ADULT - SUBJECTIVE AND OBJECTIVE BOX
· Subjective and Objective:   [facility] CARDIOLOGY CONSULTANTS:    Jamilah Manning, Traci Bustos, Stanford Mendez, Tiffany Ewing      [phone]    CHIEF COMPLAINT: Patient is a 53y old  Male who presents with a chief complaint of pleural effusion (17 Aug 2019 07:19)    Pt intubated on sedation - chart reviewed  No overnight events  Tele: NSR without ectopy    Unable to obtain ROS as pt intubated/sedated      PAST MEDICAL & SURGICAL HISTORY:  H/O nausea  History of dysphagia  Hilar lymphadenopathy  Chronic anticoagulation  CAD (coronary artery disease)  Secondary malignant neoplasm of brain  Metastasis to bone  Esophageal cancer  MI (myocardial infarction):  with 1 coronary stent mid RCA  H/O craniotomy  History of esophageal surgery: 2018  H/O hernia repair: 1966  Status post tonsillectomy and adenoidectomy  Stented coronary artery: x1       MEDICATIONS  (STANDING):  ALBUTerol/ipratropium for Nebulization 3 milliLiter(s) Nebulizer every 6 hours  artificial tears (preservative free) Ophthalmic Solution 1 Drop(s) Both EYES two times a day  aspirin  chewable 81 milliGRAM(s) Oral daily  chlorhexidine 0.12% Liquid 15 milliLiter(s) Oral Mucosa every 12 hours  chlorhexidine 4% Liquid 1 Application(s) Topical <User Schedule>  chlorhexidine 4% Liquid 1 Application(s) Topical <User Schedule>  docusate sodium Liquid 100 milliGRAM(s) Oral three times a day  fentaNYL   Infusion. 0.5 MICROgram(s)/kG/Hr (2.015 mL/Hr) IV Continuous <Continuous>  pantoprazole  Injectable 40 milliGRAM(s) IV Push daily  petrolatum Ophthalmic Ointment 1 Application(s) Both EYES two times a day  phenylephrine    Infusion 0.165 MICROgram(s)/kG/Min (5 mL/Hr) IV Continuous <Continuous>  piperacillin/tazobactam IVPB.. 3.375 Gram(s) IV Intermittent every 8 hours  propofol Infusion 40 MICROgram(s)/kG/Min (19.344 mL/Hr) IV Continuous <Continuous>  vancomycin  IVPB 1000 milliGRAM(s) IV Intermittent every 8 hours  vasopressin Infusion 0.04 Unit(s)/Min (2.4 mL/Hr) IV Continuous <Continuous>      Allergies    No Known Allergies    Intolerances                              10.1   16.8  )-----------( 183      ( 18 Aug 2019 00:02 )             32.7       08-18    131<L>  |  93<L>  |  17  ----------------------------<  109<H>  3.8   |  29  |  0.56    Ca    8.5      18 Aug 2019 00:02  Phos  1.4     18  Mg     2.0     18            PT/INR - ( 18 Aug 2019 00:02 )   PT: 14.3 sec;   INR: 1.25 ratio         PTT - ( 18 Aug 2019 00:02 )  PTT:28.8 sec                      Daily     Daily Weight in k.6 (18 Aug 2019 04:00)    I&O's Summary    17 Aug 2019 07:  -  18 Aug 2019 07:00  --------------------------------------------------------  IN: 2232.1 mL / OUT: 1790 mL / NET: 442.1 mL    18 Aug 2019 07:01  -  18 Aug 2019 09:25  --------------------------------------------------------  IN: 71.4 mL / OUT: 0 mL / NET: 71.4 mL        Vital Signs Last 24 Hrs  T(C): 37.9 (18 Aug 2019 08:00), Max: 37.9 (18 Aug 2019 08:00)  T(F): 100.2 (18 Aug 2019 08:00), Max: 100.2 (18 Aug 2019 08:00)  HR: 95 (18 Aug 2019 09:) (72 - 120)  BP: 118/80 (18 Aug 2019 09:00) (89/54 - 143/74)  BP(mean): 95 (18 Aug 2019 09:) (66 - 107)  RR: 26 (18 Aug 2019 09:) (12 - 46)  SpO2: 95% (18 Aug 2019 09:) (93% - 100%)    PHYSICAL EXAM:   · Constitutional	Well-developed, well nourished  · Eyes	EOMI; PERRL; no drainage or redness  · ENMT	No oral lesions; no gross abnormalities  · Neck	No bruits; no thyromegaly or nodules  · Respiratory	Normal breath sounds b/l, No RRW  · Cardiovascular	Regular rate & rhythm, normal S1, S2; no murmurs, gallops or rubs; no S3, S4  · Gastrointestinal	Soft, non-tender, no hepatosplenomegaly, normal bowel sounds  · Extremities	No cyanosis, clubbing or edema  · Vascular	Equal and normal pulses (carotid, femoral, dorsalis pedis)  · Neurological	Alert & oriented; no sensory, motor or coordination deficits, normal reflexes

## 2019-08-19 LAB
ANION GAP SERPL CALC-SCNC: 10 MMOL/L — SIGNIFICANT CHANGE UP (ref 5–17)
APTT BLD: 28.6 SEC — SIGNIFICANT CHANGE UP (ref 27.5–36.3)
BUN SERPL-MCNC: 17 MG/DL — SIGNIFICANT CHANGE UP (ref 7–23)
CALCIUM SERPL-MCNC: 8.4 MG/DL — SIGNIFICANT CHANGE UP (ref 8.4–10.5)
CHLORIDE SERPL-SCNC: 93 MMOL/L — LOW (ref 96–108)
CO2 SERPL-SCNC: 28 MMOL/L — SIGNIFICANT CHANGE UP (ref 22–31)
CREAT SERPL-MCNC: 0.49 MG/DL — LOW (ref 0.5–1.3)
CULTURE RESULTS: SIGNIFICANT CHANGE UP
CULTURE RESULTS: SIGNIFICANT CHANGE UP
GLUCOSE SERPL-MCNC: 126 MG/DL — HIGH (ref 70–99)
GRAM STN FLD: SIGNIFICANT CHANGE UP
HCT VFR BLD CALC: 35.6 % — LOW (ref 39–50)
HGB BLD-MCNC: 10.9 G/DL — LOW (ref 13–17)
INR BLD: 1.31 RATIO — HIGH (ref 0.88–1.16)
MAGNESIUM SERPL-MCNC: 1.9 MG/DL — SIGNIFICANT CHANGE UP (ref 1.6–2.6)
MCHC RBC-ENTMCNC: 24.8 PG — LOW (ref 27–34)
MCHC RBC-ENTMCNC: 30.6 GM/DL — LOW (ref 32–36)
MCV RBC AUTO: 81.1 FL — SIGNIFICANT CHANGE UP (ref 80–100)
PHOSPHATE SERPL-MCNC: 2.2 MG/DL — LOW (ref 2.5–4.5)
PLATELET # BLD AUTO: 202 K/UL — SIGNIFICANT CHANGE UP (ref 150–400)
POTASSIUM SERPL-MCNC: 3.7 MMOL/L — SIGNIFICANT CHANGE UP (ref 3.5–5.3)
POTASSIUM SERPL-SCNC: 3.7 MMOL/L — SIGNIFICANT CHANGE UP (ref 3.5–5.3)
PROTHROM AB SERPL-ACNC: 15.2 SEC — HIGH (ref 10–12.9)
RBC # BLD: 4.39 M/UL — SIGNIFICANT CHANGE UP (ref 4.2–5.8)
RBC # FLD: 14.5 % — SIGNIFICANT CHANGE UP (ref 10.3–14.5)
SODIUM SERPL-SCNC: 131 MMOL/L — LOW (ref 135–145)
SPECIMEN SOURCE: SIGNIFICANT CHANGE UP
WBC # BLD: 16.2 K/UL — HIGH (ref 3.8–10.5)
WBC # FLD AUTO: 16.2 K/UL — HIGH (ref 3.8–10.5)

## 2019-08-19 PROCEDURE — 99233 SBSQ HOSP IP/OBS HIGH 50: CPT

## 2019-08-19 PROCEDURE — 99291 CRITICAL CARE FIRST HOUR: CPT

## 2019-08-19 RX ORDER — ACETAMINOPHEN 500 MG
650 TABLET ORAL ONCE
Refills: 0 | Status: COMPLETED | OUTPATIENT
Start: 2019-08-19 | End: 2019-08-19

## 2019-08-19 RX ORDER — ENOXAPARIN SODIUM 100 MG/ML
40 INJECTION SUBCUTANEOUS DAILY
Refills: 0 | Status: DISCONTINUED | OUTPATIENT
Start: 2019-08-19 | End: 2019-08-22

## 2019-08-19 RX ORDER — ACETAMINOPHEN 500 MG
650 TABLET ORAL EVERY 6 HOURS
Refills: 0 | Status: DISCONTINUED | OUTPATIENT
Start: 2019-08-19 | End: 2019-08-21

## 2019-08-19 RX ADMIN — Medication 650 MILLIGRAM(S): at 01:30

## 2019-08-19 RX ADMIN — Medication 1 DROP(S): at 03:42

## 2019-08-19 RX ADMIN — Medication 3 MILLILITER(S): at 23:55

## 2019-08-19 RX ADMIN — Medication 650 MILLIGRAM(S): at 22:10

## 2019-08-19 RX ADMIN — Medication 250 MILLIGRAM(S): at 23:38

## 2019-08-19 RX ADMIN — PHENYLEPHRINE HYDROCHLORIDE 5 MICROGRAM(S)/KG/MIN: 10 INJECTION INTRAVENOUS at 19:00

## 2019-08-19 RX ADMIN — Medication 1 APPLICATION(S): at 18:33

## 2019-08-19 RX ADMIN — Medication 650 MILLIGRAM(S): at 14:55

## 2019-08-19 RX ADMIN — Medication 250 MILLIGRAM(S): at 16:00

## 2019-08-19 RX ADMIN — Medication 62.5 MILLIMOLE(S): at 01:06

## 2019-08-19 RX ADMIN — Medication 1 APPLICATION(S): at 05:52

## 2019-08-19 RX ADMIN — PIPERACILLIN AND TAZOBACTAM 25 GRAM(S): 4; .5 INJECTION, POWDER, LYOPHILIZED, FOR SOLUTION INTRAVENOUS at 14:55

## 2019-08-19 RX ADMIN — Medication 100 MILLIGRAM(S): at 14:54

## 2019-08-19 RX ADMIN — Medication 3 MILLILITER(S): at 17:11

## 2019-08-19 RX ADMIN — Medication 3 MILLILITER(S): at 05:28

## 2019-08-19 RX ADMIN — Medication 1 DROP(S): at 14:56

## 2019-08-19 RX ADMIN — PROPOFOL 19.34 MICROGRAM(S)/KG/MIN: 10 INJECTION, EMULSION INTRAVENOUS at 18:52

## 2019-08-19 RX ADMIN — Medication 650 MILLIGRAM(S): at 21:06

## 2019-08-19 RX ADMIN — PROPOFOL 19.34 MICROGRAM(S)/KG/MIN: 10 INJECTION, EMULSION INTRAVENOUS at 19:00

## 2019-08-19 RX ADMIN — PROPOFOL 19.34 MICROGRAM(S)/KG/MIN: 10 INJECTION, EMULSION INTRAVENOUS at 08:28

## 2019-08-19 RX ADMIN — CHLORHEXIDINE GLUCONATE 15 MILLILITER(S): 213 SOLUTION TOPICAL at 18:32

## 2019-08-19 RX ADMIN — CHLORHEXIDINE GLUCONATE 15 MILLILITER(S): 213 SOLUTION TOPICAL at 05:52

## 2019-08-19 RX ADMIN — ENOXAPARIN SODIUM 40 MILLIGRAM(S): 100 INJECTION SUBCUTANEOUS at 12:08

## 2019-08-19 RX ADMIN — Medication 650 MILLIGRAM(S): at 01:12

## 2019-08-19 RX ADMIN — Medication 650 MILLIGRAM(S): at 10:30

## 2019-08-19 RX ADMIN — PROPOFOL 19.34 MICROGRAM(S)/KG/MIN: 10 INJECTION, EMULSION INTRAVENOUS at 02:06

## 2019-08-19 RX ADMIN — PANTOPRAZOLE SODIUM 40 MILLIGRAM(S): 20 TABLET, DELAYED RELEASE ORAL at 12:08

## 2019-08-19 RX ADMIN — Medication 3 MILLILITER(S): at 12:03

## 2019-08-19 RX ADMIN — VASOPRESSIN 2.4 UNIT(S)/MIN: 20 INJECTION INTRAVENOUS at 19:00

## 2019-08-19 RX ADMIN — CHLORHEXIDINE GLUCONATE 1 APPLICATION(S): 213 SOLUTION TOPICAL at 05:52

## 2019-08-19 RX ADMIN — Medication 650 MILLIGRAM(S): at 16:00

## 2019-08-19 RX ADMIN — Medication 250 MILLIGRAM(S): at 08:27

## 2019-08-19 RX ADMIN — Medication 650 MILLIGRAM(S): at 09:17

## 2019-08-19 RX ADMIN — FENTANYL CITRATE 2.02 MICROGRAM(S)/KG/HR: 50 INJECTION INTRAVENOUS at 19:00

## 2019-08-19 RX ADMIN — FENTANYL CITRATE 2.02 MICROGRAM(S)/KG/HR: 50 INJECTION INTRAVENOUS at 02:07

## 2019-08-19 RX ADMIN — PIPERACILLIN AND TAZOBACTAM 25 GRAM(S): 4; .5 INJECTION, POWDER, LYOPHILIZED, FOR SOLUTION INTRAVENOUS at 05:52

## 2019-08-19 RX ADMIN — PIPERACILLIN AND TAZOBACTAM 25 GRAM(S): 4; .5 INJECTION, POWDER, LYOPHILIZED, FOR SOLUTION INTRAVENOUS at 21:07

## 2019-08-19 NOTE — GOALS OF CARE CONVERSATION - PERSONAL ADVANCE DIRECTIVE - TREATMENT GUIDELINE COMMENT
DNR, do not re-intubate; Cap pressors  A trial of IV fluids, tube feeding and antibiotics  Continue with blood draws  Supportive care and medications

## 2019-08-19 NOTE — CHART NOTE - NSCHARTNOTEFT_GEN_A_CORE
Nutrition Follow up - Malnutrition Follow Up     Interim events noted. Pt continues to be intubated, sedated, pressors capped. Pt NPO at this time, OGT was removed. Plan for no aggressive measures at this time.     Pt continues c severe malnutrition. RD remains available as needed for further nutrition interventions as warranted.     RD remains available.   Mimi Amin MS RD CDN  [facility],  #[phone]

## 2019-08-19 NOTE — PROGRESS NOTE ADULT - SUBJECTIVE AND OBJECTIVE BOX
Dr. Tuyet Alarcon  Internal Medicine, PGY-1  Pager #: [phone]      Patient is a 53 M with hx of esophageal CA with mets to R pelvis and brain, CAD s/p RCA stent in 2015, p/w dyspnea 2/2 R pleural effusion s/p thoracentesis and bronchoscopy, admitted to MICU for hypoxic respiratory failure s/p intubation and hypotension.    INTERVAL HPI/OVERNIGHT EVENTS:      SUBJECTIVE: Patient seen and examined at bedside.       Unable to obtain ROS due to intubated and sedation.     OBJECTIVE:    VITAL SIGNS:  ICU Vital Signs Last 24 Hrs  T(C): 37.3 (19 Aug 2019 04:00), Max: 38.4 (18 Aug 2019 16:00)  T(F): 99.1 (19 Aug 2019 04:00), Max: 101.1 (18 Aug 2019 16:00)  HR: 96 (19 Aug 2019 06:45) (76 - 117)  BP: 91/59 (19 Aug 2019 06:45) (84/55 - 142/82)  BP(mean): 70 (19 Aug 2019 06:45) (64 - 107)  ABP: --  ABP(mean): --  RR: 18 (19 Aug 2019 06:45) (6 - 45)  SpO2: 94% (19 Aug 2019 06:45) (82% - 99%)    Mode: AC/ CMV (Assist Control/ Continuous Mandatory Ventilation), RR (machine): 26, TV (machine): 450, FiO2: 70, PEEP: 8, ITime: 1, MAP: 13, PIP: 28    08-18 @ 07:01  -  08-19 @ 07:00  --------------------------------------------------------  IN: 2251.4 mL / OUT: 1150 mL / NET: 1101.4 mL      CAPILLARY BLOOD GLUCOSE          PHYSICAL EXAM:    GENERAL: Intubated  NEURO: Sedated, unresponsive  HEENT: b/l pupil constricted and reactive to light; clear conjunctiva;  RESP: Intubated; decreased breath sound bibasilar fields; (+) crackles R upper and mid lung fields with improved air movements  CVS: S1/S2 present, RRR. no murmurs, gallops or rubs appreciated; (+) R subclavian TLC  ABD: Soft, non-distended, no masses appreciated; diminished BS  EXT: 2+ pedal pulses bilaterally, no BLE edema  SKIN: Warm, dry. No new rashes    MEDICATIONS:  MEDICATIONS  (STANDING):  ALBUTerol/ipratropium for Nebulization 3 milliLiter(s) Nebulizer every 6 hours  artificial tears (preservative free) Ophthalmic Solution 1 Drop(s) Both EYES two times a day  aspirin  chewable 81 milliGRAM(s) Oral daily  chlorhexidine 0.12% Liquid 15 milliLiter(s) Oral Mucosa every 12 hours  chlorhexidine 4% Liquid 1 Application(s) Topical <User Schedule>  chlorhexidine 4% Liquid 1 Application(s) Topical <User Schedule>  docusate sodium Liquid 100 milliGRAM(s) Oral three times a day  fentaNYL   Infusion. 0.5 MICROgram(s)/kG/Hr (2.015 mL/Hr) IV Continuous <Continuous>  pantoprazole  Injectable 40 milliGRAM(s) IV Push daily  petrolatum Ophthalmic Ointment 1 Application(s) Both EYES two times a day  phenylephrine    Infusion 0.165 MICROgram(s)/kG/Min (5 mL/Hr) IV Continuous <Continuous>  piperacillin/tazobactam IVPB.. 3.375 Gram(s) IV Intermittent every 8 hours  propofol Infusion 40 MICROgram(s)/kG/Min (19.344 mL/Hr) IV Continuous <Continuous>  vancomycin  IVPB 1250 milliGRAM(s) IV Intermittent every 8 hours  vasopressin Infusion 0.04 Unit(s)/Min (2.4 mL/Hr) IV Continuous <Continuous>    MEDICATIONS  (PRN):  sodium chloride 0.9% lock flush 10 milliLiter(s) IV Push every 1 hour PRN Pre/post blood products, medications, blood draw, and to maintain line patency      ALLERGIES:  Allergies    No Known Allergies    Intolerances              LABS:                        10.9   16.2  )-----------( 202      ( 19 Aug 2019 00:20 )             35.6     08-19    131<L>  |  93<L>  |  17  ----------------------------<  126<H>  3.7   |  28  |  0.49<L>    Ca    8.4      19 Aug 2019 00:20  Phos  2.2     08-19  Mg     1.9     08-19      PT/INR - ( 19 Aug 2019 00:20 )   PT: 15.2 sec;   INR: 1.31 ratio         PTT - ( 19 Aug 2019 00:20 )  PTT:28.6 sec  Urinalysis Basic - ( 18 Aug 2019 13:33 )    Color: Dark Yellow / Appearance: Slightly Turbid / SG: >1.050 / pH: x  Gluc: x / Ketone: Large  / Bili: Negative / Urobili: 2 mg/dL   Blood: x / Protein: 100 / Nitrite: Negative   Leuk Esterase: Negative / RBC: >50 /hpf / WBC 5 /HPF   Sq Epi: x / Non Sq Epi: 2 / Bacteria: Occasional        RADIOLOGY & ADDITIONAL TESTS: Reviewed. Dr. Tuyet Alarcon  Internal Medicine, PGY-1  Pager #: [phone]      Patient is a 53 M with hx of esophageal CA with mets to R pelvis and brain, CAD s/p RCA stent in 2015, p/w dyspnea 2/2 R pleural effusion s/p thoracentesis and bronchoscopy, admitted to MICU for hypoxic respiratory failure s/p intubation and hypotension.    INTERVAL HPI/OVERNIGHT EVENTS:  Tachypneic > 30's when weaning off sedation. Gerard fevers, s/p bcx 8/18.    SUBJECTIVE: Patient seen and examined at bedside.       Unable to obtain ROS due to intubated and sedation.     OBJECTIVE:    VITAL SIGNS:  ICU Vital Signs Last 24 Hrs  T(C): 37.3 (19 Aug 2019 04:00), Max: 38.4 (18 Aug 2019 16:00)  T(F): 99.1 (19 Aug 2019 04:00), Max: 101.1 (18 Aug 2019 16:00)  HR: 96 (19 Aug 2019 06:45) (76 - 117)  BP: 91/59 (19 Aug 2019 06:45) (84/55 - 142/82)  BP(mean): 70 (19 Aug 2019 06:45) (64 - 107)  ABP: --  ABP(mean): --  RR: 18 (19 Aug 2019 06:45) (6 - 45)  SpO2: 94% (19 Aug 2019 06:45) (82% - 99%)    Mode: AC/ CMV (Assist Control/ Continuous Mandatory Ventilation), RR (machine): 26, TV (machine): 450, FiO2: 70, PEEP: 8, ITime: 1, MAP: 13, PIP: 28    08-18 @ 07:01  -  08-19 @ 07:00  --------------------------------------------------------  IN: 2251.4 mL / OUT: 1150 mL / NET: 1101.4 mL      CAPILLARY BLOOD GLUCOSE          PHYSICAL EXAM:    GENERAL: Intubated  NEURO: Sedated, unresponsive  HEENT: b/l pupil constricted and reactive to light; clear conjunctiva;  RESP: Intubated; decreased breath sound bibasilar fields; (+) crackles R upper and mid lung fields with improved air movements  CVS: S1/S2 present, RRR. no murmurs, gallops or rubs appreciated; (+) R subclavian TLC  ABD: Soft, non-distended, no masses appreciated; diminished BS  EXT: 2+ pedal pulses bilaterally, no BLE edema  SKIN: Warm, dry. No new rashes    MEDICATIONS:  MEDICATIONS  (STANDING):  ALBUTerol/ipratropium for Nebulization 3 milliLiter(s) Nebulizer every 6 hours  artificial tears (preservative free) Ophthalmic Solution 1 Drop(s) Both EYES two times a day  aspirin  chewable 81 milliGRAM(s) Oral daily  chlorhexidine 0.12% Liquid 15 milliLiter(s) Oral Mucosa every 12 hours  chlorhexidine 4% Liquid 1 Application(s) Topical <User Schedule>  chlorhexidine 4% Liquid 1 Application(s) Topical <User Schedule>  docusate sodium Liquid 100 milliGRAM(s) Oral three times a day  fentaNYL   Infusion. 0.5 MICROgram(s)/kG/Hr (2.015 mL/Hr) IV Continuous <Continuous>  pantoprazole  Injectable 40 milliGRAM(s) IV Push daily  petrolatum Ophthalmic Ointment 1 Application(s) Both EYES two times a day  phenylephrine    Infusion 0.165 MICROgram(s)/kG/Min (5 mL/Hr) IV Continuous <Continuous>  piperacillin/tazobactam IVPB.. 3.375 Gram(s) IV Intermittent every 8 hours  propofol Infusion 40 MICROgram(s)/kG/Min (19.344 mL/Hr) IV Continuous <Continuous>  vancomycin  IVPB 1250 milliGRAM(s) IV Intermittent every 8 hours  vasopressin Infusion 0.04 Unit(s)/Min (2.4 mL/Hr) IV Continuous <Continuous>    MEDICATIONS  (PRN):  sodium chloride 0.9% lock flush 10 milliLiter(s) IV Push every 1 hour PRN Pre/post blood products, medications, blood draw, and to maintain line patency      ALLERGIES:  Allergies    No Known Allergies    Intolerances              LABS:                        10.9   16.2  )-----------( 202      ( 19 Aug 2019 00:20 )             35.6     08-19    131<L>  |  93<L>  |  17  ----------------------------<  126<H>  3.7   |  28  |  0.49<L>    Ca    8.4      19 Aug 2019 00:20  Phos  2.2     08-19  Mg     1.9     08-19      PT/INR - ( 19 Aug 2019 00:20 )   PT: 15.2 sec;   INR: 1.31 ratio         PTT - ( 19 Aug 2019 00:20 )  PTT:28.6 sec  Urinalysis Basic - ( 18 Aug 2019 13:33 )    Color: Dark Yellow / Appearance: Slightly Turbid / SG: >1.050 / pH: x  Gluc: x / Ketone: Large  / Bili: Negative / Urobili: 2 mg/dL   Blood: x / Protein: 100 / Nitrite: Negative   Leuk Esterase: Negative / RBC: >50 /hpf / WBC 5 /HPF   Sq Epi: x / Non Sq Epi: 2 / Bacteria: Occasional        RADIOLOGY & ADDITIONAL TESTS: Reviewed.

## 2019-08-19 NOTE — PROGRESS NOTE ADULT - ATTENDING COMMENTS
Patient seen and examined, agree with above     54 y/o male with hx esophageal ca with mets to pelvis and brain (s/p resection), s/p surgery, chemo and radiation, CAD s/p RCA stent, pleural effusion, now with acute hypoxic and hypercapnic resp failure, R pneumothorax, shock w/o an obvious source of sepsis.     He does not tolerate weaning sedation or vent support   He is on Melvin and vasopressin     Lung US with lack on lung sliding on R, no obvious lung point, focal B lines on R, +lung sliding on L, b/l pleural effusion (complex on R)   Bedside ECHO with normal LV and RV systolic fn, normal RV size, IVC 1.2cm     Recs:   Neuro - sedated with propofol and fentanyl, wean as tolerated (have been unsuccessful so far)   CV - on Melvin and vasopressin, increasing Melvin dose or adding another pressor with not benefit   Resp - FiO2 decreased to 60% earlier however had to be increased back to 80%, continue AC vol, continue CT suction, has air leak  GI - consult GI to help insert OG/NG tube given hx esophageal sx and gastric pullup   ID - febrile today, send BCx, sputum cx, continue vancomycin and Zosyn for now   PPx - start Lovenox   Prognosis extremely poor, patient is DNR, wife wants him to be comfortable, I discussed withdrawal of life support - she will discuss with patient's parents    Patient is critically ill, 41mins spent

## 2019-08-19 NOTE — PROGRESS NOTE ADULT - ASSESSMENT
53 M with hx of esophageal CA diagnosed in 2017 s/p chemo and radiation and with mets to R pelvis and brain mets to the L superior cerebellar mass (s/p craniotomy in 04/2019) currently on Keytruda, CAD s/p RCA stent in 2015, p/w pleural effusion, now with hypoxic resp failure post bronch with resulting tension PTX now s/p chest tube    - No signs of significant ischemia   - Pleural effusion is presumably malignant. No signs of ADHF at this time.   - EKG without ischemic changes  - Echo with normal LV function, moderate diastolic dysfunction, and evidence of RVE and RV pressure overload.  - Cont asa for CAD history  - Tele demonstrates NSR/Sinus tachy which is likely all reactive to his underlying condition.     - Cont pressor support to maintain MAP at least >60. Titrate off as blood pressure tolerates.     - PTx rx per MICU. Monitor Chest tube output.     - Monitor and replete electrolytes. Keep K>4.0 and Mg>2.0.  - Further cardiac workup will depend on clinical course.   - All other workup per MICU team. Will followup.

## 2019-08-19 NOTE — GOALS OF CARE CONVERSATION - PERSONAL ADVANCE DIRECTIVE - CONVERSATION DETAILS
Discussed with patient's wife, mother and sister at bedside:     Confirmed DNR/do not re-intubate status again. Mother deferred decision making to patient's wife and sister. Wife stated that patient made the decision to point wife and sister as the health care decision makers, and would like to be "comfortable" when he was alert and oriented prior to the bronchoscopy. Wife and sister wanted to let God and the patient's body decide on the end-point. They want to proceed with OGT placement and initiating tube feeds, keep patient sedated so he doesn't feel pain or discomfort, and to continue the antibiotics and other medications if he has a chance to get better on the medications. They agree to wean off pressors if the patient can tolerate the weaning, and to not increase pressors if patient becomes hypotensive. They understand that it is difficult to anticipate the duration of care under current circumstances, and do not wish to withdraw care at this moment. Discussed with patient's wife, mother and sister at bedside:     Confirmed DNR/do not re-intubate status again. Mother deferred decision making to patient's wife and sister. Wife stated that patient made the decision to point wife and sister as the health care decision makers, and would like to be "comfortable" when he was alert and oriented prior to the bronchoscopy. Wife and sister wanted to let God and the patient's body decide on the end-point. They wanted to proceed with OGT placement and initiating tube feeds, keep patient sedated so he doesn't feel pain or discomfort, and to continue the antibiotics and other medications if he has a chance to get better on the medications. They agreed to wean off pressors if the patient could tolerate the weaning, and to not increase pressors if patient becomes hypotensive. They understood that it would be difficult to anticipate the duration of care under current circumstances, and did not wish to withdraw care at this moment.

## 2019-08-19 NOTE — PROGRESS NOTE ADULT - SUBJECTIVE AND OBJECTIVE BOX
[facility] - Jamilah Manning Grossman, [place], [place] Mendez Savella  Office Number:  [phone]    remains intubated and sedated. on vasopressin and phenylephrine.  not able to be extubated  oxygen requirement has increased over weekend, though now down to fi02 of 70%    ROS: negative unless otherwise mentioned.    Telemetry: sr      MEDICATIONS  (STANDING):  ALBUTerol/ipratropium for Nebulization 3 milliLiter(s) Nebulizer every 6 hours  artificial tears (preservative free) Ophthalmic Solution 1 Drop(s) Both EYES two times a day  aspirin  chewable 81 milliGRAM(s) Oral daily  chlorhexidine 0.12% Liquid 15 milliLiter(s) Oral Mucosa every 12 hours  chlorhexidine 4% Liquid 1 Application(s) Topical <User Schedule>  chlorhexidine 4% Liquid 1 Application(s) Topical <User Schedule>  docusate sodium Liquid 100 milliGRAM(s) Oral three times a day  fentaNYL   Infusion. 0.5 MICROgram(s)/kG/Hr (2.015 mL/Hr) IV Continuous <Continuous>  pantoprazole  Injectable 40 milliGRAM(s) IV Push daily  petrolatum Ophthalmic Ointment 1 Application(s) Both EYES two times a day  phenylephrine    Infusion 0.165 MICROgram(s)/kG/Min (5 mL/Hr) IV Continuous <Continuous>  piperacillin/tazobactam IVPB.. 3.375 Gram(s) IV Intermittent every 8 hours  propofol Infusion 40 MICROgram(s)/kG/Min (19.344 mL/Hr) IV Continuous <Continuous>  vancomycin  IVPB 1250 milliGRAM(s) IV Intermittent every 8 hours  vasopressin Infusion 0.04 Unit(s)/Min (2.4 mL/Hr) IV Continuous <Continuous>    MEDICATIONS  (PRN):  sodium chloride 0.9% lock flush 10 milliLiter(s) IV Push every 1 hour PRN Pre/post blood products, medications, blood draw, and to maintain line patency      Allergies    No Known Allergies    Intolerances        Vital Signs Last 24 Hrs  T(C): 38 (19 Aug 2019 08:00), Max: 38.4 (18 Aug 2019 16:00)  T(F): 100.4 (19 Aug 2019 08:00), Max: 101.1 (18 Aug 2019 16:00)  HR: 103 (19 Aug 2019 09:00) (76 - 117)  BP: 111/78 (19 Aug 2019 09:00) (84/55 - 142/82)  BP(mean): 91 (19 Aug 2019 09:00) (64 - 107)  RR: 30 (19 Aug 2019 09:00) (6 - 45)  SpO2: 94% (19 Aug 2019 09:00) (82% - 99%)    I&O's Summary    18 Aug 2019 07:01  -  19 Aug 2019 07:00  --------------------------------------------------------  IN: 2251.4 mL / OUT: 1150 mL / NET: 1101.4 mL    19 Aug 2019 07:01  -  19 Aug 2019 09:38  --------------------------------------------------------  IN: 66.6 mL / OUT: 0 mL / NET: 66.6 mL        ON EXAM:    Constitutional: intubated, sedated  HEENT: ETT in place  Pulmonary: Decreased breath sounds b/l. R>L  Cardiovascular: tachy , S1 and S2, distant   Gastrointestinal: Bowel Sounds present, soft, nontender.   Lymph: No peripheral edema. No lymphadenopathy.  Skin: No visible rashes or ulcers. + chest tube  Psych:  unable to assess    LABS: All Labs Reviewed:                        10.9   16.2  )-----------( 202      ( 19 Aug 2019 00:20 )             35.6                         10.1   16.8  )-----------( 183      ( 18 Aug 2019 00:02 )             32.7                         10.9   22.3  )-----------( 261      ( 17 Aug 2019 01:03 )             36.0     19 Aug 2019 00:20    131    |  93     |  17     ----------------------------<  126    3.7     |  28     |  0.49   18 Aug 2019 00:02    131    |  93     |  17     ----------------------------<  109    3.8     |  29     |  0.56   17 Aug 2019 01:03    133    |  95     |  19     ----------------------------<  116    4.3     |  28     |  0.70     Ca    8.4        19 Aug 2019 00:20  Ca    8.5        18 Aug 2019 00:02  Ca    9.0        17 Aug 2019 01:03  Phos  2.2       19 Aug 2019 00:20  Phos  1.4       18 Aug 2019 00:02  Phos  2.0       17 Aug 2019 01:03  Mg     1.9       19 Aug 2019 00:20  Mg     2.0       18 Aug 2019 00:02  Mg     2.1       17 Aug 2019 01:03      PT/INR - ( 19 Aug 2019 00:20 )   PT: 15.2 sec;   INR: 1.31 ratio         PTT - ( 19 Aug 2019 00:20 )  PTT:28.6 sec      Blood Culture: Organism --  Gram Stain Blood -- Gram Stain --  Specimen Source .Blood  Culture-Blood --    Organism --  Gram Stain Blood -- Gram Stain   Moderate polymorphonuclear leukocytes per low power field  Few Squamous epithelial cells per low power field  Few Yeast like cells per oil power field  Few Gram Negative Rods per oil power field  Specimen Source Bronch Wash  Culture-Blood --

## 2019-08-19 NOTE — PROGRESS NOTE ADULT - ASSESSMENT
Patient is a 53 M with hx of esophageal CA with mets to R pelvis and brain, CAD s/p RCA stent in 2015, p/w dyspnea 2/2 R pleural effusion s/p thoracentesis and bronchoscopy, admitted to MICU for hypoxic respiratory failure post bronch with resulting tension pneumothorax now s/p chest tube and intubation, course further c/b hypotension, on pressors.     #Neuro  - Sedated on fentanyl and propofol; wean on sedation as tolerated  - New finding of dilated left pupil 8/14, s/p CTH showed no acute changes. Pupils are constricted b/l currently    #Resp  - Hypoxic respiratory failure 2/2 R pleural effusion vs. infection, s/p intubation  - Pleural effusion is likely malignant. No signs of ADHF at this time.   - FiO2 decreased to 50%, rate 26 and PEEP 8.  - s/p thoracentesis 8/13 with 3.2 L removal and bronchoscopy 8/14  - Chest tube suction 630 cc overnight; (+) air leak, c/w wall suction  - Chest PT and suction prn  - f/u BAL fluid cytology; lipase, triglyceride wnl    #CV  - s/p RCA stent in 2015  - On li and vaso, off levo  - Cardiology following, no acute interventions  - (-) pericardial effusion on POCUS  - No signs of significant ischemia   - EKG without ischemic changes  - Tele demonstrates NSR/Sinus tachy which is likely all reactive to his underlying condition.   - Cont asa    #GI  - OGT removed, NPO now.   - CT chest (+) large R pleural effusion and small L pleural effusion, (-) gastric leak.   - Protonix 40 mg IV daily    #ID  - ?Septic shock, (+) persistent leukocytosis  - C/w vanc (8/14 - )and zosyn (8/14 - ); vanc increased to 1500 BID due to low trough  - Lactate wnl  - Bcx, BAL cx, AFB NGTD.   - febrile on 8/18, repeat BCx and UA pending    #Renal  - AGNES resolved  - UOP 15 cc/hr now, 500 cc LR bolus, and re-assess  - Hyponatremia, SIADH vs. renal excretion 2/2 intrinsic kidney injury  - Rodriguez with clot removed. Replaced with condom cath.    #Onc  - Metastatic esophageal CA with mets to pelvis and brain  - Holding Keytruda inpatient (last cycle in July), will f/u outpatient with Dr. Mackey  - Onc following    #Endo  - No active issue currently    #DVT PPx  - POCUS finding of left subclavian vein thrombosis during central line insertion; Switched to heparin gtt; Heparin gtt d/c'ed due to concern for ICH  - Now on SCD    #GOC/Ethics  - DNR; Cap pressors, c/w current treatment, no aggressive measures  - MOLST form in chart. Patient is a 53 M with hx of esophageal CA with mets to R pelvis and brain, CAD s/p RCA stent in 2015, p/w dyspnea 2/2 R pleural effusion s/p thoracentesis and bronchoscopy, admitted to MICU for hypoxic respiratory failure post bronch with resulting tension pneumothorax now s/p chest tube and intubation, course further c/b hypotension, on pressors.     #Neuro  - Sedated on fentanyl and propofol; wean on sedation as tolerated  - New finding of dilated left pupil 8/14, s/p CTH showed no acute changes. Pupils are constricted b/l currently    #Resp  - Hypoxic respiratory failure 2/2 R pleural effusion vs. infection, s/p intubation  - Pleural effusion is likely malignant. No signs of ADHF at this time.   - FiO2 increased to 80%, rate 26 and PEEP 8.  - s/p thoracentesis 8/13 with 3.2 L removal and bronchoscopy 8/14  - Chest tube suction 550 cc over 24 hours; (+) air leak, c/w wall suction  - Chest PT and suction prn  - BAL fluid cytology atypical findings, f/u final report    #CV  - s/p RCA stent in 2015  - On li and vaso, off levo  - Cardiology following, no acute interventions  - No signs of significant ischemia   - Cont asa    #GI  - OGT removed, NPO now. Consulted GI for OGT placement under endoscopy  - CT chest (+) large R pleural effusion and small L pleural effusion, (-) gastric leak.   - Protonix 40 mg IV daily    #ID  - ?Septic shock, (+) persistent leukocytosis  - C/w vanc (8/14 - )and zosyn (8/14 - ); vanc increased to 1250 BID due to low trough  - Lactate wnl  - Bcx, BAL cx, AFB NGTD.   - febrile on 8/18, repeat BCx and UA pending; Combi cath Cx sent 8/19    #Renal  - AGNES resolved  - Hyponatremia, SIADH vs. renal excretion 2/2 intrinsic kidney injury  - Rodriguez with clot removed. Replaced with condom cath. 600 cc UOP    #Onc  - Metastatic esophageal CA with mets to pelvis and brain  - Holding Keytruda inpatient (last cycle in July), will f/u outpatient with Dr. Mackey  - Onc following    #Endo  - No active issue currently    #DVT PPx  - POCUS finding of left subclavian vein thrombosis during central line insertion; Switched to heparin gtt; Heparin gtt d/c'ed due to concern for ICH  - Restarted Lovenox    #GOC/Ethics  - DNR; Cap pressors, c/w current treatment, no aggressive measures  - MOLST form in chart.

## 2019-08-20 LAB
ANION GAP SERPL CALC-SCNC: 10 MMOL/L — SIGNIFICANT CHANGE UP (ref 5–17)
APPEARANCE UR: ABNORMAL
APTT BLD: 32.3 SEC — SIGNIFICANT CHANGE UP (ref 27.5–36.3)
BACTERIA # UR AUTO: NEGATIVE — SIGNIFICANT CHANGE UP
BILIRUB UR-MCNC: ABNORMAL
BUN SERPL-MCNC: 17 MG/DL — SIGNIFICANT CHANGE UP (ref 7–23)
CALCIUM SERPL-MCNC: 8.6 MG/DL — SIGNIFICANT CHANGE UP (ref 8.4–10.5)
CHLORIDE SERPL-SCNC: 90 MMOL/L — LOW (ref 96–108)
CO2 SERPL-SCNC: 28 MMOL/L — SIGNIFICANT CHANGE UP (ref 22–31)
COLOR SPEC: ABNORMAL
CREAT SERPL-MCNC: 0.5 MG/DL — SIGNIFICANT CHANGE UP (ref 0.5–1.3)
DIFF PNL FLD: ABNORMAL
EPI CELLS # UR: 1 /HPF — SIGNIFICANT CHANGE UP
GLUCOSE SERPL-MCNC: 104 MG/DL — HIGH (ref 70–99)
GLUCOSE UR QL: NEGATIVE — SIGNIFICANT CHANGE UP
HCT VFR BLD CALC: 33.9 % — LOW (ref 39–50)
HGB BLD-MCNC: 10.5 G/DL — LOW (ref 13–17)
HYALINE CASTS # UR AUTO: 2 /LPF — SIGNIFICANT CHANGE UP (ref 0–2)
INR BLD: 1.5 RATIO — HIGH (ref 0.88–1.16)
KETONES UR-MCNC: NEGATIVE — SIGNIFICANT CHANGE UP
LEUKOCYTE ESTERASE UR-ACNC: NEGATIVE — SIGNIFICANT CHANGE UP
MAGNESIUM SERPL-MCNC: 1.8 MG/DL — SIGNIFICANT CHANGE UP (ref 1.6–2.6)
MCHC RBC-ENTMCNC: 25.5 PG — LOW (ref 27–34)
MCHC RBC-ENTMCNC: 31 GM/DL — LOW (ref 32–36)
MCV RBC AUTO: 82.3 FL — SIGNIFICANT CHANGE UP (ref 80–100)
NITRITE UR-MCNC: NEGATIVE — SIGNIFICANT CHANGE UP
PH UR: 6 — SIGNIFICANT CHANGE UP (ref 5–8)
PHOSPHATE SERPL-MCNC: 2.4 MG/DL — LOW (ref 2.5–4.5)
PLATELET # BLD AUTO: 212 K/UL — SIGNIFICANT CHANGE UP (ref 150–400)
POTASSIUM SERPL-MCNC: 3.9 MMOL/L — SIGNIFICANT CHANGE UP (ref 3.5–5.3)
POTASSIUM SERPL-SCNC: 3.9 MMOL/L — SIGNIFICANT CHANGE UP (ref 3.5–5.3)
PROT UR-MCNC: 100 — SIGNIFICANT CHANGE UP
PROTHROM AB SERPL-ACNC: 17.3 SEC — HIGH (ref 10–12.9)
RBC # BLD: 4.12 M/UL — LOW (ref 4.2–5.8)
RBC # FLD: 14.8 % — HIGH (ref 10.3–14.5)
RBC CASTS # UR COMP ASSIST: 304 /HPF — HIGH (ref 0–4)
SODIUM SERPL-SCNC: 128 MMOL/L — LOW (ref 135–145)
SP GR SPEC: >1.05 (ref 1.01–1.02)
UROBILINOGEN FLD QL: ABNORMAL
VANCOMYCIN TROUGH SERPL-MCNC: 16.4 UG/ML — SIGNIFICANT CHANGE UP (ref 10–20)
WBC # BLD: 19.4 K/UL — HIGH (ref 3.8–10.5)
WBC # FLD AUTO: 19.4 K/UL — HIGH (ref 3.8–10.5)
WBC UR QL: 4 /HPF — SIGNIFICANT CHANGE UP (ref 0–5)

## 2019-08-20 PROCEDURE — 74018 RADEX ABDOMEN 1 VIEW: CPT | Mod: 26

## 2019-08-20 PROCEDURE — 99222 1ST HOSP IP/OBS MODERATE 55: CPT | Mod: GC,25

## 2019-08-20 PROCEDURE — 71045 X-RAY EXAM CHEST 1 VIEW: CPT | Mod: 26

## 2019-08-20 PROCEDURE — 99291 CRITICAL CARE FIRST HOUR: CPT

## 2019-08-20 PROCEDURE — 44360 SMALL BOWEL ENDOSCOPY: CPT | Mod: GC

## 2019-08-20 RX ORDER — POTASSIUM CHLORIDE 20 MEQ
10 PACKET (EA) ORAL ONCE
Refills: 0 | Status: COMPLETED | OUTPATIENT
Start: 2019-08-20 | End: 2019-08-20

## 2019-08-20 RX ORDER — SODIUM CHLORIDE 9 MG/ML
1000 INJECTION INTRAMUSCULAR; INTRAVENOUS; SUBCUTANEOUS ONCE
Refills: 0 | Status: COMPLETED | OUTPATIENT
Start: 2019-08-20 | End: 2019-08-20

## 2019-08-20 RX ORDER — MIDAZOLAM HYDROCHLORIDE 1 MG/ML
2 INJECTION, SOLUTION INTRAMUSCULAR; INTRAVENOUS ONCE
Refills: 0 | Status: DISCONTINUED | OUTPATIENT
Start: 2019-08-20 | End: 2019-08-20

## 2019-08-20 RX ORDER — ALBUMIN HUMAN 25 %
100 VIAL (ML) INTRAVENOUS EVERY 6 HOURS
Refills: 0 | Status: COMPLETED | OUTPATIENT
Start: 2019-08-20 | End: 2019-08-21

## 2019-08-20 RX ORDER — MAGNESIUM SULFATE 500 MG/ML
1 VIAL (ML) INJECTION ONCE
Refills: 0 | Status: COMPLETED | OUTPATIENT
Start: 2019-08-20 | End: 2019-08-20

## 2019-08-20 RX ADMIN — Medication 250 MILLIGRAM(S): at 07:31

## 2019-08-20 RX ADMIN — CHLORHEXIDINE GLUCONATE 1 APPLICATION(S): 213 SOLUTION TOPICAL at 06:19

## 2019-08-20 RX ADMIN — CHLORHEXIDINE GLUCONATE 1 APPLICATION(S): 213 SOLUTION TOPICAL at 05:00

## 2019-08-20 RX ADMIN — Medication 100 GRAM(S): at 06:47

## 2019-08-20 RX ADMIN — Medication 62.5 MILLIMOLE(S): at 02:05

## 2019-08-20 RX ADMIN — Medication 1 APPLICATION(S): at 18:38

## 2019-08-20 RX ADMIN — MIDAZOLAM HYDROCHLORIDE 2 MILLIGRAM(S): 1 INJECTION, SOLUTION INTRAMUSCULAR; INTRAVENOUS at 16:30

## 2019-08-20 RX ADMIN — Medication 650 MILLIGRAM(S): at 14:18

## 2019-08-20 RX ADMIN — PHENYLEPHRINE HYDROCHLORIDE 5 MICROGRAM(S)/KG/MIN: 10 INJECTION INTRAVENOUS at 19:00

## 2019-08-20 RX ADMIN — PROPOFOL 19.34 MICROGRAM(S)/KG/MIN: 10 INJECTION, EMULSION INTRAVENOUS at 19:00

## 2019-08-20 RX ADMIN — PIPERACILLIN AND TAZOBACTAM 25 GRAM(S): 4; .5 INJECTION, POWDER, LYOPHILIZED, FOR SOLUTION INTRAVENOUS at 21:17

## 2019-08-20 RX ADMIN — Medication 250 MILLIGRAM(S): at 16:00

## 2019-08-20 RX ADMIN — Medication 100 MILLIGRAM(S): at 21:16

## 2019-08-20 RX ADMIN — CHLORHEXIDINE GLUCONATE 15 MILLILITER(S): 213 SOLUTION TOPICAL at 05:01

## 2019-08-20 RX ADMIN — Medication 3 MILLILITER(S): at 11:38

## 2019-08-20 RX ADMIN — PROPOFOL 19.34 MICROGRAM(S)/KG/MIN: 10 INJECTION, EMULSION INTRAVENOUS at 18:37

## 2019-08-20 RX ADMIN — PANTOPRAZOLE SODIUM 40 MILLIGRAM(S): 20 TABLET, DELAYED RELEASE ORAL at 13:04

## 2019-08-20 RX ADMIN — Medication 650 MILLIGRAM(S): at 13:48

## 2019-08-20 RX ADMIN — Medication 50 MILLILITER(S): at 20:13

## 2019-08-20 RX ADMIN — Medication 650 MILLIGRAM(S): at 21:17

## 2019-08-20 RX ADMIN — Medication 100 MILLIEQUIVALENT(S): at 06:50

## 2019-08-20 RX ADMIN — ENOXAPARIN SODIUM 40 MILLIGRAM(S): 100 INJECTION SUBCUTANEOUS at 13:08

## 2019-08-20 RX ADMIN — Medication 50 MILLILITER(S): at 15:04

## 2019-08-20 RX ADMIN — Medication 1 APPLICATION(S): at 05:01

## 2019-08-20 RX ADMIN — Medication 1 DROP(S): at 02:15

## 2019-08-20 RX ADMIN — Medication 3 MILLILITER(S): at 23:52

## 2019-08-20 RX ADMIN — PROPOFOL 19.34 MICROGRAM(S)/KG/MIN: 10 INJECTION, EMULSION INTRAVENOUS at 14:39

## 2019-08-20 RX ADMIN — Medication 3 MILLILITER(S): at 17:25

## 2019-08-20 RX ADMIN — SODIUM CHLORIDE 1000 MILLILITER(S): 9 INJECTION INTRAMUSCULAR; INTRAVENOUS; SUBCUTANEOUS at 14:39

## 2019-08-20 RX ADMIN — FENTANYL CITRATE 2.02 MICROGRAM(S)/KG/HR: 50 INJECTION INTRAVENOUS at 19:00

## 2019-08-20 RX ADMIN — Medication 1 DROP(S): at 13:24

## 2019-08-20 RX ADMIN — Medication 3 MILLILITER(S): at 05:43

## 2019-08-20 RX ADMIN — CHLORHEXIDINE GLUCONATE 15 MILLILITER(S): 213 SOLUTION TOPICAL at 18:38

## 2019-08-20 RX ADMIN — PIPERACILLIN AND TAZOBACTAM 25 GRAM(S): 4; .5 INJECTION, POWDER, LYOPHILIZED, FOR SOLUTION INTRAVENOUS at 13:12

## 2019-08-20 RX ADMIN — VASOPRESSIN 2.4 UNIT(S)/MIN: 20 INJECTION INTRAVENOUS at 19:00

## 2019-08-20 RX ADMIN — PIPERACILLIN AND TAZOBACTAM 25 GRAM(S): 4; .5 INJECTION, POWDER, LYOPHILIZED, FOR SOLUTION INTRAVENOUS at 05:00

## 2019-08-20 RX ADMIN — Medication 650 MILLIGRAM(S): at 22:00

## 2019-08-20 NOTE — CONSULT NOTE ADULT - ASSESSMENT
IMPRESSION  -    RECOMMENDATION    - trend CBC, CMP  - plan for upper endoscopy with OG tube placement   - keep NPO for now  - supportive care as per primary team      No Palmer, PGY-6  GI fellow  B- [id number]/ [phone]  Please call GI fellow on call after 5pm and on weekends IMPRESSION  - OG tube placement for feeding   - Metastatic esophageal adenocarcinoma (s/p neadjuvant chemoRT and status post robotic Arley Andrew Esophagogastrectomy on 6/14/18  - Hypoxic respiratory failure s/p intubation    RECOMMENDATION    - trend CBC, CMP  - plan for upper endoscopy with OG tube placement   - keep NPO for now  - supportive care as per primary team      No Palmer, PGY-6  GI fellow  B- [id number]/ [phone]  Please call GI fellow on call after 5pm and on weekends

## 2019-08-20 NOTE — PROGRESS NOTE ADULT - SUBJECTIVE AND OBJECTIVE BOX
[facility]    Jamilah Manning, Akash, Traci, Vanessa, [place], Gildardo      [phone]    CHIEF COMPLAINT: Patient is a 53y old  Male who presents with a chief complaint of pleural effusion (20 Aug 2019 06:26)      Follow Up: pressor dependent hypotension, resp failure on vent    Interim history: unable to provide a hx, events noted    MEDICATIONS  (STANDING):  ALBUTerol/ipratropium for Nebulization 3 milliLiter(s) Nebulizer every 6 hours  artificial tears (preservative free) Ophthalmic Solution 1 Drop(s) Both EYES two times a day  aspirin  chewable 81 milliGRAM(s) Oral daily  chlorhexidine 0.12% Liquid 15 milliLiter(s) Oral Mucosa every 12 hours  chlorhexidine 4% Liquid 1 Application(s) Topical <User Schedule>  chlorhexidine 4% Liquid 1 Application(s) Topical <User Schedule>  docusate sodium Liquid 100 milliGRAM(s) Oral three times a day  enoxaparin Injectable 40 milliGRAM(s) SubCutaneous daily  fentaNYL   Infusion. 0.5 MICROgram(s)/kG/Hr (2.015 mL/Hr) IV Continuous <Continuous>  pantoprazole  Injectable 40 milliGRAM(s) IV Push daily  petrolatum Ophthalmic Ointment 1 Application(s) Both EYES two times a day  phenylephrine    Infusion 0.165 MICROgram(s)/kG/Min (5 mL/Hr) IV Continuous <Continuous>  piperacillin/tazobactam IVPB.. 3.375 Gram(s) IV Intermittent every 8 hours  propofol Infusion 40 MICROgram(s)/kG/Min (19.344 mL/Hr) IV Continuous <Continuous>  vancomycin  IVPB 1250 milliGRAM(s) IV Intermittent every 8 hours  vasopressin Infusion 0.04 Unit(s)/Min (2.4 mL/Hr) IV Continuous <Continuous>    MEDICATIONS  (PRN):  acetaminophen  Suppository .. 650 milliGRAM(s) Rectal every 6 hours PRN Temp greater or equal to 38C (100.4F)  sodium chloride 0.9% lock flush 10 milliLiter(s) IV Push every 1 hour PRN Pre/post blood products, medications, blood draw, and to maintain line patency      REVIEW OF SYSTEMS: unable    Vital Signs Last 24 Hrs  T(C): 37 (20 Aug 2019 08:00), Max: 38.5 (19 Aug 2019 12:45)  T(F): 98.6 (20 Aug 2019 08:00), Max: 101.3 (19 Aug 2019 12:45)  HR: 83 (20 Aug 2019 09:19) (78 - 111)  BP: 95/60 (20 Aug 2019 09:15) (81/54 - 135/94)  BP(mean): 73 (20 Aug 2019 09:15) (63 - 111)  RR: 26 (20 Aug 2019 09:15) (11 - 48)  SpO2: 93% (20 Aug 2019 09:19) (89% - 98%)    I&O's Summary    19 Aug 2019 07:01  -  20 Aug 2019 07:00  --------------------------------------------------------  IN: 2133 mL / OUT: 600 mL / NET: 1533 mL    20 Aug 2019 07:01  -  20 Aug 2019 11:38  --------------------------------------------------------  IN: 147.4 mL / OUT: 0 mL / NET: 147.4 mL        Telemetry past 24h:    PHYSICAL EXAM:    Constitutional: well-nourished, well-developed, NAD   HEENT:  ett, sclerae anicteric, conjunctivae clear, no oral cyanosis.  Pulmonary: Non-labored, breath sounds are clear bilaterally, No wheezing, rales or rhonchi  Cardiovascular: Regular, S1 and S2.  No murmur.  No rubs, gallops or clicks  Gastrointestinal: Bowel Sounds present, soft, nontender.   Lymph: No peripheral edema.   Neurological: arousable, unable to eval  Skin: No rashes.  Psych:  unable  (ms)    LABS: All Labs Reviewed:                        10.5   19.4  )-----------( 212      ( 20 Aug 2019 00:15 )             33.9                         10.9   16.2  )-----------( 202      ( 19 Aug 2019 00:20 )             35.6                         10.1   16.8  )-----------( 183      ( 18 Aug 2019 00:02 )             32.7     20 Aug 2019 00:15    128    |  90     |  17     ----------------------------<  104    3.9     |  28     |  0.50   19 Aug 2019 00:20    131    |  93     |  17     ----------------------------<  126    3.7     |  28     |  0.49   18 Aug 2019 00:02    131    |  93     |  17     ----------------------------<  109    3.8     |  29     |  0.56     Ca    8.6        20 Aug 2019 00:15  Ca    8.4        19 Aug 2019 00:20  Ca    8.5        18 Aug 2019 00:02  Phos  2.4       20 Aug 2019 00:15  Phos  2.2       19 Aug 2019 00:20  Phos  1.4       18 Aug 2019 00:02  Mg     1.8       20 Aug 2019 00:15  Mg     1.9       19 Aug 2019 00:20  Mg     2.0       18 Aug 2019 00:02      PT/INR - ( 20 Aug 2019 00:15 )   PT: 17.3 sec;   INR: 1.50 ratio         PTT - ( 20 Aug 2019 00:15 )  PTT:32.3 sec      Blood Culture: Organism --  Gram Stain Blood -- Gram Stain   Numerous polymorphonuclear leukocytes per low power field  Rare Squamous epithelial cells per low power field  Moderate Yeast like cells per oil power field  Few Gram Negative Rods per oil power field  Specimen Source Bronch Wash  Culture-Blood --    Organism --  Gram Stain Blood -- Gram Stain --  Specimen Source .Blood  Culture-Blood --            RADIOLOGY:    EKG:    Echo:    Noting pressor dependent hypotension, the patient is at risk of abrupt decompensation.  I have personally provided  35 minutes of critical care time, excluding time spent on separate procedures.

## 2019-08-20 NOTE — PROGRESS NOTE ADULT - ASSESSMENT
Patient is a 53 M with hx of esophageal CA with mets to R pelvis and brain, CAD s/p RCA stent in 2015, p/w dyspnea 2/2 R pleural effusion s/p thoracentesis and bronchoscopy, admitted to MICU for hypoxic respiratory failure post bronch with resulting tension pneumothorax now s/p chest tube and intubation, course further c/b hypotension, on pressors.     #Neuro  - Sedated on fentanyl and propofol; wean on sedation as tolerated  - New finding of dilated left pupil 8/14, s/p CTH showed no acute changes. Pupils are constricted b/l currently    #Resp  - Hypoxic respiratory failure 2/2 R pleural effusion vs. infection, s/p intubation  - Pleural effusion is likely malignant. No signs of ADHF at this time.   - FiO2 increased to 80%, rate 26 and PEEP 8.  - s/p thoracentesis 8/13 with 3.2 L removal and bronchoscopy 8/14  - Chest tube suction 550 cc over 24 hours; (+) air leak, c/w wall suction  - Chest PT and suction prn  - BAL fluid cytology atypical findings, f/u final report    #CV  - s/p RCA stent in 2015  - On li and vaso, off levo  - Cardiology following, no acute interventions  - No signs of significant ischemia   - Cont asa    #GI  - OGT removed, NPO now. Consulted GI for OGT placement under endoscopy  - CT chest (+) large R pleural effusion and small L pleural effusion, (-) gastric leak.   - Protonix 40 mg IV daily    #ID  - ?Septic shock, (+) persistent leukocytosis  - C/w vanc (8/14 - )and zosyn (8/14 - ); vanc increased to 1250 BID due to low trough  - Lactate wnl  - Bcx, BAL cx, AFB NGTD.   - febrile on 8/18, repeat BCx and UA pending; Combi cath Cx sent 8/19    #Renal  - AGNES resolved  - Hyponatremia, SIADH vs. renal excretion 2/2 intrinsic kidney injury  - Rodriguez with clot removed. Replaced with condom cath. 600 cc UOP    #Onc  - Metastatic esophageal CA with mets to pelvis and brain  - Holding [facility] inpatient (last cycle in July), will f/u outpatient with Dr. Mackey  - Onc following    #Endo  - No active issue currently    #DVT PPx  - POCUS finding of left subclavian vein thrombosis during central line insertion; Switched to heparin gtt; Heparin gtt d/c'ed due to concern for ICH  - Restarted Lovenox    #GOC/Ethics  - DNR; Cap pressors, c/w current treatment, no aggressive measures  - MOLST form in chart. Patient is a 53 M with hx of esophageal CA with mets to R pelvis and brain, CAD s/p RCA stent in 2015, p/w dyspnea 2/2 R pleural effusion s/p thoracentesis and bronchoscopy, admitted to MICU for hypoxic respiratory failure post bronch with resulting tension pneumothorax now s/p chest tube and intubation, course further c/b hypotension, on pressors.     #Neuro  - Sedated on fentanyl and propofol; maintain on sedation for comfort due to tachypnea and hypotension with weaning  - New finding of dilated left pupil 8/14, s/p CTH showed no acute changes. Pupils are constricted b/l currently    #Resp  - Hypoxic respiratory failure 2/2 R pleural effusion vs. infection, s/p intubation  - Pleural effusion is likely malignant. No signs of ADHF at this time.   - FiO2 increased to 80%, rate 26 and PEEP 8.  - s/p thoracentesis 8/13 with 3.2 L removal and bronchoscopy 8/14  - Chest tube suction 50 cc over 24 hours; (+) air leak, c/w wall suction  - Chest PT and suction prn  - BAL fluid cytology atypical findings, f/u final report    #CV  - s/p RCA stent in 2015  - On li and vaso, off levo; pressor capped.   - Cardiology following, no acute interventions  - No signs of significant ischemia   - Cont asa    #GI  - Pending OGT placement by GI under endoscopy this AM; will initiate tube feeds afterwards  - CT chest (+) large R pleural effusion and small L pleural effusion, (-) gastric leak.   - Protonix 40 mg IV daily    #ID  - ?Septic shock, (+) persistent leukocytosis & febrile 8/19; afebrile currently  - C/w vanc (8/14 - ) and zosyn (8/14 - ); vanc increased to 1250 BID due to low trough  - Lactate wnl  - Bcx, BAL cx, AFB NGTD.   - febrile on 8/18, repeat BCx NGTD; Combi cath Cx (+) moderate yeast-like cells, f/u final report    #Renal  - AGNES resolved  - Hyponatremia, SIADH vs. renal excretion 2/2 intrinsic kidney injury  - Rodriguez with clot removed. Replaced with condom cath. 550 cc UOP in 24 hrs  - Mg > 2, K > 4 as per cards.    #Onc  - Metastatic esophageal CA with mets to pelvis and brain  - Holding [facility] inpatient (last cycle in July), will f/u outpatient with Dr. Mackey  - Onc following    #Endo  - No active issue currently    #DVT PPx  - POCUS finding of left subclavian vein thrombosis during central line insertion; Switched to heparin gtt; Heparin gtt d/c'ed due to concern for ICH  - On Lovenox currently    #GOC/Ethics  - DNR; Cap pressors, c/w current treatment, no aggressive measures  - MOLST form in chart. Confirmed on 8/19, GOC in chart Patient is a 53 M with hx of esophageal CA with mets to R pelvis and brain, CAD s/p RCA stent in 2015, p/w dyspnea 2/2 R pleural effusion s/p thoracentesis and bronchoscopy, admitted to MICU for hypoxic respiratory failure post bronch with resulting tension pneumothorax now s/p chest tube and intubation, course further c/b hypotension, on pressors.     #Neuro  - Sedated on fentanyl and propofol; maintain on sedation for comfort due to tachypnea and hypotension with weaning  - New finding of dilated left pupil 8/14, s/p CTH showed no acute changes. Pupils are constricted b/l currently    #Resp  - Hypoxic respiratory failure 2/2 R pleural effusion vs. infection, s/p intubation  - Pleural effusion is likely malignant. No signs of ADHF at this time.   - FiO2 increased to 100%, rate 26 and PEEP 8.  - s/p thoracentesis 8/13 with 3.2 L removal and bronchoscopy 8/14  - Chest tube suction 50 cc over 24 hours; (+) air leak, c/w wall suction  - Chest PT and suction prn  - BAL fluid cytology atypical findings, f/u final report    #CV  - s/p RCA stent in 2015  - On li and vaso, off levo; pressor capped.   - Cardiology following, no acute interventions  - No signs of significant ischemia   - Cont asa    #GI  - Pending OGT placement by GI under endoscopy; will initiate tube feeds afterwards  - CT chest (+) large R pleural effusion and small L pleural effusion, (-) gastric leak.   - Protonix 40 mg IV daily    #ID  - ?Septic shock, (+) persistent leukocytosis & febrile 8/19;  - C/w vanc (8/14 - ) and zosyn (8/14 - ); vanc increased to 1250 BID due to low trough  - Lactate wnl  - Bcx, BAL cx, AFB NGTD. R/p Bcx, UA, Ucx  - febrile on 8/18, repeat BCx NGTD; Combi cath Cx (+) moderate yeast-like cells, f/u final report    #Renal  - AGNES resolved  - Hyponatremia, SIADH vs. renal excretion 2/2 intrinsic kidney injury  - Condom cath. 550 cc UOP in 24 hrs; Rodriguez re-inserted for urinary retention  - Mg > 2, K > 4 as per cards.    #Onc  - Metastatic esophageal CA with mets to pelvis and brain  - Holding [facility] inpatient (last cycle in July), will f/u outpatient with Dr. Mackey  - Onc following    #Endo  - No active issue currently    #DVT PPx  - POCUS finding of left subclavian vein thrombosis during central line insertion; Switched to heparin gtt; Heparin gtt d/c'ed due to concern for ICH  - On Lovenox currently    #GOC/Ethics  - DNR; Cap pressors, c/w current treatment, no aggressive measures  - MOLST form in chart. Confirmed on 8/19, GOC in chart

## 2019-08-20 NOTE — CONSULT NOTE ADULT - SUBJECTIVE AND OBJECTIVE BOX
Chief Complaint:  Patient is a 53y old  Male who presents with a chief complaint of pleural effusion (20 Aug 2019 11:35)      HPI:  53 year old male with esophageal CA with mets to R pelvis and brain, CAD s/p RCA stent in 2015, presented with dyspnea 2/2 R pleural effusion s/p thoracentesis and bronchoscopy, admitted to MICU for hypoxic respiratory failure post bronch with resulting tension pneumothorax now s/p chest tube and intubation, course further c/b hypotension, on pressors.   GI consulted for placement of OG tube.     Allergies:  No Known Allergies      Home Medications:  Incomplete Medication History as of 12-Aug-2019 21:21 documented in Structured Notes  · 	melatonin 3 mg oral tablet: Last Dose Taken:  , 1 tab(s) orally once  · 	aspirin 81 mg oral tablet: Last Dose Taken:  , 1 tab(s) orally once a day  · 	risperiDONE 1 mg oral tablet: Last Dose Taken:  , 3 tab(s) orally once a day (at bedtime), As Needed  · 	Keytruda: Last Dose Taken:        Hospital Medications:  acetaminophen  Suppository .. 650 milliGRAM(s) Rectal every 6 hours PRN  ALBUTerol/ipratropium for Nebulization 3 milliLiter(s) Nebulizer every 6 hours  artificial tears (preservative free) Ophthalmic Solution 1 Drop(s) Both EYES two times a day  aspirin  chewable 81 milliGRAM(s) Oral daily  chlorhexidine 0.12% Liquid 15 milliLiter(s) Oral Mucosa every 12 hours  chlorhexidine 4% Liquid 1 Application(s) Topical <User Schedule>  chlorhexidine 4% Liquid 1 Application(s) Topical <User Schedule>  docusate sodium Liquid 100 milliGRAM(s) Oral three times a day  enoxaparin Injectable 40 milliGRAM(s) SubCutaneous daily  fentaNYL   Infusion. 0.5 MICROgram(s)/kG/Hr IV Continuous <Continuous>  pantoprazole  Injectable 40 milliGRAM(s) IV Push daily  petrolatum Ophthalmic Ointment 1 Application(s) Both EYES two times a day  phenylephrine    Infusion 0.165 MICROgram(s)/kG/Min IV Continuous <Continuous>  piperacillin/tazobactam IVPB.. 3.375 Gram(s) IV Intermittent every 8 hours  propofol Infusion 40 MICROgram(s)/kG/Min IV Continuous <Continuous>  sodium chloride 0.9% lock flush 10 milliLiter(s) IV Push every 1 hour PRN  vancomycin  IVPB 1250 milliGRAM(s) IV Intermittent every 8 hours  vasopressin Infusion 0.04 Unit(s)/Min IV Continuous <Continuous>      PMHX/PSHX:  H/O nausea  History of dysphagia  Hilar lymphadenopathy  Chronic anticoagulation  CAD (coronary artery disease)  Secondary malignant neoplasm of brain  Metastasis to bone  Esophageal cancer  MI (myocardial infarction)  H/O craniotomy  History of esophageal surgery  H/O hernia repair  Status post tonsillectomy and adenoidectomy  Stented coronary artery      Family history:  Family history of cervical cancer  Family history of throat cancer      Social History:    Patient currently lives with wife at home. Patient used to smoke a pack a day for 3-4 years as a teenager. Used to drink 3-4 drinks/ day, but quit 2 years ago. Currently not smoking, no drinking, no recreational drugs. Patient is a local musician.    ROS:   unable to assess       PHYSICAL EXAM:     GENERAL:  Appears stated age  HEENT:  NC/AT  CHEST:  intubated   HEART:  Regular rhythm, S1, S2  ABDOMEN:  Soft, non-tender  EXTREMITIES:  no edema  SKIN:  No rash  NEURO:  sedated     Vital Signs:  Vital Signs Last 24 Hrs  T(C): 37 (20 Aug 2019 08:00), Max: 38.5 (19 Aug 2019 23:00)  T(F): 98.6 (20 Aug 2019 08:00), Max: 101.3 (19 Aug 2019 23:00)  HR: 92 (20 Aug 2019 13:45) (78 - 111)  BP: 84/59 (20 Aug 2019 13:30) (81/54 - 135/94)  BP(mean): 67 (20 Aug 2019 13:30) (63 - 111)  RR: 28 (20 Aug 2019 13:45) (11 - 48)  SpO2: 95% (20 Aug 2019 13:45) (85% - 98%)  Daily     Daily     LABS:                        10.5   19.4  )-----------( 212      ( 20 Aug 2019 00:15 )             33.9     08-20    128<L>  |  90<L>  |  17  ----------------------------<  104<H>  3.9   |  28  |  0.50    Ca    8.6      20 Aug 2019 00:15  Phos  2.4     08-20  Mg     1.8     08-20        PT/INR - ( 20 Aug 2019 00:15 )   PT: 17.3 sec;   INR: 1.50 ratio         PTT - ( 20 Aug 2019 00:15 )  PTT:32.3 sec  Urinalysis Basic - ( 20 Aug 2019 12:37 )    Color: Dark Yellow / Appearance: Slightly Turbid / SG: >1.050 / pH: x  Gluc: x / Ketone: Negative  / Bili: Small / Urobili: 2 mg/dL   Blood: x / Protein: 100 / Nitrite: Negative   Leuk Esterase: Negative / RBC: 304 /hpf / WBC 4 /HPF   Sq Epi: x / Non Sq Epi: 1 /hpf / Bacteria: Negative    Imaging:    < from: CT Abdomen and Pelvis w/ Oral Cont and w/ IV Cont (08.15.19 @ 02:07) >  FINDINGS:    CHEST:     LUNGS AND LARGE AIRWAYS: Endotracheal tube below the thoracic inlet.   Patchy opacities in the left upper lobe. Atelectasis of the left lower   lobe. Complete collapse of the right lower lobe with significant collapse   of the right upper and middle lobes.  PLEURA: Moderate to large right hydropneumothorax with depression of the   right hemidiaphragm and shift of mediastinum to the left. Small left   pleural effusion.  VESSELS: Right-sided central line with tip in the SVC.  HEART: Heart size is normal. No pericardial effusion. Coronary   calcifications.  MEDIASTINUM AND ROCHELLE: No lymphadenopathy.  CHEST WALL AND LOWER NECK: Within normal limits.    ABDOMEN AND PELVIS:    LIVER: Indeterminate 7 mm low-attenuation lesion in segment 5 (4, 104).   Another tiny low-attenuation lesion in segment 6 is unchanged (4, 102).  BILE DUCTS: Normal caliber.  GALLBLADDER: Within normal limits.  SPLEEN: Within normal limits.  PANCREAS: Within normal limits.  ADRENALS: Within normal limits.  KIDNEYS/URETERS: Subcentimeter hypodense focus in the right kidney too   small to characterize. No hydronephrosis.    BLADDER: Collapsed around a Rodriguez catheter.  REPRODUCTIVE ORGANS: Prostate within normal limits.    BOWEL: Status post esophagectomy with gastric pull-through. Contrast   noted within the gastric pull-through without evidence of leak. Enteric   tube with tip at the hiatus. No bowel obstruction. Appendix not   visualized.  PERITONEUM: Small to moderate volume of ascites. Small volume   pneumoperitoneum of uncertain etiology. Unchanged peritoneal   carcinomatosis.  VESSELS: Mild atherosclerotic calcifications.  RETROPERITONEUM/LYMPH NODES: No lymphadenopathy.   ABDOMINAL WALL: Within normal limits.  BONES: Degenerative changes. Unchanged mixed sclerotic and lytic lesion   within the sacrum.    IMPRESSION:     Moderate to large right tension hydropneumothorax.  Small pneumoperitoneum of uncertain etiologybut may be secondary to   pneumothorax.  Small left pleural effusion with atelectasis of the left lower lobe.  No evidence of leak from gastric pull-through.  Increased ascites in the abdomen with peritoneal carcinomatosis again   noted. Indeterminate liver lesions.    Findings were discussed by Dr. Rodriguez with MICU attending on 8/15/2019 at   3:25 AM with read back.    < end of copied text > Chief Complaint:  Patient is a 53y old  Male who presents with a chief complaint of pleural effusion (20 Aug 2019 11:35)      HPI:  53 year old male with metastatic esophageal adenocarcinoma (s/p neadjuvant chemoRT and status post robotic [place] [facility] Esophagogastrectomy on 6/14/18, L cerebellar metastasis s/p resection and GKSRS 6/2019, and now with likely dural metastasis and operative bed recurrence), CAD s/p RCA stent in 2015, presented with dyspnea 2/2 R pleural effusion s/p thoracentesis and bronchoscopy, admitted to MICU for hypoxic respiratory failure 2/2 pleural effusion (likely malignant) s/p bronch with resulting tension pneumothorax now s/p chest tube and intubation, course further c/b hypotension, on pressors.     GI consulted for placement of OG tube.       Allergies:  No Known Allergies      Home Medications:  Incomplete Medication History as of 12-Aug-2019 21:21 documented in Structured Notes  · 	melatonin 3 mg oral tablet: Last Dose Taken:  , 1 tab(s) orally once  · 	aspirin 81 mg oral tablet: Last Dose Taken:  , 1 tab(s) orally once a day  · 	risperiDONE 1 mg oral tablet: Last Dose Taken:  , 3 tab(s) orally once a day (at bedtime), As Needed  · 	Keytruda: Last Dose Taken:        Hospital Medications:  acetaminophen  Suppository .. 650 milliGRAM(s) Rectal every 6 hours PRN  ALBUTerol/ipratropium for Nebulization 3 milliLiter(s) Nebulizer every 6 hours  artificial tears (preservative free) Ophthalmic Solution 1 Drop(s) Both EYES two times a day  aspirin  chewable 81 milliGRAM(s) Oral daily  chlorhexidine 0.12% Liquid 15 milliLiter(s) Oral Mucosa every 12 hours  chlorhexidine 4% Liquid 1 Application(s) Topical <User Schedule>  chlorhexidine 4% Liquid 1 Application(s) Topical <User Schedule>  docusate sodium Liquid 100 milliGRAM(s) Oral three times a day  enoxaparin Injectable 40 milliGRAM(s) SubCutaneous daily  fentaNYL   Infusion. 0.5 MICROgram(s)/kG/Hr IV Continuous <Continuous>  pantoprazole  Injectable 40 milliGRAM(s) IV Push daily  petrolatum Ophthalmic Ointment 1 Application(s) Both EYES two times a day  phenylephrine    Infusion 0.165 MICROgram(s)/kG/Min IV Continuous <Continuous>  piperacillin/tazobactam IVPB.. 3.375 Gram(s) IV Intermittent every 8 hours  propofol Infusion 40 MICROgram(s)/kG/Min IV Continuous <Continuous>  sodium chloride 0.9% lock flush 10 milliLiter(s) IV Push every 1 hour PRN  vancomycin  IVPB 1250 milliGRAM(s) IV Intermittent every 8 hours  vasopressin Infusion 0.04 Unit(s)/Min IV Continuous <Continuous>      PMHX/PSHX:  H/O nausea  History of dysphagia  Hilar lymphadenopathy  Chronic anticoagulation  CAD (coronary artery disease)  Secondary malignant neoplasm of brain  Metastasis to bone  Esophageal cancer  MI (myocardial infarction)  H/O craniotomy  History of esophageal surgery  H/O hernia repair  Status post tonsillectomy and adenoidectomy  Stented coronary artery      Family history:  Family history of cervical cancer  Family history of throat cancer      Social History:    Patient currently lives with wife at home. Patient used to smoke a pack a day for 3-4 years as a teenager. Used to drink 3-4 drinks/ day, but quit 2 years ago. Currently not smoking, no drinking, no recreational drugs. Patient is a local musician.    ROS:   unable to assess       PHYSICAL EXAM:     GENERAL:  Appears stated age  HEENT:  NC/AT  CHEST:  intubated   HEART:  Regular rhythm, S1, S2  ABDOMEN:  Soft, non-tender  EXTREMITIES:  no edema  SKIN:  No rash  NEURO:  sedated     Vital Signs:  Vital Signs Last 24 Hrs  T(C): 37 (20 Aug 2019 08:00), Max: 38.5 (19 Aug 2019 23:00)  T(F): 98.6 (20 Aug 2019 08:00), Max: 101.3 (19 Aug 2019 23:00)  HR: 92 (20 Aug 2019 13:45) (78 - 111)  BP: 84/59 (20 Aug 2019 13:30) (81/54 - 135/94)  BP(mean): 67 (20 Aug 2019 13:30) (63 - 111)  RR: 28 (20 Aug 2019 13:45) (11 - 48)  SpO2: 95% (20 Aug 2019 13:45) (85% - 98%)  Daily     Daily     LABS:                        10.5   19.4  )-----------( 212      ( 20 Aug 2019 00:15 )             33.9     08-20    128<L>  |  90<L>  |  17  ----------------------------<  104<H>  3.9   |  28  |  0.50    Ca    8.6      20 Aug 2019 00:15  Phos  2.4     08-20  Mg     1.8     08-20        PT/INR - ( 20 Aug 2019 00:15 )   PT: 17.3 sec;   INR: 1.50 ratio         PTT - ( 20 Aug 2019 00:15 )  PTT:32.3 sec  Urinalysis Basic - ( 20 Aug 2019 12:37 )    Color: Dark Yellow / Appearance: Slightly Turbid / SG: >1.050 / pH: x  Gluc: x / Ketone: Negative  / Bili: Small / Urobili: 2 mg/dL   Blood: x / Protein: 100 / Nitrite: Negative   Leuk Esterase: Negative / RBC: 304 /hpf / WBC 4 /HPF   Sq Epi: x / Non Sq Epi: 1 /hpf / Bacteria: Negative    Imaging:    < from: CT Abdomen and Pelvis w/ Oral Cont and w/ IV Cont (08.15.19 @ 02:07) >  FINDINGS:    CHEST:     LUNGS AND LARGE AIRWAYS: Endotracheal tube below the thoracic inlet.   Patchy opacities in the left upper lobe. Atelectasis of the left lower   lobe. Complete collapse of the right lower lobe with significant collapse   of the right upper and middle lobes.  PLEURA: Moderate to large right hydropneumothorax with depression of the   right hemidiaphragm and shift of mediastinum to the left. Small left   pleural effusion.  VESSELS: Right-sided central line with tip in the SVC.  HEART: Heart size is normal. No pericardial effusion. Coronary   calcifications.  MEDIASTINUM AND ROCHELLE: No lymphadenopathy.  CHEST WALL AND LOWER NECK: Within normal limits.    ABDOMEN AND PELVIS:    LIVER: Indeterminate 7 mm low-attenuation lesion in segment 5 (4, 104).   Another tiny low-attenuation lesion in segment 6 is unchanged (4, 102).  BILE DUCTS: Normal caliber.  GALLBLADDER: Within normal limits.  SPLEEN: Within normal limits.  PANCREAS: Within normal limits.  ADRENALS: Within normal limits.  KIDNEYS/URETERS: Subcentimeter hypodense focus in the right kidney too   small to characterize. No hydronephrosis.    BLADDER: Collapsed around a Rodriguez catheter.  REPRODUCTIVE ORGANS: Prostate within normal limits.    BOWEL: Status post esophagectomy with gastric pull-through. Contrast   noted within the gastric pull-through without evidence of leak. Enteric   tube with tip at the hiatus. No bowel obstruction. Appendix not   visualized.  PERITONEUM: Small to moderate volume of ascites. Small volume   pneumoperitoneum of uncertain etiology. Unchanged peritoneal   carcinomatosis.  VESSELS: Mild atherosclerotic calcifications.  RETROPERITONEUM/LYMPH NODES: No lymphadenopathy.   ABDOMINAL WALL: Within normal limits.  BONES: Degenerative changes. Unchanged mixed sclerotic and lytic lesion   within the sacrum.    IMPRESSION:     Moderate to large right tension hydropneumothorax.  Small pneumoperitoneum of uncertain etiologybut may be secondary to   pneumothorax.  Small left pleural effusion with atelectasis of the left lower lobe.  No evidence of leak from gastric pull-through.  Increased ascites in the abdomen with peritoneal carcinomatosis again   noted. Indeterminate liver lesions.    Findings were discussed by Dr. Rodriguez with MICU attending on 8/15/2019 at   3:25 AM with read back.    < end of copied text > Chief Complaint:  Patient is a 53y old  Male who presents with a chief complaint of pleural effusion (20 Aug 2019 11:35)      HPI:  53 year old male with metastatic esophageal adenocarcinoma (s/p neadjuvant chemoRT and status post robotic [place] [facility] Esophagogastrectomy on 6/14/18, L cerebellar metastasis s/p resection and GKSRS 6/2019, and now with likely dural metastasis and operative bed recurrence), CAD s/p RCA stent in 2015, presented with dyspnea 2/2 R pleural effusion s/p thoracentesis and bronchoscopy, admitted to MICU for hypoxic respiratory failure 2/2 pleural effusion (likely malignant) s/p bronch with resulting tension pneumothorax now s/p chest tube and intubation, course further c/b hypotension, on pressors.     GI consulted for placement of OG tube as difficulty was encountered given history of esophageactomy. Patient is intubated, cannot provide history - history obtained from charts.      Allergies:  No Known Allergies      Home Medications:  Incomplete Medication History as of 12-Aug-2019 21:21 documented in Structured Notes  · 	melatonin 3 mg oral tablet: Last Dose Taken:  , 1 tab(s) orally once  · 	aspirin 81 mg oral tablet: Last Dose Taken:  , 1 tab(s) orally once a day  · 	risperiDONE 1 mg oral tablet: Last Dose Taken:  , 3 tab(s) orally once a day (at bedtime), As Needed  · 	Keytruda: Last Dose Taken:        Hospital Medications:  acetaminophen  Suppository .. 650 milliGRAM(s) Rectal every 6 hours PRN  ALBUTerol/ipratropium for Nebulization 3 milliLiter(s) Nebulizer every 6 hours  artificial tears (preservative free) Ophthalmic Solution 1 Drop(s) Both EYES two times a day  aspirin  chewable 81 milliGRAM(s) Oral daily  chlorhexidine 0.12% Liquid 15 milliLiter(s) Oral Mucosa every 12 hours  chlorhexidine 4% Liquid 1 Application(s) Topical <User Schedule>  chlorhexidine 4% Liquid 1 Application(s) Topical <User Schedule>  docusate sodium Liquid 100 milliGRAM(s) Oral three times a day  enoxaparin Injectable 40 milliGRAM(s) SubCutaneous daily  fentaNYL   Infusion. 0.5 MICROgram(s)/kG/Hr IV Continuous <Continuous>  pantoprazole  Injectable 40 milliGRAM(s) IV Push daily  petrolatum Ophthalmic Ointment 1 Application(s) Both EYES two times a day  phenylephrine    Infusion 0.165 MICROgram(s)/kG/Min IV Continuous <Continuous>  piperacillin/tazobactam IVPB.. 3.375 Gram(s) IV Intermittent every 8 hours  propofol Infusion 40 MICROgram(s)/kG/Min IV Continuous <Continuous>  sodium chloride 0.9% lock flush 10 milliLiter(s) IV Push every 1 hour PRN  vancomycin  IVPB 1250 milliGRAM(s) IV Intermittent every 8 hours  vasopressin Infusion 0.04 Unit(s)/Min IV Continuous <Continuous>      PMHX/PSHX:  H/O nausea  History of dysphagia  Hilar lymphadenopathy  Chronic anticoagulation  CAD (coronary artery disease)  Secondary malignant neoplasm of brain  Metastasis to bone  Esophageal cancer  MI (myocardial infarction)  H/O craniotomy  History of esophageal surgery  H/O hernia repair  Status post tonsillectomy and adenoidectomy  Stented coronary artery      Family history:  Family history of cervical cancer  Family history of throat cancer      Social History:    Patient currently lives with wife at home. Patient used to smoke a pack a day for 3-4 years as a teenager. Used to drink 3-4 drinks/ day, but quit 2 years ago. Currently not smoking, no drinking, no recreational drugs. Patient is a local musician.    ROS:   unable to assess       PHYSICAL EXAM:     GENERAL:  Appears stated age  HEENT:  NC/AT  CHEST:  intubated   HEART:  Regular rhythm, S1, S2  ABDOMEN:  Soft, non-tender, non-distended  EXTREMITIES:  no edema  SKIN:  No rash  NEURO:  sedated     Vital Signs:  Vital Signs Last 24 Hrs  T(C): 37 (20 Aug 2019 08:00), Max: 38.5 (19 Aug 2019 23:00)  T(F): 98.6 (20 Aug 2019 08:00), Max: 101.3 (19 Aug 2019 23:00)  HR: 92 (20 Aug 2019 13:45) (78 - 111)  BP: 84/59 (20 Aug 2019 13:30) (81/54 - 135/94)  BP(mean): 67 (20 Aug 2019 13:30) (63 - 111)  RR: 28 (20 Aug 2019 13:45) (11 - 48)  SpO2: 95% (20 Aug 2019 13:45) (85% - 98%)  Daily     Daily     LABS:                        10.5   19.4  )-----------( 212      ( 20 Aug 2019 00:15 )             33.9     08-20    128<L>  |  90<L>  |  17  ----------------------------<  104<H>  3.9   |  28  |  0.50    Ca    8.6      20 Aug 2019 00:15  Phos  2.4     08-20  Mg     1.8     08-20        PT/INR - ( 20 Aug 2019 00:15 )   PT: 17.3 sec;   INR: 1.50 ratio         PTT - ( 20 Aug 2019 00:15 )  PTT:32.3 sec  Urinalysis Basic - ( 20 Aug 2019 12:37 )    Color: Dark Yellow / Appearance: Slightly Turbid / SG: >1.050 / pH: x  Gluc: x / Ketone: Negative  / Bili: Small / Urobili: 2 mg/dL   Blood: x / Protein: 100 / Nitrite: Negative   Leuk Esterase: Negative / RBC: 304 /hpf / WBC 4 /HPF   Sq Epi: x / Non Sq Epi: 1 /hpf / Bacteria: Negative    Imaging:    < from: CT Abdomen and Pelvis w/ Oral Cont and w/ IV Cont (08.15.19 @ 02:07) >  FINDINGS:    CHEST:     LUNGS AND LARGE AIRWAYS: Endotracheal tube below the thoracic inlet.   Patchy opacities in the left upper lobe. Atelectasis of the left lower   lobe. Complete collapse of the right lower lobe with significant collapse   of the right upper and middle lobes.  PLEURA: Moderate to large right hydropneumothorax with depression of the   right hemidiaphragm and shift of mediastinum to the left. Small left   pleural effusion.  VESSELS: Right-sided central line with tip in the SVC.  HEART: Heart size is normal. No pericardial effusion. Coronary   calcifications.  MEDIASTINUM AND ROCHELLE: No lymphadenopathy.  CHEST WALL AND LOWER NECK: Within normal limits.    ABDOMEN AND PELVIS:    LIVER: Indeterminate 7 mm low-attenuation lesion in segment 5 (4, 104).   Another tiny low-attenuation lesion in segment 6 is unchanged (4, 102).  BILE DUCTS: Normal caliber.  GALLBLADDER: Within normal limits.  SPLEEN: Within normal limits.  PANCREAS: Within normal limits.  ADRENALS: Within normal limits.  KIDNEYS/URETERS: Subcentimeter hypodense focus in the right kidney too   small to characterize. No hydronephrosis.    BLADDER: Collapsed around a Rodriguez catheter.  REPRODUCTIVE ORGANS: Prostate within normal limits.    BOWEL: Status post esophagectomy with gastric pull-through. Contrast   noted within the gastric pull-through without evidence of leak. Enteric   tube with tip at the hiatus. No bowel obstruction. Appendix not   visualized.  PERITONEUM: Small to moderate volume of ascites. Small volume   pneumoperitoneum of uncertain etiology. Unchanged peritoneal   carcinomatosis.  VESSELS: Mild atherosclerotic calcifications.  RETROPERITONEUM/LYMPH NODES: No lymphadenopathy.   ABDOMINAL WALL: Within normal limits.  BONES: Degenerative changes. Unchanged mixed sclerotic and lytic lesion   within the sacrum.    IMPRESSION:     Moderate to large right tension hydropneumothorax.  Small pneumoperitoneum of uncertain etiologybut may be secondary to   pneumothorax.  Small left pleural effusion with atelectasis of the left lower lobe.  No evidence of leak from gastric pull-through.  Increased ascites in the abdomen with peritoneal carcinomatosis again   noted. Indeterminate liver lesions.    Findings were discussed by Dr. Rodriguez with MICU attending on 8/15/2019 at   3:25 AM with read back.    < end of copied text >

## 2019-08-20 NOTE — PROGRESS NOTE ADULT - ATTENDING COMMENTS
Patient seen and examined, agree with above     54 y/o male with hx esophageal ca with mets to pelvis and brain (s/p resection), s/p surgery, chemo and radiation, CAD s/p RCA stent, pleural effusion, now with acute hypoxic and hypercapnic resp failure, R pneumothorax, shock w/o an obvious source of sepsis.     FiO2 increased to 100% overnight   Remains on Melvin and vaso, also on propofol and fentanyl   Febrile yesterday, low grade fever today     Lung US with improving b/l effusion, R sided consolidation  Abd US with trace ascites, not a safe fluid pocket for drainage     Recs:   Neuro - sedated with propofol and fentanyl, wean as tolerated (have been unsuccessful so far)   CV - on Melvin and vasopressin, increasing Melvin dose or adding another pressor with not benefit   Resp - FiO2 at 100% today, decreased to 90%, monitor SpO2, not a candidate for weaning, he continues to have air leak on R, will try putting dye through ETT to eval fistulous connection, continue CT to suction  GI - GI consult to help place OGT   Renal - distended bladder on US - insert Rodriguez   ID - BCx 8/18 neg so far, send 2 sets today, send UA and UCx, continue vancomycin and Zosyn for now   PPx - Lovenox   Prognosis extremely poor, patient is DNR, wife wants him to be comfortable but she is not ready to withdraw life support    Patient is critically ill, 54mins spent

## 2019-08-20 NOTE — PROGRESS NOTE ADULT - SUBJECTIVE AND OBJECTIVE BOX
Dr. Tuyet Alarcon  Internal Medicine, PGY-1  Pager #: [phone]      Patient is a 53 M with hx of esophageal CA with mets to R pelvis and brain, CAD s/p RCA stent in 2015, p/w dyspnea 2/2 R pleural effusion s/p thoracentesis and bronchoscopy, admitted to MICU for hypoxic respiratory failure s/p intubation and hypotension.    INTERVAL HPI/OVERNIGHT EVENTS:      SUBJECTIVE: Patient seen and examined at bedside.       CONSTITUTIONAL: No fevers or chills  EYES/ENT: No visual or hearing changes;  No ear or throat pain   NECK: No pain or stiffness  RESPIRATORY: No cough, wheezing, hemoptysis; No shortness of breath  CARDIOVASCULAR: No chest pain or palpitations  GASTROINTESTINAL: No abdominal pain. No nausea, vomiting, or hematemesis; No diarrhea or constipation. No melena or hematochezia.  GENITOURINARY: No dysuria, frequency or hematuria  NEUROLOGICAL: No headache, numbness or weakness  SKIN: No itching, or new onset of rashes    OBJECTIVE:    VITAL SIGNS:  ICU Vital Signs Last 24 Hrs  T(C): 37.5 (20 Aug 2019 04:00), Max: 38.5 (19 Aug 2019 12:45)  T(F): 99.5 (20 Aug 2019 04:00), Max: 101.3 (19 Aug 2019 12:45)  HR: 82 (20 Aug 2019 06:15) (81 - 111)  BP: 92/64 (20 Aug 2019 06:15) (81/54 - 135/94)  BP(mean): 74 (20 Aug 2019 06:15) (63 - 111)  ABP: --  ABP(mean): --  RR: 26 (20 Aug 2019 06:15) (11 - 48)  SpO2: 95% (20 Aug 2019 06:15) (90% - 98%)    Mode: AC/ CMV (Assist Control/ Continuous Mandatory Ventilation), RR (machine): 26, TV (machine): 450, FiO2: 80, PEEP: 8, ITime: 1, MAP: 14, PIP: 28    08-18 @ 07:01  -  08-19 @ 07:00  --------------------------------------------------------  IN: 2251.4 mL / OUT: 1150 mL / NET: 1101.4 mL    08-19 @ 07:01 - 08-20 @ 06:26  --------------------------------------------------------  IN: 1933 mL / OUT: 600 mL / NET: 1333 mL      CAPILLARY BLOOD GLUCOSE          PHYSICAL EXAM:    GENERAL: Intubated  NEURO: Sedated, unresponsive  HEENT: b/l pupil constricted and reactive to light; clear conjunctiva;  RESP: Intubated; decreased breath sound bibasilar fields; (+) crackles R upper and mid lung fields with improved air movements  CVS: S1/S2 present, RRR. no murmurs, gallops or rubs appreciated; (+) R subclavian TLC  ABD: Soft, non-distended, no masses appreciated; diminished BS  EXT: 2+ pedal pulses bilaterally, no BLE edema  SKIN: Warm, dry. No new rashes    MEDICATIONS:  MEDICATIONS  (STANDING):  ALBUTerol/ipratropium for Nebulization 3 milliLiter(s) Nebulizer every 6 hours  artificial tears (preservative free) Ophthalmic Solution 1 Drop(s) Both EYES two times a day  aspirin  chewable 81 milliGRAM(s) Oral daily  chlorhexidine 0.12% Liquid 15 milliLiter(s) Oral Mucosa every 12 hours  chlorhexidine 4% Liquid 1 Application(s) Topical <User Schedule>  chlorhexidine 4% Liquid 1 Application(s) Topical <User Schedule>  docusate sodium Liquid 100 milliGRAM(s) Oral three times a day  enoxaparin Injectable 40 milliGRAM(s) SubCutaneous daily  fentaNYL   Infusion. 0.5 MICROgram(s)/kG/Hr (2.015 mL/Hr) IV Continuous <Continuous>  pantoprazole  Injectable 40 milliGRAM(s) IV Push daily  petrolatum Ophthalmic Ointment 1 Application(s) Both EYES two times a day  phenylephrine    Infusion 0.165 MICROgram(s)/kG/Min (5 mL/Hr) IV Continuous <Continuous>  piperacillin/tazobactam IVPB.. 3.375 Gram(s) IV Intermittent every 8 hours  propofol Infusion 40 MICROgram(s)/kG/Min (19.344 mL/Hr) IV Continuous <Continuous>  vancomycin  IVPB 1250 milliGRAM(s) IV Intermittent every 8 hours  vasopressin Infusion 0.04 Unit(s)/Min (2.4 mL/Hr) IV Continuous <Continuous>    MEDICATIONS  (PRN):  acetaminophen  Suppository .. 650 milliGRAM(s) Rectal every 6 hours PRN Temp greater or equal to 38C (100.4F)  sodium chloride 0.9% lock flush 10 milliLiter(s) IV Push every 1 hour PRN Pre/post blood products, medications, blood draw, and to maintain line patency      ALLERGIES:  Allergies    No Known Allergies    Intolerances              LABS:                        10.5   19.4  )-----------( 212      ( 20 Aug 2019 00:15 )             33.9     08-20    128<L>  |  90<L>  |  17  ----------------------------<  104<H>  3.9   |  28  |  0.50    Ca    8.6      20 Aug 2019 00:15  Phos  2.4     08-20  Mg     1.8     08-20      PT/INR - ( 20 Aug 2019 00:15 )   PT: 17.3 sec;   INR: 1.50 ratio         PTT - ( 20 Aug 2019 00:15 )  PTT:32.3 sec  Urinalysis Basic - ( 18 Aug 2019 13:33 )    Color: Dark Yellow / Appearance: Slightly Turbid / SG: >1.050 / pH: x  Gluc: x / Ketone: Large  / Bili: Negative / Urobili: 2 mg/dL   Blood: x / Protein: 100 / Nitrite: Negative   Leuk Esterase: Negative / RBC: >50 /hpf / WBC 5 /HPF   Sq Epi: x / Non Sq Epi: 2 / Bacteria: Occasional        RADIOLOGY & ADDITIONAL TESTS: Reviewed. Dr. Tuyet Alarcon  Internal Medicine, PGY-1  Pager #: [phone]      Patient is a 53 M with hx of esophageal CA with mets to R pelvis and brain, CAD s/p RCA stent in 2015, p/w dyspnea 2/2 R pleural effusion s/p thoracentesis and bronchoscopy, admitted to MICU for hypoxic respiratory failure s/p intubation and hypotension.    INTERVAL HPI/OVERNIGHT EVENTS:  No acute events overnight    SUBJECTIVE: Patient seen and examined at bedside.     Unable to obtain ROS due to intubated and sedated.      OBJECTIVE:    VITAL SIGNS:  ICU Vital Signs Last 24 Hrs  T(C): 37.5 (20 Aug 2019 04:00), Max: 38.5 (19 Aug 2019 12:45)  T(F): 99.5 (20 Aug 2019 04:00), Max: 101.3 (19 Aug 2019 12:45)  HR: 82 (20 Aug 2019 06:15) (81 - 111)  BP: 92/64 (20 Aug 2019 06:15) (81/54 - 135/94)  BP(mean): 74 (20 Aug 2019 06:15) (63 - 111)  ABP: --  ABP(mean): --  RR: 26 (20 Aug 2019 06:15) (11 - 48)  SpO2: 95% (20 Aug 2019 06:15) (90% - 98%)    Mode: AC/ CMV (Assist Control/ Continuous Mandatory Ventilation), RR (machine): 26, TV (machine): 450, FiO2: 80, PEEP: 8, ITime: 1, MAP: 14, PIP: 28    08-18 @ 07:01 - 08-19 @ 07:00  --------------------------------------------------------  IN: 2251.4 mL / OUT: 1150 mL / NET: 1101.4 mL    08-19 @ 07:01  -  08-20 @ 06:26  --------------------------------------------------------  IN: 1933 mL / OUT: 600 mL / NET: 1333 mL        PHYSICAL EXAM:    GENERAL: Intubated  NEURO: Sedated, unresponsive  HEENT: b/l pupil constricted and reactive to light; clear conjunctiva;  RESP: Intubated; decreased breath sound bibasilar fields; (+) faint crackles R upper and mid lung fields with improved air movements  CVS: S1/S2 present, RRR. no murmurs, gallops or rubs appreciated; (+) R subclavian TLC  ABD: Soft, non-distended, no masses appreciated; diminished BS  EXT: 2+ pedal pulses bilaterally, no BLE edema  SKIN: Warm, dry. No new rashes    MEDICATIONS:  MEDICATIONS  (STANDING):  ALBUTerol/ipratropium for Nebulization 3 milliLiter(s) Nebulizer every 6 hours  artificial tears (preservative free) Ophthalmic Solution 1 Drop(s) Both EYES two times a day  aspirin  chewable 81 milliGRAM(s) Oral daily  chlorhexidine 0.12% Liquid 15 milliLiter(s) Oral Mucosa every 12 hours  chlorhexidine 4% Liquid 1 Application(s) Topical <User Schedule>  chlorhexidine 4% Liquid 1 Application(s) Topical <User Schedule>  docusate sodium Liquid 100 milliGRAM(s) Oral three times a day  enoxaparin Injectable 40 milliGRAM(s) SubCutaneous daily  fentaNYL   Infusion. 0.5 MICROgram(s)/kG/Hr (2.015 mL/Hr) IV Continuous <Continuous>  pantoprazole  Injectable 40 milliGRAM(s) IV Push daily  petrolatum Ophthalmic Ointment 1 Application(s) Both EYES two times a day  phenylephrine    Infusion 0.165 MICROgram(s)/kG/Min (5 mL/Hr) IV Continuous <Continuous>  piperacillin/tazobactam IVPB.. 3.375 Gram(s) IV Intermittent every 8 hours  propofol Infusion 40 MICROgram(s)/kG/Min (19.344 mL/Hr) IV Continuous <Continuous>  vancomycin  IVPB 1250 milliGRAM(s) IV Intermittent every 8 hours  vasopressin Infusion 0.04 Unit(s)/Min (2.4 mL/Hr) IV Continuous <Continuous>    MEDICATIONS  (PRN):  acetaminophen  Suppository .. 650 milliGRAM(s) Rectal every 6 hours PRN Temp greater or equal to 38C (100.4F)  sodium chloride 0.9% lock flush 10 milliLiter(s) IV Push every 1 hour PRN Pre/post blood products, medications, blood draw, and to maintain line patency      ALLERGIES:  Allergies    No Known Allergies    Intolerances      LABS:                        10.5   19.4  )-----------( 212      ( 20 Aug 2019 00:15 )             33.9     08-20    128<L>  |  90<L>  |  17  ----------------------------<  104<H>  3.9   |  28  |  0.50    Ca    8.6      20 Aug 2019 00:15  Phos  2.4     08-20  Mg     1.8     08-20      PT/INR - ( 20 Aug 2019 00:15 )   PT: 17.3 sec;   INR: 1.50 ratio         PTT - ( 20 Aug 2019 00:15 )  PTT:32.3 sec  Urinalysis Basic - ( 18 Aug 2019 13:33 )    Color: Dark Yellow / Appearance: Slightly Turbid / SG: >1.050 / pH: x  Gluc: x / Ketone: Large  / Bili: Negative / Urobili: 2 mg/dL   Blood: x / Protein: 100 / Nitrite: Negative   Leuk Esterase: Negative / RBC: >50 /hpf / WBC 5 /HPF   Sq Epi: x / Non Sq Epi: 2 / Bacteria: Occasional        RADIOLOGY & ADDITIONAL TESTS: Reviewed.

## 2019-08-20 NOTE — PROGRESS NOTE ADULT - ASSESSMENT
53 M with hx of esophageal CA diagnosed in 2017 s/p chemo and radiation and with mets to R pelvis and brain mets to the L superior cerebellar mass (s/p craniotomy in 04/2019) currently on Keytruda, CAD s/p RCA stent in 2015, p/w pleural effusion, now with hypoxic resp failure post bronch with resulting tension PTX now s/p chest tube    - No signs of significant ischemia   - No signs of ADHF at this time.  likely malignant pl eff, not cardiogenic  - EKG without ischemic changes  - Echo with normal LV function, moderate diastolic dysfunction, and evidence of RVE and RV pressure overload.  - Cont asa for CAD history  - Tele demonstrates NSR/Sinus tachy which is likely all reactive to his underlying condition.     - Cont pressor support to maintain MAP at least >60. Titrate off as blood pressure tolerates.     - PTx rx per MICU. Monitor Chest tube output. wean vent as arabella    - Monitor and replete electrolytes. Keep K>4.0 and Mg>2.0.  - Further cardiac workup will depend on clinical course.   - All other workup per MICU team. Will followup.

## 2019-08-21 ENCOUNTER — APPOINTMENT (OUTPATIENT)
Dept: RADIATION ONCOLOGY | Facility: CLINIC | Age: 54
End: 2019-08-21

## 2019-08-21 ENCOUNTER — APPOINTMENT (OUTPATIENT)
Age: 54
End: 2019-08-21

## 2019-08-21 LAB
ANION GAP SERPL CALC-SCNC: 10 MMOL/L — SIGNIFICANT CHANGE UP (ref 5–17)
APTT BLD: 31.8 SEC — SIGNIFICANT CHANGE UP (ref 27.5–36.3)
BUN SERPL-MCNC: 20 MG/DL — SIGNIFICANT CHANGE UP (ref 7–23)
CALCIUM SERPL-MCNC: 8.8 MG/DL — SIGNIFICANT CHANGE UP (ref 8.4–10.5)
CHLORIDE SERPL-SCNC: 90 MMOL/L — LOW (ref 96–108)
CO2 SERPL-SCNC: 27 MMOL/L — SIGNIFICANT CHANGE UP (ref 22–31)
CREAT SERPL-MCNC: 0.6 MG/DL — SIGNIFICANT CHANGE UP (ref 0.5–1.3)
CULTURE RESULTS: SIGNIFICANT CHANGE UP
CULTURE RESULTS: SIGNIFICANT CHANGE UP
GLUCOSE SERPL-MCNC: 98 MG/DL — SIGNIFICANT CHANGE UP (ref 70–99)
HCT VFR BLD CALC: 29.5 % — LOW (ref 39–50)
HCT VFR BLD CALC: 30.7 % — LOW (ref 39–50)
HGB BLD-MCNC: 8.9 G/DL — LOW (ref 13–17)
HGB BLD-MCNC: 9.5 G/DL — LOW (ref 13–17)
INR BLD: 1.23 RATIO — HIGH (ref 0.88–1.16)
MAGNESIUM SERPL-MCNC: 2.1 MG/DL — SIGNIFICANT CHANGE UP (ref 1.6–2.6)
MCHC RBC-ENTMCNC: 24.5 PG — LOW (ref 27–34)
MCHC RBC-ENTMCNC: 25.5 PG — LOW (ref 27–34)
MCHC RBC-ENTMCNC: 30.3 GM/DL — LOW (ref 32–36)
MCHC RBC-ENTMCNC: 31 GM/DL — LOW (ref 32–36)
MCV RBC AUTO: 81 FL — SIGNIFICANT CHANGE UP (ref 80–100)
MCV RBC AUTO: 82.3 FL — SIGNIFICANT CHANGE UP (ref 80–100)
PHOSPHATE SERPL-MCNC: 2.5 MG/DL — SIGNIFICANT CHANGE UP (ref 2.5–4.5)
PLATELET # BLD AUTO: 198 K/UL — SIGNIFICANT CHANGE UP (ref 150–400)
PLATELET # BLD AUTO: 220 K/UL — SIGNIFICANT CHANGE UP (ref 150–400)
POTASSIUM SERPL-MCNC: 4 MMOL/L — SIGNIFICANT CHANGE UP (ref 3.5–5.3)
POTASSIUM SERPL-SCNC: 4 MMOL/L — SIGNIFICANT CHANGE UP (ref 3.5–5.3)
PROCALCITONIN SERPL-MCNC: 1.48 NG/ML — HIGH (ref 0.02–0.1)
PROTHROM AB SERPL-ACNC: 14.2 SEC — HIGH (ref 10–12.9)
RBC # BLD: 3.65 M/UL — LOW (ref 4.2–5.8)
RBC # BLD: 3.73 M/UL — LOW (ref 4.2–5.8)
RBC # FLD: 14.7 % — HIGH (ref 10.3–14.5)
RBC # FLD: 14.7 % — HIGH (ref 10.3–14.5)
SODIUM SERPL-SCNC: 127 MMOL/L — LOW (ref 135–145)
SPECIMEN SOURCE: SIGNIFICANT CHANGE UP
SPECIMEN SOURCE: SIGNIFICANT CHANGE UP
WBC # BLD: 20.6 K/UL — HIGH (ref 3.8–10.5)
WBC # BLD: 21.3 K/UL — HIGH (ref 3.8–10.5)
WBC # FLD AUTO: 20.6 K/UL — HIGH (ref 3.8–10.5)
WBC # FLD AUTO: 21.3 K/UL — HIGH (ref 3.8–10.5)

## 2019-08-21 PROCEDURE — 99291 CRITICAL CARE FIRST HOUR: CPT

## 2019-08-21 PROCEDURE — 99254 IP/OBS CNSLTJ NEW/EST MOD 60: CPT

## 2019-08-21 PROCEDURE — 99232 SBSQ HOSP IP/OBS MODERATE 35: CPT | Mod: GC

## 2019-08-21 RX ORDER — FLUCONAZOLE 150 MG/1
400 TABLET ORAL EVERY 24 HOURS
Refills: 0 | Status: DISCONTINUED | OUTPATIENT
Start: 2019-08-21 | End: 2019-08-25

## 2019-08-21 RX ORDER — SODIUM CHLORIDE 9 MG/ML
1000 INJECTION INTRAMUSCULAR; INTRAVENOUS; SUBCUTANEOUS
Refills: 0 | Status: DISCONTINUED | OUTPATIENT
Start: 2019-08-21 | End: 2019-08-22

## 2019-08-21 RX ORDER — POLYETHYLENE GLYCOL 3350 17 G/17G
17 POWDER, FOR SOLUTION ORAL
Refills: 0 | Status: DISCONTINUED | OUTPATIENT
Start: 2019-08-21 | End: 2019-08-24

## 2019-08-21 RX ORDER — FENTANYL CITRATE 50 UG/ML
1 INJECTION INTRAVENOUS
Refills: 0 | Status: DISCONTINUED | OUTPATIENT
Start: 2019-08-21 | End: 2019-08-25

## 2019-08-21 RX ORDER — MIDAZOLAM HYDROCHLORIDE 1 MG/ML
2 INJECTION, SOLUTION INTRAMUSCULAR; INTRAVENOUS ONCE
Refills: 0 | Status: DISCONTINUED | OUTPATIENT
Start: 2019-08-21 | End: 2019-08-21

## 2019-08-21 RX ORDER — METRONIDAZOLE 500 MG
500 TABLET ORAL EVERY 8 HOURS
Refills: 0 | Status: DISCONTINUED | OUTPATIENT
Start: 2019-08-21 | End: 2019-08-25

## 2019-08-21 RX ORDER — CEFEPIME 1 G/1
1000 INJECTION, POWDER, FOR SOLUTION INTRAMUSCULAR; INTRAVENOUS EVERY 8 HOURS
Refills: 0 | Status: DISCONTINUED | OUTPATIENT
Start: 2019-08-21 | End: 2019-08-25

## 2019-08-21 RX ORDER — SENNA PLUS 8.6 MG/1
10 TABLET ORAL AT BEDTIME
Refills: 0 | Status: DISCONTINUED | OUTPATIENT
Start: 2019-08-21 | End: 2019-08-24

## 2019-08-21 RX ORDER — ACETAMINOPHEN 500 MG
650 TABLET ORAL EVERY 6 HOURS
Refills: 0 | Status: DISCONTINUED | OUTPATIENT
Start: 2019-08-21 | End: 2019-08-25

## 2019-08-21 RX ADMIN — Medication 81 MILLIGRAM(S): at 11:17

## 2019-08-21 RX ADMIN — Medication 1 APPLICATION(S): at 17:26

## 2019-08-21 RX ADMIN — Medication 100 MILLIGRAM(S): at 17:16

## 2019-08-21 RX ADMIN — VASOPRESSIN 2.4 UNIT(S)/MIN: 20 INJECTION INTRAVENOUS at 07:53

## 2019-08-21 RX ADMIN — SENNA PLUS 10 MILLILITER(S): 8.6 TABLET ORAL at 21:26

## 2019-08-21 RX ADMIN — Medication 650 MILLIGRAM(S): at 08:30

## 2019-08-21 RX ADMIN — PROPOFOL 19.34 MICROGRAM(S)/KG/MIN: 10 INJECTION, EMULSION INTRAVENOUS at 19:00

## 2019-08-21 RX ADMIN — CHLORHEXIDINE GLUCONATE 1 APPLICATION(S): 213 SOLUTION TOPICAL at 06:09

## 2019-08-21 RX ADMIN — Medication 650 MILLIGRAM(S): at 15:12

## 2019-08-21 RX ADMIN — Medication 1 DROP(S): at 14:44

## 2019-08-21 RX ADMIN — PANTOPRAZOLE SODIUM 40 MILLIGRAM(S): 20 TABLET, DELAYED RELEASE ORAL at 11:17

## 2019-08-21 RX ADMIN — PROPOFOL 19.34 MICROGRAM(S)/KG/MIN: 10 INJECTION, EMULSION INTRAVENOUS at 17:25

## 2019-08-21 RX ADMIN — Medication 100 MILLIGRAM(S): at 21:26

## 2019-08-21 RX ADMIN — FENTANYL CITRATE 1.61 MICROGRAM(S)/KG/HR: 50 INJECTION INTRAVENOUS at 11:18

## 2019-08-21 RX ADMIN — Medication 50 MILLILITER(S): at 10:15

## 2019-08-21 RX ADMIN — Medication 1 APPLICATION(S): at 05:04

## 2019-08-21 RX ADMIN — Medication 3 MILLILITER(S): at 17:36

## 2019-08-21 RX ADMIN — SODIUM CHLORIDE 100 MILLILITER(S): 9 INJECTION INTRAMUSCULAR; INTRAVENOUS; SUBCUTANEOUS at 19:00

## 2019-08-21 RX ADMIN — CEFEPIME 100 MILLIGRAM(S): 1 INJECTION, POWDER, FOR SOLUTION INTRAMUSCULAR; INTRAVENOUS at 21:26

## 2019-08-21 RX ADMIN — ENOXAPARIN SODIUM 40 MILLIGRAM(S): 100 INJECTION SUBCUTANEOUS at 11:17

## 2019-08-21 RX ADMIN — Medication 250 MILLIGRAM(S): at 00:31

## 2019-08-21 RX ADMIN — PROPOFOL 19.34 MICROGRAM(S)/KG/MIN: 10 INJECTION, EMULSION INTRAVENOUS at 07:52

## 2019-08-21 RX ADMIN — Medication 100 MILLIGRAM(S): at 14:42

## 2019-08-21 RX ADMIN — Medication 1 DROP(S): at 01:06

## 2019-08-21 RX ADMIN — CHLORHEXIDINE GLUCONATE 15 MILLILITER(S): 213 SOLUTION TOPICAL at 17:26

## 2019-08-21 RX ADMIN — POLYETHYLENE GLYCOL 3350 17 GRAM(S): 17 POWDER, FOR SOLUTION ORAL at 05:03

## 2019-08-21 RX ADMIN — CHLORHEXIDINE GLUCONATE 1 APPLICATION(S): 213 SOLUTION TOPICAL at 05:04

## 2019-08-21 RX ADMIN — FENTANYL CITRATE 1 PATCH: 50 INJECTION INTRAVENOUS at 01:02

## 2019-08-21 RX ADMIN — CHLORHEXIDINE GLUCONATE 15 MILLILITER(S): 213 SOLUTION TOPICAL at 05:04

## 2019-08-21 RX ADMIN — FENTANYL CITRATE 2.02 MICROGRAM(S)/KG/HR: 50 INJECTION INTRAVENOUS at 07:53

## 2019-08-21 RX ADMIN — Medication 50 MILLILITER(S): at 02:01

## 2019-08-21 RX ADMIN — CEFEPIME 100 MILLIGRAM(S): 1 INJECTION, POWDER, FOR SOLUTION INTRAMUSCULAR; INTRAVENOUS at 11:17

## 2019-08-21 RX ADMIN — Medication 3 MILLILITER(S): at 11:43

## 2019-08-21 RX ADMIN — Medication 250 MILLIGRAM(S): at 07:52

## 2019-08-21 RX ADMIN — Medication 650 MILLIGRAM(S): at 08:00

## 2019-08-21 RX ADMIN — SODIUM CHLORIDE 100 MILLILITER(S): 9 INJECTION INTRAMUSCULAR; INTRAVENOUS; SUBCUTANEOUS at 11:17

## 2019-08-21 RX ADMIN — FENTANYL CITRATE 1.61 MICROGRAM(S)/KG/HR: 50 INJECTION INTRAVENOUS at 19:00

## 2019-08-21 RX ADMIN — Medication 3 MILLILITER(S): at 05:42

## 2019-08-21 RX ADMIN — POLYETHYLENE GLYCOL 3350 17 GRAM(S): 17 POWDER, FOR SOLUTION ORAL at 17:26

## 2019-08-21 RX ADMIN — Medication 650 MILLIGRAM(S): at 14:42

## 2019-08-21 RX ADMIN — VASOPRESSIN 2.4 UNIT(S)/MIN: 20 INJECTION INTRAVENOUS at 19:00

## 2019-08-21 RX ADMIN — FLUCONAZOLE 100 MILLIGRAM(S): 150 TABLET ORAL at 18:20

## 2019-08-21 RX ADMIN — MIDAZOLAM HYDROCHLORIDE 2 MILLIGRAM(S): 1 INJECTION, SOLUTION INTRAMUSCULAR; INTRAVENOUS at 19:37

## 2019-08-21 RX ADMIN — FENTANYL CITRATE 1 PATCH: 50 INJECTION INTRAVENOUS at 19:00

## 2019-08-21 RX ADMIN — PIPERACILLIN AND TAZOBACTAM 25 GRAM(S): 4; .5 INJECTION, POWDER, LYOPHILIZED, FOR SOLUTION INTRAVENOUS at 05:03

## 2019-08-21 RX ADMIN — FENTANYL CITRATE 1 PATCH: 50 INJECTION INTRAVENOUS at 07:53

## 2019-08-21 RX ADMIN — Medication 100 MILLIGRAM(S): at 05:03

## 2019-08-21 RX ADMIN — Medication 100 MILLIGRAM(S): at 21:27

## 2019-08-21 NOTE — CONSULT NOTE ADULT - ASSESSMENT
53 M with hx of esophageal CA diagnosed in 2017 s/p chemo and radiation and with mets to R pelvis and brain mets to the L superior cerebellar mass (s/p craniotomy in 04/2019) currently on Keytruda, CAD s/p RCA stent in 2015, p/w worsening sob for several days. Patient went to oncologist Dr. Mackey today who was concerned for pleural effusion and sent patient to ED. Patient had a R sided pleural effusion with about 3 L drained in July. He recently had another 3 L drained in [place] while he was on vacation. Patient currently only has dyspnea on minimal exertion. No chest pain, palpitations, fevers, or chills. No nausea, vomiting, or abdominal pain. Patient had decreased appetite and weight loss for past few months. Patient denies any headache or dizziness, but has worsening fatigue. He had a brain MRI done yesterday to assess status of brain mets. (12 Aug 2019 19:08)  8/13 - underwent thoracentesis ( fluid was serosanguineous fluid, orange-tinged )with removal of 3.2L (orange-tinged fluid). Pleura difficult to penetrate, likely has pleural thickening. CT Chest without obvious endobronchial lesion. s/p bronchoscopy on 8/14 without endobronchial lesions but BAL appeared green and bilious. Unclear if pt has a fistula that has formed between the lung parenchyma and stomach/esophagus. Pleural effusion may be from ascites vs. esophageal fistula though pH is 7.45 vs. malignancy itself.   8/14 patient was intubated for hypoxemia  8/15 pt had chest tube for pneumothorax vs trapped lung  8/16- New finding of dilated left pupil 8/14, s/p CTH showed no acute changes. Pupils are constricted b/l currently  pt remains febrile and WBC remains elevated  - POCUS finding of left subclavian vein thrombosis during central line insertion; Switched to heparin gtt; Heparin gtt d/c'ed due to concern for ICH  - Restarted Lovenox  Antibiotics changed to cefepime and vancomycin by levels    ID is asked to address why spiking and why the leucocytosis, all cultures are negative and zosyn/ vancomycin did not break the fever  There is concern that in the bronchoscopy there was brown fluid and the question arose as whether there can be a fistula from the GI tract. CT with oral contrast did not demonstrate this. There is also a concern as to whether the ascites and pleural fluid could be related.  Pt to have dye studies today to look for a leak.   Pt does not have diarrhea to suggest c diff  There are minimal secretions to suggest pneumonia  u/a without pyuria  There is still a concern for a fistula - so for now would add fungal coverage and flagyl to the cefepime while cultures are pending and this is worked up further    Pt with a left subclavian thrombosis and this could cause fever but patient now on lovenox so we can see how this does    Pt also with a recent trip to [place] where he went in "baths with extra oxygen"  I m not sure where to begin with this because I didn't get a good explanation .IN terms of [place] , lets check a coccidiomycosis titer. In terms of the water- check urine legionella and consider other water born pathogen. CT does NOT  look like hypersensitivity pneumonitis though. According to the admission note, pt had fluid drained in [place] as well    check cortisol with patients low sodium    Finally, could the fevers be related to the Keytruda but he has been off for a while now and this seems less likely

## 2019-08-21 NOTE — PROGRESS NOTE ADULT - ASSESSMENT
Patient is a 53 M with hx of esophageal CA with mets to R pelvis and brain, CAD s/p RCA stent in 2015, p/w dyspnea 2/2 R pleural effusion s/p thoracentesis and bronchoscopy, admitted to MICU for hypoxic respiratory failure post bronch with resulting tension pneumothorax now s/p chest tube and intubation, course further c/b hypotension, on pressors.     #Neuro  - Sedated on fentanyl and propofol; maintain on sedation for comfort due to tachypnea and hypotension with weaning  - New finding of dilated left pupil 8/14, s/p CTH showed no acute changes. Pupils are constricted b/l currently    #Resp  - Hypoxic respiratory failure 2/2 R pleural effusion vs. infection, s/p intubation  - Pleural effusion is likely malignant. No signs of ADHF at this time.   - FiO2 down to 80%, rate 26 and PEEP 8.  - s/p thoracentesis 8/13 with 3.2 L removal and bronchoscopy 8/14  - Chest tube suction 20 cc over 24 hours; (+) air leak, c/w wall suction  - Chest PT and suction prn  - BAL fluid cytology atypical findings, f/u final report    #CV  - s/p RCA stent in 2015  - On vaso, off li and levo; pressor capped.   - Cardiology following, no acute interventions  - No signs of significant ischemia   - Cont asa    #GI  - OGT placed by GI, tube feeds initiated  - CT chest (+) large R pleural effusion and small L pleural effusion, (-) gastric leak.   - Protonix 40 mg IV daily    #ID  - ?Septic shock, (+) persistent leukocytosis & febrile 8/21;  - C/w vanc (8/14 - ) and zosyn (8/14 - ); c/w vanc 1250 BID, trough 16.4  - Lactate wnl  - Bcx, BAL cx, AFB NGTD. R/p Bcx, UA, Ucx 8/20  - Combi cath cx normal katerine    #Renal  - AGNES resolved  - Hyponatremia, SIADH vs. renal excretion 2/2 intrinsic kidney injury  - (+) Rodriguez with 10-40 cc/hr UOP;  - Mg > 2, K > 4 as per cards.    #Onc  - Metastatic esophageal CA with mets to pelvis and brain  - Holding Keytruda inpatient (last cycle in July), will f/u outpatient with Dr. Mackey  - Onc following    #Heme  - Hgb 8.9, continue to monitor    #Endo  - No active issues    #DVT PPx  - POCUS finding of left subclavian vein thrombosis during central line insertion; Switched to heparin gtt; Heparin gtt d/c'ed due to concern for ICH  - On Lovenox currently    #GOC/Ethics  - DNR; Cap pressors, c/w current treatment, no aggressive measures  - MOLST form in chart. Confirmed on 8/19, GOC in chart Patient is a 53 M with hx of esophageal CA with mets to R pelvis and brain, CAD s/p RCA stent in 2015, p/w dyspnea 2/2 R pleural effusion s/p thoracentesis and bronchoscopy, admitted to MICU for hypoxic respiratory failure post bronch with resulting tension pneumothorax now s/p chest tube and intubation, course further c/b hypotension, on pressors.     #Neuro  - Sedated on fentanyl and propofol; added fentanyl patch to assist in weaning    #Resp  - Hypoxic respiratory failure 2/2 R malignant pleural effusion vs. infection, s/p intubation  - Chest tube suction 20 cc over 24 hours; (+) air leak, c/w wall suction  - BAL fluid cytology atypical findings, f/u final report    #CV  - s/p RCA stent in 2015  - On vaso, off li and levo; pressor capped.   - Cardiology following, no acute interventions  - Cont asa    #GI  - OGT placed by GI, tube feeds initiated  - Will trace food coloring to assess for esophageal/bronchial fistula  - Protonix 40 mg IV daily    #ID  - ?Septic shock, (+) persistent leukocytosis & febrile 8/21; f/u R/p Bcx, UA, Ucx 8/20  - C/w vanc (8/14 - ) and zosyn (8/14 - ); c/w vanc 1250 BID, trough 16.4  - Combi cath cx normal katerine    #Renal  - AGNES resolved  - Hyponatremia, SIADH vs. renal excretion 2/2 intrinsic kidney injury  - (+) Rodriguez with 10-40 cc/hr UOP;  - Mg > 2, K > 4 as per cards.    #Onc  - Metastatic esophageal CA with mets to pelvis and brain  - Holding Keytruda inpatient (last cycle in July), will f/u outpatient with Dr. Mackey    #Heme  - Hgb 8.9, continue to monitor    #Endo  - No active issues    #DVT PPx  - On Lovenox currently    #GOC/Ethics  - DNR; Cap pressors, c/w current treatment, no aggressive measures  - MOLST form in chart. Confirmed on 8/19, GOC in chart

## 2019-08-21 NOTE — PROGRESS NOTE ADULT - SUBJECTIVE AND OBJECTIVE BOX
INTERVAL HPI/OVERNIGHT EVENTS:  S/p OGT yesterday. This AM remains intubated/sedated. Discussed with wife at bedside. Fevers overnight noted.     MEDICATIONS  (STANDING):  ALBUTerol/ipratropium for Nebulization 3 milliLiter(s) Nebulizer every 6 hours  artificial tears (preservative free) Ophthalmic Solution 1 Drop(s) Both EYES two times a day  aspirin  chewable 81 milliGRAM(s) Oral daily  cefepime   IVPB 1000 milliGRAM(s) IV Intermittent every 8 hours  chlorhexidine 0.12% Liquid 15 milliLiter(s) Oral Mucosa every 12 hours  chlorhexidine 4% Liquid 1 Application(s) Topical <User Schedule>  chlorhexidine 4% Liquid 1 Application(s) Topical <User Schedule>  docusate sodium Liquid 100 milliGRAM(s) Oral three times a day  enoxaparin Injectable 40 milliGRAM(s) SubCutaneous daily  fentaNYL   Infusion. 0.4 MICROgram(s)/kG/Hr (1.612 mL/Hr) IV Continuous <Continuous>  fentaNYL   Patch  50 MICROgram(s)/Hr. 1 Patch Transdermal every 48 hours  pantoprazole  Injectable 40 milliGRAM(s) IV Push daily  petrolatum Ophthalmic Ointment 1 Application(s) Both EYES two times a day  polyethylene glycol 3350 17 Gram(s) Oral two times a day  propofol Infusion 40 MICROgram(s)/kG/Min (19.344 mL/Hr) IV Continuous <Continuous>  senna Syrup 10 milliLiter(s) Oral at bedtime  sodium chloride 0.9%. 1000 milliLiter(s) (100 mL/Hr) IV Continuous <Continuous>  vasopressin Infusion 0.04 Unit(s)/Min (2.4 mL/Hr) IV Continuous <Continuous>    MEDICATIONS  (PRN):  acetaminophen  Suppository .. 650 milliGRAM(s) Rectal every 6 hours PRN Temp greater or equal to 38C (100.4F)  bisacodyl Suppository 10 milliGRAM(s) Rectal daily PRN Constipation  sodium chloride 0.9% lock flush 10 milliLiter(s) IV Push every 1 hour PRN Pre/post blood products, medications, blood draw, and to maintain line patency    Allergies    No Known Allergies    Intolerances          VITAL SIGNS:  T(F): 100.4 (08-21-19 @ 09:00)  HR: 93 (08-21-19 @ 12:00)  BP: 97/68 (08-21-19 @ 11:00)  RR: 26 (08-21-19 @ 11:00)  SpO2: 92% (08-21-19 @ 12:00)  Wt(kg): --    PHYSICAL EXAM:    Constitutional: intubated, no distress currently   Eyes: pupils constricted bilaterally   Neck: supple, no masses, no JVD  Respiratory: decreased breath sounds bilaterally   Cardiovascular: RRR, no M/R/G  Gastrointestinal: +BS, soft, NT/ND, no hepatosplenomegaly  Extremities: no c/c/e  Neurological: limited exam while on sedation     LABS:                        9.5    21.3  )-----------( 220      ( 21 Aug 2019 11:52 )             30.7     08-21    127<L>  |  90<L>  |  20  ----------------------------<  98  4.0   |  27  |  0.60    Ca    8.8      21 Aug 2019 00:16  Phos  2.5     08-21  Mg     2.1     08-21      PT/INR - ( 21 Aug 2019 00:16 )   PT: 14.2 sec;   INR: 1.23 ratio         PTT - ( 21 Aug 2019 00:16 )  PTT:31.8 sec  Urinalysis Basic - ( 20 Aug 2019 12:37 )    Color: Dark Yellow / Appearance: Slightly Turbid / SG: >1.050 / pH: x  Gluc: x / Ketone: Negative  / Bili: Small / Urobili: 2 mg/dL   Blood: x / Protein: 100 / Nitrite: Negative   Leuk Esterase: Negative / RBC: 304 /hpf / WBC 4 /HPF   Sq Epi: x / Non Sq Epi: 1 /hpf / Bacteria: Negative        RADIOLOGY & ADDITIONAL TESTS:  Studies reviewed.

## 2019-08-21 NOTE — PROGRESS NOTE ADULT - ASSESSMENT
52 yo M with pmhx of metastatic esophageal CA diagnosed in 2017 s/p NAC chemo/RT and esophagogastrectomy, now with mets to R pelvis and brain mets to the L superior cerebellar mass (s/p craniotomy in 04/2019) currently on Keytruda who presented with worsening dyspnea, found to have large rt pleural effusion. Course complicated by hypoxic respiratory failure now intubated with chest tube placed being managed in MICU.     - s/p multiple lines of treatment, most recently immunotherapy   - recent brain MRI w/ possible areas of progression   - remains critically ill in ICU intubated and sedated   - care per MICU: ongoing weaning from vent and pressors   - based on past conversations, pt would not want to go through with radiation and possible further treatments  - pt is DNR; discussed with wife about current care: she would like to wait a couple days to see how pt responds as pressor requirements are decreasing. However, even if remarkable turnaround from respiratory perspective is made, pt mental status is a concern as well as functional status. Even if this occurs, would not be a treatment candidate currently  - no plan for onc related treatment at present   - will continue to follow for ongoing GOC discussions, pt not ready to fully withdraw care at present     Nithya Priest, PGY-5  Hematology-Oncology Fellow  [phone] ([place]) [id number] ([facility])

## 2019-08-21 NOTE — CONSULT NOTE ADULT - SUBJECTIVE AND OBJECTIVE BOX
Patient is a 53y old  Male who presents with a chief complaint of pleural effusion (21 Aug 2019 13:18)      HPI:  53 M with hx of esophageal CA diagnosed in 2017 s/p chemo and radiation and with mets to R pelvis and brain mets to the L superior cerebellar mass (s/p craniotomy in 04/2019) currently on Keytruda, CAD s/p RCA stent in 2015, p/w worsening sob for several days. Patient went to oncologist Dr. Mackey today who was concerned for pleural effusion and sent patient to ED. Patient had a R sided pleural effusion with about 3 L drained in July. He recently had another 3 L drained in [place] while he was on vacation. Patient currently only has dyspnea on minimal exertion. No chest pain, palpitations, fevers, or chills. No nausea, vomiting, or abdominal pain. Patient had decreased appetite and weight loss for past few months. Patient denies any headache or dizziness, but has worsening fatigue. He had a brain MRI done yesterday to assess status of brain mets. (12 Aug 2019 19:08)  8/13 - underwent thoracentesis ( fluid was serosanguineous fluid, orange-tinged )with removal of 3.2L (orange-tinged fluid). Pleura difficult to penetrate, likely has pleural thickening. CT Chest without obvious endobronchial lesion. s/p bronchoscopy on 8/14 without endobronchial lesions but BAL appeared green and bilious. Unclear if pt has a fistula that has formed between the lung parenchyma and stomach/esophagus. Pleural effusion may be from ascites vs. esophageal fistula though pH is 7.45 vs. malignancy itself.   8/14 patient was intubated for hypoxemia  8/15 pt had chest tube for pneumothorax vs trapped lung  8/16- New finding of dilated left pupil 8/14, s/p CTH showed no acute changes. Pupils are constricted b/l currently  pt remains febrile and WBC remains elevated  - POCUS finding of left subclavian vein thrombosis during central line insertion; Switched to heparin gtt; Heparin gtt d/c'ed due to concern for ICH  - Restarted Lovenox  Antibiotics changed to cefepime        PAST MEDICAL & SURGICAL HISTORY:  H/O nausea  History of dysphagia  Hilar lymphadenopathy  Chronic anticoagulation  CAD (coronary artery disease)  Secondary malignant neoplasm of brain  Metastasis to bone  Esophageal cancer  MI (myocardial infarction): 2015 with 1 coronary stent mid RCA  H/O craniotomy  History of esophageal surgery: 6/2018  H/O hernia repair: 1966  Status post tonsillectomy and adenoidectomy  Stented coronary artery: x1 2015      Social history:   Marital Status:   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    Substance Use :denies  Tobacco Usage:  (   ) never smoked   (  x ) former smoker- PPD as a teenager   (   ) current smoker  (     ) pack year  (        ) last tobacco use date  Alcohol Usage: 3-4 drinks/day- but quit 2 years ago  Travel: was in [place] for alternative therapy            FAMILY HISTORY:  Family history of cervical cancer  Family history of throat cancer      REVIEW OF SYSTEMS  pt intubated and unable to answer ROS  according to nurse, minimal secretions  no diarrhea        Allergies    No Known Allergies    Intolerances        Antimicrobials:       MEDICATIONS  (prior antimicrobials ):  vancomycin 8/14- 8/21  piperacillin/tazobactam   8/14- 8/21    current abs    cefepime   IVPB 1000 milliGRAM(s) IV Intermittent every 8 hours      MEDICATIONS  (STANDING):  ALBUTerol/ipratropium for Nebulization 3 milliLiter(s) Nebulizer every 6 hours  artificial tears (preservative free) Ophthalmic Solution 1 Drop(s) Both EYES two times a day  aspirin  chewable 81 milliGRAM(s) Oral daily  cefepime   IVPB 1000 milliGRAM(s) IV Intermittent every 8 hours  chlorhexidine 0.12% Liquid 15 milliLiter(s) Oral Mucosa every 12 hours  chlorhexidine 4% Liquid 1 Application(s) Topical <User Schedule>  chlorhexidine 4% Liquid 1 Application(s) Topical <User Schedule>  docusate sodium Liquid 100 milliGRAM(s) Oral three times a day  enoxaparin Injectable 40 milliGRAM(s) SubCutaneous daily  fentaNYL   Infusion. 0.4 MICROgram(s)/kG/Hr (1.612 mL/Hr) IV Continuous <Continuous>  fentaNYL   Patch  50 MICROgram(s)/Hr. 1 Patch Transdermal every 48 hours  pantoprazole  Injectable 40 milliGRAM(s) IV Push daily  petrolatum Ophthalmic Ointment 1 Application(s) Both EYES two times a day  polyethylene glycol 3350 17 Gram(s) Oral two times a day  propofol Infusion 40 MICROgram(s)/kG/Min (19.344 mL/Hr) IV Continuous <Continuous>  senna Syrup 10 milliLiter(s) Oral at bedtime  sodium chloride 0.9%. 1000 milliLiter(s) (100 mL/Hr) IV Continuous <Continuous>  vasopressin Infusion 0.04 Unit(s)/Min (2.4 mL/Hr) IV Continuous <Continuous>    MEDICATIONS  (PRN):  acetaminophen    Suspension .. 650 milliGRAM(s) Oral every 6 hours PRN Temp greater or equal to 38C (100.4F)  bisacodyl Suppository 10 milliGRAM(s) Rectal daily PRN Constipation  sodium chloride 0.9% lock flush 10 milliLiter(s) IV Push every 1 hour PRN Pre/post blood products, medications, blood draw, and to maintain line patency        Vital Signs Last 24 Hrs  T(C): 38.2 (21 Aug 2019 14:00), Max: 38.3 (21 Aug 2019 00:00)  T(F): 100.8 (21 Aug 2019 14:00), Max: 100.9 (21 Aug 2019 00:00)  HR: 100 (21 Aug 2019 15:00) (79 - 107)  BP: 101/64 (21 Aug 2019 15:00) (85/56 - 150/100)  BP(mean): 78 (21 Aug 2019 15:00) (66 - 115)  RR: 23 (21 Aug 2019 15:00) (7 - 27)  SpO2: 94% (21 Aug 2019 15:00) (89% - 96%)    PHYSICAL EXAM:Pleasant patient in no acute distress.      Constitutional:Comfortable.Awake and alert  No cachexia     Eyes:PERRL EOMI.NO discharge or conjunctival injection    ENMT:No sinus tenderness.No thrush.No pharyngeal exudate or erythema.Fair dental hygiene    Neck:Supple,No LN,no JVD      Respiratory:Good air entry bilaterally,CTA    Cardiovascular:S1 S2 wnl, No murmurs,rub or gallops    Gastrointestinal:Soft BS(+) no tenderness no masses ,No rebound or guarding    Genitourinary:No CVA tendereness     Rectal:    Extremities:No cyanosis,clubbing or edema.    Vascular:peripheral pulses felt    Neurological:AAO X 3,No grossly focal deficits    Skin:No rash     Lymph Nodes:No palpable LNs    Musculoskeletal:No joint swelling or LOM    Psychiatric:Affect normal.                                9.5    21.3  )-----------( 220      ( 21 Aug 2019 11:52 )             30.7         08-21    127<L>  |  90<L>  |  20  ----------------------------<  98  4.0   |  27  |  0.60    Ca    8.8      21 Aug 2019 00:16  Phos  2.5     08-21  Mg     2.1     08-21        Vancomycin Level, Trough: 16.4 ug/mL (08-20 @ 23:23)      Urinalysis Basic - ( 20 Aug 2019 12:37 )    Color: Dark Yellow / Appearance: Slightly Turbid / SG: >1.050 / pH: x  Gluc: x / Ketone: Negative  / Bili: Small / Urobili: 2 mg/dL   Blood: x / Protein: 100 / Nitrite: Negative   Leuk Esterase: Negative / RBC: 304 /hpf / WBC 4 /HPF   Sq Epi: x / Non Sq Epi: 1 /hpf / Bacteria: Negative        RECENT CULTURES:  08-20 @ 14:58  .Blood  --  --  --    No growth to date.  --  08-19 @ 18:40  Bronch Wash  --  --  --    Normal Respiratory Vilma present  --  08-18 @ 16:59  .Blood  --  --  --    No growth to date.  --  08-14 @ 21:54  .Blood  --  --  --    No growth at 5 days.  --  08-14 @ 17:59  Bronch Wash  --  --  --    Normal Respiratory Vilma present  --      MICROBIOLOGY:  Culture Results:   No growth to date. (08-20 @ 14:58)  Culture Results:   No growth to date. (08-20 @ 14:58)  Culture Results:   Normal Respiratory Vilma present (08-19 @ 18:40)  Culture Results:   No growth to date. (08-18 @ 16:59)  Culture Results:   No growth to date. (08-18 @ 16:59)  Culture Results:   No growth at 5 days. (08-14 @ 21:54)  Culture Results:   No growth at 5 days. (08-14 @ 21:54)  Culture Results:   Normal Respiratory Vilma present (08-14 @ 17:59)        blood culture  --    No growth to date.  blood culture gram stain  --  surgical site  --  culture urine  --  culture tissue  --      blood culture  --    Normal Respiratory Vilma present  blood culture gram stain  --  surgical site  --  culture urine  --  culture tissue  --      blood culture  --    No growth to date.  blood culture gram stain  --  surgical site  --  culture urine  --  culture tissue  --        Radiology: Patient is a 53y old  Male who presents with a chief complaint of pleural effusion (21 Aug 2019 13:18)      HPI:  53 M with hx of esophageal CA diagnosed in 2017 s/p chemo and radiation and with mets to R pelvis and brain mets to the L superior cerebellar mass (s/p craniotomy in 04/2019) currently on Keytruda, CAD s/p RCA stent in 2015, p/w worsening sob for several days. Patient went to oncologist Dr. Mackey today who was concerned for pleural effusion and sent patient to ED. Patient had a R sided pleural effusion with about 3 L drained in July. He recently had another 3 L drained in [place] while he was on vacation. Patient currently only has dyspnea on minimal exertion. No chest pain, palpitations, fevers, or chills. No nausea, vomiting, or abdominal pain. Patient had decreased appetite and weight loss for past few months. Patient denies any headache or dizziness, but has worsening fatigue. He had a brain MRI done yesterday to assess status of brain mets. (12 Aug 2019 19:08)  8/13 - underwent thoracentesis ( fluid was serosanguineous fluid, orange-tinged )with removal of 3.2L (orange-tinged fluid). Pleura difficult to penetrate, likely has pleural thickening. CT Chest without obvious endobronchial lesion. s/p bronchoscopy on 8/14 without endobronchial lesions but BAL appeared green and bilious. Unclear if pt has a fistula that has formed between the lung parenchyma and stomach/esophagus. Pleural effusion may be from ascites vs. esophageal fistula though pH is 7.45 vs. malignancy itself.   8/14 patient was intubated for hypoxemia  8/15 pt had chest tube for pneumothorax vs trapped lung  8/16- New finding of dilated left pupil 8/14, s/p CTH showed no acute changes. Pupils are constricted b/l currently  pt remains febrile and WBC remains elevated  - POCUS finding of left subclavian vein thrombosis during central line insertion; Switched to heparin gtt; Heparin gtt d/c'ed due to concern for ICH  - Restarted Lovenox  Antibiotics changed to cefepime and vancomycin by levels        PAST MEDICAL & SURGICAL HISTORY:  H/O nausea  History of dysphagia  Hilar lymphadenopathy  Chronic anticoagulation  CAD (coronary artery disease)  Secondary malignant neoplasm of brain  Metastasis to bone  Esophageal cancer  MI (myocardial infarction): 2015 with 1 coronary stent mid RCA  H/O craniotomy  History of esophageal surgery: 6/2018  H/O hernia repair: 1966  Status post tonsillectomy and adenoidectomy  Stented coronary artery: x1 2015      Social history:   Marital Status:   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    Substance Use :denies  Tobacco Usage:  (   ) never smoked   (  x ) former smoker- PPD as a teenager   (   ) current smoker  (     ) pack year  (        ) last tobacco use date  Alcohol Usage: 3-4 drinks/day- but quit 2 years ago  Travel: was in [place] for alternative therapy- Oxygen therapy            FAMILY HISTORY:  Family history of cervical cancer  Family history of throat cancer      REVIEW OF SYSTEMS  pt intubated and unable to answer ROS  according to nurse, minimal secretions  no diarrhea        Allergies    No Known Allergies    Intolerances        Antimicrobials:       MEDICATIONS  (prior antimicrobials ):  vancomycin 8/14- 8/21  piperacillin/tazobactam   8/14- 8/21    current abs    cefepime   IVPB 1000 milliGRAM(s) IV Intermittent every 8 hours      MEDICATIONS  (STANDING):  ALBUTerol/ipratropium for Nebulization 3 milliLiter(s) Nebulizer every 6 hours  artificial tears (preservative free) Ophthalmic Solution 1 Drop(s) Both EYES two times a day  aspirin  chewable 81 milliGRAM(s) Oral daily  cefepime   IVPB 1000 milliGRAM(s) IV Intermittent every 8 hours  chlorhexidine 0.12% Liquid 15 milliLiter(s) Oral Mucosa every 12 hours  chlorhexidine 4% Liquid 1 Application(s) Topical <User Schedule>  chlorhexidine 4% Liquid 1 Application(s) Topical <User Schedule>  docusate sodium Liquid 100 milliGRAM(s) Oral three times a day  enoxaparin Injectable 40 milliGRAM(s) SubCutaneous daily  fentaNYL   Infusion. 0.4 MICROgram(s)/kG/Hr (1.612 mL/Hr) IV Continuous <Continuous>  fentaNYL   Patch  50 MICROgram(s)/Hr. 1 Patch Transdermal every 48 hours  pantoprazole  Injectable 40 milliGRAM(s) IV Push daily  petrolatum Ophthalmic Ointment 1 Application(s) Both EYES two times a day  polyethylene glycol 3350 17 Gram(s) Oral two times a day  propofol Infusion 40 MICROgram(s)/kG/Min (19.344 mL/Hr) IV Continuous <Continuous>  senna Syrup 10 milliLiter(s) Oral at bedtime  sodium chloride 0.9%. 1000 milliLiter(s) (100 mL/Hr) IV Continuous <Continuous>  vasopressin Infusion 0.04 Unit(s)/Min (2.4 mL/Hr) IV Continuous <Continuous>    MEDICATIONS  (PRN):  acetaminophen    Suspension .. 650 milliGRAM(s) Oral every 6 hours PRN Temp greater or equal to 38C (100.4F)  bisacodyl Suppository 10 milliGRAM(s) Rectal daily PRN Constipation  sodium chloride 0.9% lock flush 10 milliLiter(s) IV Push every 1 hour PRN Pre/post blood products, medications, blood draw, and to maintain line patency        Vital Signs Last 24 Hrs  T(C): 38.2 (21 Aug 2019 14:00), Max: 38.3 (21 Aug 2019 00:00)  T(F): 100.8 (21 Aug 2019 14:00), Max: 100.9 (21 Aug 2019 00:00)  HR: 100 (21 Aug 2019 15:00) (79 - 107)  BP: 101/64 (21 Aug 2019 15:00) (85/56 - 150/100)  BP(mean): 78 (21 Aug 2019 15:00) (66 - 115)  RR: 23 (21 Aug 2019 15:00) (7 - 27)  SpO2: 94% (21 Aug 2019 15:00) (89% - 96%)    PHYSICAL EXAM: patient is intubated      Constitutional: sedated  Eyes:PERRL EOMI.NO discharge or conjunctival injection    Neck:Supple,No LN,no JVD  TLC on right  Respiratory occasional rhonchi    chest tube in place    Cardiovascular:S1 S2 wnl,    Gastrointestinal: siftly distended    Extremities:No cyanosis,clubbing or edema.    Vascular:peripheral pulses felt    Neurological:AAO X 3,No grossly focal deficits    Skin:No rash     Lymph Nodes:No palpable LNs    Musculoskeletal:No joint swelling or LOM                    9.5    21.3  )-----------( 220      ( 21 Aug 2019 11:52 )             30.7         08-21    127<L>  |  90<L>  |  20  ----------------------------<  98  4.0   |  27  |  0.60    Ca    8.8      21 Aug 2019 00:16  Phos  2.5     08-21  Mg     2.1     08-21        Vancomycin Level, Trough: 16.4 ug/mL (08-20 @ 23:23)      Urinalysis Basic - ( 20 Aug 2019 12:37 )    Color: Dark Yellow / Appearance: Slightly Turbid / SG: >1.050 / pH: x  Gluc: x / Ketone: Negative  / Bili: Small / Urobili: 2 mg/dL   Blood: x / Protein: 100 / Nitrite: Negative   Leuk Esterase: Negative / RBC: 304 /hpf / WBC 4 /HPF   Sq Epi: x / Non Sq Epi: 1 /hpf / Bacteria: Negative      RECENT CULTURES:  08-20 @ 14:58  .Blood  --  --  --    No growth to date.  --  08-19 @ 18:40  Bronch Wash  --  --  --    Normal Respiratory Vilma present  --          blood culture  --    No growth to date.  blood culture gram stain  --  surgical site  --  culture urine  --  culture tissue  --      blood culture  --    Normal Respiratory Vilma present  blood culture gram stain  --  Culture - Urine (08.20.19 @ 15:04)    Specimen Source: .Urine    Culture Results:   <10,000 CFU/mL Normal Urogenital Vilma        Radiology:      < from: Xray Abdomen 1 View PORTABLE -Urgent (08.20.19 @ 18:10) >    IMPRESSION:   Limited study. No distended loops of bowel, unlikely ileus. The distal   portions of the enteric tube are not entirely visualized.    < end of copied text >      < from: Xray Chest 1 View- PORTABLE-Urgent (08.20.19 @ 17:56) >  Enteric tube overlies the trachea and right mainstem bronchus, and right   lower lobe bronchus or may be within a gastric pull-through. Recommend   clinical correlation. Findings were discussed with Dr. Alarcon by Dr. Cleveland on 8/21/2019 at 8:39 AM.    Increasedright mid and lower lung opacity may be secondary to infection   or pulmonary edema.    Bilateral pleural effusions appear increased compared to prior exam.    < end of copied text >    < from: CT Head No Cont (08.15.19 @ 05:29) >    IMPRESSION:    Postoperative changes. No mass effect or hemorrhage.      < end of copied text >      < from: CT Abdomen and Pelvis w/ Oral Cont and w/ IV Cont (08.15.19 @ 02:07) >  EXAM:  CT ABDOMEN AND PELVIS OC IC                          EXAM:  CT CHEST IC                            PROCEDURE DATE:  08/15/2019            INTERPRETATION:  CLINICAL INFORMATION: Metastatic esophageal cancer   status post intubation. Shortnessof breath.    COMPARISON: CT chest 8/13/2019. CT chest abdomen and pelvis 7/11/2019    PROCEDURE:   CT of the Chest, Abdomen and Pelvis was performed with intravenous   contrast.   Intravenous contrast: 90 ml Omnipaque 350. 10 ml discarded.  Oral contrast: None.  Sagittal and coronal reformats were performed.    FINDINGS:    CHEST:     LUNGS AND LARGE AIRWAYS: Endotracheal tube below the thoracic inlet.   Patchy opacities in the left upper lobe. Atelectasis of the left lower   lobe. Complete collapse of the right lower lobe with significant collapse   of the right upper and middle lobes.  PLEURA: Moderate to large right hydropneumothorax with depression of the   right hemidiaphragm and shift of mediastinum to the left. Small left   pleural effusion.  VESSELS: Right-sided central line with tip in the SVC.  HEART: Heart size is normal. No pericardial effusion. Coronary   calcifications.  MEDIASTINUM AND ROCHELLE: No lymphadenopathy.  CHEST WALL AND LOWER NECK: Within normal limits.    ABDOMEN AND PELVIS:    LIVER: Indeterminate 7 mm low-attenuation lesion in segment 5 (4, 104).   Another tiny low-attenuation lesion in segment 6 is unchanged (4, 102).  BILE DUCTS: Normal caliber.  GALLBLADDER: Within normal limits.  SPLEEN: Within normal limits.  PANCREAS: Within normal limits.  ADRENALS: Within normal limits.  KIDNEYS/URETERS: Subcentimeter hypodense focus in the right kidney too   small to characterize. No hydronephrosis.    BLADDER: Collapsed around a Rodriguez catheter.  REPRODUCTIVE ORGANS: Prostate within normal limits.    BOWEL: Status post esophagectomy with gastric pull-through. Contrast   noted within the gastric pull-through without evidence of leak. Enteric   tube with tip at the hiatus. No bowel obstruction. Appendix not   visualized.  PERITONEUM: Small to moderate volume of ascites. Small volume   pneumoperitoneum of uncertain etiology. Unchanged peritoneal   carcinomatosis.  VESSELS: Mild atherosclerotic calcifications.  RETROPERITONEUM/LYMPH NODES: No lymphadenopathy.   ABDOMINAL WALL: Within normal limits.  BONES: Degenerative changes. Unchanged mixed sclerotic and lytic lesion   within the sacrum.    IMPRESSION:     Moderate to large right tension hydropneumothorax.  Small pneumoperitoneum of uncertain etiologybut may be secondary to   pneumothorax.  Small left pleural effusion with atelectasis of the left lower lobe.  No evidence of leak from gastric pull-through.  Increased ascites in the abdomen with peritoneal carcinomatosis again   noted. Indeterminate liver lesions.    Findings were discussed by Dr. Rodriguez with MICU attending on 8/15/2019 at   3:25 AM with read back.        < end of copied text >

## 2019-08-21 NOTE — PROGRESS NOTE ADULT - ASSESSMENT
53 M with hx of esophageal CA diagnosed in 2017 s/p chemo and radiation and with mets to R pelvis and brain mets to the L superior cerebellar mass (s/p craniotomy in 04/2019) currently on Keytruda, CAD s/p RCA stent in 2015, p/w pleural effusion, now with hypoxic resp failure post bronch with resulting tension PTX now s/p chest tube    - No signs of significant ischemia   - No signs of ADHF at this time.  likely malignant pl eff, not cardiogenic  - EKG without ischemic changes  - Echo with normal LV function, moderate diastolic dysfunction, and evidence of RVE and RV pressure overload.  - Cont asa for CAD history  - Tele demonstrates NSR/Sinus tachy with short burst of PATwhich is likely all reactive to his underlying condition.     - Now off of levo and li. Cont Vaso support to maintain MAP at least >60. Titrate off as blood pressure tolerates.     - Cont on mechanical ventilatory support. Wean as tolerated.     - Monitor and replete electrolytes. Keep K>4.0 and Mg>2.0.  - Further cardiac workup will depend on clinical course.   - All other workup per MICU team. Will followup.

## 2019-08-21 NOTE — PROGRESS NOTE ADULT - SUBJECTIVE AND OBJECTIVE BOX
[facility] -- Jamilah Manning, Traci Bustos Pannella, Patel, Savella  Office # [phone]      Follow Up:  resp failure shock    Subjective/Observations: Patient seen and examined. Events noted. Sedated. The patient is intubated and requiring mechanical ventilatory support. Levo and Melvin off. Still on Vaso      REVIEW OF SYSTEMS: Limited 2/2 comorbidities      PAST MEDICAL & SURGICAL HISTORY:  H/O nausea  History of dysphagia  Hilar lymphadenopathy  Chronic anticoagulation  CAD (coronary artery disease)  Secondary malignant neoplasm of brain  Metastasis to bone  Esophageal cancer  MI (myocardial infarction): 2015 with 1 coronary stent mid RCA  H/O craniotomy  History of esophageal surgery: 6/2018  H/O hernia repair: 1966  Status post tonsillectomy and adenoidectomy  Stented coronary artery: x1 2015      MEDICATIONS  (STANDING):  albumin human 25% IVPB 100 milliLiter(s) IV Intermittent every 6 hours  ALBUTerol/ipratropium for Nebulization 3 milliLiter(s) Nebulizer every 6 hours  artificial tears (preservative free) Ophthalmic Solution 1 Drop(s) Both EYES two times a day  aspirin  chewable 81 milliGRAM(s) Oral daily  chlorhexidine 0.12% Liquid 15 milliLiter(s) Oral Mucosa every 12 hours  chlorhexidine 4% Liquid 1 Application(s) Topical <User Schedule>  chlorhexidine 4% Liquid 1 Application(s) Topical <User Schedule>  docusate sodium Liquid 100 milliGRAM(s) Oral three times a day  enoxaparin Injectable 40 milliGRAM(s) SubCutaneous daily  fentaNYL   Infusion. 0.5 MICROgram(s)/kG/Hr (2.015 mL/Hr) IV Continuous <Continuous>  fentaNYL   Patch  50 MICROgram(s)/Hr. 1 Patch Transdermal every 48 hours  pantoprazole  Injectable 40 milliGRAM(s) IV Push daily  petrolatum Ophthalmic Ointment 1 Application(s) Both EYES two times a day  piperacillin/tazobactam IVPB.. 3.375 Gram(s) IV Intermittent every 8 hours  polyethylene glycol 3350 17 Gram(s) Oral two times a day  propofol Infusion 40 MICROgram(s)/kG/Min (19.344 mL/Hr) IV Continuous <Continuous>  senna Syrup 10 milliLiter(s) Oral at bedtime  vancomycin  IVPB 1250 milliGRAM(s) IV Intermittent every 8 hours  vasopressin Infusion 0.04 Unit(s)/Min (2.4 mL/Hr) IV Continuous <Continuous>    MEDICATIONS  (PRN):  acetaminophen  Suppository .. 650 milliGRAM(s) Rectal every 6 hours PRN Temp greater or equal to 38C (100.4F)  sodium chloride 0.9% lock flush 10 milliLiter(s) IV Push every 1 hour PRN Pre/post blood products, medications, blood draw, and to maintain line patency      Allergies    No Known Allergies    Intolerances            Vital Signs Last 24 Hrs  T(C): 38 (21 Aug 2019 07:00), Max: 38.3 (21 Aug 2019 00:00)  T(F): 100.4 (21 Aug 2019 07:00), Max: 100.9 (21 Aug 2019 00:00)  HR: 87 (21 Aug 2019 07:30) (78 - 100)  BP: 98/66 (21 Aug 2019 07:15) (82/56 - 150/100)  BP(mean): 78 (21 Aug 2019 07:15) (63 - 115)  RR: 26 (21 Aug 2019 07:30) (12 - 36)  SpO2: 94% (21 Aug 2019 07:30) (85% - 98%)    I&O's Summary    20 Aug 2019 07:01  -  21 Aug 2019 07:00  --------------------------------------------------------  IN: 3461.7 mL / OUT: 875 mL / NET: 2586.7 mL          PHYSICAL EXAM:  TELE:  PAT  Constitutional: NAD, sedated  HEENT: +ETT in place  Pulmonary: Decreased breath sounds b/l. vent sounds b/l  Cardiovascular: Regular, S1 and S2, No murmurs, rubs, gallops or clicks  Gastrointestinal: Bowel Sounds present, soft, nontender.   Lymph: No peripheral edema. No lymphadenopathy.  Skin: No visible rashes or ulcers.  Psych:  unable to assess    LABS: All Labs Reviewed:                        8.9    20.6  )-----------( 198      ( 21 Aug 2019 00:16 )             29.5                         10.5   19.4  )-----------( 212      ( 20 Aug 2019 00:15 )             33.9                         10.9   16.2  )-----------( 202      ( 19 Aug 2019 00:20 )             35.6     21 Aug 2019 00:16    127    |  90     |  20     ----------------------------<  98     4.0     |  27     |  0.60   20 Aug 2019 00:15    128    |  90     |  17     ----------------------------<  104    3.9     |  28     |  0.50   19 Aug 2019 00:20    131    |  93     |  17     ----------------------------<  126    3.7     |  28     |  0.49     Ca    8.8        21 Aug 2019 00:16  Ca    8.6        20 Aug 2019 00:15  Ca    8.4        19 Aug 2019 00:20  Phos  2.5       21 Aug 2019 00:16  Phos  2.4       20 Aug 2019 00:15  Phos  2.2       19 Aug 2019 00:20  Mg     2.1       21 Aug 2019 00:16  Mg     1.8       20 Aug 2019 00:15  Mg     1.9       19 Aug 2019 00:20      PT/INR - ( 21 Aug 2019 00:16 )   PT: 14.2 sec;   INR: 1.23 ratio         PTT - ( 21 Aug 2019 00:16 )  PTT:31.8 sec

## 2019-08-21 NOTE — CONSULT NOTE ADULT - CONSULT REASON
fever, elevated WBC
hx of esophageal ca, likely malignant effusion
pleural effusion
shock
OG tube placement, history of esophageal CA

## 2019-08-21 NOTE — PROGRESS NOTE ADULT - ATTENDING COMMENTS
Patient seen and examined, agree with above     54 y/o male with hx esophageal ca with mets to pelvis and brain (s/p resection), s/p surgery, chemo and radiation, CAD s/p RCA stent, pleural effusion, now with acute hypoxic and hypercapnic resp failure, R pneumothorax, shock w/o an obvious source of sepsis.     Tolerating FiO2 80%  Continues to have fever  Off Melvin, on vaso     Recs:   Neuro - wean fentanyl and propofol as tolerated  CV - off Melvin, on vasopressin   Resp - continue vent support, not a candidate for weaning, dye test to r/o BP fistula - so far no dye leakage through the CT, he may need thoracentesis and bronch   GI - OGT placed by GI 8/20, no obvious esophageal injury on EGD, continue TF, start bowel regimen, consider Relisto if no BM by tomorrow   Renal - maintain Rodriguez, monitor hematuria, start NS at 100ml/hr  ID - Cx neg so far but continues to have fever, will d/c vanc and Zosyn and start cefepime, consult ID, patient is not stable for CT scan at this time, check LE venous duplex to r/o DVT    PPx - Lovenox   Prognosis extremely poor, patient is DNR, wife wants him to be comfortable but she is not ready to withdraw life support    Patient is critically ill, 50mins spent

## 2019-08-21 NOTE — PROGRESS NOTE ADULT - SUBJECTIVE AND OBJECTIVE BOX
Dr. Tuyet Alarcon  Internal Medicine, PGY-1  Pager #: [phone]      Patient is a 53 M with hx of esophageal CA with mets to R pelvis and brain, CAD s/p RCA stent in 2015, p/w dyspnea 2/2 R pleural effusion s/p thoracentesis and bronchoscopy, admitted to MICU for hypoxic respiratory failure s/p intubation and hypotension.    INTERVAL HPI/OVERNIGHT EVENTS:  Feeds initiated. Weaned off li.     SUBJECTIVE: Patient seen and examined at bedside.       Unable to obtain ROS due to intubation and sedation.    OBJECTIVE:    VITAL SIGNS:  ICU Vital Signs Last 24 Hrs  T(C): 37 (21 Aug 2019 04:00), Max: 38.3 (21 Aug 2019 00:00)  T(F): 98.6 (21 Aug 2019 04:00), Max: 100.9 (21 Aug 2019 00:00)  HR: 90 (21 Aug 2019 06:30) (78 - 100)  BP: 106/70 (21 Aug 2019 06:30) (82/56 - 150/100)  BP(mean): 83 (21 Aug 2019 06:30) (63 - 115)  ABP: --  ABP(mean): --  RR: 27 (21 Aug 2019 06:30) (12 - 36)  SpO2: 94% (21 Aug 2019 06:30) (85% - 98%)    Mode: AC/ CMV (Assist Control/ Continuous Mandatory Ventilation), RR (machine): 26, TV (machine): 450, FiO2: 80, PEEP: 8, ITime: 1, MAP: 14, PIP: 29    08-19 @ 07:01 - 08-20 @ 07:00  --------------------------------------------------------  IN: 2133 mL / OUT: 600 mL / NET: 1533 mL    08-20 @ 07:01 - 08-21 @ 06:53  --------------------------------------------------------  IN: 3461.7 mL / OUT: 875 mL / NET: 2586.7 mL      CAPILLARY BLOOD GLUCOSE          PHYSICAL EXAM:    GENERAL: Intubated  NEURO: Sedated, unresponsive  HEENT: b/l pupil constricted and reactive to light; clear conjunctiva;  RESP: Intubated; decreased breath sound bibasilar fields; (+) faint crackles R upper and mid lung fields with improved air movements  CVS: S1/S2 present, RRR. no murmurs, gallops or rubs appreciated; (+) R subclavian TLC  ABD: Soft, non-distended, no masses appreciated; diminished BS  EXT: 2+ pedal pulses bilaterally, no BLE edema  SKIN: Warm, dry. No new rashes    MEDICATIONS:  MEDICATIONS  (STANDING):  albumin human 25% IVPB 100 milliLiter(s) IV Intermittent every 6 hours  ALBUTerol/ipratropium for Nebulization 3 milliLiter(s) Nebulizer every 6 hours  artificial tears (preservative free) Ophthalmic Solution 1 Drop(s) Both EYES two times a day  aspirin  chewable 81 milliGRAM(s) Oral daily  chlorhexidine 0.12% Liquid 15 milliLiter(s) Oral Mucosa every 12 hours  chlorhexidine 4% Liquid 1 Application(s) Topical <User Schedule>  chlorhexidine 4% Liquid 1 Application(s) Topical <User Schedule>  docusate sodium Liquid 100 milliGRAM(s) Oral three times a day  enoxaparin Injectable 40 milliGRAM(s) SubCutaneous daily  fentaNYL   Infusion. 0.5 MICROgram(s)/kG/Hr (2.015 mL/Hr) IV Continuous <Continuous>  fentaNYL   Patch  50 MICROgram(s)/Hr. 1 Patch Transdermal every 48 hours  pantoprazole  Injectable 40 milliGRAM(s) IV Push daily  petrolatum Ophthalmic Ointment 1 Application(s) Both EYES two times a day  piperacillin/tazobactam IVPB.. 3.375 Gram(s) IV Intermittent every 8 hours  polyethylene glycol 3350 17 Gram(s) Oral two times a day  propofol Infusion 40 MICROgram(s)/kG/Min (19.344 mL/Hr) IV Continuous <Continuous>  senna Syrup 10 milliLiter(s) Oral at bedtime  vancomycin  IVPB 1250 milliGRAM(s) IV Intermittent every 8 hours  vasopressin Infusion 0.04 Unit(s)/Min (2.4 mL/Hr) IV Continuous <Continuous>    MEDICATIONS  (PRN):  acetaminophen  Suppository .. 650 milliGRAM(s) Rectal every 6 hours PRN Temp greater or equal to 38C (100.4F)  sodium chloride 0.9% lock flush 10 milliLiter(s) IV Push every 1 hour PRN Pre/post blood products, medications, blood draw, and to maintain line patency      ALLERGIES:  Allergies    No Known Allergies    Intolerances              LABS:                        8.9    20.6  )-----------( 198      ( 21 Aug 2019 00:16 )             29.5     08-21    127<L>  |  90<L>  |  20  ----------------------------<  98  4.0   |  27  |  0.60    Ca    8.8      21 Aug 2019 00:16  Phos  2.5     08-21  Mg     2.1     08-21      PT/INR - ( 21 Aug 2019 00:16 )   PT: 14.2 sec;   INR: 1.23 ratio         PTT - ( 21 Aug 2019 00:16 )  PTT:31.8 sec  Urinalysis Basic - ( 20 Aug 2019 12:37 )    Color: Dark Yellow / Appearance: Slightly Turbid / SG: >1.050 / pH: x  Gluc: x / Ketone: Negative  / Bili: Small / Urobili: 2 mg/dL   Blood: x / Protein: 100 / Nitrite: Negative   Leuk Esterase: Negative / RBC: 304 /hpf / WBC 4 /HPF   Sq Epi: x / Non Sq Epi: 1 /hpf / Bacteria: Negative        RADIOLOGY & ADDITIONAL TESTS: Reviewed.

## 2019-08-21 NOTE — CHART NOTE - NSCHARTNOTEFT_GEN_A_CORE
Nutrition Follow Up Note  Patient seen for: consult for "tube feeding recommendation, goal rate"    Interim events noted. Noted pt continues to be DNR, family does not want to withdraw care at this time, pressors capped, no aggressive treatment at this time, OGT in place, pt started on tube feeds.     Source: medical chart, RN     Diet : via OGT: Jevity 1.2 c a goal rate of 40ml/hr x 18 hours (will provide 720ml total volume, 864cal, 40 Gm Prot; ~11cal/kg and 0.5 Gm Prot/kg based on dosing wt of 80.6 kg)    Noted pt has reached rate of Jevity 1.2 at 20ml/hr overnight. Noted pt continues to be on bowel regimen, no BM yet this admission.   EN Provision: : 20ml,  thus far: 80ml  Propofol at 24.2 ml/hr consistently- in 24 hour period, this provides about 639 calories    Daily Weight in k.6 (-18), Weight in k.6 (-17), Weight in k.2 (-16), Weight in k.6 (-15), Weight in k.6 (-15), Weight in k.5 (-14), Weight in k.6 (-14); dosing wt of 80.6 kg noted, wt fluctuations noted, would continue to monitor       Pertinent Medications: MEDICATIONS  (STANDING):  albumin human 25% IVPB 100 milliLiter(s) IV Intermittent every 6 hours  ALBUTerol/ipratropium for Nebulization 3 milliLiter(s) Nebulizer every 6 hours  artificial tears (preservative free) Ophthalmic Solution 1 Drop(s) Both EYES two times a day  aspirin  chewable 81 milliGRAM(s) Oral daily  chlorhexidine 0.12% Liquid 15 milliLiter(s) Oral Mucosa every 12 hours  chlorhexidine 4% Liquid 1 Application(s) Topical <User Schedule>  chlorhexidine 4% Liquid 1 Application(s) Topical <User Schedule>  docusate sodium Liquid 100 milliGRAM(s) Oral three times a day  enoxaparin Injectable 40 milliGRAM(s) SubCutaneous daily  fentaNYL   Infusion. 0.5 MICROgram(s)/kG/Hr (2.015 mL/Hr) IV Continuous <Continuous>  fentaNYL   Patch  50 MICROgram(s)/Hr. 1 Patch Transdermal every 48 hours  pantoprazole  Injectable 40 milliGRAM(s) IV Push daily  petrolatum Ophthalmic Ointment 1 Application(s) Both EYES two times a day  piperacillin/tazobactam IVPB.. 3.375 Gram(s) IV Intermittent every 8 hours  polyethylene glycol 3350 17 Gram(s) Oral two times a day  propofol Infusion 40 MICROgram(s)/kG/Min (19.344 mL/Hr) IV Continuous <Continuous>  senna Syrup 10 milliLiter(s) Oral at bedtime  vancomycin  IVPB 1250 milliGRAM(s) IV Intermittent every 8 hours  vasopressin Infusion 0.04 Unit(s)/Min (2.4 mL/Hr) IV Continuous <Continuous>    MEDICATIONS  (PRN):  acetaminophen  Suppository .. 650 milliGRAM(s) Rectal every 6 hours PRN Temp greater or equal to 38C (100.4F)  sodium chloride 0.9% lock flush 10 milliLiter(s) IV Push every 1 hour PRN Pre/post blood products, medications, blood draw, and to maintain line patency    Pertinent Labs:  @ 00:16: Na 127<L>, BUN 20, Cr 0.60, BG 98, K+ 4.0, Phos 2.5, Mg 2.1, Alk Phos --, ALT/SGPT --, AST/SGOT --, HbA1c --    Finger Sticks: None pertinent to address at this time.         Skin per nursing documentation: right lateral sacrum suspected DTI x 2   Edema: none at this time     Estimated Needs:   Estimated calorie needs based on [place] State Equation (2003b) for intubated patients: 2192cal (~27cal/kg based on wt of 80.6kg)  Estimated protein needs: 112-129 Gm Prot (1.4-1.6 Gm Prot/kg based on wt of 80.6kg) (taking into consideration pt now c suspected DTI)        Previous Nutrition Diagnosis: severe malnutrition  Nutrition Diagnosis continues at this time, to be addressed c tube feeds    New Nutrition Diagnosis: none at this time       Recommend  Recommend change tube feeds to Vital AF (1.2)- recommend goal rate of 50ml/hr x 18 hours and 3 packets of Prosource to provide: 900ml total volume, 1260cal, 113 Gm Prot, along with calories from Propofol, pt receives: 1899cal, 113 Gm Prot (~24cal/kg and 1.4 Gm Prot/kg based on wt of 80.6kg).     Monitoring and Evaluation:     Continue to monitor Nutritional intake, Tolerance to diet prescription, weights, labs, skin integrity    RD remains available upon request and will follow up per protocol  Mimi Amin MS RD CDN [facility],  #[phone] Nutrition Follow Up Note  Patient seen for: consult for "tube feeding recommendation, goal rate"    Interim events noted. Noted pt continues to be DNR, family does not want to withdraw care at this time, pressors capped, no aggressive treatment at this time, OGT in place, pt started on tube feeds.     Source: medical chart, RN     Diet : via OGT: Jevity 1.2 c a goal rate of 40ml/hr x 18 hours (will provide 720ml total volume, 864cal, 40 Gm Prot; ~11cal/kg and 0.5 Gm Prot/kg based on dosing wt of 80.6 kg)    Noted pt has reached rate of Jevity 1.2 at 20ml/hr overnight. Noted pt continues to be on bowel regimen, no BM yet this admission. As per RN, pt tolerated initiation of tube feed overnight.   EN Provision: : 20ml,  thus far: 80ml  Propofol at 24.2 ml/hr consistently- in 24 hour period, this provides about 639 calories    Daily Weight in k.6 (-18), Weight in k.6 (-17), Weight in k.2 (-16), Weight in k.6 (-15), Weight in k.6 (-15), Weight in k.5 (-14), Weight in k.6 (-14); dosing wt of 80.6 kg noted, wt fluctuations noted, would continue to monitor       Pertinent Medications: MEDICATIONS  (STANDING):  albumin human 25% IVPB 100 milliLiter(s) IV Intermittent every 6 hours  ALBUTerol/ipratropium for Nebulization 3 milliLiter(s) Nebulizer every 6 hours  artificial tears (preservative free) Ophthalmic Solution 1 Drop(s) Both EYES two times a day  aspirin  chewable 81 milliGRAM(s) Oral daily  chlorhexidine 0.12% Liquid 15 milliLiter(s) Oral Mucosa every 12 hours  chlorhexidine 4% Liquid 1 Application(s) Topical <User Schedule>  chlorhexidine 4% Liquid 1 Application(s) Topical <User Schedule>  docusate sodium Liquid 100 milliGRAM(s) Oral three times a day  enoxaparin Injectable 40 milliGRAM(s) SubCutaneous daily  fentaNYL   Infusion. 0.5 MICROgram(s)/kG/Hr (2.015 mL/Hr) IV Continuous <Continuous>  fentaNYL   Patch  50 MICROgram(s)/Hr. 1 Patch Transdermal every 48 hours  pantoprazole  Injectable 40 milliGRAM(s) IV Push daily  petrolatum Ophthalmic Ointment 1 Application(s) Both EYES two times a day  piperacillin/tazobactam IVPB.. 3.375 Gram(s) IV Intermittent every 8 hours  polyethylene glycol 3350 17 Gram(s) Oral two times a day  propofol Infusion 40 MICROgram(s)/kG/Min (19.344 mL/Hr) IV Continuous <Continuous>  senna Syrup 10 milliLiter(s) Oral at bedtime  vancomycin  IVPB 1250 milliGRAM(s) IV Intermittent every 8 hours  vasopressin Infusion 0.04 Unit(s)/Min (2.4 mL/Hr) IV Continuous <Continuous>    MEDICATIONS  (PRN):  acetaminophen  Suppository .. 650 milliGRAM(s) Rectal every 6 hours PRN Temp greater or equal to 38C (100.4F)  sodium chloride 0.9% lock flush 10 milliLiter(s) IV Push every 1 hour PRN Pre/post blood products, medications, blood draw, and to maintain line patency    Pertinent Labs:  @ 00:16: Na 127<L>, BUN 20, Cr 0.60, BG 98, K+ 4.0, Phos 2.5, Mg 2.1, Alk Phos --, ALT/SGPT --, AST/SGOT --, HbA1c --    Finger Sticks: None pertinent to address at this time.         Skin per nursing documentation: right lateral sacrum suspected DTI x 2   Edema: none at this time     Estimated Needs:   Estimated calorie needs based on [place] State Equation (2003b) for intubated patients: 2192cal (~27cal/kg based on wt of 80.6kg)  Estimated protein needs: 112-129 Gm Prot (1.4-1.6 Gm Prot/kg based on wt of 80.6kg) (taking into consideration pt now c suspected DTI)        Previous Nutrition Diagnosis: severe malnutrition  Nutrition Diagnosis continues at this time, to be addressed c tube feeds    New Nutrition Diagnosis: none at this time       Recommend  Recommend change tube feeds to Vital AF (1.2)- recommend goal rate of 50ml/hr x 18 hours and 3 packets of No Carb Prosource to provide: 900ml total volume, 1260cal, 113 Gm Prot, along with calories from Propofol, pt receives: 1899cal, 113 Gm Prot (~24cal/kg and 1.4 Gm Prot/kg based on wt of 80.6kg).     Monitoring and Evaluation:     Continue to monitor Nutritional intake, Tolerance to diet prescription, weights, labs, skin integrity    RD remains available upon request and will follow up per protocol  Mimi Amin MS RD N [facility],  #[phone]

## 2019-08-22 LAB
ANION GAP SERPL CALC-SCNC: 10 MMOL/L — SIGNIFICANT CHANGE UP (ref 5–17)
ANION GAP SERPL CALC-SCNC: 11 MMOL/L — SIGNIFICANT CHANGE UP (ref 5–17)
ANION GAP SERPL CALC-SCNC: 13 MMOL/L — SIGNIFICANT CHANGE UP (ref 5–17)
APTT BLD: 31.1 SEC — SIGNIFICANT CHANGE UP (ref 27.5–36.3)
APTT BLD: 48.5 SEC — HIGH (ref 27.5–36.3)
BUN SERPL-MCNC: 22 MG/DL — SIGNIFICANT CHANGE UP (ref 7–23)
CALCIUM SERPL-MCNC: 8.3 MG/DL — LOW (ref 8.4–10.5)
CALCIUM SERPL-MCNC: 8.4 MG/DL — SIGNIFICANT CHANGE UP (ref 8.4–10.5)
CALCIUM SERPL-MCNC: 8.5 MG/DL — SIGNIFICANT CHANGE UP (ref 8.4–10.5)
CALCIUM SERPL-MCNC: 9 MG/DL — SIGNIFICANT CHANGE UP (ref 8.4–10.5)
CALCIUM SERPL-MCNC: 9.1 MG/DL — SIGNIFICANT CHANGE UP (ref 8.4–10.5)
CHLORIDE SERPL-SCNC: 86 MMOL/L — LOW (ref 96–108)
CHLORIDE SERPL-SCNC: 88 MMOL/L — LOW (ref 96–108)
CHLORIDE SERPL-SCNC: 89 MMOL/L — LOW (ref 96–108)
CHLORIDE SERPL-SCNC: 89 MMOL/L — LOW (ref 96–108)
CHLORIDE SERPL-SCNC: 90 MMOL/L — LOW (ref 96–108)
CO2 SERPL-SCNC: 23 MMOL/L — SIGNIFICANT CHANGE UP (ref 22–31)
CO2 SERPL-SCNC: 24 MMOL/L — SIGNIFICANT CHANGE UP (ref 22–31)
CO2 SERPL-SCNC: 25 MMOL/L — SIGNIFICANT CHANGE UP (ref 22–31)
CO2 SERPL-SCNC: 25 MMOL/L — SIGNIFICANT CHANGE UP (ref 22–31)
CO2 SERPL-SCNC: 26 MMOL/L — SIGNIFICANT CHANGE UP (ref 22–31)
CORTIS AM PEAK SERPL-MCNC: 15.8 UG/DL — SIGNIFICANT CHANGE UP (ref 6–18.4)
CREAT SERPL-MCNC: 0.38 MG/DL — LOW (ref 0.5–1.3)
CREAT SERPL-MCNC: 0.4 MG/DL — LOW (ref 0.5–1.3)
CREAT SERPL-MCNC: 0.43 MG/DL — LOW (ref 0.5–1.3)
CREAT SERPL-MCNC: 0.46 MG/DL — LOW (ref 0.5–1.3)
CREAT SERPL-MCNC: 0.51 MG/DL — SIGNIFICANT CHANGE UP (ref 0.5–1.3)
GLUCOSE SERPL-MCNC: 102 MG/DL — HIGH (ref 70–99)
GLUCOSE SERPL-MCNC: 103 MG/DL — HIGH (ref 70–99)
GLUCOSE SERPL-MCNC: 105 MG/DL — HIGH (ref 70–99)
GLUCOSE SERPL-MCNC: 111 MG/DL — HIGH (ref 70–99)
GLUCOSE SERPL-MCNC: 112 MG/DL — HIGH (ref 70–99)
HCT VFR BLD CALC: 30.6 % — LOW (ref 39–50)
HCT VFR BLD CALC: 32.3 % — LOW (ref 39–50)
HGB BLD-MCNC: 10.1 G/DL — LOW (ref 13–17)
HGB BLD-MCNC: 9.3 G/DL — LOW (ref 13–17)
INR BLD: 1.37 RATIO — HIGH (ref 0.88–1.16)
INR BLD: 1.45 RATIO — HIGH (ref 0.88–1.16)
MAGNESIUM SERPL-MCNC: 2 MG/DL — SIGNIFICANT CHANGE UP (ref 1.6–2.6)
MAGNESIUM SERPL-MCNC: 2 MG/DL — SIGNIFICANT CHANGE UP (ref 1.6–2.6)
MAGNESIUM SERPL-MCNC: 2.1 MG/DL — SIGNIFICANT CHANGE UP (ref 1.6–2.6)
MCHC RBC-ENTMCNC: 24.5 PG — LOW (ref 27–34)
MCHC RBC-ENTMCNC: 25.2 PG — LOW (ref 27–34)
MCHC RBC-ENTMCNC: 30.3 GM/DL — LOW (ref 32–36)
MCHC RBC-ENTMCNC: 31.2 GM/DL — LOW (ref 32–36)
MCV RBC AUTO: 80.8 FL — SIGNIFICANT CHANGE UP (ref 80–100)
MCV RBC AUTO: 80.9 FL — SIGNIFICANT CHANGE UP (ref 80–100)
OSMOLALITY SERPL: 269 MOSMOL/KG — LOW (ref 275–300)
OSMOLALITY UR: 913 MOS/KG — HIGH (ref 300–900)
PHOSPHATE SERPL-MCNC: 2.3 MG/DL — LOW (ref 2.5–4.5)
PHOSPHATE SERPL-MCNC: 2.3 MG/DL — LOW (ref 2.5–4.5)
PHOSPHATE SERPL-MCNC: 3.3 MG/DL — SIGNIFICANT CHANGE UP (ref 2.5–4.5)
PLATELET # BLD AUTO: 220 K/UL — SIGNIFICANT CHANGE UP (ref 150–400)
PLATELET # BLD AUTO: 236 K/UL — SIGNIFICANT CHANGE UP (ref 150–400)
POTASSIUM SERPL-MCNC: 4 MMOL/L — SIGNIFICANT CHANGE UP (ref 3.5–5.3)
POTASSIUM SERPL-MCNC: 4.1 MMOL/L — SIGNIFICANT CHANGE UP (ref 3.5–5.3)
POTASSIUM SERPL-MCNC: 4.4 MMOL/L — SIGNIFICANT CHANGE UP (ref 3.5–5.3)
POTASSIUM SERPL-SCNC: 4 MMOL/L — SIGNIFICANT CHANGE UP (ref 3.5–5.3)
POTASSIUM SERPL-SCNC: 4.1 MMOL/L — SIGNIFICANT CHANGE UP (ref 3.5–5.3)
POTASSIUM SERPL-SCNC: 4.4 MMOL/L — SIGNIFICANT CHANGE UP (ref 3.5–5.3)
PROTHROM AB SERPL-ACNC: 15.8 SEC — HIGH (ref 10–12.9)
PROTHROM AB SERPL-ACNC: 16.9 SEC — HIGH (ref 10–12.9)
RBC # BLD: 3.79 M/UL — LOW (ref 4.2–5.8)
RBC # BLD: 3.99 M/UL — LOW (ref 4.2–5.8)
RBC # FLD: 14.8 % — HIGH (ref 10.3–14.5)
RBC # FLD: 14.9 % — HIGH (ref 10.3–14.5)
SODIUM SERPL-SCNC: 122 MMOL/L — LOW (ref 135–145)
SODIUM SERPL-SCNC: 123 MMOL/L — LOW (ref 135–145)
SODIUM SERPL-SCNC: 125 MMOL/L — LOW (ref 135–145)
SODIUM SERPL-SCNC: 125 MMOL/L — LOW (ref 135–145)
SODIUM SERPL-SCNC: 126 MMOL/L — LOW (ref 135–145)
SODIUM UR-SCNC: <20 MMOL/L — SIGNIFICANT CHANGE UP
WBC # BLD: 25.2 K/UL — HIGH (ref 3.8–10.5)
WBC # BLD: 27.9 K/UL — HIGH (ref 3.8–10.5)
WBC # FLD AUTO: 25.2 K/UL — HIGH (ref 3.8–10.5)
WBC # FLD AUTO: 27.9 K/UL — HIGH (ref 3.8–10.5)

## 2019-08-22 PROCEDURE — 99291 CRITICAL CARE FIRST HOUR: CPT

## 2019-08-22 PROCEDURE — 93010 ELECTROCARDIOGRAM REPORT: CPT | Mod: 76

## 2019-08-22 PROCEDURE — 93971 EXTREMITY STUDY: CPT | Mod: 26,59

## 2019-08-22 PROCEDURE — 99233 SBSQ HOSP IP/OBS HIGH 50: CPT

## 2019-08-22 PROCEDURE — 93970 EXTREMITY STUDY: CPT | Mod: 26

## 2019-08-22 RX ORDER — METHYLNALTREXONE BROMIDE 12 MG/.6ML
12 INJECTION, SOLUTION SUBCUTANEOUS ONCE
Refills: 0 | Status: COMPLETED | OUTPATIENT
Start: 2019-08-22 | End: 2019-08-22

## 2019-08-22 RX ORDER — SODIUM CHLORIDE 9 MG/ML
1000 INJECTION INTRAMUSCULAR; INTRAVENOUS; SUBCUTANEOUS
Refills: 0 | Status: DISCONTINUED | OUTPATIENT
Start: 2019-08-22 | End: 2019-08-23

## 2019-08-22 RX ORDER — HEPARIN SODIUM 5000 [USP'U]/ML
1550 INJECTION INTRAVENOUS; SUBCUTANEOUS
Qty: 25000 | Refills: 0 | Status: DISCONTINUED | OUTPATIENT
Start: 2019-08-22 | End: 2019-08-23

## 2019-08-22 RX ORDER — SODIUM CHLORIDE 9 MG/ML
1 INJECTION INTRAMUSCULAR; INTRAVENOUS; SUBCUTANEOUS
Refills: 0 | Status: DISCONTINUED | OUTPATIENT
Start: 2019-08-22 | End: 2019-08-24

## 2019-08-22 RX ORDER — FENTANYL CITRATE 50 UG/ML
0.4 INJECTION INTRAVENOUS
Qty: 5000 | Refills: 0 | Status: DISCONTINUED | OUTPATIENT
Start: 2019-08-22 | End: 2019-08-25

## 2019-08-22 RX ORDER — HEPARIN SODIUM 5000 [USP'U]/ML
1500 INJECTION INTRAVENOUS; SUBCUTANEOUS
Qty: 25000 | Refills: 0 | Status: DISCONTINUED | OUTPATIENT
Start: 2019-08-22 | End: 2019-08-22

## 2019-08-22 RX ADMIN — SODIUM CHLORIDE 1 GRAM(S): 9 INJECTION INTRAMUSCULAR; INTRAVENOUS; SUBCUTANEOUS at 05:18

## 2019-08-22 RX ADMIN — Medication 1 DROP(S): at 14:05

## 2019-08-22 RX ADMIN — FENTANYL CITRATE 1.61 MICROGRAM(S)/KG/HR: 50 INJECTION INTRAVENOUS at 12:02

## 2019-08-22 RX ADMIN — POLYETHYLENE GLYCOL 3350 17 GRAM(S): 17 POWDER, FOR SOLUTION ORAL at 17:30

## 2019-08-22 RX ADMIN — CHLORHEXIDINE GLUCONATE 1 APPLICATION(S): 213 SOLUTION TOPICAL at 05:18

## 2019-08-22 RX ADMIN — Medication 100 MILLIGRAM(S): at 22:11

## 2019-08-22 RX ADMIN — ENOXAPARIN SODIUM 40 MILLIGRAM(S): 100 INJECTION SUBCUTANEOUS at 12:02

## 2019-08-22 RX ADMIN — FENTANYL CITRATE 1.61 MICROGRAM(S)/KG/HR: 50 INJECTION INTRAVENOUS at 22:15

## 2019-08-22 RX ADMIN — VASOPRESSIN 2.4 UNIT(S)/MIN: 20 INJECTION INTRAVENOUS at 10:03

## 2019-08-22 RX ADMIN — Medication 100 MILLIGRAM(S): at 14:04

## 2019-08-22 RX ADMIN — FENTANYL CITRATE 1 PATCH: 50 INJECTION INTRAVENOUS at 19:00

## 2019-08-22 RX ADMIN — SODIUM CHLORIDE 1 GRAM(S): 9 INJECTION INTRAMUSCULAR; INTRAVENOUS; SUBCUTANEOUS at 17:30

## 2019-08-22 RX ADMIN — Medication 3 MILLILITER(S): at 06:04

## 2019-08-22 RX ADMIN — CHLORHEXIDINE GLUCONATE 15 MILLILITER(S): 213 SOLUTION TOPICAL at 05:15

## 2019-08-22 RX ADMIN — SODIUM CHLORIDE 100 MILLILITER(S): 9 INJECTION INTRAMUSCULAR; INTRAVENOUS; SUBCUTANEOUS at 22:16

## 2019-08-22 RX ADMIN — FENTANYL CITRATE 1 PATCH: 50 INJECTION INTRAVENOUS at 08:21

## 2019-08-22 RX ADMIN — Medication 3 MILLILITER(S): at 17:17

## 2019-08-22 RX ADMIN — Medication 81 MILLIGRAM(S): at 12:02

## 2019-08-22 RX ADMIN — Medication 100 MILLIGRAM(S): at 14:06

## 2019-08-22 RX ADMIN — Medication 100 MILLIGRAM(S): at 05:15

## 2019-08-22 RX ADMIN — HEPARIN SODIUM 15.5 UNIT(S)/HR: 5000 INJECTION INTRAVENOUS; SUBCUTANEOUS at 22:16

## 2019-08-22 RX ADMIN — Medication 100 MILLIGRAM(S): at 06:39

## 2019-08-22 RX ADMIN — CEFEPIME 100 MILLIGRAM(S): 1 INJECTION, POWDER, FOR SOLUTION INTRAMUSCULAR; INTRAVENOUS at 05:15

## 2019-08-22 RX ADMIN — SODIUM CHLORIDE 100 MILLILITER(S): 9 INJECTION INTRAMUSCULAR; INTRAVENOUS; SUBCUTANEOUS at 16:00

## 2019-08-22 RX ADMIN — VASOPRESSIN 2.4 UNIT(S)/MIN: 20 INJECTION INTRAVENOUS at 22:16

## 2019-08-22 RX ADMIN — SENNA PLUS 10 MILLILITER(S): 8.6 TABLET ORAL at 22:15

## 2019-08-22 RX ADMIN — Medication 3 MILLILITER(S): at 00:58

## 2019-08-22 RX ADMIN — Medication 1 APPLICATION(S): at 05:18

## 2019-08-22 RX ADMIN — Medication 3 MILLILITER(S): at 11:40

## 2019-08-22 RX ADMIN — HEPARIN SODIUM 15 UNIT(S)/HR: 5000 INJECTION INTRAVENOUS; SUBCUTANEOUS at 14:04

## 2019-08-22 RX ADMIN — CHLORHEXIDINE GLUCONATE 15 MILLILITER(S): 213 SOLUTION TOPICAL at 17:30

## 2019-08-22 RX ADMIN — CEFEPIME 100 MILLIGRAM(S): 1 INJECTION, POWDER, FOR SOLUTION INTRAMUSCULAR; INTRAVENOUS at 13:47

## 2019-08-22 RX ADMIN — CHLORHEXIDINE GLUCONATE 1 APPLICATION(S): 213 SOLUTION TOPICAL at 06:07

## 2019-08-22 RX ADMIN — FENTANYL CITRATE 1.61 MICROGRAM(S)/KG/HR: 50 INJECTION INTRAVENOUS at 10:03

## 2019-08-22 RX ADMIN — Medication 1 APPLICATION(S): at 17:30

## 2019-08-22 RX ADMIN — PANTOPRAZOLE SODIUM 40 MILLIGRAM(S): 20 TABLET, DELAYED RELEASE ORAL at 12:02

## 2019-08-22 RX ADMIN — Medication 83.33 MILLIMOLE(S): at 15:30

## 2019-08-22 RX ADMIN — PROPOFOL 19.34 MICROGRAM(S)/KG/MIN: 10 INJECTION, EMULSION INTRAVENOUS at 22:15

## 2019-08-22 RX ADMIN — METHYLNALTREXONE BROMIDE 12 MILLIGRAM(S): 12 INJECTION, SOLUTION SUBCUTANEOUS at 12:30

## 2019-08-22 RX ADMIN — Medication 1 DROP(S): at 01:02

## 2019-08-22 RX ADMIN — CEFEPIME 100 MILLIGRAM(S): 1 INJECTION, POWDER, FOR SOLUTION INTRAMUSCULAR; INTRAVENOUS at 22:11

## 2019-08-22 RX ADMIN — POLYETHYLENE GLYCOL 3350 17 GRAM(S): 17 POWDER, FOR SOLUTION ORAL at 05:15

## 2019-08-22 RX ADMIN — FLUCONAZOLE 100 MILLIGRAM(S): 150 TABLET ORAL at 15:48

## 2019-08-22 RX ADMIN — PROPOFOL 19.34 MICROGRAM(S)/KG/MIN: 10 INJECTION, EMULSION INTRAVENOUS at 10:01

## 2019-08-22 NOTE — PROGRESS NOTE ADULT - SUBJECTIVE AND OBJECTIVE BOX
ID Coverage    Patient is a 53y old  Male who presents with a chief complaint of pleural effusion (22 Aug 2019 08:57)    Being followed by ID for septic shock, fever    Interval history:on vent  pressors  sedated  family at bedside    No other acute events      ROS:  Not obtainable     Antimicrobials:    cefepime   IVPB 1000 milliGRAM(s) IV Intermittent every 8 hours  fluconAZOLE IVPB 400 milliGRAM(s) IV Intermittent every 24 hours  metroNIDAZOLE  IVPB 500 milliGRAM(s) IV Intermittent every 8 hours      other medications reviewed    Vital Signs Last 24 Hrs  T(C): 36.7 (08-22-19 @ 10:00), Max: 38.4 (08-21-19 @ 16:00)  T(F): 98.1 (08-22-19 @ 10:00), Max: 101.1 (08-21-19 @ 16:00)  HR: 84 (08-22-19 @ 11:43) (83 - 159)  BP: 111/71 (08-22-19 @ 10:00) (91/59 - 123/80)  BP(mean): 86 (08-22-19 @ 10:00) (70 - 96)  RR: 30 (08-22-19 @ 10:00) (6 - 30)  SpO2: 95% (08-22-19 @ 11:43) (92% - 99%)    Physical Exam:    sedated    RIJ TLC no erythema or tenderness  R ant chest tube     HEENTpupils 3 mm  ,No pallor or icterus  ETT     Neck supple no JVD or LN    Chest Good AE,R crackles     CVS RRR S1 S2 WNl No murmur or rub or gallop    Abd soft BS normal No tenderness no masses    Ext No cyanosis clubbing or edema    IV site no erythema tenderness or discharge    Joints no swelling or LOM    CNS sedtaed     Lab Data:                          9.3    25.2  )-----------( 220      ( 22 Aug 2019 00:17 )             30.6   WBC Count: 25.2 (08-22-19 @ 00:17)  WBC Count: 21.3 (08-21-19 @ 11:52)  WBC Count: 20.6 (08-21-19 @ 00:16)  WBC Count: 19.4 (08-20-19 @ 00:15)  WBC Count: 16.2 (08-19-19 @ 00:20)  WBC Count: 16.8 (08-18-19 @ 00:02)  WBC Count: 22.3 (08-17-19 @ 01:03)  WBC Count: 23.8 (08-16-19 @ 01:14)      08-22    122<L>  |  86<L>  |  22  ----------------------------<  103<H>  4.0   |  25  |  0.46<L>    Ca    9.1      22 Aug 2019 12:34  Phos  2.3     08-22  Mg     2.0     08-22          Culture - Urine (collected 20 Aug 2019 15:04)  Source: .Urine  Final Report (21 Aug 2019 16:59):    <10,000 CFU/mL Normal Urogenital Vilma    Culture - Blood (collected 20 Aug 2019 14:58)  Source: .Blood  Preliminary Report (21 Aug 2019 15:04):    No growth to date.    Culture - Blood (collected 20 Aug 2019 14:58)  Source: .Blood  Preliminary Report (21 Aug 2019 15:04):    No growth to date.    Culture - Bronchial (collected 19 Aug 2019 18:40)  Source: Bronch Wash  Gram Stain (19 Aug 2019 20:40):    Numerous polymorphonuclear leukocytes per low power field    Rare Squamous epithelial cells per low power field    Moderate Yeast like cells per oil power field    Few Gram Negative Rods per oil power field  Final Report (21 Aug 2019 19:41):    Normal Respiratory Vilma present    Culture - Blood (collected 18 Aug 2019 16:59)  Source: .Blood  Preliminary Report (19 Aug 2019 17:01):    No growth to date.    Culture - Blood (collected 18 Aug 2019 16:59)  Source: .Blood  Preliminary Report (19 Aug 2019 17:01):    No growth to date.              Vancomycin Level, Trough: 16.4 ug/mL (08-20-19 @ 23:23)          < from: VA Duplex Lower Ext Vein Scan, Bilat (08.22.19 @ 12:35) >    IMPRESSION:     No evidence of deep venous thrombosis in the right lower extremity.    Focal nonocclusive thrombus within the left common femoral vein   indicative of deep venous thrombosis above the level of the left knee.  Also noted is thrombus within the left calf veins as described above.    These findings have been relayed to MELVI Mackey on 8/22/2019 at 10:00   AM.          < end of copied text >      < from: Xray Abdomen 1 View PORTABLE -Urgent (08.20.19 @ 18:10) >    IMPRESSION:   Limited study. No distended loops of bowel, unlikely ileus. The distal   portions of the enteric tube are not entirely visualized.              < end of copied text >      < from: CT Chest w/ IV Cont (08.15.19 @ 02:07) >    IMPRESSION:     Moderate to large right tension hydropneumothorax.  Small pneumoperitoneum of uncertain etiologybut may be secondary to   pneumothorax.  Small left pleural effusion with atelectasis of the left lower lobe.  No evidence of leak from gastric pull-through.  Increased ascites in the abdomen with peritoneal carcinomatosis again   noted. Indeterminate liver lesions.    Findings were discussed by Dr. Rodriguez with MICU attending on 8/15/2019 at   3:25 AM with read back.      < end of copied text >

## 2019-08-22 NOTE — PROGRESS NOTE ADULT - ASSESSMENT
53 M with hx of esophageal CA diagnosed in 2017 s/p chemo and radiation and with mets to R pelvis and brain mets to the L superior cerebellar mass (s/p craniotomy in 04/2019) currently on Keytruda, CAD s/p RCA stent in 2015, p/w pleural effusion, now with hypoxic resp failure post bronch with resulting tension PTX now s/p chest tube    - No signs of significant ischemia   - No signs of ADHF at this time.  Likely malignant pl eff, not cardiogenic  - EKG without ischemic changes  - Echo with normal LV function, moderate diastolic dysfunction, and evidence of RVE and RV pressure overload.  - Cont asa for CAD history  - Tele demonstrates NSR/Sinus tachy with short burst of PAT which is likely all reactive to his underlying condition. Unable to treat with av oscar blockers in the setting of pressor dependent hypotension. Can use amiodarone if sustained.    - Now off of levo and li. Cont Vaso support to maintain MAP at least >60. Titrate off as blood pressure tolerates.     - Cont on mechanical ventilatory support. Wean as tolerated.     - Monitor and replete electrolytes. Keep K>4.0 and Mg>2.0.  - Further cardiac workup will depend on clinical course.   - All other workup per MICU team. Will followup.

## 2019-08-22 NOTE — PROGRESS NOTE ADULT - ASSESSMENT
53 M with hx of esophageal CA diagnosed in 2017 s/p chemo and radiation and with mets to R pelvis and brain mets to the L superior cerebellar mass (s/p craniotomy in 04/2019) currently on Keytruda, CAD s/p RCA stent in 2015, ad mitted with  worsening sob for several days.  s/p thoracentesis  s/p intubation for resp failure  fevers  workup ioncluding blood cx, bronch cx negative  Does have multiple DVTs  Also recent trip to [place]-unclear if any fungal exposure-cocci serology pending  ? also fevers due to ca v keytruda  On BS coverage-fluconazole and flagyl added empirically yesterday  Still on pressors  Rec:  1) follow pending cx  2) Follow cocci serology  3) follow clinically  4) Continue empiric abx as above  5) DVT/other plan per MICU  6) supportive acre per MICU team    Will tailor plan for ID issues  per course,results.Will defer to primary team on management of other issues.  case d/w MICU team  ID will continue to follow

## 2019-08-22 NOTE — PROGRESS NOTE ADULT - ATTENDING COMMENTS
PT seen and examined. 54 y/o male with hx esophageal CA with mets to pelvis and brain (s/p resection), s/p surgery, chemo and radiation, CAD s/p RCA stent, pleural effusion, now with acute hypoxic and hypercapnic resp failure, R pneumothorax, shock state requiring pressor support, now also with LLE and L SC/ axillary DVTs. Cotn Cefepime, Diflucan and Flagyl per ID. Ongoing air leak from CT, cont to LWS. Titrate pressor sto AP > 65. Heparin gtt for DVTs. Overall prognosis grave. Remains DNR.   CC time spent: 40 mins

## 2019-08-22 NOTE — PROGRESS NOTE ADULT - SUBJECTIVE AND OBJECTIVE BOX
Dr. Tuyet Alarcon  Internal Medicine, PGY-1  Pager #: [phone]      Patient is a    INTERVAL HPI/OVERNIGHT EVENTS:      SUBJECTIVE: Patient seen and examined at bedside.       CONSTITUTIONAL: No fevers or chills  EYES/ENT: No visual or hearing changes;  No ear or throat pain   NECK: No pain or stiffness  RESPIRATORY: No cough, wheezing, hemoptysis; No shortness of breath  CARDIOVASCULAR: No chest pain or palpitations  GASTROINTESTINAL: No abdominal pain. No nausea, vomiting, or hematemesis; No diarrhea or constipation. No melena or hematochezia.  GENITOURINARY: No dysuria, frequency or hematuria  NEUROLOGICAL: No headache, numbness or weakness  SKIN: No itching, or new onset of rashes    OBJECTIVE:    VITAL SIGNS:  ICU Vital Signs Last 24 Hrs  T(C): 38 (22 Aug 2019 04:00), Max: 38.4 (21 Aug 2019 16:00)  T(F): 100.4 (22 Aug 2019 04:00), Max: 101.1 (21 Aug 2019 16:00)  HR: 100 (22 Aug 2019 06:05) (86 - 159)  BP: 99/65 (22 Aug 2019 05:45) (91/55 - 123/80)  BP(mean): 75 (22 Aug 2019 05:45) (69 - 96)  ABP: --  ABP(mean): --  RR: 27 (22 Aug 2019 05:45) (7 - 30)  SpO2: 97% (22 Aug 2019 06:05) (89% - 97%)    Mode: AC/ CMV (Assist Control/ Continuous Mandatory Ventilation), RR (machine): 26, TV (machine): 450, FiO2: 70, PEEP: 8, ITime: 1, MAP: 15, PIP: 33    08-20 @ 07:01 - 08-21 @ 07:00  --------------------------------------------------------  IN: 3461.7 mL / OUT: 875 mL / NET: 2586.7 mL    08-21 @ 07:01 - 08-22 @ 06:49  --------------------------------------------------------  IN: 4266.3 mL / OUT: 560 mL / NET: 3706.3 mL      CAPILLARY BLOOD GLUCOSE      POCT Blood Glucose.: 93 mg/dL (21 Aug 2019 17:02)      PHYSICAL EXAM:    General: NAD  HEENT: NC/AT; PERRL, clear conjunctiva  Neck: supple  Respiratory: Normal respiratory effort; CTA b/l  Cardiovascular: +S1/S2; RRR; no murmurs, gallops or rubs appreciated  Abdomen: soft, NT/ND, no masses noted; normal BS all 4 quadrants;  Extremities: 2+ peripheral pulses b/l; no LE edema  Skin: normal color and turgor; no rash  Neurological: A+O x 3, follows commands; spontaneously moving all 4 extremities, strength grossly WNL    MEDICATIONS:  MEDICATIONS  (STANDING):  ALBUTerol/ipratropium for Nebulization 3 milliLiter(s) Nebulizer every 6 hours  artificial tears (preservative free) Ophthalmic Solution 1 Drop(s) Both EYES two times a day  aspirin  chewable 81 milliGRAM(s) Oral daily  cefepime   IVPB 1000 milliGRAM(s) IV Intermittent every 8 hours  chlorhexidine 0.12% Liquid 15 milliLiter(s) Oral Mucosa every 12 hours  chlorhexidine 4% Liquid 1 Application(s) Topical <User Schedule>  chlorhexidine 4% Liquid 1 Application(s) Topical <User Schedule>  docusate sodium Liquid 100 milliGRAM(s) Oral three times a day  enoxaparin Injectable 40 milliGRAM(s) SubCutaneous daily  fentaNYL   Infusion. 0.4 MICROgram(s)/kG/Hr (1.612 mL/Hr) IV Continuous <Continuous>  fentaNYL   Patch  50 MICROgram(s)/Hr. 1 Patch Transdermal every 48 hours  fluconAZOLE IVPB 400 milliGRAM(s) IV Intermittent every 24 hours  metroNIDAZOLE  IVPB 500 milliGRAM(s) IV Intermittent every 8 hours  pantoprazole  Injectable 40 milliGRAM(s) IV Push daily  petrolatum Ophthalmic Ointment 1 Application(s) Both EYES two times a day  polyethylene glycol 3350 17 Gram(s) Oral two times a day  propofol Infusion 40 MICROgram(s)/kG/Min (19.344 mL/Hr) IV Continuous <Continuous>  senna Syrup 10 milliLiter(s) Oral at bedtime  sodium chloride 1 Gram(s) Oral two times a day  sodium chloride 0.9%. 1000 milliLiter(s) (100 mL/Hr) IV Continuous <Continuous>  vasopressin Infusion 0.04 Unit(s)/Min (2.4 mL/Hr) IV Continuous <Continuous>    MEDICATIONS  (PRN):  acetaminophen    Suspension .. 650 milliGRAM(s) Oral every 6 hours PRN Temp greater or equal to 38C (100.4F)  bisacodyl Suppository 10 milliGRAM(s) Rectal daily PRN Constipation  sodium chloride 0.9% lock flush 10 milliLiter(s) IV Push every 1 hour PRN Pre/post blood products, medications, blood draw, and to maintain line patency      ALLERGIES:  Allergies    No Known Allergies    Intolerances              LABS:                        9.3    25.2  )-----------( 220      ( 22 Aug 2019 00:17 )             30.6     08-22    123<L>  |  88<L>  |  22  ----------------------------<  102<H>  4.0   |  24  |  0.51    Ca    9.0      22 Aug 2019 05:36  Phos  2.3     08-22  Mg     2.0     08-22      PT/INR - ( 22 Aug 2019 00:17 )   PT: 15.8 sec;   INR: 1.37 ratio         PTT - ( 22 Aug 2019 00:17 )  PTT:31.1 sec  Urinalysis Basic - ( 20 Aug 2019 12:37 )    Color: Dark Yellow / Appearance: Slightly Turbid / SG: >1.050 / pH: x  Gluc: x / Ketone: Negative  / Bili: Small / Urobili: 2 mg/dL   Blood: x / Protein: 100 / Nitrite: Negative   Leuk Esterase: Negative / RBC: 304 /hpf / WBC 4 /HPF   Sq Epi: x / Non Sq Epi: 1 /hpf / Bacteria: Negative        RADIOLOGY & ADDITIONAL TESTS: Reviewed. Dr. Tuyet Alarcon  Internal Medicine, PGY-1  Pager #: [phone]      Patient is a 53 M with hx of esophageal CA with mets to R pelvis and brain, CAD s/p RCA stent in 2015, p/w dyspnea 2/2 R pleural effusion s/p thoracentesis and bronchoscopy, admitted to MICU for hypoxic respiratory failure s/p intubation and hypotension requiring pressors.    INTERVAL HPI/OVERNIGHT EVENTS:  Na tabs given for hyponatremia.     SUBJECTIVE: Patient seen and examined at bedside.     Unable to obtain ROS due to intubated and sedated.      OBJECTIVE:    VITAL SIGNS:  ICU Vital Signs Last 24 Hrs  T(C): 38 (22 Aug 2019 04:00), Max: 38.4 (21 Aug 2019 16:00)  T(F): 100.4 (22 Aug 2019 04:00), Max: 101.1 (21 Aug 2019 16:00)  HR: 100 (22 Aug 2019 06:05) (86 - 159)  BP: 99/65 (22 Aug 2019 05:45) (91/55 - 123/80)  BP(mean): 75 (22 Aug 2019 05:45) (69 - 96)  ABP: --  ABP(mean): --  RR: 27 (22 Aug 2019 05:45) (7 - 30)  SpO2: 97% (22 Aug 2019 06:05) (89% - 97%)    Mode: AC/ CMV (Assist Control/ Continuous Mandatory Ventilation), RR (machine): 26, TV (machine): 450, FiO2: 70, PEEP: 8, ITime: 1, MAP: 15, PIP: 33    08-20 @ 07:01  -  08-21 @ 07:00  --------------------------------------------------------  IN: 3461.7 mL / OUT: 875 mL / NET: 2586.7 mL    08-21 @ 07:01  -  08-22 @ 06:49  --------------------------------------------------------  IN: 4266.3 mL / OUT: 560 mL / NET: 3706.3 mL      CAPILLARY BLOOD GLUCOSE      POCT Blood Glucose.: 93 mg/dL (21 Aug 2019 17:02)      PHYSICAL EXAM:    GENERAL: Intubated  NEURO: Sedated, unresponsive  HEENT: b/l pupil constricted and reactive to light; clear conjunctiva;  RESP: Intubated; decreased breath sound bibasilar fields; R upper and mid lung fields with improved air movements  CVS: S1/S2 present, RRR. no murmurs, gallops or rubs appreciated; (+) R subclavian TLC  ABD: Soft, non-distended, no masses appreciated; diminished BS  EXT: 2+ pedal pulses bilaterally, no BLE edema  SKIN: Warm, dry. No new rashes    MEDICATIONS:  MEDICATIONS  (STANDING):  ALBUTerol/ipratropium for Nebulization 3 milliLiter(s) Nebulizer every 6 hours  artificial tears (preservative free) Ophthalmic Solution 1 Drop(s) Both EYES two times a day  aspirin  chewable 81 milliGRAM(s) Oral daily  cefepime   IVPB 1000 milliGRAM(s) IV Intermittent every 8 hours  chlorhexidine 0.12% Liquid 15 milliLiter(s) Oral Mucosa every 12 hours  chlorhexidine 4% Liquid 1 Application(s) Topical <User Schedule>  chlorhexidine 4% Liquid 1 Application(s) Topical <User Schedule>  docusate sodium Liquid 100 milliGRAM(s) Oral three times a day  enoxaparin Injectable 40 milliGRAM(s) SubCutaneous daily  fentaNYL   Infusion. 0.4 MICROgram(s)/kG/Hr (1.612 mL/Hr) IV Continuous <Continuous>  fentaNYL   Patch  50 MICROgram(s)/Hr. 1 Patch Transdermal every 48 hours  fluconAZOLE IVPB 400 milliGRAM(s) IV Intermittent every 24 hours  metroNIDAZOLE  IVPB 500 milliGRAM(s) IV Intermittent every 8 hours  pantoprazole  Injectable 40 milliGRAM(s) IV Push daily  petrolatum Ophthalmic Ointment 1 Application(s) Both EYES two times a day  polyethylene glycol 3350 17 Gram(s) Oral two times a day  propofol Infusion 40 MICROgram(s)/kG/Min (19.344 mL/Hr) IV Continuous <Continuous>  senna Syrup 10 milliLiter(s) Oral at bedtime  sodium chloride 1 Gram(s) Oral two times a day  sodium chloride 0.9%. 1000 milliLiter(s) (100 mL/Hr) IV Continuous <Continuous>  vasopressin Infusion 0.04 Unit(s)/Min (2.4 mL/Hr) IV Continuous <Continuous>    MEDICATIONS  (PRN):  acetaminophen    Suspension .. 650 milliGRAM(s) Oral every 6 hours PRN Temp greater or equal to 38C (100.4F)  bisacodyl Suppository 10 milliGRAM(s) Rectal daily PRN Constipation  sodium chloride 0.9% lock flush 10 milliLiter(s) IV Push every 1 hour PRN Pre/post blood products, medications, blood draw, and to maintain line patency      ALLERGIES:  Allergies    No Known Allergies    Intolerances              LABS:                        9.3    25.2  )-----------( 220      ( 22 Aug 2019 00:17 )             30.6     08-22    123<L>  |  88<L>  |  22  ----------------------------<  102<H>  4.0   |  24  |  0.51    Ca    9.0      22 Aug 2019 05:36  Phos  2.3     08-22  Mg     2.0     08-22      PT/INR - ( 22 Aug 2019 00:17 )   PT: 15.8 sec;   INR: 1.37 ratio         PTT - ( 22 Aug 2019 00:17 )  PTT:31.1 sec  Urinalysis Basic - ( 20 Aug 2019 12:37 )    Color: Dark Yellow / Appearance: Slightly Turbid / SG: >1.050 / pH: x  Gluc: x / Ketone: Negative  / Bili: Small / Urobili: 2 mg/dL   Blood: x / Protein: 100 / Nitrite: Negative   Leuk Esterase: Negative / RBC: 304 /hpf / WBC 4 /HPF   Sq Epi: x / Non Sq Epi: 1 /hpf / Bacteria: Negative        RADIOLOGY & ADDITIONAL TESTS: Reviewed. Dr. Tuyet Alarcon  Internal Medicine, PGY-1  Pager #: [phone]      Patient is a 53 M with hx of esophageal CA with mets to R pelvis and brain, CAD s/p RCA stent in 2015, p/w dyspnea 2/2 R pleural effusion s/p thoracentesis and bronchoscopy, admitted to MICU for hypoxic respiratory failure s/p intubation and hypotension requiring pressors.    INTERVAL HPI/OVERNIGHT EVENTS:  Na tabs given for hyponatremia.     SUBJECTIVE: Patient seen and examined at bedside.     Unable to obtain ROS due to intubated and sedated.      OBJECTIVE:    VITAL SIGNS:  ICU Vital Signs Last 24 Hrs  T(C): 38 (22 Aug 2019 04:00), Max: 38.4 (21 Aug 2019 16:00)  T(F): 100.4 (22 Aug 2019 04:00), Max: 101.1 (21 Aug 2019 16:00)  HR: 100 (22 Aug 2019 06:05) (86 - 159)  BP: 99/65 (22 Aug 2019 05:45) (91/55 - 123/80)  BP(mean): 75 (22 Aug 2019 05:45) (69 - 96)  ABP: --  ABP(mean): --  RR: 27 (22 Aug 2019 05:45) (7 - 30)  SpO2: 97% (22 Aug 2019 06:05) (89% - 97%)    Mode: AC/ CMV (Assist Control/ Continuous Mandatory Ventilation), RR (machine): 26, TV (machine): 450, FiO2: 70, PEEP: 8, ITime: 1, MAP: 15, PIP: 33    08-20 @ 07:01  -  08-21 @ 07:00  --------------------------------------------------------  IN: 3461.7 mL / OUT: 875 mL / NET: 2586.7 mL    08-21 @ 07:01  -  08-22 @ 06:49  --------------------------------------------------------  IN: 4266.3 mL / OUT: 560 mL / NET: 3706.3 mL      CAPILLARY BLOOD GLUCOSE      POCT Blood Glucose.: 93 mg/dL (21 Aug 2019 17:02)      PHYSICAL EXAM:    GENERAL: Intubated  NEURO: Sedated, unresponsive  HEENT: b/l pupil constricted and reactive to light; clear conjunctiva;  RESP: Intubated; decreased breath sound bibasilar fields; R upper and mid lung fields with improved air movements  CVS: S1/S2 present, RRR. no murmurs, gallops or rubs appreciated; (+) R subclavian TLC  ABD: Soft, non-distended, no masses appreciated; diminished BS  EXT: 2+ pedal pulses bilaterally, no BLE edema  SKIN: Warm, dry. No new rashes    MEDICATIONS:  MEDICATIONS  (STANDING):  ALBUTerol/ipratropium for Nebulization 3 milliLiter(s) Nebulizer every 6 hours  artificial tears (preservative free) Ophthalmic Solution 1 Drop(s) Both EYES two times a day  aspirin  chewable 81 milliGRAM(s) Oral daily  cefepime   IVPB 1000 milliGRAM(s) IV Intermittent every 8 hours  chlorhexidine 0.12% Liquid 15 milliLiter(s) Oral Mucosa every 12 hours  chlorhexidine 4% Liquid 1 Application(s) Topical <User Schedule>  chlorhexidine 4% Liquid 1 Application(s) Topical <User Schedule>  docusate sodium Liquid 100 milliGRAM(s) Oral three times a day  enoxaparin Injectable 40 milliGRAM(s) SubCutaneous daily  fentaNYL   Infusion. 0.4 MICROgram(s)/kG/Hr (1.612 mL/Hr) IV Continuous <Continuous>  fentaNYL   Patch  50 MICROgram(s)/Hr. 1 Patch Transdermal every 48 hours  fluconAZOLE IVPB 400 milliGRAM(s) IV Intermittent every 24 hours  metroNIDAZOLE  IVPB 500 milliGRAM(s) IV Intermittent every 8 hours  pantoprazole  Injectable 40 milliGRAM(s) IV Push daily  petrolatum Ophthalmic Ointment 1 Application(s) Both EYES two times a day  polyethylene glycol 3350 17 Gram(s) Oral two times a day  propofol Infusion 40 MICROgram(s)/kG/Min (19.344 mL/Hr) IV Continuous <Continuous>  senna Syrup 10 milliLiter(s) Oral at bedtime  sodium chloride 1 Gram(s) Oral two times a day  sodium chloride 0.9%. 1000 milliLiter(s) (100 mL/Hr) IV Continuous <Continuous>  vasopressin Infusion 0.04 Unit(s)/Min (2.4 mL/Hr) IV Continuous <Continuous>    MEDICATIONS  (PRN):  acetaminophen    Suspension .. 650 milliGRAM(s) Oral every 6 hours PRN Temp greater or equal to 38C (100.4F)  bisacodyl Suppository 10 milliGRAM(s) Rectal daily PRN Constipation  sodium chloride 0.9% lock flush 10 milliLiter(s) IV Push every 1 hour PRN Pre/post blood products, medications, blood draw, and to maintain line patency      ALLERGIES:  Allergies    No Known Allergies    Intolerances          LABS:                        9.3    25.2  )-----------( 220      ( 22 Aug 2019 00:17 )             30.6     08-22    123<L>  |  88<L>  |  22  ----------------------------<  102<H>  4.0   |  24  |  0.51    Ca    9.0      22 Aug 2019 05:36  Phos  2.3     08-22  Mg     2.0     08-22      PT/INR - ( 22 Aug 2019 00:17 )   PT: 15.8 sec;   INR: 1.37 ratio         PTT - ( 22 Aug 2019 00:17 )  PTT:31.1 sec  Urinalysis Basic - ( 20 Aug 2019 12:37 )    Color: Dark Yellow / Appearance: Slightly Turbid / SG: >1.050 / pH: x  Gluc: x / Ketone: Negative  / Bili: Small / Urobili: 2 mg/dL   Blood: x / Protein: 100 / Nitrite: Negative   Leuk Esterase: Negative / RBC: 304 /hpf / WBC 4 /HPF   Sq Epi: x / Non Sq Epi: 1 /hpf / Bacteria: Negative        RADIOLOGY & ADDITIONAL TESTS: Reviewed.

## 2019-08-22 NOTE — PROGRESS NOTE ADULT - ASSESSMENT
Patient is a 53 M with hx of esophageal CA with mets to R pelvis and brain, CAD s/p RCA stent in 2015, p/w dyspnea 2/2 R pleural effusion s/p thoracentesis and bronchoscopy, admitted to MICU for hypoxic respiratory failure post bronch with resulting tension pneumothorax now s/p chest tube and intubation, course further c/b hypotension, on pressors.     #Neuro  - Sedated on fentanyl and propofol; added fentanyl patch to assist in weaning    #Resp  - Hypoxic respiratory failure 2/2 R malignant pleural effusion vs. infection, s/p intubation  - Chest tube suction 20 cc over 24 hours; (+) air leak, c/w wall suction  - BAL fluid cytology atypical findings, f/u final report    #CV  - s/p RCA stent in 2015  - On vaso, off li and levo; pressor capped.   - Cardiology following, no acute interventions  - Cont asa    #GI  - OGT placed by GI, tube feeds initiated  - Will trace food coloring to assess for esophageal/bronchial fistula  - Protonix 40 mg IV daily    #ID  - ?Septic shock, (+) persistent leukocytosis & febrile 8/21; f/u R/p Bcx, UA, Ucx 8/20  - C/w vanc (8/14 - ) and zosyn (8/14 - ); c/w vanc 1250 BID, trough 16.4  - Combi cath cx normal kateirne    #Renal  - AGNES resolved  - Hyponatremia, SIADH vs. renal excretion 2/2 intrinsic kidney injury  - (+) Rodriguez with 10-40 cc/hr UOP;  - Mg > 2, K > 4 as per cards.    #Onc  - Metastatic esophageal CA with mets to pelvis and brain  - Holding Keytruda inpatient (last cycle in July), will f/u outpatient with Dr. Mackey    #Heme  - Hgb 8.9, continue to monitor    #Endo  - No active issues    #DVT PPx  - On Lovenox currently    #GOC/Ethics  - DNR; Cap pressors, c/w current treatment, no aggressive measures  - MOLST form in chart. Confirmed on 8/19, GOC in chart Patient is a 53 M with hx of esophageal CA with mets to R pelvis and brain, CAD s/p RCA stent in 2015, p/w dyspnea 2/2 R pleural effusion s/p thoracentesis and bronchoscopy, admitted to MICU for hypoxic respiratory failure post bronch with resulting tension pneumothorax now s/p chest tube and intubation, course further c/b hypotension, on pressors.     #Neuro  - Sedated on fentanyl and propofol; added fentanyl patch to assist in weaning    #Resp  - Hypoxic respiratory failure 2/2 R malignant pleural effusion vs. infection, s/p intubation  - No output from chest tube; (+) air leak, c/w wall suction  - BAL fluid cytology negative for malignant cells    #CV  - s/p RCA stent in 2015  - On vaso, off li and levo; pressor capped.   - Cardiology following, no acute interventions  - Cont asa    #GI  - OGT placed by GI, tube feeds initiated; high residual  - Traced food dye in ET tube, no leak in CT currently  - Protonix 40 mg IV daily    #ID  - ?Septic shock, (+) persistent leukocytosis & febrile 8/21; R/p Bcx, UA, Ucx 8/20 (-)  - s/p vanc and zosyn (8/14-8/21)  -  Changed to cefepime 8/21, added Diflucan and Flagyl (8/21 - ) as per ID  - Procalcitonin 1.48; f/u coccidioides AB   - Combi cath cx normal katerine    #Renal  - AGNES resolved  - Hyponatremia, SIADH vs. renal excretion 2/2 intrinsic kidney injury; f/u cortisol AM  - (+) Rodriguez with 15-30 cc/hr UOP; (+) hematuria  - Mg > 2, K > 4 as per cards.    #Onc  - Metastatic esophageal CA with mets to pelvis and brain  - Holding Keytruda inpatient (last cycle in July), will f/u outpatient with Dr. Mackey    #Heme  - Hgb 8.9 --> 9.3, continue to monitor  - Hx of left subclavian thrombosis upon admission; (+) fever despite abx coverage; will f/u LUE and BLE DVT studies    #Endo  - No active issues    #DVT PPx  - On Lovenox currently, will switch A/C if DVT (+)    #GOC/Ethics  - DNR; Cap pressors, c/w current treatment, no aggressive measures  - MOLST form in chart. Confirmed on 8/19, GOC in chart Patient is a 53 M with hx of esophageal CA with mets to R pelvis and brain, CAD s/p RCA stent in 2015, p/w dyspnea 2/2 R pleural effusion s/p thoracentesis and bronchoscopy, admitted to MICU for hypoxic respiratory failure post bronch with resulting tension pneumothorax now s/p chest tube and intubation, course further c/b hypotension, on pressors.     #Neuro  - Sedated on fentanyl and propofol;    #Resp  - Hypoxic respiratory failure 2/2 R malignant pleural effusion vs. infection, s/p intubation  - No output from chest tube; (+) air leak, c/w wall suction  - CXR with improved PTX while on suction, increased pleural effusion b/l compared to previous study.   - BAL fluid cytology negative for malignant cells    #CV  - s/p RCA stent in 2015  - On vaso, off li and levo; pressor capped.   - Cardiology following, no acute interventions  - Cont asa    #GI  - OGT placed by GI, high residual with tube feeds, hold feeding  - Traced food dye in ET tube, no leak in CT currently, pink fluids in OGT - suspect esophageal/bronchial communication  - Protonix 40 mg IV daily  - Started Relistor for BM    #ID  - ?Septic shock, (+) persistent leukocytosis & febrile 8/21; R/p Bcx, UA, Ucx 8/20 (-)  - s/p vanc and zosyn (8/14-8/21)  - Changed to cefepime 8/21, added Diflucan and Flagyl (8/21 - ) as per ID, c/w current regimen  - Procalcitonin 1.48; f/u coccidioides AB   - Combi cath cx normal katerine    #Renal  - AGNES resolved  - Hyponatremia, SIADH vs. renal excretion 2/2 intrinsic kidney injury; f/u cortisol AM; s/p NaCl tab x 1, f/u BMP  - (+) Rodriguez with 15-30 cc/hr UOP; (+) hematuria  - Mg > 2, K > 4 as per cards.    #Onc  - Metastatic esophageal CA with mets to pelvis and brain  - Holding Keytruda inpatient (last cycle in July), will f/u outpatient with Dr. Mackey    #Heme  - (+) extensive DVT's in LUE and LLE, will initiate heparin gtt, family aware of risks and agreed. May be the source of fevers  - Monitor for bleeding and PTT 50-80.     #Endo  - No active issues    #DVT PPx  - Start heparin gtt for (+) DVT's, high risk    #GOC/Ethics  - DNR; Cap pressors, c/w current treatment, no aggressive measures  - MOLST form in chart. Confirmed on 8/19, GOC in chart  - Will continue current treatment until Monday due to [employer] arrangements cannot be made over the weekend.

## 2019-08-22 NOTE — PROGRESS NOTE ADULT - SUBJECTIVE AND OBJECTIVE BOX
[facility] - Jamilah Manning, Akash, Traci, Vanessa, Gildardo Coyne  Office Number:  [phone]    Patient resting comfortably in bed in NAD.  Remains intubated/sedated. He is on vaso, and sedation.  runs of pat noted    ROS: negative unless otherwise mentioned.    Telemetry:  sr with runs of pat    MEDICATIONS  (STANDING):  ALBUTerol/ipratropium for Nebulization 3 milliLiter(s) Nebulizer every 6 hours  artificial tears (preservative free) Ophthalmic Solution 1 Drop(s) Both EYES two times a day  aspirin  chewable 81 milliGRAM(s) Oral daily  cefepime   IVPB 1000 milliGRAM(s) IV Intermittent every 8 hours  chlorhexidine 0.12% Liquid 15 milliLiter(s) Oral Mucosa every 12 hours  chlorhexidine 4% Liquid 1 Application(s) Topical <User Schedule>  chlorhexidine 4% Liquid 1 Application(s) Topical <User Schedule>  docusate sodium Liquid 100 milliGRAM(s) Oral three times a day  enoxaparin Injectable 40 milliGRAM(s) SubCutaneous daily  fentaNYL   Infusion. 0.4 MICROgram(s)/kG/Hr (1.612 mL/Hr) IV Continuous <Continuous>  fentaNYL   Patch  50 MICROgram(s)/Hr. 1 Patch Transdermal every 48 hours  fluconAZOLE IVPB 400 milliGRAM(s) IV Intermittent every 24 hours  metroNIDAZOLE  IVPB 500 milliGRAM(s) IV Intermittent every 8 hours  pantoprazole  Injectable 40 milliGRAM(s) IV Push daily  petrolatum Ophthalmic Ointment 1 Application(s) Both EYES two times a day  polyethylene glycol 3350 17 Gram(s) Oral two times a day  propofol Infusion 40 MICROgram(s)/kG/Min (19.344 mL/Hr) IV Continuous <Continuous>  senna Syrup 10 milliLiter(s) Oral at bedtime  sodium chloride 1 Gram(s) Oral two times a day  sodium chloride 0.9%. 1000 milliLiter(s) (100 mL/Hr) IV Continuous <Continuous>  vasopressin Infusion 0.04 Unit(s)/Min (2.4 mL/Hr) IV Continuous <Continuous>    MEDICATIONS  (PRN):  acetaminophen    Suspension .. 650 milliGRAM(s) Oral every 6 hours PRN Temp greater or equal to 38C (100.4F)  bisacodyl Suppository 10 milliGRAM(s) Rectal daily PRN Constipation  sodium chloride 0.9% lock flush 10 milliLiter(s) IV Push every 1 hour PRN Pre/post blood products, medications, blood draw, and to maintain line patency      Allergies    No Known Allergies    Intolerances        Vital Signs Last 24 Hrs  T(C): 37.6 (22 Aug 2019 08:00), Max: 38.4 (21 Aug 2019 16:00)  T(F): 99.7 (22 Aug 2019 08:00), Max: 101.1 (21 Aug 2019 16:00)  HR: 85 (22 Aug 2019 08:42) (85 - 159)  BP: 109/75 (22 Aug 2019 08:30) (91/55 - 123/80)  BP(mean): 88 (22 Aug 2019 08:30) (69 - 96)  RR: 6 (22 Aug 2019 08:30) (6 - 30)  SpO2: 97% (22 Aug 2019 08:42) (89% - 99%)    I&O's Summary    21 Aug 2019 07:01  -  22 Aug 2019 07:00  --------------------------------------------------------  IN: 4413.1 mL / OUT: 560 mL / NET: 3853.1 mL        ON EXAM:    Constitutional: NAD, sedated  HEENT: + ETT in place  Pulmonary: Decreased breath sounds b/l. vent sounds b/l  Cardiovascular: Regular, S1 and S2, No murmurs, rubs, gallops or clicks  Gastrointestinal: Bowel Sounds present, soft, nontender.   Lymph: No peripheral edema. No lymphadenopathy.  Skin: No visible rashes or ulcers.  Psych:  unable to assess    LABS: All Labs Reviewed:                        9.3    25.2  )-----------( 220      ( 22 Aug 2019 00:17 )             30.6                         9.5    21.3  )-----------( 220      ( 21 Aug 2019 11:52 )             30.7                         8.9    20.6  )-----------( 198      ( 21 Aug 2019 00:16 )             29.5     22 Aug 2019 05:36    123    |  88     |  22     ----------------------------<  102    4.0     |  24     |  0.51   22 Aug 2019 00:17    125    |  90     |  22     ----------------------------<  105    4.1     |  25     |  0.43   21 Aug 2019 00:16    127    |  90     |  20     ----------------------------<  98     4.0     |  27     |  0.60     Ca    9.0        22 Aug 2019 05:36  Ca    8.3        22 Aug 2019 00:17  Ca    8.8        21 Aug 2019 00:16  Phos  2.3       22 Aug 2019 00:17  Phos  2.5       21 Aug 2019 00:16  Phos  2.4       20 Aug 2019 00:15  Mg     2.0       22 Aug 2019 00:17  Mg     2.1       21 Aug 2019 00:16  Mg     1.8       20 Aug 2019 00:15      PT/INR - ( 22 Aug 2019 00:17 )   PT: 15.8 sec;   INR: 1.37 ratio         PTT - ( 22 Aug 2019 00:17 )  PTT:31.1 sec      Blood Culture: Organism --  Gram Stain Blood -- Gram Stain --  Specimen Source .Urine  Culture-Blood --    Organism --  Gram Stain Blood -- Gram Stain --  Specimen Source .Blood  Culture-Blood --    Organism --  Gram Stain Blood -- Gram Stain   Numerous polymorphonuclear leukocytes per low power field  Rare Squamous epithelial cells per low power field  Moderate Yeast like cells per oil power field  Few Gram Negative Rods per oil power field  Specimen Source Bronch Wash  Culture-Blood --    Organism --  Gram Stain Blood -- Gram Stain --  Specimen Source .Blood  Culture-Blood --

## 2019-08-23 LAB
ANION GAP SERPL CALC-SCNC: 10 MMOL/L — SIGNIFICANT CHANGE UP (ref 5–17)
ANION GAP SERPL CALC-SCNC: 11 MMOL/L — SIGNIFICANT CHANGE UP (ref 5–17)
APTT BLD: 47.4 SEC — HIGH (ref 27.5–36.3)
APTT BLD: 52.3 SEC — HIGH (ref 27.5–36.3)
APTT BLD: 55.8 SEC — HIGH (ref 27.5–36.3)
BUN SERPL-MCNC: 21 MG/DL — SIGNIFICANT CHANGE UP (ref 7–23)
BUN SERPL-MCNC: 22 MG/DL — SIGNIFICANT CHANGE UP (ref 7–23)
CALCIUM SERPL-MCNC: 8.6 MG/DL — SIGNIFICANT CHANGE UP (ref 8.4–10.5)
CALCIUM SERPL-MCNC: 8.9 MG/DL — SIGNIFICANT CHANGE UP (ref 8.4–10.5)
CHLORIDE SERPL-SCNC: 89 MMOL/L — LOW (ref 96–108)
CHLORIDE SERPL-SCNC: 89 MMOL/L — LOW (ref 96–108)
CO2 SERPL-SCNC: 24 MMOL/L — SIGNIFICANT CHANGE UP (ref 22–31)
CO2 SERPL-SCNC: 26 MMOL/L — SIGNIFICANT CHANGE UP (ref 22–31)
CREAT SERPL-MCNC: 0.46 MG/DL — LOW (ref 0.5–1.3)
CREAT SERPL-MCNC: 0.46 MG/DL — LOW (ref 0.5–1.3)
CULTURE RESULTS: SIGNIFICANT CHANGE UP
CULTURE RESULTS: SIGNIFICANT CHANGE UP
GLUCOSE SERPL-MCNC: 103 MG/DL — HIGH (ref 70–99)
GLUCOSE SERPL-MCNC: 108 MG/DL — HIGH (ref 70–99)
HCT VFR BLD CALC: 34.7 % — LOW (ref 39–50)
HGB BLD-MCNC: 10.2 G/DL — LOW (ref 13–17)
MAGNESIUM SERPL-MCNC: 2.1 MG/DL — SIGNIFICANT CHANGE UP (ref 1.6–2.6)
MCHC RBC-ENTMCNC: 23.8 PG — LOW (ref 27–34)
MCHC RBC-ENTMCNC: 29.3 GM/DL — LOW (ref 32–36)
MCV RBC AUTO: 81.3 FL — SIGNIFICANT CHANGE UP (ref 80–100)
PHOSPHATE SERPL-MCNC: 2.4 MG/DL — LOW (ref 2.5–4.5)
PLATELET # BLD AUTO: 249 K/UL — SIGNIFICANT CHANGE UP (ref 150–400)
POTASSIUM SERPL-MCNC: 4.1 MMOL/L — SIGNIFICANT CHANGE UP (ref 3.5–5.3)
POTASSIUM SERPL-MCNC: 4.2 MMOL/L — SIGNIFICANT CHANGE UP (ref 3.5–5.3)
POTASSIUM SERPL-SCNC: 4.1 MMOL/L — SIGNIFICANT CHANGE UP (ref 3.5–5.3)
POTASSIUM SERPL-SCNC: 4.2 MMOL/L — SIGNIFICANT CHANGE UP (ref 3.5–5.3)
RBC # BLD: 4.27 M/UL — SIGNIFICANT CHANGE UP (ref 4.2–5.8)
RBC # FLD: 15 % — HIGH (ref 10.3–14.5)
SODIUM SERPL-SCNC: 124 MMOL/L — LOW (ref 135–145)
SODIUM SERPL-SCNC: 125 MMOL/L — LOW (ref 135–145)
SPECIMEN SOURCE: SIGNIFICANT CHANGE UP
SPECIMEN SOURCE: SIGNIFICANT CHANGE UP
WBC # BLD: 26.4 K/UL — HIGH (ref 3.8–10.5)
WBC # FLD AUTO: 26.4 K/UL — HIGH (ref 3.8–10.5)

## 2019-08-23 PROCEDURE — 99291 CRITICAL CARE FIRST HOUR: CPT

## 2019-08-23 PROCEDURE — 99233 SBSQ HOSP IP/OBS HIGH 50: CPT

## 2019-08-23 RX ORDER — HEPARIN SODIUM 5000 [USP'U]/ML
1750 INJECTION INTRAVENOUS; SUBCUTANEOUS
Qty: 25000 | Refills: 0 | Status: DISCONTINUED | OUTPATIENT
Start: 2019-08-23 | End: 2019-08-24

## 2019-08-23 RX ORDER — POTASSIUM PHOSPHATE, MONOBASIC POTASSIUM PHOSPHATE, DIBASIC 236; 224 MG/ML; MG/ML
15 INJECTION, SOLUTION INTRAVENOUS ONCE
Refills: 0 | Status: COMPLETED | OUTPATIENT
Start: 2019-08-23 | End: 2019-08-23

## 2019-08-23 RX ADMIN — PANTOPRAZOLE SODIUM 40 MILLIGRAM(S): 20 TABLET, DELAYED RELEASE ORAL at 13:03

## 2019-08-23 RX ADMIN — Medication 100 MILLIGRAM(S): at 21:03

## 2019-08-23 RX ADMIN — CHLORHEXIDINE GLUCONATE 1 APPLICATION(S): 213 SOLUTION TOPICAL at 05:17

## 2019-08-23 RX ADMIN — Medication 3 MILLILITER(S): at 00:27

## 2019-08-23 RX ADMIN — CEFEPIME 100 MILLIGRAM(S): 1 INJECTION, POWDER, FOR SOLUTION INTRAMUSCULAR; INTRAVENOUS at 05:17

## 2019-08-23 RX ADMIN — PROPOFOL 19.34 MICROGRAM(S)/KG/MIN: 10 INJECTION, EMULSION INTRAVENOUS at 22:44

## 2019-08-23 RX ADMIN — Medication 3 MILLILITER(S): at 11:44

## 2019-08-23 RX ADMIN — Medication 100 MILLIGRAM(S): at 14:15

## 2019-08-23 RX ADMIN — Medication 100 MILLIGRAM(S): at 05:03

## 2019-08-23 RX ADMIN — PROPOFOL 19.34 MICROGRAM(S)/KG/MIN: 10 INJECTION, EMULSION INTRAVENOUS at 19:27

## 2019-08-23 RX ADMIN — SENNA PLUS 10 MILLILITER(S): 8.6 TABLET ORAL at 21:57

## 2019-08-23 RX ADMIN — Medication 1 DROP(S): at 14:10

## 2019-08-23 RX ADMIN — FENTANYL CITRATE 1.61 MICROGRAM(S)/KG/HR: 50 INJECTION INTRAVENOUS at 22:41

## 2019-08-23 RX ADMIN — SODIUM CHLORIDE 1 GRAM(S): 9 INJECTION INTRAMUSCULAR; INTRAVENOUS; SUBCUTANEOUS at 05:03

## 2019-08-23 RX ADMIN — Medication 3 MILLILITER(S): at 17:42

## 2019-08-23 RX ADMIN — HEPARIN SODIUM 1950 UNIT(S)/HR: 5000 INJECTION INTRAVENOUS; SUBCUTANEOUS at 20:30

## 2019-08-23 RX ADMIN — PROPOFOL 19.34 MICROGRAM(S)/KG/MIN: 10 INJECTION, EMULSION INTRAVENOUS at 11:55

## 2019-08-23 RX ADMIN — CHLORHEXIDINE GLUCONATE 15 MILLILITER(S): 213 SOLUTION TOPICAL at 17:10

## 2019-08-23 RX ADMIN — FENTANYL CITRATE 1.61 MICROGRAM(S)/KG/HR: 50 INJECTION INTRAVENOUS at 11:52

## 2019-08-23 RX ADMIN — POLYETHYLENE GLYCOL 3350 17 GRAM(S): 17 POWDER, FOR SOLUTION ORAL at 05:18

## 2019-08-23 RX ADMIN — Medication 1 DROP(S): at 01:09

## 2019-08-23 RX ADMIN — FLUCONAZOLE 100 MILLIGRAM(S): 150 TABLET ORAL at 16:17

## 2019-08-23 RX ADMIN — CEFEPIME 100 MILLIGRAM(S): 1 INJECTION, POWDER, FOR SOLUTION INTRAMUSCULAR; INTRAVENOUS at 21:03

## 2019-08-23 RX ADMIN — POTASSIUM PHOSPHATE, MONOBASIC POTASSIUM PHOSPHATE, DIBASIC 62.5 MILLIMOLE(S): 236; 224 INJECTION, SOLUTION INTRAVENOUS at 02:19

## 2019-08-23 RX ADMIN — Medication 10 MILLIGRAM(S): at 01:10

## 2019-08-23 RX ADMIN — VASOPRESSIN 2.4 UNIT(S)/MIN: 20 INJECTION INTRAVENOUS at 20:21

## 2019-08-23 RX ADMIN — FENTANYL CITRATE 1 PATCH: 50 INJECTION INTRAVENOUS at 19:26

## 2019-08-23 RX ADMIN — PROPOFOL 19.34 MICROGRAM(S)/KG/MIN: 10 INJECTION, EMULSION INTRAVENOUS at 16:17

## 2019-08-23 RX ADMIN — HEPARIN SODIUM 15.5 UNIT(S)/HR: 5000 INJECTION INTRAVENOUS; SUBCUTANEOUS at 11:26

## 2019-08-23 RX ADMIN — FENTANYL CITRATE 1 PATCH: 50 INJECTION INTRAVENOUS at 13:12

## 2019-08-23 RX ADMIN — CHLORHEXIDINE GLUCONATE 15 MILLILITER(S): 213 SOLUTION TOPICAL at 05:18

## 2019-08-23 RX ADMIN — FENTANYL CITRATE 1 PATCH: 50 INJECTION INTRAVENOUS at 01:09

## 2019-08-23 RX ADMIN — HEPARIN SODIUM 1750 UNIT(S)/HR: 5000 INJECTION INTRAVENOUS; SUBCUTANEOUS at 12:50

## 2019-08-23 RX ADMIN — FENTANYL CITRATE 1 PATCH: 50 INJECTION INTRAVENOUS at 01:00

## 2019-08-23 RX ADMIN — Medication 1 APPLICATION(S): at 05:19

## 2019-08-23 RX ADMIN — Medication 650 MILLIGRAM(S): at 01:09

## 2019-08-23 RX ADMIN — Medication 1 APPLICATION(S): at 17:10

## 2019-08-23 RX ADMIN — CEFEPIME 100 MILLIGRAM(S): 1 INJECTION, POWDER, FOR SOLUTION INTRAMUSCULAR; INTRAVENOUS at 13:03

## 2019-08-23 RX ADMIN — Medication 3 MILLILITER(S): at 05:36

## 2019-08-23 NOTE — PROGRESS NOTE ADULT - SUBJECTIVE AND OBJECTIVE BOX
ID Coverage    Patient is a 53y old  Male who presents with a chief complaint of pleural effusion (23 Aug 2019 07:08)    Being followed by ID for ? septic shock     Interval history:on pressors  sedated  family deciding GOC   No other acute events      ROS:  Not obtainable     Antimicrobials:    cefepime   IVPB 1000 milliGRAM(s) IV Intermittent every 8 hours  fluconAZOLE IVPB 400 milliGRAM(s) IV Intermittent every 24 hours  metroNIDAZOLE  IVPB 500 milliGRAM(s) IV Intermittent every 8 hours      other medications reviewed    Vital Signs Last 24 Hrs  T(C): 37.9 (08-23-19 @ 12:00), Max: 38.1 (08-23-19 @ 01:00)  T(F): 100.2 (08-23-19 @ 12:00), Max: 100.6 (08-23-19 @ 01:00)  HR: 100 (08-23-19 @ 13:00) (87 - 111)  BP: 89/63 (08-23-19 @ 13:00) (89/61 - 143/93)  BP(mean): 72 (08-23-19 @ 13:00) (71 - 114)  RR: 25 (08-23-19 @ 13:00) (3 - 31)  SpO2: 96% (08-23-19 @ 13:00) (83% - 100%)    Physical Exam:  sedated    RIJ TLC no erythema or tenderness  R ant chest tube     HEENTpupils 3 mm  ,No pallor or icterus  ETT     Neck supple no JVD or LN    Chest Good AE,R crackles     CVS RRR S1 S2 WNl No murmur or rub or gallop    Abd soft BS normal No tenderness no masses    Ext No cyanosis clubbing or edema    IV site no erythema tenderness or discharge    Joints no swelling or LOM    CNS sedtaed   Lab Data:                          10.2   26.4  )-----------( 249      ( 23 Aug 2019 01:07 )             34.7       08-23    125<L>  |  89<L>  |  21  ----------------------------<  108<H>  4.1   |  26  |  0.46<L>    Ca    8.9      23 Aug 2019 10:39  Phos  2.4     08-23  Mg     2.1     08-23          Culture - Urine (collected 20 Aug 2019 15:04)  Source: .Urine  Final Report (21 Aug 2019 16:59):    <10,000 CFU/mL Normal Urogenital Vilma    Culture - Blood (collected 20 Aug 2019 14:58)  Source: .Blood  Preliminary Report (21 Aug 2019 15:04):    No growth to date.    Culture - Blood (collected 20 Aug 2019 14:58)  Source: .Blood  Preliminary Report (21 Aug 2019 15:04):    No growth to date.    Culture - Bronchial (collected 19 Aug 2019 18:40)  Source: Bronch Wash  Gram Stain (19 Aug 2019 20:40):    Numerous polymorphonuclear leukocytes per low power field    Rare Squamous epithelial cells per low power field    Moderate Yeast like cells per oil power field    Few Gram Negative Rods per oil power field  Final Report (21 Aug 2019 19:41):    Normal Respiratory Vilma present    Culture - Blood (collected 18 Aug 2019 16:59)  Source: .Blood  Preliminary Report (19 Aug 2019 17:01):    No growth to date.    Culture - Blood (collected 18 Aug 2019 16:59)  Source: .Blood  Preliminary Report (19 Aug 2019 17:01):    No growth to date.        < from: [facility] Duplex Upper Ext Vein Scan, Left (08.22.19 @ 12:35) >    IMPRESSION:     Positive deep venous thrombosis involving the left subclavian, axillary   and proximal brachial veins.      < end of copied text >

## 2019-08-23 NOTE — REVIEW OF SYSTEMS
[Recent Change In Weight] : ~T recent weight change [FreeTextEntry2] : no  [FreeTextEntry7] : daily BM RUQ [de-identified] : lightheaded

## 2019-08-23 NOTE — PROGRESS NOTE ADULT - SUBJECTIVE AND OBJECTIVE BOX
[facility] -- Jamilah Manning, Traci Bustos Pannella, Patel, Savella  Office # [phone]      Follow Up:  resp failure    Subjective/Observations: Patient seen and examined. Events noted. Sedated. The patient is intubated and requiring mechanical ventilatory support. Remains on Vaso     REVIEW OF SYSTEMS:  Limited 2/2 comorbidities     PAST MEDICAL & SURGICAL HISTORY:  H/O nausea  History of dysphagia  Hilar lymphadenopathy  Chronic anticoagulation  CAD (coronary artery disease)  Secondary malignant neoplasm of brain  Metastasis to bone  Esophageal cancer  MI (myocardial infarction): 2015 with 1 coronary stent mid RCA  H/O craniotomy  History of esophageal surgery: 6/2018  H/O hernia repair: 1966  Status post tonsillectomy and adenoidectomy  Stented coronary artery: x1 2015      MEDICATIONS  (STANDING):  ALBUTerol/ipratropium for Nebulization 3 milliLiter(s) Nebulizer every 6 hours  artificial tears (preservative free) Ophthalmic Solution 1 Drop(s) Both EYES two times a day  aspirin  chewable 81 milliGRAM(s) Oral daily  cefepime   IVPB 1000 milliGRAM(s) IV Intermittent every 8 hours  chlorhexidine 0.12% Liquid 15 milliLiter(s) Oral Mucosa every 12 hours  chlorhexidine 4% Liquid 1 Application(s) Topical <User Schedule>  chlorhexidine 4% Liquid 1 Application(s) Topical <User Schedule>  docusate sodium Liquid 100 milliGRAM(s) Oral three times a day  fentaNYL   Infusion. 0.4 MICROgram(s)/kG/Hr (1.612 mL/Hr) IV Continuous <Continuous>  fentaNYL   Patch  50 MICROgram(s)/Hr. 1 Patch Transdermal every 48 hours  fluconAZOLE IVPB 400 milliGRAM(s) IV Intermittent every 24 hours  heparin  Infusion 1550 Unit(s)/Hr (15.5 mL/Hr) IV Continuous <Continuous>  metroNIDAZOLE  IVPB 500 milliGRAM(s) IV Intermittent every 8 hours  pantoprazole  Injectable 40 milliGRAM(s) IV Push daily  petrolatum Ophthalmic Ointment 1 Application(s) Both EYES two times a day  polyethylene glycol 3350 17 Gram(s) Oral two times a day  propofol Infusion 40 MICROgram(s)/kG/Min (19.344 mL/Hr) IV Continuous <Continuous>  senna Syrup 10 milliLiter(s) Oral at bedtime  sodium chloride 1 Gram(s) Oral two times a day  vasopressin Infusion 0.04 Unit(s)/Min (2.4 mL/Hr) IV Continuous <Continuous>    MEDICATIONS  (PRN):  acetaminophen    Suspension .. 650 milliGRAM(s) Oral every 6 hours PRN Temp greater or equal to 38C (100.4F)  bisacodyl Suppository 10 milliGRAM(s) Rectal daily PRN Constipation  sodium chloride 0.9% lock flush 10 milliLiter(s) IV Push every 1 hour PRN Pre/post blood products, medications, blood draw, and to maintain line patency      Allergies    No Known Allergies    Intolerances            Vital Signs Last 24 Hrs  T(C): 37.6 (23 Aug 2019 04:00), Max: 38.1 (23 Aug 2019 01:00)  T(F): 99.7 (23 Aug 2019 04:00), Max: 100.6 (23 Aug 2019 01:00)  HR: 103 (23 Aug 2019 06:15) (83 - 111)  BP: 91/67 (23 Aug 2019 06:15) (91/66 - 143/93)  BP(mean): 74 (23 Aug 2019 06:15) (73 - 114)  RR: 26 (23 Aug 2019 06:15) (3 - 31)  SpO2: 95% (23 Aug 2019 06:15) (83% - 100%)    I&O's Summary    21 Aug 2019 07:01  -  22 Aug 2019 07:00  --------------------------------------------------------  IN: 4413.1 mL / OUT: 560 mL / NET: 3853.1 mL    22 Aug 2019 07:01  -  23 Aug 2019 06:32  --------------------------------------------------------  IN: 3913.8 mL / OUT: 775 mL / NET: 3138.8 mL          PHYSICAL EXAM:  TELE: SR-ST   Constitutional: sedated  HEENT: +ETT in place  Pulmonary: Decreased breath sounds b/l. coarse b/l  Cardiovascular: Regular, S1 and S2, No murmurs, rubs, gallops or clicks  Gastrointestinal: Bowel Sounds present, soft, nontender.   Lymph: dependent peripheral edema.   Skin: No visible rashes or ulcers.  Psych:  unable to assess    LABS: All Labs Reviewed:                        10.2   26.4  )-----------( 249      ( 23 Aug 2019 01:07 )             34.7                         10.1   27.9  )-----------( 236      ( 22 Aug 2019 19:59 )             32.3                         9.3    25.2  )-----------( 220      ( 22 Aug 2019 00:17 )             30.6     23 Aug 2019 01:07    124    |  89     |  22     ----------------------------<  103    4.2     |  24     |  0.46   22 Aug 2019 19:59    126    |  89     |  22     ----------------------------<  111    4.4     |  26     |  0.38   22 Aug 2019 16:23    125    |  89     |  22     ----------------------------<  112    4.0     |  23     |  0.40     Ca    8.6        23 Aug 2019 01:07  Ca    8.4        22 Aug 2019 19:59  Ca    8.5        22 Aug 2019 16:23  Phos  2.4       23 Aug 2019 01:07  Phos  3.3       22 Aug 2019 19:59  Phos  2.3       22 Aug 2019 12:34  Mg     2.1       23 Aug 2019 01:07  Mg     2.1       22 Aug 2019 19:59  Mg     2.0       22 Aug 2019 12:34      PT/INR - ( 22 Aug 2019 19:59 )   PT: 16.9 sec;   INR: 1.45 ratio         PTT - ( 23 Aug 2019 03:02 )  PTT:52.3 sec

## 2019-08-23 NOTE — PROGRESS NOTE ADULT - ASSESSMENT
53 M with hx of esophageal CA diagnosed in 2017 s/p chemo and radiation and with mets to R pelvis and brain mets to the L superior cerebellar mass (s/p craniotomy in 04/2019) currently on Keytruda, CAD s/p RCA stent in 2015, p/w pleural effusion, now with hypoxic resp failure post bronch with resulting tension PTX now s/p chest tube    - No signs of significant ischemia   - No signs of ADHF at this time.  Likely malignant pl eff, not cardiogenic  - EKG without ischemic changes  - Echo with normal LV function, moderate diastolic dysfunction, and evidence of RVE and RV pressure overload.  - Cont asa for CAD history    - Tele demonstrates NSR/Sinus tachy. Has had  short bursts of PSVT likely PAT which is likely all reactive to his underlying condition. Unable to treat with av oscar blockers in the setting of pressor dependent hypotension. Can use amiodarone if sustained.    - Now off of levo and li. Cont Vaso support to maintain MAP at least >60. Titrate off as blood pressure tolerates.     - Cont on mechanical ventilatory support. Wean as tolerated.     - Hep gtt for DVTs. Titrate PTT per protocol. Monitor for signs of bleeding.     - Monitor and replete electrolytes. Keep K>4.0 and Mg>2.0.  - Further cardiac workup will depend on clinical course.   - All other workup per MICU team. Will followup.

## 2019-08-23 NOTE — ASSESSMENT
[Palliative Care Plan] : not applicable at this time [Palliative] : Goals of care discussed with patient: Palliative [FreeTextEntry1] : MRI pending\par Nutritino follow up \par

## 2019-08-23 NOTE — PROGRESS NOTE ADULT - ATTENDING COMMENTS
Patient seen and examined, agree with above     52 y/o male with hx esophageal ca with mets to pelvis and brain (s/p resection), s/p surgery, chemo and radiation, CAD s/p RCA stent, pleural effusion, now with acute hypoxic and hypercapnic resp failure, R pneumothorax, shock w/o an obvious source of sepsis.     Remains on propofol and fentanyl drips   On vaso, not on Levo   Found to have extensive DVTs involving LLE and LUE  Does not tolerate TF, no BMs     Recs:   Neuro - continue fentanyl and propofol  CV - on vasopressin, no need to add another pressor if becomes hypotensive  Resp - continue vent support, not a candidate for weaning  GI - ileus - no response to bowel regimen and Relistor    Renal - maintain Rodriguez for comfort, hematuria stable  ID - on cefepime, Flagyl, fluconazole, multiple recent cx have been neg, he is not stable for CT scans \  Heme - acute DVT - on iv heparin     Prognosis extremely poor, patient is DNR, wife and parents now agree to pursue comfort measures - they want to withdraw life support on 8/25    Patient is critically ill, 35mins spent

## 2019-08-23 NOTE — HISTORY OF PRESENT ILLNESS
[de-identified] : 53 y/o Male with PMH of CAD s/p inferior wall MI in 02/2015 s/p RCA stent in [place], HLD, who was diagnosed with Stage III lower esophageal adenocarcinoma (T3N3) in the fall of 2017. [place] [facility] [place] with concurrent radiation however, decided to switch care to Dr. Fuentes. Initial PET/CT in Oct 2017 showed hypermetabolic wall thickening of the distal esophagus and gastric cardia as well as metastatic perigastric lymphadenopathy. A PET+ paraesophageal mass was noted and was bx proven positive for adenocarcinoma. He began weekly Carbo AUC 2 and Taxol 50 mg/m2 along with RT which was completed 1/30/18. Reportedly patient tolerated treatment poorly. Subsequently underwent Arley Andrew Esophagogastrectomy.on 6/14/18, final pathology was c/w T1N1. Caris report did not reveal any actionable mutations. \par \par Jam had a PET/CT in Nov 2018 which showed a new lesion in the R pelvis. Bx of the bone was positive for poorly differentiated carcinoma, favoring adenocarcinoma of the esophagus. Received Keytruda 2/14-4/18/19 and SBRT to the lesion from 3/5-3/15/19.\par \par On 4/13/19 he presented to [facility] with HA, vertigo and nausea, found to have a L superior cerebellar mass 3 x 3.7 x 2.3 cm s/p craniotomy on 4/22 which revealed metastatic poorly differentiated carcinoma. Post op MRI showed tiny nodular area of enhancement in the operative bed. Post operative course was c/b admission on 4/30/19 with acute psychotic deterioration which resulted in a 2 week stay at a in patient psychiatric facility (5/2-5/16). After psych stabilization, he underwent 3 session of gamma knife c/b nausea.vomiting and fatigue. This was completed middle of June 2019. The last set of CT CAP was in April 2019 which showed peritoneal disease, mild ascites. 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 Very close to his father.  \par \par PMD/Cardiologist: Dr Mendez. \par \par Patient advised to f/u with medical oncology for further management of his disease. He decided [place] [facility] [place]'s [place] in the system. \par \par 7/24/19: C1 Keytruda\par \par \par  [de-identified] : invasive adenocarcinoma moderate to poorly differentiated [de-identified] : Her 2 negative \par VIANEY [de-identified] : accompanied by sister and spouse today. Went to [facility] after keytruda treatment for alternative therapy (oxygen water therapy, baths). Developed worsening SOB while there went to the ER about 10 days ago and 3 L removed from L lung.  \par Difficult time with father 8/5 so he returned back to [place].  \par No appetite, lost 10 lbs since last visit. Eating primarily protein (meat)\par The pain and swelling in the upper abdomen is better 2/10 pain\par + GERD but does not want to take medication for it \par Mood has significantly worsened since returning due to physical decline and the fallout from his father. \par

## 2019-08-23 NOTE — PROGRESS NOTE ADULT - ASSESSMENT
53 M with hx of esophageal CA diagnosed in 2017 s/p chemo and radiation and with mets to R pelvis and brain mets to the L superior cerebellar mass (s/p craniotomy in 04/2019) currently on Keytruda, CAD s/p RCA stent in 2015, ad mitted with  worsening sob for several days.  s/p thoracentesis  s/p intubation for resp failure  fevers  workup ioncluding blood cx, bronch cx negative  Does have multiple DVTs  Also recent trip to [place]-unclear if any fungal exposure-cocci serology pending  ? also fevers due to ca v keytruda  On BS coverage-fluconazole and flagyl added empirically yesterday  Still on pressors  Rec:  1) follow pending cx  2) Follow cocci serology  3) follow clinically  4) Continue empiric abx as above  5) DVT/other plan per MICU-on heparin drip   6) supportive acre per MICU team      overall prognosis poor-GOC being decided-family considering comfort care   Will tailor plan for ID issues  per course,results.Will defer to primary team on management of other issues.  case d/w MICU team  Infectious Diseases Service will cover over weekend.  Please call [phone] if issues

## 2019-08-23 NOTE — PROGRESS NOTE ADULT - SUBJECTIVE AND OBJECTIVE BOX
Dr. Tuyet Alarcon  Internal Medicine, PGY-1  Pager #: [phone]      Patient is a 53 M with hx of esophageal CA with mets to R pelvis and brain, CAD s/p RCA stent in 2015, p/w dyspnea 2/2 R pleural effusion s/p thoracentesis and bronchoscopy, admitted to MICU for hypoxic respiratory failure s/p intubation and hypotension requiring pressors.    INTERVAL HPI/OVERNIGHT EVENTS:  No acute events overnight    SUBJECTIVE: Patient seen and examined at bedside.     Unable to obtain ROS due to intubation and sedation.    OBJECTIVE:    VITAL SIGNS:  ICU Vital Signs Last 24 Hrs  T(C): 37.6 (23 Aug 2019 04:00), Max: 38.1 (23 Aug 2019 01:00)  T(F): 99.7 (23 Aug 2019 04:00), Max: 100.6 (23 Aug 2019 01:00)  HR: 106 (23 Aug 2019 07:00) (83 - 111)  BP: 94/62 (23 Aug 2019 07:00) (91/63 - 143/93)  BP(mean): 74 (23 Aug 2019 07:00) (73 - 114)  ABP: --  ABP(mean): --  RR: 22 (23 Aug 2019 07:00) (3 - 31)  SpO2: 96% (23 Aug 2019 07:00) (83% - 100%)    Mode: AC/ CMV (Assist Control/ Continuous Mandatory Ventilation), RR (machine): 26, TV (machine): 450, FiO2: 70, PEEP: 8, ITime: 1, MAP: 14, PIP: 29    08-22 @ 07:01  -  08-23 @ 07:00  --------------------------------------------------------  IN: 3976.1 mL / OUT: 820 mL / NET: 3156.1 mL      CAPILLARY BLOOD GLUCOSE      POCT Blood Glucose.: 104 mg/dL (22 Aug 2019 12:16)      PHYSICAL EXAM:    GENERAL: Intubated  NEURO: Sedated, unresponsive  HEENT: b/l pupil constricted and reactive to light; clear conjunctiva;  RESP: Intubated; decreased breath sound bibasilar fields; R upper and mid lung fields with improved air movements  CVS: S1/S2 present, RRR. no murmurs, gallops or rubs appreciated; (+) R subclavian TLC  ABD: Soft, non-distended, no masses appreciated; diminished BS  EXT: 2+ pedal pulses bilaterally, no BLE edema  SKIN: Warm, dry. No new rashes    MEDICATIONS:  MEDICATIONS  (STANDING):  ALBUTerol/ipratropium for Nebulization 3 milliLiter(s) Nebulizer every 6 hours  artificial tears (preservative free) Ophthalmic Solution 1 Drop(s) Both EYES two times a day  aspirin  chewable 81 milliGRAM(s) Oral daily  cefepime   IVPB 1000 milliGRAM(s) IV Intermittent every 8 hours  chlorhexidine 0.12% Liquid 15 milliLiter(s) Oral Mucosa every 12 hours  chlorhexidine 4% Liquid 1 Application(s) Topical <User Schedule>  chlorhexidine 4% Liquid 1 Application(s) Topical <User Schedule>  docusate sodium Liquid 100 milliGRAM(s) Oral three times a day  fentaNYL   Infusion. 0.4 MICROgram(s)/kG/Hr (1.612 mL/Hr) IV Continuous <Continuous>  fentaNYL   Patch  50 MICROgram(s)/Hr. 1 Patch Transdermal every 48 hours  fluconAZOLE IVPB 400 milliGRAM(s) IV Intermittent every 24 hours  heparin  Infusion 1550 Unit(s)/Hr (15.5 mL/Hr) IV Continuous <Continuous>  metroNIDAZOLE  IVPB 500 milliGRAM(s) IV Intermittent every 8 hours  pantoprazole  Injectable 40 milliGRAM(s) IV Push daily  petrolatum Ophthalmic Ointment 1 Application(s) Both EYES two times a day  polyethylene glycol 3350 17 Gram(s) Oral two times a day  propofol Infusion 40 MICROgram(s)/kG/Min (19.344 mL/Hr) IV Continuous <Continuous>  senna Syrup 10 milliLiter(s) Oral at bedtime  sodium chloride 1 Gram(s) Oral two times a day  vasopressin Infusion 0.04 Unit(s)/Min (2.4 mL/Hr) IV Continuous <Continuous>    MEDICATIONS  (PRN):  acetaminophen    Suspension .. 650 milliGRAM(s) Oral every 6 hours PRN Temp greater or equal to 38C (100.4F)  bisacodyl Suppository 10 milliGRAM(s) Rectal daily PRN Constipation  sodium chloride 0.9% lock flush 10 milliLiter(s) IV Push every 1 hour PRN Pre/post blood products, medications, blood draw, and to maintain line patency      ALLERGIES:  Allergies    No Known Allergies    Intolerances          LABS:                        10.2   26.4  )-----------( 249      ( 23 Aug 2019 01:07 )             34.7     08-23    124<L>  |  89<L>  |  22  ----------------------------<  103<H>  4.2   |  24  |  0.46<L>    Ca    8.6      23 Aug 2019 01:07  Phos  2.4     08-23  Mg     2.1     08-23      PT/INR - ( 22 Aug 2019 19:59 )   PT: 16.9 sec;   INR: 1.45 ratio         PTT - ( 23 Aug 2019 03:02 )  PTT:52.3 sec      RADIOLOGY & ADDITIONAL TESTS: Reviewed.

## 2019-08-23 NOTE — PROGRESS NOTE ADULT - ASSESSMENT
Patient is a 53 M with hx of esophageal CA with mets to R pelvis and brain, CAD s/p RCA stent in 2015, p/w dyspnea 2/2 R pleural effusion s/p thoracentesis and bronchoscopy, admitted to MICU for hypoxic respiratory failure post bronch with resulting tension pneumothorax now s/p chest tube and intubation, course further c/b hypotension, on pressors.     #Neuro  - Sedated on fentanyl and propofol;    #Resp  - Hypoxic respiratory failure 2/2 R malignant pleural effusion vs. infection, s/p intubation  - 10cc output from chest tube; (+) air leak, c/w wall suction  - CXR with improved PTX while on suction, increased pleural effusion b/l compared to previous study.   - BAL fluid cytology negative for malignant cells    #CV  - s/p RCA stent in 2015  - On vaso, off li and levo; pressor capped.   - Cardiology following, no acute interventions  - Cont asa    #GI  - OGT placed by GI, high residual with tube feeds, hold feeding; NPO x meds  - Traced food dye in ET tube, no leak in CT currently, pink fluids in OGT - suspect esophageal/bronchial communication  - Protonix 40 mg IV daily  - Started Relistor for BM    #ID  - ?Septic shock, (+) persistent leukocytosis & febrile 8/21; R/p Bcx, UA, Ucx 8/20 (-)  - s/p vanc and zosyn (8/14-8/21)  - Changed to cefepime 8/21, added Diflucan and Flagyl (8/21 - ) as per ID, c/w current regimen  - Procalcitonin 1.48; f/u coccidioides AB   - Combi cath cx normal katerine    #Renal  - AGNES resolved  - Hyponatremia, SIADH vs. renal excretion 2/2 intrinsic kidney injury; Cortisol AM wnl; f/u Q12h BMP  - (+) Rodriguez with ~30 cc/hr UOP; (+) hematuria  - Mg > 2, K > 4 as per cards.    #Onc  - Metastatic esophageal CA with mets to pelvis and brain  - Holding Keytruda inpatient (last cycle in July), will f/u outpatient with Dr. Mackey    #Heme  - (+) extensive DVT's in LUE and LLE, will initiate heparin gtt, family aware of risks and agreed. May be the source of fevers  - Monitor for bleeding and PTT 50-80.     #Endo  - No active issues    #DVT PPx  - Start heparin gtt for (+) DVT's, high risk    #GOC/Ethics  - DNR; Cap pressors, c/w current treatment, no aggressive measures  - MOLST form in chart. Confirmed on 8/19, GOC in chart  - Will continue current treatment until Monday due to [employer] arrangements cannot be made over the weekend. Patient is a 53 M with hx of esophageal CA with mets to R pelvis and brain, CAD s/p RCA stent in 2015, p/w dyspnea 2/2 R pleural effusion s/p thoracentesis and bronchoscopy, admitted to MICU for hypoxic respiratory failure post bronch with resulting tension pneumothorax now s/p chest tube and intubation, course further c/b hypotension, on pressors.     #Neuro  - Sedated on fentanyl and propofol;    #Resp  - Hypoxic respiratory failure 2/2 R malignant pleural effusion vs. infection, s/p intubation  - 10cc output from chest tube; (+) air leak, c/w wall suction  - CXR with improved PTX while on suction, increased pleural effusion b/l compared to previous study.   - BAL fluid cytology negative for malignant cells    #CV  - s/p RCA stent in 2015  - On vaso, off li and levo; pressor capped.   - Cardiology following, no acute interventions  - Cont asa    #GI  - OGT placed by GI, high residual with tube feeds, hold feeding; NPO except meds  - Traced food dye in ET tube, no leak in CT currently, pink fluids in OGT - suspect esophageal/bronchial communication  - Protonix 40 mg IV daily  - Started Relistor for BM    #ID  - ?Septic shock, (+) persistent leukocytosis & febrile 8/21; R/p Bcx, UA, Ucx 8/20 (-)  - s/p vanc and zosyn (8/14-8/21)  - Changed to cefepime 8/21, added Diflucan and Flagyl (8/21 - ) as per ID, c/w current regimen  - Procalcitonin 1.48; f/u coccidioides AB   - Combi cath cx normal katerine    #Renal  - AGNES resolved  - Hyponatremia, SIADH vs. renal excretion 2/2 intrinsic kidney injury; Cortisol AM wnl; f/u Q12h BMP  - (+) Rodriguez with ~30 cc/hr UOP; (+) hematuria  - Mg > 2, K > 4 as per cards.    #Onc  - Metastatic esophageal CA with mets to pelvis and brain  - Holding Keytruda inpatient (last cycle in July), will f/u outpatient with Dr. Mackey    #Heme  - (+) extensive DVT's in LUE and LLE, will initiate heparin gtt, family aware of risks and agreed. May be the source of fevers  - Monitor for bleeding and PTT 50-80.     #Endo  - No active issues    #DVT PPx  - Start heparin gtt for (+) DVT's, high risk    #GOC/Ethics  - DNR; Cap pressors, c/w current treatment, no aggressive measures  - MOLST form in chart. Confirmed on 8/19, GOC in chart  - Will continue current treatment until Monday due to [employer] arrangements cannot be made over the weekend. Patient is a 53 M with hx of esophageal CA with mets to R pelvis and brain, CAD s/p RCA stent in 2015, p/w dyspnea 2/2 R pleural effusion s/p thoracentesis and bronchoscopy, admitted to MICU for hypoxic respiratory failure post bronch with resulting tension pneumothorax now s/p chest tube and intubation, course further c/b hypotension, on pressors.     #Neuro  - Sedated on fentanyl and propofol;    #Resp  - Hypoxic respiratory failure 2/2 R malignant pleural effusion vs. infection, s/p intubation  - 10cc output from chest tube; (+) air leak, c/w wall suction  - CXR with improved PTX while on suction, increased pleural effusion b/l compared to previous study.   - BAL fluid cytology negative for malignant cells    #CV  - s/p RCA stent in 2015  - On vaso, off li and levo; pressor capped.   - Cardiology following, no acute interventions  - Cont asa    #GI  - OGT placed by GI, high residual with tube feeds, hold feeding; NPO except meds  - Traced food dye in ET tube, no leak in CT currently, pink fluids in OGT - suspect esophageal/bronchial communication  - Protonix 40 mg IV daily  - Started Relistor for BM    #ID  - ?Septic shock, (+) persistent leukocytosis & febrile 8/21; R/p Bcx, UA, Ucx 8/20 (-)  - s/p vanc and zosyn (8/14-8/21)  - Changed to cefepime 8/21, c/w Diflucan and Flagyl (8/21 - ) as per ID  - Procalcitonin 1.48; f/u coccidioides AB   - Combi cath cx normal katerine    #Renal  - AGNES resolved  - Hyponatremia, SIADH vs. renal excretion 2/2 intrinsic kidney injury; Cortisol AM wnl; f/u Q12h BMP  - (+) Rodriguez with ~30 cc/hr UOP; (+) hematuria  - Mg > 2, K > 4 as per cards.    #Onc  - Metastatic esophageal CA with mets to pelvis and brain  - Holding Keytruda inpatient (last cycle in July), will f/u outpatient with Dr. Mackey    #Heme  - (+) extensive DVT's in LUE and LLE, on heparin gtt  - Monitor for bleeding    #Endo  - No active issues    #DVT PPx  - On heparin gtt for (+) DVT's, high risk    #GOC/Ethics  - DNR; Cap pressors, c/w current treatment, no aggressive measures  - MOLST form in chart. Confirmed on 8/19, GOHITESH in chart  - As per wife (Marnie) and Father (Jesus): will withdrawal care on Sunday afternoon. Do not contact wife when patient passes. Contacts and phone numbers in provider handoff.

## 2019-08-23 NOTE — PHYSICAL EXAM
[Restricted in physically strenuous activity but ambulatory and able to carry out work of a light or sedentary nature] : Status 1- Restricted in physically strenuous activity but ambulatory and able to carry out work of a light or sedentary nature, e.g., light house work, office work [de-identified] : reflux

## 2019-08-24 LAB
ANION GAP SERPL CALC-SCNC: 10 MMOL/L — SIGNIFICANT CHANGE UP (ref 5–17)
APTT BLD: 65.2 SEC — HIGH (ref 27.5–36.3)
APTT BLD: 68.9 SEC — HIGH (ref 27.5–36.3)
BUN SERPL-MCNC: 23 MG/DL — SIGNIFICANT CHANGE UP (ref 7–23)
CALCIUM SERPL-MCNC: 8.2 MG/DL — LOW (ref 8.4–10.5)
CHLORIDE SERPL-SCNC: 91 MMOL/L — LOW (ref 96–108)
CO2 SERPL-SCNC: 24 MMOL/L — SIGNIFICANT CHANGE UP (ref 22–31)
CREAT SERPL-MCNC: 0.41 MG/DL — LOW (ref 0.5–1.3)
GLUCOSE SERPL-MCNC: 104 MG/DL — HIGH (ref 70–99)
HCT VFR BLD CALC: 32.4 % — LOW (ref 39–50)
HGB BLD-MCNC: 9.6 G/DL — LOW (ref 13–17)
INR BLD: 1.44 RATIO — HIGH (ref 0.88–1.16)
MAGNESIUM SERPL-MCNC: 2 MG/DL — SIGNIFICANT CHANGE UP (ref 1.6–2.6)
MCHC RBC-ENTMCNC: 24 PG — LOW (ref 27–34)
MCHC RBC-ENTMCNC: 29.7 GM/DL — LOW (ref 32–36)
MCV RBC AUTO: 80.8 FL — SIGNIFICANT CHANGE UP (ref 80–100)
PHOSPHATE SERPL-MCNC: 2.3 MG/DL — LOW (ref 2.5–4.5)
PLATELET # BLD AUTO: 288 K/UL — SIGNIFICANT CHANGE UP (ref 150–400)
POTASSIUM SERPL-MCNC: 4.6 MMOL/L — SIGNIFICANT CHANGE UP (ref 3.5–5.3)
POTASSIUM SERPL-SCNC: 4.6 MMOL/L — SIGNIFICANT CHANGE UP (ref 3.5–5.3)
PROTHROM AB SERPL-ACNC: 16.6 SEC — HIGH (ref 10–12.9)
RBC # BLD: 4.01 M/UL — LOW (ref 4.2–5.8)
RBC # FLD: 15.2 % — HIGH (ref 10.3–14.5)
SODIUM SERPL-SCNC: 125 MMOL/L — LOW (ref 135–145)
WBC # BLD: 27 K/UL — HIGH (ref 3.8–10.5)
WBC # FLD AUTO: 27 K/UL — HIGH (ref 3.8–10.5)

## 2019-08-24 PROCEDURE — 99291 CRITICAL CARE FIRST HOUR: CPT

## 2019-08-24 PROCEDURE — 99233 SBSQ HOSP IP/OBS HIGH 50: CPT | Mod: GC

## 2019-08-24 RX ORDER — MIDAZOLAM HYDROCHLORIDE 1 MG/ML
2 INJECTION, SOLUTION INTRAMUSCULAR; INTRAVENOUS ONCE
Refills: 0 | Status: DISCONTINUED | OUTPATIENT
Start: 2019-08-24 | End: 2019-08-24

## 2019-08-24 RX ADMIN — FENTANYL CITRATE 1.61 MICROGRAM(S)/KG/HR: 50 INJECTION INTRAVENOUS at 10:00

## 2019-08-24 RX ADMIN — FENTANYL CITRATE 1 PATCH: 50 INJECTION INTRAVENOUS at 19:18

## 2019-08-24 RX ADMIN — Medication 3 MILLILITER(S): at 23:34

## 2019-08-24 RX ADMIN — CEFEPIME 100 MILLIGRAM(S): 1 INJECTION, POWDER, FOR SOLUTION INTRAMUSCULAR; INTRAVENOUS at 21:04

## 2019-08-24 RX ADMIN — FLUCONAZOLE 100 MILLIGRAM(S): 150 TABLET ORAL at 16:26

## 2019-08-24 RX ADMIN — FENTANYL CITRATE 1.61 MICROGRAM(S)/KG/HR: 50 INJECTION INTRAVENOUS at 22:35

## 2019-08-24 RX ADMIN — Medication 3 MILLILITER(S): at 00:07

## 2019-08-24 RX ADMIN — Medication 100 MILLIGRAM(S): at 05:03

## 2019-08-24 RX ADMIN — Medication 1 DROP(S): at 12:34

## 2019-08-24 RX ADMIN — Medication 100 MILLIGRAM(S): at 12:35

## 2019-08-24 RX ADMIN — Medication 3 MILLILITER(S): at 11:47

## 2019-08-24 RX ADMIN — PROPOFOL 19.34 MICROGRAM(S)/KG/MIN: 10 INJECTION, EMULSION INTRAVENOUS at 11:04

## 2019-08-24 RX ADMIN — SODIUM CHLORIDE 1 GRAM(S): 9 INJECTION INTRAMUSCULAR; INTRAVENOUS; SUBCUTANEOUS at 17:14

## 2019-08-24 RX ADMIN — POLYETHYLENE GLYCOL 3350 17 GRAM(S): 17 POWDER, FOR SOLUTION ORAL at 17:14

## 2019-08-24 RX ADMIN — Medication 100 MILLIGRAM(S): at 12:33

## 2019-08-24 RX ADMIN — HEPARIN SODIUM 1950 UNIT(S)/HR: 5000 INJECTION INTRAVENOUS; SUBCUTANEOUS at 11:05

## 2019-08-24 RX ADMIN — HEPARIN SODIUM 1950 UNIT(S)/HR: 5000 INJECTION INTRAVENOUS; SUBCUTANEOUS at 03:39

## 2019-08-24 RX ADMIN — CHLORHEXIDINE GLUCONATE 15 MILLILITER(S): 213 SOLUTION TOPICAL at 17:16

## 2019-08-24 RX ADMIN — CHLORHEXIDINE GLUCONATE 1 APPLICATION(S): 213 SOLUTION TOPICAL at 05:04

## 2019-08-24 RX ADMIN — PROPOFOL 19.34 MICROGRAM(S)/KG/MIN: 10 INJECTION, EMULSION INTRAVENOUS at 02:35

## 2019-08-24 RX ADMIN — Medication 1 APPLICATION(S): at 17:15

## 2019-08-24 RX ADMIN — Medication 3 MILLILITER(S): at 17:38

## 2019-08-24 RX ADMIN — PANTOPRAZOLE SODIUM 40 MILLIGRAM(S): 20 TABLET, DELAYED RELEASE ORAL at 12:35

## 2019-08-24 RX ADMIN — Medication 1 APPLICATION(S): at 05:04

## 2019-08-24 RX ADMIN — CEFEPIME 100 MILLIGRAM(S): 1 INJECTION, POWDER, FOR SOLUTION INTRAMUSCULAR; INTRAVENOUS at 12:33

## 2019-08-24 RX ADMIN — Medication 3 MILLILITER(S): at 06:13

## 2019-08-24 RX ADMIN — CEFEPIME 100 MILLIGRAM(S): 1 INJECTION, POWDER, FOR SOLUTION INTRAMUSCULAR; INTRAVENOUS at 05:03

## 2019-08-24 RX ADMIN — FENTANYL CITRATE 1 PATCH: 50 INJECTION INTRAVENOUS at 07:17

## 2019-08-24 RX ADMIN — CHLORHEXIDINE GLUCONATE 15 MILLILITER(S): 213 SOLUTION TOPICAL at 05:03

## 2019-08-24 RX ADMIN — Medication 1 DROP(S): at 01:22

## 2019-08-24 RX ADMIN — Medication 81 MILLIGRAM(S): at 12:35

## 2019-08-24 RX ADMIN — Medication 100 MILLIGRAM(S): at 21:04

## 2019-08-24 RX ADMIN — MIDAZOLAM HYDROCHLORIDE 2 MILLIGRAM(S): 1 INJECTION, SOLUTION INTRAMUSCULAR; INTRAVENOUS at 04:39

## 2019-08-24 RX ADMIN — Medication 62.5 MILLIMOLE(S): at 02:40

## 2019-08-24 NOTE — PROGRESS NOTE ADULT - SUBJECTIVE AND OBJECTIVE BOX
· Subjective and Objective:   [facility] -- Jamilah Manning, Traci Bustos, Stanford Mendez Savella, [place]  Office # [phone]      Follow Up:  resp failure    Subjective/Observations: Patient seen and examined. Events noted. Sedated. The patient is intubated and requiring mechanical ventilatory support. Remains on Vaso     REVIEW OF SYSTEMS:  Limited 2/2 comorbidities       PAST MEDICAL & SURGICAL HISTORY:  H/O nausea  History of dysphagia  Hilar lymphadenopathy  Chronic anticoagulation  CAD (coronary artery disease)  Secondary malignant neoplasm of brain  Metastasis to bone  Esophageal cancer  MI (myocardial infarction):  with 1 coronary stent mid RCA  H/O craniotomy  History of esophageal surgery: 2018  H/O hernia repair: 1966  Status post tonsillectomy and adenoidectomy  Stented coronary artery: x1 2015      MEDICATIONS  (STANDING):  ALBUTerol/ipratropium for Nebulization 3 milliLiter(s) Nebulizer every 6 hours  artificial tears (preservative free) Ophthalmic Solution 1 Drop(s) Both EYES two times a day  aspirin  chewable 81 milliGRAM(s) Oral daily  cefepime   IVPB 1000 milliGRAM(s) IV Intermittent every 8 hours  chlorhexidine 0.12% Liquid 15 milliLiter(s) Oral Mucosa every 12 hours  chlorhexidine 4% Liquid 1 Application(s) Topical <User Schedule>  chlorhexidine 4% Liquid 1 Application(s) Topical <User Schedule>  docusate sodium Liquid 100 milliGRAM(s) Oral three times a day  fentaNYL   Infusion. 0.4 MICROgram(s)/kG/Hr (1.612 mL/Hr) IV Continuous <Continuous>  fentaNYL   Patch  50 MICROgram(s)/Hr. 1 Patch Transdermal every 48 hours  fluconAZOLE IVPB 400 milliGRAM(s) IV Intermittent every 24 hours  heparin  Infusion. 1750 Unit(s)/Hr (17.5 mL/Hr) IV Continuous <Continuous>  metroNIDAZOLE  IVPB 500 milliGRAM(s) IV Intermittent every 8 hours  pantoprazole  Injectable 40 milliGRAM(s) IV Push daily  petrolatum Ophthalmic Ointment 1 Application(s) Both EYES two times a day  polyethylene glycol 3350 17 Gram(s) Oral two times a day  propofol Infusion 40 MICROgram(s)/kG/Min (19.344 mL/Hr) IV Continuous <Continuous>  senna Syrup 10 milliLiter(s) Oral at bedtime  sodium chloride 1 Gram(s) Oral two times a day  vasopressin Infusion 0.04 Unit(s)/Min (2.4 mL/Hr) IV Continuous <Continuous>      Allergies    No Known Allergies    Intolerances                              9.6    27.0  )-----------( 288      ( 24 Aug 2019 02:01 )             32.4       08-24    125<L>  |  91<L>  |  23  ----------------------------<  104<H>  4.6   |  24  |  0.41<L>    Ca    8.2<L>      24 Aug 2019 02:01  Phos  2.3     08-24  Mg     2.0     08-24            PT/INR - ( 24 Aug 2019 02:01 )   PT: 16.6 sec;   INR: 1.44 ratio         PTT - ( 24 Aug 2019 02:01 )  PTT:65.2 sec                      Daily     Daily Weight in k.2 (24 Aug 2019 07:00)    I&O's Summary    23 Aug 2019 07:01  -  24 Aug 2019 07:00  --------------------------------------------------------  IN: 2101.2 mL / OUT: 1115 mL / NET: 986.2 mL        Vital Signs Last 24 Hrs  T(C): 36.4 (24 Aug 2019 07:00), Max: 38.2 (23 Aug 2019 20:00)  T(F): 97.6 (24 Aug 2019 07:00), Max: 100.8 (23 Aug 2019 20:00)  HR: 97 (24 Aug 2019 08:46) (90 - 110)  BP: 88/62 (24 Aug 2019 08:00) (85/60 - 119/74)  BP(mean): 71 (24 Aug 2019 08:00) (68 - 93)  RR: 26 (24 Aug 2019 08:00) (10 - 33)  SpO2: 97% (24 Aug 2019 08:46) (88% - 97%)    PHYSICAL EXAM:   · Constitutional	Well-developed, well nourished  · Eyes	EOMI; PERRL; no drainage or redness  · ENMT	No oral lesions; no gross abnormalities  · Neck	No bruits; no thyromegaly or nodules  · Respiratory	Normal breath sounds b/l, No RRW  · Cardiovascular	Regular rate & rhythm, normal S1, S2; no murmurs, gallops or rubs; no S3, S4  · Gastrointestinal	Soft, non-tender, no hepatosplenomegaly, normal bowel sounds  · Extremities	No cyanosis, clubbing or edema  · Vascular	Equal and normal pulses (carotid, femoral, dorsalis pedis)  · Neurological	Alert & oriented; no sensory, motor or coordination deficits, normal reflexes

## 2019-08-24 NOTE — PROGRESS NOTE ADULT - ASSESSMENT
53 M with hx of esophageal CA diagnosed in 2017 s/p chemo and radiation and with mets to R pelvis and brain mets to the L superior cerebellar mass (s/p craniotomy in 04/2019) currently on Keytruda, CAD s/p RCA stent in 2015, p/w pleural effusion, now with hypoxic resp failure post bronch with resulting tension PTX now s/p chest tube    - No signs of significant ischemia   - No signs of ADHF at this time.  Likely malignant pl eff, not cardiogenic  - EKG without ischemic changes  - Echo with normal LV function, moderate diastolic dysfunction, and evidence of RVE and RV pressure overload.  - Cont asa for CAD history    - Tele demonstrates NSR/Sinus tachy. Has had  short bursts of PSVT likely PAT which is likely all reactive to his underlying condition. Unable to treat with av oscar blockers in the setting of pressor dependent hypotension. Can use amiodarone if sustained.    - Now off of levo and li. Cont Vaso support to maintain MAP at least >60. Titrate off as blood pressure tolerates.     - Cont on mechanical ventilatory support.     - Hep gtt for DVTs. Titrate PTT per protocol. Monitor for signs of bleeding.     - Monitor and replete electrolytes. Keep K>4.0 and Mg>2.0.  - Further cardiac workup will depend on clinical course.   - All other workup per MICU team. Will followup.  - Palliative measures in place

## 2019-08-24 NOTE — PROGRESS NOTE ADULT - ASSESSMENT
Patient is a 53 M with hx of esophageal CA with mets to R pelvis and brain, CAD s/p RCA stent in 2015, p/w dyspnea 2/2 R pleural effusion s/p thoracentesis and bronchoscopy, admitted to MICU for hypoxic respiratory failure post bronch with resulting tension pneumothorax now s/p chest tube and intubation, course further c/b hypotension, on pressors.     #Neuro  - Sedated on fentanyl and propofol;    #Resp  - Hypoxic respiratory failure 2/2 R malignant pleural effusion vs. infection, s/p intubation  - 10cc output from chest tube; (+) air leak, c/w wall suction  - CXR with improved PTX while on suction, increased pleural effusion b/l compared to previous study.   - BAL fluid cytology negative for malignant cells    #CV  - s/p RCA stent in 2015  - On vaso, off li and levo; pressor capped.   - Cardiology following, no acute interventions  - Cont asa    #GI  - OGT placed by GI, high residual with tube feeds, hold feeding; NPO except meds  - Traced food dye in ET tube, no leak in CT currently, pink fluids in OGT - suspect esophageal/bronchial communication  - Protonix 40 mg IV daily  - Started Relistor for BM    #ID  - ?Septic shock, (+) persistent leukocytosis & febrile 8/21; R/p Bcx, UA, Ucx 8/20 (-)  - s/p vanc and zosyn (8/14-8/21)  - Changed to cefepime 8/21, c/w Diflucan and Flagyl (8/21 - ) as per ID  - Procalcitonin 1.48; f/u coccidioides AB   - Combi cath cx normal katerine    #Renal  - AGNES resolved  - Hyponatremia, SIADH vs. renal excretion 2/2 intrinsic kidney injury; Cortisol AM wnl; f/u Q12h BMP  - (+) Rodriguez with ~30 cc/hr UOP; (+) hematuria  - Mg > 2, K > 4 as per cards.    #Onc  - Metastatic esophageal CA with mets to pelvis and brain  - Holding Keytruda inpatient (last cycle in July), will f/u outpatient with Dr. Mackey    #Heme  - (+) extensive DVT's in LUE and LLE, on heparin gtt  - Monitor for bleeding    #Endo  - No active issues    #DVT PPx  - On heparin gtt for (+) DVT's, high risk    #GOC/Ethics  - DNR; Cap pressors, c/w current treatment, no aggressive measures  - MOLST form in chart. Confirmed on 8/19, GOHITESH in chart  - As per wife (Marnie) and Father (Jesus): will withdrawal care on Sunday afternoon. Do not contact wife when patient passes. Contacts and phone numbers in provider handoff. Patient is a 53 M with hx of esophageal CA with mets to R pelvis and brain, CAD s/p RCA stent in 2015, p/w dyspnea 2/2 R pleural effusion s/p thoracentesis and bronchoscopy, admitted to MICU for hypoxic respiratory failure post bronch with resulting tension pneumothorax now s/p chest tube and intubation, course further c/b hypotension, on pressors.     #Neuro  - Sedated on fentanyl and propofol;    #Resp  - Hypoxic respiratory failure 2/2 R malignant pleural effusion vs. infection, s/p intubation  - 40cc output from chest tube; (+) air leak, c/w wall suction  - CXR with improved PTX while on suction, increased pleural effusion b/l compared to previous study.   - BAL fluid cytology negative for malignant cells    #CV  - s/p RCA stent in 2015  - Off pressors; pressor capped.   - Cardiology following, no acute interventions  - Cont asa    #GI  - OGT placed by GI, high residual with tube feeds, hold feeding; NPO except meds  - Traced food dye in ET tube, no leak in CT currently, pink fluids in OGT - suspect esophageal/bronchial communication  - Protonix 40 mg IV daily  - Relistor didn't work    #ID  - (+) persistent leukocytosis & febrile 8/21; R/p Bcx, UA, Ucx 8/20 (-)  - s/p vanc and zosyn (8/14-8/21)  - Changed to cefepime 8/21, c/w Diflucan and Flagyl (8/21 - ) as per ID  - Procalcitonin 1.48; f/u coccidioides AB   - Combi cath cx normal katerine    #Renal  - AGNES resolved  - Hyponatremia, SIADH vs. renal excretion 2/2 intrinsic kidney injury; Cortisol AM wnl; f/u Q12h BMP  - (+) Rodriguez with low UOP; (+) hematuria  - Mg > 2, K > 4 as per cards.    #Onc  - Metastatic esophageal CA with mets to pelvis and brain  - Holding Keytruda inpatient (last cycle in July), will f/u outpatient with Dr. Mackey    #Heme  - (+) extensive DVT's in LUE and LLE, on heparin gtt  - Monitor for bleeding    #Endo  - No active issues    #DVT PPx  - On heparin gtt for (+) DVT's, high risk    #GOC/Ethics  - DNR; Cap pressors; will discuss with family about comfort care.  - As per wife (Marnie) and Father (Jesus): will withdrawal care on Sunday afternoon. Do not contact wife when patient passes. Contacts and phone numbers in provider handoff.

## 2019-08-24 NOTE — PROGRESS NOTE ADULT - ATTENDING COMMENTS
53 year old man with metastatic esophageal cancer,  recurrent pleural effusions, hypoxic respiratory failure requiring intubation, tension pneumothorax s/p chest-tube placement    after extensive discussion the family has opted to for comfort measures with plans to withdraw care on 8/25.

## 2019-08-24 NOTE — PROGRESS NOTE ADULT - SUBJECTIVE AND OBJECTIVE BOX
Dr. Tuyet Alarcon  Internal Medicine, PGY-1  Pager #: [phone]      Patient is a 53 M with hx of esophageal CA with mets to R pelvis and brain, CAD s/p RCA stent in 2015, p/w dyspnea 2/2 R pleural effusion s/p thoracentesis and bronchoscopy, admitted to MICU for hypoxic respiratory failure s/p intubation and hypotension requiring pressors.    INTERVAL HPI/OVERNIGHT EVENTS:      SUBJECTIVE: Patient seen and examined at bedside.     Unable to obtain ROS due to sedation    OBJECTIVE:    VITAL SIGNS:  ICU Vital Signs Last 24 Hrs  T(C): 36.5 (24 Aug 2019 04:00), Max: 38.2 (23 Aug 2019 20:00)  T(F): 97.7 (24 Aug 2019 04:00), Max: 100.8 (23 Aug 2019 20:00)  HR: 96 (24 Aug 2019 06:15) (90 - 110)  BP: 95/72 (24 Aug 2019 06:00) (85/60 - 119/74)  BP(mean): 79 (24 Aug 2019 06:00) (68 - 93)  ABP: --  ABP(mean): --  RR: 26 (24 Aug 2019 06:00) (10 - 33)  SpO2: 93% (24 Aug 2019 06:15) (88% - 97%)    Mode: AC/ CMV (Assist Control/ Continuous Mandatory Ventilation), RR (machine): 26, TV (machine): 450, FiO2: 90, PEEP: 8, ITime: 1, MAP: 14, PIP: 30    08-22 @ 07:01 - 08-23 @ 07:00  --------------------------------------------------------  IN: 3976.1 mL / OUT: 820 mL / NET: 3156.1 mL    08-23 @ 07:01 - 08-24 @ 06:31  --------------------------------------------------------  IN: 2101.2 mL / OUT: 1095 mL / NET: 1006.2 mL      CAPILLARY BLOOD GLUCOSE      POCT Blood Glucose.: 104 mg/dL (23 Aug 2019 13:09)      PHYSICAL EXAM:    GENERAL: Intubated  NEURO: Sedated, unresponsive  HEENT: b/l pupil constricted and reactive to light; clear conjunctiva;  RESP: Intubated; decreased breath sound bibasilar fields; R upper and mid lung fields with improved air movements  CVS: S1/S2 present, RRR. no murmurs, gallops or rubs appreciated; (+) R subclavian TLC  ABD: Soft, non-distended, no masses appreciated; diminished BS  EXT: 2+ pedal pulses bilaterally, no BLE edema  SKIN: Warm, dry. No new rashes    MEDICATIONS:  MEDICATIONS  (STANDING):  ALBUTerol/ipratropium for Nebulization 3 milliLiter(s) Nebulizer every 6 hours  artificial tears (preservative free) Ophthalmic Solution 1 Drop(s) Both EYES two times a day  aspirin  chewable 81 milliGRAM(s) Oral daily  cefepime   IVPB 1000 milliGRAM(s) IV Intermittent every 8 hours  chlorhexidine 0.12% Liquid 15 milliLiter(s) Oral Mucosa every 12 hours  chlorhexidine 4% Liquid 1 Application(s) Topical <User Schedule>  chlorhexidine 4% Liquid 1 Application(s) Topical <User Schedule>  docusate sodium Liquid 100 milliGRAM(s) Oral three times a day  fentaNYL   Infusion. 0.4 MICROgram(s)/kG/Hr (1.612 mL/Hr) IV Continuous <Continuous>  fentaNYL   Patch  50 MICROgram(s)/Hr. 1 Patch Transdermal every 48 hours  fluconAZOLE IVPB 400 milliGRAM(s) IV Intermittent every 24 hours  heparin  Infusion. 1750 Unit(s)/Hr (17.5 mL/Hr) IV Continuous <Continuous>  metroNIDAZOLE  IVPB 500 milliGRAM(s) IV Intermittent every 8 hours  pantoprazole  Injectable 40 milliGRAM(s) IV Push daily  petrolatum Ophthalmic Ointment 1 Application(s) Both EYES two times a day  polyethylene glycol 3350 17 Gram(s) Oral two times a day  propofol Infusion 40 MICROgram(s)/kG/Min (19.344 mL/Hr) IV Continuous <Continuous>  senna Syrup 10 milliLiter(s) Oral at bedtime  sodium chloride 1 Gram(s) Oral two times a day  vasopressin Infusion 0.04 Unit(s)/Min (2.4 mL/Hr) IV Continuous <Continuous>    MEDICATIONS  (PRN):  acetaminophen    Suspension .. 650 milliGRAM(s) Oral every 6 hours PRN Temp greater or equal to 38C (100.4F)  bisacodyl Suppository 10 milliGRAM(s) Rectal daily PRN Constipation  sodium chloride 0.9% lock flush 10 milliLiter(s) IV Push every 1 hour PRN Pre/post blood products, medications, blood draw, and to maintain line patency      ALLERGIES:  Allergies    No Known Allergies    Intolerances              LABS:                        9.6    27.0  )-----------( 288      ( 24 Aug 2019 02:01 )             32.4     08-24    125<L>  |  91<L>  |  23  ----------------------------<  104<H>  4.6   |  24  |  0.41<L>    Ca    8.2<L>      24 Aug 2019 02:01  Phos  2.3     08-24  Mg     2.0     08-24      PT/INR - ( 24 Aug 2019 02:01 )   PT: 16.6 sec;   INR: 1.44 ratio         PTT - ( 24 Aug 2019 02:01 )  PTT:65.2 sec      RADIOLOGY & ADDITIONAL TESTS: Reviewed. Dr. Tuyet Alarcon  Internal Medicine, PGY-1  Pager #: [phone]      Patient is a 53 M with hx of esophageal CA with mets to R pelvis and brain, CAD s/p RCA stent in 2015, p/w dyspnea 2/2 R pleural effusion s/p thoracentesis and bronchoscopy, admitted to MICU for hypoxic respiratory failure s/p intubation and hypotension requiring pressors.    INTERVAL HPI/OVERNIGHT EVENTS:  No acute events overnight.    SUBJECTIVE: Patient seen and examined at bedside.     Unable to obtain ROS due to sedation    OBJECTIVE:    VITAL SIGNS:  ICU Vital Signs Last 24 Hrs  T(C): 36.5 (24 Aug 2019 04:00), Max: 38.2 (23 Aug 2019 20:00)  T(F): 97.7 (24 Aug 2019 04:00), Max: 100.8 (23 Aug 2019 20:00)  HR: 96 (24 Aug 2019 06:15) (90 - 110)  BP: 95/72 (24 Aug 2019 06:00) (85/60 - 119/74)  BP(mean): 79 (24 Aug 2019 06:00) (68 - 93)  ABP: --  ABP(mean): --  RR: 26 (24 Aug 2019 06:00) (10 - 33)  SpO2: 93% (24 Aug 2019 06:15) (88% - 97%)    Mode: AC/ CMV (Assist Control/ Continuous Mandatory Ventilation), RR (machine): 26, TV (machine): 450, FiO2: 90, PEEP: 8, ITime: 1, MAP: 14, PIP: 30    08-22 @ 07:01 - 08-23 @ 07:00  --------------------------------------------------------  IN: 3976.1 mL / OUT: 820 mL / NET: 3156.1 mL    08-23 @ 07:01 - 08-24 @ 06:31  --------------------------------------------------------  IN: 2101.2 mL / OUT: 1095 mL / NET: 1006.2 mL      CAPILLARY BLOOD GLUCOSE      POCT Blood Glucose.: 104 mg/dL (23 Aug 2019 13:09)      PHYSICAL EXAM:    GENERAL: Intubated  NEURO: Sedated, unresponsive  HEENT: b/l pupil constricted and reactive to light; clear conjunctiva;  RESP: Intubated; decreased breath sound bibasilar fields (R>L);  CVS: S1/S2 present, RRR. no murmurs, gallops or rubs appreciated; (+) R subclavian TLC  ABD: Soft, non-distended, no masses appreciated; diminished BS  EXT: 2+ pedal pulses bilaterally, no BLE edema  SKIN: Warm, dry. No new rashes    MEDICATIONS:  MEDICATIONS  (STANDING):  ALBUTerol/ipratropium for Nebulization 3 milliLiter(s) Nebulizer every 6 hours  artificial tears (preservative free) Ophthalmic Solution 1 Drop(s) Both EYES two times a day  aspirin  chewable 81 milliGRAM(s) Oral daily  cefepime   IVPB 1000 milliGRAM(s) IV Intermittent every 8 hours  chlorhexidine 0.12% Liquid 15 milliLiter(s) Oral Mucosa every 12 hours  chlorhexidine 4% Liquid 1 Application(s) Topical <User Schedule>  chlorhexidine 4% Liquid 1 Application(s) Topical <User Schedule>  docusate sodium Liquid 100 milliGRAM(s) Oral three times a day  fentaNYL   Infusion. 0.4 MICROgram(s)/kG/Hr (1.612 mL/Hr) IV Continuous <Continuous>  fentaNYL   Patch  50 MICROgram(s)/Hr. 1 Patch Transdermal every 48 hours  fluconAZOLE IVPB 400 milliGRAM(s) IV Intermittent every 24 hours  heparin  Infusion. 1750 Unit(s)/Hr (17.5 mL/Hr) IV Continuous <Continuous>  metroNIDAZOLE  IVPB 500 milliGRAM(s) IV Intermittent every 8 hours  pantoprazole  Injectable 40 milliGRAM(s) IV Push daily  petrolatum Ophthalmic Ointment 1 Application(s) Both EYES two times a day  polyethylene glycol 3350 17 Gram(s) Oral two times a day  propofol Infusion 40 MICROgram(s)/kG/Min (19.344 mL/Hr) IV Continuous <Continuous>  senna Syrup 10 milliLiter(s) Oral at bedtime  sodium chloride 1 Gram(s) Oral two times a day  vasopressin Infusion 0.04 Unit(s)/Min (2.4 mL/Hr) IV Continuous <Continuous>    MEDICATIONS  (PRN):  acetaminophen    Suspension .. 650 milliGRAM(s) Oral every 6 hours PRN Temp greater or equal to 38C (100.4F)  bisacodyl Suppository 10 milliGRAM(s) Rectal daily PRN Constipation  sodium chloride 0.9% lock flush 10 milliLiter(s) IV Push every 1 hour PRN Pre/post blood products, medications, blood draw, and to maintain line patency      ALLERGIES:  Allergies    No Known Allergies    Intolerances          LABS:                        9.6    27.0  )-----------( 288      ( 24 Aug 2019 02:01 )             32.4     08-24    125<L>  |  91<L>  |  23  ----------------------------<  104<H>  4.6   |  24  |  0.41<L>    Ca    8.2<L>      24 Aug 2019 02:01  Phos  2.3     08-24  Mg     2.0     08-24      PT/INR - ( 24 Aug 2019 02:01 )   PT: 16.6 sec;   INR: 1.44 ratio         PTT - ( 24 Aug 2019 02:01 )  PTT:65.2 sec      RADIOLOGY & ADDITIONAL TESTS: Reviewed.

## 2019-08-25 VITALS
RESPIRATION RATE: 26 BRPM | DIASTOLIC BLOOD PRESSURE: 63 MMHG | SYSTOLIC BLOOD PRESSURE: 89 MMHG | OXYGEN SATURATION: 95 % | HEART RATE: 110 BPM

## 2019-08-25 LAB
CULTURE RESULTS: SIGNIFICANT CHANGE UP
CULTURE RESULTS: SIGNIFICANT CHANGE UP
SPECIMEN SOURCE: SIGNIFICANT CHANGE UP
SPECIMEN SOURCE: SIGNIFICANT CHANGE UP

## 2019-08-25 PROCEDURE — 83935 ASSAY OF URINE OSMOLALITY: CPT

## 2019-08-25 PROCEDURE — 84300 ASSAY OF URINE SODIUM: CPT

## 2019-08-25 PROCEDURE — 70450 CT HEAD/BRAIN W/O DYE: CPT

## 2019-08-25 PROCEDURE — 93971 EXTREMITY STUDY: CPT

## 2019-08-25 PROCEDURE — 36600 WITHDRAWAL OF ARTERIAL BLOOD: CPT

## 2019-08-25 PROCEDURE — 84157 ASSAY OF PROTEIN OTHER: CPT

## 2019-08-25 PROCEDURE — 97161 PT EVAL LOW COMPLEX 20 MIN: CPT

## 2019-08-25 PROCEDURE — 71260 CT THORAX DX C+: CPT

## 2019-08-25 PROCEDURE — 82945 GLUCOSE OTHER FLUID: CPT

## 2019-08-25 PROCEDURE — 83986 ASSAY PH BODY FLUID NOS: CPT

## 2019-08-25 PROCEDURE — 84478 ASSAY OF TRIGLYCERIDES: CPT

## 2019-08-25 PROCEDURE — 74177 CT ABD & PELVIS W/CONTRAST: CPT

## 2019-08-25 PROCEDURE — 83605 ASSAY OF LACTIC ACID: CPT

## 2019-08-25 PROCEDURE — 82247 BILIRUBIN TOTAL: CPT

## 2019-08-25 PROCEDURE — 99233 SBSQ HOSP IP/OBS HIGH 50: CPT | Mod: GC

## 2019-08-25 PROCEDURE — 85610 PROTHROMBIN TIME: CPT

## 2019-08-25 PROCEDURE — 85730 THROMBOPLASTIN TIME PARTIAL: CPT

## 2019-08-25 PROCEDURE — 87205 SMEAR GRAM STAIN: CPT

## 2019-08-25 PROCEDURE — 85014 HEMATOCRIT: CPT

## 2019-08-25 PROCEDURE — 87102 FUNGUS ISOLATION CULTURE: CPT

## 2019-08-25 PROCEDURE — 88112 CYTOPATH CELL ENHANCE TECH: CPT

## 2019-08-25 PROCEDURE — 87075 CULTR BACTERIA EXCEPT BLOOD: CPT

## 2019-08-25 PROCEDURE — 87040 BLOOD CULTURE FOR BACTERIA: CPT

## 2019-08-25 PROCEDURE — 94002 VENT MGMT INPAT INIT DAY: CPT

## 2019-08-25 PROCEDURE — 71045 X-RAY EXAM CHEST 1 VIEW: CPT

## 2019-08-25 PROCEDURE — 86635 COCCIDIOIDES ANTIBODY: CPT

## 2019-08-25 PROCEDURE — 93970 EXTREMITY STUDY: CPT

## 2019-08-25 PROCEDURE — 81001 URINALYSIS AUTO W/SCOPE: CPT

## 2019-08-25 PROCEDURE — 86850 RBC ANTIBODY SCREEN: CPT

## 2019-08-25 PROCEDURE — 82533 TOTAL CORTISOL: CPT

## 2019-08-25 PROCEDURE — 93306 TTE W/DOPPLER COMPLETE: CPT

## 2019-08-25 PROCEDURE — 99285 EMERGENCY DEPT VISIT HI MDM: CPT

## 2019-08-25 PROCEDURE — 86900 BLOOD TYPING SEROLOGIC ABO: CPT

## 2019-08-25 PROCEDURE — 88305 TISSUE EXAM BY PATHOLOGIST: CPT

## 2019-08-25 PROCEDURE — 80048 BASIC METABOLIC PNL TOTAL CA: CPT

## 2019-08-25 PROCEDURE — 82947 ASSAY GLUCOSE BLOOD QUANT: CPT

## 2019-08-25 PROCEDURE — 82330 ASSAY OF CALCIUM: CPT

## 2019-08-25 PROCEDURE — 82435 ASSAY OF BLOOD CHLORIDE: CPT

## 2019-08-25 PROCEDURE — 82962 GLUCOSE BLOOD TEST: CPT

## 2019-08-25 PROCEDURE — 83735 ASSAY OF MAGNESIUM: CPT

## 2019-08-25 PROCEDURE — 85027 COMPLETE CBC AUTOMATED: CPT

## 2019-08-25 PROCEDURE — 89051 BODY FLUID CELL COUNT: CPT

## 2019-08-25 PROCEDURE — 94799 UNLISTED PULMONARY SVC/PX: CPT

## 2019-08-25 PROCEDURE — 83930 ASSAY OF BLOOD OSMOLALITY: CPT

## 2019-08-25 PROCEDURE — 83615 LACTATE (LD) (LDH) ENZYME: CPT

## 2019-08-25 PROCEDURE — 94640 AIRWAY INHALATION TREATMENT: CPT

## 2019-08-25 PROCEDURE — 84100 ASSAY OF PHOSPHORUS: CPT

## 2019-08-25 PROCEDURE — 84295 ASSAY OF SERUM SODIUM: CPT

## 2019-08-25 PROCEDURE — 80202 ASSAY OF VANCOMYCIN: CPT

## 2019-08-25 PROCEDURE — 71046 X-RAY EXAM CHEST 2 VIEWS: CPT

## 2019-08-25 PROCEDURE — 82042 OTHER SOURCE ALBUMIN QUAN EA: CPT

## 2019-08-25 PROCEDURE — 86901 BLOOD TYPING SEROLOGIC RH(D): CPT

## 2019-08-25 PROCEDURE — 87070 CULTURE OTHR SPECIMN AEROBIC: CPT

## 2019-08-25 PROCEDURE — 82803 BLOOD GASES ANY COMBINATION: CPT

## 2019-08-25 PROCEDURE — 82565 ASSAY OF CREATININE: CPT

## 2019-08-25 PROCEDURE — 71250 CT THORAX DX C-: CPT

## 2019-08-25 PROCEDURE — 84145 PROCALCITONIN (PCT): CPT

## 2019-08-25 PROCEDURE — 82150 ASSAY OF AMYLASE: CPT

## 2019-08-25 PROCEDURE — 94003 VENT MGMT INPAT SUBQ DAY: CPT

## 2019-08-25 PROCEDURE — 84132 ASSAY OF SERUM POTASSIUM: CPT

## 2019-08-25 PROCEDURE — 80053 COMPREHEN METABOLIC PANEL: CPT

## 2019-08-25 PROCEDURE — 87086 URINE CULTURE/COLONY COUNT: CPT

## 2019-08-25 PROCEDURE — P9047: CPT

## 2019-08-25 PROCEDURE — 74018 RADEX ABDOMEN 1 VIEW: CPT

## 2019-08-25 PROCEDURE — 93005 ELECTROCARDIOGRAM TRACING: CPT

## 2019-08-25 RX ORDER — MORPHINE SULFATE 50 MG/1
4 CAPSULE, EXTENDED RELEASE ORAL ONCE
Refills: 0 | Status: DISCONTINUED | OUTPATIENT
Start: 2019-08-25 | End: 2019-08-25

## 2019-08-25 RX ORDER — HYDROMORPHONE HYDROCHLORIDE 2 MG/ML
2 INJECTION INTRAMUSCULAR; INTRAVENOUS; SUBCUTANEOUS
Qty: 100 | Refills: 0 | Status: DISCONTINUED | OUTPATIENT
Start: 2019-08-25 | End: 2019-08-25

## 2019-08-25 RX ORDER — MIDAZOLAM HYDROCHLORIDE 1 MG/ML
2 INJECTION, SOLUTION INTRAMUSCULAR; INTRAVENOUS ONCE
Refills: 0 | Status: DISCONTINUED | OUTPATIENT
Start: 2019-08-25 | End: 2019-08-25

## 2019-08-25 RX ORDER — HYDROMORPHONE HYDROCHLORIDE 2 MG/ML
4 INJECTION INTRAMUSCULAR; INTRAVENOUS; SUBCUTANEOUS ONCE
Refills: 0 | Status: DISCONTINUED | OUTPATIENT
Start: 2019-08-25 | End: 2019-08-25

## 2019-08-25 RX ORDER — MIDAZOLAM HYDROCHLORIDE 1 MG/ML
6 INJECTION, SOLUTION INTRAMUSCULAR; INTRAVENOUS ONCE
Refills: 0 | Status: DISCONTINUED | OUTPATIENT
Start: 2019-08-25 | End: 2019-08-25

## 2019-08-25 RX ADMIN — Medication 1 DROP(S): at 01:09

## 2019-08-25 RX ADMIN — Medication 3 MILLILITER(S): at 11:51

## 2019-08-25 RX ADMIN — FENTANYL CITRATE 1 PATCH: 50 INJECTION INTRAVENOUS at 00:30

## 2019-08-25 RX ADMIN — CEFEPIME 100 MILLIGRAM(S): 1 INJECTION, POWDER, FOR SOLUTION INTRAMUSCULAR; INTRAVENOUS at 05:02

## 2019-08-25 RX ADMIN — PROPOFOL 19.34 MICROGRAM(S)/KG/MIN: 10 INJECTION, EMULSION INTRAVENOUS at 00:30

## 2019-08-25 RX ADMIN — PROPOFOL 19.34 MICROGRAM(S)/KG/MIN: 10 INJECTION, EMULSION INTRAVENOUS at 05:02

## 2019-08-25 RX ADMIN — Medication 3 MILLILITER(S): at 06:23

## 2019-08-25 RX ADMIN — MORPHINE SULFATE 4 MILLIGRAM(S): 50 CAPSULE, EXTENDED RELEASE ORAL at 16:18

## 2019-08-25 RX ADMIN — MIDAZOLAM HYDROCHLORIDE 6 MILLIGRAM(S): 1 INJECTION, SOLUTION INTRAMUSCULAR; INTRAVENOUS at 15:21

## 2019-08-25 RX ADMIN — MORPHINE SULFATE 4 MILLIGRAM(S): 50 CAPSULE, EXTENDED RELEASE ORAL at 15:56

## 2019-08-25 RX ADMIN — Medication 100 MILLIGRAM(S): at 05:03

## 2019-08-25 RX ADMIN — HYDROMORPHONE HYDROCHLORIDE 2 MG/HR: 2 INJECTION INTRAMUSCULAR; INTRAVENOUS; SUBCUTANEOUS at 12:05

## 2019-08-25 RX ADMIN — Medication 1 APPLICATION(S): at 05:03

## 2019-08-25 RX ADMIN — FENTANYL CITRATE 1.61 MICROGRAM(S)/KG/HR: 50 INJECTION INTRAVENOUS at 11:31

## 2019-08-25 RX ADMIN — CHLORHEXIDINE GLUCONATE 1 APPLICATION(S): 213 SOLUTION TOPICAL at 05:03

## 2019-08-25 RX ADMIN — HYDROMORPHONE HYDROCHLORIDE 2 MG/HR: 2 INJECTION INTRAMUSCULAR; INTRAVENOUS; SUBCUTANEOUS at 12:35

## 2019-08-25 RX ADMIN — HYDROMORPHONE HYDROCHLORIDE 4 MILLIGRAM(S): 2 INJECTION INTRAMUSCULAR; INTRAVENOUS; SUBCUTANEOUS at 15:51

## 2019-08-25 RX ADMIN — CHLORHEXIDINE GLUCONATE 15 MILLILITER(S): 213 SOLUTION TOPICAL at 05:03

## 2019-08-25 RX ADMIN — FENTANYL CITRATE 1 PATCH: 50 INJECTION INTRAVENOUS at 10:36

## 2019-08-25 RX ADMIN — MIDAZOLAM HYDROCHLORIDE 2 MILLIGRAM(S): 1 INJECTION, SOLUTION INTRAMUSCULAR; INTRAVENOUS at 12:04

## 2019-08-25 RX ADMIN — HYDROMORPHONE HYDROCHLORIDE 4 MILLIGRAM(S): 2 INJECTION INTRAMUSCULAR; INTRAVENOUS; SUBCUTANEOUS at 15:21

## 2019-08-25 RX ADMIN — PROPOFOL 19.34 MICROGRAM(S)/KG/MIN: 10 INJECTION, EMULSION INTRAVENOUS at 08:31

## 2019-08-25 NOTE — AIRWAY REMOVAL NOTE  ADULT & PEDS - ARTIFICAL AIRWAY REMOVAL COMMENTS
Written order for extubation verified.  Patient identified by full name and birth date as per identification band.  Present at the procedure was Dr. Gonzalez.

## 2019-08-25 NOTE — CHART NOTE - NSCHARTNOTEFT_GEN_A_CORE
Patient had no spontaneous respirations, heart sounds, response to any stimulus including noxious stimuli. Patient's pupil were fixed and dilated at 8mm and with no response to light, no corneal reflexes, no gag reflex, and no oculocephalic reflex. Patient was pronounced at 3:59. Patient's family was contacted and did not desire autopsy,  services were offered, and  arrangements were discussed.      Benigno Gonzalez DO

## 2019-08-25 NOTE — PROGRESS NOTE ADULT - PROVIDER SPECIALTY LIST ADULT
Cardiology
Heme/Onc
Heme/Onc
Infectious Disease
Infectious Disease
MICU
Pulmonology
Pulmonology
Cardiology
Internal Medicine
Cardiology
Cardiology
Internal Medicine

## 2019-08-25 NOTE — PROGRESS NOTE ADULT - REASON FOR ADMISSION
pleural effusion

## 2019-08-25 NOTE — PROGRESS NOTE ADULT - ATTENDING COMMENTS
53 year old man with metastatic esophageal cancer,  recurrent pleural effusions, hypoxic respiratory failure requiring intubation, tension pneumothorax s/p chest-tube placement    planned withdrawal of care this afternoon

## 2019-08-25 NOTE — DISCHARGE NOTE FOR THE EXPIRED PATIENT - HOSPITAL COURSE
53 M with hx of esophageal CA with mets to R pelvis and brain, CAD s/p RCA stent in 2015, p/w dyspnea 2/2 R pleural effusion s/p thoracentesis and bronchoscopy, admitted to MICU hypoxic respiratory failure and was intubated. Patient was found to have pre-existing pneumothorax that worsened after the bronchoscopy and intubation. Right anterior chest tube was inserted and connected to wall suction, with subsequent improvement in pneumothorax. Patient was hypotensive, and supported on 3 pressors. He was covered empirically with antibiotics, and was worked up for infections. He was also found to have left subclavian vein thrombosis when attempted to insert the central line. Heparin gtt was initiated, and held due to new finding of left dilated pupil, concerning for ICH. CTH was negative, and patient was put back on A/C after clearance. The repeated trails to wean off sedation was unsuccessful due to patient being tachypneic, tachycardic and hypotensive. family made the decision for DNR/DNI, capping pressors and maintain deep sedation given the extent of diseases. An OGT was put in by GI due to altered anatomy (unable to comfirm with CXR), and tube feed was initiated. However, patient had high residual, and tube feed was held due to concern for aspiration. No BM or bowel sounds despite bowel regimens. The pagan was re-inserted for comfort due to urinary retention. 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 Patient continued to spike fever despite being on broad coverage Abx. Fungal coverage was added by ID, and performed workup for DVT's. Vascular duplux revelaed extensive DVT's in LUE and LLE. Full A/C with heparin gtt was initiated as per family wishes. Patient was able to be weaned off pressors, with decreased chest tube output, but with increased oxygen requirements while being on vent. Continue to have no GI function, and minimal UOP despite being net positive. Family decided to terminally extubate on  due to timing for arrangement of [employer] , continue with all existing IV medications and IVF, but no more blood draws, anticoagulation, PO meds or feeds. Will keep all tubes and lines in place and remove after extubation. At 3:59pm on , patient had no spontaneous respirations, heart sounds, response to any stimulus including noxious stimuli. Patient's pupil were fixed and dilated at 8mm and with no response to light, no corneal reflexes, no gag reflex, and no oculocephalic reflex.Patient's family was contacted and did not desire autopsy,  services were offered, and  arrangements were discussed.

## 2019-08-25 NOTE — PROGRESS NOTE ADULT - SUBJECTIVE AND OBJECTIVE BOX
Interval Events: Patient seen and examined at the bedside. No overnight events. Plan is to palliatevely extubate at 15:00 today.    REVIEW OF SYSTEMS:  unobtainable secondary to mental status      OBJECTIVE:  ICU Vital Signs Last 24 Hrs  T(C): 37 (25 Aug 2019 08:00), Max: 37.9 (25 Aug 2019 00:00)  T(F): 98.6 (25 Aug 2019 08:00), Max: 100.2 (25 Aug 2019 00:00)  HR: 106 (25 Aug 2019 11:54) (102 - 124)  BP: 89/69 (25 Aug 2019 10:00) (78/58 - 98/69)  BP(mean): 75 (25 Aug 2019 10:00) (62 - 78)  ABP: --  ABP(mean): --  RR: 27 (25 Aug 2019 10:00) (25 - 29)  SpO2: 93% (25 Aug 2019 11:54) (93% - 100%)    Mode: AC/ CMV (Assist Control/ Continuous Mandatory Ventilation), RR (machine): 26, TV (machine): 450, FiO2: 90, PEEP: 8, ITime: 1, MAP: 13, PIP: 31    08-24 @ 07:01 - 08-25 @ 07:00  --------------------------------------------------------  IN: 1618.2 mL / OUT: 2140 mL / NET: -521.8 mL    08-25 @ 07:01 - 08-25 @ 11:57  --------------------------------------------------------  IN: 92.8 mL / OUT: 475 mL / NET: -382.2 mL      CAPILLARY BLOOD GLUCOSE      POCT Blood Glucose.: 104 mg/dL (23 Aug 2019 13:09)      PHYSICAL EXAM:  GENERAL: Intubated  NEURO: Sedated, unresponsive  HEENT: b/l pupil constricted and reactive to light; clear conjunctiva;  RESP: Intubated; decreased breath sound bibasilar fields (R>L);  CVS: S1/S2 present, RRR. no murmurs, gallops or rubs appreciated; (+) R subclavian TLC  ABD: Soft, non-distended, no masses appreciated; diminished BS  EXT: 2+ pedal pulses bilaterally, no BLE edema  SKIN: Warm, dry. No new rashes        HOSPITAL MEDICATIONS:  Standing Meds:  ALBUTerol/ipratropium for Nebulization 3 milliLiter(s) Nebulizer every 6 hours  artificial tears (preservative free) Ophthalmic Solution 1 Drop(s) Both EYES two times a day  aspirin  chewable 81 milliGRAM(s) Oral daily  cefepime   IVPB 1000 milliGRAM(s) IV Intermittent every 8 hours  chlorhexidine 0.12% Liquid 15 milliLiter(s) Oral Mucosa every 12 hours  chlorhexidine 4% Liquid 1 Application(s) Topical <User Schedule>  chlorhexidine 4% Liquid 1 Application(s) Topical <User Schedule>  fentaNYL   Infusion. 0.4 MICROgram(s)/kG/Hr IV Continuous <Continuous>  fentaNYL   Patch  50 MICROgram(s)/Hr. 1 Patch Transdermal every 48 hours  fluconAZOLE IVPB 400 milliGRAM(s) IV Intermittent every 24 hours  HYDROmorphone Infusion 2 mG/Hr IV Continuous <Continuous>  metroNIDAZOLE  IVPB 500 milliGRAM(s) IV Intermittent every 8 hours  midazolam Injectable 2 milliGRAM(s) IV Push once  pantoprazole  Injectable 40 milliGRAM(s) IV Push daily  petrolatum Ophthalmic Ointment 1 Application(s) Both EYES two times a day  propofol Infusion 40 MICROgram(s)/kG/Min IV Continuous <Continuous>      PRN Meds:  acetaminophen    Suspension .. 650 milliGRAM(s) Oral every 6 hours PRN  sodium chloride 0.9% lock flush 10 milliLiter(s) IV Push every 1 hour PRN      LABS:                        9.6    27.0  )-----------( 288      ( 24 Aug 2019 02:01 )             32.4     Hgb Trend: 9.6<--, 10.2<--, 10.1<--, 9.3<--, 9.5<--  08-24    125<L>  |  91<L>  |  23  ----------------------------<  104<H>  4.6   |  24  |  0.41<L>    Ca    8.2<L>      24 Aug 2019 02:01  Phos  2.3     08-24  Mg     2.0     08-24      Creatinine Trend: 0.41<--, 0.46<--, 0.46<--, 0.38<--, 0.40<--, 0.46<--  PT/INR - ( 24 Aug 2019 02:01 )   PT: 16.6 sec;   INR: 1.44 ratio         PTT - ( 24 Aug 2019 10:31 )  PTT:68.9 sec          MICROBIOLOGY:     Culture - Blood (collected 23 Aug 2019 06:34)  Source: .Blood  Preliminary Report (24 Aug 2019 07:01):    No growth to date.    Culture - Blood (collected 23 Aug 2019 06:34)  Source: .Blood  Preliminary Report (24 Aug 2019 07:01):    No growth to date.      RADIOLOGY:  [ ] Reviewed and interpreted by me    EKG:

## 2019-08-25 NOTE — PROGRESS NOTE ADULT - NSHPATTENDINGPLANDISCUSS_GEN_ALL_CORE
MICU team
MICU team, bedside RN and wife (Marnie)
MICU team
MICU team
MICU team, bedside RN and wife (Marnie)
MICU team, bedside RN, wife and parents
MICU team
MICU team, bedside RN and wife (Marnie)
MICU team
House staff
house staff

## 2019-08-25 NOTE — PROGRESS NOTE ADULT - ASSESSMENT
Patient is a 53 M with hx of esophageal CA with mets to R pelvis and brain, CAD s/p RCA stent in 2015, p/w dyspnea 2/2 R pleural effusion s/p thoracentesis and bronchoscopy, admitted to MICU for hypoxic respiratory failure post bronch with resulting tension pneumothorax now s/p chest tube and intubation, course further c/b hypotension, on pressors.     #Neuro  - Sedated on fentanyl and propofol, will wean off propofol and start dilaudid drip pending extubation    #Resp  - Hypoxic respiratory failure 2/2 R malignant pleural effusion vs. infection, s/p intubation  - 40cc output from chest tube; (+) air leak, c/w wall suction  - CXR with improved PTX while on suction, increased pleural effusion b/l compared to previous study.   - BAL fluid cytology negative for malignant cells    #CV  - s/p RCA stent in 2015  - Off pressors; pressor capped.   - Cardiology following, no acute interventions  - Cont asa    #GI  - OGT placed by GI, high residual with tube feeds, hold feeding; NPO except meds  - Traced food dye in ET tube, no leak in CT currently, pink fluids in OGT - suspect esophageal/bronchial communication  - Protonix 40 mg IV daily  - Relistor didn't work    #ID  - (+) persistent leukocytosis & febrile 8/21; R/p Bcx, UA, Ucx 8/20 (-)  - s/p vanc and zosyn (8/14-8/21)  - Changed to cefepime 8/21, c/w Diflucan and Flagyl (8/21 - ) as per ID  - Procalcitonin 1.48; f/u coccidioides AB   - Combi cath cx normal katerine    #Renal  - AGNES resolved  - Hyponatremia, SIADH vs. renal excretion 2/2 intrinsic kidney injury; Cortisol AM wnl; f/u Q12h BMP  - (+) Rodriguez with low UOP; (+) hematuria  - Mg > 2, K > 4 as per cards.    #Onc  - Metastatic esophageal CA with mets to pelvis and brain  - Holding Keytruda inpatient (last cycle in July), will f/u outpatient with Dr. Mackey    #Heme  - (+) extensive DVT's in LUE and LLE, on heparin gtt  - Monitor for bleeding    #Endo  - No active issues    #DVT PPx  - On heparin gtt for (+) DVT's, high risk    #GOC/Ethics  - DNR; Cap pressors; will discuss with family about comfort care.  - As per wife (Marnie) and Father (Jesus): will withdrawal care on Sunday afternoon. Do not contact wife when patient passes. Contacts and phone numbers in provider handoff. Patient is a 53 M with hx of esophageal CA with mets to R pelvis and brain, CAD s/p RCA stent in 2015, p/w dyspnea 2/2 R pleural effusion s/p thoracentesis and bronchoscopy, admitted to MICU for hypoxic respiratory failure post bronch with resulting tension pneumothorax now s/p chest tube and intubation, course further c/b hypotension, on pressors.     #Neuro  - Sedated on fentanyl and propofol, will wean off propofol and start dilaudid drip pending extubation    #Resp  - Hypoxic respiratory failure 2/2 R malignant pleural effusion vs. infection, s/p intubation  - 40cc output from chest tube; (+) air leak, c/w wall suction  - CXR with improved PTX while on suction, increased pleural effusion b/l compared to previous study.   - BAL fluid cytology negative for malignant cells    #CV  - s/p RCA stent in 2015  - Off pressors; pressor capped.   - Cardiology following, no acute interventions  - Cont asa    #GI  - OGT placed by GI, high residual with tube feeds, hold feeding; NPO except meds  - Traced food dye in ET tube, no leak in CT currently, pink fluids in OGT - suspect esophageal/bronchial communication  - Protonix 40 mg IV daily  - Relistor didn't work    #ID  - holding labs as patient is on comfort care only  - (+) persistent leukocytosis & febrile 8/21; R/p Bcx, UA, Ucx 8/20 (-)  - s/p vanc and zosyn (8/14-8/21)  - Changed to cefepime 8/21, c/w Diflucan and Flagyl (8/21 - ) as per ID  - Procalcitonin 1.48; f/u coccidioides AB   - Combi cath cx normal katerine    #Renal  - AGNES resolved  - Hyponatremia, SIADH vs. renal excretion 2/2 intrinsic kidney injury; Cortisol AM wnl; f/u Q12h BMP  - (+) Rodriguez with low UOP; (+) hematuria  - Mg > 2, K > 4 as per cards.    #Onc  - Metastatic esophageal CA with mets to pelvis and brain  - Holding Keytruda inpatient (last cycle in July), will f/u outpatient with Dr. Mackey    #Heme  - off heparin drip as patient is comfort care only  - (+) extensive DVT's in LUE and LLE  - Monitor for bleeding    #Endo  - No active issues    #DVT PPx  - patient is comfort care, off heparin drip    #GOC/Ethics  - DNR; patient is comfort care, palliative extubation to occur today  - As per wife (Marnie) and Father (Jesus): will withdrawal care on Sunday afternoon. Do not contact wife when patient passes. Contacts and phone numbers in provider handoff.

## 2019-08-26 LAB
C IMMITIS AB FLD QL CF: NEGATIVE — SIGNIFICANT CHANGE UP
C IMMITIS IGM SPEC QL IA: NEGATIVE — SIGNIFICANT CHANGE UP
COCCIDIOIDES IGG SPEC QL IA: NEGATIVE — SIGNIFICANT CHANGE UP

## 2019-08-27 ENCOUNTER — APPOINTMENT (OUTPATIENT)
Dept: RADIATION ONCOLOGY | Facility: CLINIC | Age: 54
End: 2019-08-27

## 2019-08-28 ENCOUNTER — APPOINTMENT (OUTPATIENT)
Dept: PULMONOLOGY | Facility: CLINIC | Age: 54
End: 2019-08-28

## 2019-09-04 ENCOUNTER — APPOINTMENT (OUTPATIENT)
Age: 54
End: 2019-09-04

## 2019-09-11 LAB
CULTURE RESULTS: SIGNIFICANT CHANGE UP
SPECIMEN SOURCE: SIGNIFICANT CHANGE UP

## 2019-12-17 ENCOUNTER — OTHER (OUTPATIENT)
Age: 54
End: 2019-12-17

## 2020-03-11 NOTE — ED PROVIDER NOTE - INTERPRETATION
S/w patient about fasting labs, he stated that he will be completing his labs before his appt with REGGIE Balderrama on 03/13/2020.  
normal sinus rhythm

## 2020-07-15 NOTE — DISCHARGE NOTE ADULT - LEARNING BEHAVIORAL ACTIVITIES TO COPE WITH URGES. FOR EXAMPLE, DISTRACTION AND CHANGING ROUTINES.
Appetite improved , weight is stable  Continue regular diet and supplements as tolerated  Statement Selected

## 2020-10-29 NOTE — DISCHARGE NOTE ADULT - LEARNING BEHAVIORAL ACTIVITIES TO COPE WITH URGES. FOR EXAMPLE, DISTRACTION AND CHANGING ROUTINES.
anteverted bulky uterus with multiple fibroids; bilateral fallopian tubes and ovaries appeared normal.   dense adhesions of uterus to anterior abdominal wall   umbilical hernia containing fat noted and repaired.   upper abdomen free of disease Statement Selected

## 2021-01-07 NOTE — DISCHARGE NOTE ADULT - VISION (WITH CORRECTIVE LENSES IF THE PATIENT USUALLY WEARS THEM):
Normal vision: sees adequately in most situations; can see medication labels, newsprint Valtrex Pregnancy And Lactation Text: this medication is Pregnancy Category B and is considered safe during pregnancy. This medication is not directly found in breast milk but it's metabolite acyclovir is present. Azathioprine Pregnancy And Lactation Text: This medication is Pregnancy Category D and isn't considered safe during pregnancy. It is unknown if this medication is excreted in breast milk. Isotretinoin Pregnancy And Lactation Text: This medication is Pregnancy Category X and is considered extremely dangerous during pregnancy. It is unknown if it is excreted in breast milk. Zyclara Pregnancy And Lactation Text: This medication is Pregnancy Category C. It is unknown if this medication is excreted in breast milk. Nsaids Counseling: NSAID Counseling: I discussed with the patient that NSAIDs should be taken with food. Prolonged use of NSAIDs can result in the development of stomach ulcers.  Patient advised to stop taking NSAIDs if abdominal pain occurs.  The patient verbalized understanding of the proper use and possible adverse effects of NSAIDs.  All of the patient's questions and concerns were addressed. Hydroxychloroquine Pregnancy And Lactation Text: This medication has been shown to cause fetal harm but it isn't assigned a Pregnancy Risk Category. There are small amounts excreted in breast milk. Zyclara Counseling:  I discussed with the patient the risks of imiquimod including but not limited to erythema, scaling, itching, weeping, crusting, and pain.  Patient understands that the inflammatory response to imiquimod is variable from person to person and was educated regarded proper titration schedule.  If flu-like symptoms develop, patient knows to discontinue the medication and contact us. Quinolones Counseling:  I discussed with the patient the risks of fluoroquinolones including but not limited to GI upset, allergic reaction, drug rash, diarrhea, dizziness, photosensitivity, yeast infections, liver function test abnormalities, tendonitis/tendon rupture. Cosentyx Pregnancy And Lactation Text: This medication is Pregnancy Category B and is considered safe during pregnancy. It is unknown if this medication is excreted in breast milk. Imiquimod Counseling:  I discussed with the patient the risks of imiquimod including but not limited to erythema, scaling, itching, weeping, crusting, and pain.  Patient understands that the inflammatory response to imiquimod is variable from person to person and was educated regarded proper titration schedule.  If flu-like symptoms develop, patient knows to discontinue the medication and contact us. Cimetidine Counseling:  I discussed with the patient the risks of Cimetidine including but not limited to gynecomastia, headache, diarrhea, nausea, drowsiness, arrhythmias, pancreatitis, skin rashes, psychosis, bone marrow suppression and kidney toxicity. High Dose Vitamin A Counseling: Side effects reviewed, pt to contact office should one occur. Dupixent Counseling: I discussed with the patient the risks of dupilumab including but not limited to eye infection and irritation, cold sores, injection site reactions, worsening of asthma, allergic reactions and increased risk of parasitic infection.  Live vaccines should be avoided while taking dupilumab. Dupilumab will also interact with certain medications such as warfarin and cyclosporine. The patient understands that monitoring is required and they must alert us or the primary physician if symptoms of infection or other concerning signs are noted. Siliq Pregnancy And Lactation Text: The risk during pregnancy and breastfeeding is uncertain with this medication. Rifampin Counseling: I discussed with the patient the risks of rifampin including but not limited to liver damage, kidney damage, red-orange body fluids, nausea/vomiting and severe allergy. Nsaids Pregnancy And Lactation Text: These medications are considered safe up to 30 weeks gestation. It is excreted in breast milk. Quinolones Pregnancy And Lactation Text: This medication is Pregnancy Category C and it isn't know if it is safe during pregnancy. It is also excreted in breast milk. Cellcept Counseling:  I discussed with the patient the risks of mycophenolate mofetil including but not limited to infection/immunosuppression, GI upset, hypokalemia, hypercholesterolemia, bone marrow suppression, lymphoproliferative disorders, malignancy, GI ulceration/bleed/perforation, colitis, interstitial lung disease, kidney failure, progressive multifocal leukoencephalopathy, and birth defects.  The patient understands that monitoring is required including a baseline creatinine and regular CBC testing. In addition, patient must alert us immediately if symptoms of infection or other concerning signs are noted. Simponi Counseling:  I discussed with the patient the risks of golimumab including but not limited to myelosuppression, immunosuppression, autoimmune hepatitis, demyelinating diseases, lymphoma, and serious infections.  The patient understands that monitoring is required including a PPD at baseline and must alert us or the primary physician if symptoms of infection or other concerning signs are noted. Odomzo Counseling- I discussed with the patient the risks of Odomzo including but not limited to nausea, vomiting, diarrhea, constipation, weight loss, changes in the sense of taste, decreased appetite, muscle spasms, and hair loss.  The patient verbalized understanding of the proper use and possible adverse effects of Odomzo.  All of the patient's questions and concerns were addressed. Rifampin Pregnancy And Lactation Text: This medication is Pregnancy Category C and it isn't know if it is safe during pregnancy. It is also excreted in breast milk and should not be used if you are breast feeding. Minoxidil Pregnancy And Lactation Text: This medication has not been assigned a Pregnancy Risk Category but animal studies failed to show danger with the topical medication. It is unknown if the medication is excreted in breast milk. Cimetidine Pregnancy And Lactation Text: This medication is Pregnancy Category B and is considered safe during pregnancy. It is also excreted in breast milk and breast feeding isn't recommended. Arava Counseling:  Patient counseled regarding adverse effects of Arava including but not limited to nausea, vomiting, abnormalities in liver function tests. Patients may develop mouth sores, rash, diarrhea, and abnormalities in blood counts. The patient understands that monitoring is required including LFTs and blood counts.  There is a rare possibility of scarring of the liver and lung problems that can occur when taking methotrexate. Persistent nausea, loss of appetite, pale stools, dark urine, cough, and shortness of breath should be reported immediately. Patient advised to discontinue Arava treatment and consult with a physician prior to attempting conception. The patient will have to undergo a treatment to eliminate Arava from the body prior to conception. Minoxidil Counseling: Minoxidil is a topical medication which can increase blood flow where it is applied. It is uncertain how this medication increases hair growth. Side effects are uncommon and include stinging and allergic reactions. Azithromycin Counseling:  I discussed with the patient the risks of azithromycin including but not limited to GI upset, allergic reaction, drug rash, diarrhea, and yeast infections. High Dose Vitamin A Pregnancy And Lactation Text: High dose vitamin A therapy is contraindicated during pregnancy and breast feeding. Dupixent Pregnancy And Lactation Text: This medication likely crosses the placenta but the risk for the fetus is uncertain. This medication is excreted in breast milk. Use Enhanced Medication Counseling?: No Sarecycline Counseling: Patient advised regarding possible photosensitivity and discoloration of the teeth, skin, lips, tongue and gums.  Patient instructed to avoid sunlight, if possible.  When exposed to sunlight, patients should wear protective clothing, sunglasses, and sunscreen.  The patient was instructed to call the office immediately if the following severe adverse effects occur:  hearing changes, easy bruising/bleeding, severe headache, or vision changes.  The patient verbalized understanding of the proper use and possible adverse effects of sarecycline.  All of the patient's questions and concerns were addressed. Doxepin Counseling:  Patient advised that the medication is sedating and not to drive a car after taking this medication. Patient informed of potential adverse effects including but not limited to dry mouth, urinary retention, and blurry vision.  The patient verbalized understanding of the proper use and possible adverse effects of doxepin.  All of the patient's questions and concerns were addressed. Picato Counseling:  I discussed with the patient the risks of Picato including but not limited to erythema, scaling, itching, weeping, crusting, and pain. Bactrim Counseling:  I discussed with the patient the risks of sulfa antibiotics including but not limited to GI upset, allergic reaction, drug rash, diarrhea, dizziness, photosensitivity, and yeast infections.  Rarely, more serious reactions can occur including but not limited to aplastic anemia, agranulocytosis, methemoglobinemia, blood dyscrasias, liver or kidney failure, lung infiltrates or desquamative/blistering drug rashes. Arava Pregnancy And Lactation Text: This medication is Pregnancy Category X and is absolutely contraindicated during pregnancy. It is unknown if it is excreted in breast milk. Azithromycin Pregnancy And Lactation Text: This medication is considered safe during pregnancy and is also secreted in breast milk. Enbrel Counseling:  I discussed with the patient the risks of etanercept including but not limited to myelosuppression, immunosuppression, autoimmune hepatitis, demyelinating diseases, lymphoma, and infections.  The patient understands that monitoring is required including a PPD at baseline and must alert us or the primary physician if symptoms of infection or other concerning signs are noted. Cyclophosphamide Counseling:  I discussed with the patient the risks of cyclophosphamide including but not limited to hair loss, hormonal abnormalities, decreased fertility, abdominal pain, diarrhea, nausea and vomiting, bone marrow suppression and infection. The patient understands that monitoring is required while taking this medication. Bactrim Pregnancy And Lactation Text: This medication is Pregnancy Category D and is known to cause fetal risk.  It is also excreted in breast milk. Sarecycline Pregnancy And Lactation Text: This medication is Pregnancy Category D and not consider safe during pregnancy. It is also excreted in breast milk. Skyrizi Counseling: I discussed with the patient the risks of risankizumab-rzaa including but not limited to immunosuppression, and serious infections.  The patient understands that monitoring is required including a PPD at baseline and must alert us or the primary physician if symptoms of infection or other concerning signs are noted. Clofazimine Counseling:  I discussed with the patient the risks of clofazimine including but not limited to skin and eye pigmentation, liver damage, nausea/vomiting, gastrointestinal bleeding and allergy. Benzoyl Peroxide Pregnancy And Lactation Text: This medication is Pregnancy Category C. It is unknown if benzoyl peroxide is excreted in breast milk. Humira Counseling:  I discussed with the patient the risks of adalimumab including but not limited to myelosuppression, immunosuppression, autoimmune hepatitis, demyelinating diseases, lymphoma, and serious infections.  The patient understands that monitoring is required including a PPD at baseline and must alert us or the primary physician if symptoms of infection or other concerning signs are noted. Benzoyl Peroxide Counseling: Patient counseled that medicine may cause skin irritation and bleach clothing.  In the event of skin irritation, the patient was advised to reduce the amount of the drug applied or use it less frequently.   The patient verbalized understanding of the proper use and possible adverse effects of benzoyl peroxide.  All of the patient's questions and concerns were addressed. Cyclophosphamide Pregnancy And Lactation Text: This medication is Pregnancy Category D and it isn't considered safe during pregnancy. This medication is excreted in breast milk. Doxepin Pregnancy And Lactation Text: This medication is Pregnancy Category C and it isn't known if it is safe during pregnancy. It is also excreted in breast milk and breast feeding isn't recommended. Otezla Counseling: The side effects of Otezla were discussed with the patient, including but not limited to worsening or new depression, weight loss, diarrhea, nausea, upper respiratory tract infection, and headache. Patient instructed to call the office should any adverse effect occur.  The patient verbalized understanding of the proper use and possible adverse effects of Otezla.  All the patient's questions and concerns were addressed. Tetracycline Counseling: Patient counseled regarding possible photosensitivity and increased risk for sunburn.  Patient instructed to avoid sunlight, if possible.  When exposed to sunlight, patients should wear protective clothing, sunglasses, and sunscreen.  The patient was instructed to call the office immediately if the following severe adverse effects occur:  hearing changes, easy bruising/bleeding, severe headache, or vision changes.  The patient verbalized understanding of the proper use and possible adverse effects of tetracycline.  All of the patient's questions and concerns were addressed. Patient understands to avoid pregnancy while on therapy due to potential birth defects. Protopic Counseling: Patient may experience a mild burning sensation during topical application. Protopic is not approved in children less than 2 years of age. There have been case reports of hematologic and skin malignancies in patients using topical calcineurin inhibitors although causality is questionable. Carac Counseling:  I discussed with the patient the risks of Carac including but not limited to erythema, scaling, itching, weeping, crusting, and pain. Oxybutynin Counseling:  I discussed with the patient the risks of oxybutynin including but not limited to skin rash, drowsiness, dry mouth, difficulty urinating, and blurred vision. Cyclosporine Pregnancy And Lactation Text: This medication is Pregnancy Category C and it isn't know if it is safe during pregnancy. This medication is excreted in breast milk. Otezla Pregnancy And Lactation Text: This medication is Pregnancy Category C and it isn't known if it is safe during pregnancy. It is unknown if it is excreted in breast milk. Cyclosporine Counseling:  I discussed with the patient the risks of cyclosporine including but not limited to hypertension, gingival hyperplasia,myelosuppression, immunosuppression, liver damage, kidney damage, neurotoxicity, lymphoma, and serious infections. The patient understands that monitoring is required including baseline blood pressure, CBC, CMP, lipid panel and uric acid, and then 1-2 times monthly CMP and blood pressure. Hydroxyzine Counseling: Patient advised that the medication is sedating and not to drive a car after taking this medication.  Patient informed of potential adverse effects including but not limited to dry mouth, urinary retention, and blurry vision.  The patient verbalized understanding of the proper use and possible adverse effects of hydroxyzine.  All of the patient's questions and concerns were addressed. Clofazimine Pregnancy And Lactation Text: This medication is Pregnancy Category C and isn't considered safe during pregnancy. It is excreted in breast milk. Cephalexin Counseling: I counseled the patient regarding use of cephalexin as an antibiotic for prophylactic and/or therapeutic purposes. Cephalexin (commonly prescribed under brand name Keflex) is a cephalosporin antibiotic which is active against numerous classes of bacteria, including most skin bacteria. Side effects may include nausea, diarrhea, gastrointestinal upset, rash, hives, yeast infections, and in rare cases, hepatitis, kidney disease, seizures, fever, confusion, neurologic symptoms, and others. Patients with severe allergies to penicillin medications are cautioned that there is about a 10% incidence of cross-reactivity with cephalosporins. When possible, patients with penicillin allergies should use alternatives to cephalosporins for antibiotic therapy. Protopic Pregnancy And Lactation Text: This medication is Pregnancy Category C. It is unknown if this medication is excreted in breast milk when applied topically. Methotrexate Counseling:  Patient counseled regarding adverse effects of methotrexate including but not limited to nausea, vomiting, abnormalities in liver function tests. Patients may develop mouth sores, rash, diarrhea, and abnormalities in blood counts. The patient understands that monitoring is required including LFT's and blood counts.  There is a rare possibility of scarring of the liver and lung problems that can occur when taking methotrexate. Persistent nausea, loss of appetite, pale stools, dark urine, cough, and shortness of breath should be reported immediately. Patient advised to discontinue methotrexate treatment at least three months before attempting to become pregnant.  I discussed the need for folate supplements while taking methotrexate.  These supplements can decrease side effects during methotrexate treatment. The patient verbalized understanding of the proper use and possible adverse effects of methotrexate.  All of the patient's questions and concerns were addressed. Carac Pregnancy And Lactation Text: This medication is Pregnancy Category X and contraindicated in pregnancy and in women who may become pregnant. It is unknown if this medication is excreted in breast milk. Hydroxyzine Pregnancy And Lactation Text: This medication is not safe during pregnancy and should not be taken. It is also excreted in breast milk and breast feeding isn't recommended. Cephalexin Pregnancy And Lactation Text: This medication is Pregnancy Category B and considered safe during pregnancy.  It is also excreted in breast milk but can be used safely for shorter doses. Stelara Counseling:  I discussed with the patient the risks of ustekinumab including but not limited to immunosuppression, malignancy, posterior leukoencephalopathy syndrome, and serious infections.  The patient understands that monitoring is required including a PPD at baseline and must alert us or the primary physician if symptoms of infection or other concerning signs are noted. Colchicine Counseling:  Patient counseled regarding adverse effects including but not limited to stomach upset (nausea, vomiting, stomach pain, or diarrhea).  Patient instructed to limit alcohol consumption while taking this medication.  Colchicine may reduce blood counts especially with prolonged use.  The patient understands that monitoring of kidney function and blood counts may be required, especially at baseline. The patient verbalized understanding of the proper use and possible adverse effects of colchicine.  All of the patient's questions and concerns were addressed. Clindamycin Counseling: I counseled the patient regarding use of clindamycin as an antibiotic for prophylactic and/or therapeutic purposes. Clindamycin is active against numerous classes of bacteria, including skin bacteria. Side effects may include nausea, diarrhea, gastrointestinal upset, rash, hives, yeast infections, and in rare cases, colitis. 5-Fu Counseling: 5-Fluorouracil Counseling:  I discussed with the patient the risks of 5-fluorouracil including but not limited to erythema, scaling, itching, weeping, crusting, and pain. Albendazole Counseling:  I discussed with the patient the risks of albendazole including but not limited to cytopenia, kidney damage, nausea/vomiting and severe allergy.  The patient understands that this medication is being used in an off-label manner. Methotrexate Pregnancy And Lactation Text: This medication is Pregnancy Category X and is known to cause fetal harm. This medication is excreted in breast milk. Oxybutynin Pregnancy And Lactation Text: This medication is Pregnancy Category B and is considered safe during pregnancy. It is unknown if it is excreted in breast milk. Taltz Counseling: I discussed with the patient the risks of ixekizumab including but not limited to immunosuppression, serious infections, worsening of inflammatory bowel disease and drug reactions.  The patient understands that monitoring is required including a PPD at baseline and must alert us or the primary physician if symptoms of infection or other concerning signs are noted. Detail Level: Detailed Solaraze Counseling:  I discussed with the patient the risks of Solaraze including but not limited to erythema, scaling, itching, weeping, crusting, and pain. Ilumya Counseling: I discussed with the patient the risks of tildrakizumab including but not limited to immunosuppression, malignancy, posterior leukoencephalopathy syndrome, and serious infections.  The patient understands that monitoring is required including a PPD at baseline and must alert us or the primary physician if symptoms of infection or other concerning signs are noted. Albendazole Pregnancy And Lactation Text: This medication is Pregnancy Category C and it isn't known if it is safe during pregnancy. It is also excreted in breast milk. Birth Control Pills Counseling: Birth Control Pill Counseling: I discussed with the patient the potential side effects of OCPs including but not limited to increased risk of stroke, heart attack, thrombophlebitis, deep venous thrombosis, hepatic adenomas, breast changes, GI upset, headaches, and depression.  The patient verbalized understanding of the proper use and possible adverse effects of OCPs. All of the patient's questions and concerns were addressed. Fluconazole Counseling:  Patient counseled regarding adverse effects of fluconazole including but not limited to headache, diarrhea, nausea, upset stomach, liver function test abnormalities, taste disturbance, and stomach pain.  There is a rare possibility of liver failure that can occur when taking fluconazole.  The patient understands that monitoring of LFTs and kidney function test may be required, especially at baseline. The patient verbalized understanding of the proper use and possible adverse effects of fluconazole.  All of the patient's questions and concerns were addressed. Solaraze Pregnancy And Lactation Text: This medication is Pregnancy Category B and is considered safe. There is some data to suggest avoiding during the third trimester. It is unknown if this medication is excreted in breast milk. Clindamycin Pregnancy And Lactation Text: This medication can be used in pregnancy if certain situations. Clindamycin is also present in breast milk. Prednisone Counseling:  I discussed with the patient the risks of prolonged use of prednisone including but not limited to weight gain, insomnia, osteoporosis, mood changes, diabetes, susceptibility to infection, glaucoma and high blood pressure.  In cases where prednisone use is prolonged, patients should be monitored with blood pressure checks, serum glucose levels and an eye exam.  Additionally, the patient may need to be placed on GI prophylaxis, PCP prophylaxis, and calcium and vitamin D supplementation and/or a bisphosphonate.  The patient verbalized understanding of the proper use and the possible adverse effects of prednisone.  All of the patient's questions and concerns were addressed. Dapsone Counseling: I discussed with the patient the risks of dapsone including but not limited to hemolytic anemia, agranulocytosis, rashes, methemoglobinemia, kidney failure, peripheral neuropathy, headaches, GI upset, and liver toxicity.  Patients who start dapsone require monitoring including baseline LFTs and weekly CBCs for the first month, then every month thereafter.  The patient verbalized understanding of the proper use and possible adverse effects of dapsone.  All of the patient's questions and concerns were addressed. Birth Control Pills Pregnancy And Lactation Text: This medication should be avoided if pregnant and for the first 30 days post-partum. Tremfya Counseling: I discussed with the patient the risks of guselkumab including but not limited to immunosuppression, serious infections, worsening of inflammatory bowel disease and drug reactions.  The patient understands that monitoring is required including a PPD at baseline and must alert us or the primary physician if symptoms of infection or other concerning signs are noted. Griseofulvin Counseling:  I discussed with the patient the risks of griseofulvin including but not limited to photosensitivity, cytopenia, liver damage, nausea/vomiting and severe allergy.  The patient understands that this medication is best absorbed when taken with a fatty meal (e.g., ice cream or french fries). Ivermectin Counseling:  Patient instructed to take medication on an empty stomach with a full glass of water.  Patient informed of potential adverse effects including but not limited to nausea, diarrhea, dizziness, itching, and swelling of the extremities or lymph nodes.  The patient verbalized understanding of the proper use and possible adverse effects of ivermectin.  All of the patient's questions and concerns were addressed. Erivedge Counseling- I discussed with the patient the risks of Erivedge including but not limited to nausea, vomiting, diarrhea, constipation, weight loss, changes in the sense of taste, decreased appetite, muscle spasms, and hair loss.  The patient verbalized understanding of the proper use and possible adverse effects of Erivedge.  All of the patient's questions and concerns were addressed. Dapsone Pregnancy And Lactation Text: This medication is Pregnancy Category C and is not considered safe during pregnancy or breast feeding. Topical Retinoid counseling:  Patient advised to apply a pea-sized amount only at bedtime and wait 30 minutes after washing their face before applying.  If too drying, patient may add a non-comedogenic moisturizer. The patient verbalized understanding of the proper use and possible adverse effects of retinoids.  All of the patient's questions and concerns were addressed. Doxycycline Counseling:  Patient counseled regarding possible photosensitivity and increased risk for sunburn.  Patient instructed to avoid sunlight, if possible.  When exposed to sunlight, patients should wear protective clothing, sunglasses, and sunscreen.  The patient was instructed to call the office immediately if the following severe adverse effects occur:  hearing changes, easy bruising/bleeding, severe headache, or vision changes.  The patient verbalized understanding of the proper use and possible adverse effects of doxycycline.  All of the patient's questions and concerns were addressed. Drysol Counseling:  I discussed with the patient the risks of drysol/aluminum chloride including but not limited to skin rash, itching, irritation, burning. Infliximab Counseling:  I discussed with the patient the risks of infliximab including but not limited to myelosuppression, immunosuppression, autoimmune hepatitis, demyelinating diseases, lymphoma, and serious infections.  The patient understands that monitoring is required including a PPD at baseline and must alert us or the primary physician if symptoms of infection or other concerning signs are noted. Griseofulvin Pregnancy And Lactation Text: This medication is Pregnancy Category X and is known to cause serious birth defects. It is unknown if this medication is excreted in breast milk but breast feeding should be avoided. Tazorac Counseling:  Patient advised that medication is irritating and drying.  Patient may need to apply sparingly and wash off after an hour before eventually leaving it on overnight.  The patient verbalized understanding of the proper use and possible adverse effects of tazorac.  All of the patient's questions and concerns were addressed. Erythromycin Counseling:  I discussed with the patient the risks of erythromycin including but not limited to GI upset, allergic reaction, drug rash, diarrhea, increase in liver enzymes, and yeast infections. Doxycycline Pregnancy And Lactation Text: This medication is Pregnancy Category D and not consider safe during pregnancy. It is also excreted in breast milk but is considered safe for shorter treatment courses. Drysol Pregnancy And Lactation Text: This medication is considered safe during pregnancy and breast feeding. Spironolactone Counseling: Patient advised regarding risks of diarrhea, abdominal pain, hyperkalemia, birth defects (for female patients), liver toxicity and renal toxicity. The patient may need blood work to monitor liver and kidney function and potassium levels while on therapy. The patient verbalized understanding of the proper use and possible adverse effects of spironolactone.  All of the patient's questions and concerns were addressed. Erythromycin Pregnancy And Lactation Text: This medication is Pregnancy Category B and is considered safe during pregnancy. It is also excreted in breast milk. Xeljanz Counseling: I discussed with the patient the risks of Xeljanz therapy including increased risk of infection, liver issues, headache, diarrhea, or cold symptoms. Live vaccines should be avoided. They were instructed to call if they have any problems. Gabapentin Counseling: I discussed with the patient the risks of gabapentin including but not limited to dizziness, somnolence, fatigue and ataxia. Itraconazole Counseling:  I discussed with the patient the risks of itraconazole including but not limited to liver damage, nausea/vomiting, neuropathy, and severe allergy.  The patient understands that this medication is best absorbed when taken with acidic beverages such as non-diet cola or ginger ale.  The patient understands that monitoring is required including baseline LFTs and repeat LFTs at intervals.  The patient understands that they are to contact us or the primary physician if concerning signs are noted. Tazorac Pregnancy And Lactation Text: This medication is not safe during pregnancy. It is unknown if this medication is excreted in breast milk. Rituxan Pregnancy And Lactation Text: This medication is Pregnancy Category C and it isn't know if it is safe during pregnancy. It is unknown if this medication is excreted in breast milk but similar antibodies are known to be excreted. Elidel Counseling: Patient may experience a mild burning sensation during topical application. Elidel is not approved in children less than 2 years of age. There have been case reports of hematologic and skin malignancies in patients using topical calcineurin inhibitors although causality is questionable. Rituxan Counseling:  I discussed with the patient the risks of Rituxan infusions. Side effects can include infusion reactions, severe drug rashes including mucocutaneous reactions, reactivation of latent hepatitis and other infections and rarely progressive multifocal leukoencephalopathy.  All of the patient's questions and concerns were addressed. Spironolactone Pregnancy And Lactation Text: This medication can cause feminization of the male fetus and should be avoided during pregnancy. The active metabolite is also found in breast milk. Metronidazole Counseling:  I discussed with the patient the risks of metronidazole including but not limited to seizures, nausea/vomiting, a metallic taste in the mouth, nausea/vomiting and severe allergy. Eucrisa Counseling: Patient may experience a mild burning sensation during topical application. Eucrisa is not approved in children less than 2 years of age. Sski Pregnancy And Lactation Text: This medication is Pregnancy Category D and isn't considered safe during pregnancy. It is excreted in breast milk. Siliq Counseling:  I discussed with the patient the risks of Siliq including but not limited to new or worsening depression, suicidal thoughts and behavior, immunosuppression, malignancy, posterior leukoencephalopathy syndrome, and serious infections.  The patient understands that monitoring is required including a PPD at baseline and must alert us or the primary physician if symptoms of infection or other concerning signs are noted. There is also a special program designed to monitor depression which is required with Siliq. Topical Clindamycin Counseling: Patient counseled that this medication may cause skin irritation or allergic reactions.  In the event of skin irritation, the patient was advised to reduce the amount of the drug applied or use it less frequently.   The patient verbalized understanding of the proper use and possible adverse effects of clindamycin.  All of the patient's questions and concerns were addressed. Acitretin Pregnancy And Lactation Text: This medication is Pregnancy Category X and should not be given to women who are pregnant or may become pregnant in the future. This medication is excreted in breast milk. SSKI Counseling:  I discussed with the patient the risks of SSKI including but not limited to thyroid abnormalities, metallic taste, GI upset, fever, headache, acne, arthralgias, paraesthesias, lymphadenopathy, easy bleeding, arrhythmias, and allergic reaction. Acitretin Counseling:  I discussed with the patient the risks of acitretin including but not limited to hair loss, dry lips/skin/eyes, liver damage, hyperlipidemia, depression/suicidal ideation, photosensitivity.  Serious rare side effects can include but are not limited to pancreatitis, pseudotumor cerebri, bony changes, clot formation/stroke/heart attack.  Patient understands that alcohol is contraindicated since it can result in liver toxicity and significantly prolong the elimination of the drug by many years. Xelalphonsoz Pregnancy And Lactation Text: This medication is Pregnancy Category D and is not considered safe during pregnancy.  The risk during breast feeding is also uncertain. Bexarotene Counseling:  I discussed with the patient the risks of bexarotene including but not limited to hair loss, dry lips/skin/eyes, liver abnormalities, hyperlipidemia, pancreatitis, depression/suicidal ideation, photosensitivity, drug rash/allergic reactions, hypothyroidism, anemia, leukopenia, infection, cataracts, and teratogenicity.  Patient understands that they will need regular blood tests to check lipid profile, liver function tests, white blood cell count, thyroid function tests and pregnancy test if applicable. Cimzia Counseling:  I discussed with the patient the risks of Cimzia including but not limited to immunosuppression, allergic reactions and infections.  The patient understands that monitoring is required including a PPD at baseline and must alert us or the primary physician if symptoms of infection or other concerning signs are noted. Xolair Counseling:  Patient informed of potential adverse effects including but not limited to fever, muscle aches, rash and allergic reactions.  The patient verbalized understanding of the proper use and possible adverse effects of Xolair.  All of the patient's questions and concerns were addressed. Metronidazole Pregnancy And Lactation Text: This medication is Pregnancy Category B and considered safe during pregnancy.  It is also excreted in breast milk. Ketoconazole Counseling:   Patient counseled regarding improving absorption with orange juice.  Adverse effects include but are not limited to breast enlargement, headache, diarrhea, nausea, upset stomach, liver function test abnormalities, taste disturbance, and stomach pain.  There is a rare possibility of liver failure that can occur when taking ketoconazole. The patient understands that monitoring of LFTs may be required, especially at baseline. The patient verbalized understanding of the proper use and possible adverse effects of ketoconazole.  All of the patient's questions and concerns were addressed. Glycopyrrolate Counseling:  I discussed with the patient the risks of glycopyrrolate including but not limited to skin rash, drowsiness, dry mouth, difficulty urinating, and blurred vision. Minocycline Counseling: Patient advised regarding possible photosensitivity and discoloration of the teeth, skin, lips, tongue and gums.  Patient instructed to avoid sunlight, if possible.  When exposed to sunlight, patients should wear protective clothing, sunglasses, and sunscreen.  The patient was instructed to call the office immediately if the following severe adverse effects occur:  hearing changes, easy bruising/bleeding, severe headache, or vision changes.  The patient verbalized understanding of the proper use and possible adverse effects of minocycline.  All of the patient's questions and concerns were addressed. Hydroquinone Counseling:  Patient advised that medication may result in skin irritation, lightening (hypopigmentation), dryness, and burning.  In the event of skin irritation, the patient was advised to reduce the amount of the drug applied or use it less frequently.  Rarely, spots that are treated with hydroquinone can become darker (pseudoochronosis).  Should this occur, patient instructed to stop medication and call the office. The patient verbalized understanding of the proper use and possible adverse effects of hydroquinone.  All of the patient's questions and concerns were addressed. Bexarotene Pregnancy And Lactation Text: This medication is Pregnancy Category X and should not be given to women who are pregnant or may become pregnant. This medication should not be used if you are breast feeding. Cimzia Pregnancy And Lactation Text: This medication crosses the placenta but can be considered safe in certain situations. Cimzia may be excreted in breast milk. Thalidomide Counseling: I discussed with the patient the risks of thalidomide including but not limited to birth defects, anxiety, weakness, chest pain, dizziness, cough and severe allergy. Ketoconazole Pregnancy And Lactation Text: This medication is Pregnancy Category C and it isn't know if it is safe during pregnancy. It is also excreted in breast milk and breast feeding isn't recommended. Xolair Pregnancy And Lactation Text: This medication is Pregnancy Category B and is considered safe during pregnancy. This medication is excreted in breast milk. Glycopyrrolate Pregnancy And Lactation Text: This medication is Pregnancy Category B and is considered safe during pregnancy. It is unknown if it is excreted breast milk. Topical Sulfur Applications Counseling: Topical Sulfur Counseling: Patient counseled that this medication may cause skin irritation or allergic reactions.  In the event of skin irritation, the patient was advised to reduce the amount of the drug applied or use it less frequently.   The patient verbalized understanding of the proper use and possible adverse effects of topical sulfur application.  All of the patient's questions and concerns were addressed. Cosentyx Counseling:  I discussed with the patient the risks of Cosentyx including but not limited to worsening of Crohn's disease, immunosuppression, allergic reactions and infections.  The patient understands that monitoring is required including a PPD at baseline and must alert us or the primary physician if symptoms of infection or other concerning signs are noted. Azathioprine Counseling:  I discussed with the patient the risks of azathioprine including but not limited to myelosuppression, immunosuppression, hepatotoxicity, lymphoma, and infections.  The patient understands that monitoring is required including baseline LFTs, Creatinine, possible TPMP genotyping and weekly CBCs for the first month and then every 2 weeks thereafter.  The patient verbalized understanding of the proper use and possible adverse effects of azathioprine.  All of the patient's questions and concerns were addressed. Valtrex Counseling: I discussed with the patient the risks of valacyclovir including but not limited to kidney damage, nausea, vomiting and severe allergy.  The patient understands that if the infection seems to be worsening or is not improving, they are to call. Terbinafine Counseling: Patient counseling regarding adverse effects of terbinafine including but not limited to headache, diarrhea, rash, upset stomach, liver function test abnormalities, itching, taste/smell disturbance, nausea, abdominal pain, and flatulence.  There is a rare possibility of liver failure that can occur when taking terbinafine.  The patient understands that a baseline LFT and kidney function test may be required. The patient verbalized understanding of the proper use and possible adverse effects of terbinafine.  All of the patient's questions and concerns were addressed. Topical Sulfur Applications Pregnancy And Lactation Text: This medication is Pregnancy Category C and has an unknown safety profile during pregnancy. It is unknown if this topical medication is excreted in breast milk. Isotretinoin Counseling: Patient should get monthly blood tests, not donate blood, not drive at night if vision affected, not share medication, and not undergo elective surgery for 6 months after tx completed. Side effects reviewed, pt to contact office should one occur. Hydroxychloroquine Counseling:  I discussed with the patient that a baseline ophthalmologic exam is needed at the start of therapy and every year thereafter while on therapy. A CBC may also be warranted for monitoring.  The side effects of this medication were discussed with the patient, including but not limited to agranulocytosis, aplastic anemia, seizures, rashes, retinopathy, and liver toxicity. Patient instructed to call the office should any adverse effect occur.  The patient verbalized understanding of the proper use and possible adverse effects of Plaquenil.  All the patient's questions and concerns were addressed.

## 2021-03-30 NOTE — H&P PST ADULT - VENOUS THROMBOEMBOLISM SCORE
"Initial /69 (BP Location: Left arm, Patient Position: Chair, Cuff Size: Adult Regular)   Pulse 71   Temp 98.3  F (36.8  C) (Tympanic)   Resp 18   Ht 1.626 m (5' 4\")   Wt 58.1 kg (128 lb)   LMP  (LMP Unknown)   Breastfeeding No   BMI 21.97 kg/m   Estimated body mass index is 21.97 kg/m  as calculated from the following:    Height as of this encounter: 1.626 m (5' 4\").    Weight as of this encounter: 58.1 kg (128 lb). .      " 4

## 2021-05-04 NOTE — ED PROVIDER NOTE - CHIEF COMPLAINT
New patient, chief complaint wart minor problem. Solitary on toe. Not better with otc meds. OBJECTIVE: well developed, well nourished appearing patient, alert and oriented, normal affect, exam of foot revealed pinkish plaque great toe ASSESSMENT; wart PLAN: infectivity and spread discussed, lysol shoes. tx options including freezing and dessication discussed. The lesion was destroyed by currettage and dessication   after discussion of recurrence and scarring without complication using sterile technique. Local care per routine Sushma Buchanan M.D.      
Roomed by Jazz Bernal MA    Patient verified by Name and .    Accompanied by Mom: Danay  
The patient is a 52y Male complaining of chest pain.

## 2021-06-16 NOTE — ASU PATIENT PROFILE, ADULT - PAIN LOCATION
except R foot drop with 0/5 R dorsiflexors/no strength deficits were identified My acceptable pain level 5

## 2021-06-25 NOTE — CONSULT NOTE ADULT - I WAS PHYSICALLY PRESENT FOR THE KEY PORTIONS OF THE EVALUATION AND MANAGEMENT (E/M) SERVICE PROVIDED.  I AGREE WITH THE ABOVE HISTORY, PHYSICAL, AND PLAN WHICH I HAVE REVIEWED AND EDITED WHERE APPROPRIATE
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
Statement Selected

## 2021-07-12 NOTE — ED ADULT NURSE NOTE - CAS TRG GEN SKIN COLOR
How Severe Is Your Skin Lesion?: moderate Have Your Skin Lesions Been Treated?: been treated Is This A New Presentation, Or A Follow-Up?: Follow Up Skin Lesions Normal for race

## 2021-07-23 NOTE — ASU PREOP CHECKLIST - RESPIRATORY RATE (BREATHS/MIN)
Person(s) Involved in Teaching   [id number]    Motivation Level  Asks Questions  Yes  Eager to Learn   Yes  Cooperative  Yes  Receptive (willing/able to accept information)  Yes  Any cultural factors/Yazidism beliefs that may influence understanding or compliance? No    Teaching Concerns Addressed  Reviewed diary and proper care of monitor with parent(s)/guardian(s) and patient. Family instructed to return monitor via /mailbox after 1 day(s) .  For questions or problems, call [employer] with number provided 24/7.     Comments  Patient will send monitor back via /mailbox.     Instructional Materials Used/Given  1 day(s)  Zio Patch Holter Monitor     Time Spent With Patient  15 minutes    Teaching Completed By  Ting Bo, EMT    ZIO PATCH In-Clinic Setup    [facility]  [address] [place] [id number]  [phone]    DATE/TIME :  July 23, 2021    PRODUCT CODE / ID: [id number]    
18

## 2021-09-11 NOTE — ED ADULT NURSE NOTE - EENT ASSESSMENT, MLM
ANU Jenkins from [facility] called to speak with patient.  Patient agreed to speak to ANU Jenkins.  Kayla, patient care tech, holding phone to speak to patient.   WDL

## 2021-09-25 NOTE — PROGRESS NOTE ADULT - PROBLEM SELECTOR PROBLEM 2
PAST SURGICAL HISTORY:  No significant past surgical history     
Malignant neoplasm of esophagus, unspecified location
Malignant neoplasm of esophagus, unspecified location

## 2022-01-01 NOTE — PHYSICAL THERAPY INITIAL EVALUATION ADULT - TRANSFER SAFETY CONCERNS NOTED: SIT/STAND, REHAB EVAL
decreased weight-shifting ability Screen#: [id number]  Screen Date: 2022  Screen Comment: N/A    Screen#: [id number]  Screen Date: 2022  [facility] right hand 97% right foot 97%

## 2022-08-03 NOTE — REVIEW OF SYSTEMS
[Negative] : Heme/Lymph [Constipation: Grade 0] : Constipation: Grade 0 [Diarrhea: Grade 0] : Diarrhea: Grade 0 [Nausea: Grade 0] : Nausea: Grade 0 [Vomiting: Grade 0] : Vomiting: Grade 0 [Fatigue: Grade 0] : Fatigue: Grade 0 [Cough: Grade 0] : Cough: Grade 0 no

## 2022-12-20 NOTE — REASON FOR VISIT
Patient was looking at her AVS from previous Urgent Care visit and noticed that her lab results indicated RSV .  She would like more information.   [Initial Evaluation] : an initial evaluation [Abnormal CT Scan] : abnormal CT Scan

## 2022-12-29 NOTE — CONSULT NOTE ADULT - REASON FOR ADMISSION
pleural effusion
I will STOP taking the medications listed below when I get home from the hospital:  None

## 2023-02-01 NOTE — PATIENT PROFILE ADULT - BRADEN SENSORY
What Type Of Note Output Would You Prefer (Optional)?: Standard Output
Hpi Title: Evaluation of Skin Lesions
How Severe Are Your Spot(S)?: mild
(4) no impairment

## 2023-02-02 NOTE — H&P ADULT - PROBLEM SELECTOR PLAN 9
(V5) oriented - DVT ppx: HSQ, hold am dose for thoracentesis   - Diet: regular diet   - Dispo: PT consult

## 2023-03-17 NOTE — PROCEDURE NOTE - AMOUNT OF FLUID OBTAINED
Patient arrives with caregiver, was just at clinic. Edema, palpitations, stomach feels bloated. Was inpatient 3 weeks ago for CVA, has had these symptoms since then.   [id number]

## 2023-07-11 NOTE — ASU PREOP CHECKLIST - LATEX ALLERGY
PATIENT WANTS TO GO HOME. INFORMED HAVE TO WAIT ON URINE RESULTS.      Roxane James RN  07/11/23 0140
no

## 2023-08-18 NOTE — ED ADULT NURSE NOTE - PAIN: PRESENCE, MLM
Problem: At Risk for Falls  Goal: # Patient does not fall  8/18/2023 1639 by Madeleine Hutchinson, RN  Outcome: Outcome Met, Continue evaluating goal progress toward completion  8/18/2023 1638 by Madeleine Hutchinson, RN  Outcome: Outcome Met, Continue evaluating goal progress toward completion     Problem: Pain  Goal: Acceptable pain/comfort level is achieved/maintained at rest based on PAINAID scale (Dementia)  Description: This goal is used for patients who are not able to self-report pain, have dementia, and assessed using the PAINAD scale.  Outcome: Outcome Met, Continue evaluating goal progress toward completion     Problem: Pressure Injury, Risk for  Goal: # Skin remains intact  Outcome: Outcome Met, Continue evaluating goal progress toward completion      complains of pain/discomfort

## 2023-10-08 NOTE — ED ADULT NURSE NOTE - NS ED PATIENT SAFETY CONCERN
Hospitalist discharge summary:  Date of service 10/08/2023     Admission date:  10/06/2023   Discharge date:  10/08/2023     Discharge diagnosis:  1. Diarrhea, more than 10 days duration, possibly exacerbated by multiple courses of oral antibiotics.  GI consultation suggests this is a result of altering her microbial nella and should resolve on its own without colonoscopy or additional treatments  2. Acute kidney injury likely secondary to dehydration, resolved with IV fluids  3. Fibromyalgia   4. 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 Essential hypertension   6. Mild hypokalemia, treated with oral potassium supplementations.  Rx 20 mEq q.day until diarrhea ceased    History:  61-year-old female who has had numerous courses of antibiotics for upper respiratory tract symptoms over the past 2-3 weeks.  She has developed diarrhea, stop the antibiotics, but her diarrhea continued.  It is now over 7 days since her last antibiotic pill.    Consultations: Dr. Acosta of Gastroenterology who did not feel any endoscopy is necessary at this point   Complications:  Cutaneous manifestations of the T-cell lymphoma with cracking, bleeding skin treated with steroid cream    Discharge disposition:  On 10/08/2023 she was having 4 bowel movements per day, her renal function had normalized, electrolytes had improved, she was asking to be discharged home.  K 3.3, home potassium supplementation ordered    Will follow-up with primary care physician and Dr. Acosta for endoscopy if diarrhea continues    Buzz Stoddard MD   [facility]  
No

## 2023-11-30 NOTE — ASU PREOP CHECKLIST - HEIGHT IN FEET
Initial Post Discharge Follow Up   Discharge Date: 11/29/23  Contact Date: 11/30/2023    Consent Verification:  Assessment Completed With: Patient  HIPAA Verified? Yes    Discharge Dx:   Facial cellulitis   Sepsis due to MSSA bacteremia     General:   How have you been since your discharge from the hospital?. Pt reports the swelling is a lot better today. Pt denies pain. Pt denies vision changes. Pt is eating and drinking better then before due to the swelling subsiding. Pt denies any other symptoms at this this time. Pt plans to FU with ID next Tue and is planning to call ENT again to schedule. Pt did call ENT today however the office was not open yet. Pt is aware FU labs are due 12/7/23, pt plans to discuss this at the Cancer center today when he goes for his first IV abx dose post discharge. Pt has no concerns at this time. Do you have any pain since discharge? No    How well was your pain managed while in the hospital?   On a scale of 1-5   1- Very Poor and 5- Very well   Very Well  When you were leaving the hospital were your discharge instructions reviewed with you? Yes  How well were your discharge instructions explained to you? On a scale of 1-5   1- Very Poor and 5- Very well   Very Well  Do you have any questions about your discharge instructions? No  Before leaving the hospital was your diagnoses explained to you? Yes  Do you have any questions about your diagnoses? No  Are you able to perform normal daily activities of living as you have prior to your hospital stay (dressing, bathing, ambulating to the bathroom, etc)? yes  (NCM) Was patient given a different diet per AVS? no      Medications: Reviewed medication list with the patient. Medications are up to date. Current Outpatient Medications   Medication Sig Dispense Refill    dextrose 5% SOLN 500 mL with dalbavancin 500 MG SOLR 1,000 mg Inject 1,000 mg into the vein once for 1 dose.  1 Bag 0    [START ON 12/7/2023] dextrose 5% SOLN 100 mL with dalbavancin 500 MG SOLR 500 mg Inject 500 mg into the vein once for 1 dose. 1 Dose 0    buPROPion ER (WELLBUTRIN XL) 150 MG Oral Tablet 24 Hr Take 1 tablet (150 mg total) by mouth daily with lunch. Take in addition to 300mg tablet to equal 450mg/day 90 tablet 1    BUPROPION  MG Oral Tablet 24 Hr TAKE 1 TABLET (300 MG TOTAL) BY MOUTH EVERY MORNING. 90 tablet 0    Omega 3-6-9 Fatty Acids (OMEGA-3 FUSION OR) Take by mouth. Multiple Vitamin (MULTI-VITAMIN DAILY OR) Take by mouth. Were there any changes to your current medication(s) noted on the AVS? Yes  If so, were these medication changes discussed with you prior to leaving the hospital? Yes  If a new medication was prescribed:    Was the new medication's purpose & side effects reviewed? Yes  Do you have any questions about your new medication? No  Did you  your discharge medications when you left the hospital? No- Pt is to have IV ABX administered at [facility] x2 doses. Let's go over your medications together to make sure we are not missing anything. Medications Reviewed  Are there any reasons that keep you from taking your medication as prescribed? No  Are you having any concerns with constipation? No  Did patient receive their flu shot (Sept-March)? Yes    Discharge medications reviewed/discussed/and reconciled against outpatient medications with patient. Any changes or updates to medications sent to PCP. Patient Acknowledged     Referrals/orders at D/C:  Referrals/orders placed at D/C? no  DME ordered at D/C? No    Discharge orders, AVS reviewed and discussed with patient. Any changes or updates to orders sent to PCP.   Patient Acknowledged      SDOH:     Transportation Needs: No Transportation Needs (11/30/2023)    Transportation Needs     Lack of Transportation: No         Financial Resource Strain: Low Risk  (11/30/2023)    Financial Resource Strain     Difficulty of Paying Living Expenses: Not very hard     Med Affordability: No Follow up appointments:  Reviewed recommended follow up appointments. Pt denies any barriers to keeping/getting to HFU appts. Your appointments       Date & Time Appointment Department [facility])    Nov 30, 2023 11:30 AM CST Antibiotic with Afua Villalobos [facility])        Dec 19, 2023  2:30 PM CST Video Visit with Abel Foote ([id number])    Please verify your telehealth insurance benefits prior to your appointment. You must be in the state of [place] during the virtual visit. Please use the [employer] [place] and launch the video visit 10 minutes prior to your scheduled appointment time to ensure your camera and microphone are working properly. Once the video visit has started you will be placed in a waiting room until the provider begins the visit. You will receive an email confirmation with instructions. If you have questions, call your doctor's office directly. If you are having issues or need to use a desktop/laptop, please follow the below steps:        1. Close out all other open apps (could be competing for audio resources)  2. Disable Bluetooth  3.       Reboot mobile device before joining the video  4. Come off Wi-Fi and switch over to Data    Please see our Video Visit Tip Sheet if you need additional assistance. If you believe this is an emergency, please dial 911 immediately. Christopher Carry  [address] [phone] [address] [place] [address]  [phone]            TCC  Was TCC ordered: No      PCP (If no TCC appointment)  Does patient already have a PCP appointment scheduled?  No  NCM Attempted to schedule PCP office TCM appointment with patient   If no appointment scheduled: Explain: Pt declined for now as he plans to FU with ID and ENT as advised. Pt plans to FU with PCP if needed. He will call the office himself. Specialist    Does the patient have any other follow up appointment(s) needing to be scheduled? Yes  If yes: NCM reviewed upcoming specialist appointment with patient: Yes  Does the patient need assistance scheduling appointment(s): No    Is there any reason as to why you cannot make your appointment(s)? No     Needs post D/C:   Now that you are home, are there any needs or concerns you need addressed before your next visit with your PCP?  (DME, meds, questions, etc.): No    Interventions by NCM:   All d/c instructions reviewed with the pt. Reviewed when to call MD vs when to call 911 or go the ED. Reviewed s/s of worsening cellulitis. Educated pt on the importance of taking all meds as prescribed as well as close f/u with PCP/specialists. Pt verbalized understanding and will contact the office with any further questions or concerns. Overall Rating:    How would you rate the care you received while in the hospital? good    CCM referral placed:    Not Applicable 6

## 2024-06-07 NOTE — CONSULT NOTE ADULT - ATTENDING COMMENTS
pt seen and examined  d/w the team    agree with Dr Mcfarland assessment plan    will  consider fro thoracocentesis and repeat CT chest after wards to r/o any endobronchial disease    pt says last few times after the   thoracentesis he did not see any change in his breathing status    may ultimately need plurex catheter
Prachi Daniel M.D. ,   Pager [phone]     after 5PM/ weekends [phone]      I will be away as of tomorrow. My associates will be covering. Please call [phone] with acute issues, questions.
52 yo M with metastatic esophageal CA diagnosed in 2017 s/p NAC chemo/RT and esophagogastrectomy, now with mets to R pelvis and brain mets to the L superior cerebellar mass (s/p craniotomy in 04/2019) currently on Keytruda who presented with worsening dyspnea, found to have large rt pleural effusion. He is s/p thoracentesis with mild improvement in his sx. Pulm is planning to do CT chest and then likely pleurax as he has reaccumulated x 3 in the last month. We discussed the brain MRI results with the pt and wife. Rec rad onc consult for brain mets progression. Pt has h/o psyche breakdown and is having hard time with his dx. Rec psyche consult. Plan d/w pt and wife
Yes

## 2024-06-18 NOTE — PROGRESS NOTE BEHAVIORAL HEALTH - ORIENTED TO SITUATION
Received pt in bed at change of shift with eyes closed; chest movement noted.  Continues the same thus this far as per q 7 min room checks.   Will continue to monitor behavior, sleeping pattern and any medical issues that may arise.    
Yes
Yes

## 2024-06-20 NOTE — DISCHARGE NOTE ADULT - MEDICATION SUMMARY - MEDICATIONS TO CHANGE
I will SWITCH the dose or number of times a day I take the medications listed below when I get home from the hospital:    HYDROmorphone 2 mg oral tablet  -- 2 tab(s) by mouth every 4 hours, As Needed for severe pain. Can take 1 tab for moderate. MDD:12    gabapentin 100 mg oral capsule  -- 2 cap(s) by mouth 2 times a day
Bed/Stretcher in lowest position, wheels locked, appropriate side rails in place/Call bell, personal items and telephone in reach/Instruct patient to call for assistance before getting out of bed/chair/stretcher/Non-slip footwear applied when patient is off stretcher/[place] to call system/Physically safe environment - no spills, clutter or unnecessary equipment/Purposeful proactive rounding/Room/bathroom lighting operational, light cord in reach

## 2024-11-20 NOTE — DISCHARGE NOTE FOR THE EXPIRED PATIENT - REASON FOR ADMISSION
pleural effusion
FAMILY HISTORY:  Father  Still living? No  FHx: stomach cancer, Age at diagnosis: Age Unknown

## 2025-01-29 NOTE — ED PROVIDER NOTE - NSCAREINITIATED _GEN_ER
Penned all medication. Pharmacy is requesting to be sent for 90 day supply. Thank you.   Jam Carpenter(Resident)

## 2025-07-02 NOTE — PRE-OP CHECKLIST - # OF UNITS
Struggled with frequent asthma exacerbations in the end of 2024.  Extensive workup.  He was escalated to Trelegy which improved his symptoms.  Trelegy is cost prohibitive and he returned to Symbicort.  On physical exam today, he is doing well without bronchospasm or exacerbation.  He uses his nebulizer first thing in the morning with albuterol which is helpful for him.  Continue with Symbicort.  If he needs to be escalated again to triple therapy, consider adding Spiriva to his Symbicort regimen and lieu of Trelegy inhaler.  Follow-up in 6 months or sooner if necessary   2